# Patient Record
Sex: MALE | Race: BLACK OR AFRICAN AMERICAN | NOT HISPANIC OR LATINO | Employment: OTHER | URBAN - METROPOLITAN AREA
[De-identification: names, ages, dates, MRNs, and addresses within clinical notes are randomized per-mention and may not be internally consistent; named-entity substitution may affect disease eponyms.]

---

## 2017-12-07 ENCOUNTER — OFFICE VISIT (OUTPATIENT)
Dept: RHEUMATOLOGY | Facility: CLINIC | Age: 67
End: 2017-12-07
Payer: MEDICARE

## 2017-12-07 VITALS
DIASTOLIC BLOOD PRESSURE: 72 MMHG | HEIGHT: 72 IN | WEIGHT: 177.69 LBS | BODY MASS INDEX: 24.07 KG/M2 | HEART RATE: 62 BPM | SYSTOLIC BLOOD PRESSURE: 144 MMHG

## 2017-12-07 DIAGNOSIS — M06.9 RHEUMATOID ARTHRITIS, INVOLVING UNSPECIFIED SITE, UNSPECIFIED RHEUMATOID FACTOR PRESENCE: Primary | ICD-10-CM

## 2017-12-07 DIAGNOSIS — B18.2 HEPATITIS C CARRIER: ICD-10-CM

## 2017-12-07 DIAGNOSIS — G56.00 CARPAL TUNNEL SYNDROME, UNSPECIFIED LATERALITY: ICD-10-CM

## 2017-12-07 PROCEDURE — 96372 THER/PROPH/DIAG INJ SC/IM: CPT | Mod: S$GLB,,, | Performed by: INTERNAL MEDICINE

## 2017-12-07 PROCEDURE — 99999 PR PBB SHADOW E&M-EST. PATIENT-LVL III: CPT | Mod: PBBFAC,,, | Performed by: INTERNAL MEDICINE

## 2017-12-07 PROCEDURE — 99205 OFFICE O/P NEW HI 60 MIN: CPT | Mod: 25,S$GLB,, | Performed by: INTERNAL MEDICINE

## 2017-12-07 RX ORDER — CLONAZEPAM 1 MG/1
TABLET ORAL
COMMUNITY
Start: 2017-11-20 | End: 2018-06-13

## 2017-12-07 RX ORDER — TIZANIDINE 4 MG/1
4 TABLET ORAL
COMMUNITY
End: 2017-12-07 | Stop reason: SDUPTHER

## 2017-12-07 RX ORDER — SILDENAFIL 100 MG/1
100 TABLET, FILM COATED ORAL
COMMUNITY
Start: 2014-04-21 | End: 2018-03-15 | Stop reason: SDUPTHER

## 2017-12-07 RX ORDER — TIZANIDINE 4 MG/1
4 TABLET ORAL EVERY 8 HOURS
Qty: 90 TABLET | Refills: 5 | Status: SHIPPED | OUTPATIENT
Start: 2017-12-07 | End: 2018-05-03 | Stop reason: SDUPTHER

## 2017-12-07 RX ORDER — ENALAPRIL MALEATE 20 MG/1
20 TABLET ORAL 2 TIMES DAILY
COMMUNITY
Start: 2014-09-05 | End: 2019-05-24 | Stop reason: SDUPTHER

## 2017-12-07 RX ORDER — OMEPRAZOLE 20 MG/1
20 CAPSULE, DELAYED RELEASE ORAL
COMMUNITY
Start: 2015-02-27 | End: 2019-08-09 | Stop reason: SDUPTHER

## 2017-12-07 RX ORDER — ALBUTEROL SULFATE 90 UG/1
1 AEROSOL, METERED RESPIRATORY (INHALATION) EVERY 4 HOURS PRN
COMMUNITY
Start: 2016-08-14 | End: 2021-10-27 | Stop reason: SDUPTHER

## 2017-12-07 RX ORDER — KETOROLAC TROMETHAMINE 30 MG/ML
60 INJECTION, SOLUTION INTRAMUSCULAR; INTRAVENOUS
Status: COMPLETED | OUTPATIENT
Start: 2017-12-07 | End: 2017-12-07

## 2017-12-07 RX ORDER — FUROSEMIDE 20 MG/1
20 TABLET ORAL EVERY MORNING
COMMUNITY
Start: 2017-12-01 | End: 2019-01-29

## 2017-12-07 RX ORDER — PRAVASTATIN SODIUM 20 MG/1
TABLET ORAL
COMMUNITY
Start: 2014-09-09 | End: 2019-08-09 | Stop reason: SDUPTHER

## 2017-12-07 RX ORDER — DICYCLOMINE HYDROCHLORIDE 20 MG/1
20 TABLET ORAL 3 TIMES DAILY PRN
COMMUNITY
Start: 2017-10-26 | End: 2019-02-18

## 2017-12-07 RX ORDER — METHYLPREDNISOLONE ACETATE 80 MG/ML
160 INJECTION, SUSPENSION INTRA-ARTICULAR; INTRALESIONAL; INTRAMUSCULAR; SOFT TISSUE
Status: COMPLETED | OUTPATIENT
Start: 2017-12-07 | End: 2017-12-07

## 2017-12-07 RX ORDER — PREDNISONE 5 MG/1
10 TABLET ORAL DAILY PRN
Qty: 30 TABLET | Refills: 3 | Status: SHIPPED | OUTPATIENT
Start: 2017-12-07 | End: 2018-03-04 | Stop reason: SDUPTHER

## 2017-12-07 RX ORDER — DICLOFENAC SODIUM 10 MG/G
2 GEL TOPICAL DAILY
Qty: 100 G | Refills: 5 | Status: SHIPPED | OUTPATIENT
Start: 2017-12-07 | End: 2018-03-15 | Stop reason: SDUPTHER

## 2017-12-07 RX ORDER — TRAVOPROST OPHTHALMIC SOLUTION 0.04 MG/ML
SOLUTION OPHTHALMIC
COMMUNITY
Start: 2017-02-09 | End: 2019-04-02 | Stop reason: SDUPTHER

## 2017-12-07 RX ORDER — HYDROCODONE BITARTRATE AND ACETAMINOPHEN 10; 325 MG/1; MG/1
TABLET ORAL
COMMUNITY
Start: 2017-11-29 | End: 2017-12-07 | Stop reason: SDUPTHER

## 2017-12-07 RX ORDER — ATENOLOL 100 MG/1
50 TABLET ORAL
COMMUNITY
Start: 2017-09-22 | End: 2019-01-29

## 2017-12-07 RX ORDER — HYDRALAZINE HYDROCHLORIDE 10 MG/1
TABLET, FILM COATED ORAL
COMMUNITY
Start: 2015-02-27 | End: 2019-08-09 | Stop reason: SDUPTHER

## 2017-12-07 RX ORDER — AMLODIPINE BESYLATE 10 MG/1
10 TABLET ORAL EVERY MORNING
COMMUNITY
Start: 2014-09-05 | End: 2019-08-09 | Stop reason: SDUPTHER

## 2017-12-07 RX ORDER — HYDROCODONE BITARTRATE AND ACETAMINOPHEN 10; 325 MG/1; MG/1
1 TABLET ORAL EVERY 8 HOURS PRN
Qty: 90 TABLET | Refills: 0 | Status: SHIPPED | OUTPATIENT
Start: 2017-12-07 | End: 2018-01-06

## 2017-12-07 RX ADMIN — KETOROLAC TROMETHAMINE 60 MG: 30 INJECTION, SOLUTION INTRAMUSCULAR; INTRAVENOUS at 01:12

## 2017-12-07 RX ADMIN — METHYLPREDNISOLONE ACETATE 160 MG: 80 INJECTION, SUSPENSION INTRA-ARTICULAR; INTRALESIONAL; INTRAMUSCULAR; SOFT TISSUE at 01:12

## 2017-12-07 NOTE — PROGRESS NOTES
Administered 2 cc ( 80 mg/ml ) of depomedrol to the right upper outer gluteal. Informed of s/s to report verbalized understanding. No adverse reactions noted.    Lot # [id number]  Expiration 08/2018    Administered 2 cc ( 30 mg/ml ) of toradol to the left upper outer gluteal. Informed of s/s to report verbalized understanding. No adverse reactions noted.    Lot # [id number]  Expiration 05/19

## 2018-01-23 RX ORDER — HYDROCODONE BITARTRATE AND ACETAMINOPHEN 10; 325 MG/1; MG/1
1 TABLET ORAL EVERY 8 HOURS PRN
Qty: 90 TABLET | Refills: 0 | Status: SHIPPED | OUTPATIENT
Start: 2018-01-23 | End: 2018-02-19 | Stop reason: SDUPTHER

## 2018-01-23 RX ORDER — HYDROCODONE BITARTRATE AND ACETAMINOPHEN 10; 325 MG/1; MG/1
TABLET ORAL
COMMUNITY
Start: 2017-12-26 | End: 2018-01-23 | Stop reason: SDUPTHER

## 2018-01-23 RX ORDER — HYDROCODONE BITARTRATE AND ACETAMINOPHEN 10; 325 MG/1; MG/1
TABLET ORAL
Status: CANCELLED | OUTPATIENT
Start: 2018-01-23

## 2018-01-23 NOTE — TELEPHONE ENCOUNTER
----- Message from Sena Perez sent at 1/22/2018 10:58 AM CST -----  Contact: [phone]  Refill :Hydrocodone please send to [facility] - [place], [place] - [address] [facility]

## 2018-02-19 ENCOUNTER — TELEPHONE (OUTPATIENT)
Dept: RHEUMATOLOGY | Facility: CLINIC | Age: 68
End: 2018-02-19

## 2018-02-19 RX ORDER — HYDROXYCHLOROQUINE SULFATE 200 MG/1
200 TABLET, FILM COATED ORAL 2 TIMES DAILY
Qty: 60 TABLET | Refills: 5 | Status: SHIPPED | OUTPATIENT
Start: 2018-02-19 | End: 2018-07-16 | Stop reason: SDUPTHER

## 2018-02-19 RX ORDER — HYDROCODONE BITARTRATE AND ACETAMINOPHEN 10; 325 MG/1; MG/1
1 TABLET ORAL EVERY 8 HOURS PRN
Qty: 90 TABLET | Refills: 0 | Status: SHIPPED | OUTPATIENT
Start: 2018-02-19 | End: 2018-03-15 | Stop reason: SDUPTHER

## 2018-03-04 DIAGNOSIS — G56.00 CARPAL TUNNEL SYNDROME, UNSPECIFIED LATERALITY: ICD-10-CM

## 2018-03-04 DIAGNOSIS — M06.9 RHEUMATOID ARTHRITIS, INVOLVING UNSPECIFIED SITE, UNSPECIFIED RHEUMATOID FACTOR PRESENCE: ICD-10-CM

## 2018-03-05 RX ORDER — PREDNISONE 5 MG/1
10 TABLET ORAL DAILY PRN
Qty: 60 TABLET | Refills: 3 | Status: SHIPPED | OUTPATIENT
Start: 2018-03-05 | End: 2018-06-29 | Stop reason: SDUPTHER

## 2018-03-15 ENCOUNTER — TELEPHONE (OUTPATIENT)
Dept: PHARMACY | Facility: CLINIC | Age: 68
End: 2018-03-15

## 2018-03-15 ENCOUNTER — OFFICE VISIT (OUTPATIENT)
Dept: RHEUMATOLOGY | Facility: CLINIC | Age: 68
End: 2018-03-15
Payer: MEDICARE

## 2018-03-15 VITALS
HEART RATE: 63 BPM | BODY MASS INDEX: 23.61 KG/M2 | SYSTOLIC BLOOD PRESSURE: 157 MMHG | DIASTOLIC BLOOD PRESSURE: 82 MMHG | WEIGHT: 174.06 LBS

## 2018-03-15 DIAGNOSIS — E03.9 HYPOTHYROIDISM, UNSPECIFIED TYPE: ICD-10-CM

## 2018-03-15 DIAGNOSIS — B18.2 HEPATITIS C CARRIER: ICD-10-CM

## 2018-03-15 DIAGNOSIS — M06.9 RHEUMATOID ARTHRITIS, INVOLVING UNSPECIFIED SITE, UNSPECIFIED RHEUMATOID FACTOR PRESENCE: Primary | ICD-10-CM

## 2018-03-15 DIAGNOSIS — M17.9 OSTEOARTHRITIS OF KNEE, UNSPECIFIED LATERALITY, UNSPECIFIED OSTEOARTHRITIS TYPE: ICD-10-CM

## 2018-03-15 DIAGNOSIS — G56.00 CARPAL TUNNEL SYNDROME, UNSPECIFIED LATERALITY: ICD-10-CM

## 2018-03-15 PROCEDURE — 96372 THER/PROPH/DIAG INJ SC/IM: CPT | Mod: S$GLB,,, | Performed by: INTERNAL MEDICINE

## 2018-03-15 PROCEDURE — 99999 PR PBB SHADOW E&M-EST. PATIENT-LVL III: CPT | Mod: PBBFAC,,, | Performed by: INTERNAL MEDICINE

## 2018-03-15 PROCEDURE — 99214 OFFICE O/P EST MOD 30 MIN: CPT | Mod: 25,S$GLB,, | Performed by: INTERNAL MEDICINE

## 2018-03-15 RX ORDER — DICLOFENAC SODIUM 10 MG/G
2 GEL TOPICAL 4 TIMES DAILY
Qty: 100 G | Refills: 5 | Status: SHIPPED | OUTPATIENT
Start: 2018-03-15 | End: 2018-03-23 | Stop reason: SDUPTHER

## 2018-03-15 RX ORDER — BRIMONIDINE TARTRATE 1.5 MG/ML
1 SOLUTION/ DROPS OPHTHALMIC 2 TIMES DAILY
COMMUNITY
Start: 2018-02-27 | End: 2018-10-17 | Stop reason: ALTCHOICE

## 2018-03-15 RX ORDER — SILDENAFIL 100 MG/1
100 TABLET, FILM COATED ORAL
Qty: 10 TABLET | Refills: 11 | Status: ON HOLD | OUTPATIENT
Start: 2018-03-15 | End: 2018-10-18 | Stop reason: CLARIF

## 2018-03-15 RX ORDER — HYDROCODONE BITARTRATE AND ACETAMINOPHEN 10; 325 MG/1; MG/1
1 TABLET ORAL EVERY 8 HOURS PRN
Qty: 90 TABLET | Refills: 0 | Status: SHIPPED | OUTPATIENT
Start: 2018-04-16 | End: 2018-06-13

## 2018-03-15 RX ORDER — HYDROCODONE BITARTRATE AND ACETAMINOPHEN 10; 325 MG/1; MG/1
1 TABLET ORAL EVERY 8 HOURS PRN
Qty: 90 TABLET | Refills: 0
Start: 2018-05-15 | End: 2018-03-15 | Stop reason: SDUPTHER

## 2018-03-15 RX ORDER — HYDROCODONE BITARTRATE AND ACETAMINOPHEN 10; 325 MG/1; MG/1
1 TABLET ORAL EVERY 8 HOURS PRN
Qty: 90 TABLET | Refills: 0 | Status: SHIPPED | OUTPATIENT
Start: 2018-05-15 | End: 2018-06-13

## 2018-03-15 RX ORDER — HYDROCODONE BITARTRATE AND ACETAMINOPHEN 10; 325 MG/1; MG/1
1 TABLET ORAL EVERY 8 HOURS PRN
Qty: 90 TABLET | Refills: 0 | Status: SHIPPED | OUTPATIENT
Start: 2018-03-15 | End: 2018-06-13 | Stop reason: SDUPTHER

## 2018-03-15 RX ORDER — KETOROLAC TROMETHAMINE 30 MG/ML
60 INJECTION, SOLUTION INTRAMUSCULAR; INTRAVENOUS
Status: COMPLETED | OUTPATIENT
Start: 2018-03-15 | End: 2018-03-15

## 2018-03-15 RX ORDER — METHYLPREDNISOLONE ACETATE 80 MG/ML
160 INJECTION, SUSPENSION INTRA-ARTICULAR; INTRALESIONAL; INTRAMUSCULAR; SOFT TISSUE
Status: COMPLETED | OUTPATIENT
Start: 2018-03-15 | End: 2018-03-15

## 2018-03-15 RX ORDER — DORZOLAMIDE HCL 20 MG/ML
1 SOLUTION/ DROPS OPHTHALMIC 3 TIMES DAILY
COMMUNITY
Start: 2018-03-06 | End: 2019-02-06

## 2018-03-15 RX ADMIN — KETOROLAC TROMETHAMINE 60 MG: 30 INJECTION, SOLUTION INTRAMUSCULAR; INTRAVENOUS at 01:03

## 2018-03-15 RX ADMIN — METHYLPREDNISOLONE ACETATE 160 MG: 80 INJECTION, SUSPENSION INTRA-ARTICULAR; INTRALESIONAL; INTRAMUSCULAR; SOFT TISSUE at 01:03

## 2018-03-15 ASSESSMENT — ROUTINE ASSESSMENT OF PATIENT INDEX DATA (RAPID3)
MDHAQ FUNCTION SCORE: 1.5
TOTAL RAPID3 SCORE: 5
PAIN SCORE: 7
PATIENT GLOBAL ASSESSMENT SCORE: 3
FATIGUE SCORE: 5
WHEN YOU AWAKENED IN THE MORNING OVER THE LAST WEEK, PLEASE INDICATE THE AMOUNT OF TIME IT TAKES UNTIL YOU ARE AS LIMBER AS YOU WILL BE FOR THE DAY: 3 HOURS AFTER WAKING
PSYCHOLOGICAL DISTRESS SCORE: 3.3
AM STIFFNESS SCORE: 1, YES

## 2018-03-15 NOTE — PROGRESS NOTES
Administered 2 cc ( 30 mg/ml ) of toradol to the left upper outer gluteal. Informed of s/s to report verbalized understanding. No adverse reactions noted.    Lot # [id number]  Expiration 1 sep 2019    Administered 2 cc ( 80 mg/ml ) of depomedrol to the right upper outer gluteal. Informed of s/s to report verbalized understanding. No adverse reactions noted.    Lot # [id number]  Expiration 02/2019

## 2018-03-15 NOTE — PROGRESS NOTES
Subjective:       Patient ID: Scooter Swan is a 67 y.o. male.    Chief Complaint: Follow-up          Rheumatoid Arthritis    The disease course has been worsening. Compliance with total regimen is %.      She complains of pain, stiffness and joint swelling. The symptoms have been worsening. Affected locations include the right shoulder, left shoulder, left knee, right knee, left PIP, left DIP, right PIP, right DIP and right MCP. Associated symptoms include pain at night, dry eyes and jaw pain. She complains of morning stiffness.The neck, left shoulder, right shoulder, left elbow, right elbow, left wrist, right wrist, left hand, right hand, left hip, right hip, left knee, right knee, left ankle and right ankle presents with Arthralgia.        Review of Systems   Constitutional: Positive for activity change. Negative for appetite change, chills, diaphoresis and unexpected weight change.   HENT: Negative for congestion, ear pain, facial swelling, mouth sores, nosebleeds, postnasal drip, rhinorrhea, sinus pressure, sneezing, sore throat, tinnitus and voice change.    Eyes: Negative for pain, discharge, redness, itching and visual disturbance.   Respiratory: Negative for apnea, cough, chest tightness, shortness of breath and wheezing.    Cardiovascular: Negative for chest pain, palpitations and leg swelling.   Gastrointestinal: Negative for abdominal pain, constipation, diarrhea, nausea and vomiting.   Endocrine: Negative for cold intolerance, heat intolerance, polydipsia and polyuria.   Genitourinary: Negative for decreased urine volume, difficulty urinating, flank pain, frequency, hematuria and urgency.   Musculoskeletal: Positive for back pain, gait problem, neck pain and neck stiffness. Negative for arthralgias.   Skin: Positive for rash. Negative for pallor and wound.   Allergic/Immunologic: Negative for immunocompromised state.   Neurological: Negative for dizziness, tremors, seizures, syncope, weakness and  numbness.   Hematological: Negative for adenopathy. Does not bruise/bleed easily.   Psychiatric/Behavioral: Negative for sleep disturbance and suicidal ideas. The patient is not nervous/anxious.          Objective:   BP (!) 157/82 (BP Location: Left arm, Patient Position: Sitting, BP Method: Large (Automatic))   Pulse 63   Wt 78.9 kg (174 lb 0.9 oz)   BMI 23.61 kg/m²      Physical Exam   Vitals reviewed.  Constitutional: He is oriented to person, place, and time and well-developed, well-nourished, and in no distress.   HENT:   Head: Normocephalic and atraumatic.   Mouth/Throat: Oropharynx is clear and moist.   Eyes: EOM are normal. Pupils are equal, round, and reactive to light.   Neck: Neck supple. No thyromegaly present.   Cardiovascular: Normal rate, regular rhythm and normal heart sounds.  Exam reveals no gallop and no friction rub.    No murmur heard.  Pulmonary/Chest: Breath sounds normal. He has no wheezes. He has no rales. He exhibits no tenderness.   Abdominal: There is no tenderness. There is no rebound and no guarding.       Right Side Rheumatological Exam     The patient is tender to palpation of the shoulder, elbow, wrist, knee, 1st PIP, 1st MCP, 2nd PIP, 2nd MCP, 3rd PIP, 3rd MCP, 4th PIP, 4th MCP and 5th PIP    He has swelling of the elbow, wrist, knee, 1st PIP, 1st MCP, 2nd PIP, 2nd MCP, 3rd PIP, 3rd MCP, 4th PIP, 4th MCP, 5th PIP and 5th MCP    The patient has an enlarged wrist and knee    Shoulder Exam   Tenderness Location: no tenderness    Range of Motion   Active Abduction: abnormal   Adduction: abnormal  Sensation: normal    Knee Exam   Tenderness Location: medial joint line and LCL  Patellofemoral Crepitus: positive  Effusion: positive  Sensation: normal    Hip Exam   Tenderness Location: posterior and anterior  Sensation: normal    Elbow/Wrist Exam   Tenderness Location: no tenderness  Sensation: normal    Left Side Rheumatological Exam     The patient is tender to palpation of the  shoulder, elbow, wrist, knee, 1st PIP, 1st MCP, 2nd PIP, 2nd MCP, 3rd PIP, 3rd MCP, 4th PIP, 4th MCP, 5th PIP and 5th MCP.    He has swelling of the wrist, knee, 1st PIP, 1st MCP, 2nd PIP, 2nd MCP, 3rd PIP, 3rd MCP, 4th PIP, 4th MCP, 5th PIP and 5th MCP    The patient has an enlarged knee.    Shoulder Exam   Tenderness Location: no tenderness    Range of Motion   Active Abduction: abnormal   Sensation: normal    Knee Exam   Tenderness Location: lateral joint line and medial joint line    Patellofemoral Crepitus: positive  Effusion: positive  Sensation: normal    Hip Exam   Tenderness Location: posterior and anterior  Sensation: normal    Elbow/Wrist Exam   Sensation: normal      Back/Neck Exam   General Inspection   Gait: normal       Tenderness Right paramedian tenderness of the Upper C-Spine, Lower C-Spine, Lower L-Spine and SI Joint.Left paramedian tenderness of the Upper C-Spine, Lower L-Spine, Lower C-Spine and SI Joint.    Neck Range of Motion   Flexion: Limited  Extension: Limited  Lymphadenopathy:     He has no cervical adenopathy.   Neurological: He is alert and oriented to person, place, and time. Gait normal.   Skin: No rash noted. No erythema. No pallor.     Psychiatric: Mood and affect normal.   Musculoskeletal: He exhibits edema, tenderness and deformity.           Component Name Value Ref Range   Test Requested see below   Comment: QUANTIFERON TB GOLD     Quantiferon TB Gold NEGATIVE   Comment:  Negative test result. M. tuberculosis complex  infection unlikely. NEGATIVE    NIL 0.04 [IU]/mL   Mitogen-nil >10.00 [IU]/mL    TB-nil <0.00   Comment:  The Nil tube value is used to determine if the patient  has a preexisting immune response which could cause a  false-positive reading on the test. In order for a  test to be valid, the Nil tube must have a value of  less than or equal to 8.0 IU/mL.  The mitogen control tube is used to assure the patient  has a healthy immune status and also serves as  a  control for correct blood handling and incubation. It  is used to detect false-negative readings. The mitogen  tube must have a gamma interferon value of greater  than or equal to 0.5 IU/mL higher than the value of  the Nil tube.  The TB antigen tube is coated with the M. tuberculosis  specific antigens. For a test to be considered  positive, the TB antigen tube value minus the Nil tube  value must be greater than or equal to 0.35 IU/mL.  For additional information, please refer to  http://education.Khush/faq/QFT  (This link is being provided for informational/  educational purposes only.)  TEST PERFORMED AT:  [facility] [address] [address]  JOVANI LORENZANA M.D. [IU]/mL    Specimen   Blood         Assessment:       1. Rheumatoid arthritis, involving unspecified site, unspecified rheumatoid factor presence    2. Carpal tunnel syndrome, unspecified laterality    3. Hepatitis C carrier    4. Osteoarthritis of knee, unspecified laterality, unspecified osteoarthritis type            Plan:       Scooter was seen today for follow-up.    Diagnoses and all orders for this visit:    Rheumatoid arthritis, involving unspecified site, unspecified rheumatoid factor presence  -     hydrocodone-acetaminophen 10-325mg (NORCO)  mg Tab; Take 1 tablet by mouth every 8 (eight) hours as needed for Pain.  -     hydrocodone-acetaminophen 10-325mg (NORCO)  mg Tab; Take 1 tablet by mouth every 8 (eight) hours as needed for Pain.  -     Discontinue: hydrocodone-acetaminophen 10-325mg (NORCO)  mg Tab; Take 1 tablet by mouth every 8 (eight) hours as needed for Pain.  -     hydrocodone-acetaminophen 10-325mg (NORCO)  mg Tab; Take 1 tablet by mouth every 8 (eight) hours as needed for Pain.  -     Discontinue: diclofenac sodium 1 % Gel; Apply 2 g topically 4 (four) times daily.  -     Quantiferon Gold TB; Future  -     Quantiferon Gold TB  -     etanercept (ENBREL MINI)  50 mg/mL (0.98 mL) Crtg; Inject 1 each into the skin every 7 days.  -     Comprehensive metabolic panel; Future  -     CBC auto differential; Future  -     Hepatitis C RNA, quantitative, PCR; Future  -     Comprehensive metabolic panel  -     CBC auto differential  -     Hepatitis C RNA, quantitative, PCR  -     methylPREDNISolone acetate injection 160 mg; Inject 2 mLs (160 mg total) into the muscle one time.  -     ketorolac injection 60 mg; Inject 2 mLs (60 mg total) into the muscle one time.  -     TSH; Future  -     TSH    Carpal tunnel syndrome, unspecified laterality  -     hydrocodone-acetaminophen 10-325mg (NORCO)  mg Tab; Take 1 tablet by mouth every 8 (eight) hours as needed for Pain.  -     hydrocodone-acetaminophen 10-325mg (NORCO)  mg Tab; Take 1 tablet by mouth every 8 (eight) hours as needed for Pain.  -     Discontinue: hydrocodone-acetaminophen 10-325mg (NORCO)  mg Tab; Take 1 tablet by mouth every 8 (eight) hours as needed for Pain.  -     hydrocodone-acetaminophen 10-325mg (NORCO)  mg Tab; Take 1 tablet by mouth every 8 (eight) hours as needed for Pain.  -     Discontinue: diclofenac sodium 1 % Gel; Apply 2 g topically 4 (four) times daily.  -     Quantiferon Gold TB; Future  -     Quantiferon Gold TB  -     etanercept (ENBREL MINI) 50 mg/mL (0.98 mL) Crtg; Inject 1 each into the skin every 7 days.  -     Comprehensive metabolic panel; Future  -     CBC auto differential; Future  -     Hepatitis C RNA, quantitative, PCR; Future  -     Comprehensive metabolic panel  -     CBC auto differential  -     Hepatitis C RNA, quantitative, PCR  -     methylPREDNISolone acetate injection 160 mg; Inject 2 mLs (160 mg total) into the muscle one time.  -     ketorolac injection 60 mg; Inject 2 mLs (60 mg total) into the muscle one time.  -     TSH; Future  -     TSH    Hepatitis C carrier  Comments:  treated with Harvoni  Orders:  -     hydrocodone-acetaminophen 10-325mg (NORCO)   mg Tab; Take 1 tablet by mouth every 8 (eight) hours as needed for Pain.  -     hydrocodone-acetaminophen 10-325mg (NORCO)  mg Tab; Take 1 tablet by mouth every 8 (eight) hours as needed for Pain.  -     Discontinue: hydrocodone-acetaminophen 10-325mg (NORCO)  mg Tab; Take 1 tablet by mouth every 8 (eight) hours as needed for Pain.  -     hydrocodone-acetaminophen 10-325mg (NORCO)  mg Tab; Take 1 tablet by mouth every 8 (eight) hours as needed for Pain.  -     Quantiferon Gold TB; Future  -     Quantiferon Gold TB  -     etanercept (ENBREL MINI) 50 mg/mL (0.98 mL) Crtg; Inject 1 each into the skin every 7 days.  -     Comprehensive metabolic panel; Future  -     CBC auto differential; Future  -     Hepatitis C RNA, quantitative, PCR; Future  -     Comprehensive metabolic panel  -     CBC auto differential  -     Hepatitis C RNA, quantitative, PCR  -     methylPREDNISolone acetate injection 160 mg; Inject 2 mLs (160 mg total) into the muscle one time.  -     ketorolac injection 60 mg; Inject 2 mLs (60 mg total) into the muscle one time.  -     TSH; Future  -     TSH    Osteoarthritis of knee, unspecified laterality, unspecified osteoarthritis type  -     Discontinue: diclofenac sodium 1 % Gel; Apply 2 g topically 4 (four) times daily.  -     Quantiferon Gold TB; Future  -     Quantiferon Gold TB  -     etanercept (ENBREL MINI) 50 mg/mL (0.98 mL) Crtg; Inject 1 each into the skin every 7 days.  -     Comprehensive metabolic panel; Future  -     CBC auto differential; Future  -     Hepatitis C RNA, quantitative, PCR; Future  -     Comprehensive metabolic panel  -     CBC auto differential  -     Hepatitis C RNA, quantitative, PCR  -     methylPREDNISolone acetate injection 160 mg; Inject 2 mLs (160 mg total) into the muscle one time.  -     ketorolac injection 60 mg; Inject 2 mLs (60 mg total) into the muscle one time.  -     TSH; Future  -     TSH    Hypothyroidism, unspecified type  -     TSH;  Future  -     TSH    Other orders  -     sildenafil (VIAGRA) 100 MG tablet; Take 1 tablet (100 mg total) by mouth as needed for Erectile Dysfunction.

## 2018-03-21 NOTE — TELEPHONE ENCOUNTER
"Patient wife Jacqueline called regarding the new RX that was sent over. Informed her the Rx was Enbrel and it does need authorization before we can give a cost to her. She asked if it was "that needle", told her it was an inject-able med and if that was not something they were comfortable with pursuing they would need to talk to the provider. Also stated if he had questions regarding the medication he can speak to one of our pharmacists. - NICK  "

## 2018-03-23 DIAGNOSIS — M17.9 OSTEOARTHRITIS OF KNEE, UNSPECIFIED LATERALITY, UNSPECIFIED OSTEOARTHRITIS TYPE: ICD-10-CM

## 2018-03-23 DIAGNOSIS — G56.00 CARPAL TUNNEL SYNDROME, UNSPECIFIED LATERALITY: ICD-10-CM

## 2018-03-23 DIAGNOSIS — M06.9 RHEUMATOID ARTHRITIS, INVOLVING UNSPECIFIED SITE, UNSPECIFIED RHEUMATOID FACTOR PRESENCE: ICD-10-CM

## 2018-03-23 RX ORDER — DICLOFENAC SODIUM 10 MG/G
2 GEL TOPICAL 4 TIMES DAILY
Qty: 100 G | Refills: 5 | Status: SHIPPED | OUTPATIENT
Start: 2018-03-23 | End: 2019-10-09 | Stop reason: SDUPTHER

## 2018-03-29 ENCOUNTER — TELEPHONE (OUTPATIENT)
Dept: PHARMACY | Facility: CLINIC | Age: 68
End: 2018-03-29

## 2018-03-29 NOTE — TELEPHONE ENCOUNTER
Called and spoke with patient, Scooter Swan, notifying him that PA was required on his medication Diclofenac Sodium Gel 1%.    Explained that PA has been submitted to his insurance and a decision can take up to 72 hours.    Patient verbalized understanding and was thankful for the call.    Please let me know if you any questions.     Thanks,   Bere Keller  Patient Care Advocate   [facility]  Phone: [phone]  Fax: [phone]

## 2018-04-17 NOTE — TELEPHONE ENCOUNTER
Good Morning,  Mr. Swan's insurance company has denied the prior authorization for Enbrel. Formulary alternatives are Humira (Tier 5), MTX, Xeljanz, Rituxan, Remicade, Leflunomide.  Please let [facility] know how you would like to proceed.    Thanks,  Judy Best, PharmD  [facility]  [phone]

## 2018-04-24 ENCOUNTER — TELEPHONE (OUTPATIENT)
Dept: PHARMACY | Facility: CLINIC | Age: 68
End: 2018-04-24

## 2018-04-27 ENCOUNTER — TELEPHONE (OUTPATIENT)
Dept: RHEUMATOLOGY | Facility: CLINIC | Age: 68
End: 2018-04-27

## 2018-04-27 NOTE — TELEPHONE ENCOUNTER
----- Message from Tess Hoang sent at 4/27/2018  4:41 PM CDT -----  Contact: Rhett/[facility]  Rhett called to speak with the nurse about the PA request that they received. She needs more information. She can be contacted at [phone].    Thanks,  Tess

## 2018-04-30 NOTE — TELEPHONE ENCOUNTER
DOCUMENTATION ONLY:  Prior authorization for Xelariez approved from 4/30/18 to 12/31/18    Co-pay: $3.70    Patient Assistance is not required

## 2018-05-01 NOTE — TELEPHONE ENCOUNTER
Initial Xejanz consult completed on 18 . Xeljanz will be shipped on 18 to arrive at patient's home on 5/3/18 via [employer]. $ 3.70 copay. Address confirmed. Confirmed 2 patient identifiers - name and . Therapy Appropriate.     Patient was counseled on the administration directions for Xeljanz:  -Take 1 tablet (11 mg) by mouth daily, with or without food  -If dose is missed, skip the missed dose and go back to your normal time.  Do not take 2 doses at the same time or extra doses    Patient was counseled on the following possible side effects, which include, but are not limited to:   -diarrhea, headache, cold like symptoms - runny nose, stuffy nose, sore throat.  Contact provider if allergic reaction, changes in heartbeat, muscle pain or weakness, signs of infection, liver problems - dark urine, fatigue, light-colored stools, yellow skin or eyes.       DDIs:  Medication list reviewed.       Patient confirmed understanding. Patient did not have additional questions.  Patient will start Xeljanz after verification with prescriber. Consultation included: indication; goals of treatment; administration; storage and handling; side effects; how to handle side effects; the importance of compliance; how to handle missed doses; the importance of laboratory monitoring; the importance of keeping all follow up appointments.  Patient understands to report any medication changes to [facility] and provider. All questions answered and addressed to patients satisfaction. I will f/u with her in 1 week from start, [facility] to contact patient in 3 weeks for refills.

## 2018-05-03 DIAGNOSIS — G56.00 CARPAL TUNNEL SYNDROME, UNSPECIFIED LATERALITY: ICD-10-CM

## 2018-05-03 DIAGNOSIS — M06.9 RHEUMATOID ARTHRITIS, INVOLVING UNSPECIFIED SITE, UNSPECIFIED RHEUMATOID FACTOR PRESENCE: ICD-10-CM

## 2018-05-03 RX ORDER — TIZANIDINE 4 MG/1
4 TABLET ORAL EVERY 8 HOURS
Qty: 90 TABLET | Refills: 3 | Status: SHIPPED | OUTPATIENT
Start: 2018-05-03 | End: 2018-08-28 | Stop reason: SDUPTHER

## 2018-05-03 NOTE — TELEPHONE ENCOUNTER
Returned patient's call regarding xeljanz. Patient has concerns about starting the medication. Patient states contraindications are concerning to him because he has past history of hep c, stomach ulcers, sob, copd,muscle cramps. These are all contraindications for xeljanz. Patient needs to know if he should start medication.

## 2018-05-03 NOTE — TELEPHONE ENCOUNTER
----- Message from Janny Anguiano sent at 5/3/2018 11:10 AM CDT -----  Contact: wife, Jacqueline wSan  Patient has questions about a new medication Xeljan. Please call patient at [phone]. Thanks!

## 2018-05-18 ENCOUNTER — TELEPHONE (OUTPATIENT)
Dept: RHEUMATOLOGY | Facility: CLINIC | Age: 68
End: 2018-05-18

## 2018-05-18 NOTE — TELEPHONE ENCOUNTER
----- Message from Lai Albarran sent at 5/18/2018 12:37 PM CDT -----  Contact: Jacqueline (wife)  Name of Who is Calling: Jacqueline (wife)      What is the request in detail: Would like to speak with staff in regards to medication that was giving to spouse      Can the clinic reply by [facility]: no      What Number to Call Back if not in [facility]: [phone]

## 2018-05-18 NOTE — TELEPHONE ENCOUNTER
Spoke to pt, advises that he received the xeljanz and was reading on it that he can not be on it if had hepatitis or COPD. Pt advises just got over hepatitis C and has COPD. Advised we can try again to get Enbrel approved. Pt advises he would like to just remain on Plaquenil as he is scared of the injections. Advised this is fine and we will have medication stopped with pharmacy.

## 2018-05-23 LAB
ALBUMIN SERPL-MCNC: 4.8 G/DL (ref 3.6–5.1)
ALBUMIN/GLOB SERPL: 1.8 (CALC) (ref 1–2.5)
ALP SERPL-CCNC: 56 U/L (ref 40–115)
ALT SERPL-CCNC: 7 U/L (ref 9–46)
AST SERPL-CCNC: 14 U/L (ref 10–35)
BASOPHILS # BLD AUTO: 66 CELLS/UL (ref 0–200)
BASOPHILS NFR BLD AUTO: 0.5 %
BILIRUB SERPL-MCNC: 0.6 MG/DL (ref 0.2–1.2)
BUN SERPL-MCNC: 16 MG/DL (ref 7–25)
BUN/CREAT SERPL: ABNORMAL (CALC) (ref 6–22)
CALCIUM SERPL-MCNC: 9.8 MG/DL (ref 8.6–10.3)
CHLORIDE SERPL-SCNC: 104 MMOL/L (ref 98–110)
CO2 SERPL-SCNC: 27 MMOL/L (ref 20–31)
CREAT SERPL-MCNC: 1.17 MG/DL (ref 0.7–1.25)
EOSINOPHIL # BLD AUTO: 39 CELLS/UL (ref 15–500)
EOSINOPHIL NFR BLD AUTO: 0.3 %
ERYTHROCYTE [DISTWIDTH] IN BLOOD BY AUTOMATED COUNT: 13.1 % (ref 11–15)
GFR SERPL CREATININE-BSD FRML MDRD: 64 ML/MIN/1.73M2
GLOBULIN SER CALC-MCNC: 2.7 G/DL (CALC) (ref 1.9–3.7)
GLUCOSE SERPL-MCNC: 94 MG/DL (ref 65–99)
HCT VFR BLD AUTO: 33.3 % (ref 38.5–50)
HCV RNA SERPL NAA+PROBE-ACNC: NORMAL IU/ML
HCV RNA SERPL NAA+PROBE-LOG IU: NORMAL LOG IU/ML
HGB BLD-MCNC: 11.1 G/DL (ref 13.2–17.1)
LYMPHOCYTES # BLD AUTO: 4611 CELLS/UL (ref 850–3900)
LYMPHOCYTES NFR BLD AUTO: 35.2 %
MCH RBC QN AUTO: 29.3 PG (ref 27–33)
MCHC RBC AUTO-ENTMCNC: 33.3 G/DL (ref 32–36)
MCV RBC AUTO: 87.9 FL (ref 80–100)
MONOCYTES # BLD AUTO: 1847 CELLS/UL (ref 200–950)
MONOCYTES NFR BLD AUTO: 14.1 %
NEUTROPHILS # BLD AUTO: 6537 CELLS/UL (ref 1500–7800)
NEUTROPHILS NFR BLD AUTO: 49.9 %
NOTE: NORMAL
PLATELET # BLD AUTO: 183 THOUSAND/UL (ref 140–400)
PMV BLD REES-ECKER: 13.9 FL (ref 7.5–12.5)
POTASSIUM SERPL-SCNC: 3.8 MMOL/L (ref 3.5–5.3)
PROT SERPL-MCNC: 7.5 G/DL (ref 6.1–8.1)
RBC # BLD AUTO: 3.79 MILLION/UL (ref 4.2–5.8)
SODIUM SERPL-SCNC: 142 MMOL/L (ref 135–146)
TSH SERPL-ACNC: 4.24 MIU/L (ref 0.4–4.5)
WBC # BLD AUTO: 13.1 THOUSAND/UL (ref 3.8–10.8)

## 2018-06-13 ENCOUNTER — OFFICE VISIT (OUTPATIENT)
Dept: RHEUMATOLOGY | Facility: CLINIC | Age: 68
End: 2018-06-13
Payer: MEDICARE

## 2018-06-13 VITALS
HEART RATE: 86 BPM | HEIGHT: 72 IN | WEIGHT: 166.56 LBS | BODY MASS INDEX: 22.56 KG/M2 | DIASTOLIC BLOOD PRESSURE: 72 MMHG | SYSTOLIC BLOOD PRESSURE: 154 MMHG

## 2018-06-13 DIAGNOSIS — B18.2 HEPATITIS C CARRIER: ICD-10-CM

## 2018-06-13 DIAGNOSIS — R52 PAIN MANAGEMENT: ICD-10-CM

## 2018-06-13 DIAGNOSIS — M06.9 RHEUMATOID ARTHRITIS, INVOLVING UNSPECIFIED SITE, UNSPECIFIED RHEUMATOID FACTOR PRESENCE: Primary | ICD-10-CM

## 2018-06-13 DIAGNOSIS — G56.00 CARPAL TUNNEL SYNDROME, UNSPECIFIED LATERALITY: ICD-10-CM

## 2018-06-13 DIAGNOSIS — M17.9 OSTEOARTHRITIS OF KNEE, UNSPECIFIED LATERALITY, UNSPECIFIED OSTEOARTHRITIS TYPE: ICD-10-CM

## 2018-06-13 PROCEDURE — 99999 PR PBB SHADOW E&M-EST. PATIENT-LVL III: CPT | Mod: PBBFAC,,, | Performed by: INTERNAL MEDICINE

## 2018-06-13 PROCEDURE — 96372 THER/PROPH/DIAG INJ SC/IM: CPT | Mod: S$GLB,,, | Performed by: INTERNAL MEDICINE

## 2018-06-13 PROCEDURE — 99214 OFFICE O/P EST MOD 30 MIN: CPT | Mod: 25,S$GLB,, | Performed by: INTERNAL MEDICINE

## 2018-06-13 RX ORDER — KETOROLAC TROMETHAMINE 30 MG/ML
60 INJECTION, SOLUTION INTRAMUSCULAR; INTRAVENOUS
Status: COMPLETED | OUTPATIENT
Start: 2018-06-13 | End: 2018-06-13

## 2018-06-13 RX ORDER — HYDROCODONE BITARTRATE AND ACETAMINOPHEN 10; 325 MG/1; MG/1
1 TABLET ORAL EVERY 8 HOURS PRN
Qty: 90 TABLET | Refills: 0 | Status: SHIPPED | OUTPATIENT
Start: 2018-07-13 | End: 2018-06-13 | Stop reason: SDUPTHER

## 2018-06-13 RX ORDER — METHYLPREDNISOLONE ACETATE 80 MG/ML
160 INJECTION, SUSPENSION INTRA-ARTICULAR; INTRALESIONAL; INTRAMUSCULAR; SOFT TISSUE
Status: COMPLETED | OUTPATIENT
Start: 2018-06-13 | End: 2018-06-13

## 2018-06-13 RX ORDER — HYDROCODONE BITARTRATE AND ACETAMINOPHEN 10; 325 MG/1; MG/1
1 TABLET ORAL EVERY 8 HOURS PRN
Qty: 90 TABLET | Refills: 0 | Status: SHIPPED | OUTPATIENT
Start: 2018-08-13 | End: 2018-09-18 | Stop reason: SDUPTHER

## 2018-06-13 RX ORDER — HYDROCODONE BITARTRATE AND ACETAMINOPHEN 10; 325 MG/1; MG/1
1 TABLET ORAL EVERY 8 HOURS PRN
Qty: 90 TABLET | Refills: 0 | Status: SHIPPED | OUTPATIENT
Start: 2018-06-13 | End: 2018-06-13 | Stop reason: SDUPTHER

## 2018-06-13 RX ADMIN — KETOROLAC TROMETHAMINE 60 MG: 30 INJECTION, SOLUTION INTRAMUSCULAR; INTRAVENOUS at 01:06

## 2018-06-13 RX ADMIN — METHYLPREDNISOLONE ACETATE 160 MG: 80 INJECTION, SUSPENSION INTRA-ARTICULAR; INTRALESIONAL; INTRAMUSCULAR; SOFT TISSUE at 01:06

## 2018-06-13 ASSESSMENT — ROUTINE ASSESSMENT OF PATIENT INDEX DATA (RAPID3)
TOTAL RAPID3 SCORE: 4.89
PSYCHOLOGICAL DISTRESS SCORE: 2.2
MDHAQ FUNCTION SCORE: .8
PAIN SCORE: 6
PATIENT GLOBAL ASSESSMENT SCORE: 6

## 2018-06-13 NOTE — PROGRESS NOTES
Subjective:       Patient ID: Scooter Swan is a 67 y.o. male.    Chief Complaint: Follow-up          Rheumatoid Arthritis    The disease course has been worsening.tried xeljanz but he has breathing problems, h/o hep c and gi issues so he was unable to take.    She complains of pain, stiffness and joint swelling. The symptoms have been worsening. Affected locations include the right shoulder, left shoulder, left knee, right knee, left PIP, left DIP, right PIP, right DIP and right MCP. Associated symptoms include pain at night, dry eyes and jaw pain. She complains of morning stiffness.The neck, left shoulder, right shoulder, left elbow, right elbow, left wrist, right wrist, left hand, right hand, left hip, right hip, left knee, right knee, left ankle and right ankle presents with Arthralgia.        Review of Systems   Constitutional: Positive for activity change. Negative for appetite change, chills, diaphoresis and unexpected weight change.   HENT: Negative for congestion, ear pain, facial swelling, mouth sores, nosebleeds, postnasal drip, rhinorrhea, sinus pressure, sneezing, sore throat, tinnitus and voice change.    Eyes: Negative for pain, discharge, redness, itching and visual disturbance.   Respiratory: Negative for apnea, cough, chest tightness, shortness of breath and wheezing.    Cardiovascular: Negative for chest pain, palpitations and leg swelling.   Gastrointestinal: Negative for abdominal pain, constipation, diarrhea, nausea and vomiting.   Endocrine: Negative for cold intolerance, heat intolerance, polydipsia and polyuria.   Genitourinary: Negative for decreased urine volume, difficulty urinating, flank pain, frequency, hematuria and urgency.   Musculoskeletal: Positive for back pain, gait problem, joint swelling, myalgias, neck pain and neck stiffness. Negative for arthralgias.   Skin: Positive for rash. Negative for pallor and wound.   Allergic/Immunologic: Negative for immunocompromised state.    Neurological: Negative for dizziness, tremors, seizures, syncope, weakness and numbness.   Hematological: Negative for adenopathy. Does not bruise/bleed easily.   Psychiatric/Behavioral: Negative for sleep disturbance and suicidal ideas. The patient is not nervous/anxious.          Objective:   BP (!) 154/72   Pulse 86   Ht 6' (1.829 m)   Wt 75.5 kg (166 lb 8.9 oz)   BMI 22.59 kg/m²      Physical Exam   Vitals reviewed.  Constitutional: He is oriented to person, place, and time and well-developed, well-nourished, and in no distress.   HENT:   Head: Normocephalic and atraumatic.   Mouth/Throat: Oropharynx is clear and moist.   Eyes: EOM are normal. Pupils are equal, round, and reactive to light.   Neck: Neck supple. No thyromegaly present.   Cardiovascular: Normal rate, regular rhythm and normal heart sounds.  Exam reveals no gallop and no friction rub.    No murmur heard.  Pulmonary/Chest: Breath sounds normal. He has no wheezes. He has no rales. He exhibits no tenderness.   Abdominal: There is no tenderness. There is no rebound and no guarding.       Right Side Rheumatological Exam     The patient is tender to palpation of the shoulder, elbow, wrist, knee, 1st PIP, 1st MCP, 2nd PIP, 2nd MCP, 3rd PIP, 3rd MCP, 4th PIP, 4th MCP and 5th PIP    He has swelling of the elbow, wrist, knee, 1st PIP, 1st MCP, 2nd PIP, 2nd MCP, 3rd PIP, 3rd MCP, 4th PIP, 4th MCP, 5th PIP and 5th MCP    The patient has an enlarged wrist and knee    Shoulder Exam   Tenderness Location: no tenderness    Range of Motion   Active Abduction: abnormal   Adduction: abnormal  Sensation: normal    Knee Exam   Tenderness Location: medial joint line and LCL  Patellofemoral Crepitus: positive  Effusion: positive  Sensation: normal    Hip Exam   Tenderness Location: posterior and anterior  Sensation: normal    Elbow/Wrist Exam   Tenderness Location: no tenderness  Sensation: normal    Left Side Rheumatological Exam     The patient is tender to  palpation of the shoulder, elbow, wrist, knee, 1st PIP, 1st MCP, 2nd PIP, 2nd MCP, 3rd PIP, 3rd MCP, 4th PIP, 4th MCP, 5th PIP and 5th MCP.    He has swelling of the wrist, knee, 1st PIP, 1st MCP, 2nd PIP, 2nd MCP, 3rd PIP, 3rd MCP, 4th PIP, 4th MCP, 5th PIP and 5th MCP    The patient has an enlarged knee.    Shoulder Exam   Tenderness Location: no tenderness    Range of Motion   Active Abduction: abnormal   Sensation: normal    Knee Exam   Tenderness Location: lateral joint line and medial joint line    Patellofemoral Crepitus: positive  Effusion: positive  Sensation: normal    Hip Exam   Tenderness Location: posterior and anterior  Sensation: normal    Elbow/Wrist Exam   Sensation: normal      Back/Neck Exam   General Inspection   Gait: normal       Tenderness Right paramedian tenderness of the Upper C-Spine, Lower C-Spine, Lower L-Spine and SI Joint.Left paramedian tenderness of the Upper C-Spine, Lower L-Spine, Lower C-Spine and SI Joint.    Neck Range of Motion   Flexion: Limited  Extension: Limited  Lymphadenopathy:     He has no cervical adenopathy.   Neurological: He is alert and oriented to person, place, and time. Gait normal.   Skin: No rash noted. No erythema. No pallor.     Psychiatric: Mood and affect normal.   Musculoskeletal: He exhibits edema, tenderness and deformity.           Results for orders placed or performed in visit on 05/21/18   Comprehensive metabolic panel   Result Value Ref Range    Glucose 94 65 - 99 mg/dL    BUN, Bld 16 7 - 25 mg/dL    Creatinine 1.17 0.70 - 1.25 mg/dL    eGFR if non  64 > OR = 60 mL/min/1.73m2    eGFR if African American 74 > OR = 60 mL/min/1.73m2    BUN/Creatinine Ratio NOT APPLICABLE 6 - 22 (calc)    Sodium 142 135 - 146 mmol/L    Potassium 3.8 3.5 - 5.3 mmol/L    Chloride 104 98 - 110 mmol/L    CO2 27 20 - 31 mmol/L    Calcium 9.8 8.6 - 10.3 mg/dL    Total Protein 7.5 6.1 - 8.1 g/dL    Albumin 4.8 3.6 - 5.1 g/dL    Globulin, Total 2.7 1.9 - 3.7  g/dL (calc)    Albumin/Globulin Ratio 1.8 1.0 - 2.5 (calc)    Total Bilirubin 0.6 0.2 - 1.2 mg/dL    Alkaline Phosphatase 56 40 - 115 U/L    AST 14 10 - 35 U/L    ALT 7 (L) 9 - 46 U/L   CBC auto differential   Result Value Ref Range    WBC 13.1 (H) 3.8 - 10.8 Thousand/uL    RBC 3.79 (L) 4.20 - 5.80 Million/uL    Hemoglobin 11.1 (L) 13.2 - 17.1 g/dL    Hematocrit 33.3 (L) 38.5 - 50.0 %    MCV 87.9 80.0 - 100.0 fL    MCH 29.3 27.0 - 33.0 pg    MCHC 33.3 32.0 - 36.0 g/dL    RDW 13.1 11.0 - 15.0 %    Platelets 183 140 - 400 Thousand/uL    MPV 13.9 (H) 7.5 - 12.5 fL    Neutrophils Absolute 6,537 1,500 - 7,800 cells/uL    Lymph # 4,611 (H) 850 - 3,900 cells/uL    Mono # 1,847 (H) 200 - 950 cells/uL    Eos # 39 15 - 500 cells/uL    Baso # 66 0 - 200 cells/uL    Neutrophils Relative 49.9 %    Lymph% 35.2 %    Mono% 14.1 %    Eosinophil% 0.3 %    Basophil% 0.5 %   TSH   Result Value Ref Range    TSH 4.24 0.40 - 4.50 mIU/L   Hepatitis C RNA, quantitative, PCR   Result Value Ref Range    HCV RNA, Quantitative Real Time PCR <15 NOT DETECTED NOT DETECTED IU/mL    HCV RNA Quant PCR <1.18 NOT DETECTED NOT DETECTED Log IU/mL    Note         Component Name Value Ref Range   Test Requested see below   Comment: QUANTIFERON TB GOLD     Quantiferon TB Gold NEGATIVE   Comment:  Negative test result. M. tuberculosis complex  infection unlikely. NEGATIVE    NIL 0.04 [IU]/mL   Mitogen-nil >10.00 [IU]/mL    TB-nil <0.00   Comment:  The Nil tube value is used to determine if the patient  has a preexisting immune response which could cause a  false-positive reading on the test. In order for a  test to be valid, the Nil tube must have a value of  less than or equal to 8.0 IU/mL.  The mitogen control tube is used to assure the patient  has a healthy immune status and also serves as a  control for correct blood handling and incubation. It  is used to detect false-negative readings. The mitogen  tube must have a gamma interferon value of  greater  than or equal to 0.5 IU/mL higher than the value of  the Nil tube.  The TB antigen tube is coated with the M. tuberculosis  specific antigens. For a test to be considered  positive, the TB antigen tube value minus the Nil tube  value must be greater than or equal to 0.35 IU/mL.  For additional information, please refer to  http://education.[employer]/faq/QFT  (This link is being provided for informational/  educational purposes only.)  TEST PERFORMED AT:  [facility] [address] [address]  JOVANI LORENZANA M.D. [IU]/mL    Specimen   Blood     Hep C, rbc 3.79,  pt had external labs done, we reviewed  the results at this visit and the lab results will be scanned into the  media    Assessment:       1. Rheumatoid arthritis, involving unspecified site, unspecified rheumatoid factor presence    2. Hepatitis C carrier    3. Osteoarthritis of knee, unspecified laterality, unspecified osteoarthritis type    4. Carpal tunnel syndrome, unspecified laterality    5. Hepatitis C carrier            Plan:       Scooter was seen today for follow-up.    Diagnoses and all orders for this visit:    Rheumatoid arthritis, involving unspecified site, unspecified rheumatoid factor presence  -     Discontinue: HYDROcodone-acetaminophen (NORCO)  mg per tablet; Take 1 tablet by mouth every 8 (eight) hours as needed for Pain.  -     Discontinue: HYDROcodone-acetaminophen (NORCO)  mg per tablet; Take 1 tablet by mouth every 8 (eight) hours as needed for Pain.  -     HYDROcodone-acetaminophen (NORCO)  mg per tablet; Take 1 tablet by mouth every 8 (eight) hours as needed for Pain.  -     ketorolac injection 60 mg; Inject 2 mLs (60 mg total) into the muscle one time.  -     methylPREDNISolone acetate injection 160 mg; Inject 2 mLs (160 mg total) into the muscle one time.  -     CBC auto differential; Future  -     Comprehensive metabolic panel; Future  -     C-reactive protein;  Future  -     Sedimentation rate; Future  -     Rheumatoid factor; Future  -     Cyclic citrul peptide antibody, IgG; Future  -     CBC auto differential  -     Comprehensive metabolic panel  -     C-reactive protein  -     Sedimentation rate  -     Rheumatoid factor  -     Cyclic citrul peptide antibody, IgG    Hepatitis C carrier  -     Discontinue: HYDROcodone-acetaminophen (NORCO)  mg per tablet; Take 1 tablet by mouth every 8 (eight) hours as needed for Pain.  -     Discontinue: HYDROcodone-acetaminophen (NORCO)  mg per tablet; Take 1 tablet by mouth every 8 (eight) hours as needed for Pain.  -     HYDROcodone-acetaminophen (NORCO)  mg per tablet; Take 1 tablet by mouth every 8 (eight) hours as needed for Pain.  -     ketorolac injection 60 mg; Inject 2 mLs (60 mg total) into the muscle one time.  -     methylPREDNISolone acetate injection 160 mg; Inject 2 mLs (160 mg total) into the muscle one time.  -     CBC auto differential; Future  -     Comprehensive metabolic panel; Future  -     C-reactive protein; Future  -     Sedimentation rate; Future  -     Rheumatoid factor; Future  -     Cyclic citrul peptide antibody, IgG; Future  -     CBC auto differential  -     Comprehensive metabolic panel  -     C-reactive protein  -     Sedimentation rate  -     Rheumatoid factor  -     Cyclic citrul peptide antibody, IgG    Osteoarthritis of knee, unspecified laterality, unspecified osteoarthritis type  -     ketorolac injection 60 mg; Inject 2 mLs (60 mg total) into the muscle one time.  -     methylPREDNISolone acetate injection 160 mg; Inject 2 mLs (160 mg total) into the muscle one time.  -     CBC auto differential; Future  -     Comprehensive metabolic panel; Future  -     C-reactive protein; Future  -     Sedimentation rate; Future  -     Rheumatoid factor; Future  -     Cyclic citrul peptide antibody, IgG; Future  -     CBC auto differential  -     Comprehensive metabolic panel  -      C-reactive protein  -     Sedimentation rate  -     Rheumatoid factor  -     Cyclic citrul peptide antibody, IgG    Carpal tunnel syndrome, unspecified laterality  -     Discontinue: HYDROcodone-acetaminophen (NORCO)  mg per tablet; Take 1 tablet by mouth every 8 (eight) hours as needed for Pain.  -     Discontinue: HYDROcodone-acetaminophen (NORCO)  mg per tablet; Take 1 tablet by mouth every 8 (eight) hours as needed for Pain.  -     HYDROcodone-acetaminophen (NORCO)  mg per tablet; Take 1 tablet by mouth every 8 (eight) hours as needed for Pain.  -     ketorolac injection 60 mg; Inject 2 mLs (60 mg total) into the muscle one time.  -     methylPREDNISolone acetate injection 160 mg; Inject 2 mLs (160 mg total) into the muscle one time.  -     CBC auto differential; Future  -     Comprehensive metabolic panel; Future  -     C-reactive protein; Future  -     Sedimentation rate; Future  -     Rheumatoid factor; Future  -     Cyclic citrul peptide antibody, IgG; Future  -     CBC auto differential  -     Comprehensive metabolic panel  -     C-reactive protein  -     Sedimentation rate  -     Rheumatoid factor  -     Cyclic citrul peptide antibody, IgG    Hepatitis C carrier  Comments:  treated with Harvoni  Orders:  -     Discontinue: HYDROcodone-acetaminophen (NORCO)  mg per tablet; Take 1 tablet by mouth every 8 (eight) hours as needed for Pain.  -     Discontinue: HYDROcodone-acetaminophen (NORCO)  mg per tablet; Take 1 tablet by mouth every 8 (eight) hours as needed for Pain.  -     HYDROcodone-acetaminophen (NORCO)  mg per tablet; Take 1 tablet by mouth every 8 (eight) hours as needed for Pain.  -     ketorolac injection 60 mg; Inject 2 mLs (60 mg total) into the muscle one time.  -     methylPREDNISolone acetate injection 160 mg; Inject 2 mLs (160 mg total) into the muscle one time.  -     CBC auto differential; Future  -     Comprehensive metabolic panel; Future  -      C-reactive protein; Future  -     Sedimentation rate; Future  -     Rheumatoid factor; Future  -     Cyclic citrul peptide antibody, IgG; Future  -     CBC auto differential  -     Comprehensive metabolic panel  -     C-reactive protein  -     Sedimentation rate  -     Rheumatoid factor  -     Cyclic citrul peptide antibody, IgG    Pain management  Comments:  manage toxicity     hepc took harvoni in remission will try to get enbrel approved

## 2018-06-13 NOTE — PROGRESS NOTES
Administered 2 cc ( 30 mg/ml ) of toradol to the left upper outer gluteal. Informed of s/s to report verbalized understanding. No adverse reactions noted.    Lot # -[id number]  Expiration 1 nov 2019    Administered 2 cc ( 80 mg/ml ) of depomedrol to the right upper outer gluteal. Informed of s/s to report verbalized understanding. No adverse reactions noted.    Lot # [id number]  Expiration 05/19

## 2018-06-29 DIAGNOSIS — M06.9 RHEUMATOID ARTHRITIS, INVOLVING UNSPECIFIED SITE, UNSPECIFIED RHEUMATOID FACTOR PRESENCE: ICD-10-CM

## 2018-06-29 DIAGNOSIS — G56.00 CARPAL TUNNEL SYNDROME, UNSPECIFIED LATERALITY: ICD-10-CM

## 2018-07-02 RX ORDER — PREDNISONE 5 MG/1
TABLET ORAL
Qty: 60 TABLET | Refills: 3 | Status: SHIPPED | OUTPATIENT
Start: 2018-07-02 | End: 2018-10-15 | Stop reason: SDUPTHER

## 2018-07-16 RX ORDER — HYDROXYCHLOROQUINE SULFATE 200 MG/1
200 TABLET, FILM COATED ORAL 2 TIMES DAILY
Qty: 60 TABLET | Refills: 3 | Status: SHIPPED | OUTPATIENT
Start: 2018-07-16 | End: 2018-09-18 | Stop reason: SDUPTHER

## 2018-07-24 NOTE — TELEPHONE ENCOUNTER
----- Message from Betsy Montes sent at 7/23/2018  2:50 PM CDT -----  Contact: wife Jacqueline Swan  wife Jacqueline Swan need to speak with nurse regarding patient was in emergency room and received injection for constipation and patient wife states she was told injection come in pill form and will like that for patient     Please call to advice [phone] (home)

## 2018-07-24 NOTE — TELEPHONE ENCOUNTER
Spoke with pt's wife, advises he was seen yesterday at [place] ED for opiod constipation and was given Relistor injections. Advises they were told they can get this in pill form and would like something sent to [facility]. Advised would submit to Dr. Morales to address. No further questions.

## 2018-07-26 NOTE — TELEPHONE ENCOUNTER
We can try Amitiza or Linzess if the patient has not tried these otherwise reason try more aggressive means of treatment please call patient and see if he sees either these medication and we will call and Amitiza 24 mg 1 p.o. b.i.d. ER if he has uses Linzess 290 mg MCPs 1 p.o. daily thank you also needs to increase his water significantly

## 2018-07-30 RX ORDER — LUBIPROSTONE 24 UG/1
24 CAPSULE ORAL 2 TIMES DAILY WITH MEALS
Qty: 60 CAPSULE | Refills: 3 | Status: SHIPPED | OUTPATIENT
Start: 2018-07-30 | End: 2018-08-29

## 2018-07-30 NOTE — TELEPHONE ENCOUNTER
Spoke to wife and patient advised Relistor does not come in pill form. Per Andrew we can call in Linzess or Amitiza. Explained to pt how to take each medication. He advises he is not doing well with the constipation. He would like to try the Amitiza and have it called in to [facility]. Yue called in to pharmacy.

## 2018-08-28 DIAGNOSIS — M06.9 RHEUMATOID ARTHRITIS, INVOLVING UNSPECIFIED SITE, UNSPECIFIED RHEUMATOID FACTOR PRESENCE: ICD-10-CM

## 2018-08-28 DIAGNOSIS — G56.00 CARPAL TUNNEL SYNDROME, UNSPECIFIED LATERALITY: ICD-10-CM

## 2018-08-28 RX ORDER — TIZANIDINE 4 MG/1
4 TABLET ORAL EVERY 8 HOURS
Qty: 90 TABLET | Refills: 3 | Status: SHIPPED | OUTPATIENT
Start: 2018-08-28 | End: 2018-12-27 | Stop reason: SDUPTHER

## 2018-09-18 ENCOUNTER — TELEPHONE (OUTPATIENT)
Dept: RHEUMATOLOGY | Facility: CLINIC | Age: 68
End: 2018-09-18

## 2018-09-18 ENCOUNTER — OFFICE VISIT (OUTPATIENT)
Dept: RHEUMATOLOGY | Facility: CLINIC | Age: 68
End: 2018-09-18
Payer: MEDICARE

## 2018-09-18 VITALS
HEIGHT: 72 IN | BODY MASS INDEX: 21.31 KG/M2 | WEIGHT: 157.31 LBS | DIASTOLIC BLOOD PRESSURE: 67 MMHG | HEART RATE: 49 BPM | SYSTOLIC BLOOD PRESSURE: 143 MMHG

## 2018-09-18 DIAGNOSIS — B18.2 HEPATITIS C CARRIER: ICD-10-CM

## 2018-09-18 DIAGNOSIS — K59.00 CONSTIPATION, UNSPECIFIED CONSTIPATION TYPE: ICD-10-CM

## 2018-09-18 DIAGNOSIS — M17.9 OSTEOARTHRITIS OF KNEE, UNSPECIFIED LATERALITY, UNSPECIFIED OSTEOARTHRITIS TYPE: ICD-10-CM

## 2018-09-18 DIAGNOSIS — M06.9 RHEUMATOID ARTHRITIS, INVOLVING UNSPECIFIED SITE, UNSPECIFIED RHEUMATOID FACTOR PRESENCE: Primary | ICD-10-CM

## 2018-09-18 DIAGNOSIS — T50.905A BRADYCARDIA, DRUG INDUCED: ICD-10-CM

## 2018-09-18 DIAGNOSIS — R00.1 BRADYCARDIA, DRUG INDUCED: ICD-10-CM

## 2018-09-18 DIAGNOSIS — G56.00 CARPAL TUNNEL SYNDROME, UNSPECIFIED LATERALITY: ICD-10-CM

## 2018-09-18 DIAGNOSIS — I15.9 SECONDARY HYPERTENSION: ICD-10-CM

## 2018-09-18 PROCEDURE — 99215 OFFICE O/P EST HI 40 MIN: CPT | Mod: PBBFAC,PO,25 | Performed by: INTERNAL MEDICINE

## 2018-09-18 PROCEDURE — 96372 THER/PROPH/DIAG INJ SC/IM: CPT | Mod: PBBFAC,PO

## 2018-09-18 PROCEDURE — 99215 OFFICE O/P EST HI 40 MIN: CPT | Mod: S$PBB,,, | Performed by: INTERNAL MEDICINE

## 2018-09-18 PROCEDURE — 99999 PR PBB SHADOW E&M-EST. PATIENT-LVL V: CPT | Mod: PBBFAC,,, | Performed by: INTERNAL MEDICINE

## 2018-09-18 PROCEDURE — 1101F PT FALLS ASSESS-DOCD LE1/YR: CPT | Mod: CPTII,,, | Performed by: INTERNAL MEDICINE

## 2018-09-18 RX ORDER — HYDROCODONE BITARTRATE AND ACETAMINOPHEN 10; 325 MG/1; MG/1
1 TABLET ORAL EVERY 8 HOURS PRN
Qty: 90 TABLET | Refills: 0 | Status: ON HOLD | OUTPATIENT
Start: 2018-11-18 | End: 2018-10-18 | Stop reason: HOSPADM

## 2018-09-18 RX ORDER — ONDANSETRON 4 MG/1
4 TABLET, FILM COATED ORAL EVERY 6 HOURS PRN
Status: ON HOLD | COMMUNITY
Start: 2018-08-14 | End: 2018-10-18 | Stop reason: CLARIF

## 2018-09-18 RX ORDER — FLUCONAZOLE 150 MG/1
150 TABLET ORAL DAILY
Qty: 3 TABLET | Refills: 3 | Status: SHIPPED | OUTPATIENT
Start: 2018-09-18 | End: 2018-09-21

## 2018-09-18 RX ORDER — METHYLPREDNISOLONE ACETATE 80 MG/ML
160 INJECTION, SUSPENSION INTRA-ARTICULAR; INTRALESIONAL; INTRAMUSCULAR; SOFT TISSUE
Status: COMPLETED | OUTPATIENT
Start: 2018-09-18 | End: 2018-09-18

## 2018-09-18 RX ORDER — AMOXICILLIN 500 MG/1
CAPSULE ORAL
COMMUNITY
Start: 2018-09-17 | End: 2018-10-17 | Stop reason: ALTCHOICE

## 2018-09-18 RX ORDER — HYDROCODONE BITARTRATE AND ACETAMINOPHEN 10; 325 MG/1; MG/1
1 TABLET ORAL EVERY 8 HOURS PRN
Qty: 90 TABLET | Refills: 0 | Status: SHIPPED | OUTPATIENT
Start: 2018-09-18 | End: 2018-09-18 | Stop reason: SDUPTHER

## 2018-09-18 RX ORDER — LACTULOSE 10 G/10G
POWDER, FOR SOLUTION ORAL
Qty: 30 EACH | Refills: 12 | Status: SHIPPED | OUTPATIENT
Start: 2018-09-18 | End: 2019-02-18

## 2018-09-18 RX ORDER — HYDROCODONE BITARTRATE AND ACETAMINOPHEN 10; 325 MG/1; MG/1
1 TABLET ORAL EVERY 8 HOURS PRN
Qty: 90 TABLET | Refills: 0 | Status: SHIPPED | OUTPATIENT
Start: 2018-10-18 | End: 2018-09-18 | Stop reason: SDUPTHER

## 2018-09-18 RX ORDER — KETOROLAC TROMETHAMINE 30 MG/ML
60 INJECTION, SOLUTION INTRAMUSCULAR; INTRAVENOUS
Status: COMPLETED | OUTPATIENT
Start: 2018-09-18 | End: 2018-09-18

## 2018-09-18 RX ORDER — MEGESTROL ACETATE 40 MG/1
40 TABLET ORAL DAILY
Qty: 30 TABLET | Refills: 11 | Status: SHIPPED | OUTPATIENT
Start: 2018-09-18 | End: 2018-09-27

## 2018-09-18 RX ORDER — LACTULOSE 10 G/10G
POWDER, FOR SOLUTION ORAL
Refills: 0 | COMMUNITY
Start: 2018-08-24 | End: 2018-09-18 | Stop reason: SDUPTHER

## 2018-09-18 RX ORDER — HYDROXYCHLOROQUINE SULFATE 200 MG/1
200 TABLET, FILM COATED ORAL 2 TIMES DAILY
Qty: 60 TABLET | Refills: 6 | Status: SHIPPED | OUTPATIENT
Start: 2018-09-18 | End: 2019-01-16

## 2018-09-18 RX ORDER — CYANOCOBALAMIN 1000 UG/ML
1000 INJECTION, SOLUTION INTRAMUSCULAR; SUBCUTANEOUS
Status: COMPLETED | OUTPATIENT
Start: 2018-09-18 | End: 2018-09-18

## 2018-09-18 RX ADMIN — KETOROLAC TROMETHAMINE 60 MG: 60 INJECTION, SOLUTION INTRAMUSCULAR at 01:09

## 2018-09-18 RX ADMIN — METHYLPREDNISOLONE ACETATE 160 MG: 80 INJECTION, SUSPENSION INTRA-ARTICULAR; INTRALESIONAL; INTRAMUSCULAR; SOFT TISSUE at 01:09

## 2018-09-18 RX ADMIN — CYANOCOBALAMIN 1000 MCG: 1000 INJECTION, SOLUTION INTRAMUSCULAR at 01:09

## 2018-09-18 ASSESSMENT — ROUTINE ASSESSMENT OF PATIENT INDEX DATA (RAPID3)
TOTAL RAPID3 SCORE: 5.05
PSYCHOLOGICAL DISTRESS SCORE: 3.3
PATIENT GLOBAL ASSESSMENT SCORE: 4
MDHAQ FUNCTION SCORE: 1.1
PAIN SCORE: 7.5

## 2018-09-18 NOTE — PROGRESS NOTES
Administered 1 cc ( 1000 mcg/ml ) of b12 to the right upper outer gluteal. Informed of s/s to report verbalized understanding. No adverse reactions noted.    Lot # [id number]  Expiration nov 19    Administered 2 cc ( 80 mg/ml ) of depomedrol to the right upper outer gluteal. Informed of s/s to report verbalized understanding. No adverse reactions noted.    Lot # [id number]  Expiration 08/19    Administered 2 cc ( 30 mg/ml ) of toradol to the left upper outer gluteal. Informed of s/s to report verbalized understanding. No adverse reactions noted.    Lot # [id number]  Expiration 1 dec 2019

## 2018-09-18 NOTE — TELEPHONE ENCOUNTER
Attempted to call the patient on both lines lm on one and the other the line was busy. Patient has been scheduled for an appointment on 9/27/18

## 2018-09-18 NOTE — PROGRESS NOTES
Subjective:       Patient ID: Scooter Swan is a 67 y.o. male.    Chief Complaint: Pain and Swelling          Rheumatoid Arthritis  Admitted with abdominal pain, anemia and reaction to amitiza, resolved with lactulose, liver was checked, appetite down. Lost weight and anemic but better than in the hospitals   gi issues so he was unable to take.he complains of pain, stiffness and joint swelling. The symptoms have been worsening. Affected locations include the right shoulder, left shoulder, left knee, right knee, left PIP, left DIP, right PIP, right DIP and right MCP. Associated symptoms include pain at night, dry eyes and jaw pain. She complains of morning stiffness.The neck, left shoulder, right shoulder, left elbow, right elbow, left wrist, right wrist, left hand, right hand, left hip, right hip, left knee, right knee, left ankle and right ankle presents with Arthralgia.        Review of Systems   Constitutional: Positive for activity change. Negative for appetite change, chills, diaphoresis and unexpected weight change.   HENT: Negative for congestion, ear pain, facial swelling, mouth sores, nosebleeds, postnasal drip, rhinorrhea, sinus pressure, sneezing, sore throat, tinnitus and voice change.    Eyes: Negative for pain, discharge, redness, itching and visual disturbance.   Respiratory: Negative for apnea, cough, chest tightness, shortness of breath and wheezing.    Cardiovascular: Negative for chest pain, palpitations and leg swelling.   Gastrointestinal: Negative for abdominal pain, constipation, diarrhea, nausea and vomiting.   Endocrine: Negative for cold intolerance, heat intolerance, polydipsia and polyuria.   Genitourinary: Negative for decreased urine volume, difficulty urinating, flank pain, frequency, hematuria and urgency.   Musculoskeletal: Positive for back pain, gait problem, joint swelling, myalgias, neck pain and neck stiffness. Negative for arthralgias.   Skin: Positive for rash. Negative for  pallor and wound.   Allergic/Immunologic: Negative for immunocompromised state.   Neurological: Negative for dizziness, tremors, seizures, syncope, weakness and numbness.   Hematological: Negative for adenopathy. Does not bruise/bleed easily.   Psychiatric/Behavioral: Negative for sleep disturbance and suicidal ideas. The patient is not nervous/anxious.          Objective:   BP (!) 143/67   Pulse (!) 49   Ht 6' (1.829 m)   Wt 71.3 kg (157 lb 4.8 oz)   BMI 21.33 kg/m²      Physical Exam   Vitals reviewed.  Constitutional: He is oriented to person, place, and time and well-developed, well-nourished, and in no distress.   HENT:   Head: Normocephalic and atraumatic.   Mouth/Throat: Oropharynx is clear and moist.   Eyes: EOM are normal. Pupils are equal, round, and reactive to light.   Neck: Neck supple. No thyromegaly present.   Cardiovascular: Normal rate, regular rhythm and normal heart sounds.  Exam reveals no gallop and no friction rub.    No murmur heard.  Pulmonary/Chest: Breath sounds normal. He has no wheezes. He has no rales. He exhibits no tenderness.   Abdominal: There is no tenderness. There is no rebound and no guarding.       Right Side Rheumatological Exam     The patient is tender to palpation of the shoulder, elbow, wrist, knee, 1st PIP, 1st MCP, 2nd PIP, 2nd MCP, 3rd PIP, 3rd MCP, 4th PIP, 4th MCP and 5th PIP    He has swelling of the elbow, wrist, knee, 1st PIP, 1st MCP, 2nd PIP, 2nd MCP, 3rd PIP, 3rd MCP, 4th PIP, 4th MCP, 5th PIP and 5th MCP    The patient has an enlarged wrist and knee    Shoulder Exam   Tenderness Location: no tenderness    Range of Motion   Active abduction: abnormal   Adduction: abnormal  Sensation: normal    Knee Exam   Tenderness Location: medial joint line and LCL  Patellofemoral Crepitus: positive  Effusion: positive  Sensation: normal    Hip Exam   Tenderness Location: posterior and anterior  Sensation: normal    Elbow/Wrist Exam   Tenderness Location: no  tenderness  Sensation: normal    Left Side Rheumatological Exam     The patient is tender to palpation of the shoulder, elbow, wrist, knee, 1st PIP, 1st MCP, 2nd PIP, 2nd MCP, 3rd PIP, 3rd MCP, 4th PIP, 4th MCP, 5th PIP and 5th MCP.    He has swelling of the wrist, knee, 1st PIP, 1st MCP, 2nd PIP, 2nd MCP, 3rd PIP, 3rd MCP, 4th PIP, 4th MCP, 5th PIP and 5th MCP    The patient has an enlarged knee.    Shoulder Exam   Tenderness Location: no tenderness    Range of Motion   Active abduction: abnormal   Sensation: normal    Knee Exam   Tenderness Location: lateral joint line and medial joint line    Patellofemoral Crepitus: positive  Effusion: positive  Sensation: normal    Hip Exam   Tenderness Location: posterior and anterior  Sensation: normal    Elbow/Wrist Exam   Sensation: normal      Back/Neck Exam   General Inspection   Gait: normal       Tenderness Right paramedian tenderness of the Upper C-Spine, Lower C-Spine, Lower L-Spine and SI Joint.Left paramedian tenderness of the Upper C-Spine, Lower L-Spine, Lower C-Spine and SI Joint.    Neck Range of Motion   Flexion: Limited  Extension: Limited  Lymphadenopathy:     He has no cervical adenopathy.   Neurological: He is alert and oriented to person, place, and time. Gait normal.   Skin: No rash noted. No erythema. No pallor.     Psychiatric: Mood and affect normal.   Musculoskeletal: He exhibits edema, tenderness and deformity.           Results for orders placed or performed in visit on 05/21/18   Comprehensive metabolic panel   Result Value Ref Range    Glucose 94 65 - 99 mg/dL    BUN, Bld 16 7 - 25 mg/dL    Creatinine 1.17 0.70 - 1.25 mg/dL    eGFR if non  64 > OR = 60 mL/min/1.73m2    eGFR if African American 74 > OR = 60 mL/min/1.73m2    BUN/Creatinine Ratio NOT APPLICABLE 6 - 22 (calc)    Sodium 142 135 - 146 mmol/L    Potassium 3.8 3.5 - 5.3 mmol/L    Chloride 104 98 - 110 mmol/L    CO2 27 20 - 31 mmol/L    Calcium 9.8 8.6 - 10.3 mg/dL    Total  Protein 7.5 6.1 - 8.1 g/dL    Albumin 4.8 3.6 - 5.1 g/dL    Globulin, Total 2.7 1.9 - 3.7 g/dL (calc)    Albumin/Globulin Ratio 1.8 1.0 - 2.5 (calc)    Total Bilirubin 0.6 0.2 - 1.2 mg/dL    Alkaline Phosphatase 56 40 - 115 U/L    AST 14 10 - 35 U/L    ALT 7 (L) 9 - 46 U/L   CBC auto differential   Result Value Ref Range    WBC 13.1 (H) 3.8 - 10.8 Thousand/uL    RBC 3.79 (L) 4.20 - 5.80 Million/uL    Hemoglobin 11.1 (L) 13.2 - 17.1 g/dL    Hematocrit 33.3 (L) 38.5 - 50.0 %    MCV 87.9 80.0 - 100.0 fL    MCH 29.3 27.0 - 33.0 pg    MCHC 33.3 32.0 - 36.0 g/dL    RDW 13.1 11.0 - 15.0 %    Platelets 183 140 - 400 Thousand/uL    MPV 13.9 (H) 7.5 - 12.5 fL    Neutrophils Absolute 6,537 1,500 - 7,800 cells/uL    Lymph # 4,611 (H) 850 - 3,900 cells/uL    Mono # 1,847 (H) 200 - 950 cells/uL    Eos # 39 15 - 500 cells/uL    Baso # 66 0 - 200 cells/uL    Neutrophils Relative 49.9 %    Lymph% 35.2 %    Mono% 14.1 %    Eosinophil% 0.3 %    Basophil% 0.5 %   TSH   Result Value Ref Range    TSH 4.24 0.40 - 4.50 mIU/L   Hepatitis C RNA, quantitative, PCR   Result Value Ref Range    HCV RNA, Quantitative Real Time PCR <15 NOT DETECTED NOT DETECTED IU/mL    HCV RNA Quant PCR <1.18 NOT DETECTED NOT DETECTED Log IU/mL    Note         Component Name Value Ref Range   Test Requested see below   Comment: QUANTIFERON TB GOLD     Quantiferon TB Gold NEGATIVE   Comment:  Negative test result. M. tuberculosis complex  infection unlikely. NEGATIVE    NIL 0.04 [IU]/mL   Mitogen-nil >10.00 [IU]/mL    TB-nil <0.00   Comment:  The Nil tube value is used to determine if the patient  has a preexisting immune response which could cause a  false-positive reading on the test. In order for a  test to be valid, the Nil tube must have a value of  less than or equal to 8.0 IU/mL.  The mitogen control tube is used to assure the patient  has a healthy immune status and also serves as a  control for correct blood handling and incubation. It  is used to detect  false-negative readings. The mitogen  tube must have a gamma interferon value of greater  than or equal to 0.5 IU/mL higher than the value of  the Nil tube.  The TB antigen tube is coated with the M. tuberculosis  specific antigens. For a test to be considered  positive, the TB antigen tube value minus the Nil tube  value must be greater than or equal to 0.35 IU/mL.  For additional information, please refer to  http://education.[employer]/faq/QFT  (This link is being provided for informational/  educational purposes only.)  TEST PERFORMED AT:  [facility] [address] [address]  JOVANI LORENZANA M.D. [IU]/mL    Specimen   Blood     Hep C, rbc 3.79,  pt had external labs done, we reviewed  the results at this visit and the lab results will be scanned into the  media    Assessment:       1. Rheumatoid arthritis, involving unspecified site, unspecified rheumatoid factor presence    2. Hepatitis C carrier    3. Bradycardia, drug induced    4. Osteoarthritis of knee, unspecified laterality, unspecified osteoarthritis type    5. Constipation, unspecified constipation type    6. Secondary hypertension            Plan:       Scooter was seen today for pain and swelling.    Diagnoses and all orders for this visit:    Rheumatoid arthritis, involving unspecified site, unspecified rheumatoid factor presence  -     megestrol (MEGACE) 40 MG Tab; Take 1 tablet (40 mg total) by mouth once daily.  -     fluconazole (DIFLUCAN) 150 MG Tab; Take 1 tablet (150 mg total) by mouth once daily. for 3 days  -     Cancel: Ambulatory consult to Cardiology  -     Cancel: Ambulatory consult to Hematology / Oncology  -     Ambulatory consult to Hematology / Oncology  -     Ambulatory consult to Cardiology  -     CBC auto differential; Future  -     Comprehensive metabolic panel; Future  -     C-reactive protein; Future  -     Iron and TIBC; Future  -     Sedimentation rate; Future  -     CBC auto  differential  -     Comprehensive metabolic panel  -     C-reactive protein  -     Iron and TIBC  -     Sedimentation rate    Hepatitis C carrier  -     megestrol (MEGACE) 40 MG Tab; Take 1 tablet (40 mg total) by mouth once daily.  -     fluconazole (DIFLUCAN) 150 MG Tab; Take 1 tablet (150 mg total) by mouth once daily. for 3 days  -     Cancel: Ambulatory consult to Cardiology  -     Cancel: Ambulatory consult to Hematology / Oncology  -     Ambulatory consult to Hematology / Oncology  -     Ambulatory consult to Cardiology  -     CBC auto differential; Future  -     Comprehensive metabolic panel; Future  -     C-reactive protein; Future  -     Iron and TIBC; Future  -     Sedimentation rate; Future  -     CBC auto differential  -     Comprehensive metabolic panel  -     C-reactive protein  -     Iron and TIBC  -     Sedimentation rate    Bradycardia, drug induced  -     megestrol (MEGACE) 40 MG Tab; Take 1 tablet (40 mg total) by mouth once daily.  -     fluconazole (DIFLUCAN) 150 MG Tab; Take 1 tablet (150 mg total) by mouth once daily. for 3 days  -     Cancel: Ambulatory consult to Cardiology  -     Cancel: Ambulatory consult to Hematology / Oncology  -     Ambulatory consult to Hematology / Oncology  -     Ambulatory consult to Cardiology  -     CBC auto differential; Future  -     Comprehensive metabolic panel; Future  -     C-reactive protein; Future  -     Iron and TIBC; Future  -     Sedimentation rate; Future  -     CBC auto differential  -     Comprehensive metabolic panel  -     C-reactive protein  -     Iron and TIBC  -     Sedimentation rate    Osteoarthritis of knee, unspecified laterality, unspecified osteoarthritis type  -     megestrol (MEGACE) 40 MG Tab; Take 1 tablet (40 mg total) by mouth once daily.  -     fluconazole (DIFLUCAN) 150 MG Tab; Take 1 tablet (150 mg total) by mouth once daily. for 3 days  -     Cancel: Ambulatory consult to Cardiology  -     Cancel: Ambulatory consult to  Hematology / Oncology  -     Ambulatory consult to Hematology / Oncology  -     Ambulatory consult to Cardiology  -     CBC auto differential; Future  -     Comprehensive metabolic panel; Future  -     C-reactive protein; Future  -     Iron and TIBC; Future  -     Sedimentation rate; Future  -     CBC auto differential  -     Comprehensive metabolic panel  -     C-reactive protein  -     Iron and TIBC  -     Sedimentation rate    Constipation, unspecified constipation type  -     megestrol (MEGACE) 40 MG Tab; Take 1 tablet (40 mg total) by mouth once daily.  -     fluconazole (DIFLUCAN) 150 MG Tab; Take 1 tablet (150 mg total) by mouth once daily. for 3 days  -     Cancel: Ambulatory consult to Cardiology  -     Cancel: Ambulatory consult to Hematology / Oncology  -     Ambulatory consult to Hematology / Oncology  -     Ambulatory consult to Cardiology  -     CBC auto differential; Future  -     Comprehensive metabolic panel; Future  -     C-reactive protein; Future  -     Iron and TIBC; Future  -     Sedimentation rate; Future  -     CBC auto differential  -     Comprehensive metabolic panel  -     C-reactive protein  -     Iron and TIBC  -     Sedimentation rate    Secondary hypertension  -     Cancel: Ambulatory consult to Cardiology  -     Cancel: Ambulatory consult to Hematology / Oncology  -     Ambulatory consult to Hematology / Oncology  -     Ambulatory consult to Cardiology  -     CBC auto differential; Future  -     Comprehensive metabolic panel; Future  -     C-reactive protein; Future  -     Iron and TIBC; Future  -     Sedimentation rate; Future  -     CBC auto differential  -     Comprehensive metabolic panel  -     C-reactive protein  -     Iron and TIBC  -     Sedimentation rate        decrease atenolol to 50 mg like Daisha  Wrote in discharge and take in PM  Add megace and diflucan   referred to cardiology multiple B/P meds with sig jovanny   heme/onc  Possible iron defiency  . I have  check  [place] prescription monitoring program site and no unusual or abnormal behavior has occured

## 2018-09-18 NOTE — TELEPHONE ENCOUNTER
Attempted to contact PCP office to inform of HR and medication changes made at visit. Faxed over office notes to [phone] with medication changes in notes. Left message to contact office regarding medication changes.

## 2018-09-27 ENCOUNTER — OFFICE VISIT (OUTPATIENT)
Dept: CARDIOLOGY | Facility: CLINIC | Age: 68
End: 2018-09-27
Payer: MEDICARE

## 2018-09-27 ENCOUNTER — TELEPHONE (OUTPATIENT)
Dept: CARDIOLOGY | Facility: CLINIC | Age: 68
End: 2018-09-27

## 2018-09-27 VITALS
SYSTOLIC BLOOD PRESSURE: 165 MMHG | HEART RATE: 54 BPM | HEIGHT: 72 IN | WEIGHT: 157.38 LBS | DIASTOLIC BLOOD PRESSURE: 67 MMHG | BODY MASS INDEX: 21.32 KG/M2

## 2018-09-27 DIAGNOSIS — R09.89 PULSE PRESSURE DECREASE: ICD-10-CM

## 2018-09-27 DIAGNOSIS — R03.1: Primary | ICD-10-CM

## 2018-09-27 DIAGNOSIS — I10 ESSENTIAL HYPERTENSION: Primary | ICD-10-CM

## 2018-09-27 PROCEDURE — 1101F PT FALLS ASSESS-DOCD LE1/YR: CPT | Mod: CPTII,,, | Performed by: INTERNAL MEDICINE

## 2018-09-27 PROCEDURE — 99213 OFFICE O/P EST LOW 20 MIN: CPT | Mod: PBBFAC,PO | Performed by: INTERNAL MEDICINE

## 2018-09-27 PROCEDURE — 99999 PR PBB SHADOW E&M-EST. PATIENT-LVL III: CPT | Mod: PBBFAC,,, | Performed by: INTERNAL MEDICINE

## 2018-09-27 PROCEDURE — 99204 OFFICE O/P NEW MOD 45 MIN: CPT | Mod: S$PBB,,, | Performed by: INTERNAL MEDICINE

## 2018-09-27 NOTE — PROGRESS NOTES
Subjective:    Patient ID:  Scooter Swan is a 67 y.o. male who presents for evaluation of hypertension, bradycardia    HPI  Referred by dr Morales.  Long h/o hypertension. On atenolol, amlodipine, enalapril, hydralazine, lasix  He comes with no major problems, no chest pain, no shortness of breath, no syncope/near syncope.  Disabled due to RA  Recently found to be bradycardic, atenolol cut in half    Review of Systems   Constitution: Negative for decreased appetite, weakness, malaise/fatigue, weight gain and weight loss.   Cardiovascular: Negative for chest pain, dyspnea on exertion, leg swelling, palpitations and syncope.   Respiratory: Negative for cough and shortness of breath.    Gastrointestinal: Negative.    All other systems reviewed and are negative.       Objective:      Physical Exam   Constitutional: He is oriented to person, place, and time. He appears well-developed and well-nourished.   HENT:   Head: Normocephalic.   Eyes: Pupils are equal, round, and reactive to light.   Neck: Normal range of motion. Neck supple. No JVD present. Carotid bruit is not present. No thyromegaly present.   Cardiovascular: Normal rate, regular rhythm, normal heart sounds, intact distal pulses and normal pulses. PMI is not displaced. Exam reveals no gallop.   No murmur heard.  Pulmonary/Chest: Effort normal and breath sounds normal.   Abdominal: Soft. Normal appearance. He exhibits no mass. There is no hepatosplenomegaly. There is no tenderness.   Musculoskeletal: Normal range of motion. He exhibits no edema.   Neurological: He is alert and oriented to person, place, and time. He has normal strength and normal reflexes. No sensory deficit.   Skin: Skin is warm and intact.   Psychiatric: He has a normal mood and affect.   Nursing note and vitals reviewed.        Assessment:       1. Essential hypertension         Plan:     Echo, renal doppler  Call with results  Continue current medicines for now

## 2018-09-27 NOTE — LETTER
September 27, 2018      Carson Morales MD  [address] [facility] [address] [place] [address]           [facility] Cardiology  [address] [facility] [address] [place] [address]  Phone: [phone]          Patient: Scooter Swan   MR Number: [id number]   YOB: 1950   Date of Visit: 9/27/2018       Dear Dr. Carson Morales:    Thank you for referring Scooter Swan to me for evaluation. Attached you will find relevant portions of my assessment and plan of care.    If you have questions, please do not hesitate to call me. I look forward to following Scooter Swan along with you.    Sincerely,    Orion Beth MD    Enclosure  CC:  No Recipients    If you would like to receive this communication electronically, please contact [email or web address] or [phone] to request more information on [employer] access.    For providers and/or their staff who would like to refer a patient to [facility], please contact us through our one-stop-shop provider referral line, [facility], at [phone].    If you feel you have received this communication in error or would no longer like to receive these types of communications, please e-mail [email or web address]

## 2018-10-05 ENCOUNTER — CLINICAL SUPPORT (OUTPATIENT)
Dept: CARDIOLOGY | Facility: CLINIC | Age: 68
End: 2018-10-05
Attending: INTERNAL MEDICINE
Payer: MEDICARE

## 2018-10-05 VITALS
BODY MASS INDEX: 21.26 KG/M2 | SYSTOLIC BLOOD PRESSURE: 140 MMHG | HEIGHT: 72 IN | DIASTOLIC BLOOD PRESSURE: 80 MMHG | WEIGHT: 157 LBS | HEART RATE: 60 BPM

## 2018-10-05 DIAGNOSIS — R09.89 PULSE PRESSURE DECREASE: ICD-10-CM

## 2018-10-05 DIAGNOSIS — I10 ESSENTIAL HYPERTENSION: ICD-10-CM

## 2018-10-05 DIAGNOSIS — R03.1: ICD-10-CM

## 2018-10-05 PROCEDURE — 99999 PR PBB SHADOW E&M-EST. PATIENT-LVL II: CPT | Mod: PBBFAC,,,

## 2018-10-05 PROCEDURE — 93975 VASCULAR STUDY: CPT | Mod: 26,S$PBB,, | Performed by: INTERNAL MEDICINE

## 2018-10-05 PROCEDURE — 76770 US EXAM ABDO BACK WALL COMP: CPT | Mod: PBBFAC,PO,59 | Performed by: INTERNAL MEDICINE

## 2018-10-05 PROCEDURE — 93306 TTE W/DOPPLER COMPLETE: CPT | Mod: PBBFAC,PO | Performed by: INTERNAL MEDICINE

## 2018-10-05 PROCEDURE — 99212 OFFICE O/P EST SF 10 MIN: CPT | Mod: PBBFAC,PO,25

## 2018-10-08 LAB
ABDOMINAL AORTA PROX EDV: 12 CM/S
ABDOMINAL AORTA PROX PSV: 76 CM/S
ASCENDING AORTA: 3.28 CM
AV MEAN GRADIENT: 32.58 MMHG
AV PEAK GRADIENT: 65.2 MMHG
AV VALVE AREA: 1.19 CM2
BSA FOR ECHO PROCEDURE: 1.9 M2
CV ECHO LV RWT: 0.35 CM
DOP CALC AO PEAK VEL: 4.04 M/S
DOP CALC AO VTI: 101.92 CM
DOP CALC LVOT AREA: 3.87 CM2
DOP CALC LVOT DIAMETER: 2.22 CM
DOP CALC LVOT STROKE VOLUME: 120.78 CM3
DOP CALCLVOT PEAK VEL VTI: 31.22 CM
E WAVE DECELERATION TIME: 407.85 MSEC
E/A RATIO: 0.84
E/E' RATIO: 11.23
ECHO LV POSTERIOR WALL: 0.92 CM (ref 0.6–1.1)
FRACTIONAL SHORTENING: 38 % (ref 28–44)
INTERVENTRICULAR SEPTUM: 1 CM (ref 0.6–1.1)
IVRT: 0.14 MSEC
LA MAJOR: 4.83 CM
LA MINOR: 5.06 CM
LA WIDTH: 3.83 CM
LEFT ATRIUM SIZE: 3.43 CM
LEFT ATRIUM VOLUME INDEX: 29 ML/M2
LEFT ATRIUM VOLUME: 55.19 CM3
LEFT INTERNAL DIMENSION IN SYSTOLE: 3.4 CM (ref 2.1–4)
LEFT RENAL DIST DIAS: 12 CM/S
LEFT RENAL DIST SYS: 71 CM/S
LEFT RENAL MID DIAS: 12 CM/S
LEFT RENAL MID SYS: 64 CM/S
LEFT RENAL ORIGIN DIAS: 14 CM/S
LEFT RENAL ORIGIN SYS: 82 CM/S
LEFT RENAL PROX DIAS: 11 CM/S
LEFT RENAL PROX RAR: 0.95
LEFT RENAL PROX SYS: 72 CM/S
LEFT RENAL ULTRASOUND ACCELERATION TIME MEASUREMENT 1: 62 MS
LEFT RENAL ULTRASOUND ACCELERATION TIME MEASUREMENT 2: 24 MS
LEFT RENAL ULTRASOUND ACCELERATION TIME MEASUREMENT 3: 59 MS
LEFT RENAL ULTRASOUND ACCELERATION TIME MEASUREMENT AVERAGE: 62 MS
LEFT RENAL ULTRASOUND KIDNEY SIZE MEASUREMENT 1: 11.43 CM
LEFT RENAL ULTRASOUND KIDNEY SIZE MEASUREMENT 2: 11.51 CM
LEFT RENAL ULTRASOUND KIDNEY SIZE MEASUREMENT 3: 11.59 CM
LEFT RENAL ULTRASOUND KIDNEY SIZE MEASUREMENT AVERAGE: 11.59 CM
LEFT RENAL ULTRASOUND RESISTIVE INDEX MEASUREMENT 1: 0.8
LEFT RENAL ULTRASOUND RESISTIVE INDEX MEASUREMENT 2: 0.74
LEFT RENAL ULTRASOUND RESISTIVE INDEX MEASUREMENT 3: 0.68
LEFT RENAL ULTRASOUND RESISTIVE INDEX MEASUREMENT AVERAGE: 0.8
LEFT VENTRICLE DIASTOLIC VOLUME INDEX: 78.36 ML/M2
LEFT VENTRICLE DIASTOLIC VOLUME: 148.89 ML
LEFT VENTRICLE MASS INDEX: 107 G/M2
LEFT VENTRICLE SYSTOLIC VOLUME INDEX: 25 ML/M2
LEFT VENTRICLE SYSTOLIC VOLUME: 47.46 ML
LEFT VENTRICULAR INTERNAL DIMENSION IN DIASTOLE: 5.52 CM (ref 3.5–6)
LEFT VENTRICULAR MASS: 203.3 G
LV LATERAL E/E' RATIO: 10.43
LV SEPTAL E/E' RATIO: 12.17
MV PEAK A VEL: 0.87 M/S
MV PEAK E VEL: 0.73 M/S
MV STENOSIS PRESSURE HALF TIME: 118.28 MS
MV VALVE AREA P 1/2 METHOD: 1.86 CM2
OHS CV LEFT RENAL RAR: 1.08
OHS CV RIGHT RENAL RAR: 1.55
PISA TR MAX VEL: 2.52 M/S
PULM VEIN S/D RATIO: 1.62
PV PEAK D VEL: 0.34 M/S
PV PEAK GRADIENT: 23.79 MMHG
PV PEAK S VEL: 0.55 M/S
PV PEAK VELOCITY: 2.44 CM/S
RA MAJOR: 4.66 CM
RA PRESSURE: 8 MMHG
RA WIDTH: 4.15 CM
RETIRED EF AND QEF - SEE NOTES: 68.12 %
RIGHT RENAL DIST DIAS: 14 CM/S
RIGHT RENAL DIST SYS: 80 CM/S
RIGHT RENAL MID DIAS: 16 CM/S
RIGHT RENAL MID SYS: 118 CM/S
RIGHT RENAL ORIGIN DIAS: 11 CM/S
RIGHT RENAL ORIGIN SYS: 63 CM/S
RIGHT RENAL PROX DIAS: 19 CM/S
RIGHT RENAL PROX RAR: 1.09
RIGHT RENAL PROX SYS: 83 CM/S
RIGHT RENAL ULTRASOUND ACCELERATION TIME MEASUREMENT 1: 55 MS
RIGHT RENAL ULTRASOUND ACCELERATION TIME MEASUREMENT 2: 87 MS
RIGHT RENAL ULTRASOUND ACCELERATION TIME MEASUREMENT 3: 62 MS
RIGHT RENAL ULTRASOUND ACCELERATION TIME MEASUREMENT AVERAGE: 87 MS
RIGHT RENAL ULTRASOUND KIDNEY SIZE MEASUREMENT 1: 10.95 CM
RIGHT RENAL ULTRASOUND KIDNEY SIZE MEASUREMENT 2: 10.99 CM
RIGHT RENAL ULTRASOUND KIDNEY SIZE MEASUREMENT 3: 11.32 CM
RIGHT RENAL ULTRASOUND KIDNEY SIZE MEASUREMENT AVERAGE: 11.32 CM
RIGHT RENAL ULTRASOUND RESISTIVE INDEX MEASUREMENT 1: 0.84
RIGHT RENAL ULTRASOUND RESISTIVE INDEX MEASUREMENT 2: 0.69
RIGHT RENAL ULTRASOUND RESISTIVE INDEX MEASUREMENT 3: 0.73
RIGHT RENAL ULTRASOUND RESISTIVE INDEX MEASUREMENT AVERAGE: 0.84
RIGHT VENTRICULAR END-DIASTOLIC DIMENSION: 4.44 CM
SINUS: 3.43 CM
STJ: 3.03 CM
TDI LATERAL: 0.07
TDI SEPTAL: 0.06
TDI: 0.07
TR MAX PG: 25.4 MMHG
TRICUSPID ANNULAR PLANE SYSTOLIC EXCURSION: 0.02 CM
TV REST PULMONARY ARTERY PRESSURE: 33.4 MMHG

## 2018-10-11 NOTE — PROGRESS NOTES
CRS Office Visit History and Physical    Referring Md:   Aldo Torres Md  [id number] [place] [address]    SUBJECTIVE:     Chief Complaint:  Rectal prolapse    History of Present Illness:  The patient is new patient to this practice.   Course is as follows:  Patient is a 67 y.o. male presents with possible rectal prolapse diagnosed at a doctor's visit 3 days ago. He has a long history of hemorrhoids, however recently has felt something protruding. He is able to reduce the prolapse but then it reappears with flatus or bowel movements. He does not experience pain, just irritation. Of note, he does take daily pain medication for rheumatoid arthritis diagnosed 5-6 years ago. Takes crystalose daily to prevent constipation. Denies blood in stool, but reports some blood on toilet paper he attributes to irritation. Denies pain with BM, recent changes to BMs.   He has a bowel movement every day.  He spends less than 5 min on the toilet for each bowel movement.  He has protrusion with each bowel movement.  He manually reduces this.  This has gotten worse over the past 3-4 weeks.  Functionally, he is active and performs all of his activities of daily living    Last Colonoscopy: ~2 years ago, told to follow up in 10 years     Review of patient's allergies indicates:   Allergen Reactions    Buspar [buspirone] Hallucinations     Night mare    Amitiza [lubiprostone] Other (See Comments)     Nausea, fatigue.    Fentanyl Hallucinations     Hives, hallucinations.       Past Medical History:   Diagnosis Date    GERD (gastroesophageal reflux disease)     Glaucoma     Hyperlipidemia     Hypertension     RA (rheumatoid arthritis)      Past Surgical History:   Procedure Laterality Date    CARPAL TUNNEL RELEASE      Right wrist 2015    THYROID SURGERY       History reviewed. No pertinent family history.  Social History     Tobacco Use    Smoking status: Never Smoker   Substance Use Topics    Alcohol use: Not on  file    Drug use: Not on file        Review of Systems:  Review of Systems   Constitutional: Negative.    HENT: Negative.    Eyes: Negative.    Respiratory: Negative.    Cardiovascular: Negative.    Gastrointestinal: Positive for constipation. Negative for abdominal pain, blood in stool, diarrhea, nausea and vomiting.   Genitourinary: Negative.    Musculoskeletal: Negative.    Skin: Negative.    Neurological: Negative.    Endo/Heme/Allergies: Negative.    Psychiatric/Behavioral: Negative.        OBJECTIVE:     Vital Signs (Most Recent)  BP (!) 115/57 (BP Location: Right arm, Patient Position: Sitting, BP Method: Large (Automatic))   Pulse 60   Ht 6' (1.829 m)   Wt 71.3 kg (157 lb 3 oz)   BMI 21.32 kg/m²     Physical Exam:  General: Black or  male in no distress   Neuro: alert and oriented x 4.  Moves all extremities.     HEENT: no icterus.  Trachea midline  Respiratory: respirations are even and unlabored  Cardiac: regular rate  Abdomen: soft, non-tender    Extremities: Warm dry and intact  Skin: no rashes  Anorectal: External exam was normal. No external hemorrhoids or fissures seen.  Digital exam was performed.  Normal tone. Normal relaxation of the puborectalis.  Anoscopy was then performed.  Mildly irritated distal rectal mucosa.  He was then placed on the toilet Nast strain.  With straining on the toilet, he has circumferentially prolapsed internal hemorrhoids.    Labs:  Pending    Imaging:  None      ASSESSMENT/PLAN:     Scooter was seen today for rectal prolapse.    Diagnoses and all orders for this visit:    Grade III hemorrhoids  -     Case Request Operating Room: HEMORRHOIDECTOMY, prone      59-year-old gentleman with circumferentially prolapsed grade 3 internal hemorrhoids.  We discussed options for treatment today.  This is too extensive for banding.  I therefore recommended excisional hemorrhoidectomy versus Delorme procedure based on intraoperative findings. We discussed this a 4  week recovery.  We discussed the risks of recurrence, bleeding, pain, damage to underlying sphincter, infection.  Consents were signed today.  He will need to be in prone position under general anesthesia.  He would likely need to be admitted for postoperative observation given the extensive nature of the hemorrhoids.    JENNY Horton MD  Staff Surgeon  Colon & Rectal Surgery

## 2018-10-11 NOTE — H&P (VIEW-ONLY)
CRS Office Visit History and Physical    Referring Md:   Aldo Torres Md  [address] [place] [address]    SUBJECTIVE:     Chief Complaint:  Rectal prolapse    History of Present Illness:  The patient is new patient to this practice.   Course is as follows:  Patient is a 67 y.o. male presents with possible rectal prolapse diagnosed at a doctor's visit 3 days ago. He has a long history of hemorrhoids, however recently has felt something protruding. He is able to reduce the prolapse but then it reappears with flatus or bowel movements. He does not experience pain, just irritation. Of note, he does take daily pain medication for rheumatoid arthritis diagnosed 5-6 years ago. Takes crystalose daily to prevent constipation. Denies blood in stool, but reports some blood on toilet paper he attributes to irritation. Denies pain with BM, recent changes to BMs.   He has a bowel movement every day.  He spends less than 5 min on the toilet for each bowel movement.  He has protrusion with each bowel movement.  He manually reduces this.  This has gotten worse over the past 3-4 weeks.  Functionally, he is active and performs all of his activities of daily living    Last Colonoscopy: ~2 years ago, told to follow up in 10 years     Review of patient's allergies indicates:   Allergen Reactions    Buspar [buspirone] Hallucinations     Night mare    Amitiza [lubiprostone] Other (See Comments)     Nausea, fatigue.    Fentanyl Hallucinations     Hives, hallucinations.       Past Medical History:   Diagnosis Date    GERD (gastroesophageal reflux disease)     Glaucoma     Hyperlipidemia     Hypertension     RA (rheumatoid arthritis)      Past Surgical History:   Procedure Laterality Date    CARPAL TUNNEL RELEASE      Right wrist 2015    THYROID SURGERY       History reviewed. No pertinent family history.  Social History     Tobacco Use    Smoking status: Never Smoker   Substance Use Topics    Alcohol use: Not on  file    Drug use: Not on file        Review of Systems:  Review of Systems   Constitutional: Negative.    HENT: Negative.    Eyes: Negative.    Respiratory: Negative.    Cardiovascular: Negative.    Gastrointestinal: Positive for constipation. Negative for abdominal pain, blood in stool, diarrhea, nausea and vomiting.   Genitourinary: Negative.    Musculoskeletal: Negative.    Skin: Negative.    Neurological: Negative.    Endo/Heme/Allergies: Negative.    Psychiatric/Behavioral: Negative.        OBJECTIVE:     Vital Signs (Most Recent)  BP (!) 115/57 (BP Location: Right arm, Patient Position: Sitting, BP Method: Large (Automatic))   Pulse 60   Ht 6' (1.829 m)   Wt 71.3 kg (157 lb 3 oz)   BMI 21.32 kg/m²     Physical Exam:  General: Black or  male in no distress   Neuro: alert and oriented x 4.  Moves all extremities.     HEENT: no icterus.  Trachea midline  Respiratory: respirations are even and unlabored  Cardiac: regular rate  Abdomen: soft, non-tender    Extremities: Warm dry and intact  Skin: no rashes  Anorectal: External exam was normal. No external hemorrhoids or fissures seen.  Digital exam was performed.  Normal tone. Normal relaxation of the puborectalis.  Anoscopy was then performed.  Mildly irritated distal rectal mucosa.  He was then placed on the toilet Nast strain.  With straining on the toilet, he has circumferentially prolapsed internal hemorrhoids.    Labs:  Pending    Imaging:  None      ASSESSMENT/PLAN:     Scooter was seen today for rectal prolapse.    Diagnoses and all orders for this visit:    Grade III hemorrhoids  -     Case Request Operating Room: HEMORRHOIDECTOMY, prone      59-year-old gentleman with circumferentially prolapsed grade 3 internal hemorrhoids.  We discussed options for treatment today.  This is too extensive for banding.  I therefore recommended excisional hemorrhoidectomy versus Delorme procedure based on intraoperative findings. We discussed this a 4  week recovery.  We discussed the risks of recurrence, bleeding, pain, damage to underlying sphincter, infection.  Consents were signed today.  He will need to be in prone position under general anesthesia.  He would likely need to be admitted for postoperative observation given the extensive nature of the hemorrhoids.    JENNY Horton MD  Staff Surgeon  Colon & Rectal Surgery

## 2018-10-12 ENCOUNTER — HOSPITAL ENCOUNTER (OUTPATIENT)
Dept: CARDIOLOGY | Facility: CLINIC | Age: 68
Discharge: HOME | End: 2018-10-12
Payer: MEDICARE

## 2018-10-12 ENCOUNTER — OFFICE VISIT (OUTPATIENT)
Dept: SURGERY | Facility: CLINIC | Age: 68
End: 2018-10-12
Payer: MEDICARE

## 2018-10-12 VITALS
BODY MASS INDEX: 21.29 KG/M2 | SYSTOLIC BLOOD PRESSURE: 115 MMHG | HEART RATE: 60 BPM | WEIGHT: 157.19 LBS | DIASTOLIC BLOOD PRESSURE: 57 MMHG | HEIGHT: 72 IN

## 2018-10-12 DIAGNOSIS — Z01.818 PRE-OP EVALUATION: ICD-10-CM

## 2018-10-12 DIAGNOSIS — K64.2 GRADE III HEMORRHOIDS: Primary | ICD-10-CM

## 2018-10-12 DIAGNOSIS — Z01.818 PRE-OP EVALUATION: Primary | ICD-10-CM

## 2018-10-12 PROCEDURE — 46600 DIAGNOSTIC ANOSCOPY SPX: CPT | Mod: PBBFAC | Performed by: COLON & RECTAL SURGERY

## 2018-10-12 PROCEDURE — 93005 ELECTROCARDIOGRAM TRACING: CPT | Mod: PBBFAC | Performed by: INTERNAL MEDICINE

## 2018-10-12 PROCEDURE — 99203 OFFICE O/P NEW LOW 30 MIN: CPT | Mod: S$PBB,25,, | Performed by: COLON & RECTAL SURGERY

## 2018-10-12 PROCEDURE — 99213 OFFICE O/P EST LOW 20 MIN: CPT | Mod: PBBFAC,25 | Performed by: COLON & RECTAL SURGERY

## 2018-10-12 PROCEDURE — 1101F PT FALLS ASSESS-DOCD LE1/YR: CPT | Mod: CPTII,,, | Performed by: COLON & RECTAL SURGERY

## 2018-10-12 PROCEDURE — 99999 PR PBB SHADOW E&M-EST. PATIENT-LVL III: CPT | Mod: PBBFAC,,, | Performed by: COLON & RECTAL SURGERY

## 2018-10-12 PROCEDURE — 46600 DIAGNOSTIC ANOSCOPY SPX: CPT | Mod: S$PBB,,, | Performed by: COLON & RECTAL SURGERY

## 2018-10-12 PROCEDURE — 93010 ELECTROCARDIOGRAM REPORT: CPT | Mod: S$PBB,,, | Performed by: INTERNAL MEDICINE

## 2018-10-12 NOTE — LETTER
October 12, 2018      Aldo Torres MD  [address] [facility] [employer] [place] [address]           [facility]  [address]  [facility] [address]  Phone: [phone]          Patient: Scooter Swan   MR Number: [id number]   YOB: 1950   Date of Visit: 10/12/2018       Dear Dr. Aldo Torres:    Thank you for referring Scooter Swan to me for evaluation. Attached you will find relevant portions of my assessment and plan of care.    If you have questions, please do not hesitate to call me. I look forward to following Scooter Swan along with you.    Sincerely,    Gunnar Horton MD    Enclosure  CC:  No Recipients    If you would like to receive this communication electronically, please contact [email or web address] or [phone] to request more information on [facility] Link access.    For providers and/or their staff who would like to refer a patient to [facility], please contact us through our one-stop-shop provider referral line, [facility], at [phone].    If you feel you have received this communication in error or would no longer like to receive these types of communications, please e-mail [email or web address]

## 2018-10-15 DIAGNOSIS — M06.9 RHEUMATOID ARTHRITIS, INVOLVING UNSPECIFIED SITE, UNSPECIFIED RHEUMATOID FACTOR PRESENCE: ICD-10-CM

## 2018-10-15 DIAGNOSIS — G56.00 CARPAL TUNNEL SYNDROME, UNSPECIFIED LATERALITY: ICD-10-CM

## 2018-10-17 RX ORDER — PREDNISONE 5 MG/1
TABLET ORAL
Qty: 60 TABLET | Refills: 3 | Status: SHIPPED | OUTPATIENT
Start: 2018-10-17 | End: 2019-02-10 | Stop reason: SDUPTHER

## 2018-10-17 NOTE — PRE-PROCEDURE INSTRUCTIONS
Spoke with Patient.  NPO, medication, and pre-op instructions reviewed.  Denies previous problems with Anesthesia.  Stated that he has a bottle of Mg+ Citrate to take today at 1600 and will also start a clear liquid diet at 1600.  Arrival time 1230.  Instructed that he can stay on a clear liquid diet until 1030 and then to remain NPO after 1030.  Has SOB with exertion and at times while at rest.  Uses an Albuterol inhaler prn with relief.  He checks his blood pressures at home.  Stated that his last reading was 115/80.  Verbalized understanding of instructions.

## 2018-10-18 ENCOUNTER — ANESTHESIA EVENT (OUTPATIENT)
Dept: SURGERY | Facility: HOSPITAL | Age: 68
End: 2018-10-18
Payer: MEDICARE

## 2018-10-18 ENCOUNTER — HOSPITAL ENCOUNTER (OUTPATIENT)
Facility: HOSPITAL | Age: 68
Discharge: HOME | End: 2018-10-18
Attending: COLON & RECTAL SURGERY | Admitting: COLON & RECTAL SURGERY
Payer: MEDICARE

## 2018-10-18 ENCOUNTER — ANESTHESIA (OUTPATIENT)
Dept: SURGERY | Facility: HOSPITAL | Age: 68
End: 2018-10-18
Payer: MEDICARE

## 2018-10-18 ENCOUNTER — NURSE TRIAGE (OUTPATIENT)
Dept: ADMINISTRATIVE | Facility: CLINIC | Age: 68
End: 2018-10-18

## 2018-10-18 VITALS
DIASTOLIC BLOOD PRESSURE: 86 MMHG | BODY MASS INDEX: 20.99 KG/M2 | OXYGEN SATURATION: 100 % | RESPIRATION RATE: 22 BRPM | HEIGHT: 72 IN | TEMPERATURE: 98 F | WEIGHT: 155 LBS | HEART RATE: 67 BPM | SYSTOLIC BLOOD PRESSURE: 169 MMHG

## 2018-10-18 DIAGNOSIS — K64.9 HEMORRHOIDS, UNSPECIFIED HEMORRHOID TYPE: Primary | ICD-10-CM

## 2018-10-18 DIAGNOSIS — K64.9 HEMORRHOIDS: ICD-10-CM

## 2018-10-18 PROCEDURE — 25000003 PHARM REV CODE 250: Performed by: NURSE PRACTITIONER

## 2018-10-18 PROCEDURE — 36000707: Performed by: COLON & RECTAL SURGERY

## 2018-10-18 PROCEDURE — 25000003 PHARM REV CODE 250: Performed by: NURSE ANESTHETIST, CERTIFIED REGISTERED

## 2018-10-18 PROCEDURE — 63600175 PHARM REV CODE 636 W HCPCS: Performed by: COLON & RECTAL SURGERY

## 2018-10-18 PROCEDURE — 71000033 HC RECOVERY, INTIAL HOUR: Performed by: COLON & RECTAL SURGERY

## 2018-10-18 PROCEDURE — 63600175 PHARM REV CODE 636 W HCPCS: Performed by: NURSE PRACTITIONER

## 2018-10-18 PROCEDURE — 37000008 HC ANESTHESIA 1ST 15 MINUTES: Performed by: COLON & RECTAL SURGERY

## 2018-10-18 PROCEDURE — S0020 INJECTION, BUPIVICAINE HYDRO: HCPCS | Performed by: COLON & RECTAL SURGERY

## 2018-10-18 PROCEDURE — 36000706: Performed by: COLON & RECTAL SURGERY

## 2018-10-18 PROCEDURE — 25000003 PHARM REV CODE 250: Performed by: COLON & RECTAL SURGERY

## 2018-10-18 PROCEDURE — 63600175 PHARM REV CODE 636 W HCPCS: Performed by: UROLOGY

## 2018-10-18 PROCEDURE — 88304 TISSUE EXAM BY PATHOLOGIST: CPT | Mod: 26,,, | Performed by: PATHOLOGY

## 2018-10-18 PROCEDURE — 94761 N-INVAS EAR/PLS OXIMETRY MLT: CPT

## 2018-10-18 PROCEDURE — 46260 REMOVE IN/EX HEM GROUPS 2+: CPT | Mod: ,,, | Performed by: COLON & RECTAL SURGERY

## 2018-10-18 PROCEDURE — 71000039 HC RECOVERY, EACH ADD'L HOUR: Performed by: COLON & RECTAL SURGERY

## 2018-10-18 PROCEDURE — C9290 INJ, BUPIVACAINE LIPOSOME: HCPCS | Performed by: COLON & RECTAL SURGERY

## 2018-10-18 PROCEDURE — D9220A PRA ANESTHESIA: Mod: CRNA,,, | Performed by: NURSE ANESTHETIST, CERTIFIED REGISTERED

## 2018-10-18 PROCEDURE — 63600175 PHARM REV CODE 636 W HCPCS: Performed by: NURSE ANESTHETIST, CERTIFIED REGISTERED

## 2018-10-18 PROCEDURE — 25000003 PHARM REV CODE 250: Performed by: SURGERY

## 2018-10-18 PROCEDURE — D9220A PRA ANESTHESIA: Mod: ANES,,, | Performed by: ANESTHESIOLOGY

## 2018-10-18 PROCEDURE — 37000009 HC ANESTHESIA EA ADD 15 MINS: Performed by: COLON & RECTAL SURGERY

## 2018-10-18 PROCEDURE — 88304 TISSUE EXAM BY PATHOLOGIST: CPT | Performed by: PATHOLOGY

## 2018-10-18 PROCEDURE — 71000015 HC POSTOP RECOV 1ST HR: Performed by: COLON & RECTAL SURGERY

## 2018-10-18 RX ORDER — HYDROMORPHONE HYDROCHLORIDE 1 MG/ML
INJECTION, SOLUTION INTRAMUSCULAR; INTRAVENOUS; SUBCUTANEOUS
Status: DISCONTINUED
Start: 2018-10-18 | End: 2018-10-18 | Stop reason: HOSPADM

## 2018-10-18 RX ORDER — HEPARIN SODIUM 5000 [USP'U]/ML
5000 INJECTION, SOLUTION INTRAVENOUS; SUBCUTANEOUS
Status: COMPLETED | OUTPATIENT
Start: 2018-10-18 | End: 2018-10-18

## 2018-10-18 RX ORDER — OXYCODONE AND ACETAMINOPHEN 5; 325 MG/1; MG/1
1-2 TABLET ORAL
Qty: 60 TABLET | Refills: 0 | Status: SHIPPED | OUTPATIENT
Start: 2018-10-18 | End: 2018-10-18 | Stop reason: SDUPTHER

## 2018-10-18 RX ORDER — ACETAMINOPHEN 10 MG/ML
1000 INJECTION, SOLUTION INTRAVENOUS
Status: COMPLETED | OUTPATIENT
Start: 2018-10-18 | End: 2018-10-18

## 2018-10-18 RX ORDER — HYDRALAZINE HYDROCHLORIDE 20 MG/ML
INJECTION INTRAMUSCULAR; INTRAVENOUS
Status: COMPLETED
Start: 2018-10-18 | End: 2018-10-18

## 2018-10-18 RX ORDER — HYDRALAZINE HYDROCHLORIDE 20 MG/ML
INJECTION INTRAMUSCULAR; INTRAVENOUS
Status: DISCONTINUED | OUTPATIENT
Start: 2018-10-18 | End: 2018-10-18

## 2018-10-18 RX ORDER — MIDAZOLAM HYDROCHLORIDE 1 MG/ML
INJECTION, SOLUTION INTRAMUSCULAR; INTRAVENOUS
Status: DISCONTINUED | OUTPATIENT
Start: 2018-10-18 | End: 2018-10-18

## 2018-10-18 RX ORDER — HYDROMORPHONE HYDROCHLORIDE 2 MG/ML
INJECTION, SOLUTION INTRAMUSCULAR; INTRAVENOUS; SUBCUTANEOUS
Status: DISCONTINUED | OUTPATIENT
Start: 2018-10-18 | End: 2018-10-18

## 2018-10-18 RX ORDER — LABETALOL HYDROCHLORIDE 5 MG/ML
INJECTION, SOLUTION INTRAVENOUS
Status: COMPLETED
Start: 2018-10-18 | End: 2018-10-18

## 2018-10-18 RX ORDER — HYDRALAZINE HYDROCHLORIDE 20 MG/ML
10 INJECTION INTRAMUSCULAR; INTRAVENOUS ONCE
Status: COMPLETED | OUTPATIENT
Start: 2018-10-18 | End: 2018-10-18

## 2018-10-18 RX ORDER — PROPOFOL 10 MG/ML
VIAL (ML) INTRAVENOUS
Status: DISCONTINUED | OUTPATIENT
Start: 2018-10-18 | End: 2018-10-18

## 2018-10-18 RX ORDER — OXYCODONE AND ACETAMINOPHEN 5; 325 MG/1; MG/1
1-2 TABLET ORAL
Qty: 60 TABLET | Refills: 0 | Status: SHIPPED | OUTPATIENT
Start: 2018-10-18 | End: 2018-11-29

## 2018-10-18 RX ORDER — OXYCODONE AND ACETAMINOPHEN 5; 325 MG/1; MG/1
1 TABLET ORAL EVERY 4 HOURS PRN
Status: DISCONTINUED | OUTPATIENT
Start: 2018-10-18 | End: 2018-10-18 | Stop reason: HOSPADM

## 2018-10-18 RX ORDER — HYDROMORPHONE HYDROCHLORIDE 1 MG/ML
0.2 INJECTION, SOLUTION INTRAMUSCULAR; INTRAVENOUS; SUBCUTANEOUS EVERY 5 MIN PRN
Status: DISCONTINUED | OUTPATIENT
Start: 2018-10-18 | End: 2018-10-18 | Stop reason: HOSPADM

## 2018-10-18 RX ORDER — SODIUM CHLORIDE 0.9 % (FLUSH) 0.9 %
3 SYRINGE (ML) INJECTION
Status: DISCONTINUED | OUTPATIENT
Start: 2018-10-18 | End: 2018-10-18 | Stop reason: HOSPADM

## 2018-10-18 RX ORDER — BUPIVACAINE HYDROCHLORIDE 5 MG/ML
INJECTION, SOLUTION EPIDURAL; INTRACAUDAL
Status: DISCONTINUED | OUTPATIENT
Start: 2018-10-18 | End: 2018-10-18 | Stop reason: HOSPADM

## 2018-10-18 RX ORDER — LIDOCAINE HCL/PF 100 MG/5ML
SYRINGE (ML) INTRAVENOUS
Status: DISCONTINUED | OUTPATIENT
Start: 2018-10-18 | End: 2018-10-18

## 2018-10-18 RX ORDER — ONDANSETRON 2 MG/ML
INJECTION INTRAMUSCULAR; INTRAVENOUS
Status: DISCONTINUED | OUTPATIENT
Start: 2018-10-18 | End: 2018-10-18

## 2018-10-18 RX ORDER — SODIUM CHLORIDE 9 MG/ML
INJECTION, SOLUTION INTRAVENOUS CONTINUOUS
Status: ACTIVE | OUTPATIENT
Start: 2018-10-18

## 2018-10-18 RX ORDER — LABETALOL HYDROCHLORIDE 5 MG/ML
INJECTION, SOLUTION INTRAVENOUS
Status: DISCONTINUED | OUTPATIENT
Start: 2018-10-18 | End: 2018-10-18

## 2018-10-18 RX ORDER — OXYCODONE AND ACETAMINOPHEN 5; 325 MG/1; MG/1
TABLET ORAL
Status: DISCONTINUED
Start: 2018-10-18 | End: 2018-10-18 | Stop reason: HOSPADM

## 2018-10-18 RX ORDER — HYDROCODONE BITARTRATE AND ACETAMINOPHEN 5; 325 MG/1; MG/1
TABLET ORAL
Status: DISCONTINUED
Start: 2018-10-18 | End: 2018-10-18 | Stop reason: WASHOUT

## 2018-10-18 RX ORDER — MUPIROCIN 20 MG/G
OINTMENT TOPICAL
Status: DISPENSED | OUTPATIENT
Start: 2018-10-18

## 2018-10-18 RX ORDER — SUCCINYLCHOLINE CHLORIDE 20 MG/ML
INJECTION INTRAMUSCULAR; INTRAVENOUS
Status: DISCONTINUED | OUTPATIENT
Start: 2018-10-18 | End: 2018-10-18

## 2018-10-18 RX ADMIN — HYDRALAZINE HYDROCHLORIDE 10 MG: 20 INJECTION INTRAMUSCULAR; INTRAVENOUS at 04:10

## 2018-10-18 RX ADMIN — MUPIROCIN: 20 OINTMENT TOPICAL at 01:10

## 2018-10-18 RX ADMIN — MIDAZOLAM HYDROCHLORIDE 2 MG: 1 INJECTION, SOLUTION INTRAMUSCULAR; INTRAVENOUS at 03:10

## 2018-10-18 RX ADMIN — LABETALOL HYDROCHLORIDE 5 MG: 5 INJECTION, SOLUTION INTRAVENOUS at 04:10

## 2018-10-18 RX ADMIN — ONDANSETRON 4 MG: 2 INJECTION INTRAMUSCULAR; INTRAVENOUS at 04:10

## 2018-10-18 RX ADMIN — HYDROMORPHONE HYDROCHLORIDE 1 MG: 2 INJECTION, SOLUTION INTRAMUSCULAR; INTRAVENOUS; SUBCUTANEOUS at 03:10

## 2018-10-18 RX ADMIN — SODIUM CHLORIDE, SODIUM GLUCONATE, SODIUM ACETATE, POTASSIUM CHLORIDE, MAGNESIUM CHLORIDE, SODIUM PHOSPHATE, DIBASIC, AND POTASSIUM PHOSPHATE: .53; .5; .37; .037; .03; .012; .00082 INJECTION, SOLUTION INTRAVENOUS at 04:10

## 2018-10-18 RX ADMIN — HEPARIN SODIUM 5000 UNITS: 5000 INJECTION, SOLUTION INTRAVENOUS; SUBCUTANEOUS at 02:10

## 2018-10-18 RX ADMIN — PROPOFOL 200 MG: 10 INJECTION, EMULSION INTRAVENOUS at 03:10

## 2018-10-18 RX ADMIN — IBUPROFEN 400 MG: 800 INJECTION INTRAVENOUS at 03:10

## 2018-10-18 RX ADMIN — HYDRALAZINE HYDROCHLORIDE 5 MG: 20 INJECTION INTRAMUSCULAR; INTRAVENOUS at 04:10

## 2018-10-18 RX ADMIN — OXYCODONE HYDROCHLORIDE AND ACETAMINOPHEN 1 TABLET: 5; 325 TABLET ORAL at 04:10

## 2018-10-18 RX ADMIN — ACETAMINOPHEN 1000 MG: 10 INJECTION, SOLUTION INTRAVENOUS at 01:10

## 2018-10-18 RX ADMIN — SUCCINYLCHOLINE CHLORIDE 120 MG: 20 INJECTION, SOLUTION INTRAMUSCULAR; INTRAVENOUS at 03:10

## 2018-10-18 RX ADMIN — LIDOCAINE HYDROCHLORIDE 60 MG: 20 INJECTION, SOLUTION INTRAVENOUS at 03:10

## 2018-10-18 RX ADMIN — SODIUM CHLORIDE: 0.9 INJECTION, SOLUTION INTRAVENOUS at 01:10

## 2018-10-18 NOTE — PLAN OF CARE
Problem: Patient Care Overview  Goal: Plan of Care Review  Outcome: Ongoing (interventions implemented as appropriate)  Vital sign stable. 10mg hydralazine given per MD order for elevated BP. Afebrile. Alert, oriented and following commands. Pain controlled with PRN meds. Denies nausea. Voiding via urinal. Dressing remains intact with serosanguinous drainage. POC reviewed and all questions/concerns addressed.

## 2018-10-18 NOTE — BRIEF OP NOTE
[facility]  Brief Operative Note     SUMMARY     Surgery Date: 10/18/2018     Surgeon(s) and Role:     * Gunnar Horton MD - Primary     * Mp Berrios Jr., MD - Resident - Assisting    Pre-op Diagnosis:  Grade III hemorrhoids [K64.2]    Post-op Diagnosis:  Post-Op Diagnosis Codes:     * Grade III hemorrhoids [K64.2]    Procedure(s) (LRB):  HEMORRHOIDECTOMY, prone (N/A)    Anesthesia: General    Description of the findings of the procedure: Left lateral and right posterior hemorrhoidectomy    Findings/Key Components: Grade III internal hemorrhoids in the left lateral and right posterior columns    Estimated Blood Loss: minimal         Specimens:   Specimen (12h ago, onward)    Start     Ordered    10/18/18 1614  Specimen to Pathology - Surgery  Once     Comments:  1. Left lateral hemorrhoid-permanent2. Right posterior hemorrhoid-permanent     Start Status   10/18/18 1614 Collected (10/18/18 1613)       10/18/18 1613          Discharge Note    SUMMARY     Admit Date: 10/18/2018    Discharge Date and Time:  10/18/2018 4:25 PM    Hospital Course: Patient presented for outpatient hemorrhoidectomy.  He tolerated the procedure well and was discharged later that same day.     Final Diagnosis: Post-Op Diagnosis Codes:     * Grade III hemorrhoids [K64.2]    Disposition: Home or Self Care    Follow Up/Patient Instructions: 4 weeks    Medications:  Reconciled Home Medications:      Medication List      START taking these medications    oxyCODONE-acetaminophen 5-325 mg per tablet  Commonly known as:  PERCOCET  Take 1-2 tablets by mouth every 4 to 6 hours as needed.        CHANGE how you take these medications    diclofenac sodium 1 % Gel  Commonly known as:  VOLTAREN  Apply 2 grams  topically 4 (four) times daily.  What changed:    · when to take this  · reasons to take this     KRISTALOSE 10 gram packet  Generic drug:  lactulose  use 1 packet DAILY AS DIRECTED  What changed:    · how much to  take  · how to take this  · when to take this  · additional instructions        CONTINUE taking these medications    amLODIPine 10 MG tablet  Commonly known as:  NORVASC  Take 10 mg by mouth every morning.     atenolol 100 MG tablet  Commonly known as:  TENORMIN  Take 50 mg by mouth daily with dinner or evening meal.     dicyclomine 20 mg tablet  Commonly known as:  BENTYL  Take 20 mg by mouth 3 (three) times daily as needed.     dorzolamide 2 % ophthalmic solution  Commonly known as:  TRUSOPT  Place 1 drop into both eyes 3 (three) times daily.     enalapril 20 MG tablet  Commonly known as:  VASOTEC  Take 20 mg by mouth 2 (two) times daily.     furosemide 20 MG tablet  Commonly known as:  LASIX  Take 20 mg by mouth every morning.     hydrALAZINE 10 MG tablet  Commonly known as:  APRESOLINE  TAKE 2 TABLETs (20 mg) BY MOUTH 2 TIMES DAILY     hydroxychloroquine 200 mg tablet  Commonly known as:  PLAQUENIL  Take 1 tablet (200 mg total) by mouth 2 (two) times daily. for 240 doses     omeprazole 20 MG capsule  Commonly known as:  PRILOSEC  TAKE 1 CAPSULE BY MOUTH TWICE DAILY     pravastatin 20 MG tablet  Commonly known as:  PRAVACHOL  TAKE 1 TABLET BY MOUTH DAILY, morning     predniSONE 5 MG tablet  Commonly known as:  DELTASONE  TAKE 2 TABLETS BY MOUTH once daily AS NEEDED     PROAIR HFA 90 mcg/actuation inhaler  Generic drug:  albuterol  INHALE 2 PUFFS EVERY 4 HOURS AS NEEDED     tiZANidine 4 MG tablet  Commonly known as:  ZANAFLEX  Take 1 tablet (4 mg total) by mouth every 8 (eight) hours.     TRAVATAN Z 0.004 % ophthalmic solution  Generic drug:  travoprost  1 drop in both eyes at bedtime        STOP taking these medications    HYDROcodone-acetaminophen  mg per tablet  Commonly known as:  NORCO          Discharge Procedure Orders   Diet Adult Regular     Order Specific Question Answer Comments   Additional restrictions: High Fiber      Other restrictions (specify):   Order Comments: OK to shower normally.    Do  not drive while taking Percocet for pain.     Notify your health care provider if you experience any of the following:  redness, tenderness, or signs of infection (pain, swelling, redness, odor or green/yellow discharge around incision site)     Notify your health care provider if you experience any of the following:  severe uncontrolled pain     Notify your health care provider if you experience any of the following:  temperature >100.4     Notify your health care provider if you experience any of the following:  persistent nausea and vomiting or diarrhea     Remove dressing in 24 hours   Order Comments: There are gauze bandages covering your anus.  These may be removed for your first bowel movement or at 24 hours after surgery, whichever comes first.  There is a dressing that is within your anal canal called gel foam. This will likely pass with your first bowel movement.     Activity as tolerated

## 2018-10-18 NOTE — DISCHARGE INSTRUCTIONS
Anal Surgery Post Op Instructions:    You had an excisional hemorrhoidectomy x 2 performed today.  Everything went really well.  You had two areas of very redundant mucosa that were prolapsing with bowel movements and causing you trouble.  Both of those areas were removed and the remainder of the anal canal looked normal and healthy.       Wound care:  Expect some drainage over the next few weeks as the wound heals.  Please wear a pad to help with drainage.     You have no activity restrictions.   No dietary restrictions.    Medications:  A narcotic pain medication has been prescribed.  Please start to wean the pain medication over the following few days.  You can take tylenol instead of the pain medication.      Please take miralax 1 capful at night to prevent constipation associated with narcotic pain medications.      Follow up:  Return to clinic in 4 weeks for follow up and wound check.    JENNY Horton MD  Staff Surgeon  Colon & Rectal Surgery        Discharge Instructions for Hemorrhoid Surgery  You had surgery to remove hemorrhoids. These are large, swollen veins inside and outside the anus. Hemorrhoids are caused by too much pressure on the anus. This is often due to straining during bowel movements or pressure during pregnancy. After surgery, it may take a few weeks or longer to recover. This sheet tells you how to care for yourself once youre home.   Home care  You may have some bleeding, discharge, or itching for a short time after surgery. This is common. Once at home, be sure to:  · Take prescribed pain medicines on time as directed. Dont skip doses or wait until pain gets bad, as it may be harder to control.  · Take sitz baths. Fill a tub with 3 inches of warm water. Sit in the basin or tub for 10 to 20 minutes a few times a day and after each bowel movement.  · Avoid straining to pass stool. This can increase pressure on the anus. It can also lead to swelling.  · Avoid constipation:  ¨ Use a  laxative or stool softener as advised.  ¨ Eat more high-fiber foods. These include whole grains, fruit, and veggies.  ¨ Drink plenty of fluids.  · Avoid heavy lifting and strenuous activity for 1 to 2 weeks.  · Use suppositories and pads, if needed. These can help relieve symptoms.  · Avoid driving until youre able to sit and move without pain. Ask someone to drive you to appointments, if needed.  · Practice good bowel habits. Dont ignore the urge to go. But avoid spending too much time on the toilet.  Follow-up  Youll have a follow-up visit with the healthcare provider. During this visit, the healthcare provider will check how well youre healing. This visit will likely happen within 1 to 2 weeks.  When to call your healthcare provider  Call your healthcare provider right away if you have any of the following:  · Fever of 100.4°F (38.0°C) or higher, or as directed by your healthcare provider  · A large amount of drainage or bleeding from the rectum  · Trouble urinating  · No bowel movement for more than 48 hours     PATIENT INSTRUCTIONS  POST-ANESTHESIA    IMMEDIATELY FOLLOWING SURGERY:  Do not drive or operate machinery for the first twenty four hours after surgery.  Do not make any important decisions for twenty four hours after surgery or while taking narcotic pain medications or sedatives.  If you develop intractable nausea and vomiting or a severe headache please notify your doctor immediately.    FOLLOW-UP:  Please make an appointment with your surgeon as instructed. You do not need to follow up with anesthesia unless specifically instructed to do so.    WOUND CARE INSTRUCTIONS (if applicable):  Keep a dry clean dressing on the anesthesia/puncture wound site if there is drainage.  Once the wound has quit draining you may leave it open to air.  Generally you should leave the bandage intact for twenty four hours unless there is drainage.  If the epidural site drains for more than 36-48 hours please call the  anesthesia department.    QUESTIONS?:  Please feel free to call your physician or the hospital  if you have any questions, and they will be happy to assist you.       [facility] Anesthesia Department  [address] [facility]  [phone]

## 2018-10-18 NOTE — ANESTHESIA PREPROCEDURE EVALUATION
10/18/2018  Scooter Swan is a 67 y.o., male.  Pre-operative evaluation for Procedure(s) (LRB):  HEMORRHOIDECTOMY, prone (N/A)    Scooter Swan is a 67 y.o. male     Patient Active Problem List   Diagnosis    Hemorrhoids       Review of patient's allergies indicates:   Allergen Reactions    Buspar [buspirone] Hallucinations     Nightmares    Fentanyl Hives and Hallucinations    Amitiza [lubiprostone] Nausea Only and Other (See Comments)     Fatigue.       No current facility-administered medications on file prior to encounter.      Current Outpatient Medications on File Prior to Encounter   Medication Sig Dispense Refill    albuterol (PROAIR HFA) 90 mcg/actuation inhaler INHALE 2 PUFFS EVERY 4 HOURS AS NEEDED      amLODIPine (NORVASC) 10 MG tablet Take 10 mg by mouth every morning.       atenolol (TENORMIN) 100 MG tablet Take 50 mg by mouth daily with dinner or evening meal.       diclofenac sodium (VOLTAREN) 1 % Gel Apply 2 grams  topically 4 (four) times daily. (Patient taking differently: Apply 2 g topically 4 (four) times daily as needed. ) 100 g 5    dicyclomine (BENTYL) 20 mg tablet Take 20 mg by mouth 3 (three) times daily as needed.       dorzolamide (TRUSOPT) 2 % ophthalmic solution Place 1 drop into both eyes 3 (three) times daily.       enalapril (VASOTEC) 20 MG tablet Take 20 mg by mouth 2 (two) times daily.       furosemide (LASIX) 20 MG tablet Take 20 mg by mouth every morning.       hydrALAZINE (APRESOLINE) 10 MG tablet TAKE 2 TABLETs (20 mg) BY MOUTH 2 TIMES DAILY      [START ON 11/18/2018] HYDROcodone-acetaminophen (NORCO)  mg per tablet Take 1 tablet by mouth every 8 (eight) hours as needed for Pain. 90 tablet 0    hydroxychloroquine (PLAQUENIL) 200 mg tablet Take 1 tablet (200 mg total) by mouth 2 (two) times daily. for 240 doses 60 tablet 6    KRISTALOSE 10 gram packet use  1 packet DAILY AS DIRECTED (Patient taking differently: Take 10 g by mouth every morning. use 1 packet DAILY AS DIRECTED) 30 each 12    omeprazole (PRILOSEC) 20 MG capsule TAKE 1 CAPSULE BY MOUTH TWICE DAILY      pravastatin (PRAVACHOL) 20 MG tablet TAKE 1 TABLET BY MOUTH DAILY, morning      tiZANidine (ZANAFLEX) 4 MG tablet Take 1 tablet (4 mg total) by mouth every 8 (eight) hours. 90 tablet 3    travoprost (TRAVATAN Z) 0.004 % Drop 1 drop in both eyes at bedtime         Past Surgical History:   Procedure Laterality Date    CARPAL TUNNEL RELEASE      Right wrist 2015    THYROID SURGERY         Social History     Socioeconomic History    Marital status:      Spouse name: Not on file    Number of children: Not on file    Years of education: Not on file    Highest education level: Not on file   Social Needs    Financial resource strain: Not on file    Food insecurity - worry: Not on file    Food insecurity - inability: Not on file    Transportation needs - medical: Not on file    Transportation needs - non-medical: Not on file   Occupational History    Not on file   Tobacco Use    Smoking status: Never Smoker   Substance and Sexual Activity    Alcohol use: Not on file    Drug use: Not on file    Sexual activity: Not on file   Other Topics Concern    Not on file   Social History Narrative    Not on file         CBC: No results for input(s): WBC, RBC, HGB, HCT, PLT, MCV, MCH, MCHC in the last 72 hours.    CMP: No results for input(s): NA, K, CL, CO2, BUN, CREATININE, GLU, MG, PHOS, CALCIUM, ALBUMIN, PROT, ALKPHOS, ALT, AST, BILITOT in the last 72 hours.    INR  No results for input(s): PT, INR, PROTIME, APTT in the last 72 hours.        Diagnostic Studies:      EKD Echo:  No results found for this or any previous visit.      Anesthesia Evaluation    I have reviewed the Patient Summary Reports.     I have reviewed the Medications.     Review of Systems  Anesthesia Hx:  No problems  with previous Anesthesia  History of prior surgery of interest to airway management or planning: Previous anesthesia: General Denies Family Hx of Anesthesia complications.   Denies Personal Hx of Anesthesia complications.   Cardiovascular:   Exercise tolerance: poor Hypertension    Hepatic/GI:   GERD    Musculoskeletal:   Arthritis         Physical Exam  General:  Well nourished, Large Beard    Airway/Jaw/Neck:  Airway Findings: Mouth Opening: Normal Tongue: Normal  General Airway Assessment: Adult  Mallampati: III  TM Distance: Normal, at least 6 cm  Jaw/Neck Findings:  Neck ROM: Normal ROM      Dental:  Dental Findings: In tact   Chest/Lungs:  Chest/Lungs Findings: Clear to auscultation, Normal Respiratory Rate     Heart/Vascular:  Heart Findings: Rate: Normal  Rhythm: Regular Rhythm  Sounds: Normal        Mental Status:  Mental Status Findings:  Cooperative, Alert and Oriented         Anesthesia Plan  Type of Anesthesia, risks & benefits discussed:  Anesthesia Type:  general  Patient's Preference:   Intra-op Monitoring Plan: standard ASA monitors  Intra-op Monitoring Plan Comments:   Post Op Pain Control Plan: multimodal analgesia  Post Op Pain Control Plan Comments:   Induction:   IV  Beta Blocker:         Informed Consent: Patient understands risks and agrees with Anesthesia plan.  Questions answered. Anesthesia consent signed with patient.  ASA Score: 2     Day of Surgery Review of History & Physical:    H&P update referred to the surgeon.         Ready For Surgery From Anesthesia Perspective.

## 2018-10-19 ENCOUNTER — TELEPHONE (OUTPATIENT)
Dept: SURGERY | Facility: CLINIC | Age: 68
End: 2018-10-19

## 2018-10-19 NOTE — ANESTHESIA POSTPROCEDURE EVALUATION
Anesthesia Post Evaluation    Patient: Scooter Swan    Procedure(s) Performed: Procedure(s) (LRB):  HEMORRHOIDECTOMY, prone (N/A)    Final Anesthesia Type: general  Patient location during evaluation: PACU  Patient participation: Yes- Able to Participate  Level of consciousness: awake and alert  Post-procedure vital signs: reviewed and stable  Pain management: adequate  Airway patency: patent  PONV status at discharge: No PONV  Anesthetic complications: no      Cardiovascular status: blood pressure returned to baseline  Respiratory status: unassisted  Hydration status: euvolemic  Follow-up not needed.        Visit Vitals  BP (!) 169/86   Pulse 67   Temp 36.8 °C (98.2 °F) (Temporal)   Resp (!) 22   Ht 6' (1.829 m)   Wt 70.3 kg (155 lb)   SpO2 100%   BMI 21.02 kg/m²       Pain/Carisa Score: Pain Assessment Performed: Yes (10/18/2018  5:50 PM)  Presence of Pain: denies (10/18/2018  5:50 PM)  Pain Rating Prior to Med Admin: 4 (10/18/2018  4:49 PM)  Carisa Score: 10 (10/18/2018  5:50 PM)  Modified Carisa Score: 20 (10/18/2018  5:50 PM)

## 2018-10-19 NOTE — OP NOTE
DATE OF PROCEDURE:  10/18/2018    PREOPERATIVE DIAGNOSIS:  Grade III prolapsing internal hemorrhoids.    POSTOPERATIVE DIAGNOSIS:  Grade III prolapsing internal hemorrhoids.    PROCEDURE PERFORMED:  Two-quadrant excisional hemorrhoidectomy of left lateral   and right posterior hemorrhoids.    ATTENDING SURGEON:  Gunnar Horton M.D.    RESIDENT:  Dex Berrios Jr, M.D. (RES)    ANESTHESIA:  General.    ESTIMATED BLOOD LOSS:  20 mL.    FINDINGS:  Very large redundant internal hemorrhoid column on the left lateral   side as well as the right posterior side.  The right anterior side was   relatively free of hemorrhoidal disease.    SPECIMENS:  1.  Left lateral hemorrhoid.  2.  Right posterior hemorrhoid.    COMPLICATIONS:  None apparent.    DISPOSITION:  PACU, then home.    INDICATIONS:  Mr. Swan is a 67-year-old gentleman who presented to me for   evaluation for rectal prolapse.  His external exam was normal.  On attempted   defecation, prolapsing internal hemorrhoids were seen.  This appeared to be more   consistent with mucosal prolapse or hemorrhoidal prolapse.  I therefore   counseled him for exam under anesthesia with excisional hemorrhoidectomy versus   Delorme depending on the operative findings.  He demonstrated understanding and   wished to proceed with surgery.    DESCRIPTION OF PROCEDURE:  After informed consent was reviewed, the patient was   taken to the Operating Room, and placed under general anesthesia.  He was then   placed in the prone jackknife position.  The buttocks were taped apart.  The   perianal skin was prepped and draped in the usual sterile fashion.  A timeout   was performed.  Digital exam confirmed decreased tone.  Anoscopy was then   performed.  Findings are as above.  Since there was no circumferential mucosal   prolapse, I elected to perform a two-quadrant generous excisional   hemorrhoidectomy of both the left lateral and right posterior columns.  The left   lateral  column was addressed first.  There was a small external component.    This was grasped and elevated.  An elliptical incision was made externally   around the anoderm.  This continued towards the dentate line.  The fibers of the   internal anal sphincter were identified and swept posteriorly.  The avascular   plane between the hemorrhoidal plexus and the internal anal sphincter was   identified and tracked proximally.  We tracked proximally for approximately 4   cm.  At that time, we coned down onto the apex of the hemorrhoidal column,   grasped this with a tonsil clamp, and divided it.  This was then ligated with a   3-0 Vicryl tie.  A 3-0 Vicryl stitch was placed above this and ran back to the   dentate line in a running locked fashion.  At the dentate line, this   transitioned to a simple running suture.  The last 5 mm of the incision was left   open to allow for drainage.  This was then repeated on the right posterior   side.  There was no hematoma in either apex of the hemorrhoidal excision.  There   was approximately 2 cm between the hemorrhoidal excision sites.  A medium   Hill-Woodard could be easily accommodated into the anal canal.  The remainder   of the anal canal appeared normal.  Two pieces of Gelfoam were placed into the   anal canal.  Then, 35 mL of 0.5% Marcaine mixed with Exparel were injected into   the perianal tissue as a long-acting local anesthetic.  He tolerated the   procedure well.  There were no apparent intraoperative complications.  All   sponge, needle, and instrument counts were correct x2.  He was placed back in   the supine position on the stretcher, extubated, and taken to PACU in stable   condition.      DAVID  dd: 10/18/2018 16:29:36 (CDT)  td: 10/18/2018 21:08:21 (CDT)  Doc ID   #[id number]  Job ID #[id number]    CC:

## 2018-10-19 NOTE — TELEPHONE ENCOUNTER
Reason for Disposition   Caller has medication question, adult has minor symptoms, caller declines triage, and triager answers question    Protocols used: ST MEDICATION QUESTION CALL-A-AH    Pt had surgery today and was prescribed Percocet. Pharmacy would not fill because he just had Norco filled. He wanted to know if he could take the Norco fo his pain. Advised patient to do so. Contact patient to further advise

## 2018-10-31 LAB
ALBUMIN SERPL-MCNC: 4.3 G/DL (ref 3.6–5.1)
ALBUMIN/GLOB SERPL: 1.8 (CALC) (ref 1–2.5)
ALP SERPL-CCNC: 46 U/L (ref 40–115)
ALT SERPL-CCNC: 6 U/L (ref 9–46)
AST SERPL-CCNC: 12 U/L (ref 10–35)
BASOPHILS # BLD AUTO: 87 CELLS/UL (ref 0–200)
BASOPHILS NFR BLD AUTO: 1 %
BILIRUB SERPL-MCNC: 0.5 MG/DL (ref 0.2–1.2)
BUN SERPL-MCNC: 12 MG/DL (ref 7–25)
BUN/CREAT SERPL: ABNORMAL (CALC) (ref 6–22)
CALCIUM SERPL-MCNC: 9.3 MG/DL (ref 8.6–10.3)
CCP IGG SERPL-ACNC: <16 UNITS
CHLORIDE SERPL-SCNC: 104 MMOL/L (ref 98–110)
CO2 SERPL-SCNC: 32 MMOL/L (ref 20–32)
CREAT SERPL-MCNC: 1.05 MG/DL (ref 0.7–1.25)
CRP SERPL-MCNC: 7.2 MG/L
EOSINOPHIL # BLD AUTO: 70 CELLS/UL (ref 15–500)
EOSINOPHIL NFR BLD AUTO: 0.8 %
ERYTHROCYTE [DISTWIDTH] IN BLOOD BY AUTOMATED COUNT: 13.7 % (ref 11–15)
ERYTHROCYTE [SEDIMENTATION RATE] IN BLOOD BY WESTERGREN METHOD: 2 MM/H
GFR SERPL CREATININE-BSD FRML MDRD: 73 ML/MIN/1.73M2
GLOBULIN SER CALC-MCNC: 2.4 G/DL (CALC) (ref 1.9–3.7)
GLUCOSE SERPL-MCNC: 97 MG/DL (ref 65–139)
HCT VFR BLD AUTO: 32.2 % (ref 38.5–50)
HGB BLD-MCNC: 10.4 G/DL (ref 13.2–17.1)
LYMPHOCYTES # BLD AUTO: 3028 CELLS/UL (ref 850–3900)
LYMPHOCYTES NFR BLD AUTO: 34.8 %
MCH RBC QN AUTO: 29.1 PG (ref 27–33)
MCHC RBC AUTO-ENTMCNC: 32.3 G/DL (ref 32–36)
MCV RBC AUTO: 90.2 FL (ref 80–100)
MONOCYTES # BLD AUTO: 1401 CELLS/UL (ref 200–950)
MONOCYTES NFR BLD AUTO: 16.1 %
NEUTROPHILS # BLD AUTO: 4115 CELLS/UL (ref 1500–7800)
NEUTROPHILS NFR BLD AUTO: 47.3 %
PLATELET # BLD AUTO: 107 THOUSAND/UL (ref 140–400)
PMV BLD REES-ECKER: 14.5 FL (ref 7.5–12.5)
POTASSIUM SERPL-SCNC: 4.3 MMOL/L (ref 3.5–5.3)
PROT SERPL-MCNC: 6.7 G/DL (ref 6.1–8.1)
RBC # BLD AUTO: 3.57 MILLION/UL (ref 4.2–5.8)
RHEUMATOID FACT SERPL-ACNC: 17 IU/ML
SODIUM SERPL-SCNC: 142 MMOL/L (ref 135–146)
WBC # BLD AUTO: 8.7 THOUSAND/UL (ref 3.8–10.8)

## 2018-10-31 RX ORDER — LUBIPROSTONE 24 UG/1
CAPSULE, GELATIN COATED ORAL
Qty: 60 CAPSULE | Refills: 2 | Status: SHIPPED | OUTPATIENT
Start: 2018-10-31 | End: 2019-02-06

## 2018-11-28 NOTE — PROGRESS NOTES
CRS Office Post Operative Visit    SUBJECTIVE:     Chief Complaint: followup from surgery.     Procedure:   10/18/18: Two-quadrant excisional hemorrhoidectomy of left lateral   and right posterior hemorrhoids.     Indication: grade III hemorrhoids     Significant post operative events: none     Pathology: hemorrhoids    Last Colonoscopy: ~2 years ago, told to follow up in 10 years     Current Status:  Doing well. Continues on long-term narcotic medications secondary to longstanding arthritis.   Currently taking lactulose daily.  Complains of abdominal bloating with lactulose    Review of Systems:  Review of Systems   Constitutional: Negative for chills, diaphoresis, fever, malaise/fatigue and weight loss.   HENT: Negative for congestion.    Respiratory: Negative for shortness of breath.    Cardiovascular: Negative for chest pain and leg swelling.   Gastrointestinal: Negative for abdominal pain, blood in stool, constipation, nausea and vomiting.   Genitourinary: Negative for dysuria.   Musculoskeletal: Positive for joint pain. Negative for back pain and myalgias.   Skin: Negative for rash.   Neurological: Negative for dizziness and weakness.   Endo/Heme/Allergies: Does not bruise/bleed easily.   Psychiatric/Behavioral: Negative for depression.       OBJECTIVE:     Vital Signs (Most Recent)  BP (!) 154/76 (BP Location: Right arm, Patient Position: Sitting, BP Method: Large (Automatic))   Pulse 66   Ht 6' (1.829 m)   Wt 67.6 kg (149 lb 0.5 oz)   BMI 20.21 kg/m²     Physical Exam:  General: Black or  male in no distress   Respiratory: respirations are even and unlabored  Cardiac: regular rate  Abdomen:  Scaphoid, nontender  Extremities: Warm dry and intact  Anorectal:  External exam demonstrates well-healed hemorrhoidectomy with no residual skin tags.  Digital exam was performed. Normal tone.  No masses    ASSESSMENT/PLAN:     Scooter was seen today for hemorrhoids.    Diagnoses and all orders for  this visit:    Grade II hemorrhoids        68 y.o. male post op from 2 quadrant excisional hemorrhoidectomy on 10/18/18.  Overall he is healed very well.  He can follow up with me as needed    JENNY Horton MD  Staff Surgeon  Colon & Rectal Surgery

## 2018-11-29 ENCOUNTER — OFFICE VISIT (OUTPATIENT)
Dept: SURGERY | Facility: CLINIC | Age: 68
End: 2018-11-29
Payer: MEDICARE

## 2018-11-29 VITALS
HEIGHT: 72 IN | DIASTOLIC BLOOD PRESSURE: 76 MMHG | HEART RATE: 66 BPM | BODY MASS INDEX: 20.19 KG/M2 | SYSTOLIC BLOOD PRESSURE: 154 MMHG | WEIGHT: 149.06 LBS

## 2018-11-29 DIAGNOSIS — K64.1 GRADE II HEMORRHOIDS: Primary | ICD-10-CM

## 2018-11-29 PROCEDURE — 99024 POSTOP FOLLOW-UP VISIT: CPT | Mod: S$GLB,,, | Performed by: COLON & RECTAL SURGERY

## 2018-11-29 PROCEDURE — 99999 PR PBB SHADOW E&M-EST. PATIENT-LVL III: CPT | Mod: PBBFAC,,, | Performed by: COLON & RECTAL SURGERY

## 2018-12-11 ENCOUNTER — DOCUMENTATION ONLY (OUTPATIENT)
Dept: RHEUMATOLOGY | Facility: CLINIC | Age: 68
End: 2018-12-11

## 2018-12-11 NOTE — PROGRESS NOTES
Received progress notes from Dr Man. No change in treatment follow up in 6 months notes sent to scan.

## 2018-12-12 RX ORDER — HYDROCODONE BITARTRATE AND ACETAMINOPHEN 10; 325 MG/1; MG/1
1 TABLET ORAL 3 TIMES DAILY PRN
Qty: 90 TABLET | Refills: 0 | Status: SHIPPED | OUTPATIENT
Start: 2018-12-12 | End: 2019-01-10 | Stop reason: SDUPTHER

## 2018-12-27 DIAGNOSIS — G56.00 CARPAL TUNNEL SYNDROME, UNSPECIFIED LATERALITY: ICD-10-CM

## 2018-12-27 DIAGNOSIS — M06.9 RHEUMATOID ARTHRITIS, INVOLVING UNSPECIFIED SITE, UNSPECIFIED RHEUMATOID FACTOR PRESENCE: ICD-10-CM

## 2018-12-27 RX ORDER — TIZANIDINE 4 MG/1
TABLET ORAL
Qty: 90 TABLET | Refills: 3 | Status: SHIPPED | OUTPATIENT
Start: 2018-12-27 | End: 2019-08-09 | Stop reason: SDUPTHER

## 2019-01-03 ENCOUNTER — NURSE TRIAGE (OUTPATIENT)
Dept: ADMINISTRATIVE | Facility: CLINIC | Age: 69
End: 2019-01-03

## 2019-01-03 NOTE — TELEPHONE ENCOUNTER
Reason for Disposition   SEVERE abdominal pain (e.g., excruciating)    Protocols used: ST ABDOMINAL PAIN - MALE-A-OH    Reports severe abdominal pain- rating 8/10. Says that he feels like its trapped gas. Per protocol advised ER. Contact patient to further advise

## 2019-01-10 NOTE — TELEPHONE ENCOUNTER
----- Message from Stormy Espino sent at 1/10/2019  9:25 AM CST -----  Contact: spouse majo [phone]  Patient spouse majo requesting a refill on hydrocodone on patient behalf.    Patient will be using [facility] pharmacy [address] in [place], [place]    Please call patient spouse majo at [phone].     Thanks!

## 2019-01-10 NOTE — TELEPHONE ENCOUNTER
Pt last refilled 12/12/2018, next appt 2/21/2019, last appt 9/18/2018.   Pt notified that refill request received and sent to Dr. Morales for approval. Asked to allow for up to 72 hours for your refill request to be processed.   Pharmacy corrected.

## 2019-01-13 RX ORDER — HYDROCODONE BITARTRATE AND ACETAMINOPHEN 10; 325 MG/1; MG/1
1 TABLET ORAL 3 TIMES DAILY PRN
Qty: 90 TABLET | Refills: 0 | Status: SHIPPED | OUTPATIENT
Start: 2019-01-13 | End: 2019-02-07 | Stop reason: SDUPTHER

## 2019-01-29 ENCOUNTER — OFFICE VISIT (OUTPATIENT)
Dept: FAMILY MEDICINE | Facility: CLINIC | Age: 69
End: 2019-01-29
Payer: MEDICARE

## 2019-01-29 VITALS
HEART RATE: 54 BPM | WEIGHT: 154.13 LBS | OXYGEN SATURATION: 98 % | DIASTOLIC BLOOD PRESSURE: 62 MMHG | BODY MASS INDEX: 20.9 KG/M2 | SYSTOLIC BLOOD PRESSURE: 130 MMHG

## 2019-01-29 DIAGNOSIS — M05.741 RHEUMATOID ARTHRITIS INVOLVING BOTH HANDS WITH POSITIVE RHEUMATOID FACTOR: ICD-10-CM

## 2019-01-29 DIAGNOSIS — I10 ESSENTIAL HYPERTENSION: ICD-10-CM

## 2019-01-29 DIAGNOSIS — M05.742 RHEUMATOID ARTHRITIS INVOLVING BOTH HANDS WITH POSITIVE RHEUMATOID FACTOR: ICD-10-CM

## 2019-01-29 DIAGNOSIS — E78.5 HYPERLIPIDEMIA, UNSPECIFIED HYPERLIPIDEMIA TYPE: ICD-10-CM

## 2019-01-29 DIAGNOSIS — I1A.0 RESISTANT HYPERTENSION: ICD-10-CM

## 2019-01-29 DIAGNOSIS — E78.49 OTHER HYPERLIPIDEMIA: Primary | ICD-10-CM

## 2019-01-29 PROBLEM — M06.9 RA (RHEUMATOID ARTHRITIS): Status: ACTIVE | Noted: 2019-01-29

## 2019-01-29 PROCEDURE — G0008 ADMIN INFLUENZA VIRUS VAC: HCPCS | Mod: S$GLB,,, | Performed by: INTERNAL MEDICINE

## 2019-01-29 PROCEDURE — 3075F PR MOST RECENT SYSTOLIC BLOOD PRESS GE 130-139MM HG: ICD-10-PCS | Mod: CPTII,S$GLB,, | Performed by: INTERNAL MEDICINE

## 2019-01-29 PROCEDURE — 99999 PR PBB SHADOW E&M-EST. PATIENT-LVL IV: CPT | Mod: PBBFAC,,, | Performed by: INTERNAL MEDICINE

## 2019-01-29 PROCEDURE — 1101F PT FALLS ASSESS-DOCD LE1/YR: CPT | Mod: CPTII,S$GLB,, | Performed by: INTERNAL MEDICINE

## 2019-01-29 PROCEDURE — 90662 IIV NO PRSV INCREASED AG IM: CPT | Mod: S$GLB,,, | Performed by: INTERNAL MEDICINE

## 2019-01-29 PROCEDURE — G0008 FLU VACCINE - HIGH DOSE (65+) PRESERVATIVE FREE IM: ICD-10-PCS | Mod: S$GLB,,, | Performed by: INTERNAL MEDICINE

## 2019-01-29 PROCEDURE — 99204 PR OFFICE/OUTPT VISIT, NEW, LEVL IV, 45-59 MIN: ICD-10-PCS | Mod: 25,S$GLB,, | Performed by: INTERNAL MEDICINE

## 2019-01-29 PROCEDURE — 3075F SYST BP GE 130 - 139MM HG: CPT | Mod: CPTII,S$GLB,, | Performed by: INTERNAL MEDICINE

## 2019-01-29 PROCEDURE — 90662 FLU VACCINE - HIGH DOSE (65+) PRESERVATIVE FREE IM: ICD-10-PCS | Mod: S$GLB,,, | Performed by: INTERNAL MEDICINE

## 2019-01-29 PROCEDURE — 99204 OFFICE O/P NEW MOD 45 MIN: CPT | Mod: 25,S$GLB,, | Performed by: INTERNAL MEDICINE

## 2019-01-29 PROCEDURE — 3078F PR MOST RECENT DIASTOLIC BLOOD PRESSURE < 80 MM HG: ICD-10-PCS | Mod: CPTII,S$GLB,, | Performed by: INTERNAL MEDICINE

## 2019-01-29 PROCEDURE — 1101F PR PT FALLS ASSESS DOC 0-1 FALLS W/OUT INJ PAST YR: ICD-10-PCS | Mod: CPTII,S$GLB,, | Performed by: INTERNAL MEDICINE

## 2019-01-29 PROCEDURE — 3078F DIAST BP <80 MM HG: CPT | Mod: CPTII,S$GLB,, | Performed by: INTERNAL MEDICINE

## 2019-01-29 PROCEDURE — 99999 PR PBB SHADOW E&M-EST. PATIENT-LVL IV: ICD-10-PCS | Mod: PBBFAC,,, | Performed by: INTERNAL MEDICINE

## 2019-01-29 RX ORDER — HYDROXYCHLOROQUINE SULFATE 200 MG/1
200 TABLET, FILM COATED ORAL 2 TIMES DAILY
COMMUNITY
End: 2019-02-21

## 2019-01-29 NOTE — PROGRESS NOTES
Patient ID: Scooter Swan is a 68 y.o. male.    Chief Complaint: Establish Care    HPI   Mr. Swan is a 67 yo male from [facility]. PMH: HTN, HLD, RA. glaucoma, emphysema, and Hep C s/p Harvoni.    Resistant HTN:   on amlodipine 10 mg, enalapril 20 mg BID, lasix 20 mg qd, and atenolol 50mg qd, hydralazine 10 mg QID. He is concerned with vol of medications he is on. States lasix makes his constipation worse. HR is 54. No indication for HR to be this low. Renal artery doppler show < 39% stenosis bilaterally   -continue amlodipine 10 mg qd and enalapril 20 mg BID  -will try hydralazine 10 mg TID instead of QID  -will stop atenolol, will monitor for reflex tachy 2/2 hydralazine  -will stop lasix for now   -Check BP for 1 week and will call us with results    RA: seen by Dr. Morales.   Swelling seems improved per patient initial description with digits of bilateral hands  -on hydroxychloroqine 200 mg BID, states eye exam UTD  -prn prednisone 20 mg (last time taken this am)  -recommend 5-7 use of prednisone then break.    Glaucoma: travatan  -Will [facility] in near future    HLD:   On pravastatin 20 mg   -continue statin  -repeat lipids    Anemia:   Likely of Chronic Dz likely (RA). CSC utd.   -Check iron studies (already ordered by Dr. Morales)  -f/u results    Grade II hemorrhoids:   S/p hemorrhoidectomy 10/18/18    Health Maintenance:   -CSC 2-3 yrs ago next in 7 years  -lipid panel today   -PVC 13 today   -influenza today.    Social History     Socioeconomic History    Marital status:      Spouse name: Not on file    Number of children: Not on file    Years of education: Not on file    Highest education level: Not on file   Social Needs    Financial resource strain: Not on file    Food insecurity - worry: Not on file    Food insecurity - inability: Not on file    Transportation needs - medical: Not on file    Transportation needs - non-medical: Not on file   Occupational History    Not on  file   Tobacco Use    Smoking status: Never Smoker   Substance and Sexual Activity    Alcohol use: Not on file    Drug use: Not on file    Sexual activity: Not on file   Other Topics Concern    Not on file   Social History Narrative    Not on file     No family history on file.  Current Outpatient Medications on File Prior to Visit   Medication Sig Dispense Refill    albuterol (PROAIR HFA) 90 mcg/actuation inhaler INHALE 2 PUFFS EVERY 4 HOURS AS NEEDED      amLODIPine (NORVASC) 10 MG tablet Take 10 mg by mouth every morning.       diclofenac sodium (VOLTAREN) 1 % Gel Apply 2 grams  topically 4 (four) times daily. (Patient taking differently: Apply 2 g topically 4 (four) times daily as needed. ) 100 g 5    dicyclomine (BENTYL) 20 mg tablet Take 20 mg by mouth 3 (three) times daily as needed.       enalapril (VASOTEC) 20 MG tablet Take 20 mg by mouth 2 (two) times daily.       hydrALAZINE (APRESOLINE) 10 MG tablet TAKE 2 TABLETs (20 mg) BY MOUTH 2 TIMES DAILY      HYDROcodone-acetaminophen (NORCO)  mg per tablet Take 1 tablet by mouth 3 (three) times daily as needed for Pain (pain). 90 tablet 0    hydroxychloroquine (PLAQUENIL) 200 mg tablet Take 200 mg by mouth 2 (two) times daily.      omeprazole (PRILOSEC) 20 MG capsule TAKE 1 CAPSULE BY MOUTH TWICE DAILY      pravastatin (PRAVACHOL) 20 MG tablet TAKE 1 TABLET BY MOUTH DAILY, morning      predniSONE (DELTASONE) 5 MG tablet TAKE 2 TABLETS BY MOUTH once daily AS NEEDED 60 tablet 3    tiZANidine (ZANAFLEX) 4 MG tablet TAKE 1 TABLET BY MOUTH EVERY 8 HOURS 90 tablet 3    travoprost (TRAVATAN Z) 0.004 % Drop 1 drop in both eyes at bedtime      [DISCONTINUED] atenolol (TENORMIN) 100 MG tablet Take 50 mg by mouth daily with dinner or evening meal.       [DISCONTINUED] furosemide (LASIX) 20 MG tablet Take 20 mg by mouth every morning.       AMITIZA 24 mcg Cap TAKE 1 CAPSULE BY MOUTH TWICE DAILY 60 capsule 2    dorzolamide (TRUSOPT) 2 %  ophthalmic solution Place 1 drop into both eyes 3 (three) times daily.       KRISTALOSE 10 gram packet use 1 packet DAILY AS DIRECTED (Patient taking differently: Take 10 g by mouth every morning. use 1 packet DAILY AS DIRECTED) 30 each 12     Current Facility-Administered Medications on File Prior to Visit   Medication Dose Route Frequency Provider Last Rate Last Dose    0.9%  NaCl infusion   Intravenous Continuous Deedee Sarkar NP   Stopped at 10/18/18 7823    mupirocin 2 % ointment   Nasal On Call Procedure Deedee Sarkar NP         Review of Systems   Constitutional: Negative for activity change, fever and unexpected weight change.   HENT: Negative for facial swelling, hearing loss and trouble swallowing.    Eyes: Negative for visual disturbance.   Respiratory: Negative for cough, chest tightness, shortness of breath and wheezing.    Cardiovascular: Negative for chest pain, palpitations and leg swelling.   Gastrointestinal: Positive for constipation. Negative for abdominal pain, blood in stool, diarrhea, nausea and vomiting.   Endocrine: Negative for cold intolerance, polydipsia, polyphagia and polyuria.   Genitourinary: Negative for decreased urine volume and dysuria.   Musculoskeletal: Negative for gait problem and neck pain.   Skin: Negative for rash.   Neurological: Negative for dizziness, syncope and light-headedness.   Psychiatric/Behavioral: Negative for dysphoric mood. The patient is not nervous/anxious.        Objective:     Vitals:    01/29/19 1035   BP: 130/62   Pulse: (!) 54        Physical Exam   Constitutional: He is oriented to person, place, and time. He appears well-developed and well-nourished. No distress.   HENT:   Head: Normocephalic and atraumatic.   Right Ear: External ear normal.   Left Ear: External ear normal.   Mouth/Throat: Oropharynx is clear and moist.   Eyes: Conjunctivae and EOM are normal. Pupils are equal, round, and reactive to light. Right eye exhibits no  discharge. Left eye exhibits no discharge. No scleral icterus.   Neck: Normal range of motion. Neck supple. No JVD present. No tracheal deviation present. No thyromegaly present.   Cardiovascular: Regular rhythm. Exam reveals no gallop and no friction rub.   No murmur heard.  Bradycardic 50s, systolic murmur 2/6    Pulmonary/Chest: Effort normal and breath sounds normal. No respiratory distress. He has no wheezes.   Abdominal: Soft. Bowel sounds are normal. He exhibits no distension and no mass. There is no tenderness.   Musculoskeletal: Normal range of motion. He exhibits no edema.   Lymphadenopathy:     He has no cervical adenopathy.   Neurological: He is alert and oriented to person, place, and time. No cranial nerve deficit. Coordination normal.   Skin: Skin is warm and dry. Capillary refill takes less than 2 seconds. No rash noted. He is not diaphoretic.   Psychiatric: He has a normal mood and affect. His behavior is normal.       Assessment/Plan   Scooter was seen today for establish care.    Diagnoses and all orders for this visit:    Other hyperlipidemia  -     Lipid panel; Future    Resistant hypertension  -     Cancel: US Abdomen Pelvis Doppler Study Limited; Future    Hyperlipidemia, unspecified hyperlipidemia type    Essential hypertension    Rheumatoid arthritis involving both hands with positive rheumatoid factor    Other orders  -     Pneumococcal Conjugate Vaccine (13 Valent) (IM)

## 2019-01-30 PROCEDURE — 90670 PCV13 VACCINE IM: CPT | Mod: S$GLB,,, | Performed by: INTERNAL MEDICINE

## 2019-01-30 PROCEDURE — G0009 ADMIN PNEUMOCOCCAL VACCINE: HCPCS | Mod: S$GLB,,, | Performed by: INTERNAL MEDICINE

## 2019-01-30 PROCEDURE — 90670 PNEUMOCOCCAL CONJUGATE VACCINE 13-VALENT LESS THAN 5YO & GREATER THAN: ICD-10-PCS | Mod: S$GLB,,, | Performed by: INTERNAL MEDICINE

## 2019-01-30 PROCEDURE — G0009 PNEUMOCOCCAL CONJUGATE VACCINE 13-VALENT LESS THAN 5YO & GREATER THAN: ICD-10-PCS | Mod: S$GLB,,, | Performed by: INTERNAL MEDICINE

## 2019-02-06 ENCOUNTER — HOSPITAL ENCOUNTER (OUTPATIENT)
Dept: RADIOLOGY | Facility: HOSPITAL | Age: 69
Discharge: HOME | End: 2019-02-06
Attending: INTERNAL MEDICINE
Payer: MEDICARE

## 2019-02-06 ENCOUNTER — OFFICE VISIT (OUTPATIENT)
Dept: FAMILY MEDICINE | Facility: CLINIC | Age: 69
End: 2019-02-06
Payer: MEDICARE

## 2019-02-06 VITALS
WEIGHT: 147.69 LBS | HEIGHT: 72 IN | SYSTOLIC BLOOD PRESSURE: 126 MMHG | HEART RATE: 54 BPM | BODY MASS INDEX: 20 KG/M2 | DIASTOLIC BLOOD PRESSURE: 58 MMHG | OXYGEN SATURATION: 97 %

## 2019-02-06 DIAGNOSIS — R10.30 LOWER ABDOMINAL PAIN: ICD-10-CM

## 2019-02-06 DIAGNOSIS — I1A.0 RESISTANT HYPERTENSION: ICD-10-CM

## 2019-02-06 DIAGNOSIS — E78.5 HYPERLIPIDEMIA, UNSPECIFIED HYPERLIPIDEMIA TYPE: Primary | ICD-10-CM

## 2019-02-06 PROCEDURE — 99214 OFFICE O/P EST MOD 30 MIN: CPT | Mod: S$GLB,,, | Performed by: INTERNAL MEDICINE

## 2019-02-06 PROCEDURE — 99214 PR OFFICE/OUTPT VISIT, EST, LEVL IV, 30-39 MIN: ICD-10-PCS | Mod: S$GLB,,, | Performed by: INTERNAL MEDICINE

## 2019-02-06 PROCEDURE — 3078F PR MOST RECENT DIASTOLIC BLOOD PRESSURE < 80 MM HG: ICD-10-PCS | Mod: CPTII,S$GLB,, | Performed by: INTERNAL MEDICINE

## 2019-02-06 PROCEDURE — 74019 RADEX ABDOMEN 2 VIEWS: CPT | Mod: 26,,, | Performed by: RADIOLOGY

## 2019-02-06 PROCEDURE — 3078F DIAST BP <80 MM HG: CPT | Mod: CPTII,S$GLB,, | Performed by: INTERNAL MEDICINE

## 2019-02-06 PROCEDURE — 99499 UNLISTED E&M SERVICE: CPT | Mod: S$GLB,,, | Performed by: INTERNAL MEDICINE

## 2019-02-06 PROCEDURE — 99999 PR PBB SHADOW E&M-EST. PATIENT-LVL III: CPT | Mod: PBBFAC,,, | Performed by: INTERNAL MEDICINE

## 2019-02-06 PROCEDURE — 3074F PR MOST RECENT SYSTOLIC BLOOD PRESSURE < 130 MM HG: ICD-10-PCS | Mod: CPTII,S$GLB,, | Performed by: INTERNAL MEDICINE

## 2019-02-06 PROCEDURE — 74019 RADEX ABDOMEN 2 VIEWS: CPT | Mod: TC,FY,PO

## 2019-02-06 PROCEDURE — 3288F PR FALLS RISK ASSESSMENT DOCUMENTED: ICD-10-PCS | Mod: CPTII,S$GLB,, | Performed by: INTERNAL MEDICINE

## 2019-02-06 PROCEDURE — 74019 XR ABDOMEN FLAT AND ERECT: ICD-10-PCS | Mod: 26,,, | Performed by: RADIOLOGY

## 2019-02-06 PROCEDURE — 1100F PTFALLS ASSESS-DOCD GE2>/YR: CPT | Mod: CPTII,S$GLB,, | Performed by: INTERNAL MEDICINE

## 2019-02-06 PROCEDURE — 99499 RISK ADDL DX/OHS AUDIT: ICD-10-PCS | Mod: S$GLB,,, | Performed by: INTERNAL MEDICINE

## 2019-02-06 PROCEDURE — 3288F FALL RISK ASSESSMENT DOCD: CPT | Mod: CPTII,S$GLB,, | Performed by: INTERNAL MEDICINE

## 2019-02-06 PROCEDURE — 99999 PR PBB SHADOW E&M-EST. PATIENT-LVL III: ICD-10-PCS | Mod: PBBFAC,,, | Performed by: INTERNAL MEDICINE

## 2019-02-06 PROCEDURE — 3074F SYST BP LT 130 MM HG: CPT | Mod: CPTII,S$GLB,, | Performed by: INTERNAL MEDICINE

## 2019-02-06 PROCEDURE — 1100F PR PT FALLS ASSESS DOC 2+ FALLS/FALL W/INJURY/YR: ICD-10-PCS | Mod: CPTII,S$GLB,, | Performed by: INTERNAL MEDICINE

## 2019-02-06 NOTE — PROGRESS NOTES
Subjective:       Patient ID: Scooter Swan is a 68 y.o. male.    Chief Complaint: Hypertension (BP) and Abdominal Pain ( x 1 month, gas)    HPI     Mr. Swan is 67 yo male with a PMH of resistant HTN, HLD, RA, glucoma, emphysema, Hep C s/p Harvoni, and hemorrhoids s/p resection    Has been having abdominal pain and gas pain that gets better once passing gas. States he has had problems with gas for some time now (months). This has been keeping him up at night. No diarrhea. His stool is like rock. He has been having abdominal pain and has been taking dicyclomine. Is still taking norco  about 2x day. Lost 9 lbs since last visit. He has been eating. No blood per rectum. Abdominal x-ray does not show acute pathology. Patient has taken amitiza in the past But had reaction  -Differential diagnosis includes SIBO   -continue miralax  -start colace  -stop dicyclomine.  -Will consider CT abdomen if abdominal pain continues.  -Referral to GI Per patient request    Insomnia: talked about sleep hygiene.   -will start melatonin otc    Resistant HTN:   On amlodipine 10mg, enalapril 20 mg BID, and hydralazine 10 mg TID. Lasix stopped 1/29/19. Atenolol stopped 1/29/19.  -BP Relatively well controlled on current medications per home log.  -Continue current medications.    Emphysema: stable on PRN albuterol inhaler    Normocytic anemia:   -Iron studies and repeat CBC ordered by Dr. Morales  -Follow-up results    Thrombocytopenia:   -Follow-up CBC    HLD:   Currently on pravastatin 20 mg daily.  -Lipid panel ordered.    RA:  Was on hydroxychloroquine. Patient stopped secondary to potential visual side effects. Patient up-to-date with eye doctor. I reeducated patient. He will restart Hydroxychloroquine 200 mg BID.   -Follow-up rheumatology    Glaucoma:   -Stable on Travatan drops  -follow-up ophthalmology as directed.    Routine health maintenance:  -Colonoscopy done 2 to 3 years ago next due 2026  -PPSV 23 due jan 2020    Current  Outpatient Medications on File Prior to Visit   Medication Sig Dispense Refill    albuterol (PROAIR HFA) 90 mcg/actuation inhaler INHALE 2 PUFFS EVERY 4 HOURS AS NEEDED      amLODIPine (NORVASC) 10 MG tablet Take 10 mg by mouth every morning.       diclofenac sodium (VOLTAREN) 1 % Gel Apply 2 grams  topically 4 (four) times daily. (Patient taking differently: Apply 2 g topically 4 (four) times daily as needed. ) 100 g 5    dicyclomine (BENTYL) 20 mg tablet Take 20 mg by mouth 3 (three) times daily as needed.       enalapril (VASOTEC) 20 MG tablet Take 20 mg by mouth 2 (two) times daily.       hydrALAZINE (APRESOLINE) 10 MG tablet TAKE 2 TABLETs (20 mg) BY MOUTH 2 TIMES DAILY      HYDROcodone-acetaminophen (NORCO)  mg per tablet Take 1 tablet by mouth 3 (three) times daily as needed for Pain (pain). 90 tablet 0    hydroxychloroquine (PLAQUENIL) 200 mg tablet Take 200 mg by mouth 2 (two) times daily.      KRISTALOSE 10 gram packet use 1 packet DAILY AS DIRECTED (Patient taking differently: Take 10 g by mouth every morning. use 1 packet DAILY AS DIRECTED) 30 each 12    omeprazole (PRILOSEC) 20 MG capsule TAKE 1 CAPSULE BY MOUTH TWICE DAILY      pravastatin (PRAVACHOL) 20 MG tablet TAKE 1 TABLET BY MOUTH DAILY, morning      predniSONE (DELTASONE) 5 MG tablet TAKE 2 TABLETS BY MOUTH once daily AS NEEDED 60 tablet 3    tiZANidine (ZANAFLEX) 4 MG tablet TAKE 1 TABLET BY MOUTH EVERY 8 HOURS 90 tablet 3    travoprost (TRAVATAN Z) 0.004 % Drop 1 drop in both eyes at bedtime      [DISCONTINUED] AMITIZA 24 mcg Cap TAKE 1 CAPSULE BY MOUTH TWICE DAILY 60 capsule 2    [DISCONTINUED] dorzolamide (TRUSOPT) 2 % ophthalmic solution Place 1 drop into both eyes 3 (three) times daily.        Current Facility-Administered Medications on File Prior to Visit   Medication Dose Route Frequency Provider Last Rate Last Dose    0.9%  NaCl infusion   Intravenous Continuous Deedee Sarkar NP   Stopped at 10/18/18 3246     mupirocin 2 % ointment   Nasal On Call Procedure Deedee Sarkar NP         Review of Systems   Constitutional: Negative for activity change, fever and unexpected weight change.   HENT: Negative for facial swelling, hearing loss and trouble swallowing.    Eyes: Negative for visual disturbance.   Respiratory: Negative for cough, chest tightness, shortness of breath and wheezing.    Cardiovascular: Negative for chest pain, palpitations and leg swelling.   Gastrointestinal: Negative for abdominal pain, blood in stool, constipation, diarrhea, nausea and vomiting.   Endocrine: Negative for cold intolerance, polydipsia, polyphagia and polyuria.   Genitourinary: Negative for decreased urine volume and dysuria.   Musculoskeletal: Negative for gait problem and neck pain.   Skin: Negative for rash.   Neurological: Negative for dizziness, syncope and light-headedness.   Psychiatric/Behavioral: Negative for dysphoric mood. The patient is not nervous/anxious.        Objective:     Vitals:    02/06/19 1616   BP: (!) 126/58   Pulse: (!) 54        Physical Exam   Constitutional: He is oriented to person, place, and time. He appears well-developed and well-nourished. No distress.   Patient is thin and ill appearing   HENT:   Head: Normocephalic and atraumatic.   Right Ear: External ear normal.   Left Ear: External ear normal.   Mouth/Throat: Oropharynx is clear and moist.   Eyes: Conjunctivae and EOM are normal. Pupils are equal, round, and reactive to light. Right eye exhibits no discharge. Left eye exhibits no discharge. No scleral icterus.   Neck: Normal range of motion. Neck supple. No JVD present. No tracheal deviation present. No thyromegaly present.   Cardiovascular: Exam reveals no gallop and no friction rub.   No murmur heard.  3/6 holosystolic murmur   Pulmonary/Chest: Effort normal and breath sounds normal. No respiratory distress. He has no wheezes.   Abdominal: Soft. Bowel sounds are normal. He exhibits no  distension and no mass. There is no tenderness.   Musculoskeletal: Normal range of motion. He exhibits no edema.   Lymphadenopathy:     He has no cervical adenopathy.   Neurological: He is alert and oriented to person, place, and time. No cranial nerve deficit. Coordination normal.   Skin: Skin is warm and dry. Capillary refill takes less than 2 seconds. No rash noted. He is not diaphoretic.   Psychiatric: He has a normal mood and affect. His behavior is normal.       Assessment/Plan   Scooter was seen today for hypertension and abdominal pain.    Diagnoses and all orders for this visit:    Hyperlipidemia, unspecified hyperlipidemia type    Lower abdominal pain  -     X-Ray Abdomen Flat And Erect; Future  -     Comprehensive metabolic panel; Future  -     Ambulatory referral to Gastroenterology    Resistant hypertension

## 2019-02-07 RX ORDER — HYDROCODONE BITARTRATE AND ACETAMINOPHEN 10; 325 MG/1; MG/1
1 TABLET ORAL 3 TIMES DAILY PRN
Qty: 90 TABLET | Refills: 0 | Status: SHIPPED | OUTPATIENT
Start: 2019-02-12 | End: 2019-02-21 | Stop reason: SDUPTHER

## 2019-02-07 NOTE — PROGRESS NOTES
Patient, Scooter Swan (MRN #[id number]), presented with a recent Platelet count less than 150 K/uL consistent with the definition of thrombocytopenia (ICD10 - D69.6).    Platelets   Date Value Ref Range Status   10/12/2018 91 (L) 150 - 350 K/uL Final     The patient's thrombocytopenia was monitored, evaluated, addressed and/or treated. This addendum to the medical record is made on 02/06/2019.

## 2019-02-08 ENCOUNTER — TELEPHONE (OUTPATIENT)
Dept: FAMILY MEDICINE | Facility: CLINIC | Age: 69
End: 2019-02-08

## 2019-02-08 DIAGNOSIS — E78.5 HYPERLIPIDEMIA, UNSPECIFIED HYPERLIPIDEMIA TYPE: Primary | ICD-10-CM

## 2019-02-08 NOTE — TELEPHONE ENCOUNTER
----- Message from Stormy Espino sent at 2/8/2019  2:17 PM CST -----  Contact: [phone]  Patient called requesting orders to have labs done, prefer to have labs done at [facility].    Please call upon patient request at [phone].

## 2019-02-10 DIAGNOSIS — M06.9 RHEUMATOID ARTHRITIS, INVOLVING UNSPECIFIED SITE, UNSPECIFIED RHEUMATOID FACTOR PRESENCE: ICD-10-CM

## 2019-02-10 DIAGNOSIS — G56.00 CARPAL TUNNEL SYNDROME, UNSPECIFIED LATERALITY: ICD-10-CM

## 2019-02-10 DIAGNOSIS — R10.9 ABDOMINAL PAIN, UNSPECIFIED ABDOMINAL LOCATION: Primary | ICD-10-CM

## 2019-02-10 DIAGNOSIS — R79.9 ABNORMAL FINDING OF BLOOD CHEMISTRY: ICD-10-CM

## 2019-02-11 ENCOUNTER — LAB VISIT (OUTPATIENT)
Dept: LAB | Facility: HOSPITAL | Age: 69
End: 2019-02-11
Attending: INTERNAL MEDICINE
Payer: MEDICARE

## 2019-02-11 DIAGNOSIS — M06.9 RHEUMATOID ARTHRITIS, INVOLVING UNSPECIFIED SITE, UNSPECIFIED RHEUMATOID FACTOR PRESENCE: ICD-10-CM

## 2019-02-11 DIAGNOSIS — R79.9 ABNORMAL FINDING OF BLOOD CHEMISTRY: ICD-10-CM

## 2019-02-11 DIAGNOSIS — G56.00 CARPAL TUNNEL SYNDROME, UNSPECIFIED LATERALITY: ICD-10-CM

## 2019-02-11 DIAGNOSIS — R10.30 LOWER ABDOMINAL PAIN: ICD-10-CM

## 2019-02-11 DIAGNOSIS — E78.49 OTHER HYPERLIPIDEMIA: ICD-10-CM

## 2019-02-11 DIAGNOSIS — R10.9 ABDOMINAL PAIN, UNSPECIFIED ABDOMINAL LOCATION: ICD-10-CM

## 2019-02-11 LAB
ALBUMIN SERPL BCP-MCNC: 4.3 G/DL
ALBUMIN SERPL BCP-MCNC: 4.6 G/DL
ALP SERPL-CCNC: 48 U/L
ALP SERPL-CCNC: 51 U/L
ALT SERPL W/O P-5'-P-CCNC: 6 U/L
ALT SERPL W/O P-5'-P-CCNC: 7 U/L
ANION GAP SERPL CALC-SCNC: 11 MMOL/L
ANION GAP SERPL CALC-SCNC: 9 MMOL/L
AST SERPL-CCNC: 16 U/L
AST SERPL-CCNC: 18 U/L
BASOPHILS # BLD AUTO: 0.08 K/UL
BASOPHILS NFR BLD: 0.8 %
BILIRUB SERPL-MCNC: 0.6 MG/DL
BILIRUB SERPL-MCNC: 0.7 MG/DL
BUN SERPL-MCNC: 19 MG/DL
BUN SERPL-MCNC: 19 MG/DL
CALCIUM SERPL-MCNC: 10 MG/DL
CALCIUM SERPL-MCNC: 10.2 MG/DL
CHLORIDE SERPL-SCNC: 105 MMOL/L
CHLORIDE SERPL-SCNC: 106 MMOL/L
CHOLEST SERPL-MCNC: 162 MG/DL
CHOLEST/HDLC SERPL: 2.1 {RATIO}
CO2 SERPL-SCNC: 27 MMOL/L
CO2 SERPL-SCNC: 28 MMOL/L
CREAT SERPL-MCNC: 1.2 MG/DL
CREAT SERPL-MCNC: 1.2 MG/DL
CRP SERPL-MCNC: 1.1 MG/L
DIFFERENTIAL METHOD: ABNORMAL
EOSINOPHIL # BLD AUTO: 0 K/UL
EOSINOPHIL NFR BLD: 0.4 %
ERYTHROCYTE [DISTWIDTH] IN BLOOD BY AUTOMATED COUNT: 13.5 %
ERYTHROCYTE [SEDIMENTATION RATE] IN BLOOD BY WESTERGREN METHOD: 1 MM/HR
EST. GFR  (AFRICAN AMERICAN): >60 ML/MIN/1.73 M^2
EST. GFR  (AFRICAN AMERICAN): >60 ML/MIN/1.73 M^2
EST. GFR  (NON AFRICAN AMERICAN): >60 ML/MIN/1.73 M^2
EST. GFR  (NON AFRICAN AMERICAN): >60 ML/MIN/1.73 M^2
GLUCOSE SERPL-MCNC: 75 MG/DL
GLUCOSE SERPL-MCNC: 86 MG/DL
HCT VFR BLD AUTO: 36.3 %
HDLC SERPL-MCNC: 76 MG/DL
HDLC SERPL: 46.9 %
HGB BLD-MCNC: 11.2 G/DL
IMM GRANULOCYTES # BLD AUTO: 0.02 K/UL
IMM GRANULOCYTES NFR BLD AUTO: 0.2 %
IRON SERPL-MCNC: 99 UG/DL
LDLC SERPL CALC-MCNC: 77.6 MG/DL
LYMPHOCYTES # BLD AUTO: 3.5 K/UL
LYMPHOCYTES NFR BLD: 33.8 %
MCH RBC QN AUTO: 28.6 PG
MCHC RBC AUTO-ENTMCNC: 30.9 G/DL
MCV RBC AUTO: 93 FL
MONOCYTES # BLD AUTO: 1.4 K/UL
MONOCYTES NFR BLD: 13.5 %
NEUTROPHILS # BLD AUTO: 5.3 K/UL
NEUTROPHILS NFR BLD: 51.3 %
NONHDLC SERPL-MCNC: 86 MG/DL
NRBC BLD-RTO: 0 /100 WBC
PLATELET # BLD AUTO: 120 K/UL
PMV BLD AUTO: 14.5 FL
POTASSIUM SERPL-SCNC: 4.1 MMOL/L
POTASSIUM SERPL-SCNC: 4.4 MMOL/L
PROT SERPL-MCNC: 7.2 G/DL
PROT SERPL-MCNC: 7.7 G/DL
RBC # BLD AUTO: 3.92 M/UL
SATURATED IRON: 33 %
SODIUM SERPL-SCNC: 142 MMOL/L
SODIUM SERPL-SCNC: 144 MMOL/L
TOTAL IRON BINDING CAPACITY: 296 UG/DL
TRANSFERRIN SERPL-MCNC: 200 MG/DL
TRIGL SERPL-MCNC: 42 MG/DL
WBC # BLD AUTO: 10.32 K/UL

## 2019-02-11 PROCEDURE — 86140 C-REACTIVE PROTEIN: CPT

## 2019-02-11 PROCEDURE — 85025 COMPLETE CBC W/AUTO DIFF WBC: CPT

## 2019-02-11 PROCEDURE — 80061 LIPID PANEL: CPT

## 2019-02-11 PROCEDURE — 85651 RBC SED RATE NONAUTOMATED: CPT | Mod: PO

## 2019-02-11 PROCEDURE — 80053 COMPREHEN METABOLIC PANEL: CPT

## 2019-02-11 PROCEDURE — 80053 COMPREHEN METABOLIC PANEL: CPT | Mod: 91

## 2019-02-11 PROCEDURE — 83540 ASSAY OF IRON: CPT

## 2019-02-11 PROCEDURE — 36415 COLL VENOUS BLD VENIPUNCTURE: CPT | Mod: PO

## 2019-02-11 RX ORDER — PREDNISONE 5 MG/1
TABLET ORAL
Qty: 60 TABLET | Refills: 3 | Status: SHIPPED | OUTPATIENT
Start: 2019-02-11 | End: 2019-07-31 | Stop reason: SDUPTHER

## 2019-02-11 NOTE — TELEPHONE ENCOUNTER
Returned call regarding orders. Office was informed patient would like to have lab work done at [facility] in [place]. Office informed orders will be re entered since they were ordered external at visit. Verbalized understanding.

## 2019-02-11 NOTE — TELEPHONE ENCOUNTER
----- Message from Fabiola King sent at 2/11/2019  8:26 AM CST -----   Patient would like lab work ordered from dr. ingram

## 2019-02-18 ENCOUNTER — OFFICE VISIT (OUTPATIENT)
Dept: GASTROENTEROLOGY | Facility: CLINIC | Age: 69
End: 2019-02-18
Payer: MEDICARE

## 2019-02-18 VITALS
RESPIRATION RATE: 19 BRPM | BODY MASS INDEX: 20.68 KG/M2 | HEART RATE: 84 BPM | WEIGHT: 152.69 LBS | HEIGHT: 72 IN | DIASTOLIC BLOOD PRESSURE: 66 MMHG | SYSTOLIC BLOOD PRESSURE: 110 MMHG

## 2019-02-18 DIAGNOSIS — K76.9 LIVER LESION: ICD-10-CM

## 2019-02-18 DIAGNOSIS — R14.3 FLATULENCE: ICD-10-CM

## 2019-02-18 DIAGNOSIS — K59.00 CONSTIPATION, UNSPECIFIED CONSTIPATION TYPE: ICD-10-CM

## 2019-02-18 DIAGNOSIS — R10.30 LOWER ABDOMINAL PAIN: Primary | ICD-10-CM

## 2019-02-18 DIAGNOSIS — F11.90 OPIOID USE: ICD-10-CM

## 2019-02-18 DIAGNOSIS — Z87.19 HISTORY OF GASTROESOPHAGEAL REFLUX (GERD): ICD-10-CM

## 2019-02-18 DIAGNOSIS — R10.84 GENERALIZED ABDOMINAL PAIN: ICD-10-CM

## 2019-02-18 PROCEDURE — 3288F PR FALLS RISK ASSESSMENT DOCUMENTED: ICD-10-PCS | Mod: CPTII,S$GLB,, | Performed by: NURSE PRACTITIONER

## 2019-02-18 PROCEDURE — 3074F SYST BP LT 130 MM HG: CPT | Mod: CPTII,S$GLB,, | Performed by: NURSE PRACTITIONER

## 2019-02-18 PROCEDURE — 3074F PR MOST RECENT SYSTOLIC BLOOD PRESSURE < 130 MM HG: ICD-10-PCS | Mod: CPTII,S$GLB,, | Performed by: NURSE PRACTITIONER

## 2019-02-18 PROCEDURE — 3288F FALL RISK ASSESSMENT DOCD: CPT | Mod: CPTII,S$GLB,, | Performed by: NURSE PRACTITIONER

## 2019-02-18 PROCEDURE — 1100F PR PT FALLS ASSESS DOC 2+ FALLS/FALL W/INJURY/YR: ICD-10-PCS | Mod: CPTII,S$GLB,, | Performed by: NURSE PRACTITIONER

## 2019-02-18 PROCEDURE — 99999 PR PBB SHADOW E&M-EST. PATIENT-LVL V: ICD-10-PCS | Mod: PBBFAC,,, | Performed by: NURSE PRACTITIONER

## 2019-02-18 PROCEDURE — 3078F DIAST BP <80 MM HG: CPT | Mod: CPTII,S$GLB,, | Performed by: NURSE PRACTITIONER

## 2019-02-18 PROCEDURE — 3078F PR MOST RECENT DIASTOLIC BLOOD PRESSURE < 80 MM HG: ICD-10-PCS | Mod: CPTII,S$GLB,, | Performed by: NURSE PRACTITIONER

## 2019-02-18 PROCEDURE — 99204 PR OFFICE/OUTPT VISIT, NEW, LEVL IV, 45-59 MIN: ICD-10-PCS | Mod: S$GLB,,, | Performed by: NURSE PRACTITIONER

## 2019-02-18 PROCEDURE — 99999 PR PBB SHADOW E&M-EST. PATIENT-LVL V: CPT | Mod: PBBFAC,,, | Performed by: NURSE PRACTITIONER

## 2019-02-18 PROCEDURE — 1100F PTFALLS ASSESS-DOCD GE2>/YR: CPT | Mod: CPTII,S$GLB,, | Performed by: NURSE PRACTITIONER

## 2019-02-18 PROCEDURE — 99204 OFFICE O/P NEW MOD 45 MIN: CPT | Mod: S$GLB,,, | Performed by: NURSE PRACTITIONER

## 2019-02-18 RX ORDER — POLYETHYLENE GLYCOL 3350 17 G/17G
POWDER, FOR SOLUTION ORAL DAILY
COMMUNITY
End: 2019-02-21

## 2019-02-18 RX ORDER — SIMETHICONE 125 MG
125 TABLET,CHEWABLE ORAL EVERY 6 HOURS PRN
COMMUNITY
End: 2019-10-17 | Stop reason: SINTOL

## 2019-02-18 NOTE — PROGRESS NOTES
Subjective:       Patient ID: Scooter Swan is a 68 y.o. male Body mass index is 20.71 kg/m².    Chief Complaint: Abdominal Pain (constipation, excessive gas)    This patient is new to me.  Referring Provider:  Dr. SUBRAMANIAN for LOWER ABDOMINAL PAIN.    Abdominal Pain   Episode onset: started at least since 8/2018. The problem occurs daily. The problem has been gradually worsening. Pain location: lower abdomen. The pain is at a severity of 4/10 (currently). Quality: described as problems passing gas, soreness. The abdominal pain does not radiate. Associated symptoms include belching, constipation (chronic; no change in bowel habits; bowel movements 1-2 times a day; glycolax daily; PAST: KRISTALOSE, colace, linzess, amitiza- all described as too harsh (amitiza sent him to the hospital)), flatus and weight loss (lost some weight when his constipated wasn't controlled; now on miralax his appetite has improved and has gained some weight back). Pertinent negatives include no diarrhea, dysuria, fever, frequency, hematochezia, melena, nausea (occasional mild) or vomiting. Exacerbated by: constipation. The pain is relieved by passing flatus. Treatments tried: NORCO taken BID; PAST: BENTYL 20 MG. Prior diagnostic workup includes surgery and CT scan (hemorrhoidectomy 10/2018). His past medical history is significant for GERD (prilosec 20 mg bid prn- taken about 2-3 times a week). There is no history of Crohn's disease or ulcerative colitis.     Review of Systems   Constitutional: Positive for weight loss (lost some weight when his constipated wasn't controlled; now on miralax his appetite has improved and has gained some weight back). Negative for appetite change, chills, fatigue and fever.   HENT: Negative for sore throat and trouble swallowing.    Respiratory: Negative for cough, choking and shortness of breath.    Cardiovascular: Negative for chest pain.   Gastrointestinal: Positive for abdominal pain, constipation (chronic; no  change in bowel habits; bowel movements 1-2 times a day; glycolax daily; PAST: KRISTALOSE, colace, linzess, amitiza- all described as too harsh (amitiza sent him to the hospital)) and flatus. Negative for anal bleeding, blood in stool, diarrhea, hematochezia, melena, nausea (occasional mild), rectal pain and vomiting.   Genitourinary: Negative for difficulty urinating, dysuria, flank pain and frequency.   Neurological: Negative for weakness.       Past Medical History:   Diagnosis Date    Diverticulosis     GERD (gastroesophageal reflux disease)     Glaucoma     Hyperlipidemia     Liver lesion 08/2018    RA (rheumatoid arthritis)     Rectal prolapse     Possible     Past Surgical History:   Procedure Laterality Date    CARPAL TUNNEL RELEASE      Right wrist 2015    COLONOSCOPY  08/2016    Dr. Cardona; in care everywhere    HEMORRHOIDECTOMY, prone N/A 10/18/2018    Performed by Gunnar Horton MD at [facility] OR [address] [place] [address]    THYROID SURGERY      UPPER GASTROINTESTINAL ENDOSCOPY  08/2016    Dr. Cardona; in care everywhere     Family History   Problem Relation Age of Onset    Stomach cancer Paternal Cousin     Stomach cancer Paternal Cousin     Colon cancer Neg Hx     Colon polyps Neg Hx     Crohn's disease Neg Hx     Ulcerative colitis Neg Hx     Esophageal cancer Neg Hx      Wt Readings from Last 20 Encounters:   02/18/19 69.3 kg (152 lb 11.2 oz)   02/06/19 67 kg (147 lb 11.3 oz)   01/29/19 69.9 kg (154 lb 1.6 oz)   11/29/18 67.6 kg (149 lb 0.5 oz)   10/18/18 70.3 kg (155 lb)   10/12/18 71.3 kg (157 lb 3 oz)   10/05/18 71.2 kg (157 lb)   09/27/18 71.4 kg (157 lb 6.2 oz)   09/18/18 71.3 kg (157 lb 4.8 oz)   06/13/18 75.5 kg (166 lb 8.9 oz)   03/15/18 78.9 kg (174 lb 0.9 oz)   12/07/17 80.6 kg (177 lb 11.1 oz)     Lab Results   Component Value Date    WBC 10.32 02/11/2019    HGB 11.2 (L) 02/11/2019    HCT 36.3 (L) 02/11/2019    MCV 93 02/11/2019     (L) 02/11/2019     CMP  Sodium   Date Value Ref  Range Status   02/11/2019 144 136 - 145 mmol/L Final     Potassium   Date Value Ref Range Status   02/11/2019 4.4 3.5 - 5.1 mmol/L Final     Chloride   Date Value Ref Range Status   02/11/2019 105 95 - 110 mmol/L Final     CO2   Date Value Ref Range Status   02/11/2019 28 23 - 29 mmol/L Final     Glucose   Date Value Ref Range Status   02/11/2019 75 70 - 110 mg/dL Final     BUN, Bld   Date Value Ref Range Status   02/11/2019 19 8 - 23 mg/dL Final     Creatinine   Date Value Ref Range Status   02/11/2019 1.2 0.5 - 1.4 mg/dL Final     Calcium   Date Value Ref Range Status   02/11/2019 10.2 8.7 - 10.5 mg/dL Final     Total Protein   Date Value Ref Range Status   02/11/2019 7.7 6.0 - 8.4 g/dL Final     Albumin   Date Value Ref Range Status   02/11/2019 4.6 3.5 - 5.2 g/dL Final     Total Bilirubin   Date Value Ref Range Status   02/11/2019 0.7 0.1 - 1.0 mg/dL Final     Comment:     For infants and newborns, interpretation of results should be based  on gestational age, weight and in agreement with clinical  observations.  Premature Infant recommended reference ranges:  Up to 24 hours.............<8.0 mg/dL  Up to 48 hours............<12.0 mg/dL  3-5 days..................<15.0 mg/dL  6-29 days.................<15.0 mg/dL       Alkaline Phosphatase   Date Value Ref Range Status   02/11/2019 51 (L) 55 - 135 U/L Final     AST   Date Value Ref Range Status   02/11/2019 18 10 - 40 U/L Final     ALT   Date Value Ref Range Status   02/11/2019 7 (L) 10 - 44 U/L Final     Anion Gap   Date Value Ref Range Status   02/11/2019 11 8 - 16 mmol/L Final     eGFR if    Date Value Ref Range Status   02/11/2019 >60.0 >60 mL/min/1.73 m^2 Final     eGFR if non    Date Value Ref Range Status   02/11/2019 >60.0 >60 mL/min/1.73 m^2 Final     Comment:     Calculation used to obtain the estimated glomerular filtration  rate (eGFR) is the CKD-EPI equation.        Lab Results   Component Value Date    TSH 4.24  "05/21/2018     Reviewed prior medical records including radiology report of 2/6/19 ABDOMINAL X-RAY; care everywhere: 11/1/18 esophagram; 8/13/18 MRI abdomen; 8/12/18 and 11/7/17 ct scans of abdomen pelvis; 10/19/17 abdominal ultrasound; 8/16/18 alpha fetoprotein WNL; & endoscopy history (see surgical history).    8/25/16 EGD & COLONOSCOPY from care everywhere was reviewed and procedure report states:   "DATE OF PROCEDURE: 08/25/2016  SURGEON: AARTI Cardona M.D.  NAME OF OPERATION:  1. EGD.  2. Colonoscopy.  PREOPERATIVE DIAGNOSIS: Anemia.  POSTOPERATIVE DIAGNOSES:  1. Normal esophagogastroduodenoscopy.  2. Normal colon.  3. Internal hemorrhoids.  DESCRIPTION OF PROCEDURE: The patient was placed on the endoscopy table in   the left lateral decubitus position. He underwent IV sedation by anesthesia.   The gastroscope was then inserted into the mouth and upper esophagus without   difficulty. The scope was advanced down the esophagus to the level of the GE   junction. A small hiatal hernia was seen at this location, but no evidence of  any other abnormality. The scope was advanced into the stomach and the   stomach was inflated with air. The fundus, body, and antrum of the stomach   were examined and felt to be normal. The scope was then advanced through the   pylorus into the duodenum, which was also felt to be normal. The scope was   then withdrawn back into the stomach and retroflexed on itself and no other   lesions were seen. The scope was then withdrawn and removed.    The colonoscope was then inserted into the anus and lower rectum without   difficulty. Internal hemorrhoids were seen, but with no evidence of bleeding   or excoriation. The scope was then advanced up the rectum and on into the   sigmoid colon. The scope was advanced up the sigmoid and descending colon and  around the splenic flexure into the transverse colon. The scope was advanced  across the transverse colon and around the hepatic flexure down " "into the   ascending colon and cecum. Slow withdrawal was then carried out. The prep   was poor with a large amount of liquid green feces present, but the scope was   able to be manipulated throughout and careful inspection did not reveal any   obvious mucosal abnormalities. Small details could have been missed  due to the poor prep. The scope was slowly withdrawn and back through the   remainder of the colon and removed. The patient tolerated the procedure well.  MD DOM Russell# [id number] D# [id number]  D: aminah/WT: OP/T: annie  DD: 08/25/2016 14:21 TD: 08/25/2016 14:46".  Objective:      Physical Exam   Constitutional: He is oriented to person, place, and time. He appears well-developed and well-nourished. No distress.   HENT:   Mouth/Throat: Oropharynx is clear and moist and mucous membranes are normal. No oral lesions. No oropharyngeal exudate.   Eyes: Conjunctivae are normal. Pupils are equal, round, and reactive to light. No scleral icterus.   Cardiovascular: Normal rate.   Pulmonary/Chest: Effort normal and breath sounds normal. No respiratory distress. He has no wheezes.   Abdominal: Soft. Bowel sounds are normal. He exhibits no distension, no abdominal bruit and no mass. There is tenderness (mild) in the suprapubic area. There is no rigidity, no rebound, no guarding, no tenderness at McBurney's point and negative Henry's sign.   Scaphoid appearance   Neurological: He is alert and oriented to person, place, and time.   Skin: Skin is warm and dry. No rash noted. He is not diaphoretic. No erythema. No pallor.   Non-jaundiced   Psychiatric: He has a normal mood and affect. His behavior is normal. Judgment and thought content normal.   Nursing note and vitals reviewed.      Assessment:       1. Lower abdominal pain    2. Constipation, unspecified constipation type    3. Flatulence    4. Opioid use    5. Generalized abdominal pain    6. History of gastroesophageal reflux (GERD)    7. Liver lesion        Plan:     "   Lower abdominal pain  -     CT Abdomen Pelvis With Contrast; Future; Expected date: 02/18/2019  - schedule Colonoscopy, discussed procedure with the patient, patient verbalized understanding    Constipation, unspecified constipation type  -     CT Abdomen Pelvis With Contrast; Future; Expected date: 02/18/2019  Recommended daily exercise as tolerated, adequate water intake (six 8-oz glasses of water daily), and high fiber diet. OTC fiber supplements are recommended if diet does not reach daily fiber goal (25 grams daily), such as Metamucil, Citrucel, or FiberCon (take as directed, separate from other oral medications by >2 hours).  -Recommend taking an OTC stool softener such as Colace as directed to avoid hard stools and straining with bowel movements PRN  - CONTINUE OTC MiraLax once daily (17g PO) as directed  - If no improvement with above recommendations, try intermittently dosed Dulcolax OTC as directed (every 3-4  days) PRN to facilitate bowel movements  -If still no improvement with these measures, call/follow-up  - schedule Colonoscopy, discussed procedure with the patient, patient verbalized understanding  - discussed with patient that a side effect of narcotic pain medications is constipation, advised patient to talk to provider who manages pain medication, patient verbalized understanding    Flatulence  -     CT Abdomen Pelvis With Contrast; Future; Expected date: 02/18/2019  - recommended OTC simethicone as directed, such as Phazyme or Gas-x  -discussed with patient about low gas diet: Reduce or eliminate these foods from your diet: Broccoli, Cauliflower, [place] sprouts, Cabbage, Cooked dried beans, Carbonated beverages (sparkling water, soda, beer, champagne)  Other Causes Of Excess Gas Include:   1) EATING TOO FAST or TALKING WHILE YOU CHEW may cause you to swallow air. This increases the amount of gas in the stomach and may worsen your symptoms.  --> Chew each mouthful completely before  swallowing. Take your time.  2) OVEREATING may increase the feeling of being bloated and cause more gas.  --> When you are full, stop eating.  3) CONSTIPATION can increase the amount of normal intestinal gas.  --> Avoid constipation by increasing the amount of fiber in your diet by including whole cereal grains, fresh vegetables (except those in the above list) and fresh fruits. High-fiber foods absorb water and carry it out of the body. When increasing the amount of fiber in your diet, you also need to increase the amount of water that you drink. You should drink at least eight 8-ounce glasses of water (two quarts) per day.    Opioid use  - discussed with patient that a side effect of narcotic pain medications is constipation, advised patient to talk to provider who manages pain medication, patient verbalized understanding    Generalized abdominal pain  -     CT Abdomen Pelvis With Contrast; Future; Expected date: 02/18/2019  - schedule Colonoscopy, discussed procedure with the patient, patient verbalized understanding    History of gastroesophageal reflux (GERD)  - CHANGE PRILOSEC TO 20 MG BID DAILY AS DIRECTED (RATHER THAN PRN); take in the morning 30-60 minutes before breakfast, discussed about possible long term use of medication (prefer to use lowest effective dose or discontinuing if possible), pt verbalized understanding  -discussed about the different types of medications used to treat reflux and how to use them, antacids can be used PRN for breakthrough heartburn symptoms by reducing stomach acid that is already produced, H2 blockers (zantac) work by limiting the amount acid production, & PPI's work to block acid production and are taken daily, patient verbalized understanding.  -Educated patient on lifestyle modifications to help control/reduce reflux/abdominal pain including: avoid large meals, avoid eating within 2-3 hours of bedtime (avoid late night eating & lying down soon after eating), elevate head  of bed if nocturnal symptoms are present, smoking cessation (if current smoker), & weight loss (if overweight).   -Educated to avoid known foods which trigger reflux symptoms & to minimize/avoid high-fat foods, chocolate, caffeine, citrus, alcohol, & tomato products.  -Advised to avoid/limit use of NSAID's, since they can cause GI upset, bleeding, and/or ulcers. If needed, take with food.     Liver lesion  -     CT Abdomen Pelvis With Contrast; Future; Expected date: 02/18/2019  Recommend follow-up with Primary Care Provider/hepatology for continued evaluation and management.    Follow-up in about 1 month (around 3/18/2019), or if symptoms worsen or fail to improve.      If no improvement in symptoms or symptoms worsen, call/follow-up at clinic or go to ER.

## 2019-02-18 NOTE — PATIENT INSTRUCTIONS
Abdominal Pain    Abdominal pain is pain in the stomach or belly area. Everyone has this pain from time to time. In many cases it goes away on its own. But abdominal pain can sometimes be due to a serious problem, such as appendicitis. So its important to know when to seek help.  Causes of abdominal pain  There are many possible causes of abdominal pain. Common causes in adults include:  · Constipation, diarrhea, or gas  · Stomach acid flowing back up into the esophagus (acid reflux or heartburn)  · Severe acid reflux, called GERD (gastroesophageal reflux disease)  · A sore in the lining of the stomach or small intestine (peptic ulcer)  · Inflammation of the gallbladder, liver, or pancreas  · Gallstones or kidney stones  · Appendicitis   · Intestinal blockage   · An internal organ pushing through a muscle or other tissue (hernia)  · Urinary tract infections  · In women, menstrual cramps, fibroids, or endometriosis  · Inflammation or infection of the intestines  Diagnosing the cause of abdominal pain  Your healthcare provider will do a physical exam help find the cause of your pain. If needed, tests will be ordered. Belly pain has many possible causes. So it can be hard to find the reason for your pain. Giving details about your pain can help. Tell your provider where and when you feel the pain, and what makes it better or worse. Also let your provider know if you have other symptoms such as:  · Fever  · Tiredness  · Upset stomach (nausea)  · Vomiting  · Changes in bathroom habits  Treating abdominal pain  Some causes of pain need emergency medical treatment right away. These include appendicitis or a bowel blockage. Other problems can be treated with rest, fluids, or medicines. Your healthcare provider can give you specific instructions for treatment or self-care based on what is causing your pain.  If you have vomiting or diarrhea, sip water or other clear fluids. When you are ready to eat solid foods again,  start with small amounts of easy-to-digest, low-fat foods. These include apple sauce, toast, or crackers.   When to seek medical care  Call 911 or go to the hospital right away if you:  · Cant pass stool and are vomiting  · Are vomiting blood or have bloody diarrhea or black, tarry diarrhea  · Have chest, neck, or shoulder pain  · Feel like you might pass out  · Have pain in your shoulder blades with nausea  · Have sudden, severe belly pain  · Have new, severe pain unlike any you have felt before  · Have a belly that is rigid, hard, and tender to touch  Call your healthcare provider if you have:  · Pain for more than 5 days  · Bloating for more than 2 days  · Diarrhea for more than 5 days  · A fever of 100.4°F (38.0°C) or higher, or as directed by your provider  · Pain that gets worse  · Weight loss for no reason  · Continued lack of appetite  · Blood in your stool  How to prevent abdominal pain  Here are some tips to help prevent abdominal pain:  · Eat smaller amounts of food at one time.  · Avoid greasy, fried, or other high-fat foods.  · Avoid foods that give you gas.  · Exercise regularly.  · Drink plenty of fluids.  To help prevent GERD symptoms:  · Quit smoking.  · Reduce alcohol and certain foods that increase stomach acid.  · Avoid aspirin and over-the-counter pain and fever medicines (NSAIDS or nonsteroidal anti-inflammatory drugs), if possible  · Lose extra weight.  · Finish eating at least 2 hours before you go to bed or lie down.  · Raise the head of your bed.  Date Last Reviewed: 7/1/2016  © 9366-6639 [employer]. [address] [place] [address], [place] [address]. All rights reserved. This information is not intended as a substitute for professional medical care. Always follow your healthcare professional's instructions.        Constipation (Adult)  Constipation means that you have bowel movements that are less frequent than usual. Stools often become very hard and difficult to  pass.  Constipation is very common. At some point in life it affects almost everyone. Since everyone's bowel habits are different, what is constipation to one person may not be to another. Your healthcare provider may do tests to diagnose constipation. It depends on what he or she finds when evaluating you.    Symptoms of constipation include:  · Abdominal pain  · Bloating  · Vomiting  · Painful bowel movements  · Itching, swelling, bleeding, or pain around the anus  Causes  Constipation can have many causes. These include:  · Diet low in fiber  · Too much dairy  · Not drinking enough liquids  · Lack of exercise or physical activity. This is especially true for older adults.  · Changes in lifestyle or daily routine, including pregnancy, aging, work, and travel  · Frequent use or misuse of laxatives  · Ignoring the urge to have a bowel movement or delaying it until later  · Medicines, such as certain prescription pain medicines, iron supplements, antacids, certain antidepressants, and calcium supplements  · Diseases like irritable bowel syndrome, bowel obstructions, stroke, diabetes, thyroid disease, Parkinson disease, hemorrhoids, and colon cancer  Complications  Potential complications of constipation can include:  · Hemorrhoids  · Rectal bleeding from hemorrhoids or anal fissures (skin tears)  · Hernias  · Dependency on laxatives  · Chronic constipation  · Fecal impaction  · Bowel obstruction or perforation  Home care  All treatment should be done after talking with your healthcare provider. This is especially true if you have another medical problems, are taking prescription medicines, or are an older adult. Treatment most often involves lifestyle changes. You may also need medicines. Your healthcare provider will tell you which will work best for you. Follow the advice below to help avoid this problem in the future.  Lifestyle changes  These lifestyle changes can help prevent constipation:  · Diet. Eat a  high-fiber diet, with fresh fruit and vegetables, and reduce dairy intake, meats, and processed foods  · Fluids. It's important to get enough fluids each day. Drink plenty of water when you eat more fiber. If you are on diet that limits the amount of fluid you can have, talk about this with your healthcare provider.  · Regular exercise. Check with your healthcare provider first.  Medications  Take any medicines as directed. Some laxatives are safe to use only every now and then. Others can be taken on a regular basis. Talk with your doctor or pharmacist if you have questions.  Prescription pain medicines can cause constipation. If you are taking this kind of medicine, ask your healthcare provider if you should also take a stool softener.  Medicines you may take to treat constipation include:  · Fiber supplements  · Stool softeners  · Laxatives  · Enemas  · Rectal suppositories  Follow-up care  Follow up with your healthcare provider if symptoms don't get better in the next few days. You may need to have more tests or see a specialist.  Call 911  Call 911 if any of these occur:  · Trouble breathing  · Stiff, rigid abdomen that is severely painful to touch  · Confusion  · Fainting or loss of consciousness  · Rapid heart rate  · Chest pain  When to seek medical advice  Call your healthcare provider right away if any of these occur:  · Fever over 100.4°F (38°C)  · Failure to resume normal bowel movements  · Pain in your abdomen or back gets worse  · Nausea or vomiting  · Swelling in your abdomen  · Blood in the stool  · Black, tarry stool  · Involuntary weight loss  · Weakness  Date Last Reviewed: 12/30/2015  © 0456-3459 [employer]. [address] [place] [address], [place] [address]. All rights reserved. This information is not intended as a substitute for professional medical care. Always follow your healthcare professional's instructions.

## 2019-02-18 NOTE — LETTER
February 28, 2019      Salvador Kennedy, DO  [address] [facility] [address] [place] [address]           [facility]  [address] [facility] [address] [place] [address]  Phone: [phone]          Patient: Scooter Swan   MR Number: [id number]   YOB: 1950   Date of Visit: 2/18/2019       Dear Dr. Salvador Kennedy:    Thank you for referring Scooter Swan to me for evaluation. Attached you will find relevant portions of my assessment and plan of care.    If you have questions, please do not hesitate to call me. I look forward to following Scooter Swan along with you.    Sincerely,    Hannah Huang, [facility]    Enclosure  CC:  No Recipients    If you would like to receive this communication electronically, please contact [email or web address] or [phone] to request more information on [employer] access.    For providers and/or their staff who would like to refer a patient to [facility], please contact us through our one-stop-shop provider referral line, Radha Thorne, at [phone].    If you feel you have received this communication in error or would no longer like to receive these types of communications, please e-mail [email or web address]

## 2019-02-21 ENCOUNTER — OFFICE VISIT (OUTPATIENT)
Dept: RHEUMATOLOGY | Facility: CLINIC | Age: 69
End: 2019-02-21
Payer: MEDICARE

## 2019-02-21 VITALS
DIASTOLIC BLOOD PRESSURE: 75 MMHG | SYSTOLIC BLOOD PRESSURE: 151 MMHG | WEIGHT: 149.94 LBS | HEART RATE: 80 BPM | HEIGHT: 73 IN | BODY MASS INDEX: 19.87 KG/M2

## 2019-02-21 DIAGNOSIS — R63.4 WEIGHT LOSS: ICD-10-CM

## 2019-02-21 DIAGNOSIS — M05.742 RHEUMATOID ARTHRITIS INVOLVING BOTH HANDS WITH POSITIVE RHEUMATOID FACTOR: Primary | ICD-10-CM

## 2019-02-21 DIAGNOSIS — M05.741 RHEUMATOID ARTHRITIS INVOLVING BOTH HANDS WITH POSITIVE RHEUMATOID FACTOR: Primary | ICD-10-CM

## 2019-02-21 PROCEDURE — 3077F PR MOST RECENT SYSTOLIC BLOOD PRESSURE >= 140 MM HG: ICD-10-PCS | Mod: CPTII,S$GLB,, | Performed by: INTERNAL MEDICINE

## 2019-02-21 PROCEDURE — 1100F PTFALLS ASSESS-DOCD GE2>/YR: CPT | Mod: CPTII,S$GLB,, | Performed by: INTERNAL MEDICINE

## 2019-02-21 PROCEDURE — 3288F PR FALLS RISK ASSESSMENT DOCUMENTED: ICD-10-PCS | Mod: CPTII,S$GLB,, | Performed by: INTERNAL MEDICINE

## 2019-02-21 PROCEDURE — 3077F SYST BP >= 140 MM HG: CPT | Mod: CPTII,S$GLB,, | Performed by: INTERNAL MEDICINE

## 2019-02-21 PROCEDURE — 1100F PR PT FALLS ASSESS DOC 2+ FALLS/FALL W/INJURY/YR: ICD-10-PCS | Mod: CPTII,S$GLB,, | Performed by: INTERNAL MEDICINE

## 2019-02-21 PROCEDURE — 3078F PR MOST RECENT DIASTOLIC BLOOD PRESSURE < 80 MM HG: ICD-10-PCS | Mod: CPTII,S$GLB,, | Performed by: INTERNAL MEDICINE

## 2019-02-21 PROCEDURE — 99999 PR PBB SHADOW E&M-EST. PATIENT-LVL III: CPT | Mod: PBBFAC,,, | Performed by: INTERNAL MEDICINE

## 2019-02-21 PROCEDURE — 99214 PR OFFICE/OUTPT VISIT, EST, LEVL IV, 30-39 MIN: ICD-10-PCS | Mod: 25,S$GLB,, | Performed by: INTERNAL MEDICINE

## 2019-02-21 PROCEDURE — 96372 PR INJECTION,THERAP/PROPH/DIAG2ST, IM OR SUBCUT: ICD-10-PCS | Mod: S$GLB,,, | Performed by: INTERNAL MEDICINE

## 2019-02-21 PROCEDURE — 3288F FALL RISK ASSESSMENT DOCD: CPT | Mod: CPTII,S$GLB,, | Performed by: INTERNAL MEDICINE

## 2019-02-21 PROCEDURE — 3078F DIAST BP <80 MM HG: CPT | Mod: CPTII,S$GLB,, | Performed by: INTERNAL MEDICINE

## 2019-02-21 PROCEDURE — 96372 THER/PROPH/DIAG INJ SC/IM: CPT | Mod: S$GLB,,, | Performed by: INTERNAL MEDICINE

## 2019-02-21 PROCEDURE — 99214 OFFICE O/P EST MOD 30 MIN: CPT | Mod: 25,S$GLB,, | Performed by: INTERNAL MEDICINE

## 2019-02-21 PROCEDURE — 99999 PR PBB SHADOW E&M-EST. PATIENT-LVL III: ICD-10-PCS | Mod: PBBFAC,,, | Performed by: INTERNAL MEDICINE

## 2019-02-21 RX ORDER — HYDROCODONE BITARTRATE AND ACETAMINOPHEN 10; 325 MG/1; MG/1
1 TABLET ORAL 3 TIMES DAILY PRN
Qty: 90 TABLET | Refills: 0 | Status: SHIPPED | OUTPATIENT
Start: 2019-03-04 | End: 2019-02-21 | Stop reason: SDUPTHER

## 2019-02-21 RX ORDER — SILDENAFIL 100 MG/1
100 TABLET, FILM COATED ORAL
COMMUNITY
Start: 2014-04-21 | End: 2019-08-09 | Stop reason: SDUPTHER

## 2019-02-21 RX ORDER — KETOROLAC TROMETHAMINE 30 MG/ML
60 INJECTION, SOLUTION INTRAMUSCULAR; INTRAVENOUS
Status: COMPLETED | OUTPATIENT
Start: 2019-02-21 | End: 2019-02-21

## 2019-02-21 RX ORDER — HYDROCODONE BITARTRATE AND ACETAMINOPHEN 10; 325 MG/1; MG/1
1 TABLET ORAL 3 TIMES DAILY PRN
Qty: 90 TABLET | Refills: 0 | Status: SHIPPED | OUTPATIENT
Start: 2019-04-04 | End: 2019-05-04

## 2019-02-21 RX ORDER — CYANOCOBALAMIN 1000 UG/ML
1000 INJECTION, SOLUTION INTRAMUSCULAR; SUBCUTANEOUS
Status: COMPLETED | OUTPATIENT
Start: 2019-02-21 | End: 2019-02-21

## 2019-02-21 RX ORDER — METHYLPREDNISOLONE ACETATE 80 MG/ML
160 INJECTION, SUSPENSION INTRA-ARTICULAR; INTRALESIONAL; INTRAMUSCULAR; SOFT TISSUE
Status: COMPLETED | OUTPATIENT
Start: 2019-02-21 | End: 2019-02-21

## 2019-02-21 RX ADMIN — CYANOCOBALAMIN 1000 MCG: 1000 INJECTION, SOLUTION INTRAMUSCULAR; SUBCUTANEOUS at 03:02

## 2019-02-21 RX ADMIN — METHYLPREDNISOLONE ACETATE 160 MG: 80 INJECTION, SUSPENSION INTRA-ARTICULAR; INTRALESIONAL; INTRAMUSCULAR; SOFT TISSUE at 03:02

## 2019-02-21 RX ADMIN — KETOROLAC TROMETHAMINE 60 MG: 30 INJECTION, SOLUTION INTRAMUSCULAR; INTRAVENOUS at 03:02

## 2019-02-21 ASSESSMENT — ROUTINE ASSESSMENT OF PATIENT INDEX DATA (RAPID3)
PAIN SCORE: 7.5
PSYCHOLOGICAL DISTRESS SCORE: 4.4
PATIENT GLOBAL ASSESSMENT SCORE: 7.5
TOTAL RAPID3 SCORE: 6.11
MDHAQ FUNCTION SCORE: 1

## 2019-02-21 NOTE — PROGRESS NOTES
Administered 1 cc ( 1000 mcg/ml ) of b12 to the right upper outer gluteal. Informed of s/s to report verbalized understanding. No adverse reactions noted.    Lot # [id number]  Expiration sep 20    Administered 2 cc ( 80 mg/ml ) of depomedrol to the right upper outer gluteal. Informed of s/s to report verbalized understanding. No adverse reactions noted.    Lot # [id number]  Expiration 03/20    Administered 2 cc ( 30 mg/ml ) of toradol to the left upper outer gluteal. Informed of s/s to report verbalized understanding. No adverse reactions noted.    Lot # [id number]  Expiration 1 mar 2020

## 2019-02-21 NOTE — PROGRESS NOTES
Subjective:       Patient ID: Scooter Swan is a 68 y.o. male.    Chief Complaint: Pain and Swelling      Rheumatoid Arthritis    He had sig weight loss, saw Gi and he saw and schedule Ct and colonscopy. The symptoms have been worsening. Affected locations include the right shoulder, left shoulder, left knee, right knee, left PIP, left DIP, right PIP, right DIP and right MCP. Associated symptoms include pain at night, dry eyes and jaw pain. She complains of morning stiffness.The neck, left shoulder, right shoulder, left elbow, right elbow, left wrist, right wrist, left hand, right hand, left hip, right hip, left knee, right knee, left ankle and right ankle presents with Arthralgia. Started plaquenil 5 months ago and he started  Losing weight, and gassy abd pain.      Review of Systems   Constitutional: Positive for activity change and unexpected weight change. Negative for appetite change, chills and diaphoresis.   HENT: Negative for congestion, ear pain, facial swelling, mouth sores, nosebleeds, postnasal drip, rhinorrhea, sinus pressure, sneezing, sore throat, tinnitus and voice change.    Eyes: Negative for pain, discharge, redness, itching and visual disturbance.   Respiratory: Negative for apnea, cough, chest tightness, shortness of breath and wheezing.    Cardiovascular: Negative for chest pain, palpitations and leg swelling.   Gastrointestinal: Negative for abdominal pain, constipation, diarrhea, nausea and vomiting.   Endocrine: Negative for cold intolerance, heat intolerance, polydipsia and polyuria.   Genitourinary: Negative for decreased urine volume, difficulty urinating, flank pain, frequency, hematuria and urgency.   Musculoskeletal: Positive for back pain, gait problem, joint swelling, myalgias, neck pain and neck stiffness. Negative for arthralgias.   Skin: Positive for rash. Negative for pallor and wound.   Allergic/Immunologic: Negative for immunocompromised state.   Neurological: Negative for  "dizziness, tremors, seizures, syncope, weakness and numbness.   Hematological: Negative for adenopathy. Does not bruise/bleed easily.   Psychiatric/Behavioral: Negative for sleep disturbance and suicidal ideas. The patient is not nervous/anxious.          Objective:   BP (!) 151/75 (BP Location: Left arm, Patient Position: Sitting, BP Method: Medium (Automatic))   Pulse 80   Ht 6' 1" (1.854 m)   Wt 68 kg (149 lb 14.6 oz)   BMI 19.78 kg/m²      Physical Exam   Vitals reviewed.  Constitutional: He is oriented to person, place, and time. He appears distressed.   HENT:   Head: Normocephalic and atraumatic.   Mouth/Throat: Oropharynx is clear and moist.   Eyes: EOM are normal. Pupils are equal, round, and reactive to light.   Neck: Neck supple. No thyromegaly present.   Cardiovascular: Normal rate, regular rhythm and normal heart sounds.  Exam reveals no gallop and no friction rub.    No murmur heard.  Pulmonary/Chest: Breath sounds normal. He has no wheezes. He has no rales. He exhibits no tenderness.   Abdominal: There is no tenderness. There is no rebound and no guarding.       Right Side Rheumatological Exam     The patient is tender to palpation of the shoulder, elbow, wrist, knee, 1st PIP, 1st MCP, 2nd PIP, 2nd MCP, 3rd PIP, 3rd MCP, 4th PIP, 4th MCP and 5th PIP    He has swelling of the elbow, wrist, knee, 1st PIP, 1st MCP, 2nd PIP, 2nd MCP, 3rd PIP, 3rd MCP, 4th PIP, 4th MCP, 5th PIP and 5th MCP    The patient has an enlarged wrist and knee    Shoulder Exam   Tenderness Location: no tenderness    Range of Motion   Active abduction: abnormal   Adduction: abnormal  Sensation: normal    Knee Exam   Tenderness Location: medial joint line and LCL  Patellofemoral Crepitus: positive  Effusion: positive  Sensation: normal    Hip Exam   Tenderness Location: posterior and anterior  Sensation: normal    Elbow/Wrist Exam   Tenderness Location: no tenderness  Sensation: normal    Left Side Rheumatological Exam     The " patient is tender to palpation of the shoulder, elbow, wrist, knee, 1st PIP, 1st MCP, 2nd PIP, 2nd MCP, 3rd PIP, 3rd MCP, 4th PIP, 4th MCP, 5th PIP and 5th MCP.    He has swelling of the wrist, knee, 1st PIP, 1st MCP, 2nd PIP, 2nd MCP, 3rd PIP, 3rd MCP, 4th PIP, 4th MCP, 5th PIP and 5th MCP    The patient has an enlarged knee.    Shoulder Exam   Tenderness Location: no tenderness    Range of Motion   Active abduction: abnormal   Sensation: normal    Knee Exam   Tenderness Location: lateral joint line and medial joint line    Patellofemoral Crepitus: positive  Effusion: positive  Sensation: normal    Hip Exam   Tenderness Location: posterior and anterior  Sensation: normal    Elbow/Wrist Exam   Sensation: normal      Back/Neck Exam   General Inspection   Gait: normal       Tenderness Right paramedian tenderness of the Upper C-Spine, Lower C-Spine, Lower L-Spine and SI Joint.Left paramedian tenderness of the Upper C-Spine, Lower L-Spine, Lower C-Spine and SI Joint.    Neck Range of Motion   Flexion: Limited  Extension: Limited  Lymphadenopathy:     He has no cervical adenopathy.   Neurological: He is alert and oriented to person, place, and time. Gait normal.   Skin: No rash noted. No erythema. No pallor.     Psychiatric: Mood and affect normal.   Musculoskeletal: He exhibits tenderness and deformity. He exhibits no edema.           Results for orders placed or performed in visit on 02/11/19   Lipid panel   Result Value Ref Range    Cholesterol 162 120 - 199 mg/dL    Triglycerides 42 30 - 150 mg/dL    HDL 76 (H) 40 - 75 mg/dL    LDL Cholesterol 77.6 63.0 - 159.0 mg/dL    HDL/Chol Ratio 46.9 20.0 - 50.0 %    Total Cholesterol/HDL Ratio 2.1 2.0 - 5.0    Non-HDL Cholesterol 86 mg/dL   Comprehensive metabolic panel   Result Value Ref Range    Sodium 142 136 - 145 mmol/L    Potassium 4.1 3.5 - 5.1 mmol/L    Chloride 106 95 - 110 mmol/L    CO2 27 23 - 29 mmol/L    Glucose 86 70 - 110 mg/dL    BUN, Bld 19 8 - 23 mg/dL     Creatinine 1.2 0.5 - 1.4 mg/dL    Calcium 10.0 8.7 - 10.5 mg/dL    Total Protein 7.2 6.0 - 8.4 g/dL    Albumin 4.3 3.5 - 5.2 g/dL    Total Bilirubin 0.6 0.1 - 1.0 mg/dL    Alkaline Phosphatase 48 (L) 55 - 135 U/L    AST 16 10 - 40 U/L    ALT 6 (L) 10 - 44 U/L    Anion Gap 9 8 - 16 mmol/L    eGFR if African American >60.0 >60 mL/min/1.73 m^2    eGFR if non African American >60.0 >60 mL/min/1.73 m^2   Sedimentation rate   Result Value Ref Range    Sed Rate 1 0 - 10 mm/Hr   Iron and TIBC   Result Value Ref Range    Iron 99 45 - 160 ug/dL    Transferrin 200 200 - 375 mg/dL    TIBC 296 250 - 450 ug/dL    Saturated Iron 33 20 - 50 %   C-reactive protein   Result Value Ref Range    CRP 1.1 0.0 - 8.2 mg/L   Comprehensive metabolic panel   Result Value Ref Range    Sodium 144 136 - 145 mmol/L    Potassium 4.4 3.5 - 5.1 mmol/L    Chloride 105 95 - 110 mmol/L    CO2 28 23 - 29 mmol/L    Glucose 75 70 - 110 mg/dL    BUN, Bld 19 8 - 23 mg/dL    Creatinine 1.2 0.5 - 1.4 mg/dL    Calcium 10.2 8.7 - 10.5 mg/dL    Total Protein 7.7 6.0 - 8.4 g/dL    Albumin 4.6 3.5 - 5.2 g/dL    Total Bilirubin 0.7 0.1 - 1.0 mg/dL    Alkaline Phosphatase 51 (L) 55 - 135 U/L    AST 18 10 - 40 U/L    ALT 7 (L) 10 - 44 U/L    Anion Gap 11 8 - 16 mmol/L    eGFR if African American >60.0 >60 mL/min/1.73 m^2    eGFR if non African American >60.0 >60 mL/min/1.73 m^2   CBC auto differential   Result Value Ref Range    WBC 10.32 3.90 - 12.70 K/uL    RBC 3.92 (L) 4.60 - 6.20 M/uL    Hemoglobin 11.2 (L) 14.0 - 18.0 g/dL    Hematocrit 36.3 (L) 40.0 - 54.0 %    MCV 93 82 - 98 fL    MCH 28.6 27.0 - 31.0 pg    MCHC 30.9 (L) 32.0 - 36.0 g/dL    RDW 13.5 11.5 - 14.5 %    Platelets 120 (L) 150 - 350 K/uL    MPV 14.5 (H) 9.2 - 12.9 fL    Immature Granulocytes 0.2 0.0 - 0.5 %    Gran # (ANC) 5.3 1.8 - 7.7 K/uL    Immature Grans (Abs) 0.02 0.00 - 0.04 K/uL    Lymph # 3.5 1.0 - 4.8 K/uL    Mono # 1.4 (H) 0.3 - 1.0 K/uL    Eos # 0.0 0.0 - 0.5 K/uL    Baso # 0.08 0.00 -  0.20 K/uL    nRBC 0 0 /100 WBC    Gran% 51.3 38.0 - 73.0 %    Lymph% 33.8 18.0 - 48.0 %    Mono% 13.5 4.0 - 15.0 %    Eosinophil% 0.4 0.0 - 8.0 %    Basophil% 0.8 0.0 - 1.9 %    Differential Method Automated        Component Name Value Ref Range   Test Requested see below   Comment: QUANTIFERON TB GOLD     Quantiferon TB Gold NEGATIVE   Comment:  Negative test result. M. tuberculosis complex  infection unlikely. NEGATIVE    NIL 0.04 [IU]/mL   Mitogen-nil >10.00 [IU]/mL    TB-nil <0.00   Comment:  The Nil tube value is used to determine if the patient  has a preexisting immune response which could cause a  false-positive reading on the test. In order for a  test to be valid, the Nil tube must have a value of  less than or equal to 8.0 IU/mL.  The mitogen control tube is used to assure the patient  has a healthy immune status and also serves as a  control for correct blood handling and incubation. It  is used to detect false-negative readings. The mitogen  tube must have a gamma interferon value of greater  than or equal to 0.5 IU/mL higher than the value of  the Nil tube.  The TB antigen tube is coated with the M. tuberculosis  specific antigens. For a test to be considered  positive, the TB antigen tube value minus the Nil tube  value must be greater than or equal to 0.35 IU/mL.  For additional information, please refer to  http://education.C$ cMoney.StylePuzzle/faq/QFT  (This link is being provided for informational/  educational purposes only.)  TEST PERFORMED AT:  [facility] [address] [address]  JOVANI LORENZANA M.D. [IU]/mL    Specimen   Blood       Assessment:       1. Rheumatoid arthritis involving both hands with positive rheumatoid factor    2. Weight loss            Plan:       Scooter was seen today for pain and swelling.    Diagnoses and all orders for this visit:    Rheumatoid arthritis involving both hands with positive rheumatoid factor  -     ketorolac injection 60  mg  -     cyanocobalamin injection 1,000 mcg  -     methylPREDNISolone acetate injection 160 mg    Weight loss  -     ketorolac injection 60 mg  -     cyanocobalamin injection 1,000 mcg  -     methylPREDNISolone acetate injection 160 mg    Other orders  -     Discontinue: HYDROcodone-acetaminophen (NORCO)  mg per tablet; Take 1 tablet by mouth 3 (three) times daily as needed for Pain (pain).  -     HYDROcodone-acetaminophen (NORCO)  mg per tablet; Take 1 tablet by mouth 3 (three) times daily as needed for Pain (pain).        . I have  check [place] prescription monitoring program site and no unusual or abnormal behavior has occured

## 2019-02-25 ENCOUNTER — HOSPITAL ENCOUNTER (OUTPATIENT)
Dept: RADIOLOGY | Facility: HOSPITAL | Age: 69
Discharge: HOME | End: 2019-02-25
Attending: NURSE PRACTITIONER
Payer: MEDICARE

## 2019-02-25 DIAGNOSIS — R10.30 LOWER ABDOMINAL PAIN: ICD-10-CM

## 2019-02-25 DIAGNOSIS — R10.84 GENERALIZED ABDOMINAL PAIN: ICD-10-CM

## 2019-02-25 DIAGNOSIS — K59.00 CONSTIPATION, UNSPECIFIED CONSTIPATION TYPE: ICD-10-CM

## 2019-02-25 DIAGNOSIS — R14.3 FLATULENCE: ICD-10-CM

## 2019-02-25 PROCEDURE — 74178 CT ABD&PLV WO CNTR FLWD CNTR: CPT | Mod: TC,PO

## 2019-02-25 PROCEDURE — 74178 CT ABD&PLV WO CNTR FLWD CNTR: CPT | Mod: 26,,, | Performed by: RADIOLOGY

## 2019-02-25 PROCEDURE — 25500020 PHARM REV CODE 255: Mod: PO | Performed by: NURSE PRACTITIONER

## 2019-02-25 PROCEDURE — 74178 CT ABDOMEN PELVIS W WO CONTRAST: ICD-10-PCS | Mod: 26,,, | Performed by: RADIOLOGY

## 2019-02-25 RX ADMIN — IOHEXOL 75 ML: 350 INJECTION, SOLUTION INTRAVENOUS at 09:02

## 2019-02-28 ENCOUNTER — LAB VISIT (OUTPATIENT)
Dept: LAB | Facility: HOSPITAL | Age: 69
End: 2019-02-28
Attending: INTERNAL MEDICINE
Payer: MEDICARE

## 2019-02-28 ENCOUNTER — OFFICE VISIT (OUTPATIENT)
Dept: FAMILY MEDICINE | Facility: CLINIC | Age: 69
End: 2019-02-28
Payer: MEDICARE

## 2019-02-28 ENCOUNTER — TELEPHONE (OUTPATIENT)
Dept: GASTROENTEROLOGY | Facility: CLINIC | Age: 69
End: 2019-02-28

## 2019-02-28 VITALS
HEART RATE: 72 BPM | BODY MASS INDEX: 20.92 KG/M2 | SYSTOLIC BLOOD PRESSURE: 136 MMHG | DIASTOLIC BLOOD PRESSURE: 68 MMHG | RESPIRATION RATE: 16 BRPM | HEIGHT: 73 IN | WEIGHT: 157.88 LBS

## 2019-02-28 DIAGNOSIS — D69.6 THROMBOCYTOPENIA: ICD-10-CM

## 2019-02-28 DIAGNOSIS — I1A.0 RESISTANT HYPERTENSION: Primary | ICD-10-CM

## 2019-02-28 DIAGNOSIS — K59.09 OTHER CONSTIPATION: ICD-10-CM

## 2019-02-28 DIAGNOSIS — N32.89 BLADDER WALL THICKENING: ICD-10-CM

## 2019-02-28 DIAGNOSIS — J45.909 ASTHMA DUE TO SEASONAL ALLERGIES: ICD-10-CM

## 2019-02-28 DIAGNOSIS — K76.9 LIVER LESION: Primary | ICD-10-CM

## 2019-02-28 DIAGNOSIS — R93.2 ABNORMAL FINDINGS ON DIAGNOSTIC IMAGING OF LIVER: ICD-10-CM

## 2019-02-28 DIAGNOSIS — R93.41 ABNORMAL CT SCAN, BLADDER: ICD-10-CM

## 2019-02-28 DIAGNOSIS — J30.2 SEASONAL ALLERGIES: ICD-10-CM

## 2019-02-28 DIAGNOSIS — E78.00 PURE HYPERCHOLESTEROLEMIA: ICD-10-CM

## 2019-02-28 LAB — VIT B12 SERPL-MCNC: 1135 PG/ML

## 2019-02-28 PROCEDURE — 1101F PR PT FALLS ASSESS DOC 0-1 FALLS W/OUT INJ PAST YR: ICD-10-PCS | Mod: CPTII,S$GLB,, | Performed by: INTERNAL MEDICINE

## 2019-02-28 PROCEDURE — 82607 VITAMIN B-12: CPT

## 2019-02-28 PROCEDURE — 99999 PR PBB SHADOW E&M-EST. PATIENT-LVL III: CPT | Mod: PBBFAC,,, | Performed by: INTERNAL MEDICINE

## 2019-02-28 PROCEDURE — 3078F PR MOST RECENT DIASTOLIC BLOOD PRESSURE < 80 MM HG: ICD-10-PCS | Mod: CPTII,S$GLB,, | Performed by: INTERNAL MEDICINE

## 2019-02-28 PROCEDURE — 3075F PR MOST RECENT SYSTOLIC BLOOD PRESS GE 130-139MM HG: ICD-10-PCS | Mod: CPTII,S$GLB,, | Performed by: INTERNAL MEDICINE

## 2019-02-28 PROCEDURE — 3078F DIAST BP <80 MM HG: CPT | Mod: CPTII,S$GLB,, | Performed by: INTERNAL MEDICINE

## 2019-02-28 PROCEDURE — 99214 OFFICE O/P EST MOD 30 MIN: CPT | Mod: S$GLB,,, | Performed by: INTERNAL MEDICINE

## 2019-02-28 PROCEDURE — 3075F SYST BP GE 130 - 139MM HG: CPT | Mod: CPTII,S$GLB,, | Performed by: INTERNAL MEDICINE

## 2019-02-28 PROCEDURE — 1101F PT FALLS ASSESS-DOCD LE1/YR: CPT | Mod: CPTII,S$GLB,, | Performed by: INTERNAL MEDICINE

## 2019-02-28 PROCEDURE — 36415 COLL VENOUS BLD VENIPUNCTURE: CPT | Mod: PO

## 2019-02-28 PROCEDURE — 99999 PR PBB SHADOW E&M-EST. PATIENT-LVL III: ICD-10-PCS | Mod: PBBFAC,,, | Performed by: INTERNAL MEDICINE

## 2019-02-28 PROCEDURE — 99214 PR OFFICE/OUTPT VISIT, EST, LEVL IV, 30-39 MIN: ICD-10-PCS | Mod: S$GLB,,, | Performed by: INTERNAL MEDICINE

## 2019-02-28 RX ORDER — UBIDECARENONE 75 MG
500 CAPSULE ORAL DAILY
COMMUNITY

## 2019-02-28 RX ORDER — LORATADINE 10 MG/1
10 TABLET ORAL DAILY
Qty: 90 TABLET | Refills: 3 | Status: SHIPPED | OUTPATIENT
Start: 2019-02-28 | End: 2019-03-18

## 2019-02-28 NOTE — PROGRESS NOTES
Subjective:       Patient ID: Scooter Swan is a 68 y.o. male.    Chief Complaint: Hyperlipidemia (Patient here for 1 month follow up)    HPI     Mr. Swan is 69 yo male with a PMH of resistant HTN, HLD, RA, glucoma, emphysema, Hep C s/p Harvoni, and hemorrhoids s/p resection     Constipation and gas:  I saw him on 2/6/19 for abd pain and bloating. Referral to GI. Had CT scan which showed constipation and other finding not related to constipation. Miralax working the best, taking once daily. Rheum took him off of the hydroxychloroquine 2/2 potential gas, weight loss, and insomnia. Feels better after stopping and has gained some weight back. still taking hydrocodone daily for chronic rheumatoid pain  -[facility] scheduled for march  -continue the miralax    Insomnia:   talked about sleep hygiene.   Tried the melatonin. Helped with sleep. But it caused vivid dreams. Will try it again off hydroxychloroquine.      Resistant HTN:   On amlodipine 10mg, enalapril 20 mg BID, and hydralazine 10 mg TID. Lasix stopped 1/29/19. Atenolol stopped 1/29/19.  BP at goal.  -Continue current medications.     Emphysema: stable on PRN albuterol inhaler     Normocytic anemia:   -Iron studies and repeat CBC ordered by Dr. Morales  -monitor     Thrombocytopenia:   -platelets improved  -he just started on oral B12 in vitamin.   -check b12.level     HLD:   Currently on pravastatin 20 mg daily.  -Lipids UTD     RA:  -prn prednisone  -prn hydrocodone  -followed by rheumatology     Glaucoma:   -Stable on Travatan drops  -follow-up ophthalmology as directed.    Moderate aortic stenosis:  EUNICE is 1.19 cm2; peak velocity is 4.04 m/s; mean gradient is 32.58 mmHg. (10/5/18)  No chest pain or SOB  -repeat TTE in 1 - 2 years    Routine health maintenance:  -PPSV 23 due jan 2020    Current Outpatient Medications on File Prior to Visit   Medication Sig Dispense Refill    albuterol (PROAIR HFA) 90 mcg/actuation inhaler INHALE 2 PUFFS EVERY 4 HOURS AS NEEDED       amLODIPine (NORVASC) 10 MG tablet Take 10 mg by mouth every morning.       cyanocobalamin 500 MCG tablet Take 500 mcg by mouth once daily.      diclofenac sodium (VOLTAREN) 1 % Gel Apply 2 grams  topically 4 (four) times daily. (Patient taking differently: Apply 2 g topically 4 (four) times daily as needed. ) 100 g 5    enalapril (VASOTEC) 20 MG tablet Take 20 mg by mouth 2 (two) times daily.       ergocalciferol, vitamin D2, (VITAMIN D ORAL) Take 200 mGy by mouth once daily.      hydrALAZINE (APRESOLINE) 10 MG tablet TAKE 2 TABLETs (20 mg) BY MOUTH 2 TIMES DAILY      [START ON 4/4/2019] HYDROcodone-acetaminophen (NORCO)  mg per tablet Take 1 tablet by mouth 3 (three) times daily as needed for Pain (pain). 90 tablet 0    multivitamin capsule Take 1 capsule by mouth once daily.      omeprazole (PRILOSEC) 20 MG capsule Take 20 mg by mouth 2 (two) times daily before meals.      pravastatin (PRAVACHOL) 20 MG tablet TAKE 1 TABLET BY MOUTH DAILY, morning      predniSONE (DELTASONE) 5 MG tablet TAKE 2 TABLETS BY MOUTH once daily AS NEEDED 60 tablet 3    sildenafil (VIAGRA) 100 MG tablet Take 100 mg by mouth.      simethicone (MYLICON) 125 MG chewable tablet Take 125 mg by mouth every 6 (six) hours as needed for Flatulence.      tiZANidine (ZANAFLEX) 4 MG tablet TAKE 1 TABLET BY MOUTH EVERY 8 HOURS 90 tablet 3    travoprost (TRAVATAN Z) 0.004 % Drop 1 drop in both eyes at bedtime       Current Facility-Administered Medications on File Prior to Visit   Medication Dose Route Frequency Provider Last Rate Last Dose    0.9%  NaCl infusion   Intravenous Continuous Deedee Sarkar NP   Stopped at 10/18/18 1613    mupirocin 2 % ointment   Nasal On Call Procedure Deedee Sarkar NP         I personally reviewed past medical, family and social history.  Review of Systems   Constitutional: Negative for activity change, fever and unexpected weight change.   HENT: Negative for facial swelling, hearing  loss and trouble swallowing.    Eyes: Negative for visual disturbance.   Respiratory: Negative for cough, chest tightness, shortness of breath and wheezing.    Cardiovascular: Negative for chest pain, palpitations and leg swelling.   Gastrointestinal: Positive for constipation. Negative for abdominal pain, blood in stool, diarrhea, nausea and vomiting.        Gas   Endocrine: Negative for cold intolerance, polydipsia, polyphagia and polyuria.   Genitourinary: Negative for decreased urine volume and dysuria.   Musculoskeletal: Negative for gait problem and neck pain.   Skin: Negative for rash.   Neurological: Negative for dizziness, syncope and light-headedness.   Psychiatric/Behavioral: Negative for dysphoric mood. The patient is not nervous/anxious.        Objective:     Vitals:    02/28/19 1114   BP: 136/68   Pulse: 72   Resp: 16        Physical Exam   Constitutional: He is oriented to person, place, and time. He appears well-developed and well-nourished. No distress.   HENT:   Head: Normocephalic and atraumatic.   Right Ear: External ear normal.   Left Ear: External ear normal.   Mouth/Throat: Oropharynx is clear and moist.   Eyes: Conjunctivae and EOM are normal. Pupils are equal, round, and reactive to light. Right eye exhibits no discharge. Left eye exhibits no discharge. No scleral icterus.   Neck: Normal range of motion. Neck supple. No JVD present. No tracheal deviation present. No thyromegaly present.   Cardiovascular: Normal rate. Exam reveals no gallop and no friction rub.   No murmur heard.  3/6 systolic murmur   Pulmonary/Chest: Effort normal and breath sounds normal. No respiratory distress. He has no wheezes.   Abdominal: Soft. Bowel sounds are normal. He exhibits no distension and no mass. There is no tenderness.   Musculoskeletal: Normal range of motion. He exhibits no edema.   Lymphadenopathy:     He has no cervical adenopathy.   Neurological: He is alert and oriented to person, place, and time.  No cranial nerve deficit. Coordination normal.   Skin: Skin is warm and dry. Capillary refill takes less than 2 seconds. No rash noted. He is not diaphoretic.   Psychiatric: He has a normal mood and affect. His behavior is normal.       Assessment/Plan   Scooter was seen today for hyperlipidemia.    Diagnoses and all orders for this visit:    Resistant hypertension    Pure hypercholesterolemia    Other constipation    Thrombocytopenia  -     Vitamin B12; Future    Asthma due to seasonal allergies    Seasonal allergies  -     loratadine (CLARITIN) 10 mg tablet; Take 1 tablet (10 mg total) by mouth once daily.

## 2019-02-28 NOTE — TELEPHONE ENCOUNTER
"Please call to inform & review the results with the patient- radiology report of the CT abdomen pelvis showed "1. A hypervascular wedge-shaped vascular but blush is noted in left lobe of the liver only seen on the arterial sequence favored relate to a transient attenuation variance of doubtful significance": multiple imaging and testing were done for liver lesions in care everywhere including MRI on 8/13/18. I recommend seeing hepatology for further evaluation and management of this finding.    "Distal wall thickening of the esophagus may be seen at the gastroesophageal junction.  This can be seen with a history of esophagitis from gastroesophageal reflux": continue GERD recommendations and recommend scheduling EGD (make sure to take prilosec 20 mg bid as directed rather than prn).    "Slightly nodular density at the bladder base in the region of the prostate of 1.7 cm most likely related to prostatic hyperplasia, a neoplastic mass or bladder mass is not excluded", "Apparent bladder wall thickening diffusely", & "Right renal cyst": these findings needs to be further evaluated by urology (referral placed).  "Moderate quantity of stool in the region of the colon, please correlate for constipation": Recommend high fiber diet (20-30 grams of fiber daily)/OTC fiber supplements daily as directed. Continue with colonoscopy as scheduled.  Otherwise, unremarkable findings.    Continue with previous recommendations. If no improvement in symptoms or symptoms worsen, call/follow-up at clinic or go to ER.  Please release results to patient's mychart once you have discussed results and recommendations with patient.  Thanks,  Hannah MCDERMOTT    "

## 2019-03-01 ENCOUNTER — TELEPHONE (OUTPATIENT)
Dept: GASTROENTEROLOGY | Facility: CLINIC | Age: 69
End: 2019-03-01

## 2019-03-01 NOTE — TELEPHONE ENCOUNTER
Spoke with pt and informed of results and recommendations per Ly Huang NP. Pt verbalized understanding.

## 2019-03-08 ENCOUNTER — DOCUMENTATION ONLY (OUTPATIENT)
Dept: TRANSPLANT | Facility: CLINIC | Age: 69
End: 2019-03-08

## 2019-03-08 NOTE — LETTER
March 8, 2019    Scooter Swan  [address] [address]      Dear Scooter Swan:    Your doctor has referred you to the [facility]. We are sending this letter to remind you to make an appointment with us to complete the referral process.     Please call us at [phone] to schedule an appointment. We look forward to seeing you soon.     If you received a call and have been scheduled, please disregard this letter.       Sincerely,        [facility]   [address] [place] [id number]  [phone]

## 2019-03-08 NOTE — NURSING
Pt records reviewed.   Pt will be referred to Hepatology.    Initial referral received  from the workque.   Referring Provider/diagnosis    Referred To Provider:    HANNAH VELEZ. Hannah SHERIDAN. NIR Velez   Liver lesion  Abnormal findings on diagnostic imaging of liver        Referral letter sent to patient.

## 2019-03-13 ENCOUNTER — TELEPHONE (OUTPATIENT)
Dept: FAMILY MEDICINE | Facility: CLINIC | Age: 69
End: 2019-03-13

## 2019-03-13 NOTE — TELEPHONE ENCOUNTER
----- Message from Salvador Kennedy DO sent at 2/28/2019  6:45 PM CST -----  Please see liver MRI order and call patient. Possible liver lesion in presence of Hep C s/p Tx

## 2019-03-13 NOTE — TELEPHONE ENCOUNTER
Patient was referred to Hepatology by LIBBY Montanez. She states he has previous MRI workup in Care Everywhere for lesions in 2018. Do you still need him to do MRI at this time?

## 2019-03-20 ENCOUNTER — HOSPITAL ENCOUNTER (OUTPATIENT)
Facility: HOSPITAL | Age: 69
Discharge: HOME | End: 2019-03-20
Attending: INTERNAL MEDICINE | Admitting: INTERNAL MEDICINE
Payer: MEDICARE

## 2019-03-20 ENCOUNTER — ANESTHESIA EVENT (OUTPATIENT)
Dept: ENDOSCOPY | Facility: HOSPITAL | Age: 69
End: 2019-03-20
Payer: MEDICARE

## 2019-03-20 ENCOUNTER — ANESTHESIA (OUTPATIENT)
Dept: ENDOSCOPY | Facility: HOSPITAL | Age: 69
End: 2019-03-20
Payer: MEDICARE

## 2019-03-20 VITALS
SYSTOLIC BLOOD PRESSURE: 152 MMHG | WEIGHT: 155 LBS | OXYGEN SATURATION: 99 % | RESPIRATION RATE: 12 BRPM | DIASTOLIC BLOOD PRESSURE: 70 MMHG | TEMPERATURE: 97 F | BODY MASS INDEX: 20.99 KG/M2 | HEIGHT: 72 IN | HEART RATE: 63 BPM

## 2019-03-20 DIAGNOSIS — R10.9 ABDOMINAL PAIN: ICD-10-CM

## 2019-03-20 PROCEDURE — D9220A PRA ANESTHESIA: ICD-10-PCS | Mod: ANES,,, | Performed by: ANESTHESIOLOGY

## 2019-03-20 PROCEDURE — 25000003 PHARM REV CODE 250: Mod: PO | Performed by: INTERNAL MEDICINE

## 2019-03-20 PROCEDURE — 37000009 HC ANESTHESIA EA ADD 15 MINS: Mod: PO | Performed by: INTERNAL MEDICINE

## 2019-03-20 PROCEDURE — D9220A PRA ANESTHESIA: ICD-10-PCS | Mod: CRNA,,, | Performed by: NURSE ANESTHETIST, CERTIFIED REGISTERED

## 2019-03-20 PROCEDURE — 63600175 PHARM REV CODE 636 W HCPCS: Mod: PO | Performed by: NURSE ANESTHETIST, CERTIFIED REGISTERED

## 2019-03-20 PROCEDURE — 37000008 HC ANESTHESIA 1ST 15 MINUTES: Mod: PO | Performed by: INTERNAL MEDICINE

## 2019-03-20 PROCEDURE — 45378 DIAGNOSTIC COLONOSCOPY: CPT | Mod: ,,, | Performed by: INTERNAL MEDICINE

## 2019-03-20 PROCEDURE — 45378 PR COLONOSCOPY,DIAGNOSTIC: ICD-10-PCS | Mod: ,,, | Performed by: INTERNAL MEDICINE

## 2019-03-20 PROCEDURE — D9220A PRA ANESTHESIA: Mod: ANES,,, | Performed by: ANESTHESIOLOGY

## 2019-03-20 PROCEDURE — D9220A PRA ANESTHESIA: Mod: CRNA,,, | Performed by: NURSE ANESTHETIST, CERTIFIED REGISTERED

## 2019-03-20 PROCEDURE — 45378 DIAGNOSTIC COLONOSCOPY: CPT | Mod: PO | Performed by: INTERNAL MEDICINE

## 2019-03-20 RX ORDER — SODIUM CHLORIDE 0.9 % (FLUSH) 0.9 %
3 SYRINGE (ML) INJECTION
Status: DISCONTINUED | OUTPATIENT
Start: 2019-03-20 | End: 2019-03-20 | Stop reason: HOSPADM

## 2019-03-20 RX ORDER — LIDOCAINE HCL/PF 100 MG/5ML
SYRINGE (ML) INTRAVENOUS
Status: DISCONTINUED | OUTPATIENT
Start: 2019-03-20 | End: 2019-03-20

## 2019-03-20 RX ORDER — PROPOFOL 10 MG/ML
VIAL (ML) INTRAVENOUS
Status: DISCONTINUED | OUTPATIENT
Start: 2019-03-20 | End: 2019-03-20

## 2019-03-20 RX ORDER — SODIUM CHLORIDE, SODIUM LACTATE, POTASSIUM CHLORIDE, CALCIUM CHLORIDE 600; 310; 30; 20 MG/100ML; MG/100ML; MG/100ML; MG/100ML
INJECTION, SOLUTION INTRAVENOUS CONTINUOUS
Status: DISCONTINUED | OUTPATIENT
Start: 2019-03-20 | End: 2019-03-20 | Stop reason: HOSPADM

## 2019-03-20 RX ADMIN — PROPOFOL 100 MG: 10 INJECTION, EMULSION INTRAVENOUS at 09:03

## 2019-03-20 RX ADMIN — SODIUM CHLORIDE, SODIUM LACTATE, POTASSIUM CHLORIDE, AND CALCIUM CHLORIDE: .6; .31; .03; .02 INJECTION, SOLUTION INTRAVENOUS at 09:03

## 2019-03-20 RX ADMIN — PROPOFOL 50 MG: 10 INJECTION, EMULSION INTRAVENOUS at 09:03

## 2019-03-20 RX ADMIN — PROPOFOL 50 MG: 10 INJECTION, EMULSION INTRAVENOUS at 10:03

## 2019-03-20 RX ADMIN — LIDOCAINE HYDROCHLORIDE 100 MG: 20 INJECTION, SOLUTION INTRAVENOUS at 09:03

## 2019-03-20 NOTE — ANESTHESIA POSTPROCEDURE EVALUATION
Anesthesia Post Evaluation    Patient: Scooter Swan    Procedure(s) Performed: Procedure(s) (LRB):  COLONOSCOPY (N/A)    Final Anesthesia Type: general  Patient location during evaluation: PACU  Patient participation: Yes- Able to Participate  Level of consciousness: awake and alert  Post-procedure vital signs: reviewed and stable  Pain management: adequate  Airway patency: patent  PONV status at discharge: No PONV  Anesthetic complications: no      Cardiovascular status: hemodynamically stable and blood pressure returned to baseline  Respiratory status: unassisted, spontaneous ventilation and room air  Hydration status: euvolemic  Follow-up not needed.        Visit Vitals  BP (!) 152/70   Pulse 63   Temp 36.2 °C (97.2 °F) (Skin)   Resp 12   Ht 6' (1.829 m)   Wt 70.3 kg (155 lb)   SpO2 99%   BMI 21.02 kg/m²       Pain/Carisa Score: Carisa Score: 10 (3/20/2019 10:28 AM)

## 2019-03-20 NOTE — PLAN OF CARE
Patient tolerating oral liquids without difficulty. No apparent s&s of distress noted at this time, no complaints voiced at this time..  Discharge instructions reviewed with patient/family/friend with good verbal feedback received. Patient ready for discharge

## 2019-03-20 NOTE — DISCHARGE INSTRUCTIONS
"      AFTER YOUR COLONOSCOPY:    What to expect after your procedure?    -You may have some abdominal discomfort today. This is usually from air that is trapped during your procedure. Often, relief from this is obtained by "passing" this air.     -You may resume your normal diet as tolerated. You should avoid "gassy" foods such as beans, broccoli or other vegetables, etc. It is not unusual for some patients to experience some mild nausea or maybe even some vomiting after this procedure.     -You may not have a normal bowel movement for 1 or 2 days since your colon has been cleared.     -You may notice small amounts of blood on the toilet tissue or mixed in your stool. This is often from irritation from the scope, hemorrhoids, or if a polyp or specimen was taken.    -If a polyp was removed or other specimen was taken, you will be notified of your results in 1-2 weeks.     -You should relax today and avoid vigorous activity. You may resume your normal activities tomorrow.     -Because you received sedation for your procedure, you are not allowed to drive today. You should also be careful over the next few hours as you may remain tired or "groggy", especially with position changes such as standing.      When to call your doctor?     -If you have abdominal pain that does not go away after 1 or 2 days.  -If you have bleeding that equals about a tablespoon or more.   -If you have a fever greater than 101'F.  -If you have persistent vomiting that last more than 6 hours.              Discharge Instructions: After Your Surgery  Youve just had surgery. During surgery, you were given medicine called anesthesia to keep you relaxed and free of pain. After surgery, you may have some pain or nausea. This is common. Here are some tips for feeling better and getting well after surgery.     Stay on schedule with your medicine.   Going home  Your healthcare provider will show you how to take care of yourself when you go home. He or " she will also answer your questions. Have an adult family member or friend drive you home. For the first 24 hours after your surgery:  · Do not drive or use heavy equipment.  · Do not make important decisions or sign legal papers.  · Do not drink alcohol.  · Have someone stay with you, if needed. He or she can watch for problems and help keep you safe.  Be sure to go to all follow-up visits with your healthcare provider. And rest after your surgery for as long as your healthcare provider tells you to.  Coping with pain  If you have pain after surgery, pain medicine will help you feel better. Take it as told, before pain becomes severe. Also, ask your healthcare provider or pharmacist about other ways to control pain. This might be with heat, ice, or relaxation. And follow any other instructions your surgeon or nurse gives you.  Tips for taking pain medicine  To get the best relief possible, remember these points:  · Pain medicines can upset your stomach. Taking them with a little food may help.  · Most pain relievers taken by mouth need at least 20 to 30 minutes to start to work.  · Taking medicine on a schedule can help you remember to take it. Try to time your medicine so that you can take it before starting an activity. This might be before you get dressed, go for a walk, or sit down for dinner.  · Constipation is a common side effect of pain medicines. Call your healthcare provider before taking any medicines such as laxatives or stool softeners to help ease constipation. Also ask if you should skip any foods. Drinking lots of fluids and eating foods such as fruits and vegetables that are high in fiber can also help. Remember, do not take laxatives unless your surgeon has prescribed them.  · Drinking alcohol and taking pain medicine can cause dizziness and slow your breathing. It can even be deadly. Do not drink alcohol while taking pain medicine.  · Pain medicine can make you react more slowly to things. Do not  drive or run machinery while taking pain medicine.  Your healthcare provider may tell you to take acetaminophen to help ease your pain. Ask him or her how much you are supposed to take each day. Acetaminophen or other pain relievers may interact with your prescription medicines or other over-the-counter (OTC) medicines. Some prescription medicines have acetaminophen and other ingredients. Using both prescription and OTC acetaminophen for pain can cause you to overdose. Read the labels on your OTC medicines with care. This will help you to clearly know the list of ingredients, how much to take, and any warnings. It may also help you not take too much acetaminophen. If you have questions or do not understand the information, ask your pharmacist or healthcare provider to explain it to you before you take the OTC medicine.  Managing nausea  Some people have an upset stomach after surgery. This is often because of anesthesia, pain, or pain medicine, or the stress of surgery. These tips will help you handle nausea and eat healthy foods as you get better. If you were on a special food plan before surgery, ask your healthcare provider if you should follow it while you get better. These tips may help:  · Do not push yourself to eat. Your body will tell you when to eat and how much.  · Start off with clear liquids and soup. They are easier to digest.  · Next try semi-solid foods, such as mashed potatoes, applesauce, and gelatin, as you feel ready.  · Slowly move to solid foods. Dont eat fatty, rich, or spicy foods at first.  · Do not force yourself to have 3 large meals a day. Instead eat smaller amounts more often.  · Take pain medicines with a small amount of solid food, such as crackers or toast, to avoid nausea.     Call your surgeon if  · You still have pain an hour after taking medicine. The medicine may not be strong enough.  · You feel too sleepy, dizzy, or groggy. The medicine may be too strong.  · You have side  effects like nausea, vomiting, or skin changes, such as rash, itching, or hives.       If you have obstructive sleep apnea  You were given anesthesia medicine during surgery to keep you comfortable and free of pain. After surgery, you may have more apnea spells because of this medicine and other medicines you were given. The spells may last longer than usual.   At home:  · Keep using the continuous positive airway pressure (CPAP) device when you sleep. Unless your healthcare provider tells you not to, use it when you sleep, day or night. CPAP is a common device used to treat obstructive sleep apnea.  · Talk with your provider before taking any pain medicine, muscle relaxants, or sedatives. Your provider will tell you about the possible dangers of taking these medicines.  Date Last Reviewed: 12/1/2016  © 1095-6175 [employer]. [address] [place] [address], [place] [address]. All rights reserved. This information is not intended as a substitute for professional medical care. Always follow your healthcare professional's instructions.

## 2019-03-20 NOTE — H&P
History & Physical - Short Stay  Gastroenterology      SUBJECTIVE:     Procedure: Colonoscopy    Chief Complaint/Indication for Procedure: Abdominal Pain and Change in Bowel Habits    PTA Medications   Medication Sig    albuterol (PROAIR HFA) 90 mcg/actuation inhaler INHALE 2 PUFFS EVERY 4 HOURS AS NEEDED    amLODIPine (NORVASC) 10 MG tablet Take 10 mg by mouth every morning.     cyanocobalamin 500 MCG tablet Take 500 mcg by mouth once daily.    diclofenac sodium (VOLTAREN) 1 % Gel Apply 2 grams  topically 4 (four) times daily. (Patient taking differently: Apply 2 g topically 4 (four) times daily as needed. )    enalapril (VASOTEC) 20 MG tablet Take 20 mg by mouth 2 (two) times daily.     ergocalciferol, vitamin D2, (VITAMIN D ORAL) Take 200 mGy by mouth once daily.    hydrALAZINE (APRESOLINE) 10 MG tablet TAKE 2 TABLETs (20 mg) BY MOUTH 2 TIMES DAILY    [START ON 4/4/2019] HYDROcodone-acetaminophen (NORCO)  mg per tablet Take 1 tablet by mouth 3 (three) times daily as needed for Pain (pain).    multivitamin capsule Take 1 capsule by mouth once daily.    omeprazole (PRILOSEC) 20 MG capsule Take 20 mg by mouth 2 (two) times daily before meals.    pravastatin (PRAVACHOL) 20 MG tablet TAKE 1 TABLET BY MOUTH DAILY, morning    predniSONE (DELTASONE) 5 MG tablet TAKE 2 TABLETS BY MOUTH once daily AS NEEDED    simethicone (MYLICON) 125 MG chewable tablet Take 125 mg by mouth every 6 (six) hours as needed for Flatulence.    tiZANidine (ZANAFLEX) 4 MG tablet TAKE 1 TABLET BY MOUTH EVERY 8 HOURS    travoprost (TRAVATAN Z) 0.004 % Drop 1 drop in both eyes at bedtime    sildenafil (VIAGRA) 100 MG tablet Take 100 mg by mouth.       Review of patient's allergies indicates:   Allergen Reactions    Buspar [buspirone] Hallucinations     Nightmares    Fentanyl Hives and Hallucinations    Amitiza [lubiprostone] Nausea Only and Other (See Comments)     Fatigue.    Plaquenil [hydroxychloroquine]      Nausea,  insomnia, weight loss        Past Medical History:   Diagnosis Date    COPD (chronic obstructive pulmonary disease)     Diverticulosis     DUARTE (dyspnea on exertion)     GERD (gastroesophageal reflux disease)     Glaucoma     Hyperlipidemia     Hypertension     Liver lesion 08/2018    RA (rheumatoid arthritis)     Rectal prolapse     Possible     Past Surgical History:   Procedure Laterality Date    CARPAL TUNNEL RELEASE      Right wrist 2015    COLONOSCOPY  08/2016    Dr. Cardona; in care everywhere    HEMORRHOIDECTOMY, prone N/A 10/18/2018    Performed by Gunnar H[facility] at [facility] OR [address] [place] [address]    THYROID SURGERY      UPPER GASTROINTESTINAL ENDOSCOPY  08/2016    Dr. Cardona; in care everywhere     Family History   Problem Relation Age of Onset    Stomach cancer Paternal Cousin     Stomach cancer Paternal Cousin     Colon cancer Neg Hx     Colon polyps Neg Hx     Crohn's disease Neg Hx     Ulcerative colitis Neg Hx     Esophageal cancer Neg Hx      Social History     Tobacco Use    Smoking status: Never Smoker    Smokeless tobacco: Never Used   Substance Use Topics    Alcohol use: No     Frequency: Never    Drug use: Yes     Types: Hydrocodone         OBJECTIVE:     Vital Signs (Most Recent)  Temp: 97 °F (36.1 °C) (03/20/19 0924)  Pulse: 66 (03/20/19 0924)  Resp: 18 (03/20/19 0924)  BP: (!) 175/82 (03/20/19 0924)  SpO2: 100 % (03/20/19 0924)    Physical Exam:                                                       GENERAL:  Comfortable, in no acute distress.                                 HEENT EXAM:  Nonicteric.  No adenopathy.  Oropharynx is clear.               NECK:  Supple.                                                               LUNGS:  Clear.                                                               CARDIAC:  Regular rate and rhythm.  S1, S2.  No murmur.                      ABDOMEN:  Soft, positive bowel sounds, nontender.  No hepatosplenomegaly or masses.  No rebound or  guarding.                                             EXTREMITIES:  No edema.     MENTAL STATUS:  Normal, alert and oriented.      ASSESSMENT/PLAN:     Assessment: Abdominal Pain and Change in Bowel Habits    Plan: Colonoscopy    Anesthesia Plan: General    ASA Grade: ASA 2 - Patient with mild systemic disease with no functional limitations    MALLAMPATI SCORE:  I (soft palate, uvula, fauces, and tonsillar pillars visible)

## 2019-03-20 NOTE — ANESTHESIA PREPROCEDURE EVALUATION
03/20/2019  Scooter Swan is a 68 y.o., male.    Anesthesia Evaluation      I have reviewed the Medications.   Prednisone    Review of Systems  Anesthesia Hx:  No problems with previous Anesthesia   Social:  Non-Smoker, No Alcohol Use    Cardiovascular:   Hypertension hyperlipidemia    Pulmonary:   COPD    Renal/:  Renal/ Normal     Hepatic/GI:   GERD    Musculoskeletal:   Arthritis (rheumatoid arthritis)     Neurological:  Neurology Normal    Endocrine:  Endocrine Normal        Physical Exam  General:  Well nourished    Airway/Jaw/Neck:  Airway Findings: Mouth Opening: Normal Tongue: Normal  General Airway Assessment: Adult, Average  Mallampati: II  Jaw/Neck Findings:  Neck ROM: Normal ROM       Chest/Lungs:  Chest/Lungs Findings: Clear to auscultation, Normal Respiratory Rate     Heart/Vascular:  Heart Findings: Rate: Normal  Rhythm: Regular Rhythm  Sounds: Normal  Heart murmur: negative       Mental Status:  Mental Status Findings:  Cooperative, Alert and Oriented         Anesthesia Plan  Type of Anesthesia, risks & benefits discussed:  Anesthesia Type:  general  Patient's Preference:   Intra-op Monitoring Plan:   Intra-op Monitoring Plan Comments:   Post Op Pain Control Plan:   Post Op Pain Control Plan Comments:   Induction:   IV  Beta Blocker:  Patient is not currently on a Beta-Blocker (No further documentation required).       Informed Consent: Patient understands risks and agrees with Anesthesia plan.  Questions answered. Anesthesia consent signed with patient.  ASA Score: 3     Day of Surgery Review of History & Physical:        Anesthesia Plan Notes: Propofol general        Ready For Surgery From Anesthesia Perspective.

## 2019-03-20 NOTE — TRANSFER OF CARE
Anesthesia Transfer of Care Note    Patient: Scooter Swan    Procedure(s) Performed: Procedure(s) (LRB):  COLONOSCOPY (N/A)    Patient location: PACU    Anesthesia Type: general    Transport from OR: Transported from OR on room air with adequate spontaneous ventilation    Post pain: adequate analgesia    Post assessment: no apparent anesthetic complications and tolerated procedure well    Post vital signs: stable    Level of consciousness: awake and sedated    Nausea/Vomiting: no nausea/vomiting    Complications: none    Transfer of care protocol was followed      Last vitals:   Visit Vitals  BP (!) 175/82 (BP Location: Right arm, Patient Position: Lying)   Pulse 66   Temp 36.1 °C (97 °F) (Skin)   Resp 18   Ht 6' (1.829 m)   Wt 70.3 kg (155 lb)   SpO2 100%   BMI 21.02 kg/m²

## 2019-03-20 NOTE — [employer] PATIENT INSTRUCTIONS
Discharge Summary/Instructions after an Endoscopic Procedure  Patient Name: Scooter Swan  Patient MRN: [id number]  Patient YOB: 1950  Wednesday, March 20, 2019  Sotero Arthur MD  RESTRICTIONS:  During your procedure today, you received medications for sedation.  These   medications may affect your judgment, balance and coordination.  Therefore,   for 24 hours, you have the following restrictions:   - DO NOT drive a car, operate machinery, make legal/financial decisions,   sign important papers or drink alcohol.    ACTIVITY:  Today: no heavy lifting, straining or running due to procedural   sedation/anesthesia.  The following day: return to full activity including work.  DIET:  Eat and drink normally unless instructed otherwise.     TREATMENT FOR COMMON SIDE EFFECTS:  - Mild abdominal pain, nausea, belching, bloating or excessive gas:  rest,   eat lightly and use a heating pad.  - Sore Throat: treat with throat lozenges and/or gargle with warm salt   water.  - Because air was used during the procedure, expelling large amounts of air   from your rectum or belching is normal.  - If a bowel prep was taken, you may not have a bowel movement for 1-3 days.    This is normal.  SYMPTOMS TO WATCH FOR AND REPORT TO YOUR PHYSICIAN:  1. Abdominal pain or bloating, other than gas cramps.  2. Chest pain.  3. Back pain.  4. Signs of infection such as: chills or fever occurring within 24 hours   after the procedure.  5. Rectal bleeding, which would show as bright red, maroon, or black stools.   (A tablespoon of blood from the rectum is not serious, especially if   hemorrhoids are present.)  6. Vomiting.  7. Weakness or dizziness.  GO DIRECTLY TO THE NEAREST EMERGENCY ROOM IF YOU HAVE ANY OF THE FOLLOWING:      Difficulty breathing              Chills and/or fever over 101 F   Persistent vomiting and/or vomiting blood   Severe abdominal pain   Severe chest pain   Black, tarry stools   Bleeding- more than one  tablespoon   Any other symptom or condition that you feel may need urgent attention  Your doctor recommends these additional instructions:  If any biopsies were taken, your doctors clinic will contact you in 1 to 2   weeks with any results.  Eat a high fiber diet.   Your physician has recommended a repeat colonoscopy in 10 years for   screening purposes.   You are being discharged to home.  For questions, problems or results please call your physician - Sotero Arthur MD at Work:  [phone].  EMERGENCY PHONE NUMBER: [phone], LAB RESULTS: [phone]  IF A COMPLICATION OR EMERGENCY SITUATION ARISES AND YOU ARE UNABLE TO REACH   YOUR PHYSICIAN - GO DIRECTLY TO THE EMERGENCY ROOM.  ___________________________________________  Nurse Signature  ___________________________________________  Patient/Designated Responsible Party Signature  Sotero Arthur MD  3/20/2019 10:10:57 AM  This report has been verified and signed electronically.  [employer]

## 2019-03-20 NOTE — DISCHARGE SUMMARY
Discharge Note  Short Stay      SUMMARY     Admit Date: 3/20/2019    Attending Physician: Sotero Arthur MD     Discharge Physician: Sotero Arthur MD    Discharge Date: 3/20/2019 10:12 AM    Final Diagnosis: Lower abdominal pain [R10.30]    Disposition: HOME OR SELF CARE    Patient Instructions:   Current Discharge Medication List      CONTINUE these medications which have NOT CHANGED    Details   albuterol (PROAIR HFA) 90 mcg/actuation inhaler INHALE 2 PUFFS EVERY 4 HOURS AS NEEDED      amLODIPine (NORVASC) 10 MG tablet Take 10 mg by mouth every morning.       cyanocobalamin 500 MCG tablet Take 500 mcg by mouth once daily.      diclofenac sodium (VOLTAREN) 1 % Gel Apply 2 grams  topically 4 (four) times daily.  Qty: 100 g, Refills: 5    Associated Diagnoses: Rheumatoid arthritis, involving unspecified site, unspecified rheumatoid factor presence; Carpal tunnel syndrome, unspecified laterality; Osteoarthritis of knee, unspecified laterality, unspecified osteoarthritis type      enalapril (VASOTEC) 20 MG tablet Take 20 mg by mouth 2 (two) times daily.       ergocalciferol, vitamin D2, (VITAMIN D ORAL) Take 200 mGy by mouth once daily.      hydrALAZINE (APRESOLINE) 10 MG tablet TAKE 2 TABLETs (20 mg) BY MOUTH 2 TIMES DAILY      HYDROcodone-acetaminophen (NORCO)  mg per tablet Take 1 tablet by mouth 3 (three) times daily as needed for Pain (pain).  Qty: 90 tablet, Refills: 0      multivitamin capsule Take 1 capsule by mouth once daily.      omeprazole (PRILOSEC) 20 MG capsule Take 20 mg by mouth 2 (two) times daily before meals.      pravastatin (PRAVACHOL) 20 MG tablet TAKE 1 TABLET BY MOUTH DAILY, morning      predniSONE (DELTASONE) 5 MG tablet TAKE 2 TABLETS BY MOUTH once daily AS NEEDED  Qty: 60 tablet, Refills: 3    Comments: This prescription was filled on 2/10/2019. Any refills authorized will be placed on file.  Associated Diagnoses: Rheumatoid arthritis, involving unspecified site, unspecified  rheumatoid factor presence; Carpal tunnel syndrome, unspecified laterality; Abdominal pain, unspecified abdominal location      simethicone (MYLICON) 125 MG chewable tablet Take 125 mg by mouth every 6 (six) hours as needed for Flatulence.      tiZANidine (ZANAFLEX) 4 MG tablet TAKE 1 TABLET BY MOUTH EVERY 8 HOURS  Qty: 90 tablet, Refills: 3    Comments: This prescription was filled on 12/27/2018. Any refills authorized will be placed on file.  Associated Diagnoses: Rheumatoid arthritis, involving unspecified site, unspecified rheumatoid factor presence; Carpal tunnel syndrome, unspecified laterality      travoprost (TRAVATAN Z) 0.004 % Drop 1 drop in both eyes at bedtime      sildenafil (VIAGRA) 100 MG tablet Take 100 mg by mouth.             Discharge Procedure Orders (must include Diet, Follow-up, Activity)    Follow Up:  Follow up with PCP as previously scheduled  Resume routine diet.  Activity as tolerated.    No driving day of procedure.

## 2019-03-25 ENCOUNTER — OFFICE VISIT (OUTPATIENT)
Dept: UROLOGY | Facility: CLINIC | Age: 69
End: 2019-03-25
Payer: MEDICARE

## 2019-03-25 VITALS
SYSTOLIC BLOOD PRESSURE: 109 MMHG | DIASTOLIC BLOOD PRESSURE: 62 MMHG | HEIGHT: 72 IN | WEIGHT: 149 LBS | BODY MASS INDEX: 20.18 KG/M2 | HEART RATE: 70 BPM

## 2019-03-25 DIAGNOSIS — R35.1 NOCTURIA MORE THAN TWICE PER NIGHT: ICD-10-CM

## 2019-03-25 DIAGNOSIS — N40.1 ENLARGED PROSTATE WITH URINARY OBSTRUCTION: ICD-10-CM

## 2019-03-25 DIAGNOSIS — N13.8 ENLARGED PROSTATE WITH URINARY OBSTRUCTION: ICD-10-CM

## 2019-03-25 DIAGNOSIS — N28.1 ACQUIRED RENAL CYST: Primary | ICD-10-CM

## 2019-03-25 LAB
BILIRUB SERPL-MCNC: NORMAL MG/DL
BLOOD URINE, POC: NORMAL
COLOR, POC UA: NORMAL
GLUCOSE UR QL STRIP: NORMAL
KETONES UR QL STRIP: NORMAL
LEUKOCYTE ESTERASE URINE, POC: NORMAL
NITRITE, POC UA: NORMAL
PH, POC UA: 6.5
PROTEIN, POC: NORMAL
SPECIFIC GRAVITY, POC UA: 1015
UROBILINOGEN, POC UA: NORMAL

## 2019-03-25 PROCEDURE — 81002 URINALYSIS NONAUTO W/O SCOPE: CPT | Mod: S$GLB,,, | Performed by: UROLOGY

## 2019-03-25 PROCEDURE — 3074F PR MOST RECENT SYSTOLIC BLOOD PRESSURE < 130 MM HG: ICD-10-PCS | Mod: CPTII,S$GLB,, | Performed by: UROLOGY

## 2019-03-25 PROCEDURE — 3078F PR MOST RECENT DIASTOLIC BLOOD PRESSURE < 80 MM HG: ICD-10-PCS | Mod: CPTII,S$GLB,, | Performed by: UROLOGY

## 2019-03-25 PROCEDURE — 3078F DIAST BP <80 MM HG: CPT | Mod: CPTII,S$GLB,, | Performed by: UROLOGY

## 2019-03-25 PROCEDURE — 1100F PR PT FALLS ASSESS DOC 2+ FALLS/FALL W/INJURY/YR: ICD-10-PCS | Mod: CPTII,S$GLB,, | Performed by: UROLOGY

## 2019-03-25 PROCEDURE — 99999 PR PBB SHADOW E&M-EST. PATIENT-LVL III: CPT | Mod: PBBFAC,,, | Performed by: UROLOGY

## 2019-03-25 PROCEDURE — 3288F PR FALLS RISK ASSESSMENT DOCUMENTED: ICD-10-PCS | Mod: CPTII,S$GLB,, | Performed by: UROLOGY

## 2019-03-25 PROCEDURE — 81002 POCT URINE DIPSTICK WITHOUT MICROSCOPE: ICD-10-PCS | Mod: S$GLB,,, | Performed by: UROLOGY

## 2019-03-25 PROCEDURE — 99204 OFFICE O/P NEW MOD 45 MIN: CPT | Mod: 25,S$GLB,, | Performed by: UROLOGY

## 2019-03-25 PROCEDURE — 99204 PR OFFICE/OUTPT VISIT, NEW, LEVL IV, 45-59 MIN: ICD-10-PCS | Mod: 25,S$GLB,, | Performed by: UROLOGY

## 2019-03-25 PROCEDURE — 3074F SYST BP LT 130 MM HG: CPT | Mod: CPTII,S$GLB,, | Performed by: UROLOGY

## 2019-03-25 PROCEDURE — 99999 PR PBB SHADOW E&M-EST. PATIENT-LVL III: ICD-10-PCS | Mod: PBBFAC,,, | Performed by: UROLOGY

## 2019-03-25 PROCEDURE — 1100F PTFALLS ASSESS-DOCD GE2>/YR: CPT | Mod: CPTII,S$GLB,, | Performed by: UROLOGY

## 2019-03-25 PROCEDURE — 3288F FALL RISK ASSESSMENT DOCD: CPT | Mod: CPTII,S$GLB,, | Performed by: UROLOGY

## 2019-03-25 RX ORDER — TAMSULOSIN HYDROCHLORIDE 0.4 MG/1
0.4 CAPSULE ORAL DAILY
Qty: 30 CAPSULE | Refills: 11 | Status: SHIPPED | OUTPATIENT
Start: 2019-03-25 | End: 2020-03-17

## 2019-03-25 RX ORDER — ATENOLOL 50 MG/1
TABLET ORAL
COMMUNITY
Start: 2014-03-22 | End: 2019-07-31

## 2019-03-25 RX ORDER — CYCLOBENZAPRINE HCL 5 MG
5 TABLET ORAL
COMMUNITY
End: 2019-07-31

## 2019-03-25 NOTE — PROGRESS NOTES
Subjective:       Patient ID: Scooter Swan is a 68 y.o. male.    Chief Complaint: Consult (spot on kidney)    HPI     68 year old with mass on right kidney.  He had recent contrast enhanced CT scan and I reviewed the images with him.  There is a benign simple renal cyst on the right kidney.  No enhancing solid mass.  He also complains of worsening nocturia 2-4.  No previous BPH medications.  He denies hematuria and dysuria.  CT scan also mentions thickening bladder wall with intravesical prostate.  He has no family history of prostate cancer.    Urine dipstick shows negative for all components.    Past Medical History:   Diagnosis Date    COPD (chronic obstructive pulmonary disease)     Diverticulosis     DUARTE (dyspnea on exertion)     GERD (gastroesophageal reflux disease)     Glaucoma     Hyperlipidemia     Hypertension     Liver lesion 08/2018    RA (rheumatoid arthritis)     Rectal prolapse     Possible     Past Surgical History:   Procedure Laterality Date    CARPAL TUNNEL RELEASE      Right wrist 2015    COLONOSCOPY  08/2016    Dr. Cardona; in care everywhere    HEMORRHOIDECTOMY, prone N/A 10/18/2018    Performed by Gunnar Horton MD at [facility] OR [address] [place] [address]    THYROID SURGERY      UPPER GASTROINTESTINAL ENDOSCOPY  08/2016    Dr. Cardona; in care everywhere       Current Outpatient Medications:     albuterol (PROAIR HFA) 90 mcg/actuation inhaler, INHALE 2 PUFFS EVERY 4 HOURS AS NEEDED, Disp: , Rfl:     amLODIPine (NORVASC) 10 MG tablet, Take 10 mg by mouth every morning. , Disp: , Rfl:     cyanocobalamin 500 MCG tablet, Take 500 mcg by mouth once daily., Disp: , Rfl:     enalapril (VASOTEC) 20 MG tablet, Take 20 mg by mouth 2 (two) times daily. , Disp: , Rfl:     hydrALAZINE (APRESOLINE) 10 MG tablet, TAKE 2 TABLETs (20 mg) BY MOUTH 2 TIMES DAILY, Disp: , Rfl:     [START ON 4/4/2019] HYDROcodone-acetaminophen (NORCO)  mg per tablet, Take 1 tablet by mouth 3 (three) times daily as needed for  Pain (pain)., Disp: 90 tablet, Rfl: 0    multivitamin capsule, Take 1 capsule by mouth once daily., Disp: , Rfl:     omeprazole (PRILOSEC) 20 MG capsule, Take 20 mg by mouth 2 (two) times daily before meals., Disp: , Rfl:     pravastatin (PRAVACHOL) 20 MG tablet, TAKE 1 TABLET BY MOUTH DAILY, morning, Disp: , Rfl:     predniSONE (DELTASONE) 5 MG tablet, TAKE 2 TABLETS BY MOUTH once daily AS NEEDED, Disp: 60 tablet, Rfl: 3    sildenafil (VIAGRA) 100 MG tablet, Take 100 mg by mouth., Disp: , Rfl:     simethicone (MYLICON) 125 MG chewable tablet, Take 125 mg by mouth every 6 (six) hours as needed for Flatulence., Disp: , Rfl:     tiZANidine (ZANAFLEX) 4 MG tablet, TAKE 1 TABLET BY MOUTH EVERY 8 HOURS, Disp: 90 tablet, Rfl: 3    travoprost (TRAVATAN Z) 0.004 % Drop, 1 drop in both eyes at bedtime, Disp: , Rfl:     atenolol (TENORMIN) 50 MG tablet, TAKE 1 TABLET BY MOUTH DAILY, Disp: , Rfl:     cyclobenzaprine (FLEXERIL) 5 MG tablet, Take 5 mg by mouth., Disp: , Rfl:     diclofenac sodium (VOLTAREN) 1 % Gel, Apply 2 grams  topically 4 (four) times daily. (Patient taking differently: Apply 2 g topically 4 (four) times daily as needed. ), Disp: 100 g, Rfl: 5    ergocalciferol, vitamin D2, (VITAMIN D ORAL), Take 200 mGy by mouth once daily., Disp: , Rfl:     tamsulosin (FLOMAX) 0.4 mg Cap, Take 1 capsule (0.4 mg total) by mouth once daily., Disp: 30 capsule, Rfl: 11  No current facility-administered medications for this visit.     Facility-Administered Medications Ordered in Other Visits:     0.9%  NaCl infusion, , Intravenous, Continuous, Deedee Sarkar NP, Stopped at 10/18/18 1613    mupirocin 2 % ointment, , Nasal, On Call Procedure, Deedee Sarkar NP      Review of Systems   Constitutional: Negative for fever.   Eyes: Negative for visual disturbance.   Respiratory: Negative for shortness of breath.    Cardiovascular: Negative for chest pain.   Gastrointestinal: Negative for nausea.    Genitourinary: Positive for frequency. Negative for dysuria and hematuria.   Musculoskeletal: Negative for gait problem.   Skin: Negative for rash.   Neurological: Negative for seizures.   Psychiatric/Behavioral: Negative for confusion.       Objective:      Physical Exam   Constitutional: He is oriented to person, place, and time. He appears well-developed and well-nourished.   HENT:   Head: Normocephalic and atraumatic.   Eyes: Conjunctivae are normal.   Cardiovascular: Normal rate.   Pulmonary/Chest: Effort normal.   Abdominal: Hernia confirmed negative in the right inguinal area and confirmed negative in the left inguinal area.   Genitourinary: Testes normal and penis normal. Rectal exam shows no mass and anal tone normal. Prostate is enlarged (40g, s/s/a). Prostate is not tender.   Musculoskeletal: Normal range of motion.   Neurological: He is alert and oriented to person, place, and time.   Skin: Skin is warm and dry. No rash noted.   Psychiatric: He has a normal mood and affect.   Vitals reviewed.      Assessment:       1. Acquired renal cyst    2. Enlarged prostate with urinary obstruction    3. Nocturia more than twice per night        Plan:       Acquired renal cyst  -     POCT URINE DIPSTICK WITHOUT MICROSCOPE    Enlarged prostate with urinary obstruction    Nocturia more than twice per night    Other orders  -     tamsulosin (FLOMAX) 0.4 mg Cap; Take 1 capsule (0.4 mg total) by mouth once daily.  Dispense: 30 capsule; Refill: 11      Reassurance.  No treatment for renal cyst recommended.  Trial Flomax for nocturia.

## 2019-04-02 ENCOUNTER — OFFICE VISIT (OUTPATIENT)
Dept: OPHTHALMOLOGY | Facility: CLINIC | Age: 69
End: 2019-04-02
Payer: MEDICARE

## 2019-04-02 DIAGNOSIS — H40.1132 PRIMARY OPEN ANGLE GLAUCOMA (POAG) OF BOTH EYES, MODERATE STAGE: ICD-10-CM

## 2019-04-02 DIAGNOSIS — H05.20 EXOPHTHALMOS OF BOTH EYES: ICD-10-CM

## 2019-04-02 DIAGNOSIS — H25.13 AGE-RELATED NUCLEAR CATARACT OF BOTH EYES: Primary | ICD-10-CM

## 2019-04-02 PROCEDURE — 92004 COMPRE OPH EXAM NEW PT 1/>: CPT | Mod: S$GLB,,, | Performed by: OPHTHALMOLOGY

## 2019-04-02 PROCEDURE — 99999 PR PBB SHADOW E&M-EST. PATIENT-LVL III: ICD-10-PCS | Mod: PBBFAC,,, | Performed by: OPHTHALMOLOGY

## 2019-04-02 PROCEDURE — 99999 PR PBB SHADOW E&M-EST. PATIENT-LVL III: CPT | Mod: PBBFAC,,, | Performed by: OPHTHALMOLOGY

## 2019-04-02 PROCEDURE — 92004 PR EYE EXAM, NEW PATIENT,COMPREHESV: ICD-10-PCS | Mod: S$GLB,,, | Performed by: OPHTHALMOLOGY

## 2019-04-02 RX ORDER — DORZOLAMIDE HYDROCHLORIDE AND TIMOLOL MALEATE 20; 5 MG/ML; MG/ML
1 SOLUTION/ DROPS OPHTHALMIC 2 TIMES DAILY
Qty: 10 ML | Refills: 11 | Status: SHIPPED | OUTPATIENT
Start: 2019-04-02 | End: 2020-06-23

## 2019-04-02 RX ORDER — TRAVOPROST OPHTHALMIC SOLUTION 0.04 MG/ML
1 SOLUTION OPHTHALMIC NIGHTLY
Qty: 5 ML | Refills: 12 | Status: SHIPPED | OUTPATIENT
Start: 2019-04-02

## 2019-04-02 NOTE — PROGRESS NOTES
HPI     Blurred Vision      Additional comments: blurrred va at time x not often//              Glaucoma      Additional comments: glaucoma travatan Z QHS OU //              Comments     blurrred va at time x not often//  No flashes// pos for line floaters x a   while//  glaucoma travatan Z QHS OU //  Will get signed release today.  Saw a Dr 1   time an was dx with glaucoma and had vf done//  Exopthalmos??  Had in past, had sx on thyroid, use to be worse per pt   better now since thyroid sx//          Last edited by Marli Gusman on 4/2/2019 10:00 AM. (History)        ROS     Negative for: Constitutional, Gastrointestinal, Neurological, Skin,   Genitourinary, Musculoskeletal, HENT, Endocrine, Cardiovascular, Eyes,   Respiratory, Psychiatric, Allergic/Imm, Heme/Lymph    Last edited by Lucien Alas Jr., MD on 4/2/2019 10:38 AM. (History)        Assessment /Plan     For exam results, see Encounter Report.    Age-related nuclear cataract of both eyes  -no decrease in ADLS, no significant glare, and functionality is satisfactory  -counseled on what to expect if the cataracts worsening and how it can effect the vision  -continue to monitor and if vision changes patient can call for another appointment    Primary open angle glaucoma (POAG) of both eyes, moderate stage- elevated C:D ratio   -     dorzolamide-timolol 2-0.5% (COSOPT) 22.3-6.8 mg/mL ophthalmic solution; Place 1 drop into both eyes 2 (two) times daily.  Dispense: 10 mL; Refill: 11  -     travoprost (TRAVATAN Z) 0.004 % ophthalmic solution; Place 1 drop into both eyes every evening.  Dispense: 5 mL; Refill: 12  Schedule HVF and OCT ON pachy  Gonio  Follow up in about 4 months (around 8/2/2019) for hvf, oct on, gonio, pachy.    Exophthalmos of both eyes- No RAPD, no corneal decompensation  Refresh 4x daily OU

## 2019-04-02 NOTE — PATIENT INSTRUCTIONS
What Are Cataracts?    A clear lens in the eye focuses light. This lets the eye see images sharply. With age, the lens slowly becomes cloudy. The cloudy lens is a cataract. A cataract scatters light and makes it hard for the eye to focus. Cataracts often form in both eyes. But one lens may cloud faster than the other.  The aging of your lens  Your lens may cloud so slowly that you don`t notice any vision changes at first. But as the cataract gets worse, the eye has a harder time focusing. In early stages, glasses may help you see better. As the lens gets more cloudy, your doctor may recommend surgery to restore your vision.  A clear lens allows your eye to bring objects sharply into focus.  A mild cataract may slightly blur your vision.  A dense cataract can severely blur your vision.  Date Last Reviewed: 6/14/2015  © 8236-6045 [employer]. [address] [place] [address], [place] [address]. All rights reserved. This information is not intended as a substitute for professional medical care. Always follow your healthcare professional's instructions.

## 2019-04-23 ENCOUNTER — PROCEDURE VISIT (OUTPATIENT)
Dept: HEPATOLOGY | Facility: CLINIC | Age: 69
End: 2019-04-23
Attending: INTERNAL MEDICINE
Payer: MEDICARE

## 2019-04-23 ENCOUNTER — OFFICE VISIT (OUTPATIENT)
Dept: HEPATOLOGY | Facility: CLINIC | Age: 69
End: 2019-04-23
Payer: MEDICARE

## 2019-04-23 ENCOUNTER — TELEPHONE (OUTPATIENT)
Dept: GASTROENTEROLOGY | Facility: CLINIC | Age: 69
End: 2019-04-23

## 2019-04-23 VITALS
DIASTOLIC BLOOD PRESSURE: 70 MMHG | HEIGHT: 73 IN | HEART RATE: 72 BPM | TEMPERATURE: 98 F | WEIGHT: 149.06 LBS | OXYGEN SATURATION: 99 % | RESPIRATION RATE: 18 BRPM | BODY MASS INDEX: 19.75 KG/M2 | SYSTOLIC BLOOD PRESSURE: 136 MMHG

## 2019-04-23 DIAGNOSIS — K73.8 OTHER CHRONIC HEPATITIS, NOT ELSEWHERE CLASSIFIED: ICD-10-CM

## 2019-04-23 DIAGNOSIS — K76.9 LIVER LESION: Primary | ICD-10-CM

## 2019-04-23 DIAGNOSIS — K76.9 LIVER LESION: ICD-10-CM

## 2019-04-23 PROCEDURE — 3075F PR MOST RECENT SYSTOLIC BLOOD PRESS GE 130-139MM HG: ICD-10-PCS | Mod: CPTII,S$GLB,, | Performed by: INTERNAL MEDICINE

## 2019-04-23 PROCEDURE — 3288F FALL RISK ASSESSMENT DOCD: CPT | Mod: CPTII,S$GLB,, | Performed by: INTERNAL MEDICINE

## 2019-04-23 PROCEDURE — 99499 UNLISTED E&M SERVICE: CPT | Mod: S$GLB,,, | Performed by: INTERNAL MEDICINE

## 2019-04-23 PROCEDURE — 91200 PR LIVER ELASTOGRAPHY W/OUT IMAG W/INTERP & REPORT: ICD-10-PCS | Mod: S$GLB,,, | Performed by: INTERNAL MEDICINE

## 2019-04-23 PROCEDURE — 99499 RISK ADDL DX/OHS AUDIT: ICD-10-PCS | Mod: S$GLB,,, | Performed by: INTERNAL MEDICINE

## 2019-04-23 PROCEDURE — 1100F PR PT FALLS ASSESS DOC 2+ FALLS/FALL W/INJURY/YR: ICD-10-PCS | Mod: CPTII,S$GLB,, | Performed by: INTERNAL MEDICINE

## 2019-04-23 PROCEDURE — 3288F PR FALLS RISK ASSESSMENT DOCUMENTED: ICD-10-PCS | Mod: CPTII,S$GLB,, | Performed by: INTERNAL MEDICINE

## 2019-04-23 PROCEDURE — 1100F PTFALLS ASSESS-DOCD GE2>/YR: CPT | Mod: CPTII,S$GLB,, | Performed by: INTERNAL MEDICINE

## 2019-04-23 PROCEDURE — 99999 PR PBB SHADOW E&M-EST. PATIENT-LVL V: CPT | Mod: PBBFAC,,, | Performed by: INTERNAL MEDICINE

## 2019-04-23 PROCEDURE — 91200 LIVER ELASTOGRAPHY: CPT | Mod: S$GLB,,, | Performed by: INTERNAL MEDICINE

## 2019-04-23 PROCEDURE — 3078F PR MOST RECENT DIASTOLIC BLOOD PRESSURE < 80 MM HG: ICD-10-PCS | Mod: CPTII,S$GLB,, | Performed by: INTERNAL MEDICINE

## 2019-04-23 PROCEDURE — 3078F DIAST BP <80 MM HG: CPT | Mod: CPTII,S$GLB,, | Performed by: INTERNAL MEDICINE

## 2019-04-23 PROCEDURE — 99999 PR PBB SHADOW E&M-EST. PATIENT-LVL V: ICD-10-PCS | Mod: PBBFAC,,, | Performed by: INTERNAL MEDICINE

## 2019-04-23 PROCEDURE — 99204 PR OFFICE/OUTPT VISIT, NEW, LEVL IV, 45-59 MIN: ICD-10-PCS | Mod: S$GLB,,, | Performed by: INTERNAL MEDICINE

## 2019-04-23 PROCEDURE — 99204 OFFICE O/P NEW MOD 45 MIN: CPT | Mod: S$GLB,,, | Performed by: INTERNAL MEDICINE

## 2019-04-23 PROCEDURE — 3075F SYST BP GE 130 - 139MM HG: CPT | Mod: CPTII,S$GLB,, | Performed by: INTERNAL MEDICINE

## 2019-04-23 NOTE — PATIENT INSTRUCTIONS
Given history of hepatitis C, we will perform fibroscan to determine if there is any scar tissue in the liver.    Recent liver tests are normal.      Fibroscan suggests some damage of the liver.  We will proceed with MRI (schedule within 6 weeks).    Labs for same day as MRI    Return to clinic based on results

## 2019-04-23 NOTE — PROGRESS NOTES
Hepatology Consult Note    Referring provider: Hannah Huang    Chief complaint: liver mass     HPI:  Scooter Swan is a 68 y.o. male that presents to hepatology clinic for consultation of abnormal abdominal CT.  He is accompanied by his wife    Liver mass first detected: incidentally 2/2019 in evaluation of abdominal pain  Diagnosis of mass: unclear, thought to be altered perfusion  History of chronic liver disease: history of hepatitis C.  Patient reports incidentally diagnosed with hepatitis C 3-4 years ago.  Never had symptoms of chronic liver disease.  Treated successfully with Harvoni and has evidence of negative viremia from 5/2018 consistent with cure.  Exposure is unknown but does report remote homemade piercing and tattoo.  No blood transfusions or illicit drug use  Abnormal liver tests: none   Risk factors for SARKAR: hypertension, hyperlipidemia  Medication list reviewed.    Other medical conditions:  RA - plaquenil (discontinued due to weight loss); no history of methotrexate use - intermittent steroid use       Patient Active Problem List   Diagnosis    Hemorrhoids    Hyperlipidemia    Resistant hypertension    RA (rheumatoid arthritis)    Lower abdominal pain    Other constipation    Thrombocytopenia    Abdominal pain       Past Medical History:   Diagnosis Date    Cataract     COPD (chronic obstructive pulmonary disease)     Diverticulosis     DUARTE (dyspnea on exertion)     GERD (gastroesophageal reflux disease)     Glaucoma     Hyperlipidemia     Hypertension     Liver lesion 08/2018    RA (rheumatoid arthritis)     Rectal prolapse     Possible       Past Surgical History:   Procedure Laterality Date    CARPAL TUNNEL RELEASE      Right wrist 2015    COLONOSCOPY  08/2016    Dr. Cardona; in care everywhere    COLONOSCOPY N/A 3/20/2019    [facility] at [facility] ENDO    HEMORRHOIDECTOMY, prone N/A 10/18/2018    Per[facility]ar [facility]  FLR    THYROID SURGERY      UPPER GASTROINTESTINAL ENDOSCOPY  08/2016    Dr. Cardona; in care everywhere   thyroid surgery at age 18    Family History   Problem Relation Age of Onset    Stomach cancer Paternal Cousin     Stomach cancer Paternal Cousin     Cataracts Mother     Diabetes Mother     Hypertension Mother     Stroke Mother     Diabetes Sister     Hypertension Sister     Stroke Sister     Diabetes Brother     Glaucoma Brother     Hypertension Brother     Stroke Brother     Diabetes Maternal Aunt     Hypertension Maternal Aunt     Stroke Maternal Aunt     Diabetes Maternal Uncle     Hypertension Maternal Uncle     Stroke Maternal Uncle     Diabetes Maternal Grandmother     Hypertension Maternal Grandmother     Stroke Maternal Grandmother     Cancer Cousin         stomach    Colon cancer Neg Hx     Colon polyps Neg Hx     Crohn's disease Neg Hx     Ulcerative colitis Neg Hx     Esophageal cancer Neg Hx     Amblyopia Neg Hx     Blindness Neg Hx     Macular degeneration Neg Hx     Retinal detachment Neg Hx     Strabismus Neg Hx     Thyroid disease Neg Hx        Social History     Socioeconomic History    Marital status:      Spouse name: Not on file    Number of children: Not on file    Years of education: Not on file    Highest education level: Not on file   Occupational History    Not on file   Social Needs    Financial resource strain: Not on file    Food insecurity:     Worry: Not on file     Inability: Not on file    Transportation needs:     Medical: Not on file     Non-medical: Not on file   Tobacco Use    Smoking status: Never Smoker    Smokeless tobacco: Never Used   Substance and Sexual Activity    Alcohol use: No     Frequency: Never    Drug use: Yes     Types: Hydrocodone    Sexual activity: Not on file   Lifestyle    Physical activity:     Days per week: Not on file     Minutes per session: Not on file    Stress: Not on file   Relationships     Social connections:     Talks on phone: Not on file     Gets together: Not on file     Attends Sabianist service: Not on file     Active member of club or organization: Not on file     Attends meetings of clubs or organizations: Not on file     Relationship status: Not on file   Other Topics Concern    Not on file   Social History Narrative    Not on file   resides with wife and son  Disability due to RA   No tobacco use  No alcohol use   No illicit drugs     Current Outpatient Medications   Medication Sig Dispense Refill    albuterol (PROAIR HFA) 90 mcg/actuation inhaler INHALE 2 PUFFS EVERY 4 HOURS AS NEEDED      amLODIPine (NORVASC) 10 MG tablet Take 10 mg by mouth every morning.       atenolol (TENORMIN) 50 MG tablet TAKE 1 TABLET BY MOUTH DAILY      cyanocobalamin 500 MCG tablet Take 500 mcg by mouth once daily.      cyclobenzaprine (FLEXERIL) 5 MG tablet Take 5 mg by mouth.      diclofenac sodium (VOLTAREN) 1 % Gel Apply 2 grams  topically 4 (four) times daily. (Patient taking differently: Apply 2 g topically 4 (four) times daily as needed. ) 100 g 5    dorzolamide-timolol 2-0.5% (COSOPT) 22.3-6.8 mg/mL ophthalmic solution Place 1 drop into both eyes 2 (two) times daily. 10 mL 11    enalapril (VASOTEC) 20 MG tablet Take 20 mg by mouth 2 (two) times daily.       ergocalciferol, vitamin D2, (VITAMIN D ORAL) Take 200 mGy by mouth once daily.      hydrALAZINE (APRESOLINE) 10 MG tablet TAKE 2 TABLETs (20 mg) BY MOUTH 2 TIMES DAILY      HYDROcodone-acetaminophen (NORCO)  mg per tablet Take 1 tablet by mouth 3 (three) times daily as needed for Pain (pain). 90 tablet 0    multivitamin capsule Take 1 capsule by mouth once daily.      omeprazole (PRILOSEC) 20 MG capsule Take 20 mg by mouth 2 (two) times daily before meals.      pravastatin (PRAVACHOL) 20 MG tablet TAKE 1 TABLET BY MOUTH DAILY, morning      predniSONE (DELTASONE) 5 MG tablet TAKE 2 TABLETS BY MOUTH once daily AS NEEDED 60  "tablet 3    sildenafil (VIAGRA) 100 MG tablet Take 100 mg by mouth.      simethicone (MYLICON) 125 MG chewable tablet Take 125 mg by mouth every 6 (six) hours as needed for Flatulence.      tamsulosin (FLOMAX) 0.4 mg Cap Take 1 capsule (0.4 mg total) by mouth once daily. 30 capsule 11    tiZANidine (ZANAFLEX) 4 MG tablet TAKE 1 TABLET BY MOUTH EVERY 8 HOURS 90 tablet 3    travoprost (TRAVATAN Z) 0.004 % ophthalmic solution Place 1 drop into both eyes every evening. 5 mL 12     No current facility-administered medications for this visit.      Facility-Administered Medications Ordered in Other Visits   Medication Dose Route Frequency Provider Last Rate Last Dose    0.9%  NaCl infusion   Intravenous Continuous Deedee Sarkar NP   Stopped at 10/18/18 1613    mupirocin 2 % ointment   Nasal On Call Procedure Deedee Sarkar NP           Review of patient's allergies indicates:   Allergen Reactions    Buspar [buspirone] Hallucinations     Nightmares    Fentanyl Hives and Hallucinations    Amitiza [lubiprostone] Nausea Only and Other (See Comments)     Fatigue.    Plaquenil [hydroxychloroquine]      Nausea, insomnia, weight loss       Review of Systems   Constitutional: Negative for chills, fever, malaise/fatigue and weight loss.   Eyes: Negative.    Respiratory: Negative for cough and shortness of breath.    Cardiovascular: Negative for chest pain and leg swelling.   Gastrointestinal: Positive for abdominal pain. Negative for heartburn, nausea and vomiting.   Musculoskeletal: Positive for joint pain. Negative for myalgias.   Skin: Negative for itching and rash.   Neurological: Negative for dizziness, focal weakness and headaches.   Endo/Heme/Allergies: Does not bruise/bleed easily.   Psychiatric/Behavioral: Negative for depression.       Vitals:    04/23/19 1338   BP: 136/70   Pulse: 72   Resp: 18   Temp: 97.8 °F (36.6 °C)   TempSrc: Oral   SpO2: 99%   Weight: 67.6 kg (149 lb 0.5 oz)   Height: 6' 1" " (1.854 m)       Physical Exam   Constitutional: He is oriented to person, place, and time. He appears well-developed and well-nourished. No distress.   Thin male   HENT:   Head: Normocephalic and atraumatic.   Mouth/Throat: Oropharynx is clear and moist. No oropharyngeal exudate.   Eyes: Pupils are equal, round, and reactive to light. Conjunctivae are normal. No scleral icterus.   Neck: Normal range of motion. Neck supple. No thyromegaly present.   Cardiovascular: Normal rate, regular rhythm and normal heart sounds. Exam reveals no gallop and no friction rub.   No murmur heard.  Pulmonary/Chest: Effort normal and breath sounds normal. No respiratory distress. He has no wheezes. He has no rales.   Abdominal: Soft. Bowel sounds are normal. He exhibits no distension. There is no tenderness.   Musculoskeletal: Normal range of motion. He exhibits no edema.   Lymphadenopathy:     He has no cervical adenopathy.   Neurological: He is alert and oriented to person, place, and time. No cranial nerve deficit.   Skin: Skin is warm and dry. No erythema.   Psychiatric: He has a normal mood and affect. His behavior is normal.   Vitals reviewed.      Lab Results   Component Value Date    ALT 7 (L) 02/11/2019    AST 18 02/11/2019    ALKPHOS 51 (L) 02/11/2019    BILITOT 0.7 02/11/2019       Lab Results   Component Value Date    WBC 10.32 02/11/2019    HGB 11.2 (L) 02/11/2019    HCT 36.3 (L) 02/11/2019    MCV 93 02/11/2019     (L) 02/11/2019       Lab Results   Component Value Date     02/11/2019    K 4.4 02/11/2019     02/11/2019    CO2 28 02/11/2019    BUN 19 02/11/2019    CREATININE 1.2 02/11/2019    CALCIUM 10.2 02/11/2019    ANIONGAP 11 02/11/2019    ESTGFRAFRICA >60.0 02/11/2019    EGFRNONAA >60.0 02/11/2019       Imaging: EMR and external imaging available reviewed     Assessment:  68 y.o. male presenting with abnormal liver imaging, no focal masses identified.  Given history of chronic hep C infection, will  perform fibroscan for fibrosis staging.      Plan:  *  fibroscan for fibrosis staging, suggests F3 fibrosis  *  Liver tests are normal without evidence of chronic liver disease or portal hypertension   *  Proceed with MRI given that there is evidence of advanced fibrosis    RTC based on MRI results

## 2019-04-23 NOTE — TELEPHONE ENCOUNTER
Patient was recommend to have EGD to further evaluate GERD, history of liver disease, and CT scan findings. Please schedule patient for EGD if not done so already. Patient is established with Dr. Arthur.  Thanks  [facility] [place]

## 2019-04-23 NOTE — LETTER
April 23, 2019      Hannah Huang, FNP  [address] [facility] [address] [place] [address]           [facility] - Hepatology  [address]  [facility] [address]  Phone: [phone]  Fax: [phone]          Patient: Scooter Swan   MR Number: [id number]   YOB: 1950   Date of Visit: 4/23/2019       Dear Hannah Huang:    Thank you for referring Scooter Swan to me for evaluation. Attached you will find relevant portions of my assessment and plan of care.    If you have questions, please do not hesitate to call me. I look forward to following Scooter Swan along with you.    Sincerely,    Corie Carr MD    Enclosure  CC:  No Recipients    If you would like to receive this communication electronically, please contact [email or web address] or [phone] to request more information on [employer] access.    For providers and/or their staff who would like to refer a patient to [facility], please contact us through our one-stop-shop provider referral line, [facility], at [phone].    If you feel you have received this communication in error or would no longer like to receive these types of communications, please e-mail [email or web address]

## 2019-04-23 NOTE — PROCEDURES
Procedures     Fibroscan Procedure     Name: Scooter Swan  Date of Procedure : 2019    :: Corie Carr MD  Diagnosis: HCV    Probe: M    Fibroscan readin.0 KPa    Fibrosis:F3     CAP readin dB/m    Steatosis: :S0

## 2019-04-24 NOTE — TELEPHONE ENCOUNTER
Pt has been scheduled for EGD on 5/29. Reviewed instructions. Pt is aware I will be mailing instructions and confirmation letter.

## 2019-04-30 ENCOUNTER — TELEPHONE (OUTPATIENT)
Dept: GASTROENTEROLOGY | Facility: CLINIC | Age: 69
End: 2019-04-30

## 2019-04-30 NOTE — PATIENT INSTRUCTIONS
Excess Gas  Certain foods produce gas when digested. In some people, these foods make an excessive amount of gas. This may cause bloating, burping, or increased gas passing through the rectum (flatulence).     Foods that cause gas  The following foods are more likely to cause this problem. Limit them, or remove them from your diet:  · Broccoli  · Cauliflower  · [place] sprouts  · Cabbage  · Cooked dried beans  · Fizzy (carbonated) drinks, such as sparkling water, soda, beer, and champagne  Other causes  Other causes of excess gas include:  · Eating too fast or talking while you chew. This may cause you to swallow air. This increases the amount of gas in your stomach. And it may make your symptoms worse. Chew each mouthful completely before you swallow. Take your time.  · Chewing on gum or sucking on hard candy. These cause you to swallow more often. And some of what you are swallowing is air. This leads to more gas in your stomach. Avoid chewing gum and hard candy.  · Overeating. This may increase the feeling of being bloated and cause more gas. When you are full, stop eating.   · Being constipated. This can increase the amount of normal intestinal gas. Avoid constipation by getting more fiber in your diet. Good sources of fiber include whole-grain cereal, fresh vegetables (except those in the above list), and fresh fruits. High-fiber foods absorb water and carry it out of the body. When adding more fiber to your diet, you also need to drink more water. You should drink at least 8, 8-ounce glasses of water (2 quarts) per day.  Date Last Reviewed: 8/1/2016  © 6387-9143 [employer]. [address] [place] [address], [place] [address]. All rights reserved. This information is not intended as a substitute for professional medical care. Always follow your healthcare professional's instructions.

## 2019-04-30 NOTE — TELEPHONE ENCOUNTER
----- Message from Tanya Cordero sent at 4/30/2019 11:33 AM CDT -----  Type: Needs Medical Advice    Who Called:  Jacqueline Miken - spouse  Symptoms (please be specific):  Problems with stomach/trapped gas  How long has patient had these symptoms:  months  Pharmacy name and phone #:    [id number] - [place] - [facility] [address] AT [facility] [address]  [address] [place] [address]  Phone: [phone] Fax: [phone]  Best Call Back Number:[phone]  Additional Information: contact to advise if something can be sent to the pharmacy or if he can purchase something over the counter better than gas x

## 2019-04-30 NOTE — TELEPHONE ENCOUNTER
I recommend to continue with EGD as scheduled. I attached some lifestyle modifications to help prevent excessive gas and it is below:  - recommended OTC simethicone as directed, such as Phazyme  -discussed with patient about low gas diet: Reduce or eliminate these foods from your diet: Broccoli, Cauliflower, [place] sprouts, Cabbage, Cooked dried beans, Carbonated beverages (sparkling water, soda, beer, champagne)  Other Causes Of Excess Gas Include:   1) EATING TOO FAST or TALKING WHILE YOU CHEW may cause you to swallow air. This increases the amount of gas in the stomach and may worsen your symptoms.  --> Chew each mouthful completely before swallowing. Take your time.  2) OVEREATING may increase the feeling of being bloated and cause more gas.  --> When you are full, stop eating.  3) CONSTIPATION can increase the amount of normal intestinal gas.  --> Avoid constipation by increasing the amount of fiber in your diet by including whole cereal grains, fresh vegetables (except those in the above list) and fresh fruits. High-fiber foods absorb water and carry it out of the body. When increasing the amount of fiber in your diet, you also need to increase the amount of water that you drink. You should drink at least eight 8-ounce glasses of water (two quarts) per day.  If symptoms persist, recommend patient follow-up in clinic.  Thanks  TERESA

## 2019-05-01 ENCOUNTER — TELEPHONE (OUTPATIENT)
Dept: GASTROENTEROLOGY | Facility: CLINIC | Age: 69
End: 2019-05-01

## 2019-05-01 NOTE — TELEPHONE ENCOUNTER
----- Message from Devi Higginbotham LPN sent at 4/30/2019  4:50 PM CDT -----  Contact: [phone]      ----- Message -----  From: Stormy Espino  Sent: 4/30/2019   4:00 PM  To: Sal SHERIDAN Staff    Patient is returning nurse's phone call.  Please call patient back at [phone].

## 2019-05-01 NOTE — TELEPHONE ENCOUNTER
Spoke with pt and informed of recommendations per Ly Huang NP. Pt stated OTC medications not working and he follows a diet which shouldn't contribute to the gas. Educational information sent out to address on file. Pt has made a follow up appt and is wondering if he could try a probiotic. Please advise. Thanks

## 2019-05-01 NOTE — TELEPHONE ENCOUNTER
Called pt and informed that Ly did say it was fine to try the OTC probiotic. Pt verbalized understanding.

## 2019-05-07 NOTE — TELEPHONE ENCOUNTER
----- Message from Janny Anguiano sent at 5/7/2019 10:49 AM CDT -----  Contact: self  Type:  RX Refill Request    Who Called:  self  Refill or New Rx:  new  RX Name and Strength:  Norco 10-325mg  How is the patient currently taking it? (ex. 1XDay):  3Xday  Is this a 30 day or 90 day RX:  30  Preferred Pharmacy with phone number:  [facility]  Local or Mail Order:  local  Ordering Provider:  Dr Morales  [facility] Call Back Number:  [phone]  Additional Information:    [facility] [address]  [place] [facility] [address] [facility] [address]  [address]  [place] [address]  Phone: [phone] Fax: [phone]

## 2019-05-07 NOTE — TELEPHONE ENCOUNTER
----- Message from Janny Anguiano sent at 5/7/2019 10:52 AM CDT -----  Contact: Jacqueline lima  Type:  RX Refill Request    Who Called:  wifeJacqueline  Refill or New Rx:  new  RX Name and Strength:  Norco 10-325mg  How is the patient currently taking it? (ex. 1XDay):  3Xday  Is this a 30 day or 90 day RX:  30  Preferred Pharmacy with phone number:  [facility]  Local or Mail Order:  local  Ordering Provider:  Dr Andrew Larsen Call Back Number:  [phone]  Additional Information:    [facility] [id number]  [place] [facility]  [place] [address] [facility] [address]  [address] [place] [address]  Phone: [phone] Fax: [phone]

## 2019-05-09 RX ORDER — HYDROCODONE BITARTRATE AND ACETAMINOPHEN 10; 325 MG/1; MG/1
1 TABLET ORAL EVERY 8 HOURS PRN
Qty: 90 TABLET | Refills: 0 | Status: SHIPPED | OUTPATIENT
Start: 2019-05-09 | End: 2019-06-06 | Stop reason: SDUPTHER

## 2019-05-27 ENCOUNTER — TELEPHONE (OUTPATIENT)
Dept: GASTROENTEROLOGY | Facility: CLINIC | Age: 69
End: 2019-05-27

## 2019-05-27 RX ORDER — ENALAPRIL MALEATE 20 MG/1
TABLET ORAL
Qty: 180 TABLET | Refills: 3 | Status: SHIPPED | OUTPATIENT
Start: 2019-05-27 | End: 2019-06-22 | Stop reason: SDUPTHER

## 2019-05-27 NOTE — TELEPHONE ENCOUNTER
----- Message from Eliel Wiley sent at 5/27/2019  2:55 PM CDT -----  Type: Needs Medical Advice    Who Called:  Wife  Best Call Back Number: [phone] (home)   Additional Information: Needs to cancel procedure on 5.29 - please call to advise

## 2019-05-27 NOTE — TELEPHONE ENCOUNTER
Pt's wife had surgery he cannot leave her alone, procedure cx. Pt will call back to reschedule at a later date

## 2019-06-06 NOTE — TELEPHONE ENCOUNTER
----- Message from Janny Anguiano sent at 6/6/2019  1:04 PM CDT -----  Contact: WifeJacqueline  Type:  RX Refill Request    Who Called:  WifeJacqueline  Refill or New Rx:  refill  RX Name and Strength:  HYDROcodone-acetaminophen (NORCO)  mg per tablet  How is the patient currently taking it? (ex. 1XDay):  3Xday  Is this a 30 day or 90 day RX:  30  Preferred Pharmacy with phone number:  [facility]  Local or Mail Order:  local  Ordering Provider:  Dr Andrew Larsen Call Back Number:  [phone]  Additional Information:  Please call patient. Thanks!    [facility] [address]  [place]  [address] [facility] [address]  [place] [address]  Phone: [phone] Fax: [phone]

## 2019-06-12 RX ORDER — HYDROCODONE BITARTRATE AND ACETAMINOPHEN 10; 325 MG/1; MG/1
1 TABLET ORAL EVERY 8 HOURS PRN
Qty: 90 TABLET | Refills: 0 | Status: SHIPPED | OUTPATIENT
Start: 2019-06-12 | End: 2019-07-08 | Stop reason: SDUPTHER

## 2019-06-24 ENCOUNTER — TELEPHONE (OUTPATIENT)
Dept: HEPATOLOGY | Facility: CLINIC | Age: 69
End: 2019-06-24

## 2019-06-24 ENCOUNTER — HOSPITAL ENCOUNTER (OUTPATIENT)
Dept: RADIOLOGY | Facility: HOSPITAL | Age: 69
Discharge: HOME | End: 2019-06-24
Attending: INTERNAL MEDICINE
Payer: MEDICARE

## 2019-06-24 DIAGNOSIS — K76.9 LIVER LESION: Primary | ICD-10-CM

## 2019-06-24 DIAGNOSIS — K76.9 LIVER LESION: ICD-10-CM

## 2019-06-24 PROCEDURE — 74183 MRI ABDOMEN W WO CONTRAST: ICD-10-PCS | Mod: 26,,, | Performed by: RADIOLOGY

## 2019-06-24 PROCEDURE — 74183 MRI ABD W/O CNTR FLWD CNTR: CPT | Mod: TC,PO

## 2019-06-24 PROCEDURE — 74183 MRI ABD W/O CNTR FLWD CNTR: CPT | Mod: 26,,, | Performed by: RADIOLOGY

## 2019-06-24 PROCEDURE — 25500020 PHARM REV CODE 255: Mod: PO | Performed by: INTERNAL MEDICINE

## 2019-06-24 PROCEDURE — A9585 GADOBUTROL INJECTION: HCPCS | Mod: PO | Performed by: INTERNAL MEDICINE

## 2019-06-24 RX ORDER — GADOBUTROL 604.72 MG/ML
7 INJECTION INTRAVENOUS
Status: COMPLETED | OUTPATIENT
Start: 2019-06-24 | End: 2019-06-24

## 2019-06-24 RX ADMIN — GADOBUTROL 7 ML: 604.72 INJECTION INTRAVENOUS at 11:06

## 2019-06-24 NOTE — TELEPHONE ENCOUNTER
Patient called and message relayed to the patient from Dr Bui.  Patient agreed to do the scan.  CT Scan Abd scheduled for Tues 7/2/19 at 10:45 am at [facility] location. Instructed to fast (do not eat) 4 hrs before Scan and arrive 1 hr early to drink the prep.  Appt letter placed in the mail.

## 2019-06-24 NOTE — TELEPHONE ENCOUNTER
Repeat ct scan to be scheduled since MRI did not show abnormality previously seen. Schedule and Please let pt know

## 2019-06-26 ENCOUNTER — TELEPHONE (OUTPATIENT)
Dept: RHEUMATOLOGY | Facility: CLINIC | Age: 69
End: 2019-06-26

## 2019-06-26 RX ORDER — ENALAPRIL MALEATE 20 MG/1
TABLET ORAL
Qty: 60 TABLET | Refills: 0 | Status: SHIPPED | OUTPATIENT
Start: 2019-06-26 | End: 2019-07-31 | Stop reason: SDUPTHER

## 2019-06-26 NOTE — TELEPHONE ENCOUNTER
Was speaking with wife about her refill. Then she asked about patient refill on vasotec. Nurse informed the request was sent to Dr Morales on 6-22-19. Just waiting for her to send to the pharmacy. Wife verbalized understanding.

## 2019-06-26 NOTE — TELEPHONE ENCOUNTER
----- Message from Ajith Donahue sent at 6/26/2019  2:42 PM CDT -----  Contact: pt's wife Jacqueline  Type:  RX Refill Request    Who Called:  Jacqueline  Refill or New Rx:  refill  RX Name and Strength:  enalapril (VASOTEC) 20 MG tablet  How is the patient currently taking it? (ex. 1XDay):  TAKE 1 TABLET BY MOUTH TWICE DAILY  Is this a 30 day or 90 day RX:    Preferred Pharmacy with phone number:    [facility] [address] - [place] [facility]  [address] [address] [facility] [address]  [facility] [place] [address]  Phone: [phone] Fax: [phone]    Local or Mail Order:  local  Ordering Provider:  Andrew Larsen Call Back Number:  [phone]  Additional Information:

## 2019-07-02 ENCOUNTER — HOSPITAL ENCOUNTER (OUTPATIENT)
Dept: RADIOLOGY | Facility: HOSPITAL | Age: 69
Discharge: HOME | End: 2019-07-02
Attending: INTERNAL MEDICINE
Payer: MEDICARE

## 2019-07-02 DIAGNOSIS — K76.9 LIVER LESION: ICD-10-CM

## 2019-07-02 PROCEDURE — 74177 CT ABD & PELVIS W/CONTRAST: CPT | Mod: 26,,, | Performed by: RADIOLOGY

## 2019-07-02 PROCEDURE — 74177 CT ABDOMEN PELVIS WITH CONTRAST: ICD-10-PCS | Mod: 26,,, | Performed by: RADIOLOGY

## 2019-07-02 PROCEDURE — 25500020 PHARM REV CODE 255: Mod: PO | Performed by: INTERNAL MEDICINE

## 2019-07-02 PROCEDURE — 74177 CT ABD & PELVIS W/CONTRAST: CPT | Mod: TC,PO

## 2019-07-02 RX ADMIN — IOHEXOL 75 ML: 350 INJECTION, SOLUTION INTRAVENOUS at 09:07

## 2019-07-08 NOTE — TELEPHONE ENCOUNTER
----- Message from Yael Medina sent at 7/8/2019 12:01 PM CDT -----  Contact: pt- [phone]  MD Carson Oconnor MD  Outpatient Medication Detail      Disp Refills Start End   HYDROcodone-acetaminophen (NORCO)  mg per tablet 90 tablet 0 6/12/2019    Sig - Route: Take 1 tablet by mouth every 8 (eight) hours as needed for Pain. - Oral   Sent to pharmacy as: HYDROcodone-acetaminophen (NORCO)  mg per tablet   Earliest Fill Date: 6/12/2019   E-Prescribing Status: Receipt confirmed by pharmacy (6/12/2019  9:06 AM CDT)   Pharmacy

## 2019-07-09 ENCOUNTER — TELEPHONE (OUTPATIENT)
Dept: HEPATOLOGY | Facility: CLINIC | Age: 69
End: 2019-07-09

## 2019-07-09 NOTE — TELEPHONE ENCOUNTER
----- Message from Corie Carr MD sent at 7/9/2019 12:37 PM CDT -----  Please place reminder for clinic f/u October 2019

## 2019-07-10 NOTE — TELEPHONE ENCOUNTER
----- Message from Aldo Cadena sent at 7/10/2019  1:31 PM CDT -----  Contact: Jacqueline Swan - Spouse  Type:  RX Refill Request    Who Called:  Jacqueline  Refill or New Rx:  Refill  RX Name and Strength:  HYDROcodone-acetaminophen (NORCO)  mg per tablet  How is the patient currently taking it? (ex. 1XDay):  As ordered  Is this a 30 day or 90 day RX:  30  Preferred Pharmacy with phone number:    [facility] [address]  [address] [place] [facility] [address]  [address] [place] [address]  [place] [address]  Phone: [phone] Fax: [phone]  Local or Mail Order:  Local  Ordering Provider:  Dr. Andrew Larsen Call Back Number:  [phone]  Additional Information:  NA

## 2019-07-12 RX ORDER — HYDROCODONE BITARTRATE AND ACETAMINOPHEN 10; 325 MG/1; MG/1
1 TABLET ORAL EVERY 8 HOURS PRN
Qty: 90 TABLET | Refills: 0 | Status: SHIPPED | OUTPATIENT
Start: 2019-07-12 | End: 2019-07-31 | Stop reason: SDUPTHER

## 2019-07-31 ENCOUNTER — OFFICE VISIT (OUTPATIENT)
Dept: RHEUMATOLOGY | Facility: CLINIC | Age: 69
End: 2019-07-31
Payer: MEDICARE

## 2019-07-31 VITALS
HEART RATE: 76 BPM | SYSTOLIC BLOOD PRESSURE: 167 MMHG | BODY MASS INDEX: 21.67 KG/M2 | HEIGHT: 72 IN | DIASTOLIC BLOOD PRESSURE: 83 MMHG | WEIGHT: 160 LBS

## 2019-07-31 DIAGNOSIS — D69.6 THROMBOCYTOPENIC: ICD-10-CM

## 2019-07-31 DIAGNOSIS — I15.9 SECONDARY HYPERTENSION: ICD-10-CM

## 2019-07-31 DIAGNOSIS — M05.741 RHEUMATOID ARTHRITIS INVOLVING BOTH HANDS WITH POSITIVE RHEUMATOID FACTOR: Primary | ICD-10-CM

## 2019-07-31 DIAGNOSIS — M05.742 RHEUMATOID ARTHRITIS INVOLVING BOTH HANDS WITH POSITIVE RHEUMATOID FACTOR: Primary | ICD-10-CM

## 2019-07-31 DIAGNOSIS — R10.9 ABDOMINAL PAIN, UNSPECIFIED ABDOMINAL LOCATION: ICD-10-CM

## 2019-07-31 DIAGNOSIS — G56.00 CARPAL TUNNEL SYNDROME, UNSPECIFIED LATERALITY: ICD-10-CM

## 2019-07-31 DIAGNOSIS — M06.9 RHEUMATOID ARTHRITIS, INVOLVING UNSPECIFIED SITE, UNSPECIFIED RHEUMATOID FACTOR PRESENCE: ICD-10-CM

## 2019-07-31 DIAGNOSIS — M89.9 DISORDER OF BONE: ICD-10-CM

## 2019-07-31 DIAGNOSIS — M17.9 OSTEOARTHRITIS OF KNEE, UNSPECIFIED LATERALITY, UNSPECIFIED OSTEOARTHRITIS TYPE: ICD-10-CM

## 2019-07-31 DIAGNOSIS — R52 PAIN MANAGEMENT: ICD-10-CM

## 2019-07-31 PROCEDURE — 99999 PR PBB SHADOW E&M-EST. PATIENT-LVL III: CPT | Mod: PBBFAC,,, | Performed by: INTERNAL MEDICINE

## 2019-07-31 PROCEDURE — 3077F PR MOST RECENT SYSTOLIC BLOOD PRESSURE >= 140 MM HG: ICD-10-PCS | Mod: CPTII,S$GLB,, | Performed by: INTERNAL MEDICINE

## 2019-07-31 PROCEDURE — 99214 PR OFFICE/OUTPT VISIT, EST, LEVL IV, 30-39 MIN: ICD-10-PCS | Mod: 25,S$GLB,, | Performed by: INTERNAL MEDICINE

## 2019-07-31 PROCEDURE — 99214 OFFICE O/P EST MOD 30 MIN: CPT | Mod: 25,S$GLB,, | Performed by: INTERNAL MEDICINE

## 2019-07-31 PROCEDURE — 3288F FALL RISK ASSESSMENT DOCD: CPT | Mod: CPTII,S$GLB,, | Performed by: INTERNAL MEDICINE

## 2019-07-31 PROCEDURE — 99999 PR PBB SHADOW E&M-EST. PATIENT-LVL III: ICD-10-PCS | Mod: PBBFAC,,, | Performed by: INTERNAL MEDICINE

## 2019-07-31 PROCEDURE — 1100F PTFALLS ASSESS-DOCD GE2>/YR: CPT | Mod: CPTII,S$GLB,, | Performed by: INTERNAL MEDICINE

## 2019-07-31 PROCEDURE — 1100F PR PT FALLS ASSESS DOC 2+ FALLS/FALL W/INJURY/YR: ICD-10-PCS | Mod: CPTII,S$GLB,, | Performed by: INTERNAL MEDICINE

## 2019-07-31 PROCEDURE — 96372 THER/PROPH/DIAG INJ SC/IM: CPT | Mod: S$GLB,,, | Performed by: INTERNAL MEDICINE

## 2019-07-31 PROCEDURE — 3288F PR FALLS RISK ASSESSMENT DOCUMENTED: ICD-10-PCS | Mod: CPTII,S$GLB,, | Performed by: INTERNAL MEDICINE

## 2019-07-31 PROCEDURE — 3079F PR MOST RECENT DIASTOLIC BLOOD PRESSURE 80-89 MM HG: ICD-10-PCS | Mod: CPTII,S$GLB,, | Performed by: INTERNAL MEDICINE

## 2019-07-31 PROCEDURE — 3079F DIAST BP 80-89 MM HG: CPT | Mod: CPTII,S$GLB,, | Performed by: INTERNAL MEDICINE

## 2019-07-31 PROCEDURE — 3077F SYST BP >= 140 MM HG: CPT | Mod: CPTII,S$GLB,, | Performed by: INTERNAL MEDICINE

## 2019-07-31 PROCEDURE — 96372 PR INJECTION,THERAP/PROPH/DIAG2ST, IM OR SUBCUT: ICD-10-PCS | Mod: S$GLB,,, | Performed by: INTERNAL MEDICINE

## 2019-07-31 RX ORDER — KETOROLAC TROMETHAMINE 30 MG/ML
60 INJECTION, SOLUTION INTRAMUSCULAR; INTRAVENOUS
Status: COMPLETED | OUTPATIENT
Start: 2019-07-31 | End: 2019-07-31

## 2019-07-31 RX ORDER — HYDROCODONE BITARTRATE AND ACETAMINOPHEN 10; 325 MG/1; MG/1
1 TABLET ORAL EVERY 8 HOURS PRN
Qty: 90 TABLET | Refills: 0 | Status: SHIPPED | OUTPATIENT
Start: 2019-10-09 | End: 2019-11-05 | Stop reason: SDUPTHER

## 2019-07-31 RX ORDER — PREDNISONE 5 MG/1
TABLET ORAL
Qty: 60 TABLET | Refills: 6 | Status: SHIPPED | OUTPATIENT
Start: 2019-07-31 | End: 2020-01-30 | Stop reason: SDUPTHER

## 2019-07-31 RX ORDER — ENALAPRIL MALEATE 20 MG/1
20 TABLET ORAL 2 TIMES DAILY
Qty: 60 TABLET | Refills: 12 | Status: SHIPPED | OUTPATIENT
Start: 2019-07-31 | End: 2020-11-10 | Stop reason: SDUPTHER

## 2019-07-31 RX ORDER — FUROSEMIDE 20 MG/1
TABLET ORAL
Refills: 0 | COMMUNITY
Start: 2019-07-05 | End: 2019-07-31

## 2019-07-31 RX ORDER — SULFASALAZINE 500 MG/1
1000 TABLET, DELAYED RELEASE ORAL 2 TIMES DAILY
Qty: 120 TABLET | Refills: 6 | Status: SHIPPED | OUTPATIENT
Start: 2019-07-31 | End: 2020-02-26

## 2019-07-31 RX ORDER — HYDROCODONE BITARTRATE AND ACETAMINOPHEN 10; 325 MG/1; MG/1
1 TABLET ORAL EVERY 8 HOURS PRN
Qty: 90 TABLET | Refills: 0 | Status: SHIPPED | OUTPATIENT
Start: 2019-09-09 | End: 2019-07-31 | Stop reason: SDUPTHER

## 2019-07-31 RX ORDER — CYANOCOBALAMIN 1000 UG/ML
1000 INJECTION, SOLUTION INTRAMUSCULAR; SUBCUTANEOUS
Status: COMPLETED | OUTPATIENT
Start: 2019-07-31 | End: 2019-07-31

## 2019-07-31 RX ORDER — METHYLPREDNISOLONE ACETATE 80 MG/ML
160 INJECTION, SUSPENSION INTRA-ARTICULAR; INTRALESIONAL; INTRAMUSCULAR; SOFT TISSUE
Status: COMPLETED | OUTPATIENT
Start: 2019-07-31 | End: 2019-07-31

## 2019-07-31 RX ORDER — HYDROCODONE BITARTRATE AND ACETAMINOPHEN 10; 325 MG/1; MG/1
1 TABLET ORAL EVERY 8 HOURS PRN
Qty: 90 TABLET | Refills: 0 | Status: SHIPPED | OUTPATIENT
Start: 2019-08-09 | End: 2019-07-31 | Stop reason: SDUPTHER

## 2019-07-31 RX ADMIN — KETOROLAC TROMETHAMINE 60 MG: 30 INJECTION, SOLUTION INTRAMUSCULAR; INTRAVENOUS at 06:07

## 2019-07-31 RX ADMIN — METHYLPREDNISOLONE ACETATE 160 MG: 80 INJECTION, SUSPENSION INTRA-ARTICULAR; INTRALESIONAL; INTRAMUSCULAR; SOFT TISSUE at 06:07

## 2019-07-31 RX ADMIN — CYANOCOBALAMIN 1000 MCG: 1000 INJECTION, SOLUTION INTRAMUSCULAR; SUBCUTANEOUS at 06:07

## 2019-07-31 ASSESSMENT — ROUTINE ASSESSMENT OF PATIENT INDEX DATA (RAPID3)
TOTAL RAPID3 SCORE: 6
PATIENT GLOBAL ASSESSMENT SCORE: 7.5
MDHAQ FUNCTION SCORE: .9
PSYCHOLOGICAL DISTRESS SCORE: 3.3
PAIN SCORE: 7.5

## 2019-07-31 NOTE — PROGRESS NOTES
Administered 1 cc ( 1000 mcg/ml ) of b12 to the right upper outer gluteal. Informed of s/s to report verbalized understanding. No adverse reactions noted.    Lot # [id number]  Expiration nov 20    Administered 2 cc ( 80 mg/ml ) of depomedrol to the right upper outer gluteal. Informed of s/s to report verbalized understanding. No adverse reactions noted.    Lot # [id number]  Expiration 05/20    Administered 2 cc ( 30 mg/ml ) of toradol to the left upper outer gluteal. Informed of s/s to report verbalized understanding. No adverse reactions noted.    Lot # [id number]  Expiration 1 jul 2020

## 2019-07-31 NOTE — PROGRESS NOTES
Subjective:       Patient ID: Scooter Swan is a 68 y.o. male.    Chief Complaint: Rheumatoid Arthritis      Rheumatoid Arthritis    Weight loss stopped with DC of Plaquenil.,  The symptoms have been worsening. Affected locations include the right shoulder, left shoulder, left knee, right knee, left PIP, left DIP, right PIP, right DIP and right MCP. Associated symptoms include pain at night, dry eyes and jaw pain. She complains of morning stiffness.The neck, left shoulder, right shoulder, left elbow, right elbow, left wrist, right wrist, left hand, right hand, left hip, right hip, left knee, right knee, left ankle and right ankle presents with Arthralgia.     Review of Systems   Constitutional: Positive for activity change and unexpected weight change. Negative for appetite change, chills and diaphoresis.   HENT: Negative for congestion, ear pain, facial swelling, mouth sores, nosebleeds, postnasal drip, rhinorrhea, sinus pressure, sneezing, sore throat, tinnitus and voice change.    Eyes: Negative for pain, discharge, redness, itching and visual disturbance.   Respiratory: Negative for apnea, cough, chest tightness, shortness of breath and wheezing.    Cardiovascular: Negative for chest pain, palpitations and leg swelling.   Gastrointestinal: Negative for abdominal pain, constipation, diarrhea, nausea and vomiting.   Endocrine: Negative for cold intolerance, heat intolerance, polydipsia and polyuria.   Genitourinary: Negative for decreased urine volume, difficulty urinating, flank pain, frequency, hematuria and urgency.   Musculoskeletal: Positive for back pain, gait problem, joint swelling, myalgias, neck pain and neck stiffness. Negative for arthralgias.   Skin: Positive for rash. Negative for pallor and wound.   Allergic/Immunologic: Negative for immunocompromised state.   Neurological: Negative for dizziness, tremors, seizures, syncope, weakness and numbness.   Hematological: Negative for adenopathy. Does not  bruise/bleed easily.   Psychiatric/Behavioral: Negative for sleep disturbance and suicidal ideas. The patient is not nervous/anxious.          Objective:   BP (!) 167/83   Pulse 76   Ht 6' (1.829 m)   Wt 72.6 kg (160 lb)   BMI 21.70 kg/m²      Physical Exam   Vitals reviewed.  Constitutional: He is oriented to person, place, and time. No distress.   HENT:   Head: Normocephalic and atraumatic.   Mouth/Throat: Oropharynx is clear and moist.   Eyes: EOM are normal. Pupils are equal, round, and reactive to light.   Neck: Neck supple. No thyromegaly present.   Cardiovascular: Normal rate, regular rhythm and normal heart sounds.  Exam reveals no gallop and no friction rub.    No murmur heard.  Pulmonary/Chest: Breath sounds normal. He has no wheezes. He has no rales. He exhibits no tenderness.   Abdominal: There is no tenderness. There is no rebound and no guarding.       Right Side Rheumatological Exam     The patient is tender to palpation of the shoulder, elbow, wrist, knee, 1st PIP, 1st MCP, 2nd PIP, 2nd MCP, 3rd PIP, 3rd MCP, 4th PIP, 4th MCP and 5th PIP    He has swelling of the elbow, wrist, knee, 1st PIP, 1st MCP, 2nd PIP, 2nd MCP, 3rd PIP, 3rd MCP, 4th PIP, 4th MCP, 5th PIP and 5th MCP    The patient has an enlarged wrist and knee    Shoulder Exam   Tenderness Location: no tenderness    Range of Motion   Active abduction: abnormal   Adduction: abnormal  Sensation: normal    Knee Exam   Tenderness Location: medial joint line and LCL  Patellofemoral Crepitus: positive  Effusion: positive  Sensation: normal    Hip Exam   Tenderness Location: posterior and anterior  Sensation: normal    Elbow/Wrist Exam   Tenderness Location: no tenderness  Sensation: normal    Left Side Rheumatological Exam     The patient is tender to palpation of the shoulder, elbow, wrist, knee, 1st PIP, 1st MCP, 2nd PIP, 2nd MCP, 3rd PIP, 3rd MCP, 4th PIP, 4th MCP, 5th PIP and 5th MCP.    He has swelling of the wrist, knee, 1st PIP, 1st MCP,  2nd PIP, 2nd MCP, 3rd PIP, 3rd MCP, 4th PIP, 4th MCP, 5th PIP and 5th MCP    The patient has an enlarged knee.    Shoulder Exam   Tenderness Location: no tenderness    Range of Motion   Active abduction: abnormal   Sensation: normal    Knee Exam   Tenderness Location: lateral joint line and medial joint line    Patellofemoral Crepitus: positive  Effusion: positive  Sensation: normal    Hip Exam   Tenderness Location: posterior and anterior  Sensation: normal    Elbow/Wrist Exam   Sensation: normal      Back/Neck Exam   General Inspection   Gait: normal       Tenderness Right paramedian tenderness of the Upper C-Spine, Lower C-Spine, Lower L-Spine and SI Joint.Left paramedian tenderness of the Upper C-Spine, Lower L-Spine, Lower C-Spine and SI Joint.    Neck Range of Motion   Flexion: Limited  Extension: Limited  Lymphadenopathy:     He has no cervical adenopathy.   Neurological: He is alert and oriented to person, place, and time. Gait normal.   Skin: No rash noted. No erythema. No pallor.     Psychiatric: Mood and affect normal.   Musculoskeletal: He exhibits tenderness and deformity. He exhibits no edema.           Results for orders placed or performed in visit on 06/24/19   AFP tumor marker   Result Value Ref Range    AFP 1.2 0.0 - 8.4 ng/mL   Hepatic function panel   Result Value Ref Range    Total Protein 6.6 6.0 - 8.4 g/dL    Albumin 4.2 3.5 - 5.2 g/dL    Total Bilirubin 0.3 0.1 - 1.0 mg/dL    Bilirubin, Direct 0.2 0.1 - 0.3 mg/dL    AST 16 10 - 40 U/L    ALT 6 (L) 10 - 44 U/L    Alkaline Phosphatase 45 (L) 55 - 135 U/L   CREATININE, SERUM   Result Value Ref Range    Creatinine 1.2 0.5 - 1.4 mg/dL    eGFR if African American >60 >60 mL/min/1.73 m^2    eGFR if non African American >60 >60 mL/min/1.73 m^2       Component Name Value Ref Range   Test Requested see below   Comment: QUANTIFERON TB GOLD     Quantiferon TB Gold NEGATIVE   Comment:  Negative test result. M. tuberculosis complex  infection unlikely.  NEGATIVE    NIL 0.04 [IU]/mL   Mitogen-nil >10.00 [IU]/mL    TB-nil <0.00   Comment:  The Nil tube value is used to determine if the patient  has a preexisting immune response which could cause a  false-positive reading on the test. In order for a  test to be valid, the Nil tube must have a value of  less than or equal to 8.0 IU/mL.  The mitogen control tube is used to assure the patient  has a healthy immune status and also serves as a  control for correct blood handling and incubation. It  is used to detect false-negative readings. The mitogen  tube must have a gamma interferon value of greater  than or equal to 0.5 IU/mL higher than the value of  the Nil tube.  The TB antigen tube is coated with the M. tuberculosis  specific antigens. For a test to be considered  positive, the TB antigen tube value minus the Nil tube  value must be greater than or equal to 0.35 IU/mL.  For additional information, please refer to  http://education.[employer]/faq/QFT  (This link is being provided for informational/  educational purposes only.)  TEST PERFORMED AT:  [facility] [address] [address]  JOVANI LORENZANA M.D. [IU]/mL    Specimen   Blood       Assessment:       1. Rheumatoid arthritis involving both hands with positive rheumatoid factor    2. Osteoarthritis of knee, unspecified laterality, unspecified osteoarthritis type    3. Secondary hypertension    4. Carpal tunnel syndrome, unspecified laterality    5. Pain management    6. Rheumatoid arthritis, involving unspecified site, unspecified rheumatoid factor presence    7. Abdominal pain, unspecified abdominal location    8. Thrombocytopenic    9. Disorder of bone             Plan:       Scooter was seen today for rheumatoid arthritis.    Diagnoses and all orders for this visit:    Rheumatoid arthritis involving both hands with positive rheumatoid factor  -     sulfaSALAzine (AZULFIDINE) 500 MG TbEC; Take 2 tablets (1,000 mg total)  by mouth 2 (two) times daily.  -     CBC auto differential; Future  -     Comprehensive metabolic panel; Future  -     Rheumatoid factor; Future  -     Sedimentation rate; Future  -     C-reactive protein; Future  -     Vitamin D; Future  -     PTH, intact; Future  -     HYDROcodone-acetaminophen (NORCO)  mg per tablet; Take 1 tablet by mouth every 8 (eight) hours as needed for Pain.    Osteoarthritis of knee, unspecified laterality, unspecified osteoarthritis type  -     sulfaSALAzine (AZULFIDINE) 500 MG TbEC; Take 2 tablets (1,000 mg total) by mouth 2 (two) times daily.  -     CBC auto differential; Future  -     Comprehensive metabolic panel; Future  -     Rheumatoid factor; Future  -     Sedimentation rate; Future  -     C-reactive protein; Future  -     Vitamin D; Future  -     PTH, intact; Future  -     HYDROcodone-acetaminophen (NORCO)  mg per tablet; Take 1 tablet by mouth every 8 (eight) hours as needed for Pain.    Secondary hypertension  -     sulfaSALAzine (AZULFIDINE) 500 MG TbEC; Take 2 tablets (1,000 mg total) by mouth 2 (two) times daily.  -     CBC auto differential; Future  -     Comprehensive metabolic panel; Future  -     Rheumatoid factor; Future  -     Sedimentation rate; Future  -     C-reactive protein; Future  -     Vitamin D; Future  -     PTH, intact; Future    Carpal tunnel syndrome, unspecified laterality  -     sulfaSALAzine (AZULFIDINE) 500 MG TbEC; Take 2 tablets (1,000 mg total) by mouth 2 (two) times daily.  -     predniSONE (DELTASONE) 5 MG tablet; TAKE 2 TABLETS BY MOUTH once daily AS NEEDED  -     CBC auto differential; Future  -     Comprehensive metabolic panel; Future  -     Rheumatoid factor; Future  -     Sedimentation rate; Future  -     C-reactive protein; Future  -     Vitamin D; Future  -     PTH, intact; Future    Pain management  -     sulfaSALAzine (AZULFIDINE) 500 MG TbEC; Take 2 tablets (1,000 mg total) by mouth 2 (two) times daily.  -     CBC auto  differential; Future  -     Comprehensive metabolic panel; Future  -     Rheumatoid factor; Future  -     Sedimentation rate; Future  -     C-reactive protein; Future  -     Vitamin D; Future  -     PTH, intact; Future    Rheumatoid arthritis, involving unspecified site, unspecified rheumatoid factor presence  -     predniSONE (DELTASONE) 5 MG tablet; TAKE 2 TABLETS BY MOUTH once daily AS NEEDED    Abdominal pain, unspecified abdominal location  -     predniSONE (DELTASONE) 5 MG tablet; TAKE 2 TABLETS BY MOUTH once daily AS NEEDED    Thrombocytopenic  -     CBC auto differential; Future  -     Comprehensive metabolic panel; Future  -     Rheumatoid factor; Future  -     Sedimentation rate; Future  -     C-reactive protein; Future  -     Vitamin D; Future  -     PTH, intact; Future    Disorder of bone   -     Vitamin D; Future    Other orders  -     ketorolac injection 60 mg  -     methylPREDNISolone acetate injection 160 mg  -     cyanocobalamin injection 1,000 mcg  -     enalapril (VASOTEC) 20 MG tablet; Take 1 tablet (20 mg total) by mouth 2 (two) times daily.  -     Discontinue: HYDROcodone-acetaminophen (NORCO)  mg per tablet; Take 1 tablet by mouth every 8 (eight) hours as needed for Pain.  -     Discontinue: HYDROcodone-acetaminophen (NORCO)  mg per tablet; Take 1 tablet by mouth every 8 (eight) hours as needed for Pain.        . I have  check [place] prescription monitoring program site and no unusual or abnormal behavior has occurred  Pt had weight loss with plaquenil, will try azulfidine if this med causes any problems will try enbrel

## 2019-08-01 RX ORDER — ENALAPRIL MALEATE 20 MG/1
TABLET ORAL
Qty: 60 TABLET | Refills: 0 | Status: SHIPPED | OUTPATIENT
Start: 2019-08-01 | End: 2019-10-15

## 2019-08-05 NOTE — INTERVAL H&P NOTE
The patient has been examined and the H&P has been reviewed:    I concur with the findings and no changes have occurred since H&P was written.    Anesthesia/Surgery risks, benefits and alternative options discussed and understood by patient/family.      Active Hospital Problems    Diagnosis  POA    Hemorrhoids [K64.9]  Yes      Resolved Hospital Problems   No resolved problems to display.     
English

## 2019-08-09 ENCOUNTER — OFFICE VISIT (OUTPATIENT)
Dept: FAMILY MEDICINE | Facility: CLINIC | Age: 69
End: 2019-08-09
Payer: MEDICARE

## 2019-08-09 VITALS
HEIGHT: 72 IN | HEART RATE: 67 BPM | DIASTOLIC BLOOD PRESSURE: 80 MMHG | OXYGEN SATURATION: 98 % | WEIGHT: 166 LBS | BODY MASS INDEX: 22.48 KG/M2 | SYSTOLIC BLOOD PRESSURE: 145 MMHG

## 2019-08-09 DIAGNOSIS — G56.00 CARPAL TUNNEL SYNDROME, UNSPECIFIED LATERALITY: ICD-10-CM

## 2019-08-09 DIAGNOSIS — I1A.0 RESISTANT HYPERTENSION: ICD-10-CM

## 2019-08-09 DIAGNOSIS — N52.8 OTHER MALE ERECTILE DYSFUNCTION: ICD-10-CM

## 2019-08-09 DIAGNOSIS — K21.9 GASTROESOPHAGEAL REFLUX DISEASE, ESOPHAGITIS PRESENCE NOT SPECIFIED: Primary | ICD-10-CM

## 2019-08-09 DIAGNOSIS — M06.9 RHEUMATOID ARTHRITIS, INVOLVING UNSPECIFIED SITE, UNSPECIFIED RHEUMATOID FACTOR PRESENCE: ICD-10-CM

## 2019-08-09 DIAGNOSIS — E78.49 OTHER HYPERLIPIDEMIA: ICD-10-CM

## 2019-08-09 PROCEDURE — 99999 PR PBB SHADOW E&M-EST. PATIENT-LVL III: CPT | Mod: PBBFAC,,, | Performed by: INTERNAL MEDICINE

## 2019-08-09 PROCEDURE — 3079F DIAST BP 80-89 MM HG: CPT | Mod: CPTII,S$GLB,, | Performed by: INTERNAL MEDICINE

## 2019-08-09 PROCEDURE — 3077F PR MOST RECENT SYSTOLIC BLOOD PRESSURE >= 140 MM HG: ICD-10-PCS | Mod: CPTII,S$GLB,, | Performed by: INTERNAL MEDICINE

## 2019-08-09 PROCEDURE — 3077F SYST BP >= 140 MM HG: CPT | Mod: CPTII,S$GLB,, | Performed by: INTERNAL MEDICINE

## 2019-08-09 PROCEDURE — 99999 PR PBB SHADOW E&M-EST. PATIENT-LVL III: ICD-10-PCS | Mod: PBBFAC,,, | Performed by: INTERNAL MEDICINE

## 2019-08-09 PROCEDURE — 99214 PR OFFICE/OUTPT VISIT, EST, LEVL IV, 30-39 MIN: ICD-10-PCS | Mod: S$GLB,,, | Performed by: INTERNAL MEDICINE

## 2019-08-09 PROCEDURE — 1101F PR PT FALLS ASSESS DOC 0-1 FALLS W/OUT INJ PAST YR: ICD-10-PCS | Mod: CPTII,S$GLB,, | Performed by: INTERNAL MEDICINE

## 2019-08-09 PROCEDURE — 1101F PT FALLS ASSESS-DOCD LE1/YR: CPT | Mod: CPTII,S$GLB,, | Performed by: INTERNAL MEDICINE

## 2019-08-09 PROCEDURE — 99499 RISK ADDL DX/OHS AUDIT: ICD-10-PCS | Mod: S$GLB,,, | Performed by: INTERNAL MEDICINE

## 2019-08-09 PROCEDURE — 3079F PR MOST RECENT DIASTOLIC BLOOD PRESSURE 80-89 MM HG: ICD-10-PCS | Mod: CPTII,S$GLB,, | Performed by: INTERNAL MEDICINE

## 2019-08-09 PROCEDURE — 99214 OFFICE O/P EST MOD 30 MIN: CPT | Mod: S$GLB,,, | Performed by: INTERNAL MEDICINE

## 2019-08-09 PROCEDURE — 99499 UNLISTED E&M SERVICE: CPT | Mod: S$GLB,,, | Performed by: INTERNAL MEDICINE

## 2019-08-09 RX ORDER — TIZANIDINE 4 MG/1
TABLET ORAL
Qty: 270 TABLET | Refills: 1 | Status: SHIPPED | OUTPATIENT
Start: 2019-08-09 | End: 2020-03-27

## 2019-08-09 RX ORDER — TIZANIDINE 4 MG/1
4 TABLET ORAL EVERY 8 HOURS
Qty: 90 TABLET | Refills: 1 | Status: SHIPPED | OUTPATIENT
Start: 2019-08-09 | End: 2019-08-09 | Stop reason: SDUPTHER

## 2019-08-09 RX ORDER — HYDRALAZINE HYDROCHLORIDE 25 MG/1
25 TABLET, FILM COATED ORAL EVERY 8 HOURS
Qty: 270 TABLET | Refills: 1 | Status: SHIPPED | OUTPATIENT
Start: 2019-08-09 | End: 2019-11-06 | Stop reason: SDUPTHER

## 2019-08-09 RX ORDER — SILDENAFIL 100 MG/1
100 TABLET, FILM COATED ORAL
Qty: 4 TABLET | Refills: 0 | Status: SHIPPED | OUTPATIENT
Start: 2019-08-09 | End: 2020-04-29 | Stop reason: SDUPTHER

## 2019-08-09 RX ORDER — SILDENAFIL 100 MG/1
100 TABLET, FILM COATED ORAL
Qty: 8 TABLET | Refills: 2 | Status: SHIPPED | OUTPATIENT
Start: 2019-08-09 | End: 2019-08-09 | Stop reason: SDUPTHER

## 2019-08-09 RX ORDER — PRAVASTATIN SODIUM 20 MG/1
20 TABLET ORAL DAILY
Qty: 90 TABLET | Refills: 1 | Status: SHIPPED | OUTPATIENT
Start: 2019-08-09 | End: 2020-08-21 | Stop reason: SDUPTHER

## 2019-08-09 RX ORDER — AMLODIPINE BESYLATE 10 MG/1
10 TABLET ORAL EVERY MORNING
Qty: 90 TABLET | Refills: 1 | Status: SHIPPED | OUTPATIENT
Start: 2019-08-09 | End: 2020-03-02

## 2019-08-09 RX ORDER — OMEPRAZOLE 20 MG/1
20 CAPSULE, DELAYED RELEASE ORAL
Qty: 180 CAPSULE | Refills: 1 | Status: SHIPPED | OUTPATIENT
Start: 2019-08-09 | End: 2020-02-29 | Stop reason: SDUPTHER

## 2019-08-09 NOTE — PROGRESS NOTES
Subjective:       Patient ID: Scooter Swan is a 68 y.o. male.    Chief Complaint: Hypertension    HPI    PMH of resistant HTN, HLD, RA, glucoma, emphysema, Hep C s/p Harvoni, and hemorrhoids s/p resection      He is here today for med refills. Stomach is better off plaquenil. Has gained some weight back. He is taking probiotics. Has seen Dr. Morales. planning embrel if azulfidine not tolerated. It made is stomach hurt so he stopped that.       GERD:   Has tried protonix was not effective.   Was on omeprazole and this helped.   Refilled omeprazole.      Resistant HTN:   On amlodipine 10mg, enalapril 20 mg BID, and hydralazine 10 mg TID. Lasix stopped 1/29/19. Atenolol stopped 1/29/19.  BP elevated today.  Will increase hydralazine to 25 mg TID.   Follow-up nurse visit in 1 week.     Emphysema: stable on PRN albuterol inhaler     Thrombocytopenia:   platelets improved  he just started on oral B12 in vitamin.   check b12.level     HLD:   Currently on pravastatin 20 mg daily.  Lipids UTD     RA:  prn prednisone  prn hydrocodone  followed by rheumatology     Glaucoma:   Stable on Travatan drops  follow-up ophthalmology as directed.     Moderate aortic stenosis:  EUNICE is 1.19 cm2; peak velocity is 4.04 m/s; mean gradient is 32.58 mmHg. (10/5/18)  No chest pain or SOB  repeat TTE in 1 - 2 years  Has systolic murmur. Slight diastolic component.   Will discuss with Cardiology about repeating TTE soon.    Erectile dysfunction:  No chest pain or dyspnea.  Prescribed Viagra 100 mg.  Discussed to stop taking if he has shortness of breath or chest pain.     Current Outpatient Medications on File Prior to Visit   Medication Sig Dispense Refill    albuterol (PROAIR HFA) 90 mcg/actuation inhaler INHALE 2 PUFFS EVERY 4 HOURS AS NEEDED      diclofenac sodium (VOLTAREN) 1 % Gel Apply 2 grams  topically 4 (four) times daily. (Patient taking differently: Apply 2 g topically 4 (four) times daily as needed. ) 100 g 5    dorzolamide-timolol  2-0.5% (COSOPT) 22.3-6.8 mg/mL ophthalmic solution Place 1 drop into both eyes 2 (two) times daily. 10 mL 11    enalapril (VASOTEC) 20 MG tablet Take 1 tablet (20 mg total) by mouth 2 (two) times daily. 60 tablet 12    ergocalciferol, vitamin D2, (VITAMIN D ORAL) Take 200 mGy by mouth once daily.      [START ON 10/9/2019] HYDROcodone-acetaminophen (NORCO)  mg per tablet Take 1 tablet by mouth every 8 (eight) hours as needed for Pain. 90 tablet 0    multivitamin capsule Take 1 capsule by mouth once daily.      predniSONE (DELTASONE) 5 MG tablet TAKE 2 TABLETS BY MOUTH once daily AS NEEDED 60 tablet 6    simethicone (MYLICON) 125 MG chewable tablet Take 125 mg by mouth every 6 (six) hours as needed for Flatulence.      tamsulosin (FLOMAX) 0.4 mg Cap Take 1 capsule (0.4 mg total) by mouth once daily. 30 capsule 11    travoprost (TRAVATAN Z) 0.004 % ophthalmic solution Place 1 drop into both eyes every evening. 5 mL 12    [DISCONTINUED] amLODIPine (NORVASC) 10 MG tablet Take 10 mg by mouth every morning.       [DISCONTINUED] hydrALAZINE (APRESOLINE) 10 MG tablet TAKE 2 TABLETs (20 mg) BY MOUTH 2 TIMES DAILY      [DISCONTINUED] pravastatin (PRAVACHOL) 20 MG tablet TAKE 1 TABLET BY MOUTH DAILY, morning      [DISCONTINUED] tiZANidine (ZANAFLEX) 4 MG tablet TAKE 1 TABLET BY MOUTH EVERY 8 HOURS 90 tablet 3    cyanocobalamin 500 MCG tablet Take 500 mcg by mouth once daily.      enalapril (VASOTEC) 20 MG tablet TAKE 1 TABLET BY MOUTH TWICE DAILY 60 tablet 0    sulfaSALAzine (AZULFIDINE) 500 MG TbEC Take 2 tablets (1,000 mg total) by mouth 2 (two) times daily. 120 tablet 6    [DISCONTINUED] omeprazole (PRILOSEC) 20 MG capsule Take 20 mg by mouth 2 (two) times daily before meals.      [DISCONTINUED] sildenafil (VIAGRA) 100 MG tablet Take 100 mg by mouth.       Current Facility-Administered Medications on File Prior to Visit   Medication Dose Route Frequency Provider Last Rate Last Dose    0.9%  NaCl  infusion   Intravenous Continuous Deedee Sarkar NP   Stopped at 10/18/18 1613    mupirocin 2 % ointment   Nasal On Call Procedure Deedee Sarkar NP         I personally reviewed past medical, family and social history.  Review of Systems   Constitutional: Negative for activity change, fever and unexpected weight change.   HENT: Negative for facial swelling, hearing loss and trouble swallowing.    Eyes: Negative for visual disturbance.   Respiratory: Negative for cough, chest tightness, shortness of breath and wheezing.    Cardiovascular: Negative for chest pain, palpitations and leg swelling.   Gastrointestinal: Positive for constipation. Negative for abdominal pain, blood in stool, diarrhea, nausea and vomiting.   Endocrine: Negative for cold intolerance, polydipsia, polyphagia and polyuria.   Genitourinary: Negative for decreased urine volume and dysuria.   Musculoskeletal: Positive for arthralgias. Negative for gait problem and neck pain.   Skin: Negative for rash.   Neurological: Negative for dizziness, syncope and light-headedness.   Psychiatric/Behavioral: Negative for dysphoric mood. The patient is not nervous/anxious.        Objective:     Vitals:    08/09/19 1533   BP: (!) 145/80   Pulse:         Physical Exam   Constitutional: He is oriented to person, place, and time. He appears well-developed and well-nourished. No distress.   HENT:   Head: Normocephalic and atraumatic.   Eyes: Pupils are equal, round, and reactive to light. EOM are normal. Right eye exhibits no discharge. Left eye exhibits no discharge. No scleral icterus.   Neck: Normal range of motion. Neck supple. No JVD present. No thyromegaly present.   Cardiovascular: Normal rate and regular rhythm. Exam reveals no gallop and no friction rub.   Murmur heard.  Pulmonary/Chest: Effort normal and breath sounds normal. No respiratory distress. He has no wheezes.   Abdominal: Soft. Bowel sounds are normal. He exhibits no distension and no  mass. There is no tenderness.   Musculoskeletal: Normal range of motion. He exhibits no edema.   Lymphadenopathy:     He has no cervical adenopathy.   Neurological: He is alert and oriented to person, place, and time. No cranial nerve deficit. Coordination normal.   Skin: Skin is warm and dry. Capillary refill takes less than 2 seconds. No rash noted. He is not diaphoretic.   Psychiatric: He has a normal mood and affect. His behavior is normal.         Assessment/Plan   Scooter was seen today for hypertension.    Diagnoses and all orders for this visit:    Gastroesophageal reflux disease, esophagitis presence not specified  -     omeprazole (PRILOSEC) 20 MG capsule; Take 1 capsule (20 mg total) by mouth 2 (two) times daily before meals.    Rheumatoid arthritis, involving unspecified site, unspecified rheumatoid factor presence  -     tiZANidine (ZANAFLEX) 4 MG tablet; Take 1 tablet (4 mg total) by mouth every 8 (eight) hours.    Carpal tunnel syndrome, unspecified laterality  -     tiZANidine (ZANAFLEX) 4 MG tablet; Take 1 tablet (4 mg total) by mouth every 8 (eight) hours.    Resistant hypertension  -     amLODIPine (NORVASC) 10 MG tablet; Take 1 tablet (10 mg total) by mouth every morning.  -     hydrALAZINE (APRESOLINE) 25 MG tablet; Take 1 tablet (25 mg total) by mouth every 8 (eight) hours.    Other hyperlipidemia  -     pravastatin (PRAVACHOL) 20 MG tablet; Take 1 tablet (20 mg total) by mouth once daily.    Other male erectile dysfunction  -     Discontinue: sildenafil (VIAGRA) 100 MG tablet; Take 1 tablet (100 mg total) by mouth as needed for Erectile Dysfunction.  -     sildenafil (VIAGRA) 100 MG tablet; Take 1 tablet (100 mg total) by mouth as needed for Erectile Dysfunction.

## 2019-10-09 DIAGNOSIS — G56.00 CARPAL TUNNEL SYNDROME, UNSPECIFIED LATERALITY: ICD-10-CM

## 2019-10-09 DIAGNOSIS — M06.9 RHEUMATOID ARTHRITIS, INVOLVING UNSPECIFIED SITE, UNSPECIFIED RHEUMATOID FACTOR PRESENCE: ICD-10-CM

## 2019-10-09 DIAGNOSIS — M17.9 OSTEOARTHRITIS OF KNEE, UNSPECIFIED LATERALITY, UNSPECIFIED OSTEOARTHRITIS TYPE: ICD-10-CM

## 2019-10-09 RX ORDER — DICLOFENAC SODIUM 10 MG/G
2 GEL TOPICAL 4 TIMES DAILY
Qty: 100 G | Refills: 5 | Status: SHIPPED | OUTPATIENT
Start: 2019-10-09 | End: 2020-04-13 | Stop reason: SDUPTHER

## 2019-10-11 ENCOUNTER — LAB VISIT (OUTPATIENT)
Dept: LAB | Facility: HOSPITAL | Age: 69
End: 2019-10-11
Attending: INTERNAL MEDICINE
Payer: MEDICARE

## 2019-10-11 DIAGNOSIS — M89.9 DISORDER OF BONE: ICD-10-CM

## 2019-10-11 DIAGNOSIS — R52 PAIN MANAGEMENT: ICD-10-CM

## 2019-10-11 DIAGNOSIS — G56.00 CARPAL TUNNEL SYNDROME, UNSPECIFIED LATERALITY: ICD-10-CM

## 2019-10-11 DIAGNOSIS — M17.9 OSTEOARTHRITIS OF KNEE, UNSPECIFIED LATERALITY, UNSPECIFIED OSTEOARTHRITIS TYPE: ICD-10-CM

## 2019-10-11 DIAGNOSIS — I15.9 SECONDARY HYPERTENSION: ICD-10-CM

## 2019-10-11 DIAGNOSIS — M05.742 RHEUMATOID ARTHRITIS INVOLVING BOTH HANDS WITH POSITIVE RHEUMATOID FACTOR: ICD-10-CM

## 2019-10-11 DIAGNOSIS — M05.741 RHEUMATOID ARTHRITIS INVOLVING BOTH HANDS WITH POSITIVE RHEUMATOID FACTOR: ICD-10-CM

## 2019-10-11 DIAGNOSIS — D69.6 THROMBOCYTOPENIC: ICD-10-CM

## 2019-10-11 LAB
25(OH)D3+25(OH)D2 SERPL-MCNC: 32 NG/ML (ref 30–96)
ALBUMIN SERPL BCP-MCNC: 4.1 G/DL (ref 3.5–5.2)
ALP SERPL-CCNC: 52 U/L (ref 55–135)
ALT SERPL W/O P-5'-P-CCNC: 6 U/L (ref 10–44)
ANION GAP SERPL CALC-SCNC: 7 MMOL/L (ref 8–16)
AST SERPL-CCNC: 16 U/L (ref 10–40)
BASOPHILS # BLD AUTO: 0.04 K/UL (ref 0–0.2)
BASOPHILS NFR BLD: 0.4 % (ref 0–1.9)
BILIRUB SERPL-MCNC: 0.4 MG/DL (ref 0.1–1)
BUN SERPL-MCNC: 15 MG/DL (ref 8–23)
CALCIUM SERPL-MCNC: 9.2 MG/DL (ref 8.7–10.5)
CHLORIDE SERPL-SCNC: 103 MMOL/L (ref 95–110)
CO2 SERPL-SCNC: 28 MMOL/L (ref 23–29)
CREAT SERPL-MCNC: 1.2 MG/DL (ref 0.5–1.4)
CRP SERPL-MCNC: 0.7 MG/L (ref 0–8.2)
DIFFERENTIAL METHOD: ABNORMAL
EOSINOPHIL # BLD AUTO: 0.1 K/UL (ref 0–0.5)
EOSINOPHIL NFR BLD: 0.6 % (ref 0–8)
ERYTHROCYTE [DISTWIDTH] IN BLOOD BY AUTOMATED COUNT: 13.2 % (ref 11.5–14.5)
ERYTHROCYTE [SEDIMENTATION RATE] IN BLOOD BY WESTERGREN METHOD: 4 MM/HR (ref 0–10)
EST. GFR  (AFRICAN AMERICAN): >60 ML/MIN/1.73 M^2
EST. GFR  (NON AFRICAN AMERICAN): >60 ML/MIN/1.73 M^2
GLUCOSE SERPL-MCNC: 90 MG/DL (ref 70–110)
HCT VFR BLD AUTO: 33.6 % (ref 40–54)
HGB BLD-MCNC: 10.3 G/DL (ref 14–18)
IMM GRANULOCYTES # BLD AUTO: 0.05 K/UL (ref 0–0.04)
IMM GRANULOCYTES NFR BLD AUTO: 0.5 % (ref 0–0.5)
LYMPHOCYTES # BLD AUTO: 2.8 K/UL (ref 1–4.8)
LYMPHOCYTES NFR BLD: 27.8 % (ref 18–48)
MCH RBC QN AUTO: 28.5 PG (ref 27–31)
MCHC RBC AUTO-ENTMCNC: 30.7 G/DL (ref 32–36)
MCV RBC AUTO: 93 FL (ref 82–98)
MONOCYTES # BLD AUTO: 1.2 K/UL (ref 0.3–1)
MONOCYTES NFR BLD: 12.3 % (ref 4–15)
NEUTROPHILS # BLD AUTO: 5.9 K/UL (ref 1.8–7.7)
NEUTROPHILS NFR BLD: 58.4 % (ref 38–73)
NRBC BLD-RTO: 0 /100 WBC
PLATELET # BLD AUTO: 131 K/UL (ref 150–350)
PMV BLD AUTO: 13.9 FL (ref 9.2–12.9)
POTASSIUM SERPL-SCNC: 3.9 MMOL/L (ref 3.5–5.1)
PROT SERPL-MCNC: 6.8 G/DL (ref 6–8.4)
PTH-INTACT SERPL-MCNC: 61 PG/ML (ref 9–77)
RBC # BLD AUTO: 3.61 M/UL (ref 4.6–6.2)
RHEUMATOID FACT SERPL-ACNC: 18 IU/ML (ref 0–15)
SODIUM SERPL-SCNC: 138 MMOL/L (ref 136–145)
WBC # BLD AUTO: 10.09 K/UL (ref 3.9–12.7)

## 2019-10-11 PROCEDURE — 82306 VITAMIN D 25 HYDROXY: CPT

## 2019-10-11 PROCEDURE — 85651 RBC SED RATE NONAUTOMATED: CPT | Mod: PO

## 2019-10-11 PROCEDURE — 83970 ASSAY OF PARATHORMONE: CPT

## 2019-10-11 PROCEDURE — 86431 RHEUMATOID FACTOR QUANT: CPT

## 2019-10-11 PROCEDURE — 86140 C-REACTIVE PROTEIN: CPT

## 2019-10-11 PROCEDURE — 80053 COMPREHEN METABOLIC PANEL: CPT

## 2019-10-11 PROCEDURE — 36415 COLL VENOUS BLD VENIPUNCTURE: CPT | Mod: PO

## 2019-10-11 PROCEDURE — 85025 COMPLETE CBC W/AUTO DIFF WBC: CPT

## 2019-10-15 ENCOUNTER — OFFICE VISIT (OUTPATIENT)
Dept: RHEUMATOLOGY | Facility: CLINIC | Age: 69
End: 2019-10-15
Payer: MEDICARE

## 2019-10-15 VITALS
DIASTOLIC BLOOD PRESSURE: 78 MMHG | SYSTOLIC BLOOD PRESSURE: 148 MMHG | HEIGHT: 72 IN | WEIGHT: 169 LBS | BODY MASS INDEX: 22.89 KG/M2 | HEART RATE: 71 BPM

## 2019-10-15 DIAGNOSIS — M05.741 RHEUMATOID ARTHRITIS INVOLVING BOTH HANDS WITH POSITIVE RHEUMATOID FACTOR: Primary | ICD-10-CM

## 2019-10-15 DIAGNOSIS — M05.742 RHEUMATOID ARTHRITIS INVOLVING BOTH HANDS WITH POSITIVE RHEUMATOID FACTOR: Primary | ICD-10-CM

## 2019-10-15 DIAGNOSIS — Z79.52 CURRENT USE OF STEROID MEDICATION: ICD-10-CM

## 2019-10-15 DIAGNOSIS — G89.4 CHRONIC PAIN SYNDROME: ICD-10-CM

## 2019-10-15 DIAGNOSIS — M17.9 OSTEOARTHRITIS OF KNEE, UNSPECIFIED LATERALITY, UNSPECIFIED OSTEOARTHRITIS TYPE: ICD-10-CM

## 2019-10-15 DIAGNOSIS — D69.6 THROMBOCYTOPENIA: ICD-10-CM

## 2019-10-15 DIAGNOSIS — Z86.19 HISTORY OF HEPATITIS C: ICD-10-CM

## 2019-10-15 PROCEDURE — 99214 OFFICE O/P EST MOD 30 MIN: CPT | Mod: 25,S$GLB,, | Performed by: PHYSICIAN ASSISTANT

## 2019-10-15 PROCEDURE — 99999 PR PBB SHADOW E&M-EST. PATIENT-LVL IV: ICD-10-PCS | Mod: PBBFAC,,, | Performed by: PHYSICIAN ASSISTANT

## 2019-10-15 PROCEDURE — 96372 THER/PROPH/DIAG INJ SC/IM: CPT | Mod: S$GLB,,, | Performed by: PHYSICIAN ASSISTANT

## 2019-10-15 PROCEDURE — 3077F SYST BP >= 140 MM HG: CPT | Mod: CPTII,S$GLB,, | Performed by: PHYSICIAN ASSISTANT

## 2019-10-15 PROCEDURE — 99499 UNLISTED E&M SERVICE: CPT | Mod: S$GLB,,, | Performed by: PHYSICIAN ASSISTANT

## 2019-10-15 PROCEDURE — 3078F DIAST BP <80 MM HG: CPT | Mod: CPTII,S$GLB,, | Performed by: PHYSICIAN ASSISTANT

## 2019-10-15 PROCEDURE — 3078F PR MOST RECENT DIASTOLIC BLOOD PRESSURE < 80 MM HG: ICD-10-PCS | Mod: CPTII,S$GLB,, | Performed by: PHYSICIAN ASSISTANT

## 2019-10-15 PROCEDURE — 96372 PR INJECTION,THERAP/PROPH/DIAG2ST, IM OR SUBCUT: ICD-10-PCS | Mod: S$GLB,,, | Performed by: PHYSICIAN ASSISTANT

## 2019-10-15 PROCEDURE — 3077F PR MOST RECENT SYSTOLIC BLOOD PRESSURE >= 140 MM HG: ICD-10-PCS | Mod: CPTII,S$GLB,, | Performed by: PHYSICIAN ASSISTANT

## 2019-10-15 PROCEDURE — 1101F PT FALLS ASSESS-DOCD LE1/YR: CPT | Mod: CPTII,S$GLB,, | Performed by: PHYSICIAN ASSISTANT

## 2019-10-15 PROCEDURE — 99214 PR OFFICE/OUTPT VISIT, EST, LEVL IV, 30-39 MIN: ICD-10-PCS | Mod: 25,S$GLB,, | Performed by: PHYSICIAN ASSISTANT

## 2019-10-15 PROCEDURE — 99999 PR PBB SHADOW E&M-EST. PATIENT-LVL IV: CPT | Mod: PBBFAC,,, | Performed by: PHYSICIAN ASSISTANT

## 2019-10-15 PROCEDURE — 1101F PR PT FALLS ASSESS DOC 0-1 FALLS W/OUT INJ PAST YR: ICD-10-PCS | Mod: CPTII,S$GLB,, | Performed by: PHYSICIAN ASSISTANT

## 2019-10-15 PROCEDURE — 99499 RISK ADDL DX/OHS AUDIT: ICD-10-PCS | Mod: S$GLB,,, | Performed by: PHYSICIAN ASSISTANT

## 2019-10-15 RX ORDER — CYANOCOBALAMIN 1000 UG/ML
1000 INJECTION, SOLUTION INTRAMUSCULAR; SUBCUTANEOUS
Status: COMPLETED | OUTPATIENT
Start: 2019-10-15 | End: 2019-10-15

## 2019-10-15 RX ORDER — KETOROLAC TROMETHAMINE 30 MG/ML
60 INJECTION, SOLUTION INTRAMUSCULAR; INTRAVENOUS
Status: COMPLETED | OUTPATIENT
Start: 2019-10-15 | End: 2019-10-15

## 2019-10-15 RX ORDER — METHYLPREDNISOLONE ACETATE 80 MG/ML
160 INJECTION, SUSPENSION INTRA-ARTICULAR; INTRALESIONAL; INTRAMUSCULAR; SOFT TISSUE
Status: COMPLETED | OUTPATIENT
Start: 2019-10-15 | End: 2019-10-15

## 2019-10-15 RX ADMIN — METHYLPREDNISOLONE ACETATE 160 MG: 80 INJECTION, SUSPENSION INTRA-ARTICULAR; INTRALESIONAL; INTRAMUSCULAR; SOFT TISSUE at 03:10

## 2019-10-15 RX ADMIN — CYANOCOBALAMIN 1000 MCG: 1000 INJECTION, SOLUTION INTRAMUSCULAR; SUBCUTANEOUS at 03:10

## 2019-10-15 RX ADMIN — KETOROLAC TROMETHAMINE 60 MG: 30 INJECTION, SOLUTION INTRAMUSCULAR; INTRAVENOUS at 03:10

## 2019-10-15 ASSESSMENT — ROUTINE ASSESSMENT OF PATIENT INDEX DATA (RAPID3)
PAIN SCORE: 7.5
MDHAQ FUNCTION SCORE: .5
PSYCHOLOGICAL DISTRESS SCORE: 0
PATIENT GLOBAL ASSESSMENT SCORE: 5
TOTAL RAPID3 SCORE: 4.72
FATIGUE SCORE: 1.1

## 2019-10-15 NOTE — PROGRESS NOTES
Administered 1 cc ( 1000 mcg/ml ) of b12 to the right upper outer gluteal. Informed of s/s to report verbalized understanding. No adverse reactions noted.    Lot # [id number]  Expiration feb 21    Administered 2 cc ( 80 mg/ml ) of depomedrol to the right upper outer gluteal. Informed of s/s to report verbalized understanding. No adverse reactions noted.    Lot # [id number]  Expiration 12/2020    Administered 2 cc ( 30 mg/ml ) of toradol to the left upper outer gluteal. Informed of s/s to report verbalized understanding. No adverse reactions noted.    Lot # [id number]  Expiration 05/2021

## 2019-10-15 NOTE — PROGRESS NOTES
Subjective:       Patient ID: Scooter Swan is a 68 y.o. male.    Chief Complaint: Rheumatoid Arthritis and Disease Management    Mr. Swan is a 68 year old male who presents to clinic for follow up on seropositive rheumatoid arthritis and osteoarthritis. He is a new patient to me. He has been doing poorly since his last visit. He was unable to tolerate SSZ due to stomach upset. He complains of constant aching pain involving his hands, knees, feet, and low back. He has significant morning stiffness and joint swelling despite taking prednisone 10 mg daily. His BP is elevated today, but he reports home readings typically 115/60s. He states norco is helpful for pain relief and he is not having any side effects from the medication. We reviewed recent labs in detail.    Current treatment:  1. Norco TID PRN  2. Prednisone 10 mg  3. Zanaflex PRN    Prior treatment:  1. Plaquenil (WEIGHT LOSS)  2. SSZ caused GI upset    Review of Systems   Constitutional: Positive for activity change. Negative for chills, fatigue and fever.   Eyes: Negative for visual disturbance.   Respiratory: Negative for cough, shortness of breath and wheezing.    Cardiovascular: Negative for chest pain, palpitations and leg swelling.   Gastrointestinal: Negative for abdominal pain, constipation, diarrhea, nausea and vomiting.   Musculoskeletal: Positive for arthralgias, back pain and joint swelling. Negative for myalgias.   Neurological: Negative for dizziness, syncope and headaches.   Hematological: Negative for adenopathy.         Objective:     Vitals:    10/15/19 1340   BP: (!) 148/78   Pulse: 71       Past Medical History:   Diagnosis Date    Cataract     COPD (chronic obstructive pulmonary disease)     Diverticulosis     DUARTE (dyspnea on exertion)     GERD (gastroesophageal reflux disease)     Glaucoma     Hyperlipidemia     Hypertension     Liver lesion 08/2018    RA (rheumatoid arthritis)     Rectal prolapse     Possible     Past Surgical  History:   Procedure Laterality Date    CARPAL TUNNEL RELEASE      Right wrist 2015    COLONOSCOPY  08/2016    Dr. Cardona; in care everywhere    COLONOSCOPY N/A 3/20/2019    Procedure: COLONOSCOPY;  Surgeon: Sotero Arthur MD;  Location: [facility];  Service: Endoscopy;  Laterality: N/A;    EXCISIONAL HEMORRHOIDECTOMY N/A 10/18/2018    Procedure: HEMORRHOIDECTOMY, prone;  Surgeon: Gunnar Horton MD;  Location: [address];  Service: Colon and Rectal;  Laterality: N/A;    THYROID SURGERY      UPPER GASTROINTESTINAL ENDOSCOPY  08/2016    Dr. Cardona; in care everywhere          Physical Exam   Constitutional: He is well-developed, well-nourished, and in no distress.   Eyes: Right conjunctiva is not injected. Left conjunctiva is not injected. Right eye exhibits normal extraocular motion. Left eye exhibits normal extraocular motion.   Neck: No JVD present. No thyromegaly present.   Cardiovascular: Normal rate and regular rhythm.  Exam reveals no decreased pulses.    Pulmonary/Chest: He has no wheezes. He has no rhonchi. He has no rales.       Right Side Rheumatological Exam     Examination finds the shoulder normal.    The patient is tender to palpation of the elbow, wrist, knee, 1st PIP, 1st MCP, 2nd PIP, 2nd MCP, 3rd PIP, 3rd MCP, 4th PIP, 4th MCP, 5th PIP and 5th MCP    He has swelling of the wrist, 1st PIP, 2nd PIP, 3rd PIP and 4th PIP    Left Side Rheumatological Exam     Examination finds the shoulder normal.    The patient is tender to palpation of the elbow, wrist, knee, 1st PIP, 1st MCP, 2nd PIP, 2nd MCP, 3rd PIP, 3rd MCP, 4th PIP, 4th MCP, 5th PIP and 5th MCP.    He has swelling of the wrist, 1st PIP, 2nd PIP, 3rd PIP, 4th PIP and 5th PIP      Lymphadenopathy:     He has no cervical adenopathy.   Neurological: Gait normal.   Skin: No rash noted.     Psychiatric: Mood and affect normal.       Recent labs reviewed:  Component      Latest Ref Rng & Units 10/11/2019   WBC      3.90 - 12.70 K/uL  10.09   RBC      4.60 - 6.20 M/uL 3.61 (L)   Hemoglobin      14.0 - 18.0 g/dL 10.3 (L)   Hematocrit      40.0 - 54.0 % 33.6 (L)   MCV      82 - 98 fL 93   MCH      27.0 - 31.0 pg 28.5   MCHC      32.0 - 36.0 g/dL 30.7 (L)   RDW      11.5 - 14.5 % 13.2   Platelets      150 - 350 K/uL 131 (L)   MPV      9.2 - 12.9 fL 13.9 (H)   Immature Granulocytes      0.0 - 0.5 % 0.5   Gran # (ANC)      1.8 - 7.7 K/uL 5.9   Immature Grans (Abs)      0.00 - 0.04 K/uL 0.05 (H)   Lymph #      1.0 - 4.8 K/uL 2.8   Mono #      0.3 - 1.0 K/uL 1.2 (H)   Eos #      0.0 - 0.5 K/uL 0.1   Baso #      0.00 - 0.20 K/uL 0.04   nRBC      0 /100 WBC 0   Gran%      38.0 - 73.0 % 58.4   Lymph%      18.0 - 48.0 % 27.8   Mono%      4.0 - 15.0 % 12.3   Eosinophil%      0.0 - 8.0 % 0.6   Basophil%      0.0 - 1.9 % 0.4   Differential Method       Automated   Sodium      136 - 145 mmol/L 138   Potassium      3.5 - 5.1 mmol/L 3.9   Chloride      95 - 110 mmol/L 103   CO2      23 - 29 mmol/L 28   Glucose      70 - 110 mg/dL 90   BUN, Bld      8 - 23 mg/dL 15   Creatinine      0.5 - 1.4 mg/dL 1.2   Calcium      8.7 - 10.5 mg/dL 9.2   PROTEIN TOTAL      6.0 - 8.4 g/dL 6.8   Albumin      3.5 - 5.2 g/dL 4.1   BILIRUBIN TOTAL      0.1 - 1.0 mg/dL 0.4   Alkaline Phosphatase      55 - 135 U/L 52 (L)   AST      10 - 40 U/L 16   ALT      10 - 44 U/L 6 (L)   Anion Gap      8 - 16 mmol/L 7 (L)   eGFR if African American      >60 mL/min/1.73 m:2 >60.0   eGFR if non African American      >60 mL/min/1.73 m:2 >60.0   Rheumatoid Factor      0.0 - 15.0 IU/mL 18.0 (H)   Sed Rate      0 - 10 mm/Hr 4   CRP      0.0 - 8.2 mg/L 0.7   Vit D, 25-Hydroxy      30 - 96 ng/mL 32   PTH      9.0 - 77.0 pg/mL 61.0     Assessment:       1. Rheumatoid arthritis involving both hands with positive rheumatoid factor    2. Thrombocytopenia    3. Osteoarthritis of knee, unspecified laterality, unspecified osteoarthritis type    4. History of hepatitis C    5. Chronic pain syndrome    6. Current  use of steroid medication            Plan:       Rheumatoid arthritis involving both hands with positive rheumatoid factor  -     ketorolac injection 60 mg  -     cyanocobalamin injection 1,000 mcg  -     methylPREDNISolone acetate injection 160 mg  -     CBC auto differential; Future; Expected date: 10/15/2019  -     Comprehensive metabolic panel; Future; Expected date: 10/15/2019  -     C-reactive protein; Future; Expected date: 10/15/2019  -     Sedimentation rate; Future; Expected date: 10/15/2019    Thrombocytopenia  -     CBC auto differential; Future; Expected date: 10/15/2019  -     Comprehensive metabolic panel; Future; Expected date: 10/15/2019  -     C-reactive protein; Future; Expected date: 10/15/2019  -     Sedimentation rate; Future; Expected date: 10/15/2019    Osteoarthritis of knee, unspecified laterality, unspecified osteoarthritis type    History of hepatitis C    Chronic pain syndrome    Current use of steroid medication        Assessment:  68 year old male with  Seropositive (+RF) rheumatoid arthritis, osteoarthritis on prednisone 10 mg  --thrombocytopenia  --hx of hep c s/p harvoni, last hep c viral load undetectable  --chronic pain syndrome    Plan:  1. Toradol 60, deop 160, b12 1000 mcg given today for falre  2. Discussed Enbrel and information given. Pt is very hesitant to start biologic therapy. If he is interested he will need additional labs including TB and updated hepatitis labs.  3. Continue prednisone 10 mg daily for now  4. Continue norco PRN per Dr. Morales. I have checked [place] prescription monitoring program site and no unusual or abnormal behavior has occurred pt understand the risk and benefits of taking opioid medications and has decided to continue the medication       Follow up:  3 months Dr. Morales or sooner if needed

## 2019-10-17 ENCOUNTER — OFFICE VISIT (OUTPATIENT)
Dept: GASTROENTEROLOGY | Facility: CLINIC | Age: 69
End: 2019-10-17
Payer: MEDICARE

## 2019-10-17 VITALS
HEART RATE: 76 BPM | BODY MASS INDEX: 23.47 KG/M2 | HEIGHT: 72 IN | RESPIRATION RATE: 18 BRPM | SYSTOLIC BLOOD PRESSURE: 156 MMHG | WEIGHT: 173.31 LBS | DIASTOLIC BLOOD PRESSURE: 77 MMHG

## 2019-10-17 DIAGNOSIS — Z87.19 HISTORY OF CHRONIC CONSTIPATION: ICD-10-CM

## 2019-10-17 DIAGNOSIS — R93.2 ABNORMAL FINDING ON IMAGING OF LIVER: ICD-10-CM

## 2019-10-17 DIAGNOSIS — R13.14 PHARYNGOESOPHAGEAL DYSPHAGIA: ICD-10-CM

## 2019-10-17 DIAGNOSIS — Z86.19 HISTORY OF HEPATITIS C: ICD-10-CM

## 2019-10-17 DIAGNOSIS — R93.41 ABNORMAL CT SCAN, BLADDER: ICD-10-CM

## 2019-10-17 DIAGNOSIS — R10.30 LOWER ABDOMINAL PAIN: ICD-10-CM

## 2019-10-17 DIAGNOSIS — F11.90 OPIOID USE: ICD-10-CM

## 2019-10-17 DIAGNOSIS — Z87.19 HISTORY OF GASTROESOPHAGEAL REFLUX (GERD): ICD-10-CM

## 2019-10-17 DIAGNOSIS — R14.3 FLATULENCE: Primary | ICD-10-CM

## 2019-10-17 PROCEDURE — 1101F PT FALLS ASSESS-DOCD LE1/YR: CPT | Mod: CPTII,S$GLB,, | Performed by: NURSE PRACTITIONER

## 2019-10-17 PROCEDURE — 3078F PR MOST RECENT DIASTOLIC BLOOD PRESSURE < 80 MM HG: ICD-10-PCS | Mod: CPTII,S$GLB,, | Performed by: NURSE PRACTITIONER

## 2019-10-17 PROCEDURE — 1101F PR PT FALLS ASSESS DOC 0-1 FALLS W/OUT INJ PAST YR: ICD-10-PCS | Mod: CPTII,S$GLB,, | Performed by: NURSE PRACTITIONER

## 2019-10-17 PROCEDURE — 99999 PR PBB SHADOW E&M-EST. PATIENT-LVL V: ICD-10-PCS | Mod: PBBFAC,,, | Performed by: NURSE PRACTITIONER

## 2019-10-17 PROCEDURE — 3078F DIAST BP <80 MM HG: CPT | Mod: CPTII,S$GLB,, | Performed by: NURSE PRACTITIONER

## 2019-10-17 PROCEDURE — 99214 OFFICE O/P EST MOD 30 MIN: CPT | Mod: S$GLB,,, | Performed by: NURSE PRACTITIONER

## 2019-10-17 PROCEDURE — 3077F SYST BP >= 140 MM HG: CPT | Mod: CPTII,S$GLB,, | Performed by: NURSE PRACTITIONER

## 2019-10-17 PROCEDURE — 99999 PR PBB SHADOW E&M-EST. PATIENT-LVL V: CPT | Mod: PBBFAC,,, | Performed by: NURSE PRACTITIONER

## 2019-10-17 PROCEDURE — 3077F PR MOST RECENT SYSTOLIC BLOOD PRESSURE >= 140 MM HG: ICD-10-PCS | Mod: CPTII,S$GLB,, | Performed by: NURSE PRACTITIONER

## 2019-10-17 PROCEDURE — 99214 PR OFFICE/OUTPT VISIT, EST, LEVL IV, 30-39 MIN: ICD-10-PCS | Mod: S$GLB,,, | Performed by: NURSE PRACTITIONER

## 2019-10-17 RX ORDER — POLYETHYLENE GLYCOL 3350 17 G/17G
17 POWDER, FOR SOLUTION ORAL DAILY PRN
COMMUNITY

## 2019-10-17 NOTE — PROGRESS NOTES
Subjective:       Patient ID: Scooter Swan is a 68 y.o. male Body mass index is 23.5 kg/m².    Chief Complaint: Other (Follow-up Gas)    This patient is Dr. Arthur & myself    Abdominal Pain   Episode onset: started at least since 8/2018. The problem occurs intermittently (occasional- maybe once a week). The problem has been rapidly improving (reports gas has significantly improved since off plaquenil). Pain location: lower abdomen. The pain is at a severity of 0/10 (currently). The quality of the pain is a sensation of fullness (described as problems passing gas, soreness). The abdominal pain does not radiate. Associated symptoms include arthralgias (reports generalized joint pain rated 5/10; seeing rheumatology for this currently), constipation (chronic; no change in bowel habits; bowel movements 1-2 times a day; glycolax daily; PAST: KRISTALOSE, colace, linzess, amitiza- all described as too harsh (amitiza sent him to the hospital)) and flatus. Pertinent negatives include no belching, diarrhea, dysuria, fever, frequency, hematochezia, melena, nausea (occasional mild), vomiting or weight loss. Exacerbated by: constipation. The pain is relieved by passing flatus. Treatments tried: probiotic daily helping; NORCO taken BID for arthritis pain; PAST: BENTYL 20 MG, gas-x made it worse. Prior diagnostic workup includes surgery, CT scan and lower endoscopy (hemorrhoidectomy 10/2018). His past medical history is significant for GERD (controlled on prilosec 20 mg bid). There is no history of Crohn's disease, pancreatitis or ulcerative colitis.     Review of Systems   Constitutional: Negative for appetite change, chills, fatigue, fever and weight loss.   HENT: Positive for trouble swallowing (occasional with food; denies problems with liquids or pills). Negative for sore throat.    Respiratory: Negative for cough, choking and shortness of breath.    Cardiovascular: Negative for chest pain.   Gastrointestinal: Positive for  abdominal pain, constipation (chronic; no change in bowel habits; bowel movements 1-2 times a day; glycolax daily; PAST: KRISTALOSE, colace, linzess, amitiza- all described as too harsh (amitiza sent him to the hospital)) and flatus. Negative for anal bleeding, blood in stool, diarrhea, hematochezia, melena, nausea (occasional mild), rectal pain and vomiting.   Genitourinary: Negative for difficulty urinating, dysuria, flank pain and frequency.        Seeing urology   Musculoskeletal: Positive for arthralgias (reports generalized joint pain rated 5/10; seeing rheumatology for this currently).   Neurological: Negative for weakness.       Past Medical History:   Diagnosis Date    Cataract     COPD (chronic obstructive pulmonary disease)     Diverticulosis     DUARTE (dyspnea on exertion)     GERD (gastroesophageal reflux disease)     Glaucoma     Hepatitis C     treated; seeing Dr. Carr    Hyperlipidemia     Hypertension     Liver lesion 08/2018    RA (rheumatoid arthritis)     Rectal prolapse     Possible     Past Surgical History:   Procedure Laterality Date    CARPAL TUNNEL RELEASE      Right wrist 2015    COLONOSCOPY  08/2016    Dr. Cardona; in care everywhere    COLONOSCOPY N/A 3/20/2019    Procedure: COLONOSCOPY;  Surgeon: Sotero Arthur MD;  Location: [facility];  Service: Endoscopy;  Laterality: N/A; hemorrhoids, diverticulosis, repeat in 10 years for screening    EXCISIONAL HEMORRHOIDECTOMY N/A 10/18/2018    Procedure: HEMORRHOIDECTOMY, prone;  Surgeon: Gunnar Horton MD;  Location: [facility] OR [address] [place] [address];  Service: Colon and Rectal;  Laterality: N/A;    THYROID SURGERY      UPPER GASTROINTESTINAL ENDOSCOPY  08/2016    Dr. Cardona; in care everywhere     Family History   Problem Relation Age of Onset    Stomach cancer Paternal Cousin     Stomach cancer Paternal Cousin     Cataracts Mother     Diabetes Mother     Hypertension Mother     Stroke Mother     Diabetes Sister     Hypertension  Sister     Stroke Sister     Diabetes Brother     Glaucoma Brother     Hypertension Brother     Stroke Brother     Diabetes Maternal Aunt     Hypertension Maternal Aunt     Stroke Maternal Aunt     Diabetes Maternal Uncle     Hypertension Maternal Uncle     Stroke Maternal Uncle     Diabetes Maternal Grandmother     Hypertension Maternal Grandmother     Stroke Maternal Grandmother     Cancer Cousin         stomach    Colon cancer Neg Hx     Colon polyps Neg Hx     Crohn's disease Neg Hx     Ulcerative colitis Neg Hx     Esophageal cancer Neg Hx     Amblyopia Neg Hx     Blindness Neg Hx     Macular degeneration Neg Hx     Retinal detachment Neg Hx     Strabismus Neg Hx     Thyroid disease Neg Hx      Wt Readings from Last 20 Encounters:   10/17/19 78.6 kg (173 lb 4.5 oz)   10/15/19 76.7 kg (169 lb)   08/09/19 75.3 kg (166 lb 0.1 oz)   07/31/19 72.6 kg (160 lb)   04/23/19 67.6 kg (149 lb 0.5 oz)   03/25/19 67.6 kg (149 lb)   03/18/19 70.3 kg (155 lb)   02/28/19 71.6 kg (157 lb 13.6 oz)   02/21/19 68 kg (149 lb 14.6 oz)   02/18/19 69.3 kg (152 lb 11.2 oz)   02/06/19 67 kg (147 lb 11.3 oz)   01/29/19 69.9 kg (154 lb 1.6 oz)   11/29/18 67.6 kg (149 lb 0.5 oz)   10/18/18 70.3 kg (155 lb)   10/12/18 71.3 kg (157 lb 3 oz)   10/05/18 71.2 kg (157 lb)   09/27/18 71.4 kg (157 lb 6.2 oz)   09/18/18 71.3 kg (157 lb 4.8 oz)   06/13/18 75.5 kg (166 lb 8.9 oz)   03/15/18 78.9 kg (174 lb 0.9 oz)     Lab Results   Component Value Date    WBC 10.09 10/11/2019    HGB 10.3 (L) 10/11/2019    HCT 33.6 (L) 10/11/2019    MCV 93 10/11/2019     (L) 10/11/2019     CMP  Sodium   Date Value Ref Range Status   10/11/2019 138 136 - 145 mmol/L Final     Potassium   Date Value Ref Range Status   10/11/2019 3.9 3.5 - 5.1 mmol/L Final     Chloride   Date Value Ref Range Status   10/11/2019 103 95 - 110 mmol/L Final     CO2   Date Value Ref Range Status   10/11/2019 28 23 - 29 mmol/L Final     Glucose   Date Value Ref  Range Status   10/11/2019 90 70 - 110 mg/dL Final     BUN, Bld   Date Value Ref Range Status   10/11/2019 15 8 - 23 mg/dL Final     Creatinine   Date Value Ref Range Status   10/11/2019 1.2 0.5 - 1.4 mg/dL Final     Calcium   Date Value Ref Range Status   10/11/2019 9.2 8.7 - 10.5 mg/dL Final     Total Protein   Date Value Ref Range Status   10/11/2019 6.8 6.0 - 8.4 g/dL Final     Albumin   Date Value Ref Range Status   10/11/2019 4.1 3.5 - 5.2 g/dL Final     Total Bilirubin   Date Value Ref Range Status   10/11/2019 0.4 0.1 - 1.0 mg/dL Final     Comment:     For infants and newborns, interpretation of results should be based  on gestational age, weight and in agreement with clinical  observations.  Premature Infant recommended reference ranges:  Up to 24 hours.............<8.0 mg/dL  Up to 48 hours............<12.0 mg/dL  3-5 days..................<15.0 mg/dL  6-29 days.................<15.0 mg/dL       Alkaline Phosphatase   Date Value Ref Range Status   10/11/2019 52 (L) 55 - 135 U/L Final     AST   Date Value Ref Range Status   10/11/2019 16 10 - 40 U/L Final     ALT   Date Value Ref Range Status   10/11/2019 6 (L) 10 - 44 U/L Final     Anion Gap   Date Value Ref Range Status   10/11/2019 7 (L) 8 - 16 mmol/L Final     eGFR if    Date Value Ref Range Status   10/11/2019 >60.0 >60 mL/min/1.73 m^2 Final     eGFR if non    Date Value Ref Range Status   10/11/2019 >60.0 >60 mL/min/1.73 m^2 Final     Comment:     Calculation used to obtain the estimated glomerular filtration  rate (eGFR) is the CKD-EPI equation.        Lab Results   Component Value Date    TSH 4.24 05/21/2018     Reviewed prior medical records including radiology report of 7/2/19 and 2/25/19 ct scans abdomen pelvis; 6/24/19 MRI abdomen; 2/6/19 ABDOMINAL X-RAY; care everywhere: 11/1/18 esophagram; 8/13/18 MRI abdomen; 8/12/18 and 11/7/17 ct scans of abdomen pelvis; 10/19/17 abdominal ultrasound; 8/16/18 alpha fetoprotein  "WNL; & endoscopy history (see surgical history).    8/25/16 EGD & COLONOSCOPY from care everywhere was reviewed and procedure report states:   "DATE OF PROCEDURE: 08/25/2016  SURGEON: AARTI Cardona M.D.  NAME OF OPERATION:  1. EGD.  2. Colonoscopy.  PREOPERATIVE DIAGNOSIS: Anemia.  POSTOPERATIVE DIAGNOSES:  1. Normal esophagogastroduodenoscopy.  2. Normal colon.  3. Internal hemorrhoids.  DESCRIPTION OF PROCEDURE: The patient was placed on the endoscopy table in   the left lateral decubitus position. He underwent IV sedation by anesthesia.   The gastroscope was then inserted into the mouth and upper esophagus without   difficulty. The scope was advanced down the esophagus to the level of the GE   junction. A small hiatal hernia was seen at this location, but no evidence of  any other abnormality. The scope was advanced into the stomach and the   stomach was inflated with air. The fundus, body, and antrum of the stomach   were examined and felt to be normal. The scope was then advanced through the   pylorus into the duodenum, which was also felt to be normal. The scope was   then withdrawn back into the stomach and retroflexed on itself and no other   lesions were seen. The scope was then withdrawn and removed.    The colonoscope was then inserted into the anus and lower rectum without   difficulty. Internal hemorrhoids were seen, but with no evidence of bleeding   or excoriation. The scope was then advanced up the rectum and on into the   sigmoid colon. The scope was advanced up the sigmoid and descending colon and  around the splenic flexure into the transverse colon. The scope was advanced  across the transverse colon and around the hepatic flexure down into the   ascending colon and cecum. Slow withdrawal was then carried out. The prep   was poor with a large amount of liquid green feces present, but the scope was   able to be manipulated throughout and careful inspection did not reveal any   obvious mucosal " "abnormalities. Small details could have been missed  due to the poor prep. The scope was slowly withdrawn and back through the   remainder of the colon and removed. The patient tolerated the procedure well.  MD DOM Russell# [id number] D# [id number]  D: aminah/WT: OP/T: annie  DD: 08/25/2016 14:21 TD: 08/25/2016 14:46".  Objective:      Physical Exam   Constitutional: He is oriented to person, place, and time. He appears well-developed and well-nourished. No distress.   HENT:   Mouth/Throat: Oropharynx is clear and moist and mucous membranes are normal. No oral lesions. No oropharyngeal exudate.   Eyes: Pupils are equal, round, and reactive to light. Conjunctivae are normal. No scleral icterus.   Pulmonary/Chest: Effort normal and breath sounds normal. No respiratory distress. He has no wheezes.   Abdominal: Soft. Normal appearance and bowel sounds are normal. He exhibits no distension, no abdominal bruit and no mass. There is no tenderness. There is no rigidity, no rebound, no guarding, no tenderness at McBurney's point and negative Henry's sign.   Neurological: He is alert and oriented to person, place, and time.   Skin: Skin is warm and dry. No rash noted. He is not diaphoretic. No erythema. No pallor.   Non-jaundiced   Psychiatric: He has a normal mood and affect. His behavior is normal. Judgment and thought content normal.   Nursing note and vitals reviewed.      Assessment:       1. Flatulence    2. Pharyngoesophageal dysphagia    3. History of hepatitis C    4. Abnormal finding on imaging of liver    5. Abnormal CT scan, bladder    6. History of chronic constipation    7. Opioid use    8. Lower abdominal pain    9. History of gastroesophageal reflux (GERD)        Plan:       Flatulence  - continue OTC probiotic as directed  -discussed with patient about low gas diet: Reduce or eliminate these foods from your diet: Broccoli, Cauliflower, [place] sprouts, Cabbage, Cooked dried beans, Carbonated beverages " (sparkling water, soda, beer, champagne)  Other Causes Of Excess Gas Include:   1) EATING TOO FAST or TALKING WHILE YOU CHEW may cause you to swallow air. This increases the amount of gas in the stomach and may worsen your symptoms.  --> Chew each mouthful completely before swallowing. Take your time.  2) OVEREATING may increase the feeling of being bloated and cause more gas.  --> When you are full, stop eating.  3) CONSTIPATION can increase the amount of normal intestinal gas.  --> Avoid constipation by increasing the amount of fiber in your diet by including whole cereal grains, fresh vegetables (except those in the above list) and fresh fruits. High-fiber foods absorb water and carry it out of the body. When increasing the amount of fiber in your diet, you also need to increase the amount of water that you drink. You should drink at least eight 8-ounce glasses of water (two quarts) per day.    Pharyngoesophageal dysphagia  - schedule EGD, discussed procedure with patient and possible esophageal dilation may be performed during procedure if indicated, patient verbalized understanding  - educated patient to eat smaller more frequent meals and to eat slowly and advised to eat a soft diet.  - possible UGI/esophagram/esophageal manometry if symptoms persist    History of hepatitis C & Abnormal finding on imaging of liver  Recommend follow-up with hepatology for continued evaluation and management.    Abnormal CT scan, bladder  Recommend follow-up with urology for continued evaluation and management.    History of chronic constipation  Recommended daily exercise as tolerated, adequate water intake (six 8-oz glasses of water daily), and high fiber diet. OTC fiber supplements are recommended if diet does not reach daily fiber goal (25 grams daily), such as Metamucil, Citrucel, or FiberCon (take as directed, separate from other oral medications by >2 hours).  -Recommend taking an OTC stool softener such as Colace as  directed to avoid hard stools and straining with bowel movements PRN  - CONTINUE OTC MiraLax once daily (17g PO) as directed  - If no improvement with above recommendations, try intermittently dosed Dulcolax OTC as directed (every 3-4  days) PRN to facilitate bowel movements  -If still no improvement with these measures, call/follow-up    Opioid use  - discussed with patient that a side effect of narcotic pain medications is constipation, advised patient to talk to provider who manages pain medication, patient verbalized understanding    Lower abdominal pain  Recommend follow-up with urology for continued evaluation and management.  Recommend follow-up with Dr. Arthur for continued evaluation and management.    History of gastroesophageal reflux (GERD)  - CONTINUE PRILOSEC TO 20 MG BID DAILY AS DIRECTED; take in the morning 30-60 minutes before breakfast, discussed about possible long term use of medication (prefer to use lowest effective dose or discontinuing if possible), pt verbalized understanding  -Educated patient on lifestyle modifications to help control/reduce reflux/abdominal pain including: avoid large meals, avoid eating within 2-3 hours of bedtime (avoid late night eating & lying down soon after eating), elevate head of bed if nocturnal symptoms are present, smoking cessation (if current smoker), & weight loss (if overweight).   -Educated to avoid known foods which trigger reflux symptoms & to minimize/avoid high-fat foods, chocolate, caffeine, citrus, alcohol, & tomato products.  -Advised to avoid/limit use of NSAID's, since they can cause GI upset, bleeding, and/or ulcers. If needed, take with food.   - schedule EGD, discussed procedure with patient, including risks and benefits, patient verbalized understanding    Follow up in about 1 month (around 11/17/2019), or if symptoms worsen or fail to improve.      If no improvement in symptoms or symptoms worsen, call/follow-up at clinic or go to  [facility]

## 2019-10-17 NOTE — PATIENT INSTRUCTIONS
Excess Gas  Certain foods produce gas when digested. In some people, these foods make an excessive amount of gas. This may cause bloating, burping, or increased gas passing through the rectum (flatulence).     Foods that cause gas  The following foods are more likely to cause this problem. Limit them, or remove them from your diet:  · Broccoli  · Cauliflower  · [place] sprouts  · Cabbage  · Cooked dried beans  · Fizzy (carbonated) drinks, such as sparkling water, soda, beer, and champagne  Other causes  Other causes of excess gas include:  · Eating too fast or talking while you chew. This may cause you to swallow air. This increases the amount of gas in your stomach. And it may make your symptoms worse. Chew each mouthful completely before you swallow. Take your time.  · Chewing on gum or sucking on hard candy. These cause you to swallow more often. And some of what you are swallowing is air. This leads to more gas in your stomach. Avoid chewing gum and hard candy.  · Overeating. This may increase the feeling of being bloated and cause more gas. When you are full, stop eating.   · Being constipated. This can increase the amount of normal intestinal gas. Avoid constipation by getting more fiber in your diet. Good sources of fiber include whole-grain cereal, fresh vegetables (except those in the above list), and fresh fruits. High-fiber foods absorb water and carry it out of the body. When adding more fiber to your diet, you also need to drink more water. You should drink at least 8, 8-ounce glasses of water (2 quarts) per day.  Date Last Reviewed: 8/1/2016  © 0008-5170 [employer]. [address] [place] [address], [place] [address]. All rights reserved. This information is not intended as a substitute for professional medical care. Always follow your healthcare professional's instructions.

## 2019-11-01 ENCOUNTER — PATIENT OUTREACH (OUTPATIENT)
Dept: ADMINISTRATIVE | Facility: HOSPITAL | Age: 69
End: 2019-11-01

## 2019-11-05 DIAGNOSIS — M05.742 RHEUMATOID ARTHRITIS INVOLVING BOTH HANDS WITH POSITIVE RHEUMATOID FACTOR: ICD-10-CM

## 2019-11-05 DIAGNOSIS — M05.741 RHEUMATOID ARTHRITIS INVOLVING BOTH HANDS WITH POSITIVE RHEUMATOID FACTOR: ICD-10-CM

## 2019-11-05 DIAGNOSIS — G89.4 CHRONIC PAIN SYNDROME: Primary | ICD-10-CM

## 2019-11-05 DIAGNOSIS — M17.9 OSTEOARTHRITIS OF KNEE, UNSPECIFIED LATERALITY, UNSPECIFIED OSTEOARTHRITIS TYPE: ICD-10-CM

## 2019-11-05 NOTE — TELEPHONE ENCOUNTER
----- Message from Julita Sparks sent at 11/5/2019  9:23 AM CST -----  Contact: Jacqueline  Type: Needs Medical Advice    Who Called: Wife  Best Call Back Number: [phone] (home)   Additional Information: The patient is calling to have the Rx for Hydrocodone refilled he uses the [facility]

## 2019-11-06 ENCOUNTER — OFFICE VISIT (OUTPATIENT)
Dept: OPHTHALMOLOGY | Facility: CLINIC | Age: 69
End: 2019-11-06
Payer: MEDICARE

## 2019-11-06 DIAGNOSIS — H25.13 AGE-RELATED NUCLEAR CATARACT OF BOTH EYES: ICD-10-CM

## 2019-11-06 DIAGNOSIS — H40.1132 PRIMARY OPEN ANGLE GLAUCOMA (POAG) OF BOTH EYES, MODERATE STAGE: Primary | ICD-10-CM

## 2019-11-06 DIAGNOSIS — H05.20 EXOPHTHALMOS OF BOTH EYES: ICD-10-CM

## 2019-11-06 PROCEDURE — 92020 GONIOSCOPY: CPT | Mod: S$GLB,,, | Performed by: OPHTHALMOLOGY

## 2019-11-06 PROCEDURE — 92012 PR EYE EXAM, EST PATIENT,INTERMED: ICD-10-PCS | Mod: S$GLB,,, | Performed by: OPHTHALMOLOGY

## 2019-11-06 PROCEDURE — 92133 CPTRZD OPH DX IMG PST SGM ON: CPT | Mod: S$GLB,,, | Performed by: OPHTHALMOLOGY

## 2019-11-06 PROCEDURE — 92020 PR SPECIAL EYE EVAL,GONIOSCOPY: ICD-10-PCS | Mod: S$GLB,,, | Performed by: OPHTHALMOLOGY

## 2019-11-06 PROCEDURE — 99999 PR PBB SHADOW E&M-EST. PATIENT-LVL II: CPT | Mod: PBBFAC,,, | Performed by: OPHTHALMOLOGY

## 2019-11-06 PROCEDURE — 92012 INTRM OPH EXAM EST PATIENT: CPT | Mod: S$GLB,,, | Performed by: OPHTHALMOLOGY

## 2019-11-06 PROCEDURE — 99999 PR PBB SHADOW E&M-EST. PATIENT-LVL II: ICD-10-PCS | Mod: PBBFAC,,, | Performed by: OPHTHALMOLOGY

## 2019-11-06 PROCEDURE — 92133 POSTERIOR SEGMENT OCT OPTIC NERVE(OCULAR COHERENCE TOMOGRAPHY) - OU - BOTH EYES: ICD-10-PCS | Mod: S$GLB,,, | Performed by: OPHTHALMOLOGY

## 2019-11-06 RX ORDER — HYDRALAZINE HYDROCHLORIDE 10 MG/1
TABLET, FILM COATED ORAL
Refills: 2 | COMMUNITY
Start: 2019-10-28 | End: 2020-01-29

## 2019-11-06 NOTE — PROGRESS NOTES
HPI     DLS:4/2/19    Pt states not having any problems currently. Denies pain in eyes today.   Denies F/F. Pt states using dorzolamide/ aleyda OU BID and travatan z OU QHS   as directed.     Last edited by Judy Carlson on 11/6/2019 11:14 AM. (History)        ROS     Negative for: Constitutional, Gastrointestinal, Neurological, Skin,   Genitourinary, Musculoskeletal, HENT, Endocrine, Cardiovascular, Eyes,   Respiratory, Psychiatric, Allergic/Imm, Heme/Lymph    Last edited by Lucien Alas Jr., MD on 11/6/2019 12:06 PM. (History)        Assessment /Plan     For exam results, see Encounter Report.    Primary open angle glaucoma (POAG) of both eyes, moderate stage  -     Posterior Segment OCT Optic Nerve- Both eyes  OCT ON OD- sup and inf thinning OS- WNL  Schedule HVF at next visit  Continue cosopt BID and travatan OU QHS  Age-related nuclear cataract of both eyes  -no decrease in ADLS, no significant glare, and functionality is satisfactory  -counseled on what to expect if the cataracts worsening and how it can effect the vision  -continue to monitor and if vision changes patient can call for another appointment  Exophthalmos of both eyes  Stable, no lagophthalmos, no corneal decussation    Follow up in about 4 months (around 3/6/2020) for HVF and refraction, IOP and Medication check.

## 2019-11-08 RX ORDER — HYDROCODONE BITARTRATE AND ACETAMINOPHEN 10; 325 MG/1; MG/1
1 TABLET ORAL EVERY 8 HOURS PRN
Qty: 90 TABLET | Refills: 0 | Status: SHIPPED | OUTPATIENT
Start: 2019-11-08 | End: 2019-12-04 | Stop reason: SDUPTHER

## 2019-11-15 ENCOUNTER — TELEPHONE (OUTPATIENT)
Dept: GASTROENTEROLOGY | Facility: CLINIC | Age: 69
End: 2019-11-15

## 2019-11-15 NOTE — TELEPHONE ENCOUNTER
----- Message from Ariana Natarajan sent at 11/15/2019 11:17 AM CST -----  Contact: Jacqueline/spouse  Type: Needs Medical Advice    Who Called:  jacqueline  Symptoms (please be specific):  salas  How long has patient had these symptoms:  salas  Pharmacy name and phone #:  [facility]  Best Call Back Number: [phone]  Additional Information: Jacqueline states patient is cancelling apt for endoscopy procedure on11/21/19. Thanks!

## 2019-11-15 NOTE — TELEPHONE ENCOUNTER
Left message for Jacqueline that pt's procedure has been canceled & to return call to clinic to reschedule. Number provided.

## 2019-11-16 NOTE — TRANSFER OF CARE
Anesthesia Transfer of Care Note    Patient: Scooter Swan    Procedure(s) Performed: Procedure(s) (LRB):  HEMORRHOIDECTOMY, prone (N/A)    Patient location: PACU    Anesthesia Type: general    Transport from OR: Transported from OR on 100% O2 by closed face mask with adequate spontaneous ventilation    Post pain: adequate analgesia    Post assessment: no apparent anesthetic complications and tolerated procedure well    Post vital signs: stable    Level of consciousness: responds to stimulation and sedated    Nausea/Vomiting: no nausea/vomiting    Complications: none    Transfer of care protocol was followed      Last vitals:   Visit Vitals  BP (!) 168/65   Pulse 63   Temp 36.3 °C (97.3 °F) (Temporal)   Resp 19   Ht 6' (1.829 m)   Wt 70.3 kg (155 lb)   SpO2 100%   BMI 21.02 kg/m²      Maria Ines Flannery is a 45year old female. Patient presents with:  Hypertension  Diabetes  Hyperlipidemia    HPI:   New Patient to me. Reports her glucose is overall controlled. Reports about 1 year with diabetes. Has lost 30 lbs per pt - changed her diet.  Ashley Baca Future  - HEMOGLOBIN A1C; Future  - MICROALB/CREAT RATIO, RANDOM URINE; Future  - LIPID PANEL; Future  - TSH W REFLEX TO FREE T4; Future  - VITAMIN D, 25-HYDROXY; Future  - OPHTHALMOLOGY - INTERNAL    3.  Vitamin D deficiency    - VITAMIN D, 25-HYDROXY; Fut

## 2019-11-27 ENCOUNTER — TELEPHONE (OUTPATIENT)
Dept: GASTROENTEROLOGY | Facility: CLINIC | Age: 69
End: 2019-11-27

## 2019-12-04 DIAGNOSIS — M05.742 RHEUMATOID ARTHRITIS INVOLVING BOTH HANDS WITH POSITIVE RHEUMATOID FACTOR: ICD-10-CM

## 2019-12-04 DIAGNOSIS — G89.4 CHRONIC PAIN SYNDROME: ICD-10-CM

## 2019-12-04 DIAGNOSIS — M05.741 RHEUMATOID ARTHRITIS INVOLVING BOTH HANDS WITH POSITIVE RHEUMATOID FACTOR: ICD-10-CM

## 2019-12-04 DIAGNOSIS — M17.9 OSTEOARTHRITIS OF KNEE, UNSPECIFIED LATERALITY, UNSPECIFIED OSTEOARTHRITIS TYPE: ICD-10-CM

## 2019-12-04 NOTE — TELEPHONE ENCOUNTER
----- Message from Mary Martins sent at 12/4/2019 11:12 AM CST -----  Contact: pt's wife called  [phone]  Pt's wife called for a refill on his Hydrocodone to be called into [facility] in [place]. He is almost out of the medication.

## 2019-12-06 NOTE — TELEPHONE ENCOUNTER
----- Message from Chino BHARGAV Thor sent at 12/6/2019 12:05 PM CST -----  Contact: Wife/Jacqueline Phillips called in and is requesting a refill on patients Rx for:    HYDROcodone-acetaminophen (NORCO)  mg per tablet, 90 tablets with 0  refills last  Filled on 11/8/2019   Sig - Route: Take 1 tablet by mouth every 8 (eight) hours as needed for Pain. - Oral      [id number] - [place] [address] AT [facility] [address] [id number]  Phone: [phone] Fax: [phone]    Patient call back number is [phone]

## 2019-12-09 RX ORDER — HYDROCODONE BITARTRATE AND ACETAMINOPHEN 10; 325 MG/1; MG/1
1 TABLET ORAL EVERY 8 HOURS PRN
Qty: 90 TABLET | Refills: 0 | Status: SHIPPED | OUTPATIENT
Start: 2019-12-09 | End: 2020-01-06 | Stop reason: SDUPTHER

## 2020-01-06 DIAGNOSIS — M05.742 RHEUMATOID ARTHRITIS INVOLVING BOTH HANDS WITH POSITIVE RHEUMATOID FACTOR: ICD-10-CM

## 2020-01-06 DIAGNOSIS — M17.9 OSTEOARTHRITIS OF KNEE, UNSPECIFIED LATERALITY, UNSPECIFIED OSTEOARTHRITIS TYPE: ICD-10-CM

## 2020-01-06 DIAGNOSIS — G89.4 CHRONIC PAIN SYNDROME: ICD-10-CM

## 2020-01-06 DIAGNOSIS — M05.741 RHEUMATOID ARTHRITIS INVOLVING BOTH HANDS WITH POSITIVE RHEUMATOID FACTOR: ICD-10-CM

## 2020-01-08 NOTE — TELEPHONE ENCOUNTER
----- Message from Stormy Espino sent at 1/8/2020 11:21 AM CST -----  Contact: patient spouse majo ku ph# [phone]   patient spouse majo ku ph# [phone]   Patient requesting a refill on hydrocodone.     Patient will be using   [id number] - [place]  [address] AT [facility] [address] [id number]  Phone: [phone] Fax: [phone]    Thanks!

## 2020-01-08 NOTE — TELEPHONE ENCOUNTER
Returned wife call and informed a refill request was sent to Dr Morales on 1-6-2020 waiting for medication to be sent to pharmacy. Wife informed patient will be out of medication tomorrow.

## 2020-01-09 RX ORDER — HYDROCODONE BITARTRATE AND ACETAMINOPHEN 10; 325 MG/1; MG/1
1 TABLET ORAL EVERY 8 HOURS PRN
Qty: 90 TABLET | Refills: 0 | Status: SHIPPED | OUTPATIENT
Start: 2020-01-09 | End: 2020-01-30 | Stop reason: SDUPTHER

## 2020-01-24 ENCOUNTER — LAB VISIT (OUTPATIENT)
Dept: LAB | Facility: HOSPITAL | Age: 70
End: 2020-01-24
Attending: PHYSICIAN ASSISTANT
Payer: MEDICARE

## 2020-01-24 DIAGNOSIS — D69.6 THROMBOCYTOPENIA: ICD-10-CM

## 2020-01-24 DIAGNOSIS — M05.742 RHEUMATOID ARTHRITIS INVOLVING BOTH HANDS WITH POSITIVE RHEUMATOID FACTOR: ICD-10-CM

## 2020-01-24 DIAGNOSIS — M05.741 RHEUMATOID ARTHRITIS INVOLVING BOTH HANDS WITH POSITIVE RHEUMATOID FACTOR: ICD-10-CM

## 2020-01-24 LAB
ALBUMIN SERPL BCP-MCNC: 3.8 G/DL (ref 3.5–5.2)
ALP SERPL-CCNC: 50 U/L (ref 55–135)
ALT SERPL W/O P-5'-P-CCNC: 7 U/L (ref 10–44)
ANION GAP SERPL CALC-SCNC: 7 MMOL/L (ref 8–16)
AST SERPL-CCNC: 15 U/L (ref 10–40)
BASOPHILS # BLD AUTO: 0.04 K/UL (ref 0–0.2)
BASOPHILS NFR BLD: 0.4 % (ref 0–1.9)
BILIRUB SERPL-MCNC: 0.3 MG/DL (ref 0.1–1)
BUN SERPL-MCNC: 10 MG/DL (ref 8–23)
CALCIUM SERPL-MCNC: 8.8 MG/DL (ref 8.7–10.5)
CHLORIDE SERPL-SCNC: 104 MMOL/L (ref 95–110)
CO2 SERPL-SCNC: 29 MMOL/L (ref 23–29)
CREAT SERPL-MCNC: 1.1 MG/DL (ref 0.5–1.4)
CRP SERPL-MCNC: 5.8 MG/L (ref 0–8.2)
DIFFERENTIAL METHOD: ABNORMAL
EOSINOPHIL # BLD AUTO: 0.1 K/UL (ref 0–0.5)
EOSINOPHIL NFR BLD: 1 % (ref 0–8)
ERYTHROCYTE [DISTWIDTH] IN BLOOD BY AUTOMATED COUNT: 13.2 % (ref 11.5–14.5)
ERYTHROCYTE [SEDIMENTATION RATE] IN BLOOD BY WESTERGREN METHOD: 4 MM/HR (ref 0–10)
EST. GFR  (AFRICAN AMERICAN): >60 ML/MIN/1.73 M^2
EST. GFR  (NON AFRICAN AMERICAN): >60 ML/MIN/1.73 M^2
GLUCOSE SERPL-MCNC: 111 MG/DL (ref 70–110)
HCT VFR BLD AUTO: 32.2 % (ref 40–54)
HGB BLD-MCNC: 9.6 G/DL (ref 14–18)
IMM GRANULOCYTES # BLD AUTO: 0.04 K/UL (ref 0–0.04)
IMM GRANULOCYTES NFR BLD AUTO: 0.4 % (ref 0–0.5)
LYMPHOCYTES # BLD AUTO: 3 K/UL (ref 1–4.8)
LYMPHOCYTES NFR BLD: 28.5 % (ref 18–48)
MCH RBC QN AUTO: 28.7 PG (ref 27–31)
MCHC RBC AUTO-ENTMCNC: 29.8 G/DL (ref 32–36)
MCV RBC AUTO: 96 FL (ref 82–98)
MONOCYTES # BLD AUTO: 1.5 K/UL (ref 0.3–1)
MONOCYTES NFR BLD: 14.7 % (ref 4–15)
NEUTROPHILS # BLD AUTO: 5.8 K/UL (ref 1.8–7.7)
NEUTROPHILS NFR BLD: 55 % (ref 38–73)
NRBC BLD-RTO: 0 /100 WBC
PLATELET # BLD AUTO: 115 K/UL (ref 150–350)
PMV BLD AUTO: 14.5 FL (ref 9.2–12.9)
POTASSIUM SERPL-SCNC: 3.6 MMOL/L (ref 3.5–5.1)
PROT SERPL-MCNC: 6.4 G/DL (ref 6–8.4)
RBC # BLD AUTO: 3.35 M/UL (ref 4.6–6.2)
SODIUM SERPL-SCNC: 140 MMOL/L (ref 136–145)
WBC # BLD AUTO: 10.5 K/UL (ref 3.9–12.7)

## 2020-01-24 PROCEDURE — 85025 COMPLETE CBC W/AUTO DIFF WBC: CPT

## 2020-01-24 PROCEDURE — 85651 RBC SED RATE NONAUTOMATED: CPT | Mod: PO

## 2020-01-24 PROCEDURE — 86140 C-REACTIVE PROTEIN: CPT

## 2020-01-24 PROCEDURE — 80053 COMPREHEN METABOLIC PANEL: CPT

## 2020-01-24 PROCEDURE — 36415 COLL VENOUS BLD VENIPUNCTURE: CPT | Mod: PO

## 2020-01-28 ENCOUNTER — PATIENT OUTREACH (OUTPATIENT)
Dept: ADMINISTRATIVE | Facility: OTHER | Age: 70
End: 2020-01-28

## 2020-01-29 DIAGNOSIS — I1A.0 RESISTANT HYPERTENSION: ICD-10-CM

## 2020-01-29 RX ORDER — HYDRALAZINE HYDROCHLORIDE 25 MG/1
TABLET, FILM COATED ORAL
Qty: 270 TABLET | Refills: 1 | Status: SHIPPED | OUTPATIENT
Start: 2020-01-29 | End: 2020-07-23

## 2020-01-30 ENCOUNTER — OFFICE VISIT (OUTPATIENT)
Dept: RHEUMATOLOGY | Facility: CLINIC | Age: 70
End: 2020-01-30
Payer: MEDICARE

## 2020-01-30 VITALS
HEIGHT: 72 IN | WEIGHT: 182 LBS | HEART RATE: 73 BPM | DIASTOLIC BLOOD PRESSURE: 88 MMHG | BODY MASS INDEX: 24.65 KG/M2 | SYSTOLIC BLOOD PRESSURE: 165 MMHG

## 2020-01-30 DIAGNOSIS — G56.00 CARPAL TUNNEL SYNDROME, UNSPECIFIED LATERALITY: ICD-10-CM

## 2020-01-30 DIAGNOSIS — D69.6 THROMBOPENIA: ICD-10-CM

## 2020-01-30 DIAGNOSIS — T37.8X5D HYDROXYCHLOROQUINE CAUSING ADVERSE EFFECT IN THERAPEUTIC USE, SUBSEQUENT ENCOUNTER: ICD-10-CM

## 2020-01-30 DIAGNOSIS — M05.742 RHEUMATOID ARTHRITIS INVOLVING BOTH HANDS WITH POSITIVE RHEUMATOID FACTOR: Primary | ICD-10-CM

## 2020-01-30 DIAGNOSIS — M05.741 RHEUMATOID ARTHRITIS INVOLVING BOTH HANDS WITH POSITIVE RHEUMATOID FACTOR: Primary | ICD-10-CM

## 2020-01-30 DIAGNOSIS — M06.9 RHEUMATOID ARTHRITIS, INVOLVING UNSPECIFIED SITE, UNSPECIFIED RHEUMATOID FACTOR PRESENCE: ICD-10-CM

## 2020-01-30 DIAGNOSIS — M17.9 OSTEOARTHRITIS OF KNEE, UNSPECIFIED LATERALITY, UNSPECIFIED OSTEOARTHRITIS TYPE: ICD-10-CM

## 2020-01-30 DIAGNOSIS — R10.9 ABDOMINAL PAIN, UNSPECIFIED ABDOMINAL LOCATION: ICD-10-CM

## 2020-01-30 DIAGNOSIS — G89.4 CHRONIC PAIN SYNDROME: ICD-10-CM

## 2020-01-30 DIAGNOSIS — D64.9 ANEMIA, UNSPECIFIED TYPE: ICD-10-CM

## 2020-01-30 PROCEDURE — 96372 THER/PROPH/DIAG INJ SC/IM: CPT | Mod: 59,S$GLB,, | Performed by: INTERNAL MEDICINE

## 2020-01-30 PROCEDURE — 99499 UNLISTED E&M SERVICE: CPT | Mod: S$GLB,,, | Performed by: INTERNAL MEDICINE

## 2020-01-30 PROCEDURE — 1101F PT FALLS ASSESS-DOCD LE1/YR: CPT | Mod: CPTII,S$GLB,, | Performed by: INTERNAL MEDICINE

## 2020-01-30 PROCEDURE — 99499 RISK ADDL DX/OHS AUDIT: ICD-10-PCS | Mod: S$GLB,,, | Performed by: INTERNAL MEDICINE

## 2020-01-30 PROCEDURE — 3077F PR MOST RECENT SYSTOLIC BLOOD PRESSURE >= 140 MM HG: ICD-10-PCS | Mod: CPTII,S$GLB,, | Performed by: INTERNAL MEDICINE

## 2020-01-30 PROCEDURE — 99999 PR PBB SHADOW E&M-EST. PATIENT-LVL III: CPT | Mod: PBBFAC,,, | Performed by: INTERNAL MEDICINE

## 2020-01-30 PROCEDURE — 1125F AMNT PAIN NOTED PAIN PRSNT: CPT | Mod: S$GLB,,, | Performed by: INTERNAL MEDICINE

## 2020-01-30 PROCEDURE — 1159F MED LIST DOCD IN RCRD: CPT | Mod: S$GLB,,, | Performed by: INTERNAL MEDICINE

## 2020-01-30 PROCEDURE — 1125F PR PAIN SEVERITY QUANTIFIED, PAIN PRESENT: ICD-10-PCS | Mod: S$GLB,,, | Performed by: INTERNAL MEDICINE

## 2020-01-30 PROCEDURE — 3077F SYST BP >= 140 MM HG: CPT | Mod: CPTII,S$GLB,, | Performed by: INTERNAL MEDICINE

## 2020-01-30 PROCEDURE — 3079F PR MOST RECENT DIASTOLIC BLOOD PRESSURE 80-89 MM HG: ICD-10-PCS | Mod: CPTII,S$GLB,, | Performed by: INTERNAL MEDICINE

## 2020-01-30 PROCEDURE — 99215 PR OFFICE/OUTPT VISIT, EST, LEVL V, 40-54 MIN: ICD-10-PCS | Mod: 25,S$GLB,, | Performed by: INTERNAL MEDICINE

## 2020-01-30 PROCEDURE — 99215 OFFICE O/P EST HI 40 MIN: CPT | Mod: 25,S$GLB,, | Performed by: INTERNAL MEDICINE

## 2020-01-30 PROCEDURE — 1101F PR PT FALLS ASSESS DOC 0-1 FALLS W/OUT INJ PAST YR: ICD-10-PCS | Mod: CPTII,S$GLB,, | Performed by: INTERNAL MEDICINE

## 2020-01-30 PROCEDURE — 1159F PR MEDICATION LIST DOCUMENTED IN MEDICAL RECORD: ICD-10-PCS | Mod: S$GLB,,, | Performed by: INTERNAL MEDICINE

## 2020-01-30 PROCEDURE — 99999 PR PBB SHADOW E&M-EST. PATIENT-LVL III: ICD-10-PCS | Mod: PBBFAC,,, | Performed by: INTERNAL MEDICINE

## 2020-01-30 PROCEDURE — 3079F DIAST BP 80-89 MM HG: CPT | Mod: CPTII,S$GLB,, | Performed by: INTERNAL MEDICINE

## 2020-01-30 PROCEDURE — 96372 PR INJECTION,THERAP/PROPH/DIAG2ST, IM OR SUBCUT: ICD-10-PCS | Mod: 59,S$GLB,, | Performed by: INTERNAL MEDICINE

## 2020-01-30 RX ORDER — CYANOCOBALAMIN 1000 UG/ML
1000 INJECTION, SOLUTION INTRAMUSCULAR; SUBCUTANEOUS
Status: COMPLETED | OUTPATIENT
Start: 2020-01-30 | End: 2020-01-30

## 2020-01-30 RX ORDER — HYDROCODONE BITARTRATE AND ACETAMINOPHEN 10; 325 MG/1; MG/1
1 TABLET ORAL EVERY 8 HOURS PRN
Qty: 90 TABLET | Refills: 0 | Status: SHIPPED | OUTPATIENT
Start: 2020-02-04 | End: 2020-01-30 | Stop reason: SDUPTHER

## 2020-01-30 RX ORDER — PREDNISONE 5 MG/1
TABLET ORAL
Qty: 60 TABLET | Refills: 6 | Status: SHIPPED | OUTPATIENT
Start: 2020-01-30 | End: 2020-04-13 | Stop reason: SDUPTHER

## 2020-01-30 RX ORDER — HYDROCODONE BITARTRATE AND ACETAMINOPHEN 10; 325 MG/1; MG/1
1 TABLET ORAL EVERY 8 HOURS PRN
Qty: 90 TABLET | Refills: 0 | Status: SHIPPED | OUTPATIENT
Start: 2020-03-04 | End: 2020-01-30 | Stop reason: SDUPTHER

## 2020-01-30 RX ORDER — HYDROCODONE BITARTRATE AND ACETAMINOPHEN 10; 325 MG/1; MG/1
1 TABLET ORAL EVERY 8 HOURS PRN
Qty: 90 TABLET | Refills: 0 | Status: SHIPPED | OUTPATIENT
Start: 2020-04-04 | End: 2020-05-04 | Stop reason: SDUPTHER

## 2020-01-30 RX ORDER — METHYLPREDNISOLONE ACETATE 80 MG/ML
160 INJECTION, SUSPENSION INTRA-ARTICULAR; INTRALESIONAL; INTRAMUSCULAR; SOFT TISSUE
Status: COMPLETED | OUTPATIENT
Start: 2020-01-30 | End: 2020-01-30

## 2020-01-30 RX ORDER — ATENOLOL 100 MG/1
TABLET ORAL
COMMUNITY
Start: 2019-12-24 | End: 2020-08-24

## 2020-01-30 RX ORDER — KETOROLAC TROMETHAMINE 30 MG/ML
60 INJECTION, SOLUTION INTRAMUSCULAR; INTRAVENOUS
Status: COMPLETED | OUTPATIENT
Start: 2020-01-30 | End: 2020-01-30

## 2020-01-30 RX ADMIN — METHYLPREDNISOLONE ACETATE 160 MG: 80 INJECTION, SUSPENSION INTRA-ARTICULAR; INTRALESIONAL; INTRAMUSCULAR; SOFT TISSUE at 06:01

## 2020-01-30 RX ADMIN — KETOROLAC TROMETHAMINE 60 MG: 30 INJECTION, SOLUTION INTRAMUSCULAR; INTRAVENOUS at 06:01

## 2020-01-30 RX ADMIN — CYANOCOBALAMIN 1000 MCG: 1000 INJECTION, SOLUTION INTRAMUSCULAR; SUBCUTANEOUS at 06:01

## 2020-01-30 ASSESSMENT — ROUTINE ASSESSMENT OF PATIENT INDEX DATA (RAPID3)
MDHAQ FUNCTION SCORE: .5
PATIENT GLOBAL ASSESSMENT SCORE: 7
TOTAL RAPID3 SCORE: 5.22
PAIN SCORE: 7
PSYCHOLOGICAL DISTRESS SCORE: 0
FATIGUE SCORE: 1.1

## 2020-01-31 NOTE — PROGRESS NOTES
Subjective:       Patient ID: Scooter Swan is a 69 y.o. male.    Chief Complaint: Disease Management and Rheumatoid Arthritis    Follow up: 68 year old male seropositive rheumatoid arthritis and osteoarthritis.  He complains of constant aching pain involving his hands, knees, feet, and low back. He has significant morning stiffness and joint swelling despite taking prednisone 10 mg daily. His BP is elevated today, but he reports home readings typically 115/60s. He states norco is helpful for pain relief and he is not having any side effects from the medication. We reviewed recent labs in detail. He had colonoscopy 3/20/19  And it said diverticulitis and hemmorhoids.    Current treatment:  1. Norco TID PRN  2. Prednisone 10 mg  3. Zanaflex PRN    Prior treatment:  1. Plaquenil (WEIGHT LOSS)  2. SSZ caused GI upset    Review of Systems   Constitutional: Positive for activity change. Negative for chills.   Eyes: Negative for visual disturbance.   Respiratory: Negative for cough, shortness of breath and wheezing.    Cardiovascular: Negative for chest pain, palpitations and leg swelling.   Gastrointestinal: Negative for abdominal pain, constipation, diarrhea, nausea and vomiting.   Musculoskeletal: Positive for arthralgias, back pain, joint swelling, myalgias, neck pain and neck stiffness.   Neurological: Negative for dizziness and syncope.   Hematological: Negative for adenopathy.         Objective:     Vitals:    01/30/20 1526   BP: (!) 165/88   Pulse: 73       Past Medical History:   Diagnosis Date    Cataract     COPD (chronic obstructive pulmonary disease)     Diverticulosis     DUARTE (dyspnea on exertion)     GERD (gastroesophageal reflux disease)     Glaucoma     Hepatitis C     treated; seeing Dr. Carr    Hyperlipidemia     Hypertension     Liver lesion 08/2018    RA (rheumatoid arthritis)     Rectal prolapse     Possible     Past Surgical History:   Procedure Laterality Date    CARPAL TUNNEL RELEASE       Right wrist 2015    COLONOSCOPY  08/2016    Dr. Cardona; in care everywhere    COLONOSCOPY N/A 3/20/2019    Procedure: COLONOSCOPY;  Surgeon: Sotero Arthur MD;  Location: [facility];  Service: Endoscopy;  Laterality: N/A; hemorrhoids, diverticulosis, repeat in 10 years for screening    EXCISIONAL HEMORRHOIDECTOMY N/A 10/18/2018    Procedure: HEMORRHOIDECTOMY, prone;  Surgeon: Gunnar Horton MD;  Location: [address];  Service: Colon and Rectal;  Laterality: N/A;    THYROID SURGERY      UPPER GASTROINTESTINAL ENDOSCOPY  08/2016    Dr. Cardona; in care everywhere          Physical Exam   Constitutional: He is well-developed, well-nourished, and in no distress.   Eyes: Right conjunctiva is not injected. Left conjunctiva is not injected. Right eye exhibits normal extraocular motion. Left eye exhibits normal extraocular motion.   Neck: No JVD present. No thyromegaly present.   Cardiovascular: Normal rate and regular rhythm.  Exam reveals no decreased pulses.    Pulmonary/Chest: He has no wheezes. He has no rhonchi. He has no rales.       Right Side Rheumatological Exam     Examination finds the shoulder normal.    The patient is tender to palpation of the elbow, wrist, knee, 1st PIP, 1st MCP, 2nd PIP, 2nd MCP, 3rd PIP, 3rd MCP, 4th PIP, 4th MCP, 5th PIP and 5th MCP    He has swelling of the wrist, 1st PIP, 2nd PIP, 3rd PIP and 4th PIP    Left Side Rheumatological Exam     Examination finds the shoulder normal.    The patient is tender to palpation of the elbow, wrist, knee, 1st PIP, 1st MCP, 2nd PIP, 2nd MCP, 3rd PIP, 3rd MCP, 4th PIP, 4th MCP, 5th PIP and 5th MCP.    He has swelling of the wrist, 1st PIP, 2nd PIP, 3rd PIP, 4th PIP and 5th PIP      Lymphadenopathy:     He has no cervical adenopathy.   Neurological: Gait normal.   Skin: No rash noted.     Psychiatric: Mood and affect normal.   Musculoskeletal: He exhibits tenderness and deformity.        Results for orders placed or performed in visit  on 01/24/20   CBC auto differential   Result Value Ref Range    WBC 10.50 3.90 - 12.70 K/uL    RBC 3.35 (L) 4.60 - 6.20 M/uL    Hemoglobin 9.6 (L) 14.0 - 18.0 g/dL    Hematocrit 32.2 (L) 40.0 - 54.0 %    Mean Corpuscular Volume 96 82 - 98 fL    Mean Corpuscular Hemoglobin 28.7 27.0 - 31.0 pg    Mean Corpuscular Hemoglobin Conc 29.8 (L) 32.0 - 36.0 g/dL    RDW 13.2 11.5 - 14.5 %    Platelets 115 (L) 150 - 350 K/uL    MPV 14.5 (H) 9.2 - 12.9 fL    Immature Granulocytes 0.4 0.0 - 0.5 %    Gran # (ANC) 5.8 1.8 - 7.7 K/uL    Immature Grans (Abs) 0.04 0.00 - 0.04 K/uL    Lymph # 3.0 1.0 - 4.8 K/uL    Mono # 1.5 (H) 0.3 - 1.0 K/uL    Eos # 0.1 0.0 - 0.5 K/uL    Baso # 0.04 0.00 - 0.20 K/uL    nRBC 0 0 /100 WBC    Gran% 55.0 38.0 - 73.0 %    Lymph% 28.5 18.0 - 48.0 %    Mono% 14.7 4.0 - 15.0 %    Eosinophil% 1.0 0.0 - 8.0 %    Basophil% 0.4 0.0 - 1.9 %    Differential Method Automated    Comprehensive metabolic panel   Result Value Ref Range    Sodium 140 136 - 145 mmol/L    Potassium 3.6 3.5 - 5.1 mmol/L    Chloride 104 95 - 110 mmol/L    CO2 29 23 - 29 mmol/L    Glucose 111 (H) 70 - 110 mg/dL    BUN, Bld 10 8 - 23 mg/dL    Creatinine 1.1 0.5 - 1.4 mg/dL    Calcium 8.8 8.7 - 10.5 mg/dL    Total Protein 6.4 6.0 - 8.4 g/dL    Albumin 3.8 3.5 - 5.2 g/dL    Total Bilirubin 0.3 0.1 - 1.0 mg/dL    Alkaline Phosphatase 50 (L) 55 - 135 U/L    AST 15 10 - 40 U/L    ALT 7 (L) 10 - 44 U/L    Anion Gap 7 (L) 8 - 16 mmol/L    eGFR if African American >60.0 >60 mL/min/1.73 m^2    eGFR if non African American >60.0 >60 mL/min/1.73 m^2   C-reactive protein   Result Value Ref Range    CRP 5.8 0.0 - 8.2 mg/L   Sedimentation rate   Result Value Ref Range    Sed Rate 4 0 - 10 mm/Hr     Attending MD: Sotero Arthur MD  Procedure:           Colonoscopy  Indications:         Generalized abdominal pain, Change in bowel habits,                        Constipation  Providers:           Sotero Arthur MD, Samra Thornton RN, [facility]                         KYLE Gould, Technician  Referring MD:        Salvador Kennedy DO (Referring MD)  Complications:       No immediate complications.  Medicines:           General Anesthesia  Procedure:           Pre-Anesthesia Assessment:                       - Prior to the procedure, a History and Physical was                        performed, and patient medications, allergies and                        sensitivities were reviewed. The patient's tolerance                        of previous anesthesia was reviewed.                       - The risks and benefits of the procedure and the                        sedation options and risks were discussed with the                        patient. All questions were answered and informed                        consent was obtained.                       - Abdominal Examination: bowel sounds present,                        abdomen soft and non-tender, no masses or                        organomegaly noted.                       - CV Examination: normal.                       - Mental Status Examination: alert and oriented.                       - Respiratory Examination: clear to auscultation.                       - ASA Grade Assessment: II - A patient with mild                        systemic disease.                       - After reviewing the risks and benefits, the                        patient was deemed in satisfactory condition to                        undergo the procedure.                       - The anesthesia plan was to use general anesthesia.                       - Immediately prior to administration of                        medications, the patient was re-assessed for                        adequacy to receive sedatives.                       - Sedation was administered by an anesthesia                        professional. General anesthesia was attained.                       - The heart rate, respiratory rate, oxygen                        saturations,  blood pressure, adequacy of pulmonary                        ventilation, and response to care were monitored                        throughout the procedure.                       - The physical status of the patient was re-assessed                        after the procedure.                       After I obtained informed consent, the scope was                        passed under direct vision. Throughout the                        procedure, the patient's blood pressure, pulse, and                        oxygen saturations were monitored continuously. The                        Olympus scope [id number] ([id number]) was                        introduced through the anus and advanced to the                        terminal ileum. The colonoscopy was performed                        without difficulty. The patient tolerated the                        procedure well. The quality of the bowel preparation                        was good. The ileocecal valve, appendiceal orifice,                        and rectum were photographed.  Findings:       A few small-mouthed diverticula were found in the sigmoid colon.       Internal hemorrhoids were found during retroflexion.       The exam was otherwise without abnormality to cecum, including        terminal ileum.  Impression:          - Diverticulosis in the sigmoid colon.                       - Internal hemorrhoids.                       - The examination was otherwise normal.                       - No specimens collected.  Recommendation:      - High fiber diet.                       - Repeat colonoscopy in 10 years for screening                        purposes.                       - Discharge patient to home (ambulatory).  Sotero Arthur MD  3/20/2019 10:10:57 AM  This report has been verified and signed electronically.  Number of Addenda: 0   reviewed labs with patient during this visit       Assessment:       1. Rheumatoid arthritis involving both hands with  positive rheumatoid factor    2. Chronic pain syndrome    3. Anemia, unspecified type    4. Hydroxychloroquine causing adverse effect in therapeutic use, subsequent encounter    5. Thrombopenia    6. Osteoarthritis of knee, unspecified laterality, unspecified osteoarthritis type    7. Rheumatoid arthritis, involving unspecified site, unspecified rheumatoid factor presence    8. Carpal tunnel syndrome, unspecified laterality    9. Abdominal pain, unspecified abdominal location            Plan:       Rheumatoid arthritis involving both hands with positive rheumatoid factor  -     ketorolac injection 60 mg  -     methylPREDNISolone acetate injection 160 mg  -     cyanocobalamin injection 1,000 mcg  -     Iron and TIBC; Future; Expected date: 01/30/2020  -     Protein electrophoresis, serum; Future; Expected date: 01/30/2020  -     CBC auto differential; Future; Expected date: 01/30/2020  -     Ferritin; Future; Expected date: 01/30/2020  -     Ambulatory consult to Hematology / Oncology  -     Discontinue: HYDROcodone-acetaminophen (NORCO)  mg per tablet; Take 1 tablet by mouth every 8 (eight) hours as needed for Pain.  Dispense: 90 tablet; Refill: 0  -     Discontinue: HYDROcodone-acetaminophen (NORCO)  mg per tablet; Take 1 tablet by mouth every 8 (eight) hours as needed for Pain.  Dispense: 90 tablet; Refill: 0  -     HYDROcodone-acetaminophen (NORCO)  mg per tablet; Take 1 tablet by mouth every 8 (eight) hours as needed for Pain.  Dispense: 90 tablet; Refill: 0    Chronic pain syndrome  -     ketorolac injection 60 mg  -     methylPREDNISolone acetate injection 160 mg  -     cyanocobalamin injection 1,000 mcg  -     Iron and TIBC; Future; Expected date: 01/30/2020  -     Protein electrophoresis, serum; Future; Expected date: 01/30/2020  -     CBC auto differential; Future; Expected date: 01/30/2020  -     Ferritin; Future; Expected date: 01/30/2020  -     Ambulatory consult to Hematology /  Oncology  -     Discontinue: HYDROcodone-acetaminophen (NORCO)  mg per tablet; Take 1 tablet by mouth every 8 (eight) hours as needed for Pain.  Dispense: 90 tablet; Refill: 0  -     Discontinue: HYDROcodone-acetaminophen (NORCO)  mg per tablet; Take 1 tablet by mouth every 8 (eight) hours as needed for Pain.  Dispense: 90 tablet; Refill: 0  -     HYDROcodone-acetaminophen (NORCO)  mg per tablet; Take 1 tablet by mouth every 8 (eight) hours as needed for Pain.  Dispense: 90 tablet; Refill: 0    Anemia, unspecified type  -     Iron and TIBC; Future; Expected date: 01/30/2020  -     Protein electrophoresis, serum; Future; Expected date: 01/30/2020  -     CBC auto differential; Future; Expected date: 01/30/2020  -     Ferritin; Future; Expected date: 01/30/2020  -     Ambulatory consult to Hematology / Oncology  -     Discontinue: HYDROcodone-acetaminophen (NORCO)  mg per tablet; Take 1 tablet by mouth every 8 (eight) hours as needed for Pain.  Dispense: 90 tablet; Refill: 0  -     Discontinue: HYDROcodone-acetaminophen (NORCO)  mg per tablet; Take 1 tablet by mouth every 8 (eight) hours as needed for Pain.  Dispense: 90 tablet; Refill: 0  -     HYDROcodone-acetaminophen (NORCO)  mg per tablet; Take 1 tablet by mouth every 8 (eight) hours as needed for Pain.  Dispense: 90 tablet; Refill: 0    Hydroxychloroquine causing adverse effect in therapeutic use, subsequent encounter  Comments:  last dose July 2019  Orders:  -     Iron and TIBC; Future; Expected date: 01/30/2020  -     Protein electrophoresis, serum; Future; Expected date: 01/30/2020  -     CBC auto differential; Future; Expected date: 01/30/2020  -     Ferritin; Future; Expected date: 01/30/2020  -     Ambulatory consult to Hematology / Oncology  -     Discontinue: HYDROcodone-acetaminophen (NORCO)  mg per tablet; Take 1 tablet by mouth every 8 (eight) hours as needed for Pain.  Dispense: 90 tablet; Refill: 0  -      Discontinue: HYDROcodone-acetaminophen (NORCO)  mg per tablet; Take 1 tablet by mouth every 8 (eight) hours as needed for Pain.  Dispense: 90 tablet; Refill: 0  -     HYDROcodone-acetaminophen (NORCO)  mg per tablet; Take 1 tablet by mouth every 8 (eight) hours as needed for Pain.  Dispense: 90 tablet; Refill: 0    Thrombopenia  -     Ambulatory consult to Hematology / Oncology  -     Discontinue: HYDROcodone-acetaminophen (NORCO)  mg per tablet; Take 1 tablet by mouth every 8 (eight) hours as needed for Pain.  Dispense: 90 tablet; Refill: 0  -     Discontinue: HYDROcodone-acetaminophen (NORCO)  mg per tablet; Take 1 tablet by mouth every 8 (eight) hours as needed for Pain.  Dispense: 90 tablet; Refill: 0  -     HYDROcodone-acetaminophen (NORCO)  mg per tablet; Take 1 tablet by mouth every 8 (eight) hours as needed for Pain.  Dispense: 90 tablet; Refill: 0    Osteoarthritis of knee, unspecified laterality, unspecified osteoarthritis type  -     Discontinue: HYDROcodone-acetaminophen (NORCO)  mg per tablet; Take 1 tablet by mouth every 8 (eight) hours as needed for Pain.  Dispense: 90 tablet; Refill: 0  -     Discontinue: HYDROcodone-acetaminophen (NORCO)  mg per tablet; Take 1 tablet by mouth every 8 (eight) hours as needed for Pain.  Dispense: 90 tablet; Refill: 0  -     HYDROcodone-acetaminophen (NORCO)  mg per tablet; Take 1 tablet by mouth every 8 (eight) hours as needed for Pain.  Dispense: 90 tablet; Refill: 0    Rheumatoid arthritis, involving unspecified site, unspecified rheumatoid factor presence  -     predniSONE (DELTASONE) 5 MG tablet; TAKE 2 TABLETS BY MOUTH once daily AS NEEDED  Dispense: 60 tablet; Refill: 6    Carpal tunnel syndrome, unspecified laterality  -     predniSONE (DELTASONE) 5 MG tablet; TAKE 2 TABLETS BY MOUTH once daily AS NEEDED  Dispense: 60 tablet; Refill: 6    Abdominal pain, unspecified abdominal location  -     predniSONE (DELTASONE)  5 MG tablet; TAKE 2 TABLETS BY MOUTH once daily AS NEEDED  Dispense: 60 tablet; Refill: 6        I have  check [place] prescription monitoring program site and no unusual or abnormal behavior has occurred pt understand the risk and benefits of taking opioid medications and has decided to continue the  medication

## 2020-01-31 NOTE — PROGRESS NOTES
Administered 1 cc ( 1000 mcg/ml ) of b12 to the right upper outer gluteal. Informed of s/s to report verbalized understanding. No adverse reactions noted.    Lot # [id number]  Expiration jan 21    Administered 2 cc ( 80 mg/ml ) of depomedrol to the right upper outer gluteal. Informed of s/s to report verbalized understanding. No adverse reactions noted.    Lot # [id number]  Expiration 03/2021    Administered 2 cc ( 30 mg/ml ) of toradol to the left upper outer gluteal. Informed of s/s to report verbalized understanding. No adverse reactions noted.    Lot # [id number]  Expiration 1 aug 2020

## 2020-02-03 ENCOUNTER — PATIENT OUTREACH (OUTPATIENT)
Dept: ADMINISTRATIVE | Facility: OTHER | Age: 70
End: 2020-02-03

## 2020-02-04 ENCOUNTER — TELEPHONE (OUTPATIENT)
Dept: OPHTHALMOLOGY | Facility: CLINIC | Age: 70
End: 2020-02-04

## 2020-02-04 NOTE — TELEPHONE ENCOUNTER
Called pt to reschedule missed appt with Dr Alas. Pt said he called yesterday to cancel the appt and will call back to reschedule at a later date.

## 2020-02-07 ENCOUNTER — OFFICE VISIT (OUTPATIENT)
Dept: HEMATOLOGY/ONCOLOGY | Facility: CLINIC | Age: 70
End: 2020-02-07
Payer: MEDICARE

## 2020-02-07 ENCOUNTER — LAB VISIT (OUTPATIENT)
Dept: LAB | Facility: HOSPITAL | Age: 70
End: 2020-02-07
Attending: INTERNAL MEDICINE
Payer: MEDICARE

## 2020-02-07 VITALS
WEIGHT: 187.25 LBS | TEMPERATURE: 98 F | BODY MASS INDEX: 25.36 KG/M2 | DIASTOLIC BLOOD PRESSURE: 69 MMHG | HEART RATE: 71 BPM | OXYGEN SATURATION: 97 % | SYSTOLIC BLOOD PRESSURE: 133 MMHG | HEIGHT: 72 IN

## 2020-02-07 DIAGNOSIS — D64.9 ANEMIA, UNSPECIFIED TYPE: ICD-10-CM

## 2020-02-07 DIAGNOSIS — Z86.19 HISTORY OF HEPATITIS C: ICD-10-CM

## 2020-02-07 DIAGNOSIS — D64.9 ANEMIA, UNSPECIFIED TYPE: Primary | ICD-10-CM

## 2020-02-07 DIAGNOSIS — D69.6 THROMBOCYTOPENIA: ICD-10-CM

## 2020-02-07 DIAGNOSIS — I1A.0 RESISTANT HYPERTENSION: ICD-10-CM

## 2020-02-07 DIAGNOSIS — R93.2 ABNORMAL CT SCAN, LIVER: ICD-10-CM

## 2020-02-07 LAB
ALBUMIN SERPL BCP-MCNC: 4.3 G/DL (ref 3.5–5.2)
ALP SERPL-CCNC: 48 U/L (ref 38–145)
ALT SERPL W/O P-5'-P-CCNC: 11 U/L (ref 0–50)
ANION GAP SERPL CALC-SCNC: 8 MMOL/L (ref 8–16)
AST SERPL-CCNC: 20 U/L (ref 17–59)
B2 MICROGLOB SERPL-MCNC: 1.5 UG/ML (ref 0–2.5)
BASOPHILS # BLD AUTO: 0.01 K/UL (ref 0–0.2)
BASOPHILS NFR BLD: 0.1 % (ref 0–1.9)
BILIRUB SERPL-MCNC: 0.3 MG/DL (ref 0.2–1.3)
BUN SERPL-MCNC: 16 MG/DL (ref 9–21)
CALCIUM SERPL-MCNC: 9.1 MG/DL (ref 8.4–10.2)
CHLORIDE SERPL-SCNC: 101 MMOL/L (ref 95–110)
CO2 SERPL-SCNC: 28 MMOL/L (ref 22–31)
CREAT SERPL-MCNC: 0.91 MG/DL (ref 0.5–1.4)
DIFFERENTIAL METHOD: ABNORMAL
EOSINOPHIL # BLD AUTO: 0 K/UL (ref 0–0.5)
EOSINOPHIL NFR BLD: 0.1 % (ref 0–8)
ERYTHROCYTE [DISTWIDTH] IN BLOOD BY AUTOMATED COUNT: 13.5 % (ref 11.5–14.5)
EST. GFR  (AFRICAN AMERICAN): >60 ML/MIN/1.73 M^2
EST. GFR  (NON AFRICAN AMERICAN): >60 ML/MIN/1.73 M^2
FERRITIN SERPL-MCNC: 55 NG/ML (ref 18–464)
FOLATE SERPL-MCNC: 19.7 NG/ML (ref 4–24)
GLUCOSE SERPL-MCNC: 122 MG/DL (ref 70–110)
HCT VFR BLD AUTO: 36 % (ref 40–54)
HGB BLD-MCNC: 11.1 G/DL (ref 14–18)
IMM GRANULOCYTES # BLD AUTO: 0.14 K/UL (ref 0–0.04)
IMM GRANULOCYTES NFR BLD AUTO: 0.8 % (ref 0–0.5)
IRON SATN MFR SERPL: 31 % (ref 20–50)
IRON SERPL-MCNC: 85 UG/DL (ref 45–160)
LDH SERPL L TO P-CCNC: 591 U/L (ref 313–618)
LYMPHOCYTES # BLD AUTO: 1.7 K/UL (ref 1–4.8)
LYMPHOCYTES NFR BLD: 9.7 % (ref 18–48)
MCH RBC QN AUTO: 28.6 PG (ref 27–31)
MCHC RBC AUTO-ENTMCNC: 30.8 G/DL (ref 32–36)
MCV RBC AUTO: 93 FL (ref 82–98)
MONOCYTES # BLD AUTO: 1.5 K/UL (ref 0.3–1)
MONOCYTES NFR BLD: 8.4 % (ref 4–15)
NEUTROPHILS # BLD AUTO: 14.4 K/UL (ref 1.8–7.7)
NEUTROPHILS NFR BLD: 80.9 % (ref 38–73)
NRBC BLD-RTO: 0 /100 WBC
PATH REV BLD -IMP: NORMAL
PLATELET # BLD AUTO: 155 K/UL (ref 150–350)
PMV BLD AUTO: 13.2 FL (ref 9.2–12.9)
POTASSIUM SERPL-SCNC: 4 MMOL/L (ref 3.5–5.1)
PROT SERPL-MCNC: 7.2 G/DL (ref 6–8.4)
RBC # BLD AUTO: 3.88 M/UL (ref 4.6–6.2)
RETICS/RBC NFR AUTO: 3.1 % (ref 0.4–2)
SODIUM SERPL-SCNC: 137 MMOL/L (ref 136–145)
TOTAL IRON BINDING CAPACITY: 273 UG/DL (ref 261–462)
VIT B12 SERPL-MCNC: 911 PG/ML (ref 210–950)
WBC # BLD AUTO: 17.75 K/UL (ref 3.9–12.7)

## 2020-02-07 PROCEDURE — 99205 PR OFFICE/OUTPT VISIT, NEW, LEVL V, 60-74 MIN: ICD-10-PCS | Mod: S$GLB,,, | Performed by: INTERNAL MEDICINE

## 2020-02-07 PROCEDURE — 1159F PR MEDICATION LIST DOCUMENTED IN MEDICAL RECORD: ICD-10-PCS | Mod: S$GLB,,, | Performed by: INTERNAL MEDICINE

## 2020-02-07 PROCEDURE — 82728 ASSAY OF FERRITIN: CPT | Mod: PN

## 2020-02-07 PROCEDURE — 99999 PR PBB SHADOW E&M-EST. PATIENT-LVL III: ICD-10-PCS | Mod: PBBFAC,,, | Performed by: INTERNAL MEDICINE

## 2020-02-07 PROCEDURE — 99205 OFFICE O/P NEW HI 60 MIN: CPT | Mod: S$GLB,,, | Performed by: INTERNAL MEDICINE

## 2020-02-07 PROCEDURE — 86334 IMMUNOFIX E-PHORESIS SERUM: CPT

## 2020-02-07 PROCEDURE — 85025 COMPLETE CBC W/AUTO DIFF WBC: CPT

## 2020-02-07 PROCEDURE — 84165 PROTEIN E-PHORESIS SERUM: CPT | Mod: 26,,, | Performed by: PATHOLOGY

## 2020-02-07 PROCEDURE — 1159F MED LIST DOCD IN RCRD: CPT | Mod: S$GLB,,, | Performed by: INTERNAL MEDICINE

## 2020-02-07 PROCEDURE — 83540 ASSAY OF IRON: CPT | Mod: PN

## 2020-02-07 PROCEDURE — 1101F PT FALLS ASSESS-DOCD LE1/YR: CPT | Mod: CPTII,S$GLB,, | Performed by: INTERNAL MEDICINE

## 2020-02-07 PROCEDURE — 3078F DIAST BP <80 MM HG: CPT | Mod: CPTII,S$GLB,, | Performed by: INTERNAL MEDICINE

## 2020-02-07 PROCEDURE — 83615 LACTATE (LD) (LDH) ENZYME: CPT | Mod: PN

## 2020-02-07 PROCEDURE — 82746 ASSAY OF FOLIC ACID SERUM: CPT | Mod: PN

## 2020-02-07 PROCEDURE — 86334 IMMUNOFIX E-PHORESIS SERUM: CPT | Mod: 26,,, | Performed by: PATHOLOGY

## 2020-02-07 PROCEDURE — 82607 VITAMIN B-12: CPT

## 2020-02-07 PROCEDURE — 82728 ASSAY OF FERRITIN: CPT

## 2020-02-07 PROCEDURE — 85045 AUTOMATED RETICULOCYTE COUNT: CPT | Mod: PN

## 2020-02-07 PROCEDURE — 99999 PR PBB SHADOW E&M-EST. PATIENT-LVL III: CPT | Mod: PBBFAC,,, | Performed by: INTERNAL MEDICINE

## 2020-02-07 PROCEDURE — 85045 AUTOMATED RETICULOCYTE COUNT: CPT

## 2020-02-07 PROCEDURE — 82232 ASSAY OF BETA-2 PROTEIN: CPT

## 2020-02-07 PROCEDURE — 3078F PR MOST RECENT DIASTOLIC BLOOD PRESSURE < 80 MM HG: ICD-10-PCS | Mod: CPTII,S$GLB,, | Performed by: INTERNAL MEDICINE

## 2020-02-07 PROCEDURE — 1101F PR PT FALLS ASSESS DOC 0-1 FALLS W/OUT INJ PAST YR: ICD-10-PCS | Mod: CPTII,S$GLB,, | Performed by: INTERNAL MEDICINE

## 2020-02-07 PROCEDURE — 82668 ASSAY OF ERYTHROPOIETIN: CPT

## 2020-02-07 PROCEDURE — 82746 ASSAY OF FOLIC ACID SERUM: CPT

## 2020-02-07 PROCEDURE — 82607 VITAMIN B-12: CPT | Mod: PN

## 2020-02-07 PROCEDURE — 1125F AMNT PAIN NOTED PAIN PRSNT: CPT | Mod: S$GLB,,, | Performed by: INTERNAL MEDICINE

## 2020-02-07 PROCEDURE — 36415 COLL VENOUS BLD VENIPUNCTURE: CPT | Mod: PN

## 2020-02-07 PROCEDURE — 1125F PR PAIN SEVERITY QUANTIFIED, PAIN PRESENT: ICD-10-PCS | Mod: S$GLB,,, | Performed by: INTERNAL MEDICINE

## 2020-02-07 PROCEDURE — 86334 PATHOLOGIST INTERPRETATION IFE: ICD-10-PCS | Mod: 26,,, | Performed by: PATHOLOGY

## 2020-02-07 PROCEDURE — 83520 IMMUNOASSAY QUANT NOS NONAB: CPT | Mod: 59

## 2020-02-07 PROCEDURE — 84165 PATHOLOGIST INTERPRETATION SPE: ICD-10-PCS | Mod: 26,,, | Performed by: PATHOLOGY

## 2020-02-07 PROCEDURE — 83540 ASSAY OF IRON: CPT

## 2020-02-07 PROCEDURE — 3075F PR MOST RECENT SYSTOLIC BLOOD PRESS GE 130-139MM HG: ICD-10-PCS | Mod: CPTII,S$GLB,, | Performed by: INTERNAL MEDICINE

## 2020-02-07 PROCEDURE — 3075F SYST BP GE 130 - 139MM HG: CPT | Mod: CPTII,S$GLB,, | Performed by: INTERNAL MEDICINE

## 2020-02-07 PROCEDURE — 85025 COMPLETE CBC W/AUTO DIFF WBC: CPT | Mod: PN

## 2020-02-07 PROCEDURE — 80053 COMPREHEN METABOLIC PANEL: CPT

## 2020-02-07 PROCEDURE — 84165 PROTEIN E-PHORESIS SERUM: CPT

## 2020-02-07 PROCEDURE — 80053 COMPREHEN METABOLIC PANEL: CPT | Mod: PN

## 2020-02-07 PROCEDURE — 83615 LACTATE (LD) (LDH) ENZYME: CPT

## 2020-02-07 NOTE — PROGRESS NOTES
HPI    69 years old  male was referred to Hematology Clinic for evaluation of anemia.        Patient had no history of hepatitis-C treated successfully.  Recent CT abdomen pelvis shows early bright arterial enhancement .  At the time GI consultation was referred.  However Patient did not follow-up with any GI doctor.  Currently denies any GI bleeding. Patient denies any chest pain shortness breath.  Patient denies any abdominal pain nausea vomiting or diarrhea.  No fever no chills.    Fourteen point review system as above.      Past Medical History:   Diagnosis Date    Cataract     COPD (chronic obstructive pulmonary disease)     Diverticulosis     DUARTE (dyspnea on exertion)     GERD (gastroesophageal reflux disease)     Glaucoma     Hepatitis C     treated; seeing Dr. Carr    Hyperlipidemia     Hypertension     Liver lesion 08/2018    RA (rheumatoid arthritis)     Rectal prolapse     Possible     Social History     Socioeconomic History    Marital status:      Spouse name: Not on file    Number of children: Not on file    Years of education: Not on file    Highest education level: Not on file   Occupational History    Not on file   Social Needs    Financial resource strain: Not on file    Food insecurity:     Worry: Not on file     Inability: Not on file    Transportation needs:     Medical: Not on file     Non-medical: Not on file   Tobacco Use    Smoking status: Never Smoker    Smokeless tobacco: Never Used   Substance and Sexual Activity    Alcohol use: No     Frequency: Never    Drug use: Yes     Types: Hydrocodone    Sexual activity: Not on file   Lifestyle    Physical activity:     Days per week: Not on file     Minutes per session: Not on file    Stress: Not on file   Relationships    Social connections:     Talks on phone: Not on file     Gets together: Not on file     Attends [employer] service: Not on file     Active member of club or organization: Not  on file     Attends meetings of clubs or organizations: Not on file     Relationship status: Not on file   Other Topics Concern    Not on file   Social History Narrative    Not on file         Subjective      Review of Systems   Constitutional: Negative for appetite change, fatigue and unexpected weight change.   HENT: Negative for mouth sores.   Eyes: Negative for visual disturbance.   Respiratory: Negative for cough and shortness of breath.   Cardiovascular: Negative for chest pain.   Gastrointestinal: Negative for diarrhea.   Genitourinary: Negative for frequency.   Musculoskeletal: Negative for back pain.   Skin: Negative for rash.   Neurological: Negative for headaches.   Hematological: Negative for adenopathy.   Psychiatric/Behavioral: The patient is not nervous/anxious.   All other systems reviewed and are negative.     Objective    Physical Exam   Vitals:    02/07/20 1014   BP: 133/69   Pulse: 71   Temp: 98.2 °F (36.8 °C)       Constitutional: patient is oriented to person, place, and time. patient appears well-developed and well-nourished. No distress.   HENT:   Right Ear: External ear normal.   Left Ear: External ear normal.   Nose: Nose normal.   Mouth/Throat: Oropharynx is clear and moist. No oropharyngeal exudate.   Teeth, gums and lips are normal   No sinus tenderness   Palate, tongue, posterior pharynx are normal   Eyes: Conjunctivae and lids are normal.   Neck: Trachea normal and normal range of motion. No thyromegaly   Cardiovascular: Normal rate, regular rhythm, normal heart sounds, intact distal pulses and normal pulses.   No murmur heard.   No edema, no tenderness in the extremities.   Pulmonary/Chest: Effort normal and breath sounds normal. No accessory muscle usage. patient has no wheezes..   Abdominal: Soft. Normal appearance and bowel sounds are normal. patient exhibits no distension and no mass. There is no hepatosplenomegaly. There is no tenderness.   Musculoskeletal: Normal range of  motion.   Gait is normal   No clubbing, cyanosis     Lymphadenopathy:   Head (right side): No submental and no submandibular adenopathy present.   Head (left side): No submental and no submandibular adenopathy present.   patient has no cervical adenopathy.   Right: No supraclavicular adenopathy present.   Left: No supraclavicular adenopathy present.   Neurological: patient is alert and oriented to person, place, and time. patient has normal strength and normal reflexes. No sensory deficit. Gait normal.   Skin: Skin is warm, dry and intact. No bruising, no lesion and no rash noted. No cyanosis. Nails show no clubbing.   No lesions   Psychiatric: patient has a normal mood and affect. patient speech is normal and behavior is normal. Judgment normal. Cognition and memory are normal.   Vitals reviewed.       CMP  Sodium   Date Value Ref Range Status   01/24/2020 140 136 - 145 mmol/L Final     Potassium   Date Value Ref Range Status   01/24/2020 3.6 3.5 - 5.1 mmol/L Final     Chloride   Date Value Ref Range Status   01/24/2020 104 95 - 110 mmol/L Final     CO2   Date Value Ref Range Status   01/24/2020 29 23 - 29 mmol/L Final     Glucose   Date Value Ref Range Status   01/24/2020 111 (H) 70 - 110 mg/dL Final     BUN, Bld   Date Value Ref Range Status   01/24/2020 10 8 - 23 mg/dL Final     Creatinine   Date Value Ref Range Status   01/24/2020 1.1 0.5 - 1.4 mg/dL Final     Calcium   Date Value Ref Range Status   01/24/2020 8.8 8.7 - 10.5 mg/dL Final     Total Protein   Date Value Ref Range Status   01/24/2020 6.4 6.0 - 8.4 g/dL Final     Albumin   Date Value Ref Range Status   01/24/2020 3.8 3.5 - 5.2 g/dL Final     Total Bilirubin   Date Value Ref Range Status   01/24/2020 0.3 0.1 - 1.0 mg/dL Final     Comment:     For infants and newborns, interpretation of results should be based  on gestational age, weight and in agreement with clinical  observations.  Premature Infant recommended reference ranges:  Up to 24  hours.............<8.0 mg/dL  Up to 48 hours............<12.0 mg/dL  3-5 days..................<15.0 mg/dL  6-29 days.................<15.0 mg/dL       Alkaline Phosphatase   Date Value Ref Range Status   01/24/2020 50 (L) 55 - 135 U/L Final     AST   Date Value Ref Range Status   01/24/2020 15 10 - 40 U/L Final     ALT   Date Value Ref Range Status   01/24/2020 7 (L) 10 - 44 U/L Final     Anion Gap   Date Value Ref Range Status   01/24/2020 7 (L) 8 - 16 mmol/L Final     eGFR if    Date Value Ref Range Status   01/24/2020 >60.0 >60 mL/min/1.73 m^2 Final     eGFR if non    Date Value Ref Range Status   01/24/2020 >60.0 >60 mL/min/1.73 m^2 Final     Comment:     Calculation used to obtain the estimated glomerular filtration  rate (eGFR) is the CKD-EPI equation.        Lab Results   Component Value Date    WBC 10.50 01/24/2020    HGB 9.6 (L) 01/24/2020    HCT 32.2 (L) 01/24/2020    MCV 96 01/24/2020     (L) 01/24/2020         Assessment    Anemia on differentiated.  Normocytic with MCV of 96.    Thrombocytopenia platelets 115 K.    [] No evidence of renal insufficiencies.    [] Know  history of COPD, and hepatitis-C    [] Abnormal CT finding of the liver.    [] Acid reflux    Plan     Iron panel iron studies ferritin level  SPEP KOFI   Free light chain  LDH  Beta 2 microglobulin  Ultrasound abdomen  B12 and folate  EPO level  Reticular count  Peripheral blood smear    Require GI or hepatology follow-up    Extensive discussion today on his initial visit.  Differential diagnosis of anemia and thrombocytopenia has been discussed with patient at this visit.  Patient is here with his wife.  They voiced understanding.  Further study will be based on above results.    RTC 2 weeks    There are no diagnoses linked to this encounter.

## 2020-02-07 NOTE — LETTER
February 7, 2020      Carson Morales MD  [address] [facility] [address]  [facility] [address]           [facility] [place] [id number] [facility] [address]  [place] [address]  Phone: [phone]  Fax: [phone]          Patient: Scooter Swan   MR Number: [id number]   YOB: 1950   Date of Visit: 2/7/2020       Dear Dr. Carson Morales:    Thank you for referring Scooter Swan to me for evaluation. Attached you will find relevant portions of my assessment and plan of care.    If you have questions, please do not hesitate to call me. I look forward to following Scooter Swan along with you.    Sincerely,    Eamon Bradford MD    Enclosure  CC:  No Recipients    If you would like to receive this communication electronically, please contact [email or web address] or [phone] to request more information on [facility] access.    For providers and/or their staff who would like to refer a patient to [facility], please contact us through our one-stop-shop provider referral line, [facility], at [phone].    If you feel you have received this communication in error or would no longer like to receive these types of communications, please e-mail [email or web address]

## 2020-02-10 LAB
ALBUMIN SERPL ELPH-MCNC: 4.06 G/DL (ref 3.35–5.55)
ALPHA1 GLOB SERPL ELPH-MCNC: 0.34 G/DL (ref 0.17–0.41)
ALPHA2 GLOB SERPL ELPH-MCNC: 0.88 G/DL (ref 0.43–0.99)
B-GLOBULIN SERPL ELPH-MCNC: 0.66 G/DL (ref 0.5–1.1)
EPO SERPL-ACNC: 7.8 MIU/ML (ref 2.6–18.5)
GAMMA GLOB SERPL ELPH-MCNC: 0.97 G/DL (ref 0.67–1.58)
INTERPRETATION SERPL IFE-IMP: NORMAL
KAPPA LC SER QL IA: 1.04 MG/DL (ref 0.33–1.94)
KAPPA LC/LAMBDA SER IA: 0.9 (ref 0.26–1.65)
LAMBDA LC SER QL IA: 1.15 MG/DL (ref 0.57–2.63)
PATH REV BLD -IMP: NORMAL
PATHOLOGIST INTERPRETATION IFE: NORMAL
PATHOLOGIST INTERPRETATION SPE: NORMAL
PROT SERPL-MCNC: 6.9 G/DL (ref 6–8.4)

## 2020-02-13 ENCOUNTER — HOSPITAL ENCOUNTER (OUTPATIENT)
Dept: RADIOLOGY | Facility: HOSPITAL | Age: 70
Discharge: HOME | End: 2020-02-13
Attending: INTERNAL MEDICINE
Payer: MEDICARE

## 2020-02-13 DIAGNOSIS — D69.6 THROMBOCYTOPENIA: ICD-10-CM

## 2020-02-13 DIAGNOSIS — D64.9 ANEMIA, UNSPECIFIED TYPE: ICD-10-CM

## 2020-02-13 PROCEDURE — 76700 US EXAM ABDOM COMPLETE: CPT | Mod: 26,,, | Performed by: RADIOLOGY

## 2020-02-13 PROCEDURE — 76700 US ABDOMEN COMPLETE: ICD-10-PCS | Mod: 26,,, | Performed by: RADIOLOGY

## 2020-02-13 PROCEDURE — 76700 US EXAM ABDOM COMPLETE: CPT | Mod: TC,PO

## 2020-02-21 ENCOUNTER — OFFICE VISIT (OUTPATIENT)
Dept: HEMATOLOGY/ONCOLOGY | Facility: CLINIC | Age: 70
End: 2020-02-21
Payer: MEDICARE

## 2020-02-21 VITALS
TEMPERATURE: 98 F | BODY MASS INDEX: 23.98 KG/M2 | SYSTOLIC BLOOD PRESSURE: 144 MMHG | OXYGEN SATURATION: 100 % | RESPIRATION RATE: 18 BRPM | HEART RATE: 70 BPM | DIASTOLIC BLOOD PRESSURE: 84 MMHG | WEIGHT: 177 LBS | HEIGHT: 72 IN

## 2020-02-21 DIAGNOSIS — Z86.19 HISTORY OF HEPATITIS C: ICD-10-CM

## 2020-02-21 DIAGNOSIS — I1A.0 RESISTANT HYPERTENSION: ICD-10-CM

## 2020-02-21 DIAGNOSIS — R93.2 ABNORMAL CT SCAN, LIVER: ICD-10-CM

## 2020-02-21 DIAGNOSIS — D64.9 ANEMIA, UNSPECIFIED TYPE: Primary | ICD-10-CM

## 2020-02-21 DIAGNOSIS — D69.6 THROMBOCYTOPENIA: ICD-10-CM

## 2020-02-21 PROCEDURE — 99999 PR PBB SHADOW E&M-EST. PATIENT-LVL III: CPT | Mod: PBBFAC,,, | Performed by: INTERNAL MEDICINE

## 2020-02-21 PROCEDURE — 1159F MED LIST DOCD IN RCRD: CPT | Mod: S$GLB,,, | Performed by: INTERNAL MEDICINE

## 2020-02-21 PROCEDURE — 1125F PR PAIN SEVERITY QUANTIFIED, PAIN PRESENT: ICD-10-PCS | Mod: S$GLB,,, | Performed by: INTERNAL MEDICINE

## 2020-02-21 PROCEDURE — 1101F PR PT FALLS ASSESS DOC 0-1 FALLS W/OUT INJ PAST YR: ICD-10-PCS | Mod: CPTII,S$GLB,, | Performed by: INTERNAL MEDICINE

## 2020-02-21 PROCEDURE — 3077F SYST BP >= 140 MM HG: CPT | Mod: CPTII,S$GLB,, | Performed by: INTERNAL MEDICINE

## 2020-02-21 PROCEDURE — 99214 PR OFFICE/OUTPT VISIT, EST, LEVL IV, 30-39 MIN: ICD-10-PCS | Mod: S$GLB,,, | Performed by: INTERNAL MEDICINE

## 2020-02-21 PROCEDURE — 1125F AMNT PAIN NOTED PAIN PRSNT: CPT | Mod: S$GLB,,, | Performed by: INTERNAL MEDICINE

## 2020-02-21 PROCEDURE — 3077F PR MOST RECENT SYSTOLIC BLOOD PRESSURE >= 140 MM HG: ICD-10-PCS | Mod: CPTII,S$GLB,, | Performed by: INTERNAL MEDICINE

## 2020-02-21 PROCEDURE — 99999 PR PBB SHADOW E&M-EST. PATIENT-LVL III: ICD-10-PCS | Mod: PBBFAC,,, | Performed by: INTERNAL MEDICINE

## 2020-02-21 PROCEDURE — 99214 OFFICE O/P EST MOD 30 MIN: CPT | Mod: S$GLB,,, | Performed by: INTERNAL MEDICINE

## 2020-02-21 PROCEDURE — 1101F PT FALLS ASSESS-DOCD LE1/YR: CPT | Mod: CPTII,S$GLB,, | Performed by: INTERNAL MEDICINE

## 2020-02-21 PROCEDURE — 3079F PR MOST RECENT DIASTOLIC BLOOD PRESSURE 80-89 MM HG: ICD-10-PCS | Mod: CPTII,S$GLB,, | Performed by: INTERNAL MEDICINE

## 2020-02-21 PROCEDURE — 3079F DIAST BP 80-89 MM HG: CPT | Mod: CPTII,S$GLB,, | Performed by: INTERNAL MEDICINE

## 2020-02-21 PROCEDURE — 1159F PR MEDICATION LIST DOCUMENTED IN MEDICAL RECORD: ICD-10-PCS | Mod: S$GLB,,, | Performed by: INTERNAL MEDICINE

## 2020-02-21 NOTE — PROGRESS NOTES
HPI    69 years old  male was referred to Hematology Clinic for evaluation of anemia.        Patient had no history of hepatitis-C treated successfully.  Recent CT abdomen pelvis shows early bright arterial enhancement .  At the time GI consultation was referred.  However Patient did not follow-up with any GI doctor.  Currently denies any GI bleeding. Patient denies any chest pain shortness breath.  Patient denies any abdominal pain nausea vomiting or diarrhea.  No fever no chills.    Fourteen point review system as above.      Past Medical History:   Diagnosis Date    Cataract     COPD (chronic obstructive pulmonary disease)     Diverticulosis     DUARTE (dyspnea on exertion)     GERD (gastroesophageal reflux disease)     Glaucoma     Hepatitis C     treated; seeing Dr. Carr    Hyperlipidemia     Hypertension     Liver lesion 08/2018    RA (rheumatoid arthritis)     Rectal prolapse     Possible     Social History     Socioeconomic History    Marital status:      Spouse name: Not on file    Number of children: Not on file    Years of education: Not on file    Highest education level: Not on file   Occupational History    Not on file   Social Needs    Financial resource strain: Not on file    Food insecurity:     Worry: Not on file     Inability: Not on file    Transportation needs:     Medical: Not on file     Non-medical: Not on file   Tobacco Use    Smoking status: Never Smoker    Smokeless tobacco: Never Used   Substance and Sexual Activity    Alcohol use: No     Frequency: Never    Drug use: Yes     Types: Hydrocodone    Sexual activity: Not on file   Lifestyle    Physical activity:     Days per week: Not on file     Minutes per session: Not on file    Stress: Not on file   Relationships    Social connections:     Talks on phone: Not on file     Gets together: Not on file     Attends Rastafarian service: Not on file     Active member of club or organization: Not  on file     Attends meetings of clubs or organizations: Not on file     Relationship status: Not on file   Other Topics Concern    Not on file   Social History Narrative    Not on file         Subjective      Review of Systems   Constitutional: Negative for appetite change, fatigue and unexpected weight change.   HENT: Negative for mouth sores.   Eyes: Negative for visual disturbance.   Respiratory: Negative for cough and shortness of breath.   Cardiovascular: Negative for chest pain.   Gastrointestinal: Negative for diarrhea.   Genitourinary: Negative for frequency.   Musculoskeletal: Negative for back pain.   Skin: Negative for rash.   Neurological: Negative for headaches.   Hematological: Negative for adenopathy.   Psychiatric/Behavioral: The patient is not nervous/anxious.   All other systems reviewed and are negative.     Objective    Physical Exam   Vitals:    02/21/20 1143   BP: (!) 144/84   Pulse: 70   Resp: 18   Temp: 98 °F (36.7 °C)       Constitutional:  Alert oriented x3 no acute distress  HENT:  Normocephalic atraumatic pupils equal round reactive to light extraocular muscle intact    Cardiovascular:  Regular rate   Pulmonary/Chest:  Clear to auscultate  Abdominal:  Soft nontender nondistended bowel sounds x4   Musculoskeletal: Normal range of motion.   Gait is normal   No clubbing, cyanosis     Lymphadenopathy:  No evidence lymphadenopathy  Neurological: patient is alert and oriented to person, place, and time. patient has normal strength and normal reflexes. No sensory deficit. Gait normal.   Skin: Skin is warm, dry and intact. No bruising, no lesion and no rash noted. No cyanosis. Nails show no clubbing.   No lesions   Psychiatric: patient has a normal mood and affect. patient speech is normal and behavior is normal. Judgment normal. Cognition and memory are normal.   Vitals reviewed.     CMP  Sodium   Date Value Ref Range Status   02/07/2020 137 136 - 145 mmol/L Final     Potassium   Date Value  Ref Range Status   02/07/2020 4.0 3.5 - 5.1 mmol/L Final     Chloride   Date Value Ref Range Status   02/07/2020 101 95 - 110 mmol/L Final     CO2   Date Value Ref Range Status   02/07/2020 28 22 - 31 mmol/L Final     Glucose   Date Value Ref Range Status   02/07/2020 122 (H) 70 - 110 mg/dL Final     Comment:     The ADA recommends the following guidelines for fasting glucose:  Normal:       less than 100 mg/dL  Prediabetes:  100 mg/dL to 125 mg/dL  Diabetes:     126 mg/dL or higher       BUN, Bld   Date Value Ref Range Status   02/07/2020 16 9 - 21 mg/dL Final     Creatinine   Date Value Ref Range Status   02/07/2020 0.91 0.50 - 1.40 mg/dL Final     Calcium   Date Value Ref Range Status   02/07/2020 9.1 8.4 - 10.2 mg/dL Final     Total Protein   Date Value Ref Range Status   02/07/2020 7.2 6.0 - 8.4 g/dL Final     Albumin   Date Value Ref Range Status   02/07/2020 4.3 3.5 - 5.2 g/dL Final     Total Bilirubin   Date Value Ref Range Status   02/07/2020 0.3 0.2 - 1.3 mg/dL Final     Alkaline Phosphatase   Date Value Ref Range Status   02/07/2020 48 38 - 145 U/L Final     AST   Date Value Ref Range Status   02/07/2020 20 17 - 59 U/L Final     ALT   Date Value Ref Range Status   02/07/2020 11 0 - 50 U/L Final     Anion Gap   Date Value Ref Range Status   02/07/2020 8 8 - 16 mmol/L Final     eGFR if    Date Value Ref Range Status   02/07/2020 >60 >60 mL/min/1.73 m^2 Final     eGFR if non    Date Value Ref Range Status   02/07/2020 >60 >60 mL/min/1.73 m^2 Final     Comment:     Calculation used to obtain the estimated glomerular filtration  rate (eGFR) is the CKD-EPI equation.        Lab Results   Component Value Date    WBC 17.75 (H) 02/07/2020    HGB 11.1 (L) 02/07/2020    HCT 36.0 (L) 02/07/2020    MCV 93 02/07/2020     02/07/2020         [place] free light chain 1.04 lambda free light chain 1.15 kappa lambda ratio 0.9  Epo level 7.8  Reticular count 3.1  b-2 microglobulin  1.5  SPEP Jann[place] no evidence of monoclonal peaks  Folate 19.7  B12 911        Assessment    [] Anemia un differentiated - improving  Normocytic with MCV of 96.    [] Thrombocytopenia resolved    [] No evidence of renal insufficiencies.    [] Know  history of COPD, and hepatitis-C    [] Abnormal CT finding of the liver.    [] Acid reflux    [] Elevated WBC predominantly neutrophils.    Plan    Anemia repeat CBC 2 months.  Consider bone marrow biopsy if anemia and thrombocytopenia is persistent    Require GI or hepatology follow-up    RTC 2 months    There are no diagnoses linked to this encounter.

## 2020-02-25 DIAGNOSIS — G56.00 CARPAL TUNNEL SYNDROME, UNSPECIFIED LATERALITY: ICD-10-CM

## 2020-02-25 DIAGNOSIS — R52 PAIN MANAGEMENT: ICD-10-CM

## 2020-02-25 DIAGNOSIS — I15.9 SECONDARY HYPERTENSION: ICD-10-CM

## 2020-02-25 DIAGNOSIS — M17.9 OSTEOARTHRITIS OF KNEE, UNSPECIFIED LATERALITY, UNSPECIFIED OSTEOARTHRITIS TYPE: ICD-10-CM

## 2020-02-25 DIAGNOSIS — M05.742 RHEUMATOID ARTHRITIS INVOLVING BOTH HANDS WITH POSITIVE RHEUMATOID FACTOR: ICD-10-CM

## 2020-02-25 DIAGNOSIS — M05.741 RHEUMATOID ARTHRITIS INVOLVING BOTH HANDS WITH POSITIVE RHEUMATOID FACTOR: ICD-10-CM

## 2020-02-26 RX ORDER — SULFASALAZINE 500 MG/1
TABLET, DELAYED RELEASE ORAL
Qty: 120 TABLET | Refills: 6 | Status: SHIPPED | OUTPATIENT
Start: 2020-02-26 | End: 2020-04-13

## 2020-02-29 DIAGNOSIS — I1A.0 RESISTANT HYPERTENSION: ICD-10-CM

## 2020-02-29 DIAGNOSIS — K21.9 GASTROESOPHAGEAL REFLUX DISEASE, ESOPHAGITIS PRESENCE NOT SPECIFIED: ICD-10-CM

## 2020-03-02 RX ORDER — AMLODIPINE BESYLATE 10 MG/1
TABLET ORAL
Qty: 90 TABLET | Refills: 1 | Status: SHIPPED | OUTPATIENT
Start: 2020-03-02 | End: 2020-08-23

## 2020-03-02 RX ORDER — OMEPRAZOLE 20 MG/1
20 CAPSULE, DELAYED RELEASE ORAL
Qty: 180 CAPSULE | Refills: 1 | Status: SHIPPED | OUTPATIENT
Start: 2020-03-02 | End: 2020-08-23

## 2020-03-17 RX ORDER — TAMSULOSIN HYDROCHLORIDE 0.4 MG/1
CAPSULE ORAL
Qty: 30 CAPSULE | Refills: 11 | Status: SHIPPED | OUTPATIENT
Start: 2020-03-17 | End: 2021-03-08

## 2020-03-27 DIAGNOSIS — G56.00 CARPAL TUNNEL SYNDROME, UNSPECIFIED LATERALITY: ICD-10-CM

## 2020-03-27 DIAGNOSIS — M06.9 RHEUMATOID ARTHRITIS, INVOLVING UNSPECIFIED SITE, UNSPECIFIED RHEUMATOID FACTOR PRESENCE: ICD-10-CM

## 2020-03-27 RX ORDER — TIZANIDINE 4 MG/1
TABLET ORAL
Qty: 270 TABLET | Refills: 1 | Status: SHIPPED | OUTPATIENT
Start: 2020-03-27 | End: 2020-09-21

## 2020-04-08 ENCOUNTER — PATIENT MESSAGE (OUTPATIENT)
Dept: HEMATOLOGY/ONCOLOGY | Facility: CLINIC | Age: 70
End: 2020-04-08

## 2020-04-09 ENCOUNTER — PATIENT OUTREACH (OUTPATIENT)
Dept: ADMINISTRATIVE | Facility: OTHER | Age: 70
End: 2020-04-09

## 2020-04-13 ENCOUNTER — OFFICE VISIT (OUTPATIENT)
Dept: RHEUMATOLOGY | Facility: CLINIC | Age: 70
End: 2020-04-13
Payer: MEDICARE

## 2020-04-13 VITALS — BODY MASS INDEX: 23.7 KG/M2 | WEIGHT: 175 LBS | HEIGHT: 72 IN

## 2020-04-13 DIAGNOSIS — Z79.52 CURRENT USE OF STEROID MEDICATION: ICD-10-CM

## 2020-04-13 DIAGNOSIS — M05.9 SEROPOSITIVE RHEUMATOID ARTHRITIS: Primary | ICD-10-CM

## 2020-04-13 DIAGNOSIS — G89.4 CHRONIC PAIN SYNDROME: ICD-10-CM

## 2020-04-13 DIAGNOSIS — M17.9 OSTEOARTHRITIS OF KNEE, UNSPECIFIED LATERALITY, UNSPECIFIED OSTEOARTHRITIS TYPE: ICD-10-CM

## 2020-04-13 PROCEDURE — 99441 PR PHYSICIAN TELEPHONE EVALUATION 5-10 MIN: ICD-10-PCS | Mod: 95,,, | Performed by: PHYSICIAN ASSISTANT

## 2020-04-13 PROCEDURE — 99441 PR PHYSICIAN TELEPHONE EVALUATION 5-10 MIN: CPT | Mod: 95,,, | Performed by: PHYSICIAN ASSISTANT

## 2020-04-13 RX ORDER — PREDNISONE 5 MG/1
TABLET ORAL
Qty: 60 TABLET | Refills: 6 | Status: SHIPPED | OUTPATIENT
Start: 2020-04-13 | End: 2020-10-13 | Stop reason: SDUPTHER

## 2020-04-13 RX ORDER — DICLOFENAC SODIUM 10 MG/G
2 GEL TOPICAL 4 TIMES DAILY
Qty: 100 G | Refills: 5 | Status: SHIPPED | OUTPATIENT
Start: 2020-04-13 | End: 2020-10-13 | Stop reason: SDUPTHER

## 2020-04-13 NOTE — PROGRESS NOTES
The patient location is: home  The chief complaint leading to consultation is: RA  Visit type: Virtual visit with synchronous audio   Total time spent with patient: 8 minutes  Each patient to whom he or she provides medical services by telemedicine is:  (1) informed of the relationship between the physician and patient and the respective role of any other health care provider with respect to management of the patient; and (2) notified that he or she may decline to receive medical services by telemedicine and may withdraw from such care at any time.  Notes:   Subjective:       Patient ID: Scooter Swan is a 69 y.o. male.    Chief Complaint: Disease Management and Rheumatoid Arthritis    Mr. Swan is a 68 year old male who presents to audio visit for follow up on seropositive rheumatoid arthritis and osteoarthritis. He has been doing fair since his last visit. He complains of intermittent aching pain involving his hands, knees, feet, and low back. He has significant morning stiffness and he is taking prednisone 5-10 mg daily. Norco is providing adequate control of his pain without side effects. BP is well controlled. He is undergoing work up with Hematology for anemia and may undergo BMB in the future.    Current treatment:  1. Norco TID PRN  2. Prednisone 10 mg  3. Zanaflex PRN    Prior treatment:  1. Plaquenil (WEIGHT LOSS)  2. SSZ caused GI upset    Review of Systems   Constitutional: Positive for activity change. Negative for chills, fatigue and fever.   Eyes: Negative for visual disturbance.   Respiratory: Negative for cough, shortness of breath and wheezing.    Cardiovascular: Negative for chest pain, palpitations and leg swelling.   Gastrointestinal: Negative for abdominal pain, constipation, diarrhea, nausea and vomiting.   Musculoskeletal: Positive for arthralgias, back pain and joint swelling. Negative for myalgias.   Neurological: Negative for dizziness, syncope and headaches.   Hematological: Negative for  adenopathy.         Objective:     There were no vitals filed for this visit.    Past Medical History:   Diagnosis Date    Cataract     COPD (chronic obstructive pulmonary disease)     Diverticulosis     DUARTE (dyspnea on exertion)     GERD (gastroesophageal reflux disease)     Glaucoma     Hepatitis C     treated; seeing Dr. Carr    Hyperlipidemia     Hypertension     Liver lesion 08/2018    RA (rheumatoid arthritis)     Rectal prolapse     Possible     Past Surgical History:   Procedure Laterality Date    CARPAL TUNNEL RELEASE      Right wrist 2015    COLONOSCOPY  08/2016    Dr. Cardona; in care everywhere    COLONOSCOPY N/A 3/20/2019    Procedure: COLONOSCOPY;  Surgeon: Sotero Arthur MD;  [facility];  Service: Endoscopy;  Laterality: N/A; hemorrhoids, diverticulosis, repeat in 10 years for screening    EXCISIONAL HEMORRHOIDECTOMY N/A 10/18/2018    Procedure: HEMORRHOIDECTOMY, prone;  Surgeon: Gunnar Horton MD;  Location: [facility] OR [address] [place] [address];  Service: Colon and Rectal;  Laterality: N/A;    THYROID SURGERY      UPPER GASTROINTESTINAL ENDOSCOPY  08/2016    Dr. Cardona; in care everywhere          Physical Exam   Constitutional: He is oriented to person, place, and time.   Neurological: He is alert and oriented to person, place, and time.        Assessment:       1. Seropositive rheumatoid arthritis    2. Osteoarthritis of knee, unspecified laterality, unspecified osteoarthritis type    3. Chronic pain syndrome    4. Current use of steroid medication            Plan:       Seropositive rheumatoid arthritis  -     predniSONE (DELTASONE) 5 MG tablet; TAKE 2 TABLETS BY MOUTH once daily AS NEEDED  Dispense: 60 tablet; Refill: 6    Osteoarthritis of knee, unspecified laterality, unspecified osteoarthritis type  -     diclofenac sodium (VOLTAREN) 1 % Gel; Apply 2 grams  topically 4 (four) times daily.  Dispense: 100 g; Refill: 5    Chronic pain syndrome    Current use of steroid  medication        Assessment:  68 year old male with  Seropositive (+RF) rheumatoid arthritis, osteoarthritis on prednisone 10 mg  --thrombocytopenia, anemia undergoing hematology work up  --hx of hep c s/p harvoni, last hep c viral load undetectable  --chronic pain syndrome on norco    Plan:  1. Cont. Prednisone 5-10 mg daily PRN  2. Continue norco PRN per Dr. Morales. I have checked [place] prescription monitoring program site and no unusual or abnormal behavior has occurred pt understand the risk and benefits of taking opioid medications and has decided to continue the medication   3. Labs prior to f/u visit at [facility]    Follow up:  3 months Dr. Morales

## 2020-04-23 ENCOUNTER — LAB VISIT (OUTPATIENT)
Dept: LAB | Facility: HOSPITAL | Age: 70
End: 2020-04-23
Attending: INTERNAL MEDICINE
Payer: MEDICARE

## 2020-04-23 DIAGNOSIS — D69.6 THROMBOCYTOPENIA: ICD-10-CM

## 2020-04-23 DIAGNOSIS — I1A.0 RESISTANT HYPERTENSION: ICD-10-CM

## 2020-04-23 DIAGNOSIS — Z86.19 HISTORY OF HEPATITIS C: ICD-10-CM

## 2020-04-23 DIAGNOSIS — D64.9 ANEMIA, UNSPECIFIED TYPE: ICD-10-CM

## 2020-04-23 DIAGNOSIS — R93.2 ABNORMAL CT SCAN, LIVER: ICD-10-CM

## 2020-04-23 LAB
ALBUMIN SERPL BCP-MCNC: 3.9 G/DL (ref 3.5–5.2)
ALP SERPL-CCNC: 65 U/L (ref 55–135)
ALT SERPL W/O P-5'-P-CCNC: 8 U/L (ref 10–44)
ANION GAP SERPL CALC-SCNC: 8 MMOL/L (ref 8–16)
AST SERPL-CCNC: 16 U/L (ref 10–40)
BASOPHILS # BLD AUTO: 0.07 K/UL (ref 0–0.2)
BASOPHILS NFR BLD: 0.6 % (ref 0–1.9)
BILIRUB SERPL-MCNC: 0.5 MG/DL (ref 0.1–1)
BUN SERPL-MCNC: 11 MG/DL (ref 8–23)
CALCIUM SERPL-MCNC: 9.2 MG/DL (ref 8.7–10.5)
CHLORIDE SERPL-SCNC: 106 MMOL/L (ref 95–110)
CO2 SERPL-SCNC: 28 MMOL/L (ref 23–29)
CREAT SERPL-MCNC: 1.1 MG/DL (ref 0.5–1.4)
DIFFERENTIAL METHOD: ABNORMAL
EOSINOPHIL # BLD AUTO: 0.1 K/UL (ref 0–0.5)
EOSINOPHIL NFR BLD: 0.4 % (ref 0–8)
ERYTHROCYTE [DISTWIDTH] IN BLOOD BY AUTOMATED COUNT: 14.3 % (ref 11.5–14.5)
EST. GFR  (AFRICAN AMERICAN): >60 ML/MIN/1.73 M^2
EST. GFR  (NON AFRICAN AMERICAN): >60 ML/MIN/1.73 M^2
GLUCOSE SERPL-MCNC: 92 MG/DL (ref 70–110)
HCT VFR BLD AUTO: 35.6 % (ref 40–54)
HGB BLD-MCNC: 11.4 G/DL (ref 14–18)
IMM GRANULOCYTES # BLD AUTO: 0.08 K/UL (ref 0–0.04)
IMM GRANULOCYTES NFR BLD AUTO: 0.7 % (ref 0–0.5)
LYMPHOCYTES # BLD AUTO: 3 K/UL (ref 1–4.8)
LYMPHOCYTES NFR BLD: 25.7 % (ref 18–48)
MCH RBC QN AUTO: 30.2 PG (ref 27–31)
MCHC RBC AUTO-ENTMCNC: 32 G/DL (ref 32–36)
MCV RBC AUTO: 94 FL (ref 82–98)
MONOCYTES # BLD AUTO: 1.5 K/UL (ref 0.3–1)
MONOCYTES NFR BLD: 12.9 % (ref 4–15)
NEUTROPHILS # BLD AUTO: 7.1 K/UL (ref 1.8–7.7)
NEUTROPHILS NFR BLD: 59.7 % (ref 38–73)
NRBC BLD-RTO: 0 /100 WBC
OVALOCYTES BLD QL SMEAR: ABNORMAL
PLATELET # BLD AUTO: 138 K/UL (ref 150–350)
PLATELET BLD QL SMEAR: ABNORMAL
PMV BLD AUTO: 12.4 FL (ref 9.2–12.9)
POLYCHROMASIA BLD QL SMEAR: ABNORMAL
POTASSIUM SERPL-SCNC: 4.9 MMOL/L (ref 3.5–5.1)
PROT SERPL-MCNC: 7 G/DL (ref 6–8.4)
RBC # BLD AUTO: 3.78 M/UL (ref 4.6–6.2)
SODIUM SERPL-SCNC: 142 MMOL/L (ref 136–145)
WBC # BLD AUTO: 11.85 K/UL (ref 3.9–12.7)

## 2020-04-23 PROCEDURE — 80053 COMPREHEN METABOLIC PANEL: CPT

## 2020-04-23 PROCEDURE — 36415 COLL VENOUS BLD VENIPUNCTURE: CPT | Mod: PO

## 2020-04-23 PROCEDURE — 85025 COMPLETE CBC W/AUTO DIFF WBC: CPT | Mod: PO

## 2020-04-23 NOTE — PROGRESS NOTES
The patient location is: home  The chief complaint leading to consultation is: anemia   Visit type: audiovisual by [employer]  Total time spent with patient: 15  Each patient to whom he or she provides medical services by telemedicine is:  (1) informed of the relationship between the physician and patient and the respective role of any other health care provider with respect to management of the patient; and (2) notified that he or she may decline to receive medical services by telemedicine and may withdraw from such care at any time.    Notes:     HPI    69 years old  male was referred to Hematology Clinic for evaluation of anemia.        Patient had no history of hepatitis-C treated successfully.  Recent CT abdomen pelvis shows early bright arterial enhancement .  At the time GI consultation was referred.  However Patient did not follow-up with any GI doctor.  Currently denies any GI bleeding. Patient denies any chest pain shortness breath.  Patient denies any abdominal pain nausea vomiting or diarrhea.  No fever no chills.    Fourteen point review system as above.      Past Medical History:   Diagnosis Date    Cataract     COPD (chronic obstructive pulmonary disease)     Diverticulosis     DUARTE (dyspnea on exertion)     GERD (gastroesophageal reflux disease)     Glaucoma     Hepatitis C     treated; seeing Dr. Carr    Hyperlipidemia     Hypertension     Liver lesion 08/2018    RA (rheumatoid arthritis)     Rectal prolapse     Possible     Social History     Socioeconomic History    Marital status:      Spouse name: Not on file    Number of children: Not on file    Years of education: Not on file    Highest education level: Not on file   Occupational History    Not on file   Social Needs    Financial resource strain: Not on file    Food insecurity:     Worry: Not on file     Inability: Not on file    Transportation needs:     Medical: Not on file     Non-medical: Not on  file   Tobacco Use    Smoking status: Never Smoker    Smokeless tobacco: Never Used   Substance and Sexual Activity    Alcohol use: No     Frequency: Never    Drug use: Yes     Types: Hydrocodone    Sexual activity: Not on file   Lifestyle    Physical activity:     Days per week: Not on file     Minutes per session: Not on file    Stress: Not on file   Relationships    Social connections:     Talks on phone: Not on file     Gets together: Not on file     Attends Cheondoism service: Not on file     Active member of club or organization: Not on file     Attends meetings of clubs or organizations: Not on file     Relationship status: Not on file   Other Topics Concern    Not on file   Social History Narrative    Not on file         Subjective      Review of Systems   Constitutional: Negative for appetite change, fatigue and unexpected weight change.   HENT: Negative for mouth sores.   Eyes: Negative for visual disturbance.   Respiratory: Negative for cough and shortness of breath.   Cardiovascular: Negative for chest pain.   Gastrointestinal: Negative for diarrhea.   Genitourinary: Negative for frequency.   Musculoskeletal: Negative for back pain.   Skin: Negative for rash.   Neurological: Negative for headaches.   Hematological: Negative for adenopathy.   Psychiatric/Behavioral: The patient is not nervous/anxious.   All other systems reviewed and are negative.     Objective    Physical Exam     Alert oriented x3 no acute distress  Normocephalic atraumatic  Normal speech  Normal breathing effort  No rash no lesions  Cranial nerves 2-12 grossly intact  Move extremities  Normal affect      Lab Results   Component Value Date    WBC 11.85 04/23/2020    HGB 11.4 (L) 04/23/2020    HCT 35.6 (L) 04/23/2020    MCV 94 04/23/2020     (L) 04/23/2020           [place] free light chain 1.04 lambda free light chain 1.15 kappa lambda ratio 0.9  Epo level 7.8  Reticular count 3.1  b-2 microglobulin 1.5  SPEP Jannette no  evidence of monoclonal peaks  Folate 19.7  B12 911      Assessment Plan    [] Mild Anemia un differentiated - improving  Normocytic with MCV of 96.  colonoscope 1 year ago negative - recommendation RTC 10 for surveillance scope  > continue monitor  > will have patient return 3 months with repeat blood work CBC CMP    [] ultrasound shows coarse liver echotexture   > outpatient GI referral    [] Thrombocytopenia mild    [] No evidence of renal insufficiencies.    [] Know  history of COPD, and hepatitis-C    [] Acid reflux        There are no diagnoses linked to this encounter.

## 2020-04-24 ENCOUNTER — OFFICE VISIT (OUTPATIENT)
Dept: HEMATOLOGY/ONCOLOGY | Facility: CLINIC | Age: 70
End: 2020-04-24
Payer: MEDICARE

## 2020-04-24 DIAGNOSIS — D64.9 ANEMIA, UNSPECIFIED TYPE: Primary | ICD-10-CM

## 2020-04-24 DIAGNOSIS — D69.6 THROMBOCYTOPENIA: ICD-10-CM

## 2020-04-24 PROCEDURE — 99499 RISK ADDL DX/OHS AUDIT: ICD-10-PCS | Mod: 95,,, | Performed by: INTERNAL MEDICINE

## 2020-04-24 PROCEDURE — 99214 PR OFFICE/OUTPT VISIT, EST, LEVL IV, 30-39 MIN: ICD-10-PCS | Mod: 95,,, | Performed by: INTERNAL MEDICINE

## 2020-04-24 PROCEDURE — 1159F PR MEDICATION LIST DOCUMENTED IN MEDICAL RECORD: ICD-10-PCS | Mod: 95,,, | Performed by: INTERNAL MEDICINE

## 2020-04-24 PROCEDURE — 1159F MED LIST DOCD IN RCRD: CPT | Mod: 95,,, | Performed by: INTERNAL MEDICINE

## 2020-04-24 PROCEDURE — 99499 UNLISTED E&M SERVICE: CPT | Mod: 95,,, | Performed by: INTERNAL MEDICINE

## 2020-04-24 PROCEDURE — 1101F PT FALLS ASSESS-DOCD LE1/YR: CPT | Mod: CPTII,95,, | Performed by: INTERNAL MEDICINE

## 2020-04-24 PROCEDURE — 99214 OFFICE O/P EST MOD 30 MIN: CPT | Mod: 95,,, | Performed by: INTERNAL MEDICINE

## 2020-04-24 PROCEDURE — 1101F PR PT FALLS ASSESS DOC 0-1 FALLS W/OUT INJ PAST YR: ICD-10-PCS | Mod: CPTII,95,, | Performed by: INTERNAL MEDICINE

## 2020-04-28 DIAGNOSIS — Z86.19 HISTORY OF HEPATITIS C: ICD-10-CM

## 2020-04-28 DIAGNOSIS — R93.2 ABNORMAL CT SCAN, LIVER: Primary | ICD-10-CM

## 2020-04-28 DIAGNOSIS — D64.9 ANEMIA, UNSPECIFIED TYPE: ICD-10-CM

## 2020-04-29 ENCOUNTER — OFFICE VISIT (OUTPATIENT)
Dept: FAMILY MEDICINE | Facility: CLINIC | Age: 70
End: 2020-04-29
Payer: MEDICARE

## 2020-04-29 VITALS — SYSTOLIC BLOOD PRESSURE: 115 MMHG | DIASTOLIC BLOOD PRESSURE: 60 MMHG

## 2020-04-29 DIAGNOSIS — E78.49 OTHER HYPERLIPIDEMIA: Primary | ICD-10-CM

## 2020-04-29 DIAGNOSIS — I35.0 MODERATE AORTIC STENOSIS BY PRIOR ECHOCARDIOGRAM: ICD-10-CM

## 2020-04-29 DIAGNOSIS — J43.9 PULMONARY EMPHYSEMA, UNSPECIFIED EMPHYSEMA TYPE: ICD-10-CM

## 2020-04-29 DIAGNOSIS — N52.8 OTHER MALE ERECTILE DYSFUNCTION: ICD-10-CM

## 2020-04-29 PROCEDURE — 99441 PR PHYSICIAN TELEPHONE EVALUATION 5-10 MIN: ICD-10-PCS | Mod: 95,,, | Performed by: INTERNAL MEDICINE

## 2020-04-29 PROCEDURE — 99441 PR PHYSICIAN TELEPHONE EVALUATION 5-10 MIN: CPT | Mod: 95,,, | Performed by: INTERNAL MEDICINE

## 2020-04-29 RX ORDER — SILDENAFIL 100 MG/1
100 TABLET, FILM COATED ORAL
Qty: 4 TABLET | Refills: 0 | Status: SHIPPED | OUTPATIENT
Start: 2020-04-29 | End: 2020-07-01 | Stop reason: SDUPTHER

## 2020-04-29 NOTE — PROGRESS NOTES
Audio Only Telehealth Visit     The patient location is: home  The chief complaint leading to consultation is: f/u and med refill  Visit type: Virtual visit with audio only (telephone)     The reason for the audio only service rather than synchronous audio and video virtual visit was related to technical difficulties or patient preference/necessity.     Each patient to whom I provide medical services by telemedicine is:  (1) informed of the relationship between the physician and patient and the respective role of any other health care provider with respect to management of the patient; and (2) notified that they may decline to receive medical services by telemedicine and may withdraw from such care at any time. Patient verbally consented to receive this service via voice-only telephone call.    This service was not originating from a related E/M service provided within the previous 7 days nor will  to an E/M service or procedure within the next 24 hours or my soonest available appointment.  Prevailing standard of care was able to be met in this audio-only visit.       Social Hx:  From [place]. Living in [place]. . 4 grown children. Retired .     HPI:  Has been checking BP at home. 115/60.  He has been doing well overall.  Pain is well controlled.  No chest pain or shortness of breath.  He would like to restart Viagra.    PMH and A/P  Problem  Assessment Plan Hx/Notes   Resistant hypertension Well controlled  Amlodipine, enalapril, hydralazine TID Past Tx: atenolol,    Hyperlipidemia Controlled February 2019 Repeat lipids with next labs. Pravastatin   Moderate aortic stenosis Asymptomatic Repeat TTE in October 2020 EUNICE is 1.19 cm2; peak velocity is 4.04 m/s; mean gradient is 32.58 mmHg. (10/5/18)   Emphysema controlled  Prn albuterol   Seropositive rheumatoid arthritis/osteoarthritis Pain well controlled.  Followed by rheumatology Low dosePrednisone. Prn norco.    Normocytic anemia and  thrombocytopenia Mild Followed by Heme-Onc    GERD Controlled  omeprazole   Erectile dysfunction  Restart Viagra    Glaucoma Controlled Followed by optometry    Hepatitis C Resolved  Status post Harvoni      Health Maintenance:      Current Outpatient Medications on File Prior to Visit   Medication Sig Dispense Refill    albuterol (PROAIR HFA) 90 mcg/actuation inhaler INHALE 2 PUFFS EVERY 4 HOURS AS NEEDED      amLODIPine (NORVASC) 10 MG tablet TAKE 1 TABLET(10 MG) BY MOUTH EVERY MORNING 90 tablet 1    cyanocobalamin 500 MCG tablet Take 500 mcg by mouth once daily.      diclofenac sodium (VOLTAREN) 1 % Gel Apply 2 grams  topically 4 (four) times daily. 100 g 5    enalapril (VASOTEC) 20 MG tablet Take 1 tablet (20 mg total) by mouth 2 (two) times daily. 60 tablet 12    ergocalciferol, vitamin D2, (VITAMIN D ORAL) Take 200 mGy by mouth once daily.      hydrALAZINE (APRESOLINE) 25 MG tablet TAKE 1 TABLET(25 MG) BY MOUTH EVERY 8 HOURS 270 tablet 1    HYDROcodone-acetaminophen (NORCO)  mg per tablet Take 1 tablet by mouth every 8 (eight) hours as needed for Pain. 90 tablet 0    Lactobac no.41/Bifidobact no.7 (PROBIOTIC-10 ORAL) Take by mouth once daily.      multivitamin capsule Take 1 capsule by mouth once daily.      omeprazole (PRILOSEC) 20 MG capsule Take 1 capsule (20 mg total) by mouth 2 (two) times daily before meals. 180 capsule 1    polyethylene glycol (GLYCOLAX) 17 gram PwPk Take by mouth once daily.      pravastatin (PRAVACHOL) 20 MG tablet Take 1 tablet (20 mg total) by mouth once daily. 90 tablet 1    predniSONE (DELTASONE) 5 MG tablet TAKE 2 TABLETS BY MOUTH once daily AS NEEDED 60 tablet 6    tamsulosin (FLOMAX) 0.4 mg Cap TAKE 1 CAPSULE(0.4 MG) BY MOUTH EVERY DAY 30 capsule 11    tiZANidine (ZANAFLEX) 4 MG tablet TAKE 1 TABLET BY MOUTH EVERY 8 HOURS 270 tablet 1    travoprost (TRAVATAN Z) 0.004 % ophthalmic solution Place 1 drop into both eyes every evening. 5 mL 12    atenolol  (TENORMIN) 100 MG tablet TK 1 T PO D      dorzolamide-timolol 2-0.5% (COSOPT) 22.3-6.8 mg/mL ophthalmic solution Place 1 drop into both eyes 2 (two) times daily. 10 mL 11    [DISCONTINUED] sildenafil (VIAGRA) 100 MG tablet Take 1 tablet (100 mg total) by mouth as needed for Erectile Dysfunction. (Patient not taking: Reported on 4/29/2020) 4 tablet 0     Current Facility-Administered Medications on File Prior to Visit   Medication Dose Route Frequency Provider Last Rate Last Dose    0.9%  NaCl infusion   Intravenous Continuous Deedee Sarkar NP   Stopped at 10/18/18 1613    mupirocin 2 % ointment   Nasal On Call Procedure Deedee Sarkar NP         I personally reviewed past medical, family and social history.  Review of Systems   Constitutional: Negative for activity change and fever.   HENT: Negative for sore throat and trouble swallowing.    Eyes: Negative for pain and visual disturbance.   Respiratory: Negative for cough, shortness of breath and wheezing.    Cardiovascular: Negative for chest pain, palpitations and leg swelling.   Gastrointestinal: Negative for abdominal pain, blood in stool, diarrhea, nausea and vomiting.   Endocrine: Negative for cold intolerance and polyuria.   Genitourinary: Negative for decreased urine volume and dysuria.   Musculoskeletal: Negative for gait problem and neck pain.   Skin: Negative for rash.   Neurological: Negative for dizziness, syncope and light-headedness.   Psychiatric/Behavioral: Negative for dysphoric mood. The patient is not nervous/anxious.          Assessment/Plan   Diagnoses and all orders for this visit:    Other hyperlipidemia  -     Lipid panel; Future    Pulmonary emphysema, unspecified emphysema type    Other male erectile dysfunction  -     sildenafiL (VIAGRA) 100 MG tablet; Take 1 tablet (100 mg total) by mouth as needed for Erectile Dysfunction.    Moderate aortic stenosis by prior echocardiogram  -     Echo Color Flow Doppler? Yes;  Future

## 2020-05-04 DIAGNOSIS — G89.4 CHRONIC PAIN SYNDROME: ICD-10-CM

## 2020-05-04 DIAGNOSIS — T37.8X5D HYDROXYCHLOROQUINE CAUSING ADVERSE EFFECT IN THERAPEUTIC USE, SUBSEQUENT ENCOUNTER: ICD-10-CM

## 2020-05-04 DIAGNOSIS — M05.741 RHEUMATOID ARTHRITIS INVOLVING BOTH HANDS WITH POSITIVE RHEUMATOID FACTOR: ICD-10-CM

## 2020-05-04 DIAGNOSIS — D69.6 THROMBOPENIA: ICD-10-CM

## 2020-05-04 DIAGNOSIS — D64.9 ANEMIA, UNSPECIFIED TYPE: ICD-10-CM

## 2020-05-04 DIAGNOSIS — M05.742 RHEUMATOID ARTHRITIS INVOLVING BOTH HANDS WITH POSITIVE RHEUMATOID FACTOR: ICD-10-CM

## 2020-05-04 DIAGNOSIS — M17.9 OSTEOARTHRITIS OF KNEE, UNSPECIFIED LATERALITY, UNSPECIFIED OSTEOARTHRITIS TYPE: ICD-10-CM

## 2020-05-05 ENCOUNTER — PATIENT MESSAGE (OUTPATIENT)
Dept: ADMINISTRATIVE | Facility: HOSPITAL | Age: 70
End: 2020-05-05

## 2020-05-05 RX ORDER — HYDROCODONE BITARTRATE AND ACETAMINOPHEN 10; 325 MG/1; MG/1
1 TABLET ORAL EVERY 8 HOURS PRN
Qty: 90 TABLET | Refills: 0 | Status: SHIPPED | OUTPATIENT
Start: 2020-05-05 | End: 2020-06-03 | Stop reason: SDUPTHER

## 2020-05-05 NOTE — TELEPHONE ENCOUNTER
----- Message from Marcela Anthony sent at 5/5/2020 12:34 PM CDT -----  Contact: patient  Type:  RX Refill Request    Who Called:  patient  Refill or New Rx:  refill  RX Name and Strength:  HYDROcodone-acetaminophen (NORCO)  mg per tablet  How is the patient currently taking it? (ex. 1XDay): as directed  Is this a 30 day or 90 day RX: 30  Preferred Pharmacy with phone number:    [id number] - [place] - [address] [facility] [address]  [facility]  [place] [address]  Phone: [phone] Fax: [phone]  Local or Mail Order:  local  Ordering Provider:  juan jose Larsen Call Back Number:  [phone]  Additional Information:  Please advise-thank you

## 2020-06-03 DIAGNOSIS — D64.9 ANEMIA, UNSPECIFIED TYPE: ICD-10-CM

## 2020-06-03 DIAGNOSIS — T37.8X5D HYDROXYCHLOROQUINE CAUSING ADVERSE EFFECT IN THERAPEUTIC USE, SUBSEQUENT ENCOUNTER: ICD-10-CM

## 2020-06-03 DIAGNOSIS — D69.6 THROMBOPENIA: ICD-10-CM

## 2020-06-03 DIAGNOSIS — M17.9 OSTEOARTHRITIS OF KNEE, UNSPECIFIED LATERALITY, UNSPECIFIED OSTEOARTHRITIS TYPE: ICD-10-CM

## 2020-06-03 DIAGNOSIS — M05.742 RHEUMATOID ARTHRITIS INVOLVING BOTH HANDS WITH POSITIVE RHEUMATOID FACTOR: ICD-10-CM

## 2020-06-03 DIAGNOSIS — G89.4 CHRONIC PAIN SYNDROME: ICD-10-CM

## 2020-06-03 DIAGNOSIS — M05.741 RHEUMATOID ARTHRITIS INVOLVING BOTH HANDS WITH POSITIVE RHEUMATOID FACTOR: ICD-10-CM

## 2020-06-07 RX ORDER — HYDROCODONE BITARTRATE AND ACETAMINOPHEN 10; 325 MG/1; MG/1
1 TABLET ORAL EVERY 8 HOURS PRN
Qty: 90 TABLET | Refills: 0 | Status: SHIPPED | OUTPATIENT
Start: 2020-06-07 | End: 2020-07-06 | Stop reason: SDUPTHER

## 2020-06-18 ENCOUNTER — LAB VISIT (OUTPATIENT)
Dept: LAB | Facility: HOSPITAL | Age: 70
End: 2020-06-18
Attending: INTERNAL MEDICINE
Payer: MEDICARE

## 2020-06-18 DIAGNOSIS — D64.9 ANEMIA, UNSPECIFIED TYPE: ICD-10-CM

## 2020-06-18 DIAGNOSIS — D69.6 THROMBOCYTOPENIA: ICD-10-CM

## 2020-06-18 LAB
ALBUMIN SERPL BCP-MCNC: 3.8 G/DL (ref 3.5–5.2)
ALP SERPL-CCNC: 51 U/L (ref 55–135)
ALT SERPL W/O P-5'-P-CCNC: 9 U/L (ref 10–44)
ANION GAP SERPL CALC-SCNC: 9 MMOL/L (ref 8–16)
AST SERPL-CCNC: 18 U/L (ref 10–40)
BASOPHILS # BLD AUTO: 0.04 K/UL (ref 0–0.2)
BASOPHILS NFR BLD: 0.4 % (ref 0–1.9)
BILIRUB SERPL-MCNC: 0.4 MG/DL (ref 0.1–1)
BUN SERPL-MCNC: 11 MG/DL (ref 8–23)
CALCIUM SERPL-MCNC: 8.7 MG/DL (ref 8.7–10.5)
CHLORIDE SERPL-SCNC: 105 MMOL/L (ref 95–110)
CO2 SERPL-SCNC: 25 MMOL/L (ref 23–29)
CREAT SERPL-MCNC: 1.2 MG/DL (ref 0.5–1.4)
DIFFERENTIAL METHOD: ABNORMAL
EOSINOPHIL # BLD AUTO: 0 K/UL (ref 0–0.5)
EOSINOPHIL NFR BLD: 0.4 % (ref 0–8)
ERYTHROCYTE [DISTWIDTH] IN BLOOD BY AUTOMATED COUNT: 14.2 % (ref 11.5–14.5)
EST. GFR  (AFRICAN AMERICAN): >60 ML/MIN/1.73 M^2
EST. GFR  (NON AFRICAN AMERICAN): >60 ML/MIN/1.73 M^2
GLUCOSE SERPL-MCNC: 139 MG/DL (ref 70–110)
HCT VFR BLD AUTO: 30.8 % (ref 40–54)
HGB BLD-MCNC: 9.7 G/DL (ref 14–18)
IMM GRANULOCYTES # BLD AUTO: 0.02 K/UL (ref 0–0.04)
IMM GRANULOCYTES NFR BLD AUTO: 0.2 % (ref 0–0.5)
LYMPHOCYTES # BLD AUTO: 3.5 K/UL (ref 1–4.8)
LYMPHOCYTES NFR BLD: 33.5 % (ref 18–48)
MCH RBC QN AUTO: 28.9 PG (ref 27–31)
MCHC RBC AUTO-ENTMCNC: 31.5 G/DL (ref 32–36)
MCV RBC AUTO: 92 FL (ref 82–98)
MONOCYTES # BLD AUTO: 1.3 K/UL (ref 0.3–1)
MONOCYTES NFR BLD: 12.5 % (ref 4–15)
NEUTROPHILS # BLD AUTO: 5.5 K/UL (ref 1.8–7.7)
NEUTROPHILS NFR BLD: 53 % (ref 38–73)
NRBC BLD-RTO: 0 /100 WBC
PLATELET # BLD AUTO: 121 K/UL (ref 150–350)
PMV BLD AUTO: 13 FL (ref 9.2–12.9)
POTASSIUM SERPL-SCNC: 3.6 MMOL/L (ref 3.5–5.1)
PROT SERPL-MCNC: 6.8 G/DL (ref 6–8.4)
RBC # BLD AUTO: 3.36 M/UL (ref 4.6–6.2)
SODIUM SERPL-SCNC: 139 MMOL/L (ref 136–145)
WBC # BLD AUTO: 10.36 K/UL (ref 3.9–12.7)

## 2020-06-18 PROCEDURE — 85025 COMPLETE CBC W/AUTO DIFF WBC: CPT | Mod: PO

## 2020-06-18 PROCEDURE — 36415 COLL VENOUS BLD VENIPUNCTURE: CPT | Mod: PO

## 2020-06-18 PROCEDURE — 80053 COMPREHEN METABOLIC PANEL: CPT

## 2020-07-01 DIAGNOSIS — N52.8 OTHER MALE ERECTILE DYSFUNCTION: ICD-10-CM

## 2020-07-02 RX ORDER — SILDENAFIL 100 MG/1
100 TABLET, FILM COATED ORAL
Qty: 4 TABLET | Refills: 0 | Status: SHIPPED | OUTPATIENT
Start: 2020-07-02 | End: 2020-08-04 | Stop reason: SDUPTHER

## 2020-07-06 DIAGNOSIS — M05.742 RHEUMATOID ARTHRITIS INVOLVING BOTH HANDS WITH POSITIVE RHEUMATOID FACTOR: ICD-10-CM

## 2020-07-06 DIAGNOSIS — G89.4 CHRONIC PAIN SYNDROME: ICD-10-CM

## 2020-07-06 DIAGNOSIS — D69.6 THROMBOPENIA: ICD-10-CM

## 2020-07-06 DIAGNOSIS — T37.8X5D HYDROXYCHLOROQUINE CAUSING ADVERSE EFFECT IN THERAPEUTIC USE, SUBSEQUENT ENCOUNTER: ICD-10-CM

## 2020-07-06 DIAGNOSIS — M17.9 OSTEOARTHRITIS OF KNEE, UNSPECIFIED LATERALITY, UNSPECIFIED OSTEOARTHRITIS TYPE: ICD-10-CM

## 2020-07-06 DIAGNOSIS — M05.741 RHEUMATOID ARTHRITIS INVOLVING BOTH HANDS WITH POSITIVE RHEUMATOID FACTOR: ICD-10-CM

## 2020-07-06 DIAGNOSIS — D64.9 ANEMIA, UNSPECIFIED TYPE: ICD-10-CM

## 2020-07-07 DIAGNOSIS — M17.9 OSTEOARTHRITIS OF KNEE, UNSPECIFIED LATERALITY, UNSPECIFIED OSTEOARTHRITIS TYPE: ICD-10-CM

## 2020-07-07 DIAGNOSIS — M05.741 RHEUMATOID ARTHRITIS INVOLVING BOTH HANDS WITH POSITIVE RHEUMATOID FACTOR: ICD-10-CM

## 2020-07-07 DIAGNOSIS — M05.742 RHEUMATOID ARTHRITIS INVOLVING BOTH HANDS WITH POSITIVE RHEUMATOID FACTOR: ICD-10-CM

## 2020-07-07 DIAGNOSIS — D64.9 ANEMIA, UNSPECIFIED TYPE: ICD-10-CM

## 2020-07-07 DIAGNOSIS — T37.8X5D HYDROXYCHLOROQUINE CAUSING ADVERSE EFFECT IN THERAPEUTIC USE, SUBSEQUENT ENCOUNTER: ICD-10-CM

## 2020-07-07 DIAGNOSIS — G89.4 CHRONIC PAIN SYNDROME: ICD-10-CM

## 2020-07-07 DIAGNOSIS — D69.6 THROMBOPENIA: ICD-10-CM

## 2020-07-07 RX ORDER — HYDROCODONE BITARTRATE AND ACETAMINOPHEN 10; 325 MG/1; MG/1
1 TABLET ORAL EVERY 8 HOURS PRN
Qty: 90 TABLET | Refills: 0 | Status: CANCELLED | OUTPATIENT
Start: 2020-07-07 | End: 2020-08-06

## 2020-07-07 RX ORDER — HYDROCODONE BITARTRATE AND ACETAMINOPHEN 10; 325 MG/1; MG/1
1 TABLET ORAL EVERY 8 HOURS PRN
Qty: 90 TABLET | Refills: 0 | Status: SHIPPED | OUTPATIENT
Start: 2020-07-07 | End: 2020-07-13 | Stop reason: SDUPTHER

## 2020-07-08 ENCOUNTER — OFFICE VISIT (OUTPATIENT)
Dept: HEMATOLOGY/ONCOLOGY | Facility: CLINIC | Age: 70
End: 2020-07-08
Payer: MEDICARE

## 2020-07-08 DIAGNOSIS — D69.6 THROMBOCYTOPENIA: Primary | ICD-10-CM

## 2020-07-08 PROCEDURE — 1101F PR PT FALLS ASSESS DOC 0-1 FALLS W/OUT INJ PAST YR: ICD-10-PCS | Mod: CPTII,95,, | Performed by: INTERNAL MEDICINE

## 2020-07-08 PROCEDURE — 1159F PR MEDICATION LIST DOCUMENTED IN MEDICAL RECORD: ICD-10-PCS | Mod: 95,,, | Performed by: INTERNAL MEDICINE

## 2020-07-08 PROCEDURE — 1101F PT FALLS ASSESS-DOCD LE1/YR: CPT | Mod: CPTII,95,, | Performed by: INTERNAL MEDICINE

## 2020-07-08 PROCEDURE — 99213 OFFICE O/P EST LOW 20 MIN: CPT | Mod: 95,,, | Performed by: INTERNAL MEDICINE

## 2020-07-08 PROCEDURE — 1159F MED LIST DOCD IN RCRD: CPT | Mod: 95,,, | Performed by: INTERNAL MEDICINE

## 2020-07-08 PROCEDURE — 99213 PR OFFICE/OUTPT VISIT, EST, LEVL III, 20-29 MIN: ICD-10-PCS | Mod: 95,,, | Performed by: INTERNAL MEDICINE

## 2020-07-08 NOTE — PROGRESS NOTES
The patient location is: home  The chief complaint leading to consultation is: anemia   Visit type:  Phone  Total time spent with patient: 15  Each patient to whom he or she provides medical services by telemedicine is:  (1) informed of the relationship between the physician and patient and the respective role of any other health care provider with respect to management of the patient; and (2) notified that he or she may decline to receive medical services by telemedicine and may withdraw from such care at any time.    Notes:     HPI    69 years old  male was referred to Hematology Clinic for evaluation of anemia.        Patient had no history of hepatitis-C treated successfully.  Recent CT abdomen pelvis shows early bright arterial enhancement .  At the time GI consultation was referred.  However Patient did not follow-up with any GI doctor.  Currently denies any GI bleeding. Patient denies any chest pain shortness breath.  Patient denies any abdominal pain nausea vomiting or diarrhea.  No fever no chills.    Fourteen point review system as above.      Past Medical History:   Diagnosis Date    Cataract     COPD (chronic obstructive pulmonary disease)     Diverticulosis     DUARTE (dyspnea on exertion)     GERD (gastroesophageal reflux disease)     Glaucoma     Hepatitis C     treated; seeing Dr. Carr    Hyperlipidemia     Hypertension     Liver lesion 08/2018    RA (rheumatoid arthritis)     Rectal prolapse     Possible     Social History     Socioeconomic History    Marital status:      Spouse name: Not on file    Number of children: Not on file    Years of education: Not on file    Highest education level: Not on file   Occupational History    Not on file   Social Needs    Financial resource strain: Not on file    Food insecurity     Worry: Not on file     Inability: Not on file    Transportation needs     Medical: Not on file     Non-medical: Not on file   Tobacco Use     Smoking status: Never Smoker    Smokeless tobacco: Never Used   Substance and Sexual Activity    Alcohol use: No     Frequency: Never    Drug use: Yes     Types: Hydrocodone    Sexual activity: Not on file   Lifestyle    Physical activity     Days per week: Not on file     Minutes per session: Not on file    Stress: Not on file   Relationships    Social connections     Talks on phone: Not on file     Gets together: Not on file     Attends Yazidism service: Not on file     Active member of club or organization: Not on file     Attends meetings of clubs or organizations: Not on file     Relationship status: Not on file   Other Topics Concern    Not on file   Social History Narrative    Not on file         Subjective      Review of Systems   Constitutional: Negative for appetite change, fatigue and unexpected weight change.   HENT: Negative for mouth sores.   Eyes: Negative for visual disturbance.   Respiratory: Negative for cough and shortness of breath.   Cardiovascular: Negative for chest pain.   Gastrointestinal: Negative for diarrhea.   Genitourinary: Negative for frequency.   Musculoskeletal: Negative for back pain.   Skin: Negative for rash.   Neurological: Negative for headaches.   Hematological: Negative for adenopathy.   Psychiatric/Behavioral: The patient is not nervous/anxious.   All other systems reviewed and are negative.     Objective    Physical Exam   Virtual visit        CMP  Sodium   Date Value Ref Range Status   06/18/2020 139 136 - 145 mmol/L Final     Potassium   Date Value Ref Range Status   06/18/2020 3.6 3.5 - 5.1 mmol/L Final     Chloride   Date Value Ref Range Status   06/18/2020 105 95 - 110 mmol/L Final     CO2   Date Value Ref Range Status   06/18/2020 25 23 - 29 mmol/L Final     Glucose   Date Value Ref Range Status   06/18/2020 139 (H) 70 - 110 mg/dL Final     BUN, Bld   Date Value Ref Range Status   06/18/2020 11 8 - 23 mg/dL Final     Creatinine   Date Value Ref Range Status    06/18/2020 1.2 0.5 - 1.4 mg/dL Final     Calcium   Date Value Ref Range Status   06/18/2020 8.7 8.7 - 10.5 mg/dL Final     Total Protein   Date Value Ref Range Status   06/18/2020 6.8 6.0 - 8.4 g/dL Final     Albumin   Date Value Ref Range Status   06/18/2020 3.8 3.5 - 5.2 g/dL Final     Total Bilirubin   Date Value Ref Range Status   06/18/2020 0.4 0.1 - 1.0 mg/dL Final     Comment:     For infants and newborns, interpretation of results should be based  on gestational age, weight and in agreement with clinical  observations.  Premature Infant recommended reference ranges:  Up to 24 hours.............<8.0 mg/dL  Up to 48 hours............<12.0 mg/dL  3-5 days..................<15.0 mg/dL  6-29 days.................<15.0 mg/dL       Alkaline Phosphatase   Date Value Ref Range Status   06/18/2020 51 (L) 55 - 135 U/L Final     AST   Date Value Ref Range Status   06/18/2020 18 10 - 40 U/L Final     ALT   Date Value Ref Range Status   06/18/2020 9 (L) 10 - 44 U/L Final     Anion Gap   Date Value Ref Range Status   06/18/2020 9 8 - 16 mmol/L Final     eGFR if    Date Value Ref Range Status   06/18/2020 >60.0 >60 mL/min/1.73 m^2 Final     eGFR if non    Date Value Ref Range Status   06/18/2020 >60.0 >60 mL/min/1.73 m^2 Final     Comment:     Calculation used to obtain the estimated glomerular filtration  rate (eGFR) is the CKD-EPI equation.        Lab Results   Component Value Date    WBC 10.36 06/18/2020    HGB 9.7 (L) 06/18/2020    HCT 30.8 (L) 06/18/2020    MCV 92 06/18/2020     (L) 06/18/2020             [place] free light chain 1.04 lambda free light chain 1.15 kappa lambda ratio 0.9  Epo level 7.8  Reticular count 3.1  b-2 microglobulin 1.5  SPEP Jannette no evidence of monoclonal peaks  Folate 19.7  B12 911      Assessment Plan    [] Mild Anemia un differentiated - improving  Normocytic with MCV of 96.  colonoscope 1 year ago negative - recommendation RTC 10 for surveillance  scope  > ct bone marrow biopsy RTC 2 months    [] ultrasound shows coarse liver echotexture   > outpatient GI referral    [] Thrombocytopenia mild    [] No evidence of renal insufficiencies.    [] Know  history of COPD, and hepatitis-C    [] Acid reflux        There are no diagnoses linked to this encounter.

## 2020-07-09 ENCOUNTER — LAB VISIT (OUTPATIENT)
Dept: LAB | Facility: HOSPITAL | Age: 70
End: 2020-07-09
Attending: PHYSICIAN ASSISTANT
Payer: MEDICARE

## 2020-07-09 DIAGNOSIS — M05.9 SEROPOSITIVE RHEUMATOID ARTHRITIS: ICD-10-CM

## 2020-07-09 LAB
ALBUMIN SERPL BCP-MCNC: 3.9 G/DL (ref 3.5–5.2)
ALP SERPL-CCNC: 50 U/L (ref 55–135)
ALT SERPL W/O P-5'-P-CCNC: 8 U/L (ref 10–44)
ANION GAP SERPL CALC-SCNC: 8 MMOL/L (ref 8–16)
AST SERPL-CCNC: 17 U/L (ref 10–40)
BASOPHILS # BLD AUTO: 0.06 K/UL (ref 0–0.2)
BASOPHILS NFR BLD: 0.5 % (ref 0–1.9)
BILIRUB SERPL-MCNC: 0.3 MG/DL (ref 0.1–1)
BUN SERPL-MCNC: 11 MG/DL (ref 8–23)
CALCIUM SERPL-MCNC: 9.2 MG/DL (ref 8.7–10.5)
CHLORIDE SERPL-SCNC: 107 MMOL/L (ref 95–110)
CO2 SERPL-SCNC: 27 MMOL/L (ref 23–29)
CREAT SERPL-MCNC: 1.1 MG/DL (ref 0.5–1.4)
CRP SERPL-MCNC: 7.5 MG/L (ref 0–8.2)
DIFFERENTIAL METHOD: ABNORMAL
EOSINOPHIL # BLD AUTO: 0.1 K/UL (ref 0–0.5)
EOSINOPHIL NFR BLD: 0.8 % (ref 0–8)
ERYTHROCYTE [DISTWIDTH] IN BLOOD BY AUTOMATED COUNT: 14.2 % (ref 11.5–14.5)
ERYTHROCYTE [SEDIMENTATION RATE] IN BLOOD BY WESTERGREN METHOD: 4 MM/HR (ref 0–10)
EST. GFR  (AFRICAN AMERICAN): >60 ML/MIN/1.73 M^2
EST. GFR  (NON AFRICAN AMERICAN): >60 ML/MIN/1.73 M^2
GLUCOSE SERPL-MCNC: 116 MG/DL (ref 70–110)
HCT VFR BLD AUTO: 34 % (ref 40–54)
HGB BLD-MCNC: 10.1 G/DL (ref 14–18)
IMM GRANULOCYTES # BLD AUTO: 0.03 K/UL (ref 0–0.04)
IMM GRANULOCYTES NFR BLD AUTO: 0.3 % (ref 0–0.5)
LYMPHOCYTES # BLD AUTO: 3.6 K/UL (ref 1–4.8)
LYMPHOCYTES NFR BLD: 31.1 % (ref 18–48)
MCH RBC QN AUTO: 28.1 PG (ref 27–31)
MCHC RBC AUTO-ENTMCNC: 29.7 G/DL (ref 32–36)
MCV RBC AUTO: 94 FL (ref 82–98)
MONOCYTES # BLD AUTO: 1.3 K/UL (ref 0.3–1)
MONOCYTES NFR BLD: 11.1 % (ref 4–15)
NEUTROPHILS # BLD AUTO: 6.5 K/UL (ref 1.8–7.7)
NEUTROPHILS NFR BLD: 56.2 % (ref 38–73)
NRBC BLD-RTO: 0 /100 WBC
PLATELET # BLD AUTO: 153 K/UL (ref 150–350)
PMV BLD AUTO: 13.5 FL (ref 9.2–12.9)
POTASSIUM SERPL-SCNC: 3.8 MMOL/L (ref 3.5–5.1)
PROT SERPL-MCNC: 7 G/DL (ref 6–8.4)
RBC # BLD AUTO: 3.6 M/UL (ref 4.6–6.2)
SODIUM SERPL-SCNC: 142 MMOL/L (ref 136–145)
WBC # BLD AUTO: 11.56 K/UL (ref 3.9–12.7)

## 2020-07-09 PROCEDURE — 36415 COLL VENOUS BLD VENIPUNCTURE: CPT | Mod: PO

## 2020-07-09 PROCEDURE — 85025 COMPLETE CBC W/AUTO DIFF WBC: CPT

## 2020-07-09 PROCEDURE — 86140 C-REACTIVE PROTEIN: CPT

## 2020-07-09 PROCEDURE — 80053 COMPREHEN METABOLIC PANEL: CPT

## 2020-07-09 PROCEDURE — 85651 RBC SED RATE NONAUTOMATED: CPT | Mod: PO

## 2020-07-12 ENCOUNTER — PATIENT OUTREACH (OUTPATIENT)
Dept: ADMINISTRATIVE | Facility: OTHER | Age: 70
End: 2020-07-12

## 2020-07-12 NOTE — PROGRESS NOTES
Requested updates within Care Everywhere.  Patient's chart was reviewed for overdue MARLON topics.  Immunizations reconciled.

## 2020-07-13 ENCOUNTER — OFFICE VISIT (OUTPATIENT)
Dept: RHEUMATOLOGY | Facility: CLINIC | Age: 70
End: 2020-07-13
Payer: MEDICARE

## 2020-07-13 VITALS — BODY MASS INDEX: 23.7 KG/M2 | HEIGHT: 72 IN | WEIGHT: 175 LBS

## 2020-07-13 DIAGNOSIS — M05.742 RHEUMATOID ARTHRITIS INVOLVING BOTH HANDS WITH POSITIVE RHEUMATOID FACTOR: ICD-10-CM

## 2020-07-13 DIAGNOSIS — G89.4 CHRONIC PAIN SYNDROME: ICD-10-CM

## 2020-07-13 DIAGNOSIS — Z79.52 CURRENT USE OF STEROID MEDICATION: ICD-10-CM

## 2020-07-13 DIAGNOSIS — D69.6 THROMBOPENIA: ICD-10-CM

## 2020-07-13 DIAGNOSIS — M05.9 SEROPOSITIVE RHEUMATOID ARTHRITIS: Primary | ICD-10-CM

## 2020-07-13 DIAGNOSIS — M05.741 RHEUMATOID ARTHRITIS INVOLVING BOTH HANDS WITH POSITIVE RHEUMATOID FACTOR: ICD-10-CM

## 2020-07-13 DIAGNOSIS — M17.9 OSTEOARTHRITIS OF KNEE, UNSPECIFIED LATERALITY, UNSPECIFIED OSTEOARTHRITIS TYPE: ICD-10-CM

## 2020-07-13 DIAGNOSIS — D64.9 ANEMIA, UNSPECIFIED TYPE: ICD-10-CM

## 2020-07-13 DIAGNOSIS — T37.8X5D HYDROXYCHLOROQUINE CAUSING ADVERSE EFFECT IN THERAPEUTIC USE, SUBSEQUENT ENCOUNTER: ICD-10-CM

## 2020-07-13 PROCEDURE — 1159F MED LIST DOCD IN RCRD: CPT | Mod: 95,,, | Performed by: INTERNAL MEDICINE

## 2020-07-13 PROCEDURE — 1101F PR PT FALLS ASSESS DOC 0-1 FALLS W/OUT INJ PAST YR: ICD-10-PCS | Mod: CPTII,95,, | Performed by: INTERNAL MEDICINE

## 2020-07-13 PROCEDURE — 99214 OFFICE O/P EST MOD 30 MIN: CPT | Mod: 95,,, | Performed by: INTERNAL MEDICINE

## 2020-07-13 PROCEDURE — 1101F PT FALLS ASSESS-DOCD LE1/YR: CPT | Mod: CPTII,95,, | Performed by: INTERNAL MEDICINE

## 2020-07-13 PROCEDURE — 3008F PR BODY MASS INDEX (BMI) DOCUMENTED: ICD-10-PCS | Mod: CPTII,95,, | Performed by: INTERNAL MEDICINE

## 2020-07-13 PROCEDURE — 99214 PR OFFICE/OUTPT VISIT, EST, LEVL IV, 30-39 MIN: ICD-10-PCS | Mod: 95,,, | Performed by: INTERNAL MEDICINE

## 2020-07-13 PROCEDURE — 3008F BODY MASS INDEX DOCD: CPT | Mod: CPTII,95,, | Performed by: INTERNAL MEDICINE

## 2020-07-13 PROCEDURE — 1159F PR MEDICATION LIST DOCUMENTED IN MEDICAL RECORD: ICD-10-PCS | Mod: 95,,, | Performed by: INTERNAL MEDICINE

## 2020-07-13 RX ORDER — HYDROCODONE BITARTRATE AND ACETAMINOPHEN 10; 325 MG/1; MG/1
1 TABLET ORAL EVERY 8 HOURS PRN
Qty: 90 TABLET | Refills: 0 | Status: SHIPPED | OUTPATIENT
Start: 2020-08-01 | End: 2020-07-13 | Stop reason: SDUPTHER

## 2020-07-13 RX ORDER — HYDROCODONE BITARTRATE AND ACETAMINOPHEN 10; 325 MG/1; MG/1
1 TABLET ORAL EVERY 8 HOURS PRN
Qty: 90 TABLET | Refills: 0 | Status: SHIPPED | OUTPATIENT
Start: 2020-08-01 | End: 2020-08-31

## 2020-07-13 ASSESSMENT — ROUTINE ASSESSMENT OF PATIENT INDEX DATA (RAPID3)
PSYCHOLOGICAL DISTRESS SCORE: 3
MDHAQ FUNCTION SCORE: 1
PAIN SCORE: 4

## 2020-07-13 NOTE — PROGRESS NOTES
Subjective:       Patient ID: Scooter Swan is a 69 y.o. male.    Chief Complaint: Disease Management and Rheumatoid Arthritis    Follow up: 68 year old male seropositive rheumatoid arthritis and osteoarthritis. He has been doing fair since his last visit. He complains of intermittent aching pain involving his hands, knees, feet, and low back. He has significant morning stiffness and he is taking prednisone 5-10 mg daily. Norco is providing adequate control of his pain without side effects. BP is well controlled. He is undergoing work up with Hematology for anemia and may undergo BMB in the future.    Current treatment:  1. Norco TID PRN  2. Prednisone 10 mg  3. Zanaflex PRN    Prior treatment:  1. Plaquenil (WEIGHT LOSS)  2. SSZ caused GI upset            He complains of joint swelling. Pertinent negatives include no fatigue, fever, myalgias or headaches.         Review of Systems   Constitutional: Positive for activity change. Negative for chills, fatigue and fever.   Eyes: Negative for visual disturbance.   Respiratory: Negative for cough, shortness of breath and wheezing.    Cardiovascular: Negative for chest pain, palpitations and leg swelling.   Gastrointestinal: Negative for abdominal pain, constipation, diarrhea, nausea and vomiting.   Musculoskeletal: Positive for arthralgias, back pain and joint swelling. Negative for myalgias.   Neurological: Negative for dizziness, syncope and headaches.   Hematological: Negative for adenopathy.         Objective:     There were no vitals filed for this visit.    Past Medical History:   Diagnosis Date    Cataract     COPD (chronic obstructive pulmonary disease)     Diverticulosis     DUARTE (dyspnea on exertion)     GERD (gastroesophageal reflux disease)     Glaucoma     Hepatitis C     treated; seeing Dr. Carr    Hyperlipidemia     Hypertension     Liver lesion 08/2018    RA (rheumatoid arthritis)     Rectal prolapse     Possible     Past Surgical History:    Procedure Laterality Date    CARPAL TUNNEL RELEASE      Right wrist 2015    COLONOSCOPY  08/2016    Dr. Cardona; in care everywhere    COLONOSCOPY N/A 3/20/2019    Procedure: COLONOSCOPY;  Surgeon: Sotero Arthur MD;  Location: [facility];  Service: Endoscopy;  Laterality: N/A; hemorrhoids, diverticulosis, repeat in 10 years for screening    EXCISIONAL HEMORRHOIDECTOMY N/A 10/18/2018    Procedure: HEMORRHOIDECTOMY, prone;  Surgeon: Gunnar Horton MD;  Location: [address];  Service: Colon and Rectal;  Laterality: N/A;    THYROID SURGERY      UPPER GASTROINTESTINAL ENDOSCOPY  08/2016    Dr. Cardona; in care everywhere          Physical Exam   Constitutional: He is oriented to person, place, and time and well-developed, well-nourished, and in no distress.   Neurological: He is alert and oriented to person, place, and time.   Psychiatric: Mood, affect and judgment normal.        Assessment:       1. Seropositive rheumatoid arthritis    2. Thrombopenia    3. Chronic pain syndrome    4. Current use of steroid medication    5. Rheumatoid arthritis involving both hands with positive rheumatoid factor    6. Osteoarthritis of knee, unspecified laterality, unspecified osteoarthritis type    7. Anemia, unspecified type    8. Hydroxychloroquine causing adverse effect in therapeutic use, subsequent encounter            Plan:       Seropositive rheumatoid arthritis  -     CBC auto differential; Future; Expected date: 07/13/2020  -     Iron and TIBC; Future; Expected date: 07/13/2020  -     Comprehensive metabolic panel; Future; Expected date: 07/13/2020  -     Rheumatoid factor; Future; Expected date: 07/13/2020  -     Sedimentation rate; Future; Expected date: 07/13/2020  -     C-Reactive Protein; Future; Expected date: 07/13/2020    Thrombopenia  -     CBC auto differential; Future; Expected date: 07/13/2020  -     Iron and TIBC; Future; Expected date: 07/13/2020  -     Comprehensive metabolic panel; Future;  Expected date: 07/13/2020  -     Rheumatoid factor; Future; Expected date: 07/13/2020  -     Sedimentation rate; Future; Expected date: 07/13/2020  -     C-Reactive Protein; Future; Expected date: 07/13/2020  -     Discontinue: HYDROcodone-acetaminophen (NORCO)  mg per tablet; Take 1 tablet by mouth every 8 (eight) hours as needed for Pain.  Dispense: 90 tablet; Refill: 0  -     HYDROcodone-acetaminophen (NORCO)  mg per tablet; Take 1 tablet by mouth every 8 (eight) hours as needed for Pain.  Dispense: 90 tablet; Refill: 0    Chronic pain syndrome  -     CBC auto differential; Future; Expected date: 07/13/2020  -     Iron and TIBC; Future; Expected date: 07/13/2020  -     Comprehensive metabolic panel; Future; Expected date: 07/13/2020  -     Rheumatoid factor; Future; Expected date: 07/13/2020  -     Sedimentation rate; Future; Expected date: 07/13/2020  -     C-Reactive Protein; Future; Expected date: 07/13/2020  -     Discontinue: HYDROcodone-acetaminophen (NORCO)  mg per tablet; Take 1 tablet by mouth every 8 (eight) hours as needed for Pain.  Dispense: 90 tablet; Refill: 0  -     HYDROcodone-acetaminophen (NORCO)  mg per tablet; Take 1 tablet by mouth every 8 (eight) hours as needed for Pain.  Dispense: 90 tablet; Refill: 0    Current use of steroid medication  -     CBC auto differential; Future; Expected date: 07/13/2020  -     Iron and TIBC; Future; Expected date: 07/13/2020  -     Comprehensive metabolic panel; Future; Expected date: 07/13/2020  -     Rheumatoid factor; Future; Expected date: 07/13/2020  -     Sedimentation rate; Future; Expected date: 07/13/2020  -     C-Reactive Protein; Future; Expected date: 07/13/2020    Rheumatoid arthritis involving both hands with positive rheumatoid factor  -     Discontinue: HYDROcodone-acetaminophen (NORCO)  mg per tablet; Take 1 tablet by mouth every 8 (eight) hours as needed for Pain.  Dispense: 90 tablet; Refill: 0  -      HYDROcodone-acetaminophen (NORCO)  mg per tablet; Take 1 tablet by mouth every 8 (eight) hours as needed for Pain.  Dispense: 90 tablet; Refill: 0    Osteoarthritis of knee, unspecified laterality, unspecified osteoarthritis type  -     Discontinue: HYDROcodone-acetaminophen (NORCO)  mg per tablet; Take 1 tablet by mouth every 8 (eight) hours as needed for Pain.  Dispense: 90 tablet; Refill: 0  -     HYDROcodone-acetaminophen (NORCO)  mg per tablet; Take 1 tablet by mouth every 8 (eight) hours as needed for Pain.  Dispense: 90 tablet; Refill: 0    Anemia, unspecified type  -     Discontinue: HYDROcodone-acetaminophen (NORCO)  mg per tablet; Take 1 tablet by mouth every 8 (eight) hours as needed for Pain.  Dispense: 90 tablet; Refill: 0  -     HYDROcodone-acetaminophen (NORCO)  mg per tablet; Take 1 tablet by mouth every 8 (eight) hours as needed for Pain.  Dispense: 90 tablet; Refill: 0    Hydroxychloroquine causing adverse effect in therapeutic use, subsequent encounter  Comments:  last dose July 2019  Orders:  -     Discontinue: HYDROcodone-acetaminophen (NORCO)  mg per tablet; Take 1 tablet by mouth every 8 (eight) hours as needed for Pain.  Dispense: 90 tablet; Refill: 0  -     HYDROcodone-acetaminophen (NORCO)  mg per tablet; Take 1 tablet by mouth every 8 (eight) hours as needed for Pain.  Dispense: 90 tablet; Refill: 0        The patient location is: home  The chief complaint leading to consultation is: ra    Visit type: audiovisual    Face to Face time with patient: 25   minutes of total time spent on the encounter, which includes face to face time and non-face to face time preparing to see the patient (eg, review of tests), Obtaining and/or reviewing separately obtained history, Documenting clinical information in the electronic or other health record, Independently interpreting results (not separately reported) and communicating results to the  patient/family/caregiver, or Care coordination (not separately reported).         Each patient to whom he or she provides medical services by telemedicine is:  (1) informed of the relationship between the physician and patient and the respective role of any other health care provider with respect to management of the patient; and (2) notified that he or she may decline to receive medical services by telemedicine and may withdraw from such care at any time.    Notes:

## 2020-07-15 ENCOUNTER — TELEPHONE (OUTPATIENT)
Dept: HEMATOLOGY/ONCOLOGY | Facility: CLINIC | Age: 70
End: 2020-07-15

## 2020-07-15 DIAGNOSIS — D69.6 THROMBOCYTOPENIA: Primary | ICD-10-CM

## 2020-07-15 NOTE — TELEPHONE ENCOUNTER
Attempted to contact pt regarding his BMBX and Covid testing. Attempted both phone numbers listed. Left message with contact number.

## 2020-07-16 ENCOUNTER — TELEPHONE (OUTPATIENT)
Dept: HEMATOLOGY/ONCOLOGY | Facility: CLINIC | Age: 70
End: 2020-07-16

## 2020-07-16 NOTE — TELEPHONE ENCOUNTER
----- Message from Princess FATIMAH Jesus sent at 7/16/2020 10:27 AM CDT -----  Contact: Jacqueline Swan (Spouse)  Type: Needs Medical Advice  Who Called:  Jacqueline Swan (Spouse)  Best Call Back Number: [phone]    Additional Information: requesting a call to cancel appt for the Bx he is having due to having a appt on the same day. Please advise

## 2020-07-16 NOTE — TELEPHONE ENCOUNTER
Spoke with pt. Pt stated he wanted to cancel his BMBX that was scheduled with [facility] on 7/24/20. Pt still nervous about Covid 19. [facility] to be notified and his Covid testing on Monday 7/20/20 will be cancelled.    Spoke with Laurie in IR to notify of pt wanting to cancel BMBX. Laurie verbalized understanding and will cancel procedure.

## 2020-07-24 ENCOUNTER — TELEPHONE (OUTPATIENT)
Dept: GASTROENTEROLOGY | Facility: CLINIC | Age: 70
End: 2020-07-24

## 2020-08-04 DIAGNOSIS — N52.8 OTHER MALE ERECTILE DYSFUNCTION: ICD-10-CM

## 2020-08-04 RX ORDER — SILDENAFIL 100 MG/1
100 TABLET, FILM COATED ORAL
Qty: 4 TABLET | Refills: 0 | Status: SHIPPED | OUTPATIENT
Start: 2020-08-04 | End: 2020-08-24 | Stop reason: SDUPTHER

## 2020-08-10 DIAGNOSIS — M05.742 RHEUMATOID ARTHRITIS INVOLVING BOTH HANDS WITH POSITIVE RHEUMATOID FACTOR: ICD-10-CM

## 2020-08-10 DIAGNOSIS — T37.8X5D HYDROXYCHLOROQUINE CAUSING ADVERSE EFFECT IN THERAPEUTIC USE, SUBSEQUENT ENCOUNTER: ICD-10-CM

## 2020-08-10 DIAGNOSIS — D69.6 THROMBOPENIA: ICD-10-CM

## 2020-08-10 DIAGNOSIS — D64.9 ANEMIA, UNSPECIFIED TYPE: ICD-10-CM

## 2020-08-10 DIAGNOSIS — G89.4 CHRONIC PAIN SYNDROME: ICD-10-CM

## 2020-08-10 DIAGNOSIS — M05.741 RHEUMATOID ARTHRITIS INVOLVING BOTH HANDS WITH POSITIVE RHEUMATOID FACTOR: ICD-10-CM

## 2020-08-10 DIAGNOSIS — M17.9 OSTEOARTHRITIS OF KNEE, UNSPECIFIED LATERALITY, UNSPECIFIED OSTEOARTHRITIS TYPE: ICD-10-CM

## 2020-08-10 RX ORDER — HYDROCODONE BITARTRATE AND ACETAMINOPHEN 10; 325 MG/1; MG/1
1 TABLET ORAL EVERY 8 HOURS PRN
Qty: 90 TABLET | Refills: 0 | Status: CANCELLED | OUTPATIENT
Start: 2020-08-10 | End: 2020-09-09

## 2020-08-10 NOTE — TELEPHONE ENCOUNTER
Contacted wife and informed refill was sent to the pharmacy on August 1st. Verbalized understanding.

## 2020-08-21 DIAGNOSIS — E78.49 OTHER HYPERLIPIDEMIA: ICD-10-CM

## 2020-08-21 RX ORDER — PRAVASTATIN SODIUM 20 MG/1
20 TABLET ORAL DAILY
Qty: 90 TABLET | Refills: 1 | Status: SHIPPED | OUTPATIENT
Start: 2020-08-21 | End: 2020-08-24 | Stop reason: SDUPTHER

## 2020-08-21 NOTE — PROGRESS NOTES
Refill Routing Note     Medication(s) are not appropriate for processing by [facility]:    Non Participating Provider in Refill Center     Appointments  past 12m or future 3m with PCP    Date Provider   Last Visit   4/29/2020 Salvador Kennedy, DO   Next Visit   8/24/2020 Salvador Kennedy DO           Automatic Epic Protocol Generated Data:    Requested Prescriptions   Pending Prescriptions Disp Refills    pravastatin (PRAVACHOL) 20 MG tablet 90 tablet 1     Sig: Take 1 tablet (20 mg total) by mouth once daily.       Hmg CoA Reductase Inhibitors Protocol Failed - 8/21/2020 12:22 PM        Failed - Lipid Panel within past 12 months     Lab Results   Component Value Date    CHOL 162 02/11/2019    HDL 76 (H) 02/11/2019    LDLCALC 77.6 02/11/2019    TRIG 42 02/11/2019             Passed - Recent or future visit with authorizing provider        Passed - ALT less than 95 in past 12 months      Lab Results   Component Value Date    ALT 8 (L) 07/09/2020    ALT 9 (L) 06/18/2020    ALT 8 (L) 04/23/2020                    Note composed:4:14 PM 08/21/2020

## 2020-08-24 ENCOUNTER — OFFICE VISIT (OUTPATIENT)
Dept: FAMILY MEDICINE | Facility: CLINIC | Age: 70
End: 2020-08-24
Payer: MEDICARE

## 2020-08-24 VITALS
OXYGEN SATURATION: 98 % | BODY MASS INDEX: 25.68 KG/M2 | HEART RATE: 86 BPM | TEMPERATURE: 98 F | HEIGHT: 72 IN | WEIGHT: 189.63 LBS | DIASTOLIC BLOOD PRESSURE: 82 MMHG | SYSTOLIC BLOOD PRESSURE: 118 MMHG

## 2020-08-24 DIAGNOSIS — I1A.0 RESISTANT HYPERTENSION: Primary | ICD-10-CM

## 2020-08-24 DIAGNOSIS — Z23 NEED FOR VACCINATION FOR STREP PNEUMONIAE: ICD-10-CM

## 2020-08-24 DIAGNOSIS — N52.8 OTHER MALE ERECTILE DYSFUNCTION: ICD-10-CM

## 2020-08-24 DIAGNOSIS — I35.0 MODERATE AORTIC STENOSIS: ICD-10-CM

## 2020-08-24 DIAGNOSIS — E78.49 OTHER HYPERLIPIDEMIA: ICD-10-CM

## 2020-08-24 PROCEDURE — 99214 OFFICE O/P EST MOD 30 MIN: CPT | Mod: S$GLB,25,, | Performed by: INTERNAL MEDICINE

## 2020-08-24 PROCEDURE — 1101F PT FALLS ASSESS-DOCD LE1/YR: CPT | Mod: CPTII,S$GLB,, | Performed by: INTERNAL MEDICINE

## 2020-08-24 PROCEDURE — 90732 PPSV23 VACC 2 YRS+ SUBQ/IM: CPT | Mod: S$GLB,,, | Performed by: INTERNAL MEDICINE

## 2020-08-24 PROCEDURE — 1125F PR PAIN SEVERITY QUANTIFIED, PAIN PRESENT: ICD-10-PCS | Mod: S$GLB,,, | Performed by: INTERNAL MEDICINE

## 2020-08-24 PROCEDURE — 3074F SYST BP LT 130 MM HG: CPT | Mod: CPTII,S$GLB,, | Performed by: INTERNAL MEDICINE

## 2020-08-24 PROCEDURE — 3074F PR MOST RECENT SYSTOLIC BLOOD PRESSURE < 130 MM HG: ICD-10-PCS | Mod: CPTII,S$GLB,, | Performed by: INTERNAL MEDICINE

## 2020-08-24 PROCEDURE — G0009 ADMIN PNEUMOCOCCAL VACCINE: HCPCS | Mod: S$GLB,,, | Performed by: INTERNAL MEDICINE

## 2020-08-24 PROCEDURE — 99214 PR OFFICE/OUTPT VISIT, EST, LEVL IV, 30-39 MIN: ICD-10-PCS | Mod: S$GLB,25,, | Performed by: INTERNAL MEDICINE

## 2020-08-24 PROCEDURE — 3079F PR MOST RECENT DIASTOLIC BLOOD PRESSURE 80-89 MM HG: ICD-10-PCS | Mod: CPTII,S$GLB,, | Performed by: INTERNAL MEDICINE

## 2020-08-24 PROCEDURE — 1125F AMNT PAIN NOTED PAIN PRSNT: CPT | Mod: S$GLB,,, | Performed by: INTERNAL MEDICINE

## 2020-08-24 PROCEDURE — 3079F DIAST BP 80-89 MM HG: CPT | Mod: CPTII,S$GLB,, | Performed by: INTERNAL MEDICINE

## 2020-08-24 PROCEDURE — 1101F PR PT FALLS ASSESS DOC 0-1 FALLS W/OUT INJ PAST YR: ICD-10-PCS | Mod: CPTII,S$GLB,, | Performed by: INTERNAL MEDICINE

## 2020-08-24 PROCEDURE — 3008F PR BODY MASS INDEX (BMI) DOCUMENTED: ICD-10-PCS | Mod: CPTII,S$GLB,, | Performed by: INTERNAL MEDICINE

## 2020-08-24 PROCEDURE — 99999 PR PBB SHADOW E&M-EST. PATIENT-LVL III: CPT | Mod: PBBFAC,,, | Performed by: INTERNAL MEDICINE

## 2020-08-24 PROCEDURE — 99999 PR PBB SHADOW E&M-EST. PATIENT-LVL III: ICD-10-PCS | Mod: PBBFAC,,, | Performed by: INTERNAL MEDICINE

## 2020-08-24 PROCEDURE — 1159F MED LIST DOCD IN RCRD: CPT | Mod: S$GLB,,, | Performed by: INTERNAL MEDICINE

## 2020-08-24 PROCEDURE — G0009 PNEUMOCOCCAL POLYSACCHARIDE VACCINE 23-VALENT =>2YO SQ IM: ICD-10-PCS | Mod: S$GLB,,, | Performed by: INTERNAL MEDICINE

## 2020-08-24 PROCEDURE — 90732 PNEUMOCOCCAL POLYSACCHARIDE VACCINE 23-VALENT =>2YO SQ IM: ICD-10-PCS | Mod: S$GLB,,, | Performed by: INTERNAL MEDICINE

## 2020-08-24 PROCEDURE — 1159F PR MEDICATION LIST DOCUMENTED IN MEDICAL RECORD: ICD-10-PCS | Mod: S$GLB,,, | Performed by: INTERNAL MEDICINE

## 2020-08-24 PROCEDURE — 3008F BODY MASS INDEX DOCD: CPT | Mod: CPTII,S$GLB,, | Performed by: INTERNAL MEDICINE

## 2020-08-24 RX ORDER — AMLODIPINE BESYLATE 10 MG/1
10 TABLET ORAL EVERY MORNING
Qty: 90 TABLET | Refills: 3 | Status: SHIPPED | OUTPATIENT
Start: 2020-08-24 | End: 2021-05-15

## 2020-08-24 RX ORDER — SILDENAFIL 100 MG/1
100 TABLET, FILM COATED ORAL
Qty: 8 TABLET | Refills: 5 | Status: SHIPPED | OUTPATIENT
Start: 2020-08-24 | End: 2020-09-02 | Stop reason: SDUPTHER

## 2020-08-24 RX ORDER — PRAVASTATIN SODIUM 20 MG/1
20 TABLET ORAL DAILY
Qty: 90 TABLET | Refills: 3 | Status: SHIPPED | OUTPATIENT
Start: 2020-08-24 | End: 2021-09-23

## 2020-09-02 DIAGNOSIS — N52.8 OTHER MALE ERECTILE DYSFUNCTION: ICD-10-CM

## 2020-09-02 RX ORDER — SILDENAFIL 100 MG/1
100 TABLET, FILM COATED ORAL
Qty: 8 TABLET | Refills: 5 | Status: SHIPPED | OUTPATIENT
Start: 2020-09-02 | End: 2021-04-21 | Stop reason: SDUPTHER

## 2020-09-08 DIAGNOSIS — G89.4 CHRONIC PAIN SYNDROME: Primary | ICD-10-CM

## 2020-09-08 NOTE — TELEPHONE ENCOUNTER
----- Message from Mann Harrington sent at 9/8/2020 12:38 PM CDT -----  Regarding: pt  Type:  RX Refill Request    Who Called:  pt  Refill or New Rx:  refill  RX Name and Strength:  NORCO  How is the patient currently taking it? (ex. 1XDay):  1x every 8 hours  Is this a 30 day or 90 day RX:  30  Preferred Pharmacy with phone number:    [id number] - [place] - [facility] [facility] [address] [address]  Phone: [phone] Fax: [phone]    Local or Mail Order:  local  Ordering Provider:  Dr Andrew Larsen Call Back Number:  [phone]  Additional Information:  last fill 8/10/2020 according to label on bottle. Not in chart. Please call PT to discuss

## 2020-09-09 RX ORDER — HYDROCODONE BITARTRATE AND ACETAMINOPHEN 10; 325 MG/1; MG/1
1 TABLET ORAL EVERY 8 HOURS PRN
Qty: 90 TABLET | Refills: 0 | Status: SHIPPED | OUTPATIENT
Start: 2020-09-09 | End: 2020-10-07 | Stop reason: SDUPTHER

## 2020-09-18 ENCOUNTER — TELEPHONE (OUTPATIENT)
Dept: OPHTHALMOLOGY | Facility: CLINIC | Age: 70
End: 2020-09-18

## 2020-09-18 NOTE — TELEPHONE ENCOUNTER
Called pt to reschedule him for overdue f/u appt for glaucoma ck with Dr Alas. Pt states he is seeing an outside doctor for his glaucoma treatment.

## 2020-09-21 ENCOUNTER — PATIENT MESSAGE (OUTPATIENT)
Dept: HEMATOLOGY/ONCOLOGY | Facility: CLINIC | Age: 70
End: 2020-09-21

## 2020-09-22 ENCOUNTER — LAB VISIT (OUTPATIENT)
Dept: LAB | Facility: HOSPITAL | Age: 70
End: 2020-09-22
Attending: INTERNAL MEDICINE
Payer: MEDICARE

## 2020-09-22 DIAGNOSIS — D69.6 THROMBOCYTOPENIA: ICD-10-CM

## 2020-09-22 LAB
ALBUMIN SERPL BCP-MCNC: 3.8 G/DL (ref 3.5–5.2)
ALP SERPL-CCNC: 53 U/L (ref 55–135)
ALT SERPL W/O P-5'-P-CCNC: 10 U/L (ref 10–44)
ANION GAP SERPL CALC-SCNC: 9 MMOL/L (ref 8–16)
AST SERPL-CCNC: 15 U/L (ref 10–40)
BASOPHILS # BLD AUTO: 0.04 K/UL (ref 0–0.2)
BASOPHILS NFR BLD: 0.4 % (ref 0–1.9)
BILIRUB SERPL-MCNC: 0.3 MG/DL (ref 0.1–1)
BUN SERPL-MCNC: 14 MG/DL (ref 8–23)
CALCIUM SERPL-MCNC: 9.1 MG/DL (ref 8.7–10.5)
CHLORIDE SERPL-SCNC: 104 MMOL/L (ref 95–110)
CO2 SERPL-SCNC: 29 MMOL/L (ref 23–29)
CREAT SERPL-MCNC: 1.1 MG/DL (ref 0.5–1.4)
DIFFERENTIAL METHOD: ABNORMAL
EOSINOPHIL # BLD AUTO: 0.1 K/UL (ref 0–0.5)
EOSINOPHIL NFR BLD: 0.5 % (ref 0–8)
ERYTHROCYTE [DISTWIDTH] IN BLOOD BY AUTOMATED COUNT: 13.6 % (ref 11.5–14.5)
EST. GFR  (AFRICAN AMERICAN): >60 ML/MIN/1.73 M^2
EST. GFR  (NON AFRICAN AMERICAN): >60 ML/MIN/1.73 M^2
GLUCOSE SERPL-MCNC: 120 MG/DL (ref 70–110)
HCT VFR BLD AUTO: 35.6 % (ref 40–54)
HGB BLD-MCNC: 11.1 G/DL (ref 14–18)
IMM GRANULOCYTES # BLD AUTO: 0.07 K/UL (ref 0–0.04)
IMM GRANULOCYTES NFR BLD AUTO: 0.6 % (ref 0–0.5)
LYMPHOCYTES # BLD AUTO: 2.5 K/UL (ref 1–4.8)
LYMPHOCYTES NFR BLD: 22.5 % (ref 18–48)
MCH RBC QN AUTO: 28.9 PG (ref 27–31)
MCHC RBC AUTO-ENTMCNC: 31.2 G/DL (ref 32–36)
MCV RBC AUTO: 93 FL (ref 82–98)
MONOCYTES # BLD AUTO: 1.2 K/UL (ref 0.3–1)
MONOCYTES NFR BLD: 10.8 % (ref 4–15)
NEUTROPHILS # BLD AUTO: 7.2 K/UL (ref 1.8–7.7)
NEUTROPHILS NFR BLD: 65.2 % (ref 38–73)
NRBC BLD-RTO: 0 /100 WBC
PLATELET # BLD AUTO: 117 K/UL (ref 150–350)
PMV BLD AUTO: 12.8 FL (ref 9.2–12.9)
POTASSIUM SERPL-SCNC: 3.9 MMOL/L (ref 3.5–5.1)
PROT SERPL-MCNC: 6.9 G/DL (ref 6–8.4)
RBC # BLD AUTO: 3.84 M/UL (ref 4.6–6.2)
SODIUM SERPL-SCNC: 142 MMOL/L (ref 136–145)
WBC # BLD AUTO: 11.06 K/UL (ref 3.9–12.7)

## 2020-09-22 PROCEDURE — 36415 COLL VENOUS BLD VENIPUNCTURE: CPT | Mod: PO

## 2020-09-22 PROCEDURE — 85025 COMPLETE CBC W/AUTO DIFF WBC: CPT | Mod: PO

## 2020-09-22 PROCEDURE — 80053 COMPREHEN METABOLIC PANEL: CPT

## 2020-09-23 ENCOUNTER — OFFICE VISIT (OUTPATIENT)
Dept: HEMATOLOGY/ONCOLOGY | Facility: CLINIC | Age: 70
End: 2020-09-23
Payer: MEDICARE

## 2020-09-23 ENCOUNTER — PATIENT MESSAGE (OUTPATIENT)
Dept: HEMATOLOGY/ONCOLOGY | Facility: CLINIC | Age: 70
End: 2020-09-23

## 2020-09-23 DIAGNOSIS — D64.9 ANEMIA, UNSPECIFIED TYPE: Primary | ICD-10-CM

## 2020-09-23 PROCEDURE — 99212 OFFICE O/P EST SF 10 MIN: CPT | Mod: S$GLB,,, | Performed by: INTERNAL MEDICINE

## 2020-09-23 PROCEDURE — 1159F MED LIST DOCD IN RCRD: CPT | Mod: S$GLB,,, | Performed by: INTERNAL MEDICINE

## 2020-09-23 PROCEDURE — 1101F PT FALLS ASSESS-DOCD LE1/YR: CPT | Mod: CPTII,S$GLB,, | Performed by: INTERNAL MEDICINE

## 2020-09-23 PROCEDURE — 99212 PR OFFICE/OUTPT VISIT, EST, LEVL II, 10-19 MIN: ICD-10-PCS | Mod: S$GLB,,, | Performed by: INTERNAL MEDICINE

## 2020-09-23 PROCEDURE — 1101F PR PT FALLS ASSESS DOC 0-1 FALLS W/OUT INJ PAST YR: ICD-10-PCS | Mod: CPTII,S$GLB,, | Performed by: INTERNAL MEDICINE

## 2020-09-23 PROCEDURE — 1159F PR MEDICATION LIST DOCUMENTED IN MEDICAL RECORD: ICD-10-PCS | Mod: S$GLB,,, | Performed by: INTERNAL MEDICINE

## 2020-09-23 NOTE — PROGRESS NOTES
The patient location is: home  The chief complaint leading to consultation is: anemia   Visit type:  Phone  Total time spent with patient: 15  Each patient to whom he or she provides medical services by telemedicine is:  (1) informed of the relationship between the physician and patient and the respective role of any other health care provider with respect to management of the patient; and (2) notified that he or she may decline to receive medical services by telemedicine and may withdraw from such care at any time.    Notes:     HPI    69 years old  male was referred to Hematology Clinic for evaluation of anemia.        Patient had no history of hepatitis-C treated successfully.  Recent CT abdomen pelvis shows early bright arterial enhancement .  At the time GI consultation was referred.  However Patient did not follow-up with any GI doctor.  Currently denies any GI bleeding. Patient denies any chest pain shortness breath.  Patient denies any abdominal pain nausea vomiting or diarrhea.  No fever no chills.    Fourteen point review system as above.      Past Medical History:   Diagnosis Date    Cataract     COPD (chronic obstructive pulmonary disease)     Diverticulosis     DUARTE (dyspnea on exertion)     GERD (gastroesophageal reflux disease)     Glaucoma     Hepatitis C     treated; seeing Dr. Carr    Hyperlipidemia     Hypertension     Liver lesion 08/2018    RA (rheumatoid arthritis)     Rectal prolapse     Possible     Social History     Socioeconomic History    Marital status:      Spouse name: Not on file    Number of children: Not on file    Years of education: Not on file    Highest education level: Not on file   Occupational History    Not on file   Social Needs    Financial resource strain: Not on file    Food insecurity     Worry: Not on file     Inability: Not on file    Transportation needs     Medical: Not on file     Non-medical: Not on file   Tobacco Use     Smoking status: Never Smoker    Smokeless tobacco: Never Used   Substance and Sexual Activity    Alcohol use: No     Frequency: Never    Drug use: Yes     Types: Hydrocodone    Sexual activity: Not on file   Lifestyle    Physical activity     Days per week: Not on file     Minutes per session: Not on file    Stress: Not on file   Relationships    Social connections     Talks on phone: Not on file     Gets together: Not on file     Attends Yarsanism service: Not on file     Active member of club or organization: Not on file     Attends meetings of clubs or organizations: Not on file     Relationship status: Not on file   Other Topics Concern    Not on file   Social History Narrative    Not on file         Subjective      Review of Systems   Constitutional: Negative for appetite change, fatigue and unexpected weight change.   HENT: Negative for mouth sores.   Eyes: Negative for visual disturbance.   Respiratory: Negative for cough and shortness of breath.   Cardiovascular: Negative for chest pain.   Gastrointestinal: Negative for diarrhea.   Genitourinary: Negative for frequency.   Musculoskeletal: Negative for back pain.   Skin: Negative for rash.   Neurological: Negative for headaches.   Hematological: Negative for adenopathy.   Psychiatric/Behavioral: The patient is not nervous/anxious.   All other systems reviewed and are negative.     Objective    Physical Exam     Audio visit      CMP  Sodium   Date Value Ref Range Status   09/22/2020 142 136 - 145 mmol/L Final     Potassium   Date Value Ref Range Status   09/22/2020 3.9 3.5 - 5.1 mmol/L Final     Chloride   Date Value Ref Range Status   09/22/2020 104 95 - 110 mmol/L Final     CO2   Date Value Ref Range Status   09/22/2020 29 23 - 29 mmol/L Final     Glucose   Date Value Ref Range Status   09/22/2020 120 (H) 70 - 110 mg/dL Final     BUN, Bld   Date Value Ref Range Status   09/22/2020 14 8 - 23 mg/dL Final     Creatinine   Date Value Ref Range Status    09/22/2020 1.1 0.5 - 1.4 mg/dL Final     Calcium   Date Value Ref Range Status   09/22/2020 9.1 8.7 - 10.5 mg/dL Final     Total Protein   Date Value Ref Range Status   09/22/2020 6.9 6.0 - 8.4 g/dL Final     Albumin   Date Value Ref Range Status   09/22/2020 3.8 3.5 - 5.2 g/dL Final     Total Bilirubin   Date Value Ref Range Status   09/22/2020 0.3 0.1 - 1.0 mg/dL Final     Comment:     For infants and newborns, interpretation of results should be based  on gestational age, weight and in agreement with clinical  observations.  Premature Infant recommended reference ranges:  Up to 24 hours.............<8.0 mg/dL  Up to 48 hours............<12.0 mg/dL  3-5 days..................<15.0 mg/dL  6-29 days.................<15.0 mg/dL       Alkaline Phosphatase   Date Value Ref Range Status   09/22/2020 53 (L) 55 - 135 U/L Final     AST   Date Value Ref Range Status   09/22/2020 15 10 - 40 U/L Final     ALT   Date Value Ref Range Status   09/22/2020 10 10 - 44 U/L Final     Anion Gap   Date Value Ref Range Status   09/22/2020 9 8 - 16 mmol/L Final     eGFR if    Date Value Ref Range Status   09/22/2020 >60.0 >60 mL/min/1.73 m^2 Final     eGFR if non    Date Value Ref Range Status   09/22/2020 >60.0 >60 mL/min/1.73 m^2 Final     Comment:     Calculation used to obtain the estimated glomerular filtration  rate (eGFR) is the CKD-EPI equation.        Lab Results   Component Value Date    WBC 11.06 09/22/2020    HGB 11.1 (L) 09/22/2020    HCT 35.6 (L) 09/22/2020    MCV 93 09/22/2020     (L) 09/22/2020             [place] free light chain 1.04 lambda free light chain 1.15 kappa lambda ratio 0.9  Epo level 7.8  Reticular count 3.1  b-2 microglobulin 1.5  SPEP Jannette no evidence of monoclonal peaks  Folate 19.7  B12 911      Assessment Plan    [] Mild Anemia un differentiated - improving  Normocytic with MCV of 96.  colonoscope 1 year ago negative - recommendation RTC 10 for surveillance  scope  > RTC 4 weeks after schedule      [] ultrasound shows coarse liver echotexture   > outpatient GI referral    [] Thrombocytopenia mild    [] No evidence of renal insufficiencies.    [] Know  history of COPD, and hepatitis-C    [] Acid reflux        There are no diagnoses linked to this encounter.

## 2020-10-07 DIAGNOSIS — G89.4 CHRONIC PAIN SYNDROME: ICD-10-CM

## 2020-10-08 RX ORDER — HYDROCODONE BITARTRATE AND ACETAMINOPHEN 10; 325 MG/1; MG/1
1 TABLET ORAL EVERY 8 HOURS PRN
Qty: 90 TABLET | Refills: 0 | Status: SHIPPED | OUTPATIENT
Start: 2020-10-08 | End: 2020-11-05 | Stop reason: SDUPTHER

## 2020-10-09 ENCOUNTER — LAB VISIT (OUTPATIENT)
Dept: LAB | Facility: HOSPITAL | Age: 70
End: 2020-10-09
Attending: INTERNAL MEDICINE
Payer: MEDICARE

## 2020-10-09 DIAGNOSIS — G89.4 CHRONIC PAIN SYNDROME: ICD-10-CM

## 2020-10-09 DIAGNOSIS — D69.6 THROMBOPENIA: ICD-10-CM

## 2020-10-09 DIAGNOSIS — Z79.52 CURRENT USE OF STEROID MEDICATION: ICD-10-CM

## 2020-10-09 DIAGNOSIS — M05.9 SEROPOSITIVE RHEUMATOID ARTHRITIS: ICD-10-CM

## 2020-10-09 LAB
ALBUMIN SERPL BCP-MCNC: 4.1 G/DL (ref 3.5–5.2)
ALP SERPL-CCNC: 63 U/L (ref 55–135)
ALT SERPL W/O P-5'-P-CCNC: 10 U/L (ref 10–44)
ANION GAP SERPL CALC-SCNC: 10 MMOL/L (ref 8–16)
AST SERPL-CCNC: 17 U/L (ref 10–40)
BASOPHILS # BLD AUTO: 0.04 K/UL (ref 0–0.2)
BASOPHILS NFR BLD: 0.3 % (ref 0–1.9)
BILIRUB SERPL-MCNC: 0.4 MG/DL (ref 0.1–1)
BUN SERPL-MCNC: 16 MG/DL (ref 8–23)
CALCIUM SERPL-MCNC: 9.2 MG/DL (ref 8.7–10.5)
CHLORIDE SERPL-SCNC: 102 MMOL/L (ref 95–110)
CO2 SERPL-SCNC: 26 MMOL/L (ref 23–29)
CREAT SERPL-MCNC: 1.3 MG/DL (ref 0.5–1.4)
CRP SERPL-MCNC: 2 MG/L (ref 0–8.2)
DIFFERENTIAL METHOD: ABNORMAL
EOSINOPHIL # BLD AUTO: 0 K/UL (ref 0–0.5)
EOSINOPHIL NFR BLD: 0.1 % (ref 0–8)
ERYTHROCYTE [DISTWIDTH] IN BLOOD BY AUTOMATED COUNT: 14.1 % (ref 11.5–14.5)
ERYTHROCYTE [SEDIMENTATION RATE] IN BLOOD BY WESTERGREN METHOD: 3 MM/HR (ref 0–10)
EST. GFR  (AFRICAN AMERICAN): >60 ML/MIN/1.73 M^2
EST. GFR  (NON AFRICAN AMERICAN): 55.3 ML/MIN/1.73 M^2
GLUCOSE SERPL-MCNC: 114 MG/DL (ref 70–110)
HCT VFR BLD AUTO: 38.1 % (ref 40–54)
HGB BLD-MCNC: 11.4 G/DL (ref 14–18)
IMM GRANULOCYTES # BLD AUTO: 0.1 K/UL (ref 0–0.04)
IMM GRANULOCYTES NFR BLD AUTO: 0.7 % (ref 0–0.5)
IRON SERPL-MCNC: 72 UG/DL (ref 45–160)
LYMPHOCYTES # BLD AUTO: 2.4 K/UL (ref 1–4.8)
LYMPHOCYTES NFR BLD: 17.7 % (ref 18–48)
MCH RBC QN AUTO: 28.6 PG (ref 27–31)
MCHC RBC AUTO-ENTMCNC: 29.9 G/DL (ref 32–36)
MCV RBC AUTO: 96 FL (ref 82–98)
MONOCYTES # BLD AUTO: 1.1 K/UL (ref 0.3–1)
MONOCYTES NFR BLD: 8.2 % (ref 4–15)
NEUTROPHILS # BLD AUTO: 9.7 K/UL (ref 1.8–7.7)
NEUTROPHILS NFR BLD: 73 % (ref 38–73)
NRBC BLD-RTO: 0 /100 WBC
PLATELET # BLD AUTO: 139 K/UL (ref 150–350)
PMV BLD AUTO: 14.3 FL (ref 9.2–12.9)
POTASSIUM SERPL-SCNC: 4.6 MMOL/L (ref 3.5–5.1)
PROT SERPL-MCNC: 7.6 G/DL (ref 6–8.4)
RBC # BLD AUTO: 3.99 M/UL (ref 4.6–6.2)
SATURATED IRON: 20 % (ref 20–50)
SODIUM SERPL-SCNC: 138 MMOL/L (ref 136–145)
TOTAL IRON BINDING CAPACITY: 352 UG/DL (ref 250–450)
TRANSFERRIN SERPL-MCNC: 238 MG/DL (ref 200–375)
WBC # BLD AUTO: 13.36 K/UL (ref 3.9–12.7)

## 2020-10-09 PROCEDURE — 80053 COMPREHEN METABOLIC PANEL: CPT

## 2020-10-09 PROCEDURE — 85025 COMPLETE CBC W/AUTO DIFF WBC: CPT

## 2020-10-09 PROCEDURE — 85651 RBC SED RATE NONAUTOMATED: CPT | Mod: PO

## 2020-10-09 PROCEDURE — 86431 RHEUMATOID FACTOR QUANT: CPT

## 2020-10-09 PROCEDURE — 36415 COLL VENOUS BLD VENIPUNCTURE: CPT | Mod: PO

## 2020-10-09 PROCEDURE — 86140 C-REACTIVE PROTEIN: CPT

## 2020-10-09 PROCEDURE — 83540 ASSAY OF IRON: CPT

## 2020-10-11 ENCOUNTER — PATIENT OUTREACH (OUTPATIENT)
Dept: ADMINISTRATIVE | Facility: OTHER | Age: 70
End: 2020-10-11

## 2020-10-11 NOTE — PROGRESS NOTES
Health Maintenance Due   Topic Date Due    TETANUS VACCINE  10/26/1968    Shingles Vaccine (1 of 2) 10/26/1999     Updates were requested from care everywhere.  Chart was reviewed for overdue Proactive [facility] Encounters ([facility]) topics (CRS, Breast Cancer Screening, Eye exam)  Health Maintenance has been updated.  LINKS immunization registry triggered.  Immunizations were reconciled.

## 2020-10-12 LAB — RHEUMATOID FACT SERPL-ACNC: 19 IU/ML (ref 0–15)

## 2020-10-13 ENCOUNTER — OFFICE VISIT (OUTPATIENT)
Dept: RHEUMATOLOGY | Facility: CLINIC | Age: 70
End: 2020-10-13
Payer: MEDICARE

## 2020-10-13 VITALS
HEIGHT: 72 IN | DIASTOLIC BLOOD PRESSURE: 86 MMHG | HEART RATE: 79 BPM | SYSTOLIC BLOOD PRESSURE: 143 MMHG | BODY MASS INDEX: 26.28 KG/M2 | WEIGHT: 194 LBS

## 2020-10-13 DIAGNOSIS — M05.9 SEROPOSITIVE RHEUMATOID ARTHRITIS: Primary | ICD-10-CM

## 2020-10-13 DIAGNOSIS — D72.829 LEUKOCYTOSIS, UNSPECIFIED TYPE: ICD-10-CM

## 2020-10-13 DIAGNOSIS — G89.4 CHRONIC PAIN SYNDROME: ICD-10-CM

## 2020-10-13 DIAGNOSIS — M17.9 OSTEOARTHRITIS OF KNEE, UNSPECIFIED LATERALITY, UNSPECIFIED OSTEOARTHRITIS TYPE: ICD-10-CM

## 2020-10-13 PROCEDURE — 1101F PT FALLS ASSESS-DOCD LE1/YR: CPT | Mod: CPTII,S$GLB,, | Performed by: PHYSICIAN ASSISTANT

## 2020-10-13 PROCEDURE — 1125F AMNT PAIN NOTED PAIN PRSNT: CPT | Mod: S$GLB,,, | Performed by: PHYSICIAN ASSISTANT

## 2020-10-13 PROCEDURE — 3077F SYST BP >= 140 MM HG: CPT | Mod: CPTII,S$GLB,, | Performed by: PHYSICIAN ASSISTANT

## 2020-10-13 PROCEDURE — 96372 THER/PROPH/DIAG INJ SC/IM: CPT | Mod: S$GLB,,, | Performed by: PHYSICIAN ASSISTANT

## 2020-10-13 PROCEDURE — 3079F PR MOST RECENT DIASTOLIC BLOOD PRESSURE 80-89 MM HG: ICD-10-PCS | Mod: CPTII,S$GLB,, | Performed by: PHYSICIAN ASSISTANT

## 2020-10-13 PROCEDURE — 99214 OFFICE O/P EST MOD 30 MIN: CPT | Mod: 25,S$GLB,, | Performed by: PHYSICIAN ASSISTANT

## 2020-10-13 PROCEDURE — 1159F MED LIST DOCD IN RCRD: CPT | Mod: S$GLB,,, | Performed by: PHYSICIAN ASSISTANT

## 2020-10-13 PROCEDURE — 99499 UNLISTED E&M SERVICE: CPT | Mod: S$GLB,,, | Performed by: PHYSICIAN ASSISTANT

## 2020-10-13 PROCEDURE — 99214 PR OFFICE/OUTPT VISIT, EST, LEVL IV, 30-39 MIN: ICD-10-PCS | Mod: 25,S$GLB,, | Performed by: PHYSICIAN ASSISTANT

## 2020-10-13 PROCEDURE — 3077F PR MOST RECENT SYSTOLIC BLOOD PRESSURE >= 140 MM HG: ICD-10-PCS | Mod: CPTII,S$GLB,, | Performed by: PHYSICIAN ASSISTANT

## 2020-10-13 PROCEDURE — 3008F BODY MASS INDEX DOCD: CPT | Mod: CPTII,S$GLB,, | Performed by: PHYSICIAN ASSISTANT

## 2020-10-13 PROCEDURE — 1125F PR PAIN SEVERITY QUANTIFIED, PAIN PRESENT: ICD-10-PCS | Mod: S$GLB,,, | Performed by: PHYSICIAN ASSISTANT

## 2020-10-13 PROCEDURE — 3079F DIAST BP 80-89 MM HG: CPT | Mod: CPTII,S$GLB,, | Performed by: PHYSICIAN ASSISTANT

## 2020-10-13 PROCEDURE — 99999 PR PBB SHADOW E&M-EST. PATIENT-LVL III: ICD-10-PCS | Mod: PBBFAC,,, | Performed by: PHYSICIAN ASSISTANT

## 2020-10-13 PROCEDURE — 1159F PR MEDICATION LIST DOCUMENTED IN MEDICAL RECORD: ICD-10-PCS | Mod: S$GLB,,, | Performed by: PHYSICIAN ASSISTANT

## 2020-10-13 PROCEDURE — 1101F PR PT FALLS ASSESS DOC 0-1 FALLS W/OUT INJ PAST YR: ICD-10-PCS | Mod: CPTII,S$GLB,, | Performed by: PHYSICIAN ASSISTANT

## 2020-10-13 PROCEDURE — 96372 PR INJECTION,THERAP/PROPH/DIAG2ST, IM OR SUBCUT: ICD-10-PCS | Mod: S$GLB,,, | Performed by: PHYSICIAN ASSISTANT

## 2020-10-13 PROCEDURE — 99499 RISK ADDL DX/OHS AUDIT: ICD-10-PCS | Mod: S$GLB,,, | Performed by: PHYSICIAN ASSISTANT

## 2020-10-13 PROCEDURE — 3008F PR BODY MASS INDEX (BMI) DOCUMENTED: ICD-10-PCS | Mod: CPTII,S$GLB,, | Performed by: PHYSICIAN ASSISTANT

## 2020-10-13 PROCEDURE — 99999 PR PBB SHADOW E&M-EST. PATIENT-LVL III: CPT | Mod: PBBFAC,,, | Performed by: PHYSICIAN ASSISTANT

## 2020-10-13 RX ORDER — METHYLPREDNISOLONE ACETATE 80 MG/ML
160 INJECTION, SUSPENSION INTRA-ARTICULAR; INTRALESIONAL; INTRAMUSCULAR; SOFT TISSUE
Status: COMPLETED | OUTPATIENT
Start: 2020-10-13 | End: 2020-10-13

## 2020-10-13 RX ORDER — CYANOCOBALAMIN 1000 UG/ML
1000 INJECTION, SOLUTION INTRAMUSCULAR; SUBCUTANEOUS
Status: COMPLETED | OUTPATIENT
Start: 2020-10-13 | End: 2020-10-13

## 2020-10-13 RX ORDER — DICLOFENAC SODIUM 10 MG/G
2 GEL TOPICAL 4 TIMES DAILY
Qty: 100 G | Refills: 5 | Status: SHIPPED | OUTPATIENT
Start: 2020-10-13 | End: 2021-05-13 | Stop reason: SDUPTHER

## 2020-10-13 RX ORDER — KETOROLAC TROMETHAMINE 30 MG/ML
60 INJECTION, SOLUTION INTRAMUSCULAR; INTRAVENOUS
Status: COMPLETED | OUTPATIENT
Start: 2020-10-13 | End: 2020-10-13

## 2020-10-13 RX ORDER — PREDNISONE 5 MG/1
TABLET ORAL
Qty: 60 TABLET | Refills: 6 | Status: SHIPPED | OUTPATIENT
Start: 2020-10-13 | End: 2021-05-13 | Stop reason: SDUPTHER

## 2020-10-13 RX ADMIN — METHYLPREDNISOLONE ACETATE 160 MG: 80 INJECTION, SUSPENSION INTRA-ARTICULAR; INTRALESIONAL; INTRAMUSCULAR; SOFT TISSUE at 02:10

## 2020-10-13 RX ADMIN — CYANOCOBALAMIN 1000 MCG: 1000 INJECTION, SOLUTION INTRAMUSCULAR; SUBCUTANEOUS at 02:10

## 2020-10-13 RX ADMIN — KETOROLAC TROMETHAMINE 60 MG: 30 INJECTION, SOLUTION INTRAMUSCULAR; INTRAVENOUS at 02:10

## 2020-10-13 ASSESSMENT — ROUTINE ASSESSMENT OF PATIENT INDEX DATA (RAPID3)
TOTAL RAPID3 SCORE: 4.55
PAIN SCORE: 7
PSYCHOLOGICAL DISTRESS SCORE: 0
FATIGUE SCORE: 2.2
MDHAQ FUNCTION SCORE: 0.8
PATIENT GLOBAL ASSESSMENT SCORE: 4

## 2020-10-13 NOTE — PROGRESS NOTES
Administered 1 cc ( 1000 mcg/ml ) of b12 to the right upper outer gluteal. Informed of s/s to report verbalized understanding. No adverse reactions noted.      Administered 2 cc ( 80 mg/ml ) of depomedrol to the right upper outer gluteal. Informed of s/s to report verbalized understanding. No adverse reactions noted.        Administered 2 cc ( 30 mg/ml ) of toradol to the left upper outer gluteal. Informed of s/s to report verbalized understanding. No adverse reactions noted.

## 2020-10-13 NOTE — PROGRESS NOTES
Subjective:       Patient ID: Scooter Swan is a 69 y.o. male.    Chief Complaint: Disease Management and Rheumatoid Arthritis    Mr. Swan is a 69 year old male who presents to clinic for follow up on seropositive rheumatoid arthritis and osteoarthritis. He has been doing fair since his last visit. He complains of aching pain involving his hands, knees, feet, and low back. He has significant morning stiffness and he is taking prednisone 10 mg daily. Norco is providing adequate control of his pain without side effects. BP is well controlled. He is followed by Hematology for anemia and thrombocytopenia. He was previously followed by Hepatology for abnormal CT abdomen and he is overdue for f/u.    Current treatment:  1. Norco TID PRN  2. Prednisone 10 mg  3. Zanaflex PRN    Prior treatment:  1. Plaquenil (WEIGHT LOSS)  2. SSZ caused GI upset    Review of Systems   Constitutional: Positive for activity change. Negative for chills, fatigue and fever.   Eyes: Negative for visual disturbance.   Respiratory: Negative for cough, shortness of breath and wheezing.    Cardiovascular: Negative for chest pain, palpitations and leg swelling.   Gastrointestinal: Negative for abdominal pain, constipation, diarrhea, nausea and vomiting.   Musculoskeletal: Positive for arthralgias, back pain and joint swelling. Negative for myalgias.   Neurological: Negative for dizziness, syncope and headaches.   Hematological: Negative for adenopathy.         Objective:     Vitals:    10/13/20 1309   BP: (!) 143/86   Pulse: 79       Past Medical History:   Diagnosis Date    Cataract     COPD (chronic obstructive pulmonary disease)     Diverticulosis     DUARTE (dyspnea on exertion)     GERD (gastroesophageal reflux disease)     Glaucoma     Hepatitis C     treated; seeing Dr. Carr    Hyperlipidemia     Hypertension     Liver lesion 08/2018    RA (rheumatoid arthritis)     Rectal prolapse     Possible     Past Surgical History:   Procedure  Laterality Date    CARPAL TUNNEL RELEASE      Right wrist 2015    COLONOSCOPY  08/2016    Dr. Cardona; in care everywhere    COLONOSCOPY N/A 3/20/2019    Procedure: COLONOSCOPY;  Surgeon: Sotero Arthur MD;  Location: [facility];  Service: Endoscopy;  Laterality: N/A; hemorrhoids, diverticulosis, repeat in 10 years for screening    EXCISIONAL HEMORRHOIDECTOMY N/A 10/18/2018    Procedure: HEMORRHOIDECTOMY, prone;  Surgeon: Gunnar Horton MD;  Location: [address];  Service: Colon and Rectal;  Laterality: N/A;    THYROID SURGERY      UPPER GASTROINTESTINAL ENDOSCOPY  08/2016    Dr. Cardona; in care everywhere          Physical Exam   Constitutional: He is well-developed, well-nourished, and in no distress.   Eyes: Right conjunctiva is not injected. Left conjunctiva is not injected. Right eye exhibits normal extraocular motion. Left eye exhibits normal extraocular motion.   Neck: No JVD present. No thyromegaly present.   Cardiovascular: Normal rate and regular rhythm.  Exam reveals no decreased pulses.    Pulmonary/Chest: He has no wheezes. He has no rhonchi. He has no rales.         Right Side Rheumatological Exam     Examination finds the shoulder normal.    The patient is tender to palpation of the elbow, wrist, knee, 1st PIP, 1st MCP, 2nd PIP, 2nd MCP, 3rd PIP, 3rd MCP, 4th PIP, 4th MCP, 5th PIP and 5th MCP    He has swelling of the wrist, 1st PIP, 2nd PIP, 3rd PIP and 4th PIP     Left Side Rheumatological Exam     Examination finds the shoulder normal.    The patient is tender to palpation of the elbow, wrist, knee, 1st PIP, 1st MCP, 2nd PIP, 2nd MCP, 3rd PIP, 3rd MCP, 4th PIP, 4th MCP, 5th PIP and 5th MCP.    He has swelling of the wrist, 1st PIP, 2nd PIP, 3rd PIP, 4th PIP and 5th PIP       Lymphadenopathy:     He has no cervical adenopathy.   Neurological: Gait normal.   Skin: No rash noted.     Psychiatric: Mood and affect normal.     Recent labs reviewed:  Component      Latest Ref Rng & Units  10/9/2020   WBC      3.90 - 12.70 K/uL 13.36 (H)   RBC      4.60 - 6.20 M/uL 3.99 (L)   Hemoglobin      14.0 - 18.0 g/dL 11.4 (L)   Hematocrit      40.0 - 54.0 % 38.1 (L)   MCV      82 - 98 fL 96   MCH      27.0 - 31.0 pg 28.6   MCHC      32.0 - 36.0 g/dL 29.9 (L)   RDW      11.5 - 14.5 % 14.1   Platelets      150 - 350 K/uL 139 (L)   MPV      9.2 - 12.9 fL 14.3 (H)   Immature Granulocytes      0.0 - 0.5 % 0.7 (H)   Gran # (ANC)      1.8 - 7.7 K/uL 9.7 (H)   Immature Grans (Abs)      0.00 - 0.04 K/uL 0.10 (H)   Lymph #      1.0 - 4.8 K/uL 2.4   Mono #      0.3 - 1.0 K/uL 1.1 (H)   Eos #      0.0 - 0.5 K/uL 0.0   Baso #      0.00 - 0.20 K/uL 0.04   nRBC      0 /100 WBC 0   Gran%      38.0 - 73.0 % 73.0   Lymph%      18.0 - 48.0 % 17.7 (L)   Mono%      4.0 - 15.0 % 8.2   Eosinophil%      0.0 - 8.0 % 0.1   Basophil%      0.0 - 1.9 % 0.3   Differential Method       Automated   Sodium      136 - 145 mmol/L 138   Potassium      3.5 - 5.1 mmol/L 4.6   Chloride      95 - 110 mmol/L 102   CO2      23 - 29 mmol/L 26   Glucose      70 - 110 mg/dL 114 (H)   BUN, Bld      8 - 23 mg/dL 16   Creatinine      0.5 - 1.4 mg/dL 1.3   Calcium      8.7 - 10.5 mg/dL 9.2   PROTEIN TOTAL      6.0 - 8.4 g/dL 7.6   Albumin      3.5 - 5.2 g/dL 4.1   BILIRUBIN TOTAL      0.1 - 1.0 mg/dL 0.4   Alkaline Phosphatase      55 - 135 U/L 63   AST      10 - 40 U/L 17   ALT      10 - 44 U/L 10   Anion Gap      8 - 16 mmol/L 10   eGFR if African American      >60 mL/min/1.73 m:2 >60.0   eGFR if non African American      >60 mL/min/1.73 m:2 55.3 (A)   Iron      45 - 160 ug/dL 72   Transferrin      200 - 375 mg/dL 238   TIBC      250 - 450 ug/dL 352   Saturated Iron      20 - 50 % 20   Rheumatoid Factor      0.0 - 15.0 IU/mL 19.0 (H)   Sed Rate      0 - 10 mm/Hr 3   CRP      0.0 - 8.2 mg/L 2.0     Assessment:       1. Seropositive rheumatoid arthritis    2. Osteoarthritis of knee, unspecified laterality, unspecified osteoarthritis type    3. Chronic pain  "syndrome    4. Leukocytosis, unspecified type            Plan:       Seropositive rheumatoid arthritis  -     predniSONE (DELTASONE) 5 MG tablet; TAKE 2 TABLETS BY MOUTH once daily AS NEEDED  Dispense: 60 tablet; Refill: 6  -     cyanocobalamin injection 1,000 mcg  -     ketorolac injection 60 mg  -     methylPREDNISolone acetate injection 160 mg    Osteoarthritis of knee, unspecified laterality, unspecified osteoarthritis type  -     predniSONE (DELTASONE) 5 MG tablet; TAKE 2 TABLETS BY MOUTH once daily AS NEEDED  Dispense: 60 tablet; Refill: 6  -     diclofenac sodium (VOLTAREN) 1 % Gel; Apply 2 grams  topically 4 (four) times daily.  Dispense: 100 g; Refill: 5    Chronic pain syndrome    Leukocytosis, unspecified type        Assessment:  69 year old male with  Seropositive (+RF) rheumatoid arthritis, osteoarthritis on prednisone 10 mg  --thrombocytopenia, anemia undergoing hematology work up  --hx of hep c s/p harvoni, last hep c viral load undetectable  --chronic pain syndrome on norco  --abnormal CT abd 6/2019 "Multiple areas of early bright arterial enhancement not seen on the later images.."    Plan:  1. Cont. Prednisone 5-10 mg daily PRN  2. Continue norco PRN per Dr. Morales. I have checked [place] prescription monitoring program site and no unusual or abnormal behavior has occurred pt understand the risk and benefits of taking opioid medications and has decided to continue the medication   3. Will follow repeat cbc in 2 weeks for leukocytosis. Pt denied infection sx at this time.   4. Toradol 60, b12, depo 160 given today in clinic    Follow up:  3 months Dr. Morales    "

## 2020-11-10 RX ORDER — ENALAPRIL MALEATE 20 MG/1
20 TABLET ORAL 2 TIMES DAILY
Qty: 180 TABLET | Refills: 3 | Status: SHIPPED | OUTPATIENT
Start: 2020-11-10 | End: 2021-11-05

## 2020-11-10 NOTE — TELEPHONE ENCOUNTER
----- Message from Princess FATIMAH Jesus sent at 11/10/2020 10:12 AM CST -----  Contact: Jacqueline spouse  Type:  RX Refill Request    Who Called:  Spouse   Refill or New Rx:  refill   RX Name and Strength: enalapril (VASOTEC) 20 MG tablet  How is the patient currently taking it? (ex. 1XDay):  - Route: Take 1 tablet (20 mg total) by mouth 2 (two) times daily  Is this a 30 day or 90 day RX:  30  Preferred Pharmacy with phone number:    [id number] - [facility] [address]  AT [facility] [address]  [address] [facility] [address]  Phone: [phone] Fax: [phone]      Local or Mail Order:  [facility]  Ordering Provider:  Dr. Andrew Larsen Call Back Number:  [phone] (home)     Additional Information:  please call when rx is sent

## 2020-11-10 NOTE — PROGRESS NOTES
Refill Routing Note   Medication(s) are not appropriate for processing by [facility] for the following reason(s):     - Non-participating provider    [facility] actions taken in this encounter: Route          Medication reconciliation completed: No   Automatic Epic Generated Protocol Data:        Requested Prescriptions   Pending Prescriptions Disp Refills    enalapril (VASOTEC) 20 MG tablet 60 tablet 12     Sig: Take 1 tablet (20 mg total) by mouth 2 (two) times daily.       ACE Inhibitors Refill Protocol Passed - 11/10/2020 10:25 AM        Passed - Serum Creatinine less than 1.4 on file in the past 12 months     Lab Results   Component Value Date    CREATININE 1.3 10/09/2020    CREATININE 1.1 09/22/2020    CREATININE 1.1 07/09/2020     Lab Results   Component Value Date    ESTGFRAFRICA >60.0 10/09/2020    ESTGFRAFRICA >60.0 09/22/2020    ESTGFRAFRICA >60.0 07/09/2020               Passed - Visit with authorizing provider in past 12 months or upcoming 90 days         Passed - Serum Potassium less than 5.2 on file in the past 12 months     Lab Results   Component Value Date    K 4.6 10/09/2020    K 3.9 09/22/2020    K 3.8 07/09/2020                  Passed - Blood Pressure under 139/89 on file in past 12 months      BP Readings from Last 3 Encounters:   10/13/20 (!) 143/86   08/24/20 118/82   04/29/20 115/60                   Appointments  past 12m or future 3m with PCP    Date Provider   Last Visit   8/24/2020 Salvador Kennedy, DO   Next Visit   Visit date not found Salvador Kennedy, DO   ED visits in past 90 days: 0        Note composed:2:53 PM 11/10/2020

## 2020-12-03 DIAGNOSIS — G89.4 CHRONIC PAIN SYNDROME: ICD-10-CM

## 2020-12-07 RX ORDER — HYDROCODONE BITARTRATE AND ACETAMINOPHEN 10; 325 MG/1; MG/1
1 TABLET ORAL EVERY 8 HOURS PRN
Qty: 90 TABLET | Refills: 0 | Status: SHIPPED | OUTPATIENT
Start: 2020-12-07 | End: 2021-01-05 | Stop reason: SDUPTHER

## 2020-12-11 ENCOUNTER — PATIENT MESSAGE (OUTPATIENT)
Dept: OTHER | Facility: OTHER | Age: 70
End: 2020-12-11

## 2021-01-05 DIAGNOSIS — G89.4 CHRONIC PAIN SYNDROME: ICD-10-CM

## 2021-01-05 RX ORDER — HYDROCODONE BITARTRATE AND ACETAMINOPHEN 10; 325 MG/1; MG/1
1 TABLET ORAL EVERY 8 HOURS PRN
Qty: 90 TABLET | Refills: 0 | Status: SHIPPED | OUTPATIENT
Start: 2021-01-05 | End: 2021-01-26 | Stop reason: SDUPTHER

## 2021-01-09 ENCOUNTER — IMMUNIZATION (OUTPATIENT)
Dept: FAMILY MEDICINE | Facility: CLINIC | Age: 71
End: 2021-01-09
Payer: MEDICARE

## 2021-01-09 DIAGNOSIS — Z23 NEED FOR VACCINATION: ICD-10-CM

## 2021-01-09 PROCEDURE — 91300 COVID-19, MRNA, LNP-S, PF, 30 MCG/0.3 ML DOSE VACCINE: CPT | Mod: PBBFAC | Performed by: INTERNAL MEDICINE

## 2021-01-25 ENCOUNTER — PATIENT OUTREACH (OUTPATIENT)
Dept: ADMINISTRATIVE | Facility: OTHER | Age: 71
End: 2021-01-25

## 2021-01-26 ENCOUNTER — OFFICE VISIT (OUTPATIENT)
Dept: RHEUMATOLOGY | Facility: CLINIC | Age: 71
End: 2021-01-26
Payer: MEDICARE

## 2021-01-26 DIAGNOSIS — G89.4 CHRONIC PAIN SYNDROME: ICD-10-CM

## 2021-01-26 DIAGNOSIS — D72.829 LEUKOCYTOSIS, UNSPECIFIED TYPE: ICD-10-CM

## 2021-01-26 DIAGNOSIS — M05.9 SEROPOSITIVE RHEUMATOID ARTHRITIS: Primary | ICD-10-CM

## 2021-01-26 DIAGNOSIS — M17.9 OSTEOARTHRITIS OF KNEE, UNSPECIFIED LATERALITY, UNSPECIFIED OSTEOARTHRITIS TYPE: ICD-10-CM

## 2021-01-26 DIAGNOSIS — E55.9 VITAMIN D DEFICIENCY, UNSPECIFIED: ICD-10-CM

## 2021-01-26 DIAGNOSIS — G56.00 CARPAL TUNNEL SYNDROME, UNSPECIFIED LATERALITY: ICD-10-CM

## 2021-01-26 DIAGNOSIS — D64.9 ANEMIA, UNSPECIFIED TYPE: ICD-10-CM

## 2021-01-26 PROCEDURE — 1159F PR MEDICATION LIST DOCUMENTED IN MEDICAL RECORD: ICD-10-PCS | Mod: 95,,, | Performed by: INTERNAL MEDICINE

## 2021-01-26 PROCEDURE — 99214 OFFICE O/P EST MOD 30 MIN: CPT | Mod: 95,,, | Performed by: INTERNAL MEDICINE

## 2021-01-26 PROCEDURE — 1159F MED LIST DOCD IN RCRD: CPT | Mod: 95,,, | Performed by: INTERNAL MEDICINE

## 2021-01-26 PROCEDURE — 99499 RISK ADDL DX/OHS AUDIT: ICD-10-PCS | Mod: 95,,, | Performed by: INTERNAL MEDICINE

## 2021-01-26 PROCEDURE — 99499 UNLISTED E&M SERVICE: CPT | Mod: 95,,, | Performed by: INTERNAL MEDICINE

## 2021-01-26 PROCEDURE — 99214 PR OFFICE/OUTPT VISIT, EST, LEVL IV, 30-39 MIN: ICD-10-PCS | Mod: 95,,, | Performed by: INTERNAL MEDICINE

## 2021-01-26 RX ORDER — HYDROCODONE BITARTRATE AND ACETAMINOPHEN 10; 325 MG/1; MG/1
1 TABLET ORAL EVERY 8 HOURS PRN
Qty: 90 TABLET | Refills: 0 | Status: SHIPPED | OUTPATIENT
Start: 2021-02-02 | End: 2021-03-02 | Stop reason: SDUPTHER

## 2021-01-26 RX ORDER — TIZANIDINE 4 MG/1
4 TABLET ORAL EVERY 8 HOURS
Qty: 270 TABLET | Refills: 1 | Status: SHIPPED | OUTPATIENT
Start: 2021-01-26 | End: 2021-05-13 | Stop reason: SDUPTHER

## 2021-01-30 ENCOUNTER — IMMUNIZATION (OUTPATIENT)
Dept: FAMILY MEDICINE | Facility: CLINIC | Age: 71
End: 2021-01-30
Payer: MEDICARE

## 2021-01-30 DIAGNOSIS — Z23 NEED FOR VACCINATION: Primary | ICD-10-CM

## 2021-01-30 PROCEDURE — 91300 COVID-19, MRNA, LNP-S, PF, 30 MCG/0.3 ML DOSE VACCINE: CPT | Mod: PBBFAC | Performed by: INTERNAL MEDICINE

## 2021-01-30 PROCEDURE — 0002A COVID-19, MRNA, LNP-S, PF, 30 MCG/0.3 ML DOSE VACCINE: CPT | Mod: PBBFAC | Performed by: INTERNAL MEDICINE

## 2021-02-04 DIAGNOSIS — I1A.0 RESISTANT HYPERTENSION: ICD-10-CM

## 2021-02-04 RX ORDER — HYDRALAZINE HYDROCHLORIDE 25 MG/1
25 TABLET, FILM COATED ORAL EVERY 8 HOURS
Qty: 270 TABLET | Refills: 3 | Status: SHIPPED | OUTPATIENT
Start: 2021-02-04 | End: 2021-06-25

## 2021-03-02 DIAGNOSIS — G89.4 CHRONIC PAIN SYNDROME: ICD-10-CM

## 2021-03-02 DIAGNOSIS — M05.9 SEROPOSITIVE RHEUMATOID ARTHRITIS: ICD-10-CM

## 2021-03-02 DIAGNOSIS — D64.9 ANEMIA, UNSPECIFIED TYPE: ICD-10-CM

## 2021-03-02 DIAGNOSIS — M17.9 OSTEOARTHRITIS OF KNEE, UNSPECIFIED LATERALITY, UNSPECIFIED OSTEOARTHRITIS TYPE: ICD-10-CM

## 2021-03-02 DIAGNOSIS — G56.00 CARPAL TUNNEL SYNDROME, UNSPECIFIED LATERALITY: ICD-10-CM

## 2021-03-02 DIAGNOSIS — D72.829 LEUKOCYTOSIS, UNSPECIFIED TYPE: ICD-10-CM

## 2021-03-06 ENCOUNTER — PATIENT MESSAGE (OUTPATIENT)
Dept: RHEUMATOLOGY | Facility: CLINIC | Age: 71
End: 2021-03-06

## 2021-03-07 RX ORDER — HYDROCODONE BITARTRATE AND ACETAMINOPHEN 10; 325 MG/1; MG/1
1 TABLET ORAL EVERY 8 HOURS PRN
Qty: 90 TABLET | Refills: 0 | Status: SHIPPED | OUTPATIENT
Start: 2021-03-07 | End: 2021-04-05 | Stop reason: SDUPTHER

## 2021-03-15 ENCOUNTER — OFFICE VISIT (OUTPATIENT)
Dept: FAMILY MEDICINE | Facility: CLINIC | Age: 71
End: 2021-03-15
Payer: MEDICARE

## 2021-03-15 VITALS
BODY MASS INDEX: 27.11 KG/M2 | TEMPERATURE: 98 F | HEART RATE: 92 BPM | HEIGHT: 72 IN | SYSTOLIC BLOOD PRESSURE: 138 MMHG | WEIGHT: 200.19 LBS | OXYGEN SATURATION: 96 % | DIASTOLIC BLOOD PRESSURE: 76 MMHG

## 2021-03-15 DIAGNOSIS — R01.1 SYSTOLIC MURMUR: Primary | ICD-10-CM

## 2021-03-15 DIAGNOSIS — R06.02 SHORTNESS OF BREATH: ICD-10-CM

## 2021-03-15 DIAGNOSIS — I25.119 ATHEROSCLEROSIS OF NATIVE CORONARY ARTERY OF NATIVE HEART WITH ANGINA PECTORIS: ICD-10-CM

## 2021-03-15 DIAGNOSIS — D69.6 THROMBOCYTOPENIA, UNSPECIFIED: ICD-10-CM

## 2021-03-15 DIAGNOSIS — J43.9 PULMONARY EMPHYSEMA, UNSPECIFIED EMPHYSEMA TYPE: ICD-10-CM

## 2021-03-15 PROCEDURE — 3075F SYST BP GE 130 - 139MM HG: CPT | Mod: CPTII,S$GLB,, | Performed by: INTERNAL MEDICINE

## 2021-03-15 PROCEDURE — 99214 OFFICE O/P EST MOD 30 MIN: CPT | Mod: S$GLB,,, | Performed by: INTERNAL MEDICINE

## 2021-03-15 PROCEDURE — 1125F PR PAIN SEVERITY QUANTIFIED, PAIN PRESENT: ICD-10-PCS | Mod: S$GLB,,, | Performed by: INTERNAL MEDICINE

## 2021-03-15 PROCEDURE — 1101F PT FALLS ASSESS-DOCD LE1/YR: CPT | Mod: CPTII,S$GLB,, | Performed by: INTERNAL MEDICINE

## 2021-03-15 PROCEDURE — 1101F PR PT FALLS ASSESS DOC 0-1 FALLS W/OUT INJ PAST YR: ICD-10-PCS | Mod: CPTII,S$GLB,, | Performed by: INTERNAL MEDICINE

## 2021-03-15 PROCEDURE — 1125F AMNT PAIN NOTED PAIN PRSNT: CPT | Mod: S$GLB,,, | Performed by: INTERNAL MEDICINE

## 2021-03-15 PROCEDURE — 93005 ELECTROCARDIOGRAM TRACING: CPT | Mod: S$GLB,,, | Performed by: INTERNAL MEDICINE

## 2021-03-15 PROCEDURE — 93010 ELECTROCARDIOGRAM REPORT: CPT | Mod: S$GLB,,, | Performed by: INTERNAL MEDICINE

## 2021-03-15 PROCEDURE — 3288F FALL RISK ASSESSMENT DOCD: CPT | Mod: CPTII,S$GLB,, | Performed by: INTERNAL MEDICINE

## 2021-03-15 PROCEDURE — 3075F PR MOST RECENT SYSTOLIC BLOOD PRESS GE 130-139MM HG: ICD-10-PCS | Mod: CPTII,S$GLB,, | Performed by: INTERNAL MEDICINE

## 2021-03-15 PROCEDURE — 99999 PR PBB SHADOW E&M-EST. PATIENT-LVL V: ICD-10-PCS | Mod: PBBFAC,,, | Performed by: INTERNAL MEDICINE

## 2021-03-15 PROCEDURE — 93010 EKG 12-LEAD: ICD-10-PCS | Mod: S$GLB,,, | Performed by: INTERNAL MEDICINE

## 2021-03-15 PROCEDURE — 3078F DIAST BP <80 MM HG: CPT | Mod: CPTII,S$GLB,, | Performed by: INTERNAL MEDICINE

## 2021-03-15 PROCEDURE — 99999 PR PBB SHADOW E&M-EST. PATIENT-LVL V: CPT | Mod: PBBFAC,,, | Performed by: INTERNAL MEDICINE

## 2021-03-15 PROCEDURE — 93005 EKG 12-LEAD: ICD-10-PCS | Mod: S$GLB,,, | Performed by: INTERNAL MEDICINE

## 2021-03-15 PROCEDURE — 3288F PR FALLS RISK ASSESSMENT DOCUMENTED: ICD-10-PCS | Mod: CPTII,S$GLB,, | Performed by: INTERNAL MEDICINE

## 2021-03-15 PROCEDURE — 99214 PR OFFICE/OUTPT VISIT, EST, LEVL IV, 30-39 MIN: ICD-10-PCS | Mod: S$GLB,,, | Performed by: INTERNAL MEDICINE

## 2021-03-15 PROCEDURE — 3008F PR BODY MASS INDEX (BMI) DOCUMENTED: ICD-10-PCS | Mod: CPTII,S$GLB,, | Performed by: INTERNAL MEDICINE

## 2021-03-15 PROCEDURE — 1159F MED LIST DOCD IN RCRD: CPT | Mod: S$GLB,,, | Performed by: INTERNAL MEDICINE

## 2021-03-15 PROCEDURE — 1159F PR MEDICATION LIST DOCUMENTED IN MEDICAL RECORD: ICD-10-PCS | Mod: S$GLB,,, | Performed by: INTERNAL MEDICINE

## 2021-03-15 PROCEDURE — 3078F PR MOST RECENT DIASTOLIC BLOOD PRESSURE < 80 MM HG: ICD-10-PCS | Mod: CPTII,S$GLB,, | Performed by: INTERNAL MEDICINE

## 2021-03-15 PROCEDURE — 3008F BODY MASS INDEX DOCD: CPT | Mod: CPTII,S$GLB,, | Performed by: INTERNAL MEDICINE

## 2021-03-18 ENCOUNTER — HOSPITAL ENCOUNTER (OUTPATIENT)
Dept: CARDIOLOGY | Facility: HOSPITAL | Age: 71
Discharge: HOME | End: 2021-03-18
Attending: INTERNAL MEDICINE
Payer: MEDICARE

## 2021-03-18 ENCOUNTER — TELEPHONE (OUTPATIENT)
Dept: FAMILY MEDICINE | Facility: CLINIC | Age: 71
End: 2021-03-18

## 2021-03-18 VITALS
WEIGHT: 200 LBS | HEIGHT: 72 IN | HEART RATE: 88 BPM | SYSTOLIC BLOOD PRESSURE: 138 MMHG | BODY MASS INDEX: 27.09 KG/M2 | TEMPERATURE: 99 F | DIASTOLIC BLOOD PRESSURE: 76 MMHG

## 2021-03-18 DIAGNOSIS — I35.1 MODERATE AORTIC REGURGITATION: Primary | ICD-10-CM

## 2021-03-18 DIAGNOSIS — R01.1 SYSTOLIC MURMUR: ICD-10-CM

## 2021-03-18 DIAGNOSIS — R06.09 DYSPNEA ON EXERTION: ICD-10-CM

## 2021-03-18 LAB
AORTIC ROOT ANNULUS: 3.92 CM
ASCENDING AORTA: 3.44 CM
AV INDEX (PROSTH): 0.32
AV MEAN GRADIENT: 48 MMHG
AV PEAK GRADIENT: 75 MMHG
AV REGURGITATION PRESSURE HALF TIME: 373 MS
AV VALVE AREA: 1.65 CM2
AV VELOCITY RATIO: 0.27
BSA FOR ECHO PROCEDURE: 2.15 M2
CV ECHO LV RWT: 0.45 CM
DOP CALC AO PEAK VEL: 4.34 M/S
DOP CALC AO VTI: 94.01 CM
DOP CALC LVOT AREA: 5.2 CM2
DOP CALC LVOT DIAMETER: 2.57 CM
DOP CALC LVOT PEAK VEL: 1.19 M/S
DOP CALC LVOT STROKE VOLUME: 154.72 CM3
DOP CALC RVOT PEAK VEL: 0.74 M/S
DOP CALC RVOT VTI: 15.42 CM
DOP CALCLVOT PEAK VEL VTI: 29.84 CM
E WAVE DECELERATION TIME: 215.18 MSEC
E/A RATIO: 0.79
E/E' RATIO: 11.5 M/S
ECHO LV POSTERIOR WALL: 1.19 CM (ref 0.6–1.1)
FRACTIONAL SHORTENING: 43 % (ref 28–44)
INTERVENTRICULAR SEPTUM: 1.32 CM (ref 0.6–1.1)
IVRT: 117.03 MSEC
LA MAJOR: 5.52 CM
LA MINOR: 5.16 CM
LA WIDTH: 3.93 CM
LEFT ATRIUM SIZE: 4.32 CM
LEFT ATRIUM VOLUME INDEX: 36.1 ML/M2
LEFT ATRIUM VOLUME: 76.97 CM3
LEFT INTERNAL DIMENSION IN SYSTOLE: 3.02 CM (ref 2.1–4)
LEFT VENTRICLE DIASTOLIC VOLUME INDEX: 62.57 ML/M2
LEFT VENTRICLE DIASTOLIC VOLUME: 133.27 ML
LEFT VENTRICLE MASS INDEX: 127 G/M2
LEFT VENTRICLE SYSTOLIC VOLUME INDEX: 16.7 ML/M2
LEFT VENTRICLE SYSTOLIC VOLUME: 35.61 ML
LEFT VENTRICULAR INTERNAL DIMENSION IN DIASTOLE: 5.26 CM (ref 3.5–6)
LEFT VENTRICULAR MASS: 269.81 G
LV LATERAL E/E' RATIO: 11.5 M/S
LV SEPTAL E/E' RATIO: 11.5 M/S
MV A" WAVE DURATION": 9.42 MSEC
MV PEAK A VEL: 1.17 M/S
MV PEAK E VEL: 0.92 M/S
MV STENOSIS PRESSURE HALF TIME: 62.4 MS
MV VALVE AREA P 1/2 METHOD: 3.53 CM2
PULM VEIN S/D RATIO: 1.04
PV MEAN GRADIENT: 1 MMHG
PV PEAK D VEL: 0.5 M/S
PV PEAK S VEL: 0.52 M/S
PV PEAK VELOCITY: 1.19 CM/S
RA MAJOR: 4.88 CM
RA PRESSURE: 3 MMHG
RA WIDTH: 3.5 CM
RIGHT VENTRICULAR END-DIASTOLIC DIMENSION: 2.15 CM
SINUS: 3.65 CM
STJ: 3.97 CM
TDI LATERAL: 0.08 M/S
TDI SEPTAL: 0.08 M/S
TDI: 0.08 M/S
TRICUSPID ANNULAR PLANE SYSTOLIC EXCURSION: 2.57 CM

## 2021-03-18 PROCEDURE — 93306 ECHO (CUPID ONLY): ICD-10-PCS | Mod: 26,,, | Performed by: INTERNAL MEDICINE

## 2021-03-18 PROCEDURE — 93306 TTE W/DOPPLER COMPLETE: CPT | Mod: PO

## 2021-03-18 PROCEDURE — 93306 TTE W/DOPPLER COMPLETE: CPT | Mod: 26,,, | Performed by: INTERNAL MEDICINE

## 2021-03-19 ENCOUNTER — OFFICE VISIT (OUTPATIENT)
Dept: CARDIOLOGY | Facility: CLINIC | Age: 71
End: 2021-03-19
Payer: MEDICARE

## 2021-03-19 VITALS
BODY MASS INDEX: 26.96 KG/M2 | WEIGHT: 198.81 LBS | SYSTOLIC BLOOD PRESSURE: 132 MMHG | OXYGEN SATURATION: 98 % | HEART RATE: 99 BPM | DIASTOLIC BLOOD PRESSURE: 78 MMHG

## 2021-03-19 DIAGNOSIS — I35.1 MODERATE AORTIC REGURGITATION: ICD-10-CM

## 2021-03-19 DIAGNOSIS — E78.00 PURE HYPERCHOLESTEROLEMIA: ICD-10-CM

## 2021-03-19 DIAGNOSIS — I25.119 ATHEROSCLEROSIS OF NATIVE CORONARY ARTERY OF NATIVE HEART WITH ANGINA PECTORIS: ICD-10-CM

## 2021-03-19 DIAGNOSIS — I25.10 ATHEROSCLEROSIS OF NATIVE CORONARY ARTERY OF NATIVE HEART WITHOUT ANGINA PECTORIS: Primary | ICD-10-CM

## 2021-03-19 DIAGNOSIS — I10 ESSENTIAL HYPERTENSION: ICD-10-CM

## 2021-03-19 DIAGNOSIS — I1A.0 RESISTANT HYPERTENSION: ICD-10-CM

## 2021-03-19 DIAGNOSIS — J43.8 OTHER EMPHYSEMA: ICD-10-CM

## 2021-03-19 DIAGNOSIS — I35.0 MODERATE AORTIC STENOSIS: ICD-10-CM

## 2021-03-19 DIAGNOSIS — D50.0 IRON DEFICIENCY ANEMIA DUE TO CHRONIC BLOOD LOSS: ICD-10-CM

## 2021-03-19 DIAGNOSIS — R06.09 DYSPNEA ON EXERTION: ICD-10-CM

## 2021-03-19 PROCEDURE — 1159F MED LIST DOCD IN RCRD: CPT | Mod: S$GLB,,, | Performed by: INTERNAL MEDICINE

## 2021-03-19 PROCEDURE — 3078F PR MOST RECENT DIASTOLIC BLOOD PRESSURE < 80 MM HG: ICD-10-PCS | Mod: CPTII,S$GLB,, | Performed by: INTERNAL MEDICINE

## 2021-03-19 PROCEDURE — 99999 PR PBB SHADOW E&M-EST. PATIENT-LVL V: ICD-10-PCS | Mod: PBBFAC,,, | Performed by: INTERNAL MEDICINE

## 2021-03-19 PROCEDURE — 99499 UNLISTED E&M SERVICE: CPT | Mod: S$GLB,,, | Performed by: INTERNAL MEDICINE

## 2021-03-19 PROCEDURE — 3008F BODY MASS INDEX DOCD: CPT | Mod: CPTII,S$GLB,, | Performed by: INTERNAL MEDICINE

## 2021-03-19 PROCEDURE — 3078F DIAST BP <80 MM HG: CPT | Mod: CPTII,S$GLB,, | Performed by: INTERNAL MEDICINE

## 2021-03-19 PROCEDURE — 1159F PR MEDICATION LIST DOCUMENTED IN MEDICAL RECORD: ICD-10-PCS | Mod: S$GLB,,, | Performed by: INTERNAL MEDICINE

## 2021-03-19 PROCEDURE — 3075F PR MOST RECENT SYSTOLIC BLOOD PRESS GE 130-139MM HG: ICD-10-PCS | Mod: CPTII,S$GLB,, | Performed by: INTERNAL MEDICINE

## 2021-03-19 PROCEDURE — 99205 PR OFFICE/OUTPT VISIT, NEW, LEVL V, 60-74 MIN: ICD-10-PCS | Mod: S$GLB,,, | Performed by: INTERNAL MEDICINE

## 2021-03-19 PROCEDURE — 99499 RISK ADDL DX/OHS AUDIT: ICD-10-PCS | Mod: S$GLB,,, | Performed by: INTERNAL MEDICINE

## 2021-03-19 PROCEDURE — 99999 PR PBB SHADOW E&M-EST. PATIENT-LVL V: CPT | Mod: PBBFAC,,, | Performed by: INTERNAL MEDICINE

## 2021-03-19 PROCEDURE — 3075F SYST BP GE 130 - 139MM HG: CPT | Mod: CPTII,S$GLB,, | Performed by: INTERNAL MEDICINE

## 2021-03-19 PROCEDURE — 99205 OFFICE O/P NEW HI 60 MIN: CPT | Mod: S$GLB,,, | Performed by: INTERNAL MEDICINE

## 2021-03-19 PROCEDURE — 3008F PR BODY MASS INDEX (BMI) DOCUMENTED: ICD-10-PCS | Mod: CPTII,S$GLB,, | Performed by: INTERNAL MEDICINE

## 2021-04-21 DIAGNOSIS — N52.8 OTHER MALE ERECTILE DYSFUNCTION: ICD-10-CM

## 2021-04-21 RX ORDER — SILDENAFIL 100 MG/1
100 TABLET, FILM COATED ORAL
Qty: 8 TABLET | Refills: 5 | Status: SHIPPED | OUTPATIENT
Start: 2021-04-21 | End: 2022-08-31 | Stop reason: SDUPTHER

## 2021-04-21 RX ORDER — SILDENAFIL 100 MG/1
100 TABLET, FILM COATED ORAL
Qty: 8 TABLET | Refills: 5 | Status: CANCELLED | OUTPATIENT
Start: 2021-04-21

## 2021-05-04 DIAGNOSIS — D72.829 LEUKOCYTOSIS, UNSPECIFIED TYPE: ICD-10-CM

## 2021-05-04 DIAGNOSIS — M05.9 SEROPOSITIVE RHEUMATOID ARTHRITIS: ICD-10-CM

## 2021-05-04 DIAGNOSIS — G56.00 CARPAL TUNNEL SYNDROME, UNSPECIFIED LATERALITY: ICD-10-CM

## 2021-05-04 DIAGNOSIS — G89.4 CHRONIC PAIN SYNDROME: ICD-10-CM

## 2021-05-04 DIAGNOSIS — D64.9 ANEMIA, UNSPECIFIED TYPE: ICD-10-CM

## 2021-05-04 DIAGNOSIS — M17.9 OSTEOARTHRITIS OF KNEE, UNSPECIFIED LATERALITY, UNSPECIFIED OSTEOARTHRITIS TYPE: ICD-10-CM

## 2021-05-07 ENCOUNTER — OFFICE VISIT (OUTPATIENT)
Dept: FAMILY MEDICINE | Facility: CLINIC | Age: 71
End: 2021-05-07
Payer: MEDICARE

## 2021-05-07 VITALS
BODY MASS INDEX: 25.83 KG/M2 | WEIGHT: 190.5 LBS | SYSTOLIC BLOOD PRESSURE: 135 MMHG | OXYGEN SATURATION: 97 % | HEART RATE: 78 BPM | DIASTOLIC BLOOD PRESSURE: 80 MMHG

## 2021-05-07 DIAGNOSIS — K13.70 ORAL LESION: ICD-10-CM

## 2021-05-07 DIAGNOSIS — I1A.0 RESISTANT HYPERTENSION: ICD-10-CM

## 2021-05-07 DIAGNOSIS — F51.04 CHRONIC INSOMNIA: Primary | ICD-10-CM

## 2021-05-07 PROCEDURE — 99999 PR PBB SHADOW E&M-EST. PATIENT-LVL V: CPT | Mod: PBBFAC,,, | Performed by: INTERNAL MEDICINE

## 2021-05-07 PROCEDURE — 99999 PR PBB SHADOW E&M-EST. PATIENT-LVL V: ICD-10-PCS | Mod: PBBFAC,,, | Performed by: INTERNAL MEDICINE

## 2021-05-07 PROCEDURE — 3008F PR BODY MASS INDEX (BMI) DOCUMENTED: ICD-10-PCS | Mod: CPTII,S$GLB,, | Performed by: INTERNAL MEDICINE

## 2021-05-07 PROCEDURE — 1125F AMNT PAIN NOTED PAIN PRSNT: CPT | Mod: S$GLB,,, | Performed by: INTERNAL MEDICINE

## 2021-05-07 PROCEDURE — 99499 RISK ADDL DX/OHS AUDIT: ICD-10-PCS | Mod: S$GLB,,, | Performed by: INTERNAL MEDICINE

## 2021-05-07 PROCEDURE — 1159F MED LIST DOCD IN RCRD: CPT | Mod: S$GLB,,, | Performed by: INTERNAL MEDICINE

## 2021-05-07 PROCEDURE — 1159F PR MEDICATION LIST DOCUMENTED IN MEDICAL RECORD: ICD-10-PCS | Mod: S$GLB,,, | Performed by: INTERNAL MEDICINE

## 2021-05-07 PROCEDURE — 1125F PR PAIN SEVERITY QUANTIFIED, PAIN PRESENT: ICD-10-PCS | Mod: S$GLB,,, | Performed by: INTERNAL MEDICINE

## 2021-05-07 PROCEDURE — 99214 OFFICE O/P EST MOD 30 MIN: CPT | Mod: S$GLB,,, | Performed by: INTERNAL MEDICINE

## 2021-05-07 PROCEDURE — 99499 UNLISTED E&M SERVICE: CPT | Mod: S$GLB,,, | Performed by: INTERNAL MEDICINE

## 2021-05-07 PROCEDURE — 3008F BODY MASS INDEX DOCD: CPT | Mod: CPTII,S$GLB,, | Performed by: INTERNAL MEDICINE

## 2021-05-07 PROCEDURE — 99214 PR OFFICE/OUTPT VISIT, EST, LEVL IV, 30-39 MIN: ICD-10-PCS | Mod: S$GLB,,, | Performed by: INTERNAL MEDICINE

## 2021-05-07 RX ORDER — HYDROCODONE BITARTRATE AND ACETAMINOPHEN 10; 325 MG/1; MG/1
1 TABLET ORAL EVERY 8 HOURS PRN
Qty: 90 TABLET | Refills: 0 | Status: SHIPPED | OUTPATIENT
Start: 2021-05-07 | End: 2021-06-07 | Stop reason: SDUPTHER

## 2021-05-07 RX ORDER — TRAZODONE HYDROCHLORIDE 50 MG/1
TABLET ORAL
Qty: 60 TABLET | Refills: 5 | Status: SHIPPED | OUTPATIENT
Start: 2021-05-07 | End: 2021-06-08

## 2021-05-12 ENCOUNTER — PATIENT OUTREACH (OUTPATIENT)
Dept: ADMINISTRATIVE | Facility: OTHER | Age: 71
End: 2021-05-12

## 2021-05-13 ENCOUNTER — OFFICE VISIT (OUTPATIENT)
Dept: RHEUMATOLOGY | Facility: CLINIC | Age: 71
End: 2021-05-13
Payer: MEDICARE

## 2021-05-13 DIAGNOSIS — M05.9 SEROPOSITIVE RHEUMATOID ARTHRITIS: ICD-10-CM

## 2021-05-13 DIAGNOSIS — M17.9 OSTEOARTHRITIS OF KNEE, UNSPECIFIED LATERALITY, UNSPECIFIED OSTEOARTHRITIS TYPE: ICD-10-CM

## 2021-05-13 DIAGNOSIS — Z79.52 CURRENT CHRONIC USE OF SYSTEMIC STEROIDS: ICD-10-CM

## 2021-05-13 DIAGNOSIS — G89.4 CHRONIC PAIN SYNDROME: ICD-10-CM

## 2021-05-13 PROCEDURE — 99499 RISK ADDL DX/OHS AUDIT: ICD-10-PCS | Mod: 95,,, | Performed by: PHYSICIAN ASSISTANT

## 2021-05-13 PROCEDURE — 1159F MED LIST DOCD IN RCRD: CPT | Mod: 95,,, | Performed by: PHYSICIAN ASSISTANT

## 2021-05-13 PROCEDURE — 99214 PR OFFICE/OUTPT VISIT, EST, LEVL IV, 30-39 MIN: ICD-10-PCS | Mod: 95,,, | Performed by: PHYSICIAN ASSISTANT

## 2021-05-13 PROCEDURE — 99499 UNLISTED E&M SERVICE: CPT | Mod: 95,,, | Performed by: PHYSICIAN ASSISTANT

## 2021-05-13 PROCEDURE — 1159F PR MEDICATION LIST DOCUMENTED IN MEDICAL RECORD: ICD-10-PCS | Mod: 95,,, | Performed by: PHYSICIAN ASSISTANT

## 2021-05-13 PROCEDURE — 99214 OFFICE O/P EST MOD 30 MIN: CPT | Mod: 95,,, | Performed by: PHYSICIAN ASSISTANT

## 2021-05-13 RX ORDER — PREDNISONE 5 MG/1
TABLET ORAL
Qty: 180 TABLET | Refills: 1 | Status: SHIPPED | OUTPATIENT
Start: 2021-05-13 | End: 2021-08-12

## 2021-05-13 RX ORDER — DICLOFENAC SODIUM 10 MG/G
2 GEL TOPICAL 4 TIMES DAILY
Qty: 100 G | Refills: 5 | Status: SHIPPED | OUTPATIENT
Start: 2021-05-13 | End: 2023-03-06 | Stop reason: SDUPTHER

## 2021-05-13 RX ORDER — TIZANIDINE 4 MG/1
4 TABLET ORAL EVERY 8 HOURS
Qty: 270 TABLET | Refills: 1 | Status: SHIPPED | OUTPATIENT
Start: 2021-05-13 | End: 2022-02-11

## 2021-05-18 ENCOUNTER — CLINICAL SUPPORT (OUTPATIENT)
Dept: RHEUMATOLOGY | Facility: CLINIC | Age: 71
End: 2021-05-18
Payer: MEDICARE

## 2021-05-18 ENCOUNTER — TELEPHONE (OUTPATIENT)
Dept: RHEUMATOLOGY | Facility: CLINIC | Age: 71
End: 2021-05-18

## 2021-05-18 VITALS — DIASTOLIC BLOOD PRESSURE: 85 MMHG | SYSTOLIC BLOOD PRESSURE: 172 MMHG | HEART RATE: 82 BPM

## 2021-05-18 DIAGNOSIS — M05.9 SEROPOSITIVE RHEUMATOID ARTHRITIS: Primary | ICD-10-CM

## 2021-05-18 DIAGNOSIS — M05.741 RHEUMATOID ARTHRITIS INVOLVING BOTH HANDS WITH POSITIVE RHEUMATOID FACTOR: ICD-10-CM

## 2021-05-18 DIAGNOSIS — M05.742 RHEUMATOID ARTHRITIS INVOLVING BOTH HANDS WITH POSITIVE RHEUMATOID FACTOR: ICD-10-CM

## 2021-05-18 DIAGNOSIS — G89.4 CHRONIC PAIN SYNDROME: ICD-10-CM

## 2021-05-18 PROCEDURE — 99999 PR PBB SHADOW E&M-EST. PATIENT-LVL III: ICD-10-PCS | Mod: PBBFAC,,,

## 2021-05-18 PROCEDURE — 96372 THER/PROPH/DIAG INJ SC/IM: CPT | Mod: S$GLB,,, | Performed by: PHYSICIAN ASSISTANT

## 2021-05-18 PROCEDURE — 96372 PR INJECTION,THERAP/PROPH/DIAG2ST, IM OR SUBCUT: ICD-10-PCS | Mod: S$GLB,,, | Performed by: PHYSICIAN ASSISTANT

## 2021-05-18 PROCEDURE — 99999 PR PBB SHADOW E&M-EST. PATIENT-LVL III: CPT | Mod: PBBFAC,,,

## 2021-05-18 RX ORDER — KETOROLAC TROMETHAMINE 30 MG/ML
60 INJECTION, SOLUTION INTRAMUSCULAR; INTRAVENOUS
Status: COMPLETED | OUTPATIENT
Start: 2021-05-18 | End: 2021-05-18

## 2021-05-18 RX ORDER — CYANOCOBALAMIN 1000 UG/ML
1000 INJECTION, SOLUTION INTRAMUSCULAR; SUBCUTANEOUS
Status: COMPLETED | OUTPATIENT
Start: 2021-05-18 | End: 2021-05-18

## 2021-05-18 RX ORDER — METHYLPREDNISOLONE ACETATE 80 MG/ML
160 INJECTION, SUSPENSION INTRA-ARTICULAR; INTRALESIONAL; INTRAMUSCULAR; SOFT TISSUE
Status: COMPLETED | OUTPATIENT
Start: 2021-05-18 | End: 2021-05-18

## 2021-05-18 RX ADMIN — KETOROLAC TROMETHAMINE 60 MG: 30 INJECTION, SOLUTION INTRAMUSCULAR; INTRAVENOUS at 02:05

## 2021-05-18 RX ADMIN — METHYLPREDNISOLONE ACETATE 160 MG: 80 INJECTION, SUSPENSION INTRA-ARTICULAR; INTRALESIONAL; INTRAMUSCULAR; SOFT TISSUE at 02:05

## 2021-05-18 RX ADMIN — CYANOCOBALAMIN 1000 MCG: 1000 INJECTION, SOLUTION INTRAMUSCULAR; SUBCUTANEOUS at 02:05

## 2021-06-08 ENCOUNTER — OFFICE VISIT (OUTPATIENT)
Dept: FAMILY MEDICINE | Facility: CLINIC | Age: 71
End: 2021-06-08
Payer: MEDICARE

## 2021-06-08 VITALS
WEIGHT: 195.75 LBS | RESPIRATION RATE: 18 BRPM | HEART RATE: 87 BPM | TEMPERATURE: 98 F | BODY MASS INDEX: 26.55 KG/M2 | SYSTOLIC BLOOD PRESSURE: 138 MMHG | DIASTOLIC BLOOD PRESSURE: 78 MMHG | OXYGEN SATURATION: 97 %

## 2021-06-08 DIAGNOSIS — F41.9 ANXIETY: ICD-10-CM

## 2021-06-08 DIAGNOSIS — G47.00 INSOMNIA, UNSPECIFIED TYPE: Primary | ICD-10-CM

## 2021-06-08 DIAGNOSIS — I1A.0 RESISTANT HYPERTENSION: ICD-10-CM

## 2021-06-08 PROCEDURE — 1159F PR MEDICATION LIST DOCUMENTED IN MEDICAL RECORD: ICD-10-PCS | Mod: S$GLB,,, | Performed by: INTERNAL MEDICINE

## 2021-06-08 PROCEDURE — 3008F BODY MASS INDEX DOCD: CPT | Mod: CPTII,S$GLB,, | Performed by: INTERNAL MEDICINE

## 2021-06-08 PROCEDURE — 99214 PR OFFICE/OUTPT VISIT, EST, LEVL IV, 30-39 MIN: ICD-10-PCS | Mod: S$GLB,,, | Performed by: INTERNAL MEDICINE

## 2021-06-08 PROCEDURE — 1159F MED LIST DOCD IN RCRD: CPT | Mod: S$GLB,,, | Performed by: INTERNAL MEDICINE

## 2021-06-08 PROCEDURE — 99999 PR PBB SHADOW E&M-EST. PATIENT-LVL V: ICD-10-PCS | Mod: PBBFAC,,, | Performed by: INTERNAL MEDICINE

## 2021-06-08 PROCEDURE — 3008F PR BODY MASS INDEX (BMI) DOCUMENTED: ICD-10-PCS | Mod: CPTII,S$GLB,, | Performed by: INTERNAL MEDICINE

## 2021-06-08 PROCEDURE — 99214 OFFICE O/P EST MOD 30 MIN: CPT | Mod: S$GLB,,, | Performed by: INTERNAL MEDICINE

## 2021-06-08 PROCEDURE — 99999 PR PBB SHADOW E&M-EST. PATIENT-LVL V: CPT | Mod: PBBFAC,,, | Performed by: INTERNAL MEDICINE

## 2021-06-08 PROCEDURE — 1125F AMNT PAIN NOTED PAIN PRSNT: CPT | Mod: S$GLB,,, | Performed by: INTERNAL MEDICINE

## 2021-06-08 PROCEDURE — 1125F PR PAIN SEVERITY QUANTIFIED, PAIN PRESENT: ICD-10-PCS | Mod: S$GLB,,, | Performed by: INTERNAL MEDICINE

## 2021-06-08 RX ORDER — MIRTAZAPINE 7.5 MG/1
7.5 TABLET, FILM COATED ORAL NIGHTLY
Qty: 30 TABLET | Refills: 11 | Status: SHIPPED | OUTPATIENT
Start: 2021-06-08 | End: 2022-02-14

## 2021-06-08 RX ORDER — DORZOLAMIDE HCL 20 MG/ML
1 SOLUTION/ DROPS OPHTHALMIC 3 TIMES DAILY
COMMUNITY
Start: 2020-12-14

## 2021-06-25 ENCOUNTER — OFFICE VISIT (OUTPATIENT)
Dept: CARDIOLOGY | Facility: CLINIC | Age: 71
End: 2021-06-25
Payer: MEDICARE

## 2021-06-25 VITALS
HEIGHT: 72 IN | BODY MASS INDEX: 25.78 KG/M2 | WEIGHT: 190.38 LBS | SYSTOLIC BLOOD PRESSURE: 168 MMHG | HEART RATE: 101 BPM | OXYGEN SATURATION: 98 % | DIASTOLIC BLOOD PRESSURE: 92 MMHG

## 2021-06-25 DIAGNOSIS — R06.09 DYSPNEA ON EXERTION: ICD-10-CM

## 2021-06-25 DIAGNOSIS — I10 ESSENTIAL HYPERTENSION: ICD-10-CM

## 2021-06-25 DIAGNOSIS — D69.6 THROMBOCYTOPENIA: ICD-10-CM

## 2021-06-25 DIAGNOSIS — D50.0 IRON DEFICIENCY ANEMIA DUE TO CHRONIC BLOOD LOSS: ICD-10-CM

## 2021-06-25 DIAGNOSIS — I35.1 MODERATE AORTIC REGURGITATION: Primary | ICD-10-CM

## 2021-06-25 DIAGNOSIS — J43.8 OTHER EMPHYSEMA: ICD-10-CM

## 2021-06-25 DIAGNOSIS — R01.1 SYSTOLIC MURMUR: ICD-10-CM

## 2021-06-25 DIAGNOSIS — I25.10 ATHEROSCLEROSIS OF NATIVE CORONARY ARTERY OF NATIVE HEART WITHOUT ANGINA PECTORIS: ICD-10-CM

## 2021-06-25 DIAGNOSIS — I35.0 MODERATE AORTIC STENOSIS: ICD-10-CM

## 2021-06-25 DIAGNOSIS — E78.00 PURE HYPERCHOLESTEROLEMIA: ICD-10-CM

## 2021-06-25 DIAGNOSIS — I1A.0 RESISTANT HYPERTENSION: ICD-10-CM

## 2021-06-25 DIAGNOSIS — I25.119 ATHEROSCLEROSIS OF NATIVE CORONARY ARTERY OF NATIVE HEART WITH ANGINA PECTORIS: ICD-10-CM

## 2021-06-25 PROCEDURE — 99999 PR PBB SHADOW E&M-EST. PATIENT-LVL IV: ICD-10-PCS | Mod: PBBFAC,,, | Performed by: INTERNAL MEDICINE

## 2021-06-25 PROCEDURE — 3008F BODY MASS INDEX DOCD: CPT | Mod: CPTII,S$GLB,, | Performed by: INTERNAL MEDICINE

## 2021-06-25 PROCEDURE — 1159F PR MEDICATION LIST DOCUMENTED IN MEDICAL RECORD: ICD-10-PCS | Mod: S$GLB,,, | Performed by: INTERNAL MEDICINE

## 2021-06-25 PROCEDURE — 99999 PR PBB SHADOW E&M-EST. PATIENT-LVL IV: CPT | Mod: PBBFAC,,, | Performed by: INTERNAL MEDICINE

## 2021-06-25 PROCEDURE — 99214 PR OFFICE/OUTPT VISIT, EST, LEVL IV, 30-39 MIN: ICD-10-PCS | Mod: S$GLB,,, | Performed by: INTERNAL MEDICINE

## 2021-06-25 PROCEDURE — 1159F MED LIST DOCD IN RCRD: CPT | Mod: S$GLB,,, | Performed by: INTERNAL MEDICINE

## 2021-06-25 PROCEDURE — 3008F PR BODY MASS INDEX (BMI) DOCUMENTED: ICD-10-PCS | Mod: CPTII,S$GLB,, | Performed by: INTERNAL MEDICINE

## 2021-06-25 PROCEDURE — 99214 OFFICE O/P EST MOD 30 MIN: CPT | Mod: S$GLB,,, | Performed by: INTERNAL MEDICINE

## 2021-06-25 RX ORDER — LATANOPROSTENE BUNOD 0.24 MG/ML
SOLUTION/ DROPS OPHTHALMIC
COMMUNITY
Start: 2021-04-18 | End: 2021-08-06 | Stop reason: ALTCHOICE

## 2021-06-25 RX ORDER — HYDRALAZINE HYDROCHLORIDE 50 MG/1
50 TABLET, FILM COATED ORAL EVERY 8 HOURS
Qty: 270 TABLET | Refills: 3 | Status: SHIPPED | OUTPATIENT
Start: 2021-06-25 | End: 2021-09-17 | Stop reason: SDUPTHER

## 2021-06-28 ENCOUNTER — TELEPHONE (OUTPATIENT)
Dept: CARDIOLOGY | Facility: CLINIC | Age: 71
End: 2021-06-28

## 2021-06-28 DIAGNOSIS — I1A.0 RESISTANT HYPERTENSION: Primary | ICD-10-CM

## 2021-06-28 RX ORDER — DILTIAZEM HYDROCHLORIDE 180 MG/1
180 CAPSULE, EXTENDED RELEASE ORAL DAILY
Qty: 30 CAPSULE | Refills: 11 | Status: SHIPPED | OUTPATIENT
Start: 2021-06-28 | End: 2022-06-10 | Stop reason: SDUPTHER

## 2021-07-06 DIAGNOSIS — G89.4 CHRONIC PAIN SYNDROME: ICD-10-CM

## 2021-07-06 DIAGNOSIS — M17.9 OSTEOARTHRITIS OF KNEE, UNSPECIFIED LATERALITY, UNSPECIFIED OSTEOARTHRITIS TYPE: ICD-10-CM

## 2021-07-06 DIAGNOSIS — D64.9 ANEMIA, UNSPECIFIED TYPE: ICD-10-CM

## 2021-07-06 DIAGNOSIS — D72.829 LEUKOCYTOSIS, UNSPECIFIED TYPE: ICD-10-CM

## 2021-07-06 DIAGNOSIS — M05.9 SEROPOSITIVE RHEUMATOID ARTHRITIS: ICD-10-CM

## 2021-07-06 DIAGNOSIS — G56.00 CARPAL TUNNEL SYNDROME, UNSPECIFIED LATERALITY: ICD-10-CM

## 2021-07-06 RX ORDER — HYDROCODONE BITARTRATE AND ACETAMINOPHEN 10; 325 MG/1; MG/1
1 TABLET ORAL EVERY 8 HOURS PRN
Qty: 90 TABLET | Refills: 0 | Status: SHIPPED | OUTPATIENT
Start: 2021-07-06 | End: 2021-08-04 | Stop reason: SDUPTHER

## 2021-07-07 ENCOUNTER — TELEPHONE (OUTPATIENT)
Dept: FAMILY MEDICINE | Facility: CLINIC | Age: 71
End: 2021-07-07

## 2021-07-08 ENCOUNTER — CLINICAL SUPPORT (OUTPATIENT)
Dept: FAMILY MEDICINE | Facility: CLINIC | Age: 71
End: 2021-07-08
Payer: MEDICARE

## 2021-07-08 ENCOUNTER — TELEPHONE (OUTPATIENT)
Dept: FAMILY MEDICINE | Facility: CLINIC | Age: 71
End: 2021-07-08

## 2021-07-08 ENCOUNTER — OFFICE VISIT (OUTPATIENT)
Dept: FAMILY MEDICINE | Facility: CLINIC | Age: 71
End: 2021-07-08
Payer: MEDICARE

## 2021-07-08 VITALS — DIASTOLIC BLOOD PRESSURE: 78 MMHG | OXYGEN SATURATION: 96 % | SYSTOLIC BLOOD PRESSURE: 134 MMHG | HEART RATE: 91 BPM

## 2021-07-08 VITALS — SYSTOLIC BLOOD PRESSURE: 145 MMHG | DIASTOLIC BLOOD PRESSURE: 95 MMHG

## 2021-07-08 DIAGNOSIS — Z01.30 BP CHECK: Primary | ICD-10-CM

## 2021-07-08 DIAGNOSIS — R00.0 TACHYCARDIA: ICD-10-CM

## 2021-07-08 DIAGNOSIS — F41.9 ANXIETY: ICD-10-CM

## 2021-07-08 DIAGNOSIS — M06.9 RHEUMATOID ARTHRITIS, INVOLVING UNSPECIFIED SITE, UNSPECIFIED WHETHER RHEUMATOID FACTOR PRESENT: ICD-10-CM

## 2021-07-08 DIAGNOSIS — I10 HYPERTENSION, UNSPECIFIED TYPE: Primary | ICD-10-CM

## 2021-07-08 DIAGNOSIS — Z87.39 HISTORY OF GOUT: ICD-10-CM

## 2021-07-08 PROCEDURE — 99499 UNLISTED E&M SERVICE: CPT | Mod: S$GLB,,, | Performed by: INTERNAL MEDICINE

## 2021-07-08 PROCEDURE — 99499 NO LOS: ICD-10-PCS | Mod: S$GLB,,, | Performed by: INTERNAL MEDICINE

## 2021-07-08 PROCEDURE — 3077F PR MOST RECENT SYSTOLIC BLOOD PRESSURE >= 140 MM HG: ICD-10-PCS | Mod: CPTII,95,, | Performed by: INTERNAL MEDICINE

## 2021-07-08 PROCEDURE — 99499 RISK ADDL DX/OHS AUDIT: ICD-10-PCS | Mod: 95,,, | Performed by: INTERNAL MEDICINE

## 2021-07-08 PROCEDURE — 99213 PR OFFICE/OUTPT VISIT, EST, LEVL III, 20-29 MIN: ICD-10-PCS | Mod: 95,,, | Performed by: INTERNAL MEDICINE

## 2021-07-08 PROCEDURE — 3077F SYST BP >= 140 MM HG: CPT | Mod: CPTII,95,, | Performed by: INTERNAL MEDICINE

## 2021-07-08 PROCEDURE — 99999 PR PBB SHADOW E&M-EST. PATIENT-LVL II: ICD-10-PCS | Mod: PBBFAC,,,

## 2021-07-08 PROCEDURE — 99499 UNLISTED E&M SERVICE: CPT | Mod: 95,,, | Performed by: INTERNAL MEDICINE

## 2021-07-08 PROCEDURE — 99999 PR PBB SHADOW E&M-EST. PATIENT-LVL II: CPT | Mod: PBBFAC,,,

## 2021-07-08 PROCEDURE — 3080F DIAST BP >= 90 MM HG: CPT | Mod: CPTII,95,, | Performed by: INTERNAL MEDICINE

## 2021-07-08 PROCEDURE — 1159F MED LIST DOCD IN RCRD: CPT | Mod: 95,,, | Performed by: INTERNAL MEDICINE

## 2021-07-08 PROCEDURE — 3080F PR MOST RECENT DIASTOLIC BLOOD PRESSURE >= 90 MM HG: ICD-10-PCS | Mod: CPTII,95,, | Performed by: INTERNAL MEDICINE

## 2021-07-08 PROCEDURE — 99213 OFFICE O/P EST LOW 20 MIN: CPT | Mod: 95,,, | Performed by: INTERNAL MEDICINE

## 2021-07-08 PROCEDURE — 1159F PR MEDICATION LIST DOCUMENTED IN MEDICAL RECORD: ICD-10-PCS | Mod: 95,,, | Performed by: INTERNAL MEDICINE

## 2021-07-08 RX ORDER — AMLODIPINE BESYLATE 10 MG/1
10 TABLET ORAL DAILY
COMMUNITY
End: 2021-07-15

## 2021-07-15 ENCOUNTER — OFFICE VISIT (OUTPATIENT)
Dept: FAMILY MEDICINE | Facility: CLINIC | Age: 71
End: 2021-07-15
Payer: MEDICARE

## 2021-07-15 ENCOUNTER — LAB VISIT (OUTPATIENT)
Dept: LAB | Facility: HOSPITAL | Age: 71
End: 2021-07-15
Attending: INTERNAL MEDICINE
Payer: MEDICARE

## 2021-07-15 VITALS
HEIGHT: 72 IN | WEIGHT: 189.81 LBS | SYSTOLIC BLOOD PRESSURE: 138 MMHG | HEART RATE: 90 BPM | BODY MASS INDEX: 25.71 KG/M2 | OXYGEN SATURATION: 99 % | DIASTOLIC BLOOD PRESSURE: 70 MMHG | TEMPERATURE: 98 F

## 2021-07-15 DIAGNOSIS — I1A.0 RESISTANT HYPERTENSION: Primary | ICD-10-CM

## 2021-07-15 DIAGNOSIS — K40.90 RIGHT GROIN HERNIA: ICD-10-CM

## 2021-07-15 DIAGNOSIS — I1A.0 RESISTANT HYPERTENSION: ICD-10-CM

## 2021-07-15 DIAGNOSIS — R10.31 RIGHT GROIN PAIN: ICD-10-CM

## 2021-07-15 PROBLEM — H40.89 OTHER SPECIFIED GLAUCOMA: Status: ACTIVE | Noted: 2021-07-15

## 2021-07-15 PROCEDURE — 84443 ASSAY THYROID STIM HORMONE: CPT | Performed by: INTERNAL MEDICINE

## 2021-07-15 PROCEDURE — 1101F PT FALLS ASSESS-DOCD LE1/YR: CPT | Mod: CPTII,S$GLB,, | Performed by: INTERNAL MEDICINE

## 2021-07-15 PROCEDURE — 36415 COLL VENOUS BLD VENIPUNCTURE: CPT | Mod: PO | Performed by: INTERNAL MEDICINE

## 2021-07-15 PROCEDURE — 3008F PR BODY MASS INDEX (BMI) DOCUMENTED: ICD-10-PCS | Mod: CPTII,S$GLB,, | Performed by: INTERNAL MEDICINE

## 2021-07-15 PROCEDURE — 1101F PR PT FALLS ASSESS DOC 0-1 FALLS W/OUT INJ PAST YR: ICD-10-PCS | Mod: CPTII,S$GLB,, | Performed by: INTERNAL MEDICINE

## 2021-07-15 PROCEDURE — 3078F DIAST BP <80 MM HG: CPT | Mod: CPTII,S$GLB,, | Performed by: INTERNAL MEDICINE

## 2021-07-15 PROCEDURE — 82088 ASSAY OF ALDOSTERONE: CPT | Performed by: INTERNAL MEDICINE

## 2021-07-15 PROCEDURE — 3288F FALL RISK ASSESSMENT DOCD: CPT | Mod: CPTII,S$GLB,, | Performed by: INTERNAL MEDICINE

## 2021-07-15 PROCEDURE — 80053 COMPREHEN METABOLIC PANEL: CPT | Performed by: INTERNAL MEDICINE

## 2021-07-15 PROCEDURE — 99214 OFFICE O/P EST MOD 30 MIN: CPT | Mod: S$GLB,,, | Performed by: INTERNAL MEDICINE

## 2021-07-15 PROCEDURE — 3288F PR FALLS RISK ASSESSMENT DOCUMENTED: ICD-10-PCS | Mod: CPTII,S$GLB,, | Performed by: INTERNAL MEDICINE

## 2021-07-15 PROCEDURE — 1125F AMNT PAIN NOTED PAIN PRSNT: CPT | Mod: S$GLB,,, | Performed by: INTERNAL MEDICINE

## 2021-07-15 PROCEDURE — 3078F PR MOST RECENT DIASTOLIC BLOOD PRESSURE < 80 MM HG: ICD-10-PCS | Mod: CPTII,S$GLB,, | Performed by: INTERNAL MEDICINE

## 2021-07-15 PROCEDURE — 99214 PR OFFICE/OUTPT VISIT, EST, LEVL IV, 30-39 MIN: ICD-10-PCS | Mod: S$GLB,,, | Performed by: INTERNAL MEDICINE

## 2021-07-15 PROCEDURE — 99999 PR PBB SHADOW E&M-EST. PATIENT-LVL V: ICD-10-PCS | Mod: PBBFAC,,, | Performed by: INTERNAL MEDICINE

## 2021-07-15 PROCEDURE — 1125F PR PAIN SEVERITY QUANTIFIED, PAIN PRESENT: ICD-10-PCS | Mod: S$GLB,,, | Performed by: INTERNAL MEDICINE

## 2021-07-15 PROCEDURE — 3075F PR MOST RECENT SYSTOLIC BLOOD PRESS GE 130-139MM HG: ICD-10-PCS | Mod: CPTII,S$GLB,, | Performed by: INTERNAL MEDICINE

## 2021-07-15 PROCEDURE — 3075F SYST BP GE 130 - 139MM HG: CPT | Mod: CPTII,S$GLB,, | Performed by: INTERNAL MEDICINE

## 2021-07-15 PROCEDURE — 1159F MED LIST DOCD IN RCRD: CPT | Mod: S$GLB,,, | Performed by: INTERNAL MEDICINE

## 2021-07-15 PROCEDURE — 99999 PR PBB SHADOW E&M-EST. PATIENT-LVL V: CPT | Mod: PBBFAC,,, | Performed by: INTERNAL MEDICINE

## 2021-07-15 PROCEDURE — 1159F PR MEDICATION LIST DOCUMENTED IN MEDICAL RECORD: ICD-10-PCS | Mod: S$GLB,,, | Performed by: INTERNAL MEDICINE

## 2021-07-15 PROCEDURE — 3008F BODY MASS INDEX DOCD: CPT | Mod: CPTII,S$GLB,, | Performed by: INTERNAL MEDICINE

## 2021-07-16 LAB
ALBUMIN SERPL BCP-MCNC: 4.1 G/DL (ref 3.5–5.2)
ALP SERPL-CCNC: 54 U/L (ref 55–135)
ALT SERPL W/O P-5'-P-CCNC: 12 U/L (ref 10–44)
ANION GAP SERPL CALC-SCNC: 13 MMOL/L (ref 8–16)
AST SERPL-CCNC: 21 U/L (ref 10–40)
BILIRUB SERPL-MCNC: 0.4 MG/DL (ref 0.1–1)
BUN SERPL-MCNC: 13 MG/DL (ref 8–23)
CALCIUM SERPL-MCNC: 9.5 MG/DL (ref 8.7–10.5)
CHLORIDE SERPL-SCNC: 103 MMOL/L (ref 95–110)
CO2 SERPL-SCNC: 24 MMOL/L (ref 23–29)
CREAT SERPL-MCNC: 1.2 MG/DL (ref 0.5–1.4)
EST. GFR  (AFRICAN AMERICAN): >60 ML/MIN/1.73 M^2
EST. GFR  (NON AFRICAN AMERICAN): >60 ML/MIN/1.73 M^2
GLUCOSE SERPL-MCNC: 122 MG/DL (ref 70–110)
POTASSIUM SERPL-SCNC: 4.2 MMOL/L (ref 3.5–5.1)
PROT SERPL-MCNC: 7.2 G/DL (ref 6–8.4)
SODIUM SERPL-SCNC: 140 MMOL/L (ref 136–145)
TSH SERPL DL<=0.005 MIU/L-ACNC: 3.03 UIU/ML (ref 0.4–4)

## 2021-07-27 LAB
ALDOST SERPL-MCNC: 15.4 NG/DL
ALDOST/RENIN PLAS-RTO: 1.9 RATIO
RENIN PLAS-CCNC: 8.2 NG/ML/HR

## 2021-08-06 ENCOUNTER — PATIENT OUTREACH (OUTPATIENT)
Dept: ADMINISTRATIVE | Facility: OTHER | Age: 71
End: 2021-08-06

## 2021-08-06 ENCOUNTER — OFFICE VISIT (OUTPATIENT)
Dept: CARDIOLOGY | Facility: CLINIC | Age: 71
End: 2021-08-06
Payer: MEDICARE

## 2021-08-06 VITALS
WEIGHT: 194 LBS | SYSTOLIC BLOOD PRESSURE: 138 MMHG | BODY MASS INDEX: 26.31 KG/M2 | HEART RATE: 78 BPM | OXYGEN SATURATION: 93 % | DIASTOLIC BLOOD PRESSURE: 60 MMHG

## 2021-08-06 DIAGNOSIS — I35.1 MODERATE AORTIC REGURGITATION: ICD-10-CM

## 2021-08-06 DIAGNOSIS — D50.0 IRON DEFICIENCY ANEMIA DUE TO CHRONIC BLOOD LOSS: ICD-10-CM

## 2021-08-06 DIAGNOSIS — I35.0 MODERATE AORTIC STENOSIS: ICD-10-CM

## 2021-08-06 DIAGNOSIS — E78.00 PURE HYPERCHOLESTEROLEMIA: ICD-10-CM

## 2021-08-06 DIAGNOSIS — I1A.0 RESISTANT HYPERTENSION: Primary | ICD-10-CM

## 2021-08-06 DIAGNOSIS — I25.10 ATHEROSCLEROSIS OF NATIVE CORONARY ARTERY OF NATIVE HEART WITHOUT ANGINA PECTORIS: ICD-10-CM

## 2021-08-06 DIAGNOSIS — D69.6 THROMBOCYTOPENIA: ICD-10-CM

## 2021-08-06 PROCEDURE — 1159F PR MEDICATION LIST DOCUMENTED IN MEDICAL RECORD: ICD-10-PCS | Mod: CPTII,S$GLB,, | Performed by: INTERNAL MEDICINE

## 2021-08-06 PROCEDURE — 99999 PR PBB SHADOW E&M-EST. PATIENT-LVL IV: CPT | Mod: PBBFAC,,, | Performed by: INTERNAL MEDICINE

## 2021-08-06 PROCEDURE — 3008F PR BODY MASS INDEX (BMI) DOCUMENTED: ICD-10-PCS | Mod: CPTII,S$GLB,, | Performed by: INTERNAL MEDICINE

## 2021-08-06 PROCEDURE — 1160F RVW MEDS BY RX/DR IN RCRD: CPT | Mod: CPTII,S$GLB,, | Performed by: INTERNAL MEDICINE

## 2021-08-06 PROCEDURE — 99214 PR OFFICE/OUTPT VISIT, EST, LEVL IV, 30-39 MIN: ICD-10-PCS | Mod: S$GLB,,, | Performed by: INTERNAL MEDICINE

## 2021-08-06 PROCEDURE — 3078F DIAST BP <80 MM HG: CPT | Mod: CPTII,S$GLB,, | Performed by: INTERNAL MEDICINE

## 2021-08-06 PROCEDURE — 3075F PR MOST RECENT SYSTOLIC BLOOD PRESS GE 130-139MM HG: ICD-10-PCS | Mod: CPTII,S$GLB,, | Performed by: INTERNAL MEDICINE

## 2021-08-06 PROCEDURE — 99999 PR PBB SHADOW E&M-EST. PATIENT-LVL IV: ICD-10-PCS | Mod: PBBFAC,,, | Performed by: INTERNAL MEDICINE

## 2021-08-06 PROCEDURE — 3075F SYST BP GE 130 - 139MM HG: CPT | Mod: CPTII,S$GLB,, | Performed by: INTERNAL MEDICINE

## 2021-08-06 PROCEDURE — 3008F BODY MASS INDEX DOCD: CPT | Mod: CPTII,S$GLB,, | Performed by: INTERNAL MEDICINE

## 2021-08-06 PROCEDURE — 1160F PR REVIEW ALL MEDS BY PRESCRIBER/CLIN PHARMACIST DOCUMENTED: ICD-10-PCS | Mod: CPTII,S$GLB,, | Performed by: INTERNAL MEDICINE

## 2021-08-06 PROCEDURE — 1125F AMNT PAIN NOTED PAIN PRSNT: CPT | Mod: CPTII,S$GLB,, | Performed by: INTERNAL MEDICINE

## 2021-08-06 PROCEDURE — 1159F MED LIST DOCD IN RCRD: CPT | Mod: CPTII,S$GLB,, | Performed by: INTERNAL MEDICINE

## 2021-08-06 PROCEDURE — 3078F PR MOST RECENT DIASTOLIC BLOOD PRESSURE < 80 MM HG: ICD-10-PCS | Mod: CPTII,S$GLB,, | Performed by: INTERNAL MEDICINE

## 2021-08-06 PROCEDURE — 99214 OFFICE O/P EST MOD 30 MIN: CPT | Mod: S$GLB,,, | Performed by: INTERNAL MEDICINE

## 2021-08-06 PROCEDURE — 1125F PR PAIN SEVERITY QUANTIFIED, PAIN PRESENT: ICD-10-PCS | Mod: CPTII,S$GLB,, | Performed by: INTERNAL MEDICINE

## 2021-08-12 DIAGNOSIS — M17.9 OSTEOARTHRITIS OF KNEE, UNSPECIFIED LATERALITY, UNSPECIFIED OSTEOARTHRITIS TYPE: ICD-10-CM

## 2021-08-12 DIAGNOSIS — M05.9 SEROPOSITIVE RHEUMATOID ARTHRITIS: ICD-10-CM

## 2021-08-12 RX ORDER — PREDNISONE 5 MG/1
TABLET ORAL
Qty: 60 TABLET | Refills: 3 | Status: SHIPPED | OUTPATIENT
Start: 2021-08-12 | End: 2022-02-14 | Stop reason: SDUPTHER

## 2021-08-17 ENCOUNTER — OFFICE VISIT (OUTPATIENT)
Dept: FAMILY MEDICINE | Facility: CLINIC | Age: 71
End: 2021-08-17
Payer: MEDICARE

## 2021-08-17 VITALS
HEIGHT: 72 IN | WEIGHT: 193.81 LBS | OXYGEN SATURATION: 97 % | DIASTOLIC BLOOD PRESSURE: 78 MMHG | HEART RATE: 78 BPM | SYSTOLIC BLOOD PRESSURE: 124 MMHG | BODY MASS INDEX: 26.25 KG/M2

## 2021-08-17 DIAGNOSIS — R73.9 HYPERGLYCEMIA: ICD-10-CM

## 2021-08-17 DIAGNOSIS — I1A.0 RESISTANT HYPERTENSION: Primary | ICD-10-CM

## 2021-08-17 PROCEDURE — 1160F RVW MEDS BY RX/DR IN RCRD: CPT | Mod: CPTII,S$GLB,, | Performed by: INTERNAL MEDICINE

## 2021-08-17 PROCEDURE — 1159F MED LIST DOCD IN RCRD: CPT | Mod: CPTII,S$GLB,, | Performed by: INTERNAL MEDICINE

## 2021-08-17 PROCEDURE — 1160F PR REVIEW ALL MEDS BY PRESCRIBER/CLIN PHARMACIST DOCUMENTED: ICD-10-PCS | Mod: CPTII,S$GLB,, | Performed by: INTERNAL MEDICINE

## 2021-08-17 PROCEDURE — 99213 OFFICE O/P EST LOW 20 MIN: CPT | Mod: S$GLB,,, | Performed by: INTERNAL MEDICINE

## 2021-08-17 PROCEDURE — 3074F PR MOST RECENT SYSTOLIC BLOOD PRESSURE < 130 MM HG: ICD-10-PCS | Mod: CPTII,S$GLB,, | Performed by: INTERNAL MEDICINE

## 2021-08-17 PROCEDURE — 3288F PR FALLS RISK ASSESSMENT DOCUMENTED: ICD-10-PCS | Mod: CPTII,S$GLB,, | Performed by: INTERNAL MEDICINE

## 2021-08-17 PROCEDURE — 1159F PR MEDICATION LIST DOCUMENTED IN MEDICAL RECORD: ICD-10-PCS | Mod: CPTII,S$GLB,, | Performed by: INTERNAL MEDICINE

## 2021-08-17 PROCEDURE — 99999 PR PBB SHADOW E&M-EST. PATIENT-LVL V: ICD-10-PCS | Mod: PBBFAC,,, | Performed by: INTERNAL MEDICINE

## 2021-08-17 PROCEDURE — 1101F PT FALLS ASSESS-DOCD LE1/YR: CPT | Mod: CPTII,S$GLB,, | Performed by: INTERNAL MEDICINE

## 2021-08-17 PROCEDURE — 3074F SYST BP LT 130 MM HG: CPT | Mod: CPTII,S$GLB,, | Performed by: INTERNAL MEDICINE

## 2021-08-17 PROCEDURE — 3008F BODY MASS INDEX DOCD: CPT | Mod: CPTII,S$GLB,, | Performed by: INTERNAL MEDICINE

## 2021-08-17 PROCEDURE — 3078F PR MOST RECENT DIASTOLIC BLOOD PRESSURE < 80 MM HG: ICD-10-PCS | Mod: CPTII,S$GLB,, | Performed by: INTERNAL MEDICINE

## 2021-08-17 PROCEDURE — 3288F FALL RISK ASSESSMENT DOCD: CPT | Mod: CPTII,S$GLB,, | Performed by: INTERNAL MEDICINE

## 2021-08-17 PROCEDURE — 3008F PR BODY MASS INDEX (BMI) DOCUMENTED: ICD-10-PCS | Mod: CPTII,S$GLB,, | Performed by: INTERNAL MEDICINE

## 2021-08-17 PROCEDURE — 99213 PR OFFICE/OUTPT VISIT, EST, LEVL III, 20-29 MIN: ICD-10-PCS | Mod: S$GLB,,, | Performed by: INTERNAL MEDICINE

## 2021-08-17 PROCEDURE — 1126F PR PAIN SEVERITY QUANTIFIED, NO PAIN PRESENT: ICD-10-PCS | Mod: CPTII,S$GLB,, | Performed by: INTERNAL MEDICINE

## 2021-08-17 PROCEDURE — 1126F AMNT PAIN NOTED NONE PRSNT: CPT | Mod: CPTII,S$GLB,, | Performed by: INTERNAL MEDICINE

## 2021-08-17 PROCEDURE — 99999 PR PBB SHADOW E&M-EST. PATIENT-LVL V: CPT | Mod: PBBFAC,,, | Performed by: INTERNAL MEDICINE

## 2021-08-17 PROCEDURE — 1101F PR PT FALLS ASSESS DOC 0-1 FALLS W/OUT INJ PAST YR: ICD-10-PCS | Mod: CPTII,S$GLB,, | Performed by: INTERNAL MEDICINE

## 2021-08-17 PROCEDURE — 3078F DIAST BP <80 MM HG: CPT | Mod: CPTII,S$GLB,, | Performed by: INTERNAL MEDICINE

## 2021-08-17 RX ORDER — TRAZODONE HYDROCHLORIDE 50 MG/1
50-100 TABLET ORAL NIGHTLY PRN
COMMUNITY
Start: 2021-07-31 | End: 2021-10-07

## 2021-09-17 DIAGNOSIS — I1A.0 RESISTANT HYPERTENSION: ICD-10-CM

## 2021-09-17 RX ORDER — HYDRALAZINE HYDROCHLORIDE 50 MG/1
50 TABLET, FILM COATED ORAL EVERY 8 HOURS
Qty: 270 TABLET | Refills: 3 | Status: SHIPPED | OUTPATIENT
Start: 2021-09-17 | End: 2021-10-15 | Stop reason: SDUPTHER

## 2021-10-07 ENCOUNTER — LAB VISIT (OUTPATIENT)
Dept: LAB | Facility: HOSPITAL | Age: 71
End: 2021-10-07
Attending: SURGERY
Payer: MEDICARE

## 2021-10-07 ENCOUNTER — OFFICE VISIT (OUTPATIENT)
Dept: SURGERY | Facility: CLINIC | Age: 71
End: 2021-10-07
Payer: MEDICARE

## 2021-10-07 VITALS
BODY MASS INDEX: 26.14 KG/M2 | TEMPERATURE: 98 F | HEIGHT: 72 IN | DIASTOLIC BLOOD PRESSURE: 85 MMHG | WEIGHT: 193 LBS | HEART RATE: 79 BPM | SYSTOLIC BLOOD PRESSURE: 168 MMHG

## 2021-10-07 DIAGNOSIS — K40.90 INGUINAL HERNIA OF RIGHT SIDE WITHOUT OBSTRUCTION OR GANGRENE: ICD-10-CM

## 2021-10-07 DIAGNOSIS — Z01.818 PRE-OP TESTING: ICD-10-CM

## 2021-10-07 DIAGNOSIS — K40.90 INGUINAL HERNIA OF RIGHT SIDE WITHOUT OBSTRUCTION OR GANGRENE: Primary | ICD-10-CM

## 2021-10-07 LAB
ALBUMIN SERPL BCP-MCNC: 4.3 G/DL (ref 3.5–5.2)
ALP SERPL-CCNC: 59 U/L (ref 55–135)
ALT SERPL W/O P-5'-P-CCNC: 7 U/L (ref 10–44)
ANION GAP SERPL CALC-SCNC: 14 MMOL/L (ref 8–16)
AST SERPL-CCNC: 20 U/L (ref 10–40)
BASOPHILS # BLD AUTO: 0.04 K/UL (ref 0–0.2)
BASOPHILS NFR BLD: 0.4 % (ref 0–1.9)
BILIRUB SERPL-MCNC: 0.5 MG/DL (ref 0.1–1)
BUN SERPL-MCNC: 10 MG/DL (ref 8–23)
CALCIUM SERPL-MCNC: 9.4 MG/DL (ref 8.7–10.5)
CHLORIDE SERPL-SCNC: 105 MMOL/L (ref 95–110)
CO2 SERPL-SCNC: 22 MMOL/L (ref 23–29)
CREAT SERPL-MCNC: 1.2 MG/DL (ref 0.5–1.4)
DIFFERENTIAL METHOD: ABNORMAL
EOSINOPHIL # BLD AUTO: 0 K/UL (ref 0–0.5)
EOSINOPHIL NFR BLD: 0 % (ref 0–8)
ERYTHROCYTE [DISTWIDTH] IN BLOOD BY AUTOMATED COUNT: 14.3 % (ref 11.5–14.5)
EST. GFR  (AFRICAN AMERICAN): >60 ML/MIN/1.73 M^2
EST. GFR  (NON AFRICAN AMERICAN): >60 ML/MIN/1.73 M^2
GLUCOSE SERPL-MCNC: 156 MG/DL (ref 70–110)
HCT VFR BLD AUTO: 36.1 % (ref 40–54)
HGB BLD-MCNC: 11.1 G/DL (ref 14–18)
IMM GRANULOCYTES # BLD AUTO: 0.05 K/UL (ref 0–0.04)
IMM GRANULOCYTES NFR BLD AUTO: 0.5 % (ref 0–0.5)
LYMPHOCYTES # BLD AUTO: 1.4 K/UL (ref 1–4.8)
LYMPHOCYTES NFR BLD: 13.3 % (ref 18–48)
MCH RBC QN AUTO: 28.4 PG (ref 27–31)
MCHC RBC AUTO-ENTMCNC: 30.7 G/DL (ref 32–36)
MCV RBC AUTO: 92 FL (ref 82–98)
MONOCYTES # BLD AUTO: 0.6 K/UL (ref 0.3–1)
MONOCYTES NFR BLD: 6.3 % (ref 4–15)
NEUTROPHILS # BLD AUTO: 8.2 K/UL (ref 1.8–7.7)
NEUTROPHILS NFR BLD: 79.5 % (ref 38–73)
NRBC BLD-RTO: 0 /100 WBC
PLATELET # BLD AUTO: 125 K/UL (ref 150–450)
PMV BLD AUTO: 14.6 FL (ref 9.2–12.9)
POTASSIUM SERPL-SCNC: 4.7 MMOL/L (ref 3.5–5.1)
PROT SERPL-MCNC: 7.6 G/DL (ref 6–8.4)
RBC # BLD AUTO: 3.91 M/UL (ref 4.6–6.2)
SODIUM SERPL-SCNC: 141 MMOL/L (ref 136–145)
WBC # BLD AUTO: 10.24 K/UL (ref 3.9–12.7)

## 2021-10-07 PROCEDURE — 36415 COLL VENOUS BLD VENIPUNCTURE: CPT | Mod: PO | Performed by: SURGERY

## 2021-10-07 PROCEDURE — 99999 PR PBB SHADOW E&M-EST. PATIENT-LVL V: CPT | Mod: PBBFAC,,, | Performed by: SURGERY

## 2021-10-07 PROCEDURE — 4010F ACE/ARB THERAPY RXD/TAKEN: CPT | Mod: CPTII,S$GLB,, | Performed by: SURGERY

## 2021-10-07 PROCEDURE — 99204 PR OFFICE/OUTPT VISIT, NEW, LEVL IV, 45-59 MIN: ICD-10-PCS | Mod: S$GLB,,, | Performed by: SURGERY

## 2021-10-07 PROCEDURE — 3077F SYST BP >= 140 MM HG: CPT | Mod: CPTII,S$GLB,, | Performed by: SURGERY

## 2021-10-07 PROCEDURE — 3077F PR MOST RECENT SYSTOLIC BLOOD PRESSURE >= 140 MM HG: ICD-10-PCS | Mod: CPTII,S$GLB,, | Performed by: SURGERY

## 2021-10-07 PROCEDURE — 1159F PR MEDICATION LIST DOCUMENTED IN MEDICAL RECORD: ICD-10-PCS | Mod: CPTII,S$GLB,, | Performed by: SURGERY

## 2021-10-07 PROCEDURE — 99499 UNLISTED E&M SERVICE: CPT | Mod: S$GLB,,, | Performed by: SURGERY

## 2021-10-07 PROCEDURE — 99499 RISK ADDL DX/OHS AUDIT: ICD-10-PCS | Mod: S$GLB,,, | Performed by: SURGERY

## 2021-10-07 PROCEDURE — 3079F PR MOST RECENT DIASTOLIC BLOOD PRESSURE 80-89 MM HG: ICD-10-PCS | Mod: CPTII,S$GLB,, | Performed by: SURGERY

## 2021-10-07 PROCEDURE — 1160F RVW MEDS BY RX/DR IN RCRD: CPT | Mod: CPTII,S$GLB,, | Performed by: SURGERY

## 2021-10-07 PROCEDURE — 4010F PR ACE/ARB THEARPY RXD/TAKEN: ICD-10-PCS | Mod: CPTII,S$GLB,, | Performed by: SURGERY

## 2021-10-07 PROCEDURE — 1160F PR REVIEW ALL MEDS BY PRESCRIBER/CLIN PHARMACIST DOCUMENTED: ICD-10-PCS | Mod: CPTII,S$GLB,, | Performed by: SURGERY

## 2021-10-07 PROCEDURE — 3008F BODY MASS INDEX DOCD: CPT | Mod: CPTII,S$GLB,, | Performed by: SURGERY

## 2021-10-07 PROCEDURE — 85025 COMPLETE CBC W/AUTO DIFF WBC: CPT | Performed by: SURGERY

## 2021-10-07 PROCEDURE — 99204 OFFICE O/P NEW MOD 45 MIN: CPT | Mod: S$GLB,,, | Performed by: SURGERY

## 2021-10-07 PROCEDURE — 99999 PR PBB SHADOW E&M-EST. PATIENT-LVL V: ICD-10-PCS | Mod: PBBFAC,,, | Performed by: SURGERY

## 2021-10-07 PROCEDURE — 1159F MED LIST DOCD IN RCRD: CPT | Mod: CPTII,S$GLB,, | Performed by: SURGERY

## 2021-10-07 PROCEDURE — 3079F DIAST BP 80-89 MM HG: CPT | Mod: CPTII,S$GLB,, | Performed by: SURGERY

## 2021-10-07 PROCEDURE — 1125F AMNT PAIN NOTED PAIN PRSNT: CPT | Mod: CPTII,S$GLB,, | Performed by: SURGERY

## 2021-10-07 PROCEDURE — 1125F PR PAIN SEVERITY QUANTIFIED, PAIN PRESENT: ICD-10-PCS | Mod: CPTII,S$GLB,, | Performed by: SURGERY

## 2021-10-07 PROCEDURE — 80053 COMPREHEN METABOLIC PANEL: CPT | Performed by: SURGERY

## 2021-10-07 PROCEDURE — 3008F PR BODY MASS INDEX (BMI) DOCUMENTED: ICD-10-PCS | Mod: CPTII,S$GLB,, | Performed by: SURGERY

## 2021-10-07 RX ORDER — LIDOCAINE HYDROCHLORIDE 10 MG/ML
1 INJECTION, SOLUTION EPIDURAL; INFILTRATION; INTRACAUDAL; PERINEURAL ONCE
Status: DISCONTINUED | OUTPATIENT
Start: 2021-10-07 | End: 2021-10-22 | Stop reason: HOSPADM

## 2021-10-07 RX ORDER — ONDANSETRON 4 MG/1
8 TABLET, ORALLY DISINTEGRATING ORAL EVERY 8 HOURS PRN
Status: CANCELLED | OUTPATIENT
Start: 2021-10-07

## 2021-10-08 ENCOUNTER — OFFICE VISIT (OUTPATIENT)
Dept: CARDIOLOGY | Facility: CLINIC | Age: 71
End: 2021-10-08
Payer: MEDICARE

## 2021-10-08 ENCOUNTER — HOSPITAL ENCOUNTER (OUTPATIENT)
Dept: CARDIOLOGY | Facility: HOSPITAL | Age: 71
Discharge: HOME | End: 2021-10-08
Attending: SURGERY
Payer: MEDICARE

## 2021-10-08 VITALS
HEART RATE: 81 BPM | BODY MASS INDEX: 26.42 KG/M2 | DIASTOLIC BLOOD PRESSURE: 58 MMHG | WEIGHT: 194.81 LBS | SYSTOLIC BLOOD PRESSURE: 120 MMHG | OXYGEN SATURATION: 96 %

## 2021-10-08 DIAGNOSIS — R01.1 SYSTOLIC MURMUR: ICD-10-CM

## 2021-10-08 DIAGNOSIS — K40.90 INGUINAL HERNIA OF RIGHT SIDE WITHOUT OBSTRUCTION OR GANGRENE: ICD-10-CM

## 2021-10-08 DIAGNOSIS — D69.6 THROMBOCYTOPENIA: ICD-10-CM

## 2021-10-08 DIAGNOSIS — I1A.0 RESISTANT HYPERTENSION: Primary | ICD-10-CM

## 2021-10-08 DIAGNOSIS — I10 ESSENTIAL HYPERTENSION: ICD-10-CM

## 2021-10-08 DIAGNOSIS — D50.0 IRON DEFICIENCY ANEMIA DUE TO CHRONIC BLOOD LOSS: ICD-10-CM

## 2021-10-08 DIAGNOSIS — R06.09 DYSPNEA ON EXERTION: ICD-10-CM

## 2021-10-08 DIAGNOSIS — E78.00 PURE HYPERCHOLESTEROLEMIA: ICD-10-CM

## 2021-10-08 DIAGNOSIS — I25.10 ATHEROSCLEROSIS OF NATIVE CORONARY ARTERY OF NATIVE HEART WITHOUT ANGINA PECTORIS: ICD-10-CM

## 2021-10-08 DIAGNOSIS — I35.0 MODERATE AORTIC STENOSIS: ICD-10-CM

## 2021-10-08 DIAGNOSIS — I25.119 ATHEROSCLEROSIS OF NATIVE CORONARY ARTERY OF NATIVE HEART WITH ANGINA PECTORIS: ICD-10-CM

## 2021-10-08 DIAGNOSIS — J43.8 OTHER EMPHYSEMA: ICD-10-CM

## 2021-10-08 DIAGNOSIS — I35.1 MODERATE AORTIC REGURGITATION: ICD-10-CM

## 2021-10-08 PROCEDURE — 99214 PR OFFICE/OUTPT VISIT, EST, LEVL IV, 30-39 MIN: ICD-10-PCS | Mod: S$GLB,,, | Performed by: INTERNAL MEDICINE

## 2021-10-08 PROCEDURE — 3078F DIAST BP <80 MM HG: CPT | Mod: CPTII,S$GLB,, | Performed by: INTERNAL MEDICINE

## 2021-10-08 PROCEDURE — 99214 OFFICE O/P EST MOD 30 MIN: CPT | Mod: S$GLB,,, | Performed by: INTERNAL MEDICINE

## 2021-10-08 PROCEDURE — 3074F SYST BP LT 130 MM HG: CPT | Mod: CPTII,S$GLB,, | Performed by: INTERNAL MEDICINE

## 2021-10-08 PROCEDURE — 1159F MED LIST DOCD IN RCRD: CPT | Mod: CPTII,S$GLB,, | Performed by: INTERNAL MEDICINE

## 2021-10-08 PROCEDURE — 3074F PR MOST RECENT SYSTOLIC BLOOD PRESSURE < 130 MM HG: ICD-10-PCS | Mod: CPTII,S$GLB,, | Performed by: INTERNAL MEDICINE

## 2021-10-08 PROCEDURE — 99999 PR PBB SHADOW E&M-EST. PATIENT-LVL III: ICD-10-PCS | Mod: PBBFAC,,, | Performed by: INTERNAL MEDICINE

## 2021-10-08 PROCEDURE — 3078F PR MOST RECENT DIASTOLIC BLOOD PRESSURE < 80 MM HG: ICD-10-PCS | Mod: CPTII,S$GLB,, | Performed by: INTERNAL MEDICINE

## 2021-10-08 PROCEDURE — 4010F ACE/ARB THERAPY RXD/TAKEN: CPT | Mod: CPTII,S$GLB,, | Performed by: INTERNAL MEDICINE

## 2021-10-08 PROCEDURE — 4010F PR ACE/ARB THEARPY RXD/TAKEN: ICD-10-PCS | Mod: CPTII,S$GLB,, | Performed by: INTERNAL MEDICINE

## 2021-10-08 PROCEDURE — 93005 ELECTROCARDIOGRAM TRACING: CPT | Mod: PO

## 2021-10-08 PROCEDURE — 93010 EKG 12-LEAD: ICD-10-PCS | Mod: ,,, | Performed by: INTERNAL MEDICINE

## 2021-10-08 PROCEDURE — 3008F BODY MASS INDEX DOCD: CPT | Mod: CPTII,S$GLB,, | Performed by: INTERNAL MEDICINE

## 2021-10-08 PROCEDURE — 1160F RVW MEDS BY RX/DR IN RCRD: CPT | Mod: CPTII,S$GLB,, | Performed by: INTERNAL MEDICINE

## 2021-10-08 PROCEDURE — 3008F PR BODY MASS INDEX (BMI) DOCUMENTED: ICD-10-PCS | Mod: CPTII,S$GLB,, | Performed by: INTERNAL MEDICINE

## 2021-10-08 PROCEDURE — 1125F AMNT PAIN NOTED PAIN PRSNT: CPT | Mod: CPTII,S$GLB,, | Performed by: INTERNAL MEDICINE

## 2021-10-08 PROCEDURE — 1159F PR MEDICATION LIST DOCUMENTED IN MEDICAL RECORD: ICD-10-PCS | Mod: CPTII,S$GLB,, | Performed by: INTERNAL MEDICINE

## 2021-10-08 PROCEDURE — 93010 ELECTROCARDIOGRAM REPORT: CPT | Mod: ,,, | Performed by: INTERNAL MEDICINE

## 2021-10-08 PROCEDURE — 99999 PR PBB SHADOW E&M-EST. PATIENT-LVL III: CPT | Mod: PBBFAC,,, | Performed by: INTERNAL MEDICINE

## 2021-10-08 PROCEDURE — 1160F PR REVIEW ALL MEDS BY PRESCRIBER/CLIN PHARMACIST DOCUMENTED: ICD-10-PCS | Mod: CPTII,S$GLB,, | Performed by: INTERNAL MEDICINE

## 2021-10-08 PROCEDURE — 1125F PR PAIN SEVERITY QUANTIFIED, PAIN PRESENT: ICD-10-PCS | Mod: CPTII,S$GLB,, | Performed by: INTERNAL MEDICINE

## 2021-10-08 RX ORDER — HYDROCODONE BITARTRATE AND ACETAMINOPHEN 10; 325 MG/1; MG/1
1 TABLET ORAL EVERY 6 HOURS PRN
COMMUNITY
End: 2021-10-15 | Stop reason: SDUPTHER

## 2021-10-15 ENCOUNTER — OFFICE VISIT (OUTPATIENT)
Dept: RHEUMATOLOGY | Facility: CLINIC | Age: 71
End: 2021-10-15
Payer: MEDICARE

## 2021-10-15 VITALS
HEIGHT: 72 IN | SYSTOLIC BLOOD PRESSURE: 135 MMHG | BODY MASS INDEX: 25.47 KG/M2 | HEART RATE: 85 BPM | DIASTOLIC BLOOD PRESSURE: 76 MMHG | WEIGHT: 188 LBS

## 2021-10-15 DIAGNOSIS — M05.9 SEROPOSITIVE RHEUMATOID ARTHRITIS: ICD-10-CM

## 2021-10-15 DIAGNOSIS — D64.9 ANEMIA, UNSPECIFIED TYPE: ICD-10-CM

## 2021-10-15 DIAGNOSIS — M17.9 OSTEOARTHRITIS OF KNEE, UNSPECIFIED LATERALITY, UNSPECIFIED OSTEOARTHRITIS TYPE: ICD-10-CM

## 2021-10-15 DIAGNOSIS — I1A.0 RESISTANT HYPERTENSION: ICD-10-CM

## 2021-10-15 DIAGNOSIS — D72.829 LEUKOCYTOSIS, UNSPECIFIED TYPE: ICD-10-CM

## 2021-10-15 DIAGNOSIS — G89.4 CHRONIC PAIN SYNDROME: ICD-10-CM

## 2021-10-15 DIAGNOSIS — D61.818 PANCYTOPENIA: Primary | ICD-10-CM

## 2021-10-15 DIAGNOSIS — G56.00 CARPAL TUNNEL SYNDROME, UNSPECIFIED LATERALITY: ICD-10-CM

## 2021-10-15 DIAGNOSIS — D69.6 THROMBOPENIA: ICD-10-CM

## 2021-10-15 PROCEDURE — 3044F PR MOST RECENT HEMOGLOBIN A1C LEVEL <7.0%: ICD-10-PCS | Mod: CPTII,S$GLB,, | Performed by: INTERNAL MEDICINE

## 2021-10-15 PROCEDURE — 99215 PR OFFICE/OUTPT VISIT, EST, LEVL V, 40-54 MIN: ICD-10-PCS | Mod: 25,S$GLB,, | Performed by: INTERNAL MEDICINE

## 2021-10-15 PROCEDURE — 99999 PR PBB SHADOW E&M-EST. PATIENT-LVL III: ICD-10-PCS | Mod: PBBFAC,,, | Performed by: INTERNAL MEDICINE

## 2021-10-15 PROCEDURE — 3008F BODY MASS INDEX DOCD: CPT | Mod: CPTII,S$GLB,, | Performed by: INTERNAL MEDICINE

## 2021-10-15 PROCEDURE — 4010F ACE/ARB THERAPY RXD/TAKEN: CPT | Mod: CPTII,S$GLB,, | Performed by: INTERNAL MEDICINE

## 2021-10-15 PROCEDURE — 3078F DIAST BP <80 MM HG: CPT | Mod: CPTII,S$GLB,, | Performed by: INTERNAL MEDICINE

## 2021-10-15 PROCEDURE — 4010F PR ACE/ARB THEARPY RXD/TAKEN: ICD-10-PCS | Mod: CPTII,S$GLB,, | Performed by: INTERNAL MEDICINE

## 2021-10-15 PROCEDURE — 99499 UNLISTED E&M SERVICE: CPT | Mod: S$GLB,,, | Performed by: INTERNAL MEDICINE

## 2021-10-15 PROCEDURE — 3075F PR MOST RECENT SYSTOLIC BLOOD PRESS GE 130-139MM HG: ICD-10-PCS | Mod: CPTII,S$GLB,, | Performed by: INTERNAL MEDICINE

## 2021-10-15 PROCEDURE — 96372 THER/PROPH/DIAG INJ SC/IM: CPT | Mod: S$GLB,,, | Performed by: INTERNAL MEDICINE

## 2021-10-15 PROCEDURE — 3075F SYST BP GE 130 - 139MM HG: CPT | Mod: CPTII,S$GLB,, | Performed by: INTERNAL MEDICINE

## 2021-10-15 PROCEDURE — 99215 OFFICE O/P EST HI 40 MIN: CPT | Mod: 25,S$GLB,, | Performed by: INTERNAL MEDICINE

## 2021-10-15 PROCEDURE — 99999 PR PBB SHADOW E&M-EST. PATIENT-LVL III: CPT | Mod: PBBFAC,,, | Performed by: INTERNAL MEDICINE

## 2021-10-15 PROCEDURE — 3078F PR MOST RECENT DIASTOLIC BLOOD PRESSURE < 80 MM HG: ICD-10-PCS | Mod: CPTII,S$GLB,, | Performed by: INTERNAL MEDICINE

## 2021-10-15 PROCEDURE — 96372 PR INJECTION,THERAP/PROPH/DIAG2ST, IM OR SUBCUT: ICD-10-PCS | Mod: S$GLB,,, | Performed by: INTERNAL MEDICINE

## 2021-10-15 PROCEDURE — 1125F PR PAIN SEVERITY QUANTIFIED, PAIN PRESENT: ICD-10-PCS | Mod: CPTII,S$GLB,, | Performed by: INTERNAL MEDICINE

## 2021-10-15 PROCEDURE — 3044F HG A1C LEVEL LT 7.0%: CPT | Mod: CPTII,S$GLB,, | Performed by: INTERNAL MEDICINE

## 2021-10-15 PROCEDURE — 99499 RISK ADDL DX/OHS AUDIT: ICD-10-PCS | Mod: S$GLB,,, | Performed by: INTERNAL MEDICINE

## 2021-10-15 PROCEDURE — 3008F PR BODY MASS INDEX (BMI) DOCUMENTED: ICD-10-PCS | Mod: CPTII,S$GLB,, | Performed by: INTERNAL MEDICINE

## 2021-10-15 PROCEDURE — 1125F AMNT PAIN NOTED PAIN PRSNT: CPT | Mod: CPTII,S$GLB,, | Performed by: INTERNAL MEDICINE

## 2021-10-15 RX ORDER — CYANOCOBALAMIN 1000 UG/ML
1000 INJECTION, SOLUTION INTRAMUSCULAR; SUBCUTANEOUS
Status: COMPLETED | OUTPATIENT
Start: 2021-10-15 | End: 2021-10-15

## 2021-10-15 RX ORDER — DEXAMETHASONE SODIUM PHOSPHATE 4 MG/ML
8 INJECTION, SOLUTION INTRA-ARTICULAR; INTRALESIONAL; INTRAMUSCULAR; INTRAVENOUS; SOFT TISSUE
Status: COMPLETED | OUTPATIENT
Start: 2021-10-15 | End: 2021-10-15

## 2021-10-15 RX ORDER — HYDROCODONE BITARTRATE AND ACETAMINOPHEN 10; 325 MG/1; MG/1
1 TABLET ORAL EVERY 8 HOURS PRN
Qty: 90 TABLET | Refills: 0 | Status: SHIPPED | OUTPATIENT
Start: 2021-11-03 | End: 2021-10-15

## 2021-10-15 RX ORDER — HYDROCODONE BITARTRATE AND ACETAMINOPHEN 10; 325 MG/1; MG/1
1 TABLET ORAL EVERY 8 HOURS PRN
Qty: 90 TABLET | Refills: 0 | Status: SHIPPED | OUTPATIENT
Start: 2021-12-03 | End: 2021-10-15 | Stop reason: SDUPTHER

## 2021-10-15 RX ORDER — HYDRALAZINE HYDROCHLORIDE 50 MG/1
100 TABLET, FILM COATED ORAL EVERY 12 HOURS
Qty: 360 TABLET | Refills: 3 | Status: SHIPPED | OUTPATIENT
Start: 2021-10-15 | End: 2021-10-27 | Stop reason: SDUPTHER

## 2021-10-15 RX ORDER — HYDROCODONE BITARTRATE AND ACETAMINOPHEN 10; 325 MG/1; MG/1
1 TABLET ORAL EVERY 8 HOURS PRN
Qty: 90 TABLET | Refills: 0 | Status: SHIPPED | OUTPATIENT
Start: 2022-01-03 | End: 2022-02-03 | Stop reason: SDUPTHER

## 2021-10-15 RX ADMIN — DEXAMETHASONE SODIUM PHOSPHATE 8 MG: 4 INJECTION, SOLUTION INTRA-ARTICULAR; INTRALESIONAL; INTRAMUSCULAR; INTRAVENOUS; SOFT TISSUE at 01:10

## 2021-10-15 RX ADMIN — CYANOCOBALAMIN 1000 MCG: 1000 INJECTION, SOLUTION INTRAMUSCULAR; SUBCUTANEOUS at 01:10

## 2021-10-15 ASSESSMENT — ROUTINE ASSESSMENT OF PATIENT INDEX DATA (RAPID3)
MDHAQ FUNCTION SCORE: 0.7
PAIN SCORE: 7
FATIGUE SCORE: 1.1
TOTAL RAPID3 SCORE: 4.94
PATIENT GLOBAL ASSESSMENT SCORE: 5.5
PSYCHOLOGICAL DISTRESS SCORE: 1.1

## 2021-10-22 ENCOUNTER — ANESTHESIA (OUTPATIENT)
Dept: SURGERY | Facility: HOSPITAL | Age: 71
End: 2021-10-22
Payer: MEDICARE

## 2021-10-22 ENCOUNTER — HOSPITAL ENCOUNTER (OUTPATIENT)
Facility: HOSPITAL | Age: 71
Discharge: HOME | End: 2021-10-22
Attending: SURGERY | Admitting: SURGERY
Payer: MEDICARE

## 2021-10-22 ENCOUNTER — ANESTHESIA EVENT (OUTPATIENT)
Dept: SURGERY | Facility: HOSPITAL | Age: 71
End: 2021-10-22
Payer: MEDICARE

## 2021-10-22 DIAGNOSIS — G89.4 CHRONIC PAIN SYNDROME: ICD-10-CM

## 2021-10-22 DIAGNOSIS — K40.90 INGUINAL HERNIA OF RIGHT SIDE WITHOUT OBSTRUCTION OR GANGRENE: ICD-10-CM

## 2021-10-22 DIAGNOSIS — M17.9 OSTEOARTHRITIS OF KNEE, UNSPECIFIED LATERALITY, UNSPECIFIED OSTEOARTHRITIS TYPE: ICD-10-CM

## 2021-10-22 PROCEDURE — 36000706: Performed by: SURGERY

## 2021-10-22 PROCEDURE — 25000003 PHARM REV CODE 250: Performed by: SURGERY

## 2021-10-22 PROCEDURE — 37000009 HC ANESTHESIA EA ADD 15 MINS: Performed by: SURGERY

## 2021-10-22 PROCEDURE — 71000033 HC RECOVERY, INTIAL HOUR: Performed by: SURGERY

## 2021-10-22 PROCEDURE — 63600175 PHARM REV CODE 636 W HCPCS: Performed by: SURGERY

## 2021-10-22 PROCEDURE — 37000008 HC ANESTHESIA 1ST 15 MINUTES: Performed by: SURGERY

## 2021-10-22 PROCEDURE — 49505 PRP I/HERN INIT REDUC >5 YR: CPT | Mod: RT,,, | Performed by: SURGERY

## 2021-10-22 PROCEDURE — 36000707: Performed by: SURGERY

## 2021-10-22 PROCEDURE — 71000015 HC POSTOP RECOV 1ST HR: Performed by: SURGERY

## 2021-10-22 PROCEDURE — 25000003 PHARM REV CODE 250: Performed by: NURSE ANESTHETIST, CERTIFIED REGISTERED

## 2021-10-22 PROCEDURE — 63600175 PHARM REV CODE 636 W HCPCS: Performed by: ANESTHESIOLOGY

## 2021-10-22 PROCEDURE — C1781 MESH (IMPLANTABLE): HCPCS | Performed by: SURGERY

## 2021-10-22 PROCEDURE — 49505 PR REPAIR ING HERNIA,5+Y/O,REDUCIBL: ICD-10-PCS | Mod: RT,,, | Performed by: SURGERY

## 2021-10-22 PROCEDURE — 63600175 PHARM REV CODE 636 W HCPCS: Performed by: NURSE ANESTHETIST, CERTIFIED REGISTERED

## 2021-10-22 PROCEDURE — 71000039 HC RECOVERY, EACH ADD'L HOUR: Performed by: SURGERY

## 2021-10-22 DEVICE — POLYPROPYLENE PARTIALLY ABSORBABLE MEDIUM BLUE / UNDYED PLUG AND ONLAY PATCH
Type: IMPLANTABLE DEVICE | Site: GROIN | Status: FUNCTIONAL
Brand: ULTRAPRO

## 2021-10-22 RX ORDER — ONDANSETRON 8 MG/1
8 TABLET, ORALLY DISINTEGRATING ORAL EVERY 8 HOURS PRN
Status: DISCONTINUED | OUTPATIENT
Start: 2021-10-22 | End: 2021-10-22 | Stop reason: HOSPADM

## 2021-10-22 RX ORDER — MIDAZOLAM HYDROCHLORIDE 1 MG/ML
INJECTION, SOLUTION INTRAMUSCULAR; INTRAVENOUS
Status: DISCONTINUED | OUTPATIENT
Start: 2021-10-22 | End: 2021-10-22

## 2021-10-22 RX ORDER — HYDROMORPHONE HYDROCHLORIDE 2 MG/ML
1 INJECTION, SOLUTION INTRAMUSCULAR; INTRAVENOUS; SUBCUTANEOUS EVERY 4 HOURS PRN
Status: DISCONTINUED | OUTPATIENT
Start: 2021-10-22 | End: 2021-10-22 | Stop reason: HOSPADM

## 2021-10-22 RX ORDER — BUPIVACAINE HYDROCHLORIDE 2.5 MG/ML
INJECTION, SOLUTION EPIDURAL; INFILTRATION; INTRACAUDAL
Status: DISCONTINUED | OUTPATIENT
Start: 2021-10-22 | End: 2021-10-22 | Stop reason: HOSPADM

## 2021-10-22 RX ORDER — PROPOFOL 10 MG/ML
VIAL (ML) INTRAVENOUS
Status: DISCONTINUED | OUTPATIENT
Start: 2021-10-22 | End: 2021-10-22

## 2021-10-22 RX ORDER — ONDANSETRON 2 MG/ML
4 INJECTION INTRAMUSCULAR; INTRAVENOUS DAILY PRN
Status: DISCONTINUED | OUTPATIENT
Start: 2021-10-22 | End: 2021-10-22 | Stop reason: HOSPADM

## 2021-10-22 RX ORDER — HYDROCODONE BITARTRATE AND ACETAMINOPHEN 10; 325 MG/1; MG/1
1 TABLET ORAL EVERY 6 HOURS PRN
Status: DISCONTINUED | OUTPATIENT
Start: 2021-10-22 | End: 2021-10-22 | Stop reason: HOSPADM

## 2021-10-22 RX ORDER — CEFAZOLIN SODIUM 2 G/50ML
2 SOLUTION INTRAVENOUS
Status: COMPLETED | OUTPATIENT
Start: 2021-10-22 | End: 2021-10-22

## 2021-10-22 RX ORDER — LIDOCAINE HYDROCHLORIDE 10 MG/ML
INJECTION, SOLUTION EPIDURAL; INFILTRATION; INTRACAUDAL; PERINEURAL
Status: DISCONTINUED | OUTPATIENT
Start: 2021-10-22 | End: 2021-10-22 | Stop reason: HOSPADM

## 2021-10-22 RX ORDER — PROPOFOL 10 MG/ML
VIAL (ML) INTRAVENOUS CONTINUOUS PRN
Status: DISCONTINUED | OUTPATIENT
Start: 2021-10-22 | End: 2021-10-22

## 2021-10-22 RX ORDER — HYDRALAZINE HYDROCHLORIDE 20 MG/ML
10 INJECTION INTRAMUSCULAR; INTRAVENOUS ONCE
Status: COMPLETED | OUTPATIENT
Start: 2021-10-22 | End: 2021-10-22

## 2021-10-22 RX ORDER — HYDROMORPHONE HYDROCHLORIDE 2 MG/ML
0.2 INJECTION, SOLUTION INTRAMUSCULAR; INTRAVENOUS; SUBCUTANEOUS EVERY 5 MIN PRN
Status: DISCONTINUED | OUTPATIENT
Start: 2021-10-22 | End: 2021-10-22 | Stop reason: HOSPADM

## 2021-10-22 RX ORDER — LIDOCAINE HYDROCHLORIDE 20 MG/ML
INJECTION INTRAVENOUS
Status: DISCONTINUED | OUTPATIENT
Start: 2021-10-22 | End: 2021-10-22

## 2021-10-22 RX ADMIN — HYDROMORPHONE HYDROCHLORIDE 0.2 MG: 2 INJECTION INTRAMUSCULAR; INTRAVENOUS; SUBCUTANEOUS at 10:10

## 2021-10-22 RX ADMIN — SODIUM CHLORIDE, SODIUM LACTATE, POTASSIUM CHLORIDE, AND CALCIUM CHLORIDE: .6; .31; .03; .02 INJECTION, SOLUTION INTRAVENOUS at 08:10

## 2021-10-22 RX ADMIN — CEFAZOLIN SODIUM 2 G: 2 SOLUTION INTRAVENOUS at 08:10

## 2021-10-22 RX ADMIN — MIDAZOLAM HYDROCHLORIDE 2 MG: 1 INJECTION, SOLUTION INTRAMUSCULAR; INTRAVENOUS at 10:10

## 2021-10-22 RX ADMIN — PROPOFOL 100 MCG/KG/MIN: 10 INJECTION, EMULSION INTRAVENOUS at 08:10

## 2021-10-22 RX ADMIN — HYDRALAZINE HYDROCHLORIDE 10 MG: 20 INJECTION INTRAMUSCULAR; INTRAVENOUS at 10:10

## 2021-10-22 RX ADMIN — PROPOFOL 50 MG: 10 INJECTION, EMULSION INTRAVENOUS at 08:10

## 2021-10-22 RX ADMIN — LIDOCAINE HYDROCHLORIDE 100 MG: 20 INJECTION, SOLUTION INTRAVENOUS at 08:10

## 2021-10-23 PROBLEM — J69.0 ASPIRATION PNEUMONIA OF RIGHT LOWER LOBE DUE TO GASTRIC SECRETIONS: Status: ACTIVE | Noted: 2021-10-23

## 2021-10-23 PROBLEM — R50.82 POST-PROCEDURAL FEVER: Status: ACTIVE | Noted: 2021-10-23

## 2021-10-24 PROBLEM — J69.0 ASPIRATION PNEUMONIA OF RIGHT LOWER LOBE DUE TO GASTRIC SECRETIONS: Status: ACTIVE | Noted: 2021-10-24

## 2021-10-27 DIAGNOSIS — I1A.0 RESISTANT HYPERTENSION: ICD-10-CM

## 2021-10-27 RX ORDER — ACETAMINOPHEN 500 MG
1 TABLET ORAL DAILY
Qty: 90 CAPSULE | Refills: 3 | Status: SHIPPED | OUTPATIENT
Start: 2021-10-27

## 2021-10-27 RX ORDER — HYDRALAZINE HYDROCHLORIDE 50 MG/1
100 TABLET, FILM COATED ORAL EVERY 12 HOURS
Qty: 360 TABLET | Refills: 3 | Status: SHIPPED | OUTPATIENT
Start: 2021-10-27 | End: 2022-12-26 | Stop reason: SDUPTHER

## 2021-10-27 RX ORDER — ALBUTEROL SULFATE 90 UG/1
1 AEROSOL, METERED RESPIRATORY (INHALATION) EVERY 4 HOURS PRN
Qty: 6.7 G | Refills: 5 | Status: SHIPPED | OUTPATIENT
Start: 2021-10-27 | End: 2022-12-22

## 2021-11-03 ENCOUNTER — PES CALL (OUTPATIENT)
Dept: ADMINISTRATIVE | Facility: CLINIC | Age: 71
End: 2021-11-03
Payer: MEDICARE

## 2021-11-08 ENCOUNTER — OFFICE VISIT (OUTPATIENT)
Dept: SURGERY | Facility: CLINIC | Age: 71
End: 2021-11-08
Payer: MEDICARE

## 2021-11-08 VITALS
HEIGHT: 72 IN | HEART RATE: 62 BPM | DIASTOLIC BLOOD PRESSURE: 71 MMHG | SYSTOLIC BLOOD PRESSURE: 134 MMHG | WEIGHT: 169.75 LBS | BODY MASS INDEX: 22.99 KG/M2

## 2021-11-08 DIAGNOSIS — K40.90 INGUINAL HERNIA OF RIGHT SIDE WITHOUT OBSTRUCTION OR GANGRENE: Primary | ICD-10-CM

## 2021-11-08 PROCEDURE — 3044F PR MOST RECENT HEMOGLOBIN A1C LEVEL <7.0%: ICD-10-PCS | Mod: CPTII,S$GLB,, | Performed by: SURGERY

## 2021-11-08 PROCEDURE — 1159F MED LIST DOCD IN RCRD: CPT | Mod: CPTII,S$GLB,, | Performed by: SURGERY

## 2021-11-08 PROCEDURE — 1159F PR MEDICATION LIST DOCUMENTED IN MEDICAL RECORD: ICD-10-PCS | Mod: CPTII,S$GLB,, | Performed by: SURGERY

## 2021-11-08 PROCEDURE — 3078F DIAST BP <80 MM HG: CPT | Mod: CPTII,S$GLB,, | Performed by: SURGERY

## 2021-11-08 PROCEDURE — 3075F PR MOST RECENT SYSTOLIC BLOOD PRESS GE 130-139MM HG: ICD-10-PCS | Mod: CPTII,S$GLB,, | Performed by: SURGERY

## 2021-11-08 PROCEDURE — 4010F PR ACE/ARB THEARPY RXD/TAKEN: ICD-10-PCS | Mod: CPTII,S$GLB,, | Performed by: SURGERY

## 2021-11-08 PROCEDURE — 99024 PR POST-OP FOLLOW-UP VISIT: ICD-10-PCS | Mod: S$GLB,,, | Performed by: SURGERY

## 2021-11-08 PROCEDURE — 3044F HG A1C LEVEL LT 7.0%: CPT | Mod: CPTII,S$GLB,, | Performed by: SURGERY

## 2021-11-08 PROCEDURE — 99999 PR PBB SHADOW E&M-EST. PATIENT-LVL IV: CPT | Mod: PBBFAC,,, | Performed by: SURGERY

## 2021-11-08 PROCEDURE — 1126F AMNT PAIN NOTED NONE PRSNT: CPT | Mod: CPTII,S$GLB,, | Performed by: SURGERY

## 2021-11-08 PROCEDURE — 3008F BODY MASS INDEX DOCD: CPT | Mod: CPTII,S$GLB,, | Performed by: SURGERY

## 2021-11-08 PROCEDURE — 3008F PR BODY MASS INDEX (BMI) DOCUMENTED: ICD-10-PCS | Mod: CPTII,S$GLB,, | Performed by: SURGERY

## 2021-11-08 PROCEDURE — 99999 PR PBB SHADOW E&M-EST. PATIENT-LVL IV: ICD-10-PCS | Mod: PBBFAC,,, | Performed by: SURGERY

## 2021-11-08 PROCEDURE — 3078F PR MOST RECENT DIASTOLIC BLOOD PRESSURE < 80 MM HG: ICD-10-PCS | Mod: CPTII,S$GLB,, | Performed by: SURGERY

## 2021-11-08 PROCEDURE — 99024 POSTOP FOLLOW-UP VISIT: CPT | Mod: S$GLB,,, | Performed by: SURGERY

## 2021-11-08 PROCEDURE — 1126F PR PAIN SEVERITY QUANTIFIED, NO PAIN PRESENT: ICD-10-PCS | Mod: CPTII,S$GLB,, | Performed by: SURGERY

## 2021-11-08 PROCEDURE — 3075F SYST BP GE 130 - 139MM HG: CPT | Mod: CPTII,S$GLB,, | Performed by: SURGERY

## 2021-11-08 PROCEDURE — 4010F ACE/ARB THERAPY RXD/TAKEN: CPT | Mod: CPTII,S$GLB,, | Performed by: SURGERY

## 2021-11-12 VITALS
OXYGEN SATURATION: 98 % | BODY MASS INDEX: 26.25 KG/M2 | HEIGHT: 72 IN | HEART RATE: 82 BPM | TEMPERATURE: 97 F | DIASTOLIC BLOOD PRESSURE: 78 MMHG | WEIGHT: 193.81 LBS | SYSTOLIC BLOOD PRESSURE: 174 MMHG | RESPIRATION RATE: 19 BRPM

## 2021-11-16 NOTE — PROGRESS NOTES
Left ACL follow Up     Patient: Steve Orellana        YOB: 1980      Chief Complaints:left  knee pain      History of Present Illness: Pt is here f/u knee arthroscopy, ACL reconstruction he states he is doing great progressing his activities he feels quite stable.  He is a  certainly not ready to go back to the streets        Allergies: No Known Allergies    Medications:   Home Medications:  Current Outpatient Medications on File Prior to Visit   Medication Sig   • cephalexin (KEFLEX) 500 MG capsule Take 1 capsule by mouth Every 12 (Twelve) Hours.   • ibuprofen (ADVIL,MOTRIN) 200 MG tablet Take 200 mg by mouth Every 6 (Six) Hours As Needed for Mild Pain . PT TO STOP PER MD INSTRUCTION   • multivitamin (MULTI VITAMIN DAILY PO) Take 1 tablet by mouth Daily. PT TO STOP PER MD INSTRUCTION   • Omega-3 Fatty Acids (fish oil) 1000 MG capsule capsule Take 1,000 mg by mouth Daily With Breakfast. PT TO STOP PER MD INSTRUCTION   • ondansetron (Zofran) 4 MG tablet Take 1 tablet by mouth Every 8 (Eight) Hours As Needed for Nausea or Vomiting.   • oxyCODONE-acetaminophen (PERCOCET) 5-325 MG per tablet Take 1 to 2 tablets by mouth every 4 hours as needed for severe pain.   • traZODone (DESYREL) 50 MG tablet Take 50 mg by mouth Daily.   • vilazodone (VIIBRYD) 20 MG tablet tablet Take 20 mg by mouth Daily.     No current facility-administered medications on file prior to visit.     Current Medications:  Scheduled Meds:  Continuous Infusions:No current facility-administered medications for this visit.    PRN Meds:.          Physical Exam: 41 y.o. male  General Appearance:    Alert, cooperative, in no acute distress                 There were no vitals filed for this visit.   Patient is alert and oriented ×3 no acute distress normal mood physical exam.  Physical exam of the knee, incisions looked good there is no erythema, calf is soft and non-tender.  No sign or sx of DVT. Full ROM, Neg Lachman, Good SLR  Subjective:       Patient ID: Scooter Swan is a 69 y.o. male.    Chief Complaint: Follow-up      Social Hx:  Living in [place]. . 4 grown children. Retired .     HPI:  F/u. Feeling well overall. No new issues.     PMH and A/P:   Resistant hypertension:  Amlodipine, enalapril, hydralazine.  Past treatments include atenolol.  -controlled.   Hyperlipidemia:  Pravastatin  -controlled   Moderate aortic stenosis: EUNICE is 1.19 cm2; peak velocity is 4.04 m/s; mean gradient is 32.58 mmHg. (10/5/18)  -repeat TTE October 2020  Seropositive rheumatoid arthritis/osteoarthritis:  P.r.n. hydrocodone.  Followed by Dr. Morales  Normocytic anemia and thrombocytopenia:  Followed by Heme-Onc.  -H&H 10.1 and 34 and platelets 153 July 2020.  GERD:  Omeprazole  -stable.   History of hepatitis-C:  Status post [facility]  Health maintenance:  -shingles vaccine  -pneumonia vaccine    Lipid panel with next labs.  BMP in January  Echocardiogram in October  Current Outpatient Medications on File Prior to Visit   Medication Sig Dispense Refill    albuterol (PROAIR HFA) 90 mcg/actuation inhaler INHALE 2 PUFFS EVERY 4 HOURS AS NEEDED      cyanocobalamin 500 MCG tablet Take 500 mcg by mouth once daily.      diclofenac sodium (VOLTAREN) 1 % Gel Apply 2 grams  topically 4 (four) times daily. 100 g 5    dorzolamide-timolol 2-0.5% (COSOPT) 22.3-6.8 mg/mL ophthalmic solution INSTILL 1 DROP IN BOTH EYES TWICE DAILY 10 mL 11    enalapril (VASOTEC) 20 MG tablet Take 1 tablet (20 mg total) by mouth 2 (two) times daily. 60 tablet 12    ergocalciferol, vitamin D2, (VITAMIN D ORAL) Take 200 mGy by mouth once daily.      hydrALAZINE (APRESOLINE) 25 MG tablet TAKE 1 TABLET(25 MG) BY MOUTH EVERY 8 HOURS 270 tablet 1    HYDROcodone-acetaminophen (NORCO)  mg per tablet Take 1 tablet by mouth every 8 (eight) hours as needed for Pain. 90 tablet 0    Lactobac no.41/Bifidobact no.7 (PROBIOTIC-10 ORAL) Take by mouth once daily.       and quad control      Assessment  S/P knee scope. ACL reconstruction, overall doing well.  He is off his pain medicine his knee looks good    Plan: Continue PT, work on quads,  Follow up in 6 weeks.  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      omeprazole (PRILOSEC) 20 MG capsule TAKE 1 CAPSULE(20 MG) BY MOUTH TWICE DAILY BEFORE MEALS 180 capsule 3    polyethylene glycol (GLYCOLAX) 17 gram PwPk Take by mouth once daily.      tamsulosin (FLOMAX) 0.4 mg Cap TAKE 1 CAPSULE(0.4 MG) BY MOUTH EVERY DAY 30 capsule 11    tiZANidine (ZANAFLEX) 4 MG tablet TAKE 1 TABLET BY MOUTH EVERY 8 HOURS 270 tablet 1    travoprost (TRAVATAN Z) 0.004 % ophthalmic solution Place 1 drop into both eyes every evening. 5 mL 12    [DISCONTINUED] amLODIPine (NORVASC) 10 MG tablet TAKE 1 TABLET(10 MG) BY MOUTH EVERY MORNING 90 tablet 3    [DISCONTINUED] atenolol (TENORMIN) 100 MG tablet TK 1 T PO D      [DISCONTINUED] pravastatin (PRAVACHOL) 20 MG tablet Take 1 tablet (20 mg total) by mouth once daily. 90 tablet 1    [DISCONTINUED] sildenafiL (VIAGRA) 100 MG tablet Take 1 tablet (100 mg total) by mouth as needed for Erectile Dysfunction. 4 tablet 0    predniSONE (DELTASONE) 5 MG tablet TAKE 2 TABLETS BY MOUTH once daily AS NEEDED (Patient not taking: Reported on 8/24/2020) 60 tablet 6     Current Facility-Administered Medications on File Prior to Visit   Medication Dose Route Frequency Provider Last Rate Last Dose    0.9%  NaCl infusion   Intravenous Continuous Deedee Sarkar NP   Stopped at 10/18/18 1613    mupirocin 2 % ointment   Nasal On Call Procedure Deedee Sarkar NP         I personally reviewed past medical, family and social history.  Review of Systems   Constitutional: Negative for activity change and fever.   HENT: Negative for sore throat and trouble swallowing.    Eyes: Negative for pain and visual disturbance.   Respiratory: Negative for cough, shortness of breath and wheezing.    Cardiovascular: Negative for chest pain, palpitations and leg swelling.   Gastrointestinal: Negative for abdominal pain, blood in stool, diarrhea, nausea and vomiting.   Endocrine: Negative for cold intolerance  and polyuria.   Genitourinary: Negative for decreased urine volume and dysuria.   Musculoskeletal: Negative for gait problem and neck pain.   Skin: Negative for rash.   Neurological: Negative for dizziness, syncope and light-headedness.   Psychiatric/Behavioral: Negative for dysphoric mood. The patient is not nervous/anxious.          Objective:     Vitals:    08/24/20 1315   BP: 118/82   Pulse: 86   Temp: 98.2 °F (36.8 °C)        Physical Exam  Constitutional:       General: He is not in acute distress.     Appearance: He is well-developed.   HENT:      Head: Normocephalic and atraumatic.   Eyes:      Pupils: Pupils are equal, round, and reactive to light.   Neck:      Musculoskeletal: Neck supple.      Thyroid: No thyromegaly.   Cardiovascular:      Rate and Rhythm: Normal rate and regular rhythm.      Heart sounds: Normal heart sounds. No murmur. No friction rub. No gallop.       Comments: 3/6 CHRIS rads to carotid.   Pulmonary:      Effort: Pulmonary effort is normal. No respiratory distress.      Breath sounds: Normal breath sounds. No wheezing.   Abdominal:      General: Bowel sounds are normal.      Palpations: Abdomen is soft.      Tenderness: There is no abdominal tenderness.   Skin:     General: Skin is warm.      Findings: No rash.   Neurological:      Mental Status: He is alert and oriented to person, place, and time.      Cranial Nerves: No cranial nerve deficit.   Psychiatric:         Behavior: Behavior normal.           Assessment/Plan   Scooter was seen today for follow-up.    Diagnoses and all orders for this visit:    Resistant hypertension  -     Basic metabolic panel; Future  -     amLODIPine (NORVASC) 10 MG tablet; Take 1 tablet (10 mg total) by mouth every morning.    Other hyperlipidemia  -     pravastatin (PRAVACHOL) 20 MG tablet; Take 1 tablet (20 mg total) by mouth once daily.  -     Lipid Panel; Future    Moderate aortic stenosis    Other male erectile dysfunction  -     sildenafiL (VIAGRA)  100 MG tablet; Take 1 tablet (100 mg total) by mouth as needed for Erectile Dysfunction.    Need for vaccination for Strep pneumoniae  -     Pneumococcal Polysaccharide Vaccine (23 Valent) (SQ/IM)

## 2021-11-17 ENCOUNTER — OFFICE VISIT (OUTPATIENT)
Dept: FAMILY MEDICINE | Facility: CLINIC | Age: 71
End: 2021-11-17
Payer: MEDICARE

## 2021-11-17 VITALS
DIASTOLIC BLOOD PRESSURE: 72 MMHG | HEART RATE: 68 BPM | BODY MASS INDEX: 26.07 KG/M2 | WEIGHT: 192.44 LBS | HEIGHT: 72 IN | OXYGEN SATURATION: 98 % | SYSTOLIC BLOOD PRESSURE: 124 MMHG

## 2021-11-17 DIAGNOSIS — Z12.5 SCREENING FOR PROSTATE CANCER: ICD-10-CM

## 2021-11-17 DIAGNOSIS — D69.6 THROMBOCYTOPENIA, UNSPECIFIED: ICD-10-CM

## 2021-11-17 DIAGNOSIS — I1A.0 RESISTANT HYPERTENSION: ICD-10-CM

## 2021-11-17 DIAGNOSIS — Z09 HOSPITAL DISCHARGE FOLLOW-UP: Primary | ICD-10-CM

## 2021-11-17 PROCEDURE — 4010F ACE/ARB THERAPY RXD/TAKEN: CPT | Mod: CPTII,S$GLB,, | Performed by: INTERNAL MEDICINE

## 2021-11-17 PROCEDURE — 3288F FALL RISK ASSESSMENT DOCD: CPT | Mod: CPTII,S$GLB,, | Performed by: INTERNAL MEDICINE

## 2021-11-17 PROCEDURE — 1159F PR MEDICATION LIST DOCUMENTED IN MEDICAL RECORD: ICD-10-PCS | Mod: CPTII,S$GLB,, | Performed by: INTERNAL MEDICINE

## 2021-11-17 PROCEDURE — 1111F PR DISCHARGE MEDS RECONCILED W/ CURRENT OUTPATIENT MED LIST: ICD-10-PCS | Mod: CPTII,S$GLB,, | Performed by: INTERNAL MEDICINE

## 2021-11-17 PROCEDURE — 3074F PR MOST RECENT SYSTOLIC BLOOD PRESSURE < 130 MM HG: ICD-10-PCS | Mod: CPTII,S$GLB,, | Performed by: INTERNAL MEDICINE

## 2021-11-17 PROCEDURE — 1160F PR REVIEW ALL MEDS BY PRESCRIBER/CLIN PHARMACIST DOCUMENTED: ICD-10-PCS | Mod: CPTII,S$GLB,, | Performed by: INTERNAL MEDICINE

## 2021-11-17 PROCEDURE — 3008F BODY MASS INDEX DOCD: CPT | Mod: CPTII,S$GLB,, | Performed by: INTERNAL MEDICINE

## 2021-11-17 PROCEDURE — 3078F DIAST BP <80 MM HG: CPT | Mod: CPTII,S$GLB,, | Performed by: INTERNAL MEDICINE

## 2021-11-17 PROCEDURE — 3288F PR FALLS RISK ASSESSMENT DOCUMENTED: ICD-10-PCS | Mod: CPTII,S$GLB,, | Performed by: INTERNAL MEDICINE

## 2021-11-17 PROCEDURE — 3044F HG A1C LEVEL LT 7.0%: CPT | Mod: CPTII,S$GLB,, | Performed by: INTERNAL MEDICINE

## 2021-11-17 PROCEDURE — 1101F PT FALLS ASSESS-DOCD LE1/YR: CPT | Mod: CPTII,S$GLB,, | Performed by: INTERNAL MEDICINE

## 2021-11-17 PROCEDURE — 99214 OFFICE O/P EST MOD 30 MIN: CPT | Mod: S$GLB,,, | Performed by: INTERNAL MEDICINE

## 2021-11-17 PROCEDURE — 3078F PR MOST RECENT DIASTOLIC BLOOD PRESSURE < 80 MM HG: ICD-10-PCS | Mod: CPTII,S$GLB,, | Performed by: INTERNAL MEDICINE

## 2021-11-17 PROCEDURE — 4010F PR ACE/ARB THEARPY RXD/TAKEN: ICD-10-PCS | Mod: CPTII,S$GLB,, | Performed by: INTERNAL MEDICINE

## 2021-11-17 PROCEDURE — 3074F SYST BP LT 130 MM HG: CPT | Mod: CPTII,S$GLB,, | Performed by: INTERNAL MEDICINE

## 2021-11-17 PROCEDURE — 1160F RVW MEDS BY RX/DR IN RCRD: CPT | Mod: CPTII,S$GLB,, | Performed by: INTERNAL MEDICINE

## 2021-11-17 PROCEDURE — 1126F AMNT PAIN NOTED NONE PRSNT: CPT | Mod: CPTII,S$GLB,, | Performed by: INTERNAL MEDICINE

## 2021-11-17 PROCEDURE — 99999 PR PBB SHADOW E&M-EST. PATIENT-LVL IV: ICD-10-PCS | Mod: PBBFAC,,, | Performed by: INTERNAL MEDICINE

## 2021-11-17 PROCEDURE — 1159F MED LIST DOCD IN RCRD: CPT | Mod: CPTII,S$GLB,, | Performed by: INTERNAL MEDICINE

## 2021-11-17 PROCEDURE — 1111F DSCHRG MED/CURRENT MED MERGE: CPT | Mod: CPTII,S$GLB,, | Performed by: INTERNAL MEDICINE

## 2021-11-17 PROCEDURE — 3044F PR MOST RECENT HEMOGLOBIN A1C LEVEL <7.0%: ICD-10-PCS | Mod: CPTII,S$GLB,, | Performed by: INTERNAL MEDICINE

## 2021-11-17 PROCEDURE — 99999 PR PBB SHADOW E&M-EST. PATIENT-LVL IV: CPT | Mod: PBBFAC,,, | Performed by: INTERNAL MEDICINE

## 2021-11-17 PROCEDURE — 3008F PR BODY MASS INDEX (BMI) DOCUMENTED: ICD-10-PCS | Mod: CPTII,S$GLB,, | Performed by: INTERNAL MEDICINE

## 2021-11-17 PROCEDURE — 99214 PR OFFICE/OUTPT VISIT, EST, LEVL IV, 30-39 MIN: ICD-10-PCS | Mod: S$GLB,,, | Performed by: INTERNAL MEDICINE

## 2021-11-17 PROCEDURE — 1101F PR PT FALLS ASSESS DOC 0-1 FALLS W/OUT INJ PAST YR: ICD-10-PCS | Mod: CPTII,S$GLB,, | Performed by: INTERNAL MEDICINE

## 2021-11-17 PROCEDURE — 1126F PR PAIN SEVERITY QUANTIFIED, NO PAIN PRESENT: ICD-10-PCS | Mod: CPTII,S$GLB,, | Performed by: INTERNAL MEDICINE

## 2021-11-18 ENCOUNTER — IMMUNIZATION (OUTPATIENT)
Dept: FAMILY MEDICINE | Facility: CLINIC | Age: 71
End: 2021-11-18
Payer: MEDICARE

## 2021-11-18 DIAGNOSIS — Z23 NEED FOR VACCINATION: Primary | ICD-10-CM

## 2021-11-18 PROCEDURE — 0004A COVID-19, MRNA, LNP-S, PF, 30 MCG/0.3 ML DOSE VACCINE: CPT | Mod: PBBFAC | Performed by: FAMILY MEDICINE

## 2022-02-03 DIAGNOSIS — G89.4 CHRONIC PAIN SYNDROME: ICD-10-CM

## 2022-02-03 DIAGNOSIS — M17.9 OSTEOARTHRITIS OF KNEE, UNSPECIFIED LATERALITY, UNSPECIFIED OSTEOARTHRITIS TYPE: ICD-10-CM

## 2022-02-03 NOTE — PROGRESS NOTES
Subjective:   Patient ID:  Scooter Swan is a 71 y.o. male who presents for follow-up of No chief complaint on file.    Evaluation of cardiac echo shows mild aortic stenosis with moderate aortic insufficiency normal LV function.  This is described the patient today there is no urgency on follow-up at this time.  Follow-up evaluation in 3-6 months.  Repeat echos yearly.        Overall doing well stable evidence of mild aortic stenosis and aortic insufficiency good LV function doing well this time blood pressure remains stable and advised him to take the hydralazine 50 mg q.8 hours follow-up evaluation blood pressure in about a month     Stable doing well this time no acute chest discomfort or shortness breath blood pressures been a very good control on hydralazine he continues med.  Aortic stenosis is mild to moderate follow-up evaluation next year.  Otherwise doing well this time.  No questions today.     Overall patient doing well stable blood pressure heart rate today and doing well current medications no other changes.  No evidence of shortness of breath..  Prior cardiac echo showed mild aortic stenosis repeat cardiac echo again next year.     Patient presents the office after hernia repair.  Complications a hematoma but stable.  History of aortic valve disease and evidence of resistant hypertension and here for evaluation blood pressure today.  The patient has a heart murmur consistent with more significant aortic stenosis repeat another echo this year.  Otherwise clinically stable this time.      Review of Systems   Constitutional: Negative for chills, diaphoresis, night sweats, weight gain and weight loss.   HENT: Negative for congestion, hoarse voice, sore throat and stridor.    Eyes: Negative for double vision and pain.   Cardiovascular: Negative for chest pain, claudication, cyanosis, dyspnea on exertion, irregular heartbeat, leg swelling, near-syncope, orthopnea, palpitations, paroxysmal nocturnal dyspnea and  syncope.   Respiratory: Negative for cough, hemoptysis, shortness of breath, sleep disturbances due to breathing, snoring, sputum production and wheezing.    Endocrine: Negative for cold intolerance, heat intolerance and polydipsia.   Hematologic/Lymphatic: Negative for bleeding problem. Does not bruise/bleed easily.   Skin: Negative for color change, dry skin and rash.   Musculoskeletal: Negative for joint swelling and muscle cramps.   Gastrointestinal: Negative for bloating, abdominal pain, constipation, diarrhea, dysphagia, melena, nausea and vomiting.   Genitourinary: Negative for flank pain and urgency.   Neurological: Negative for dizziness, focal weakness, headaches, light-headedness, loss of balance, seizures and weakness.   Psychiatric/Behavioral: Negative for altered mental status and memory loss. The patient is not nervous/anxious.      Family History   Problem Relation Age of Onset    Stomach cancer Paternal Cousin     Stomach cancer Paternal Cousin     Cataracts Mother     Diabetes Mother     Hypertension Mother     Stroke Mother     Diabetes Sister     Hypertension Sister     Stroke Sister     Diabetes Brother     Glaucoma Brother     Hypertension Brother     Stroke Brother     Diabetes Maternal Aunt     Hypertension Maternal Aunt     Stroke Maternal Aunt     Diabetes Maternal Uncle     Hypertension Maternal Uncle     Stroke Maternal Uncle     Diabetes Maternal Grandmother     Hypertension Maternal Grandmother     Stroke Maternal Grandmother     Cancer Cousin         stomach    Colon cancer Neg Hx     Colon polyps Neg Hx     Crohn's disease Neg Hx     Ulcerative colitis Neg Hx     Esophageal cancer Neg Hx     Amblyopia Neg Hx     Blindness Neg Hx     Macular degeneration Neg Hx     Retinal detachment Neg Hx     Strabismus Neg Hx     Thyroid disease Neg Hx      Past Medical History:   Diagnosis Date    Cataract     COPD (chronic obstructive pulmonary disease)      Diverticulosis     DUARTE (dyspnea on exertion)     GERD (gastroesophageal reflux disease)     Glaucoma     Hepatitis C     treated; seeing Dr. Carr    Hyperlipidemia     Hypertension     Liver lesion 08/2018    RA (rheumatoid arthritis)     Rectal prolapse     Possible     Social History     Socioeconomic History    Marital status:    Tobacco Use    Smoking status: Never Smoker    Smokeless tobacco: Never Used   Substance and Sexual Activity    Alcohol use: Yes     Comment: social    Drug use: Yes     Types: Hydrocodone     Social Determinants of Health     Food Insecurity: Food Insecurity Present    Worried About Running Out of Food in the Last Year: Sometimes true    Ran Out of Food in the Last Year: Never true   Transportation Needs: No Transportation Needs    Lack of Transportation (Medical): No    Lack of Transportation (Non-Medical): No   Physical Activity: Insufficiently Active    Days of Exercise per Week: 3 days    Minutes of Exercise per Session: 10 min   Stress: Stress Concern Present    Feeling of Stress : To some extent   Social Connections: Unknown    Frequency of Communication with Friends and Family: More than three times a week    Frequency of Social Gatherings with Friends and Family: Patient refused    Active Member of Clubs or Organizations: Patient refused    Marital Status:      Current Outpatient Medications on File Prior to Visit   Medication Sig Dispense Refill    albuterol (PROVENTIL/VENTOLIN HFA) 90 mcg/actuation inhaler Inhale 1 puff into the lungs every 4 (four) hours as needed for Wheezing or Shortness of Breath. 6.7 g 5    cholecalciferol, vitamin D3, (VITAMIN D3) 50 mcg (2,000 unit) Cap Take 1 capsule (2,000 Units total) by mouth once daily. 90 capsule 3    cyanocobalamin 500 MCG tablet Take 500 mcg by mouth once daily.      diclofenac sodium (VOLTAREN) 1 % Gel Apply 2 grams  topically 4 (four) times daily. 100 g 5    diltiaZEM (DILACOR XR)  180 MG CDCR Take 1 capsule (180 mg total) by mouth once daily. 30 capsule 11    dorzolamide (TRUSOPT) 2 % ophthalmic solution Place 1 drop into both eyes 3 (three) times daily.      enalapril (VASOTEC) 20 MG tablet TAKE 1 TABLET(20 MG) BY MOUTH TWICE DAILY 180 tablet 3    hydrALAZINE (APRESOLINE) 50 MG tablet Take 50 mg by mouth after lunch.      hydrALAZINE (APRESOLINE) 50 MG tablet Take 2 tablets (100 mg total) by mouth every 12 (twelve) hours. 360 tablet 3    [] HYDROcodone-acetaminophen (NORCO)  mg per tablet Take 1 tablet by mouth every 8 (eight) hours as needed for Pain. 90 tablet 0    Lactobac no.41/Bifidobact no.7 (PROBIOTIC-10 ORAL) Take 1 capsule by mouth once daily.      mirtazapine (REMERON) 7.5 MG Tab Take 1 tablet (7.5 mg total) by mouth every evening. 30 tablet 11    multivitamin capsule Take 1 capsule by mouth once daily.      omeprazole (PRILOSEC) 20 MG capsule TAKE 1 CAPSULE(20 MG) BY MOUTH TWICE DAILY BEFORE MEALS (Patient taking differently: Take 20 mg by mouth 2 (two) times a day.) 180 capsule 3    polyethylene glycol (GLYCOLAX) 17 gram PwPk Take 17 g by mouth once daily.      pravastatin (PRAVACHOL) 20 MG tablet TAKE 1 TABLET BY MOUTH DAILY (Patient taking differently: Take 20 mg by mouth once daily.) 90 tablet 3    predniSONE (DELTASONE) 5 MG tablet TAKE 2 TABLETS BY MOUTH EVERY DAY AS NEEDED (Patient taking differently: Take 10 mg by mouth daily as needed (rheumatoid arthritis).) 60 tablet 3    sildenafiL (VIAGRA) 100 MG tablet Take 1 tablet (100 mg total) by mouth as needed for Erectile Dysfunction. (Patient taking differently: Take 100 mg by mouth daily as needed for Erectile Dysfunction.) 8 tablet 5    tamsulosin (FLOMAX) 0.4 mg Cap Take 1 capsule (0.4 mg total) by mouth once daily. 30 capsule 11    tiZANidine (ZANAFLEX) 4 MG tablet Take 1 tablet (4 mg total) by mouth every 8 (eight) hours. 270 tablet 1    travoprost (TRAVATAN Z) 0.004 % ophthalmic solution  Place 1 drop into both eyes every evening. 5 mL 12     Current Facility-Administered Medications on File Prior to Visit   Medication Dose Route Frequency Provider Last Rate Last Admin    0.9%  NaCl infusion   Intravenous Continuous Deedee Sarkar NP   Stopped at 10/18/18 1613    mupirocin 2 % ointment   Nasal On Call Procedure Deedee Sarkar NP   Given at 10/18/18 1348     Review of patient's allergies indicates:   Allergen Reactions    Buspar [buspirone] Hallucinations     Nightmares    Fentanyl Hives and Hallucinations    Plaquenil [hydroxychloroquine]      Nausea, insomnia, weight loss 40LBS    Amitiza [lubiprostone] Nausea Only and Other (See Comments)     Fatigue.    Azulfidine [sulfasalazine] Nausea And Vomiting       Objective:     Physical Exam  Eyes:      Pupils: Pupils are equal, round, and reactive to light.   Neck:      Trachea: No tracheal deviation.   Cardiovascular:      Rate and Rhythm: Normal rate and regular rhythm.      Pulses: Intact distal pulses.           Carotid pulses are 2+ on the right side and 2+ on the left side.       Radial pulses are 2+ on the right side and 2+ on the left side.        Femoral pulses are 2+ on the right side and 2+ on the left side.       Popliteal pulses are 2+ on the right side and 2+ on the left side.        Dorsalis pedis pulses are 2+ on the right side and 2+ on the left side.        Posterior tibial pulses are 2+ on the right side and 2+ on the left side.      Heart sounds: Murmur heard.    Harsh midsystolic murmur is present with a grade of 2/6 at the upper right sternal border radiating to the neck.  No friction rub. No gallop.    Pulmonary:      Effort: Pulmonary effort is normal. No respiratory distress.      Breath sounds: Normal breath sounds. No stridor. No wheezing or rales.   Chest:      Chest wall: No tenderness.   Abdominal:      General: There is no distension.      Tenderness: There is no abdominal tenderness. There is no rebound.    Musculoskeletal:         General: No tenderness or edema.      Cervical back: Normal range of motion.   Skin:     General: Skin is warm and dry.   Neurological:      Mental Status: He is alert and oriented to person, place, and time.         Assessment:     1. Inguinal hernia of right side without obstruction or gangrene    2. Iron deficiency anemia due to chronic blood loss    3. Dyspnea on exertion    4. Resistant hypertension    5. Other emphysema    6. Moderate aortic stenosis    7. Moderate aortic regurgitation    8. Atherosclerosis of native coronary artery of native heart with angina pectoris    9. Thrombocytopenia    10. Atherosclerosis of native coronary artery of native heart without angina pectoris    11. Systolic murmur        Plan:     Inguinal hernia of right side without obstruction or gangrene    Iron deficiency anemia due to chronic blood loss    Dyspnea on exertion    Resistant hypertension    Other emphysema    Moderate aortic stenosis    Moderate aortic regurgitation    Atherosclerosis of native coronary artery of native heart with angina pectoris    Thrombocytopenia    Atherosclerosis of native coronary artery of native heart without angina pectoris    Systolic murmur    Impression 1. Aortic stenosis:  Seems to be worse by exam however physically he is doing well.  Recent surgery and stable.  Will plan on a cardiac echo this year after the anniversary of the last echo.  Clinically otherwise stable  2. Coronary disease stable  3. Hypertension stable good blood pressure    4. Hypercholesterolemia stable  All parameters reviewed all medications reviewed and renewed as necessary follow-up evaluation in several months after next cardiac echo was done.  Comparison with prior echoes will be done at that time

## 2022-02-04 ENCOUNTER — OFFICE VISIT (OUTPATIENT)
Dept: CARDIOLOGY | Facility: CLINIC | Age: 72
End: 2022-02-04
Payer: MEDICARE

## 2022-02-04 VITALS
OXYGEN SATURATION: 95 % | WEIGHT: 197.19 LBS | BODY MASS INDEX: 26.71 KG/M2 | DIASTOLIC BLOOD PRESSURE: 58 MMHG | HEIGHT: 72 IN | SYSTOLIC BLOOD PRESSURE: 122 MMHG | HEART RATE: 88 BPM

## 2022-02-04 DIAGNOSIS — D69.6 THROMBOCYTOPENIA: ICD-10-CM

## 2022-02-04 DIAGNOSIS — R01.1 SYSTOLIC MURMUR: ICD-10-CM

## 2022-02-04 DIAGNOSIS — I25.119 ATHEROSCLEROSIS OF NATIVE CORONARY ARTERY OF NATIVE HEART WITH ANGINA PECTORIS: ICD-10-CM

## 2022-02-04 DIAGNOSIS — I1A.0 RESISTANT HYPERTENSION: ICD-10-CM

## 2022-02-04 DIAGNOSIS — I35.1 MODERATE AORTIC REGURGITATION: ICD-10-CM

## 2022-02-04 DIAGNOSIS — I35.0 MODERATE AORTIC STENOSIS: Primary | ICD-10-CM

## 2022-02-04 DIAGNOSIS — R06.09 DYSPNEA ON EXERTION: ICD-10-CM

## 2022-02-04 DIAGNOSIS — J43.8 OTHER EMPHYSEMA: ICD-10-CM

## 2022-02-04 DIAGNOSIS — D50.0 IRON DEFICIENCY ANEMIA DUE TO CHRONIC BLOOD LOSS: ICD-10-CM

## 2022-02-04 DIAGNOSIS — I25.10 ATHEROSCLEROSIS OF NATIVE CORONARY ARTERY OF NATIVE HEART WITHOUT ANGINA PECTORIS: ICD-10-CM

## 2022-02-04 DIAGNOSIS — K40.90 INGUINAL HERNIA OF RIGHT SIDE WITHOUT OBSTRUCTION OR GANGRENE: ICD-10-CM

## 2022-02-04 PROCEDURE — 3008F BODY MASS INDEX DOCD: CPT | Mod: CPTII,S$GLB,, | Performed by: INTERNAL MEDICINE

## 2022-02-04 PROCEDURE — 1159F MED LIST DOCD IN RCRD: CPT | Mod: CPTII,S$GLB,, | Performed by: INTERNAL MEDICINE

## 2022-02-04 PROCEDURE — 99499 RISK ADDL DX/OHS AUDIT: ICD-10-PCS | Mod: S$GLB,,, | Performed by: INTERNAL MEDICINE

## 2022-02-04 PROCEDURE — 3074F PR MOST RECENT SYSTOLIC BLOOD PRESSURE < 130 MM HG: ICD-10-PCS | Mod: CPTII,S$GLB,, | Performed by: INTERNAL MEDICINE

## 2022-02-04 PROCEDURE — 99999 PR PBB SHADOW E&M-EST. PATIENT-LVL V: ICD-10-PCS | Mod: PBBFAC,,, | Performed by: INTERNAL MEDICINE

## 2022-02-04 PROCEDURE — 3074F SYST BP LT 130 MM HG: CPT | Mod: CPTII,S$GLB,, | Performed by: INTERNAL MEDICINE

## 2022-02-04 PROCEDURE — 1159F PR MEDICATION LIST DOCUMENTED IN MEDICAL RECORD: ICD-10-PCS | Mod: CPTII,S$GLB,, | Performed by: INTERNAL MEDICINE

## 2022-02-04 PROCEDURE — 99214 PR OFFICE/OUTPT VISIT, EST, LEVL IV, 30-39 MIN: ICD-10-PCS | Mod: S$GLB,,, | Performed by: INTERNAL MEDICINE

## 2022-02-04 PROCEDURE — 1101F PT FALLS ASSESS-DOCD LE1/YR: CPT | Mod: CPTII,S$GLB,, | Performed by: INTERNAL MEDICINE

## 2022-02-04 PROCEDURE — 99999 PR PBB SHADOW E&M-EST. PATIENT-LVL V: CPT | Mod: PBBFAC,,, | Performed by: INTERNAL MEDICINE

## 2022-02-04 PROCEDURE — 3078F DIAST BP <80 MM HG: CPT | Mod: CPTII,S$GLB,, | Performed by: INTERNAL MEDICINE

## 2022-02-04 PROCEDURE — 3288F PR FALLS RISK ASSESSMENT DOCUMENTED: ICD-10-PCS | Mod: CPTII,S$GLB,, | Performed by: INTERNAL MEDICINE

## 2022-02-04 PROCEDURE — 99499 UNLISTED E&M SERVICE: CPT | Mod: S$GLB,,, | Performed by: INTERNAL MEDICINE

## 2022-02-04 PROCEDURE — 3078F PR MOST RECENT DIASTOLIC BLOOD PRESSURE < 80 MM HG: ICD-10-PCS | Mod: CPTII,S$GLB,, | Performed by: INTERNAL MEDICINE

## 2022-02-04 PROCEDURE — 1101F PR PT FALLS ASSESS DOC 0-1 FALLS W/OUT INJ PAST YR: ICD-10-PCS | Mod: CPTII,S$GLB,, | Performed by: INTERNAL MEDICINE

## 2022-02-04 PROCEDURE — 99214 OFFICE O/P EST MOD 30 MIN: CPT | Mod: S$GLB,,, | Performed by: INTERNAL MEDICINE

## 2022-02-04 PROCEDURE — 1125F PR PAIN SEVERITY QUANTIFIED, PAIN PRESENT: ICD-10-PCS | Mod: CPTII,S$GLB,, | Performed by: INTERNAL MEDICINE

## 2022-02-04 PROCEDURE — 3288F FALL RISK ASSESSMENT DOCD: CPT | Mod: CPTII,S$GLB,, | Performed by: INTERNAL MEDICINE

## 2022-02-04 PROCEDURE — 1125F AMNT PAIN NOTED PAIN PRSNT: CPT | Mod: CPTII,S$GLB,, | Performed by: INTERNAL MEDICINE

## 2022-02-04 PROCEDURE — 3008F PR BODY MASS INDEX (BMI) DOCUMENTED: ICD-10-PCS | Mod: CPTII,S$GLB,, | Performed by: INTERNAL MEDICINE

## 2022-02-05 RX ORDER — HYDROCODONE BITARTRATE AND ACETAMINOPHEN 10; 325 MG/1; MG/1
1 TABLET ORAL EVERY 8 HOURS PRN
Qty: 90 TABLET | Refills: 0 | Status: SHIPPED | OUTPATIENT
Start: 2022-02-05 | End: 2022-02-14 | Stop reason: SDUPTHER

## 2022-02-14 ENCOUNTER — OFFICE VISIT (OUTPATIENT)
Dept: RHEUMATOLOGY | Facility: CLINIC | Age: 72
End: 2022-02-14
Payer: MEDICARE

## 2022-02-14 DIAGNOSIS — M17.9 OSTEOARTHRITIS OF KNEE, UNSPECIFIED LATERALITY, UNSPECIFIED OSTEOARTHRITIS TYPE: ICD-10-CM

## 2022-02-14 DIAGNOSIS — G89.4 CHRONIC PAIN SYNDROME: ICD-10-CM

## 2022-02-14 DIAGNOSIS — Z79.52 CURRENT CHRONIC USE OF SYSTEMIC STEROIDS: ICD-10-CM

## 2022-02-14 DIAGNOSIS — M05.9 SEROPOSITIVE RHEUMATOID ARTHRITIS: Primary | ICD-10-CM

## 2022-02-14 PROCEDURE — 4010F ACE/ARB THERAPY RXD/TAKEN: CPT | Mod: CPTII,95,, | Performed by: PHYSICIAN ASSISTANT

## 2022-02-14 PROCEDURE — 99499 RISK ADDL DX/OHS AUDIT: ICD-10-PCS | Mod: 95,,, | Performed by: PHYSICIAN ASSISTANT

## 2022-02-14 PROCEDURE — 99499 UNLISTED E&M SERVICE: CPT | Mod: 95,,, | Performed by: PHYSICIAN ASSISTANT

## 2022-02-14 PROCEDURE — 4010F PR ACE/ARB THEARPY RXD/TAKEN: ICD-10-PCS | Mod: CPTII,95,, | Performed by: PHYSICIAN ASSISTANT

## 2022-02-14 PROCEDURE — 99213 PR OFFICE/OUTPT VISIT, EST, LEVL III, 20-29 MIN: ICD-10-PCS | Mod: 95,,, | Performed by: PHYSICIAN ASSISTANT

## 2022-02-14 PROCEDURE — 1159F PR MEDICATION LIST DOCUMENTED IN MEDICAL RECORD: ICD-10-PCS | Mod: CPTII,95,, | Performed by: PHYSICIAN ASSISTANT

## 2022-02-14 PROCEDURE — 99213 OFFICE O/P EST LOW 20 MIN: CPT | Mod: 95,,, | Performed by: PHYSICIAN ASSISTANT

## 2022-02-14 PROCEDURE — 1159F MED LIST DOCD IN RCRD: CPT | Mod: CPTII,95,, | Performed by: PHYSICIAN ASSISTANT

## 2022-02-14 PROCEDURE — 1160F PR REVIEW ALL MEDS BY PRESCRIBER/CLIN PHARMACIST DOCUMENTED: ICD-10-PCS | Mod: CPTII,95,, | Performed by: PHYSICIAN ASSISTANT

## 2022-02-14 PROCEDURE — 1160F RVW MEDS BY RX/DR IN RCRD: CPT | Mod: CPTII,95,, | Performed by: PHYSICIAN ASSISTANT

## 2022-02-14 RX ORDER — PREDNISONE 5 MG/1
TABLET ORAL
Qty: 60 TABLET | Refills: 3 | Status: SHIPPED | OUTPATIENT
Start: 2022-02-14 | End: 2022-04-07

## 2022-02-14 NOTE — Clinical Note
Link esr/crp and dr. Morales labs October 2021 order to his appt in May for labs. Let me know if I need to re-enter anything F/u with me 4+ juan jose w/his wife visit as well

## 2022-02-14 NOTE — PROGRESS NOTES
The patient location is: home  The chief complaint leading to consultation is: RA    Visit type: audiovisual    Face to Face time with patient: 6 minutes  15 minutes of total time spent on the encounter, which includes face to face time and non-face to face time preparing to see the patient (eg, review of tests), Obtaining and/or reviewing separately obtained history, Documenting clinical information in the electronic or other health record, Independently interpreting results (not separately reported) and communicating results to the patient/family/caregiver, or Care coordination (not separately reported).     Each patient to whom he or she provides medical services by telemedicine is:  (1) informed of the relationship between the physician and patient and the respective role of any other health care provider with respect to management of the patient; and (2) notified that he or she may decline to receive medical services by telemedicine and may withdraw from such care at any time.    Notes:     Subjective:       Patient ID: Scooter Swan is a 71 y.o. male.    Chief Complaint: Disease Management    Mr. Swan is a 71 year old male who presents to telemedicine visit for follow up on seropositive rheumatoid arthritis and osteoarthritis. He is doing fair overall. He continues to have pain in his hands, elbows, knees, and ankles. He has significant morning stiffness and he is taking prednisone 10 mg daily. Norco is providing adequate control of his pain without side effects. BP has stabilized. He is scheduled for labs in May 22.    He was admitted to pneumonia s/p hernia surgery in November.     Current treatment:  1. Norco TID PRN  2. Prednisone 10 mg  3. Zanaflex PRN    Prior treatment:  1. Plaquenil (WEIGHT LOSS)  2. SSZ caused GI upset    Review of Systems   Constitutional: Positive for activity change. Negative for chills, fatigue and fever.   Eyes: Negative for visual disturbance.   Respiratory: Negative for cough,  shortness of breath and wheezing.    Cardiovascular: Negative for chest pain, palpitations and leg swelling.   Gastrointestinal: Negative for abdominal pain, constipation, diarrhea, nausea and vomiting.   Musculoskeletal: Positive for arthralgias, back pain and joint swelling. Negative for myalgias.   Neurological: Negative for dizziness, syncope and headaches.   Hematological: Negative for adenopathy.         Objective:     There were no vitals filed for this visit.    Past Medical History:   Diagnosis Date    Cataract     COPD (chronic obstructive pulmonary disease)     Diverticulosis     DUARTE (dyspnea on exertion)     GERD (gastroesophageal reflux disease)     Glaucoma     Hepatitis C     treated; seeing Dr. Carr    Hyperlipidemia     Hypertension     Liver lesion 08/2018    RA (rheumatoid arthritis)     Rectal prolapse     Possible     Past Surgical History:   Procedure Laterality Date    CARPAL TUNNEL RELEASE      Right wrist 2015    COLONOSCOPY  08/2016    Dr. Cardona; in care everywhere    COLONOSCOPY N/A 3/20/2019    Procedure: COLONOSCOPY;  Surgeon: Sotero Arthur MD;  [facility];  Service: Endoscopy;  Laterality: N/A; hemorrhoids, diverticulosis, repeat in 10 years for screening    EXCISIONAL HEMORRHOIDECTOMY N/A 10/18/2018    Procedure: HEMORRHOIDECTOMY, prone;  Surgeon: Gunnar Horton MD;  Location: [facility] OR [address] [place] [address];  Service: Colon and Rectal;  Laterality: N/A;    THYROID SURGERY      UPPER GASTROINTESTINAL ENDOSCOPY  08/2016    Dr. Cardona; in care everywhere          Physical Exam  Psychiatric: Mood and affect normal.     Labs:  Component      Latest Ref Rng & Units 10/26/2021 10/25/2021   Sodium      136 - 145 mmol/L  136   Potassium      3.5 - 5.1 mmol/L  3.7   Chloride      95 - 110 mmol/L  105   CO2      22 - 31 mmol/L  27   Glucose      70 - 110 mg/dL  123 (H)   BUN      9 - 21 mg/dL  24 (H)   Creatinine      0.50 - 1.40 mg/dL  1.10   Calcium      8.4 - 10.2 mg/dL  " 8.5   PROTEIN TOTAL      6.0 - 8.4 g/dL  6.2   Albumin      3.5 - 5.2 g/dL  3.4 (L)   BILIRUBIN TOTAL      0.2 - 1.3 mg/dL  0.9   Alkaline Phosphatase      38 - 145 U/L  47   AST      17 - 59 U/L  27   ALT      0 - 50 U/L  8   Anion Gap      8 - 16 mmol/L  4 (L)   eGFR if African American      >60 mL/min/1.73 m:2  >60   eGFR if non African American      >60 mL/min/1.73 m:2  >60   WBC      3.90 - 12.70 K/uL 11.17    RBC      4.60 - 6.20 M/uL 3.32 (L)    Hemoglobin      14.0 - 18.0 g/dL 9.3 (L)    Hematocrit      40.0 - 54.0 % 30.2 (L)    MCV      82 - 98 fL 91    MCH      27.0 - 31.0 pg 28.0    MCHC      32.0 - 36.0 g/dL 30.8 (L)    RDW      11.5 - 14.5 % 13.7    Platelets      150 - 450 K/uL 123 (L)    MPV      9.2 - 12.9 fL 11.8      Assessment:       1. Seropositive rheumatoid arthritis    2. Current chronic use of systemic steroids    3. Chronic pain syndrome            Plan:       Seropositive rheumatoid arthritis  -     C-Reactive Protein; Future; Expected date: 02/14/2022  -     Sedimentation rate; Future; Expected date: 02/14/2022    Current chronic use of systemic steroids    Chronic pain syndrome        Assessment:  71 year old male with  Seropositive (+RF) rheumatoid arthritis, osteoarthritis on prednisone 10 mg  --thrombocytopenia, anemia   --hx of hep c s/p glen, last hep c viral load undetectable  --chronic pain syndrome on norco  --abnormal CT abd 6/2019 "Multiple areas of early bright arterial enhancement not seen on the later images.."    Plan:  1. Cont. Prednisone 5-10 mg daily PRN  2. Continue norco PRN per Dr. Morales. I have checked [place] prescription monitoring program site and no unusual or abnormal behavior has occurred pt understand the risk and benefits of taking opioid medications and has decided to continue the medication.  reviewed.  3. Cont tizanidine prn, voltaren gel prn  4. Nurse injections if needed prior to next visit  5. Link labs to May appt    Follow up:  4 mo w/labs " prior

## 2022-02-19 RX ORDER — HYDROCODONE BITARTRATE AND ACETAMINOPHEN 10; 325 MG/1; MG/1
1 TABLET ORAL EVERY 8 HOURS PRN
Qty: 90 TABLET | Refills: 0 | Status: SHIPPED | OUTPATIENT
Start: 2022-04-04 | End: 2022-05-03 | Stop reason: SDUPTHER

## 2022-02-19 RX ORDER — HYDROCODONE BITARTRATE AND ACETAMINOPHEN 10; 325 MG/1; MG/1
1 TABLET ORAL EVERY 8 HOURS PRN
Qty: 90 TABLET | Refills: 0 | Status: SHIPPED | OUTPATIENT
Start: 2022-05-04 | End: 2022-06-03

## 2022-02-19 RX ORDER — HYDROCODONE BITARTRATE AND ACETAMINOPHEN 10; 325 MG/1; MG/1
1 TABLET ORAL EVERY 8 HOURS PRN
Qty: 90 TABLET | Refills: 0 | Status: SHIPPED | OUTPATIENT
Start: 2022-03-05 | End: 2022-04-04

## 2022-02-21 ENCOUNTER — HOSPITAL ENCOUNTER (OUTPATIENT)
Dept: RADIOLOGY | Facility: HOSPITAL | Age: 72
Discharge: HOME | End: 2022-02-21
Attending: INTERNAL MEDICINE
Payer: MEDICARE

## 2022-02-21 DIAGNOSIS — R06.02 SHORTNESS OF BREATH: ICD-10-CM

## 2022-02-21 PROCEDURE — 71045 XR CHEST 1 VIEW: ICD-10-PCS | Mod: 26,,, | Performed by: RADIOLOGY

## 2022-02-21 PROCEDURE — 71045 X-RAY EXAM CHEST 1 VIEW: CPT | Mod: 26,,, | Performed by: RADIOLOGY

## 2022-02-21 PROCEDURE — 71045 X-RAY EXAM CHEST 1 VIEW: CPT | Mod: TC,PO

## 2022-02-21 NOTE — TELEPHONE ENCOUNTER
----- Message from Veronica Alexandra sent at 2/7/2019  1:51 PM CST -----  Contact: wife   Wife Jacqueline Swan [phone] is requesting a refill on patient's Hydrocodone 10/325mg/please advise     [facility] [id number] - [place] - [address] AT [facility] [place] [id number]  Phone: [phone] Fax: [phone]       NEGATIVE

## 2022-02-23 DIAGNOSIS — D84.9 IMMUNOSUPPRESSED STATUS: ICD-10-CM

## 2022-03-10 ENCOUNTER — PATIENT MESSAGE (OUTPATIENT)
Dept: RHEUMATOLOGY | Facility: CLINIC | Age: 72
End: 2022-03-10
Payer: MEDICARE

## 2022-03-24 ENCOUNTER — PATIENT MESSAGE (OUTPATIENT)
Dept: CARDIOLOGY | Facility: HOSPITAL | Age: 72
End: 2022-03-24
Payer: MEDICARE

## 2022-05-03 DIAGNOSIS — M17.9 OSTEOARTHRITIS OF KNEE, UNSPECIFIED LATERALITY, UNSPECIFIED OSTEOARTHRITIS TYPE: ICD-10-CM

## 2022-05-03 DIAGNOSIS — G89.4 CHRONIC PAIN SYNDROME: ICD-10-CM

## 2022-05-03 RX ORDER — HYDROCODONE BITARTRATE AND ACETAMINOPHEN 10; 325 MG/1; MG/1
1 TABLET ORAL EVERY 8 HOURS PRN
Qty: 90 TABLET | Refills: 0 | Status: SHIPPED | OUTPATIENT
Start: 2022-05-03 | End: 2022-06-03 | Stop reason: SDUPTHER

## 2022-05-09 ENCOUNTER — PATIENT MESSAGE (OUTPATIENT)
Dept: SMOKING CESSATION | Facility: CLINIC | Age: 72
End: 2022-05-09
Payer: MEDICARE

## 2022-05-13 ENCOUNTER — HOSPITAL ENCOUNTER (OUTPATIENT)
Dept: CARDIOLOGY | Facility: HOSPITAL | Age: 72
Discharge: HOME | End: 2022-05-13
Attending: INTERNAL MEDICINE
Payer: MEDICARE

## 2022-05-13 VITALS
SYSTOLIC BLOOD PRESSURE: 122 MMHG | DIASTOLIC BLOOD PRESSURE: 58 MMHG | WEIGHT: 197 LBS | HEART RATE: 77 BPM | BODY MASS INDEX: 26.68 KG/M2 | HEIGHT: 72 IN

## 2022-05-13 DIAGNOSIS — I35.1 MODERATE AORTIC REGURGITATION: ICD-10-CM

## 2022-05-13 DIAGNOSIS — I35.0 MODERATE AORTIC STENOSIS: ICD-10-CM

## 2022-05-13 LAB
AORTIC ROOT ANNULUS: 3.92 CM
ASCENDING AORTA: 3.65 CM
AV INDEX (PROSTH): 0.31
AV MEAN GRADIENT: 61 MMHG
AV PEAK GRADIENT: 94 MMHG
AV REGURGITATION PRESSURE HALF TIME: 403.02 MS
AV VALVE AREA: 1.54 CM2
AV VELOCITY RATIO: 0.27
BSA FOR ECHO PROCEDURE: 2.13 M2
CV ECHO LV RWT: 0.59 CM
DOP CALC AO PEAK VEL: 4.84 M/S
DOP CALC AO VTI: 112.2 CM
DOP CALC LVOT AREA: 4.9 CM2
DOP CALC LVOT DIAMETER: 2.5 CM
DOP CALC LVOT PEAK VEL: 1.29 M/S
DOP CALC LVOT STROKE VOLUME: 173.19 CM3
DOP CALC RVOT PEAK VEL: 0.74 M/S
DOP CALC RVOT VTI: 16.1 CM
DOP CALCLVOT PEAK VEL VTI: 35.3 CM
E WAVE DECELERATION TIME: 329.44 MSEC
E/A RATIO: 0.61
E/E' RATIO: 11.5 M/S
ECHO EF ESTIMATED: 72 %
ECHO LV POSTERIOR WALL: 1.46 CM (ref 0.6–1.1)
EJECTION FRACTION: 55 %
FRACTIONAL SHORTENING: 41 % (ref 28–44)
INTERVENTRICULAR SEPTUM: 1.35 CM (ref 0.6–1.1)
IVRT: 137.01 MSEC
LA MAJOR: 6.02 CM
LA MINOR: 5.89 CM
LA WIDTH: 4.1 CM
LEFT ATRIUM SIZE: 4.1 CM
LEFT ATRIUM VOLUME INDEX MOD: 34.4 ML/M2
LEFT ATRIUM VOLUME INDEX: 40.1 ML/M2
LEFT ATRIUM VOLUME MOD: 73 CM3
LEFT ATRIUM VOLUME: 85.08 CM3
LEFT INTERNAL DIMENSION IN SYSTOLE: 2.88 CM (ref 2.1–4)
LEFT VENTRICLE DIASTOLIC VOLUME INDEX: 53.59 ML/M2
LEFT VENTRICLE DIASTOLIC VOLUME: 113.62 ML
LEFT VENTRICLE MASS INDEX: 134 G/M2
LEFT VENTRICLE SYSTOLIC VOLUME INDEX: 15 ML/M2
LEFT VENTRICLE SYSTOLIC VOLUME: 31.73 ML
LEFT VENTRICULAR INTERNAL DIMENSION IN DIASTOLE: 4.91 CM (ref 3.5–6)
LEFT VENTRICULAR MASS: 284.92 G
LV LATERAL E/E' RATIO: 11.5 M/S
LV SEPTAL E/E' RATIO: 11.5 M/S
LVOT MG: 4.36 MMHG
LVOT MV: 1.02 CM/S
MV PEAK A VEL: 1.14 M/S
MV PEAK E VEL: 0.69 M/S
MV STENOSIS PRESSURE HALF TIME: 95.54 MS
MV VALVE AREA P 1/2 METHOD: 2.3 CM2
PISA AR MAX VEL: 4.2 M/S
PULM VEIN S/D RATIO: 1.17
PV MEAN GRADIENT: 1.27 MMHG
PV PEAK D VEL: 0.62 M/S
PV PEAK S VEL: 0.73 M/S
PV PEAK VELOCITY: 1.19 CM/S
RA MAJOR: 4.86 CM
RA PRESSURE: 3 MMHG
RA WIDTH: 4.05 CM
RIGHT VENTRICULAR END-DIASTOLIC DIMENSION: 2.22 CM
SINUS: 3.8 CM
STJ: 3.96 CM
TDI LATERAL: 0.06 M/S
TDI SEPTAL: 0.06 M/S
TDI: 0.06 M/S
TRICUSPID ANNULAR PLANE SYSTOLIC EXCURSION: 2.37 CM

## 2022-05-13 PROCEDURE — 93306 TTE W/DOPPLER COMPLETE: CPT | Mod: PO

## 2022-05-13 PROCEDURE — 93306 ECHO (CUPID ONLY): ICD-10-PCS | Mod: 26,,, | Performed by: INTERNAL MEDICINE

## 2022-05-13 PROCEDURE — 93306 TTE W/DOPPLER COMPLETE: CPT | Mod: 26,,, | Performed by: INTERNAL MEDICINE

## 2022-05-16 ENCOUNTER — TELEPHONE (OUTPATIENT)
Dept: CARDIOLOGY | Facility: CLINIC | Age: 72
End: 2022-05-16
Payer: MEDICARE

## 2022-05-16 NOTE — TELEPHONE ENCOUNTER
Pt was contacted about results:     Heart function is good, and aortic valve is moderately narrowed. No changes, will follow.       Pt verbalized understanding with no questions or concerns.        ----- Message from Jim Gray MD sent at 5/13/2022  3:14 PM CDT -----  Heart function is good, and aortic valve is moderately narrowed. No changes, will follow.

## 2022-06-03 DIAGNOSIS — M17.9 OSTEOARTHRITIS OF KNEE, UNSPECIFIED LATERALITY, UNSPECIFIED OSTEOARTHRITIS TYPE: ICD-10-CM

## 2022-06-03 DIAGNOSIS — G89.4 CHRONIC PAIN SYNDROME: ICD-10-CM

## 2022-06-03 RX ORDER — HYDROCODONE BITARTRATE AND ACETAMINOPHEN 10; 325 MG/1; MG/1
1 TABLET ORAL EVERY 8 HOURS PRN
Qty: 90 TABLET | Refills: 0 | Status: SHIPPED | OUTPATIENT
Start: 2022-06-03 | End: 2022-06-17 | Stop reason: SDUPTHER

## 2022-06-10 ENCOUNTER — OFFICE VISIT (OUTPATIENT)
Dept: CARDIOLOGY | Facility: CLINIC | Age: 72
End: 2022-06-10
Payer: MEDICARE

## 2022-06-10 VITALS
HEART RATE: 76 BPM | SYSTOLIC BLOOD PRESSURE: 112 MMHG | WEIGHT: 192.5 LBS | BODY MASS INDEX: 26.07 KG/M2 | DIASTOLIC BLOOD PRESSURE: 54 MMHG | RESPIRATION RATE: 16 BRPM | OXYGEN SATURATION: 96 % | HEIGHT: 72 IN

## 2022-06-10 DIAGNOSIS — J43.8 OTHER EMPHYSEMA: ICD-10-CM

## 2022-06-10 DIAGNOSIS — I25.119 ATHEROSCLEROSIS OF NATIVE CORONARY ARTERY OF NATIVE HEART WITH ANGINA PECTORIS: ICD-10-CM

## 2022-06-10 DIAGNOSIS — R06.09 DYSPNEA ON EXERTION: ICD-10-CM

## 2022-06-10 DIAGNOSIS — D50.0 IRON DEFICIENCY ANEMIA DUE TO CHRONIC BLOOD LOSS: ICD-10-CM

## 2022-06-10 DIAGNOSIS — I1A.0 RESISTANT HYPERTENSION: ICD-10-CM

## 2022-06-10 DIAGNOSIS — D69.6 THROMBOCYTOPENIA: ICD-10-CM

## 2022-06-10 DIAGNOSIS — R01.1 SYSTOLIC MURMUR: ICD-10-CM

## 2022-06-10 DIAGNOSIS — E78.00 PURE HYPERCHOLESTEROLEMIA: Primary | ICD-10-CM

## 2022-06-10 DIAGNOSIS — I35.0 MODERATE AORTIC STENOSIS: ICD-10-CM

## 2022-06-10 DIAGNOSIS — I35.1 MODERATE AORTIC REGURGITATION: ICD-10-CM

## 2022-06-10 PROCEDURE — 3288F FALL RISK ASSESSMENT DOCD: CPT | Mod: CPTII,S$GLB,, | Performed by: INTERNAL MEDICINE

## 2022-06-10 PROCEDURE — 1126F PR PAIN SEVERITY QUANTIFIED, NO PAIN PRESENT: ICD-10-PCS | Mod: CPTII,S$GLB,, | Performed by: INTERNAL MEDICINE

## 2022-06-10 PROCEDURE — 3078F DIAST BP <80 MM HG: CPT | Mod: CPTII,S$GLB,, | Performed by: INTERNAL MEDICINE

## 2022-06-10 PROCEDURE — 3008F PR BODY MASS INDEX (BMI) DOCUMENTED: ICD-10-PCS | Mod: CPTII,S$GLB,, | Performed by: INTERNAL MEDICINE

## 2022-06-10 PROCEDURE — 99499 RISK ADDL DX/OHS AUDIT: ICD-10-PCS | Mod: S$GLB,,, | Performed by: INTERNAL MEDICINE

## 2022-06-10 PROCEDURE — 3288F PR FALLS RISK ASSESSMENT DOCUMENTED: ICD-10-PCS | Mod: CPTII,S$GLB,, | Performed by: INTERNAL MEDICINE

## 2022-06-10 PROCEDURE — 1159F MED LIST DOCD IN RCRD: CPT | Mod: CPTII,S$GLB,, | Performed by: INTERNAL MEDICINE

## 2022-06-10 PROCEDURE — 99499 UNLISTED E&M SERVICE: CPT | Mod: S$GLB,,, | Performed by: INTERNAL MEDICINE

## 2022-06-10 PROCEDURE — 99214 PR OFFICE/OUTPT VISIT, EST, LEVL IV, 30-39 MIN: ICD-10-PCS | Mod: S$GLB,,, | Performed by: INTERNAL MEDICINE

## 2022-06-10 PROCEDURE — 1126F AMNT PAIN NOTED NONE PRSNT: CPT | Mod: CPTII,S$GLB,, | Performed by: INTERNAL MEDICINE

## 2022-06-10 PROCEDURE — 99999 PR PBB SHADOW E&M-EST. PATIENT-LVL IV: ICD-10-PCS | Mod: PBBFAC,,, | Performed by: INTERNAL MEDICINE

## 2022-06-10 PROCEDURE — 3078F PR MOST RECENT DIASTOLIC BLOOD PRESSURE < 80 MM HG: ICD-10-PCS | Mod: CPTII,S$GLB,, | Performed by: INTERNAL MEDICINE

## 2022-06-10 PROCEDURE — 4010F PR ACE/ARB THEARPY RXD/TAKEN: ICD-10-PCS | Mod: CPTII,S$GLB,, | Performed by: INTERNAL MEDICINE

## 2022-06-10 PROCEDURE — 3008F BODY MASS INDEX DOCD: CPT | Mod: CPTII,S$GLB,, | Performed by: INTERNAL MEDICINE

## 2022-06-10 PROCEDURE — 99999 PR PBB SHADOW E&M-EST. PATIENT-LVL IV: CPT | Mod: PBBFAC,,, | Performed by: INTERNAL MEDICINE

## 2022-06-10 PROCEDURE — 3074F SYST BP LT 130 MM HG: CPT | Mod: CPTII,S$GLB,, | Performed by: INTERNAL MEDICINE

## 2022-06-10 PROCEDURE — 1101F PR PT FALLS ASSESS DOC 0-1 FALLS W/OUT INJ PAST YR: ICD-10-PCS | Mod: CPTII,S$GLB,, | Performed by: INTERNAL MEDICINE

## 2022-06-10 PROCEDURE — 3074F PR MOST RECENT SYSTOLIC BLOOD PRESSURE < 130 MM HG: ICD-10-PCS | Mod: CPTII,S$GLB,, | Performed by: INTERNAL MEDICINE

## 2022-06-10 PROCEDURE — 1159F PR MEDICATION LIST DOCUMENTED IN MEDICAL RECORD: ICD-10-PCS | Mod: CPTII,S$GLB,, | Performed by: INTERNAL MEDICINE

## 2022-06-10 PROCEDURE — 1101F PT FALLS ASSESS-DOCD LE1/YR: CPT | Mod: CPTII,S$GLB,, | Performed by: INTERNAL MEDICINE

## 2022-06-10 PROCEDURE — 4010F ACE/ARB THERAPY RXD/TAKEN: CPT | Mod: CPTII,S$GLB,, | Performed by: INTERNAL MEDICINE

## 2022-06-10 PROCEDURE — 99214 OFFICE O/P EST MOD 30 MIN: CPT | Mod: S$GLB,,, | Performed by: INTERNAL MEDICINE

## 2022-06-10 RX ORDER — DILTIAZEM HYDROCHLORIDE 180 MG/1
180 CAPSULE, EXTENDED RELEASE ORAL DAILY
Qty: 30 CAPSULE | Refills: 11 | Status: SHIPPED | OUTPATIENT
Start: 2022-06-10 | End: 2023-05-10 | Stop reason: SDUPTHER

## 2022-06-10 NOTE — PROGRESS NOTES
Subjective:   Patient ID:  Scooter Swan is a 71 y.o. male who presents for follow-up of No chief complaint on file.  Evaluation of cardiac echo shows mild aortic stenosis with moderate aortic insufficiency normal LV function.  This is described the patient today there is no urgency on follow-up at this time.  Follow-up evaluation in 3-6 months.  Repeat echos yearly.        Overall doing well stable evidence of mild aortic stenosis and aortic insufficiency good LV function doing well this time blood pressure remains stable and advised him to take the hydralazine 50 mg q.8 hours follow-up evaluation blood pressure in about a month     Stable doing well this time no acute chest discomfort or shortness breath blood pressures been a very good control on hydralazine he continues med.  Aortic stenosis is mild to moderate follow-up evaluation next year.  Otherwise doing well this time.  No questions today.     Overall patient doing well stable blood pressure heart rate today and doing well current medications no other changes.  No evidence of shortness of breath..  Prior cardiac echo showed mild aortic stenosis repeat cardiac echo again next year.     Patient presents the office after hernia repair.  Complications a hematoma but stable.  History of aortic valve disease and evidence of resistant hypertension and here for evaluation blood pressure today.  The patient has a heart murmur consistent with more significant aortic stenosis repeat another echo this year.  Otherwise clinically stable this time.     This visit finds patient feeling well all medications were reviewed and renewed as necessary.  Doing well without significant symptoms.      Review of Systems   Constitutional: Negative for chills, diaphoresis, night sweats, weight gain and weight loss.   HENT: Negative for congestion, hoarse voice, sore throat and stridor.    Eyes: Negative for double vision and pain.   Cardiovascular: Negative for chest pain, claudication,  cyanosis, dyspnea on exertion, irregular heartbeat, leg swelling, near-syncope, orthopnea, palpitations, paroxysmal nocturnal dyspnea and syncope.   Respiratory: Negative for cough, hemoptysis, shortness of breath, sleep disturbances due to breathing, snoring, sputum production and wheezing.    Endocrine: Negative for cold intolerance, heat intolerance and polydipsia.   Hematologic/Lymphatic: Negative for bleeding problem. Does not bruise/bleed easily.   Skin: Negative for color change, dry skin and rash.   Musculoskeletal: Negative for joint swelling and muscle cramps.   Gastrointestinal: Negative for bloating, abdominal pain, constipation, diarrhea, dysphagia, melena, nausea and vomiting.   Genitourinary: Negative for flank pain and urgency.   Neurological: Negative for dizziness, focal weakness, headaches, light-headedness, loss of balance, seizures and weakness.   Psychiatric/Behavioral: Negative for altered mental status and memory loss. The patient is not nervous/anxious.      Family History   Problem Relation Age of Onset    Stomach cancer Paternal Cousin     Stomach cancer Paternal Cousin     Cataracts Mother     Diabetes Mother     Hypertension Mother     Stroke Mother     Diabetes Sister     Hypertension Sister     Stroke Sister     Diabetes Brother     Glaucoma Brother     Hypertension Brother     Stroke Brother     Diabetes Maternal Aunt     Hypertension Maternal Aunt     Stroke Maternal Aunt     Diabetes Maternal Uncle     Hypertension Maternal Uncle     Stroke Maternal Uncle     Diabetes Maternal Grandmother     Hypertension Maternal Grandmother     Stroke Maternal Grandmother     Cancer Cousin         stomach    Colon cancer Neg Hx     Colon polyps Neg Hx     Crohn's disease Neg Hx     Ulcerative colitis Neg Hx     Esophageal cancer Neg Hx     Amblyopia Neg Hx     Blindness Neg Hx     Macular degeneration Neg Hx     Retinal detachment Neg Hx     Strabismus Neg Hx      Thyroid disease Neg Hx      Past Medical History:   Diagnosis Date    Cataract     COPD (chronic obstructive pulmonary disease)     Diverticulosis     DUARTE (dyspnea on exertion)     GERD (gastroesophageal reflux disease)     Glaucoma     Hepatitis C     treated; seeing Dr. Carr    Hyperlipidemia     Hypertension     Liver lesion 08/2018    RA (rheumatoid arthritis)     Rectal prolapse     Possible     Social History     Socioeconomic History    Marital status:    Tobacco Use    Smoking status: Never Smoker    Smokeless tobacco: Never Used   Substance and Sexual Activity    Alcohol use: Yes     Comment: social    Drug use: Yes     Types: Hydrocodone     Current Outpatient Medications on File Prior to Visit   Medication Sig Dispense Refill    albuterol (PROVENTIL/VENTOLIN HFA) 90 mcg/actuation inhaler Inhale 1 puff into the lungs every 4 (four) hours as needed for Wheezing or Shortness of Breath. 6.7 g 5    cholecalciferol, vitamin D3, (VITAMIN D3) 50 mcg (2,000 unit) Cap Take 1 capsule (2,000 Units total) by mouth once daily. 90 capsule 3    cyanocobalamin 500 MCG tablet Take 500 mcg by mouth once daily.      diclofenac sodium (VOLTAREN) 1 % Gel Apply 2 grams  topically 4 (four) times daily. 100 g 5    diltiaZEM (DILACOR XR) 180 MG CDCR Take 1 capsule (180 mg total) by mouth once daily. 30 capsule 11    dorzolamide (TRUSOPT) 2 % ophthalmic solution Place 1 drop into both eyes 3 (three) times daily.      enalapril (VASOTEC) 20 MG tablet TAKE 1 TABLET(20 MG) BY MOUTH TWICE DAILY 180 tablet 3    hydrALAZINE (APRESOLINE) 50 MG tablet Take 2 tablets (100 mg total) by mouth every 12 (twelve) hours. 360 tablet 3    HYDROcodone-acetaminophen (NORCO)  mg per tablet Take 1 tablet by mouth every 8 (eight) hours as needed for Pain. 90 tablet 0    Lactobac no.41/Bifidobact no.7 (PROBIOTIC-10 ORAL) Take 1 capsule by mouth once daily.      multivitamin capsule Take 1 capsule by mouth once  daily.      omeprazole (PRILOSEC) 20 MG capsule TAKE 1 CAPSULE(20 MG) BY MOUTH TWICE DAILY BEFORE MEALS (Patient taking differently: Take 20 mg by mouth 2 (two) times a day.) 180 capsule 3    polyethylene glycol (GLYCOLAX) 17 gram PwPk Take 17 g by mouth once daily.      pravastatin (PRAVACHOL) 20 MG tablet TAKE 1 TABLET BY MOUTH DAILY (Patient taking differently: Take 20 mg by mouth once daily.) 90 tablet 3    predniSONE (DELTASONE) 5 MG tablet TAKE 2 TABLETS BY MOUTH EVERY DAY AS NEEDED 60 tablet 3    sildenafiL (VIAGRA) 100 MG tablet Take 1 tablet (100 mg total) by mouth as needed for Erectile Dysfunction. (Patient taking differently: Take 100 mg by mouth daily as needed for Erectile Dysfunction.) 8 tablet 5    tamsulosin (FLOMAX) 0.4 mg Cap Take 1 capsule (0.4 mg total) by mouth once daily. 30 capsule 11    tiZANidine (ZANAFLEX) 4 MG tablet TAKE 1 TABLET(4 MG) BY MOUTH EVERY 8 HOURS 270 tablet 1    travoprost (TRAVATAN Z) 0.004 % ophthalmic solution Place 1 drop into both eyes every evening. 5 mL 12     Current Facility-Administered Medications on File Prior to Visit   Medication Dose Route Frequency Provider Last Rate Last Admin    0.9%  NaCl infusion   Intravenous Continuous Deedee Sarkar NP   Stopped at 10/18/18 1613    mupirocin 2 % ointment   Nasal On Call Procedure Deedee Sarkar NP   Given at 10/18/18 1348     Review of patient's allergies indicates:   Allergen Reactions    Buspar [buspirone] Hallucinations     Nightmares    Fentanyl Hives and Hallucinations    Plaquenil [hydroxychloroquine]      Nausea, insomnia, weight loss 40LBS    Amitiza [lubiprostone] Nausea Only and Other (See Comments)     Fatigue.    Azulfidine [sulfasalazine] Nausea And Vomiting       Objective:     Physical Exam  Eyes:      Pupils: Pupils are equal, round, and reactive to light.   Neck:      Trachea: No tracheal deviation.   Cardiovascular:      Rate and Rhythm: Normal rate and regular rhythm.       Pulses: Intact distal pulses.           Carotid pulses are 2+ on the right side and 2+ on the left side.       Radial pulses are 2+ on the right side and 2+ on the left side.        Femoral pulses are 2+ on the right side and 2+ on the left side.       Popliteal pulses are 2+ on the right side and 2+ on the left side.        Dorsalis pedis pulses are 2+ on the right side and 2+ on the left side.        Posterior tibial pulses are 2+ on the right side and 2+ on the left side.      Heart sounds: Normal heart sounds. No murmur heard.    No friction rub. No gallop.   Pulmonary:      Effort: Pulmonary effort is normal. No respiratory distress.      Breath sounds: Normal breath sounds. No stridor. No wheezing or rales.   Chest:      Chest wall: No tenderness.   Abdominal:      General: There is no distension.      Tenderness: There is no abdominal tenderness. There is no rebound.   Musculoskeletal:         General: No tenderness.      Cervical back: Normal range of motion.   Skin:     General: Skin is warm and dry.   Neurological:      Mental Status: He is alert and oriented to person, place, and time.     Cardiac echo 02/04/2022:  · The left ventricle is normal in size with concentric hypertrophy and normal systolic function.  · Mild left atrial enlargement.  · Mild pulmonic regurgitation.  · The estimated ejection fraction is 55%.  · Indeterminate left ventricular diastolic function.  · Normal right ventricular size with normal right ventricular systolic function.  · Moderate aortic regurgitation.  · There is mild-to-moderate aortic valve stenosis.  · Aortic valve area is 1.54 cm2; peak velocity is 4.84 m/s; mean gradient is 61 mmHg.  · Normal central venous pressure (3 mmHg).         Assessment:     1. Pure hypercholesterolemia    2. Resistant hypertension    3. Moderate aortic stenosis    4. Dyspnea on exertion    5. Thrombocytopenia    6. Moderate aortic regurgitation    7. Other emphysema    8. Systolic murmur    9.  Iron deficiency anemia due to chronic blood loss    10. Atherosclerosis of native coronary artery of native heart with angina pectoris        Plan:     Pure hypercholesterolemia    Resistant hypertension    Moderate aortic stenosis    Dyspnea on exertion    Thrombocytopenia    Moderate aortic regurgitation    Other emphysema    Systolic murmur    Iron deficiency anemia due to chronic blood loss    Atherosclerosis of native coronary artery of native heart with angina pectoris    Impression 1 emphysema stable   2. Aortic regurgitation stable repeat echo next year  3. CAD stable no angina.  All questions answered patient doing well chronic medications including hydralazine 50 mg q12 hours, now will 20 mg daily, pravastatin 20 mg daily.

## 2022-06-17 ENCOUNTER — OFFICE VISIT (OUTPATIENT)
Dept: RHEUMATOLOGY | Facility: CLINIC | Age: 72
End: 2022-06-17
Payer: MEDICARE

## 2022-06-17 VITALS
HEIGHT: 72 IN | WEIGHT: 191 LBS | SYSTOLIC BLOOD PRESSURE: 153 MMHG | DIASTOLIC BLOOD PRESSURE: 79 MMHG | HEART RATE: 78 BPM | BODY MASS INDEX: 25.87 KG/M2

## 2022-06-17 DIAGNOSIS — G89.4 CHRONIC PAIN SYNDROME: ICD-10-CM

## 2022-06-17 DIAGNOSIS — M05.742 RHEUMATOID ARTHRITIS INVOLVING BOTH HANDS WITH POSITIVE RHEUMATOID FACTOR: Primary | ICD-10-CM

## 2022-06-17 DIAGNOSIS — Z79.52 CURRENT CHRONIC USE OF SYSTEMIC STEROIDS: ICD-10-CM

## 2022-06-17 DIAGNOSIS — J43.9 PULMONARY EMPHYSEMA, UNSPECIFIED EMPHYSEMA TYPE: ICD-10-CM

## 2022-06-17 DIAGNOSIS — M05.741 RHEUMATOID ARTHRITIS INVOLVING BOTH HANDS WITH POSITIVE RHEUMATOID FACTOR: Primary | ICD-10-CM

## 2022-06-17 DIAGNOSIS — D69.6 THROMBOPENIA: ICD-10-CM

## 2022-06-17 DIAGNOSIS — M05.9 SEROPOSITIVE RHEUMATOID ARTHRITIS: ICD-10-CM

## 2022-06-17 DIAGNOSIS — F51.01 PRIMARY INSOMNIA: ICD-10-CM

## 2022-06-17 DIAGNOSIS — M17.9 OSTEOARTHRITIS OF KNEE, UNSPECIFIED LATERALITY, UNSPECIFIED OSTEOARTHRITIS TYPE: ICD-10-CM

## 2022-06-17 DIAGNOSIS — D61.818 PANCYTOPENIA: ICD-10-CM

## 2022-06-17 PROCEDURE — 99999 PR PBB SHADOW E&M-EST. PATIENT-LVL IV: ICD-10-PCS | Mod: PBBFAC,,, | Performed by: INTERNAL MEDICINE

## 2022-06-17 PROCEDURE — 99215 OFFICE O/P EST HI 40 MIN: CPT | Mod: 25,S$GLB,, | Performed by: INTERNAL MEDICINE

## 2022-06-17 PROCEDURE — 99499 UNLISTED E&M SERVICE: CPT | Mod: S$GLB,,, | Performed by: INTERNAL MEDICINE

## 2022-06-17 PROCEDURE — 4010F ACE/ARB THERAPY RXD/TAKEN: CPT | Mod: CPTII,S$GLB,, | Performed by: INTERNAL MEDICINE

## 2022-06-17 PROCEDURE — 99215 PR OFFICE/OUTPT VISIT, EST, LEVL V, 40-54 MIN: ICD-10-PCS | Mod: 25,S$GLB,, | Performed by: INTERNAL MEDICINE

## 2022-06-17 PROCEDURE — 4010F PR ACE/ARB THEARPY RXD/TAKEN: ICD-10-PCS | Mod: CPTII,S$GLB,, | Performed by: INTERNAL MEDICINE

## 2022-06-17 PROCEDURE — 96372 PR INJECTION,THERAP/PROPH/DIAG2ST, IM OR SUBCUT: ICD-10-PCS | Mod: S$GLB,,, | Performed by: INTERNAL MEDICINE

## 2022-06-17 PROCEDURE — 1101F PR PT FALLS ASSESS DOC 0-1 FALLS W/OUT INJ PAST YR: ICD-10-PCS | Mod: CPTII,S$GLB,, | Performed by: INTERNAL MEDICINE

## 2022-06-17 PROCEDURE — 1125F AMNT PAIN NOTED PAIN PRSNT: CPT | Mod: CPTII,S$GLB,, | Performed by: INTERNAL MEDICINE

## 2022-06-17 PROCEDURE — 1101F PT FALLS ASSESS-DOCD LE1/YR: CPT | Mod: CPTII,S$GLB,, | Performed by: INTERNAL MEDICINE

## 2022-06-17 PROCEDURE — 3077F SYST BP >= 140 MM HG: CPT | Mod: CPTII,S$GLB,, | Performed by: INTERNAL MEDICINE

## 2022-06-17 PROCEDURE — 99499 RISK ADDL DX/OHS AUDIT: ICD-10-PCS | Mod: S$GLB,,, | Performed by: INTERNAL MEDICINE

## 2022-06-17 PROCEDURE — 3077F PR MOST RECENT SYSTOLIC BLOOD PRESSURE >= 140 MM HG: ICD-10-PCS | Mod: CPTII,S$GLB,, | Performed by: INTERNAL MEDICINE

## 2022-06-17 PROCEDURE — 1159F MED LIST DOCD IN RCRD: CPT | Mod: CPTII,S$GLB,, | Performed by: INTERNAL MEDICINE

## 2022-06-17 PROCEDURE — 1159F PR MEDICATION LIST DOCUMENTED IN MEDICAL RECORD: ICD-10-PCS | Mod: CPTII,S$GLB,, | Performed by: INTERNAL MEDICINE

## 2022-06-17 PROCEDURE — 99999 PR PBB SHADOW E&M-EST. PATIENT-LVL IV: CPT | Mod: PBBFAC,,, | Performed by: INTERNAL MEDICINE

## 2022-06-17 PROCEDURE — 3078F PR MOST RECENT DIASTOLIC BLOOD PRESSURE < 80 MM HG: ICD-10-PCS | Mod: CPTII,S$GLB,, | Performed by: INTERNAL MEDICINE

## 2022-06-17 PROCEDURE — 3078F DIAST BP <80 MM HG: CPT | Mod: CPTII,S$GLB,, | Performed by: INTERNAL MEDICINE

## 2022-06-17 PROCEDURE — 3288F PR FALLS RISK ASSESSMENT DOCUMENTED: ICD-10-PCS | Mod: CPTII,S$GLB,, | Performed by: INTERNAL MEDICINE

## 2022-06-17 PROCEDURE — 3008F PR BODY MASS INDEX (BMI) DOCUMENTED: ICD-10-PCS | Mod: CPTII,S$GLB,, | Performed by: INTERNAL MEDICINE

## 2022-06-17 PROCEDURE — 3288F FALL RISK ASSESSMENT DOCD: CPT | Mod: CPTII,S$GLB,, | Performed by: INTERNAL MEDICINE

## 2022-06-17 PROCEDURE — 96372 THER/PROPH/DIAG INJ SC/IM: CPT | Mod: S$GLB,,, | Performed by: INTERNAL MEDICINE

## 2022-06-17 PROCEDURE — 1125F PR PAIN SEVERITY QUANTIFIED, PAIN PRESENT: ICD-10-PCS | Mod: CPTII,S$GLB,, | Performed by: INTERNAL MEDICINE

## 2022-06-17 PROCEDURE — 3008F BODY MASS INDEX DOCD: CPT | Mod: CPTII,S$GLB,, | Performed by: INTERNAL MEDICINE

## 2022-06-17 RX ORDER — ESZOPICLONE 3 MG/1
3 TABLET, FILM COATED ORAL NIGHTLY
Qty: 30 TABLET | Refills: 3 | Status: SHIPPED | OUTPATIENT
Start: 2022-06-17 | End: 2022-07-17

## 2022-06-17 RX ORDER — PREDNISONE 5 MG/1
15 TABLET ORAL DAILY
Qty: 270 TABLET | Refills: 1 | Status: SHIPPED | OUTPATIENT
Start: 2022-06-17 | End: 2022-10-18 | Stop reason: SDUPTHER

## 2022-06-17 RX ORDER — KETOROLAC TROMETHAMINE 30 MG/ML
60 INJECTION, SOLUTION INTRAMUSCULAR; INTRAVENOUS
Status: COMPLETED | OUTPATIENT
Start: 2022-06-17 | End: 2022-06-17

## 2022-06-17 RX ORDER — METHYLPREDNISOLONE ACETATE 80 MG/ML
160 INJECTION, SUSPENSION INTRA-ARTICULAR; INTRALESIONAL; INTRAMUSCULAR; SOFT TISSUE ONCE
Status: COMPLETED | OUTPATIENT
Start: 2022-06-17 | End: 2022-06-17

## 2022-06-17 RX ORDER — HYDROCODONE BITARTRATE AND ACETAMINOPHEN 10; 325 MG/1; MG/1
1 TABLET ORAL EVERY 8 HOURS PRN
Qty: 90 TABLET | Refills: 0 | Status: SHIPPED | OUTPATIENT
Start: 2022-08-01 | End: 2022-08-31

## 2022-06-17 RX ORDER — CYANOCOBALAMIN 1000 UG/ML
1000 INJECTION, SOLUTION INTRAMUSCULAR; SUBCUTANEOUS ONCE
Status: COMPLETED | OUTPATIENT
Start: 2022-06-17 | End: 2022-06-17

## 2022-06-17 RX ORDER — MOMETASONE FUROATE AND FORMOTEROL FUMARATE DIHYDRATE 100; 5 UG/1; UG/1
1 AEROSOL RESPIRATORY (INHALATION) 2 TIMES DAILY
Qty: 13 G | Refills: 3 | Status: SHIPPED | OUTPATIENT
Start: 2022-06-17 | End: 2022-10-18

## 2022-06-17 RX ORDER — MIRTAZAPINE 7.5 MG/1
TABLET, FILM COATED ORAL
COMMUNITY
Start: 2022-04-22 | End: 2022-06-17

## 2022-06-17 RX ORDER — HYDROCODONE BITARTRATE AND ACETAMINOPHEN 10; 325 MG/1; MG/1
1 TABLET ORAL EVERY 8 HOURS PRN
Qty: 90 TABLET | Refills: 0 | Status: SHIPPED | OUTPATIENT
Start: 2022-07-01 | End: 2022-07-31

## 2022-06-17 RX ORDER — HYDROCODONE BITARTRATE AND ACETAMINOPHEN 10; 325 MG/1; MG/1
1 TABLET ORAL EVERY 8 HOURS PRN
Qty: 90 TABLET | Refills: 0 | Status: SHIPPED | OUTPATIENT
Start: 2022-09-01 | End: 2022-08-08

## 2022-06-17 RX ADMIN — KETOROLAC TROMETHAMINE 60 MG: 30 INJECTION, SOLUTION INTRAMUSCULAR; INTRAVENOUS at 04:06

## 2022-06-17 RX ADMIN — METHYLPREDNISOLONE ACETATE 160 MG: 80 INJECTION, SUSPENSION INTRA-ARTICULAR; INTRALESIONAL; INTRAMUSCULAR; SOFT TISSUE at 04:06

## 2022-06-17 RX ADMIN — CYANOCOBALAMIN 1000 MCG: 1000 INJECTION, SOLUTION INTRAMUSCULAR; SUBCUTANEOUS at 04:06

## 2022-06-17 NOTE — PROGRESS NOTES
Subjective:       Patient ID: Scooter Swan is a 71 y.o. male.    Chief Complaint: Disease Management    Follow up: 71 year old male on seropositive rheumatoid arthritis and osteoarthritis. He is doing fair overall. He continues to have pain in his hands, elbows, knees, and ankles. He has significant morning stiffness and he is taking prednisone 10 mg daily. Norco is providing adequate control of his pain without side effects. BP has stabilized. He is scheduled for labs in May 22.    He was admitted to pneumonia s/p hernia surgery in November.     Current treatment:  1. Norco TID PRN  2. Prednisone 10 mg  3. Zanaflex PRN    Prior treatment:  1. Plaquenil (WEIGHT LOSS)  2. SSZ caused GI upset      Review of Systems   Constitutional: Negative for activity change, appetite change, chills, diaphoresis, fatigue, fever and unexpected weight change.   HENT: Negative for congestion, ear pain, facial swelling, mouth sores, nosebleeds, postnasal drip, rhinorrhea, sinus pressure, sneezing, sore throat, tinnitus, trouble swallowing and voice change.    Eyes: Negative for pain, discharge, redness, itching and visual disturbance.   Respiratory: Negative for apnea, cough, chest tightness, shortness of breath and wheezing.    Cardiovascular: Negative for chest pain, palpitations and leg swelling.   Gastrointestinal: Negative for abdominal pain, constipation, diarrhea, nausea and vomiting.   Endocrine: Negative for cold intolerance, heat intolerance, polydipsia and polyuria.   Genitourinary: Negative for decreased urine volume, difficulty urinating, dysuria, flank pain, frequency, hematuria and urgency.   Musculoskeletal: Positive for back pain, gait problem, joint swelling, myalgias, neck pain and neck stiffness. Negative for arthralgias.   Skin: Positive for rash. Negative for pallor and wound.   Allergic/Immunologic: Negative for immunocompromised state.   Neurological: Negative for dizziness, tremors, seizures, syncope, weakness,  numbness and headaches.   Hematological: Negative for adenopathy. Does not bruise/bleed easily.   Psychiatric/Behavioral: Negative for sleep disturbance and suicidal ideas. The patient is not nervous/anxious.          Objective:   BP (!) 153/79   Pulse 78   Ht 6' (1.829 m)   Wt 86.6 kg (191 lb)   BMI 25.90 kg/m²      Physical Exam   Constitutional: He is oriented to person, place, and time.   HENT:   Head: Normocephalic and atraumatic.   Mouth/Throat: Oropharynx is clear and moist.   Eyes: Pupils are equal, round, and reactive to light.   Neck: No thyromegaly present.   Cardiovascular: Normal rate, regular rhythm and normal heart sounds. Exam reveals no gallop and no friction rub.   No murmur heard.  Pulmonary/Chest: Breath sounds normal. He has no wheezes. He has no rales. He exhibits no tenderness.   Abdominal: There is no abdominal tenderness. There is no rebound and no guarding.   Musculoskeletal:         General: Tenderness present.      Right shoulder: Tenderness present.      Left shoulder: Tenderness present.      Right elbow: Swelling present. Tenderness present.      Left elbow: Tenderness present.      Right wrist: Swelling and tenderness present.      Left wrist: Swelling and tenderness present.      Cervical back: Neck supple.      Right knee: Swelling and effusion present. Tenderness present.      Left knee: Swelling and effusion present. Tenderness present.   Lymphadenopathy:     He has no cervical adenopathy.   Neurological: He is alert and oriented to person, place, and time. Gait normal.   Skin: No rash noted. No erythema. No pallor.   Psychiatric: Mood and affect normal.   Vitals reviewed.      Right Side Rheumatological Exam     The patient is tender to palpation of the shoulder, elbow, wrist, knee, 1st PIP, 1st MCP, 2nd PIP, 2nd MCP, 3rd PIP, 3rd MCP, 4th PIP, 4th MCP and 5th PIP    He has swelling of the elbow, wrist, knee, 1st PIP, 1st MCP, 2nd PIP, 2nd MCP, 3rd PIP, 3rd MCP, 4th PIP, 4th  MCP, 5th PIP and 5th MCP    The patient has an enlarged wrist and knee    Shoulder Exam   Tenderness Location: no tenderness    Range of Motion   Active abduction: abnormal   Adduction: abnormal  Sensation: normal    Knee Exam   Tenderness Location: medial joint line and LCL  Patellofemoral Crepitus: positive  Effusion: positive  Sensation: normal    Hip Exam   Tenderness Location: posterior and anterior  Sensation: normal    Elbow/Wrist Exam   Tenderness Location: no tenderness  Sensation: normal    Left Side Rheumatological Exam     The patient is tender to palpation of the shoulder, elbow, wrist, knee, 1st PIP, 1st MCP, 2nd PIP, 2nd MCP, 3rd PIP, 3rd MCP, 4th PIP, 4th MCP, 5th PIP and 5th MCP.    He has swelling of the wrist, knee, 1st PIP, 1st MCP, 2nd PIP, 2nd MCP, 3rd PIP, 3rd MCP, 4th PIP, 4th MCP, 5th PIP and 5th MCP    The patient has an enlarged knee.    Shoulder Exam   Tenderness Location: no tenderness    Range of Motion   Active abduction: abnormal   Sensation: normal    Knee Exam   Tenderness Location: lateral joint line and medial joint line    Patellofemoral Crepitus: positive  Effusion: positive  Sensation: normal    Hip Exam   Tenderness Location: posterior and anterior  Sensation: normal    Elbow/Wrist Exam   Sensation: normal      Back/Neck Exam   General Inspection   Gait: normal       Tenderness Right paramedian tenderness of the Upper C-Spine, Lower C-Spine, Lower L-Spine and SI Joint.Left paramedian tenderness of the Upper C-Spine, Lower L-Spine, Lower C-Spine and SI Joint.    Neck Range of Motion   Flexion: Limited  Extension: Limited       Results for orders placed or performed during the hospital encounter of 05/13/22   Echo   Result Value Ref Range    BSA 2.13 m2    TDI SEPTAL 0.06 m/s    LV LATERAL E/E' RATIO 11.50 m/s    LV SEPTAL E/E' RATIO 11.50 m/s    LA WIDTH 4.10 cm    Left Ventricular Outflow Tract Mean Velocity 4.8178791517 cm/s    Left Ventricular Outflow Tract Mean Gradient  4.36 mmHg    TDI LATERAL 0.06 m/s    PV PEAK VELOCITY 1.19 cm/s    LVIDd 4.91 3.5 - 6.0 cm    IVS 1.35 (A) 0.6 - 1.1 cm    Posterior Wall 1.46 (A) 0.6 - 1.1 cm    Ao root annulus 3.92 cm    LVIDs 2.88 2.1 - 4.0 cm    FS 41 28 - 44 %    LA volume 85.08 cm3    Sinus 3.80 cm    STJ 3.96 cm    Ascending aorta 3.65 cm    LV mass 284.92 g    LA size 4.10 cm    RVDD 2.22 cm    TAPSE 2.37 cm    Left Ventricle Relative Wall Thickness 0.59 cm    AV regurgitation pressure 1/2 time 403.849361282663970 ms    AV mean gradient 61 mmHg    AV valve area 1.54 cm2    AV Velocity Ratio 0.27     AV index (prosthetic) 0.31     MV valve area p 1/2 method 2.30 cm2    E/A ratio 0.61     Mean e' 0.06 m/s    E wave deceleration time 329.700999857855015 msec    IVRT 137.652086721471385 msec    Pulm vein S/D ratio 1.17     LVOT diameter 2.50 cm    LVOT area 4.9 cm2    LVOT peak jole 1.29 m/s    LVOT peak VTI 35.30 cm    Ao peak joel 4.84 m/s    Ao .2 cm    RVOT peak joel 0.74 m/s    RVOT peak VTI 16.1 cm    LVOT stroke volume 173.19 cm3    AV peak gradient 94 mmHg    PV mean gradient 1.27 mmHg    E/E' ratio 11.50 m/s    MV Peak E Joel 0.69 m/s    AR Max Joel 4.20 m/s    MV stenosis pressure 1/2 time 95.652644527248090 ms    MV Peak A Joel 1.14 m/s    PV Peak S Joel 0.69875328049 m/s    PV Peak D Joel 0.62 m/s    LV Systolic Volume 31.73 mL    LV Systolic Volume Index 15.0 mL/m2    LV Diastolic Volume 113.62 mL    LV Diastolic Volume Index 53.59 mL/m2    LA Volume Index 40.1 mL/m2    LV Mass Index 134 g/m2    Echo EF Estimated 72 %    RA Major Axis 4.86 cm    Left Atrium Minor Axis 5.89 cm    Left Atrium Major Axis 6.02 cm    LA Volume Index (Mod) 34.4 mL/m2    LA volume (mod) 73.00 cm3    RA Width 4.05 cm    Right Atrial Pressure (from IVC) 3 mmHg    EF 55 %        Collected Updated Procedure    05/13/2022 0900 05/16/2022 1829 HEAVY METALS SCREEN, BLOOD (QUANTITATIVE) [[id number]]   Blood    Component Value Units   Arsenic <1  ng/mL   Lead <1.0   mcg/dL   Cadmium <0.2  ng/mL   Mercury <1  ng/mL   Venous/Capillary Venous     Street Address Test Not Performed    City Test Not Performed    State Test Not Performed    Zip Test Not Performed    County Test Not Performed    Guardian First Name Test Not Performed    Guardian Last Name Test Not Performed    Home Phone Test Not Performed    Race Test Not Performed           05/13/2022 0900 05/13/2022 1201 Sedimentation rate [[id number]]   Blood    Component Value Units   Sed Rate 5 mm/Hr          05/13/2022 0900 05/13/2022 1622 C-Reactive Protein [[id number]]   (Abnormal)   Blood    Component Value Units   CRP 12.6 High  mg/L          05/13/2022 0900 05/13/2022 1622 Comprehensive Metabolic Panel [[id number]]   (Abnormal)   Blood    Component Value Units   Sodium 140 mmol/L   Potassium 4.2 mmol/L   Chloride 103 mmol/L   CO2 27 mmol/L   Glucose 103 mg/dL   BUN 16 mg/dL   Creatinine 1.1 mg/dL   Calcium 9.0 mg/dL   Total Protein 6.5 g/dL   Albumin 3.9 g/dL   Total Bilirubin 0.5  mg/dL   Alkaline Phosphatase 52 Low  U/L   AST 15 U/L   ALT 8 Low  U/L   Anion Gap 10 mmol/L   eGFR if African American >60.0 mL/min/1.73 m^2   eGFR if non African American >60.0  mL/min/1.73 m^2          05/13/2022 0900 05/13/2022 1619 CBC Auto Differential [[id number]]   (Abnormal)   Blood    Component Value Units   WBC 12.17 K/uL   RBC 3.53 Low  M/uL   Hemoglobin 10.1 Low  g/dL   Hematocrit 32.6 Low  %   MCV 92 fL   MCH 28.6 pg   MCHC 31.0 Low  g/dL   RDW 13.9 %   Platelets 130 Low  K/uL   MPV 13.8 High  fL   Immature Granulocytes 0.4 %   Gran # (ANC) 7.1 K/uL   Immature Grans (Abs) 0.05 High   K/uL   Lymph # 3.5 K/uL   Mono # 1.4 High  K/uL   Eos # 0.1 K/uL   Baso # 0.05 K/uL   nRBC 0 /100 WBC   Gran % 58.0 %   Lymph % 29.0 %   Mono % 11.7 %   Eosinophil % 0.5 %   Basophil % 0.4 %   Differential Method Automated           05/13/2022 0900 05/17/2022 1044 Zinc [[id number]]   Blood    Component Value Units   Zinc 74  ug/dL          05/13/2022 0900  05/13/2022 1622 Iron and TIBC [[id number]]   (Abnormal)   Blood    Component Value Units   Iron 66 ug/dL   Transferrin 184 Low  mg/dL   TIBC 272 ug/dL   Saturated Iron 24 %          05/13/2022 0900 05/17/2022 1007 COPPER, SERUM [[id number]]   Blood    Component Value Units   Copper 1139  ug/L            Assessment:       1. Rheumatoid arthritis involving both hands with positive rheumatoid factor    2. Seropositive rheumatoid arthritis    3. Current chronic use of systemic steroids    4. Chronic pain syndrome    5. Osteoarthritis of knee, unspecified laterality, unspecified osteoarthritis type    6. Thrombopenia    7. Pancytopenia    8. Pulmonary emphysema, unspecified emphysema type    9. Primary insomnia            Plan:         Scooter was seen today for disease management.    Diagnoses and all orders for this visit:    Rheumatoid arthritis involving both hands with positive rheumatoid factor  -     DXA Bone Density Spine And Hip; Future  -     predniSONE (DELTASONE) 5 MG tablet; Take 3 tablets (15 mg total) by mouth once daily.  -     Ambulatory referral/consult to Pain Clinic; Future  -     Comprehensive Metabolic Panel; Future  -     CBC Auto Differential; Future  -     C-Reactive Protein; Future  -     Rheumatoid Factor; Future  -     Sedimentation rate; Future  -     Cyclic Citrullinated Peptide Antibody, IgG; Future  -     PTH, Intact; Future  -     Iron and TIBC; Future  -     XR Arthritis Survey; Future    Seropositive rheumatoid arthritis  -     DXA Bone Density Spine And Hip; Future  -     predniSONE (DELTASONE) 5 MG tablet; Take 3 tablets (15 mg total) by mouth once daily.  -     Ambulatory referral/consult to Pulmonology; Future  -     Ambulatory referral/consult to Pain Clinic; Future  -     HYDROcodone-acetaminophen (NORCO)  mg per tablet; Take 1 tablet by mouth every 8 (eight) hours as needed for Pain.  -     HYDROcodone-acetaminophen (NORCO)  mg per tablet; Take 1 tablet by mouth every 8  (eight) hours as needed for Pain.  -     HYDROcodone-acetaminophen (NORCO)  mg per tablet; Take 1 tablet by mouth every 8 (eight) hours as needed for Pain.  -     Comprehensive Metabolic Panel; Future  -     CBC Auto Differential; Future  -     C-Reactive Protein; Future  -     Rheumatoid Factor; Future  -     Sedimentation rate; Future  -     Cyclic Citrullinated Peptide Antibody, IgG; Future  -     PTH, Intact; Future  -     Iron and TIBC; Future  -     XR Arthritis Survey; Future    Current chronic use of systemic steroids  -     DXA Bone Density Spine And Hip; Future  -     predniSONE (DELTASONE) 5 MG tablet; Take 3 tablets (15 mg total) by mouth once daily.  -     Ambulatory referral/consult to Pain Clinic; Future  -     HYDROcodone-acetaminophen (NORCO)  mg per tablet; Take 1 tablet by mouth every 8 (eight) hours as needed for Pain.  -     HYDROcodone-acetaminophen (NORCO)  mg per tablet; Take 1 tablet by mouth every 8 (eight) hours as needed for Pain.  -     HYDROcodone-acetaminophen (NORCO)  mg per tablet; Take 1 tablet by mouth every 8 (eight) hours as needed for Pain.  -     Comprehensive Metabolic Panel; Future  -     CBC Auto Differential; Future  -     C-Reactive Protein; Future  -     Rheumatoid Factor; Future  -     Sedimentation rate; Future  -     Cyclic Citrullinated Peptide Antibody, IgG; Future  -     PTH, Intact; Future  -     Iron and TIBC; Future  -     XR Arthritis Survey; Future    Chronic pain syndrome  -     DXA Bone Density Spine And Hip; Future  -     predniSONE (DELTASONE) 5 MG tablet; Take 3 tablets (15 mg total) by mouth once daily.  -     Ambulatory referral/consult to Pain Clinic; Future  -     HYDROcodone-acetaminophen (NORCO)  mg per tablet; Take 1 tablet by mouth every 8 (eight) hours as needed for Pain.  -     HYDROcodone-acetaminophen (NORCO)  mg per tablet; Take 1 tablet by mouth every 8 (eight) hours as needed for Pain.  -      HYDROcodone-acetaminophen (NORCO)  mg per tablet; Take 1 tablet by mouth every 8 (eight) hours as needed for Pain.  -     Comprehensive Metabolic Panel; Future  -     CBC Auto Differential; Future  -     C-Reactive Protein; Future  -     Rheumatoid Factor; Future  -     Sedimentation rate; Future  -     Cyclic Citrullinated Peptide Antibody, IgG; Future  -     PTH, Intact; Future  -     Iron and TIBC; Future  -     XR Arthritis Survey; Future    Osteoarthritis of knee, unspecified laterality, unspecified osteoarthritis type  -     DXA Bone Density Spine And Hip; Future  -     predniSONE (DELTASONE) 5 MG tablet; Take 3 tablets (15 mg total) by mouth once daily.  -     Ambulatory referral/consult to Pain Clinic; Future  -     HYDROcodone-acetaminophen (NORCO)  mg per tablet; Take 1 tablet by mouth every 8 (eight) hours as needed for Pain.  -     HYDROcodone-acetaminophen (NORCO)  mg per tablet; Take 1 tablet by mouth every 8 (eight) hours as needed for Pain.  -     HYDROcodone-acetaminophen (NORCO)  mg per tablet; Take 1 tablet by mouth every 8 (eight) hours as needed for Pain.  -     Comprehensive Metabolic Panel; Future  -     CBC Auto Differential; Future  -     C-Reactive Protein; Future  -     Rheumatoid Factor; Future  -     Sedimentation rate; Future  -     Cyclic Citrullinated Peptide Antibody, IgG; Future  -     PTH, Intact; Future  -     Iron and TIBC; Future  -     XR Arthritis Survey; Future    Thrombopenia  -     DXA Bone Density Spine And Hip; Future  -     predniSONE (DELTASONE) 5 MG tablet; Take 3 tablets (15 mg total) by mouth once daily.  -     Ambulatory referral/consult to Pain Clinic; Future  -     HYDROcodone-acetaminophen (NORCO)  mg per tablet; Take 1 tablet by mouth every 8 (eight) hours as needed for Pain.  -     HYDROcodone-acetaminophen (NORCO)  mg per tablet; Take 1 tablet by mouth every 8 (eight) hours as needed for Pain.  -      HYDROcodone-acetaminophen (NORCO)  mg per tablet; Take 1 tablet by mouth every 8 (eight) hours as needed for Pain.  -     Comprehensive Metabolic Panel; Future  -     CBC Auto Differential; Future  -     C-Reactive Protein; Future  -     Rheumatoid Factor; Future  -     Sedimentation rate; Future  -     Cyclic Citrullinated Peptide Antibody, IgG; Future  -     PTH, Intact; Future  -     Iron and TIBC; Future  -     XR Arthritis Survey; Future    Pancytopenia  -     DXA Bone Density Spine And Hip; Future  -     predniSONE (DELTASONE) 5 MG tablet; Take 3 tablets (15 mg total) by mouth once daily.  -     Ambulatory referral/consult to Pain Clinic; Future  -     HYDROcodone-acetaminophen (NORCO)  mg per tablet; Take 1 tablet by mouth every 8 (eight) hours as needed for Pain.  -     HYDROcodone-acetaminophen (NORCO)  mg per tablet; Take 1 tablet by mouth every 8 (eight) hours as needed for Pain.  -     HYDROcodone-acetaminophen (NORCO)  mg per tablet; Take 1 tablet by mouth every 8 (eight) hours as needed for Pain.  -     Comprehensive Metabolic Panel; Future  -     CBC Auto Differential; Future  -     C-Reactive Protein; Future  -     Rheumatoid Factor; Future  -     Sedimentation rate; Future  -     Cyclic Citrullinated Peptide Antibody, IgG; Future  -     PTH, Intact; Future  -     Iron and TIBC; Future    Pulmonary emphysema, unspecified emphysema type  -     mometasone-formoterol (DULERA) 100-5 mcg/actuation HFAA; Inhale 1 puff into the lungs 2 (two) times a day. Controller  -     Ambulatory referral/consult to Pulmonology; Future  -     Ambulatory referral/consult to Pain Clinic; Future  -     Comprehensive Metabolic Panel; Future  -     CBC Auto Differential; Future  -     C-Reactive Protein; Future  -     Rheumatoid Factor; Future  -     Sedimentation rate; Future  -     Cyclic Citrullinated Peptide Antibody, IgG; Future  -     PTH, Intact; Future  -     Iron and TIBC; Future  -     XR  Arthritis Survey; Future    Primary insomnia  -     eszopiclone (LUNESTA) 3 mg Tab; Take 1 tablet (3 mg total) by mouth every evening.  -     Comprehensive Metabolic Panel; Future  -     CBC Auto Differential; Future  -     C-Reactive Protein; Future  -     Rheumatoid Factor; Future  -     Sedimentation rate; Future  -     Cyclic Citrullinated Peptide Antibody, IgG; Future  -     PTH, Intact; Future  -     Iron and TIBC; Future  -     XR Arthritis Survey; Future      1/ refer to pulmonary  2. [employer] refill I have  check [place] prescription monitoring program site and no unusual or abnormal behavior has occurred pt understand the risk and benefits of taking opioid medications and has decided to continue the  medication   3. dexa scan  4. Arthritis survey  5, referral for   6. luesta to sleep

## 2022-06-17 NOTE — PROGRESS NOTES
Patient was administered B12 1cc (1000mcg) IM into Right Deltoid. Patient was administered 2cc (160mg) Methylprednisolone IM into Left Upper Outer Quad Gluteus. Patient was administered 2cc (60mg) Ketorolac IM into Right Upper Outer Quad Gluteus. Patient tolerated injections well without adverse reactions noted. No complaints voiced at this time.

## 2022-07-07 ENCOUNTER — DOCUMENTATION ONLY (OUTPATIENT)
Dept: PHARMACY | Facility: CLINIC | Age: 72
End: 2022-07-07
Payer: MEDICARE

## 2022-07-08 ENCOUNTER — IMMUNIZATION (OUTPATIENT)
Dept: FAMILY MEDICINE | Facility: CLINIC | Age: 72
End: 2022-07-08
Payer: MEDICARE

## 2022-07-08 DIAGNOSIS — Z23 NEED FOR VACCINATION: Primary | ICD-10-CM

## 2022-07-08 PROCEDURE — 91305 COVID-19, MRNA, LNP-S, PF, 30 MCG/0.3 ML DOSE VACCINE (PFIZER): CPT | Mod: PBBFAC | Performed by: RADIOLOGY

## 2022-08-08 ENCOUNTER — OFFICE VISIT (OUTPATIENT)
Dept: FAMILY MEDICINE | Facility: CLINIC | Age: 72
End: 2022-08-08
Payer: MEDICARE

## 2022-08-08 ENCOUNTER — TELEPHONE (OUTPATIENT)
Dept: FAMILY MEDICINE | Facility: CLINIC | Age: 72
End: 2022-08-08

## 2022-08-08 VITALS
DIASTOLIC BLOOD PRESSURE: 80 MMHG | SYSTOLIC BLOOD PRESSURE: 124 MMHG | HEART RATE: 89 BPM | OXYGEN SATURATION: 98 % | BODY MASS INDEX: 25.98 KG/M2 | WEIGHT: 191.81 LBS | HEIGHT: 72 IN

## 2022-08-08 DIAGNOSIS — I35.0 MODERATE AORTIC STENOSIS: ICD-10-CM

## 2022-08-08 DIAGNOSIS — I35.1 MODERATE AORTIC REGURGITATION: ICD-10-CM

## 2022-08-08 DIAGNOSIS — R06.00 DYSPNEA, UNSPECIFIED TYPE: Primary | ICD-10-CM

## 2022-08-08 PROBLEM — E78.2 MIXED HYPERLIPIDEMIA: Status: ACTIVE | Noted: 2019-01-29

## 2022-08-08 PROCEDURE — 3079F DIAST BP 80-89 MM HG: CPT | Mod: CPTII,S$GLB,, | Performed by: INTERNAL MEDICINE

## 2022-08-08 PROCEDURE — 93010 EKG 12-LEAD: ICD-10-PCS | Mod: S$GLB,,, | Performed by: INTERNAL MEDICINE

## 2022-08-08 PROCEDURE — 3008F PR BODY MASS INDEX (BMI) DOCUMENTED: ICD-10-PCS | Mod: CPTII,S$GLB,, | Performed by: INTERNAL MEDICINE

## 2022-08-08 PROCEDURE — 3008F BODY MASS INDEX DOCD: CPT | Mod: CPTII,S$GLB,, | Performed by: INTERNAL MEDICINE

## 2022-08-08 PROCEDURE — 1159F MED LIST DOCD IN RCRD: CPT | Mod: CPTII,S$GLB,, | Performed by: INTERNAL MEDICINE

## 2022-08-08 PROCEDURE — 99214 OFFICE O/P EST MOD 30 MIN: CPT | Mod: S$GLB,,, | Performed by: INTERNAL MEDICINE

## 2022-08-08 PROCEDURE — 93010 ELECTROCARDIOGRAM REPORT: CPT | Mod: S$GLB,,, | Performed by: INTERNAL MEDICINE

## 2022-08-08 PROCEDURE — 1101F PT FALLS ASSESS-DOCD LE1/YR: CPT | Mod: CPTII,S$GLB,, | Performed by: INTERNAL MEDICINE

## 2022-08-08 PROCEDURE — 4010F ACE/ARB THERAPY RXD/TAKEN: CPT | Mod: CPTII,S$GLB,, | Performed by: INTERNAL MEDICINE

## 2022-08-08 PROCEDURE — 93005 ELECTROCARDIOGRAM TRACING: CPT | Mod: S$GLB,,, | Performed by: INTERNAL MEDICINE

## 2022-08-08 PROCEDURE — 3288F PR FALLS RISK ASSESSMENT DOCUMENTED: ICD-10-PCS | Mod: CPTII,S$GLB,, | Performed by: INTERNAL MEDICINE

## 2022-08-08 PROCEDURE — 1160F PR REVIEW ALL MEDS BY PRESCRIBER/CLIN PHARMACIST DOCUMENTED: ICD-10-PCS | Mod: CPTII,S$GLB,, | Performed by: INTERNAL MEDICINE

## 2022-08-08 PROCEDURE — 99214 PR OFFICE/OUTPT VISIT, EST, LEVL IV, 30-39 MIN: ICD-10-PCS | Mod: S$GLB,,, | Performed by: INTERNAL MEDICINE

## 2022-08-08 PROCEDURE — 4010F PR ACE/ARB THEARPY RXD/TAKEN: ICD-10-PCS | Mod: CPTII,S$GLB,, | Performed by: INTERNAL MEDICINE

## 2022-08-08 PROCEDURE — 99499 RISK ADDL DX/OHS AUDIT: ICD-10-PCS | Mod: S$GLB,,, | Performed by: INTERNAL MEDICINE

## 2022-08-08 PROCEDURE — 1159F PR MEDICATION LIST DOCUMENTED IN MEDICAL RECORD: ICD-10-PCS | Mod: CPTII,S$GLB,, | Performed by: INTERNAL MEDICINE

## 2022-08-08 PROCEDURE — 1101F PR PT FALLS ASSESS DOC 0-1 FALLS W/OUT INJ PAST YR: ICD-10-PCS | Mod: CPTII,S$GLB,, | Performed by: INTERNAL MEDICINE

## 2022-08-08 PROCEDURE — 3079F PR MOST RECENT DIASTOLIC BLOOD PRESSURE 80-89 MM HG: ICD-10-PCS | Mod: CPTII,S$GLB,, | Performed by: INTERNAL MEDICINE

## 2022-08-08 PROCEDURE — 99499 UNLISTED E&M SERVICE: CPT | Mod: S$GLB,,, | Performed by: INTERNAL MEDICINE

## 2022-08-08 PROCEDURE — 1126F PR PAIN SEVERITY QUANTIFIED, NO PAIN PRESENT: ICD-10-PCS | Mod: CPTII,S$GLB,, | Performed by: INTERNAL MEDICINE

## 2022-08-08 PROCEDURE — 1126F AMNT PAIN NOTED NONE PRSNT: CPT | Mod: CPTII,S$GLB,, | Performed by: INTERNAL MEDICINE

## 2022-08-08 PROCEDURE — 3074F PR MOST RECENT SYSTOLIC BLOOD PRESSURE < 130 MM HG: ICD-10-PCS | Mod: CPTII,S$GLB,, | Performed by: INTERNAL MEDICINE

## 2022-08-08 PROCEDURE — 99999 PR PBB SHADOW E&M-EST. PATIENT-LVL V: ICD-10-PCS | Mod: PBBFAC,,, | Performed by: INTERNAL MEDICINE

## 2022-08-08 PROCEDURE — 3288F FALL RISK ASSESSMENT DOCD: CPT | Mod: CPTII,S$GLB,, | Performed by: INTERNAL MEDICINE

## 2022-08-08 PROCEDURE — 99999 PR PBB SHADOW E&M-EST. PATIENT-LVL V: CPT | Mod: PBBFAC,,, | Performed by: INTERNAL MEDICINE

## 2022-08-08 PROCEDURE — 93005 EKG 12-LEAD: ICD-10-PCS | Mod: S$GLB,,, | Performed by: INTERNAL MEDICINE

## 2022-08-08 PROCEDURE — 3074F SYST BP LT 130 MM HG: CPT | Mod: CPTII,S$GLB,, | Performed by: INTERNAL MEDICINE

## 2022-08-08 PROCEDURE — 1160F RVW MEDS BY RX/DR IN RCRD: CPT | Mod: CPTII,S$GLB,, | Performed by: INTERNAL MEDICINE

## 2022-08-08 RX ORDER — ESZOPICLONE 3 MG/1
3 TABLET, FILM COATED ORAL NIGHTLY PRN
COMMUNITY
Start: 2022-07-18 | End: 2022-10-04 | Stop reason: CLARIF

## 2022-08-08 NOTE — PROGRESS NOTES
Patient ID: Scooter Swan is a 71 y.o. male.    Chief Complaint: Shortness of Breath     Assessment and Plan    I would like for him to get 2nd opinion from cardiology. Check pulmonary function tests. He has both aortic stenosis and regurgitation and dyspnea. Lungs are clear. Last chest xray does not suggest COPD. If 2nd opinion is in agreement with initial,then will consider chest CTand pulmonology referral.     1. Dyspnea, unspecified type  Ambulatory referral/consult to Cardiology    Complete PFT w/ bronchodilator    IN OFFICE EKG 12-LEAD (to [facility])   2. Moderate aortic regurgitation  Ambulatory referral/consult to Cardiology   3. Moderate aortic stenosis  Ambulatory referral/consult to Cardiology      HPI   Resistant hypertension, hyperlipidemia, moderate aortic stenosis,  Moderate aortic regurgitation, rheumatoid arthritis, thrombocytopenia, anemia    Has been having dyspnea. Feels like it difficult to get air in. This has been going on for about 2 months. Worse with exertion (about 1 block before stopping for rest)  Better with rest. Albuterol helps some. He does have aortic stenosis and moderate aortic regurgitation. He saw cardiology and no changes to management made. He does have emphysema on problem list but never smoked. Chest xray march of 2021 did not indicate changes consistent with COPD. EKG ok today.     Health Maintenance Due   Topic Date Due    TETANUS VACCINE  Never done    Aspirin/Antiplatelet Therapy  Never done    Shingles Vaccine (1 of 2) Never done      Wt Readings from Last 3 Encounters:   08/08/22 1402 87 kg (191 lb 12.8 oz)   06/17/22 1320 86.6 kg (191 lb)   06/10/22 1104 87.3 kg (192 lb 8 oz)      Body mass index is 26.01 kg/m².      Hypertension Medications             diltiaZEM (DILACOR XR) 180 MG CDCR Take 1 capsule (180 mg total) by mouth once daily.    enalapril (VASOTEC) 20 MG tablet TAKE 1 TABLET(20 MG) BY MOUTH TWICE DAILY    hydrALAZINE (APRESOLINE) 50 MG tablet Take 2 tablets  (100 mg total) by mouth every 12 (twelve) hours.         Hyperlipidemia Medications             pravastatin (PRAVACHOL) 20 MG tablet TAKE 1 TABLET BY MOUTH DAILY         Review of Systems   Constitutional: Negative for fever.   Respiratory: Positive for shortness of breath.    Cardiovascular: Negative for chest pain.   Gastrointestinal: Negative for abdominal pain.         Vitals:    08/08/22 1402   BP: 124/80   Pulse: 89     Physical Exam  Cardiovascular:      Rate and Rhythm: Normal rate and regular rhythm.      Heart sounds: Murmur heard.    Systolic murmur is present with a grade of 2/6.   Diastolic murmur is present with a grade of 2/4.    No gallop.   Pulmonary:      Breath sounds: Normal breath sounds. No wheezing or rhonchi.   Abdominal:      General: Bowel sounds are normal.      Palpations: Abdomen is soft.      Tenderness: There is no abdominal tenderness.   Musculoskeletal:         General: Normal range of motion.      Cervical back: Neck supple.   Skin:     General: Skin is warm.      Findings: No rash.   Neurological:      Mental Status: He is alert.   Psychiatric:         Behavior: Behavior normal.         I personally reviewed past medical, family and social history.  Medication List with Changes/Refills   Current Medications    ALBUTEROL (PROVENTIL/VENTOLIN HFA) 90 MCG/ACTUATION INHALER    Inhale 1 puff into the lungs every 4 (four) hours as needed for Wheezing or Shortness of Breath.    CHOLECALCIFEROL, VITAMIN D3, (VITAMIN D3) 50 MCG (2,000 UNIT) CAP    Take 1 capsule (2,000 Units total) by mouth once daily.    CYANOCOBALAMIN 500 MCG TABLET    Take 500 mcg by mouth once daily.    DICLOFENAC SODIUM (VOLTAREN) 1 % GEL    Apply 2 grams  topically 4 (four) times daily.    DILTIAZEM (DILACOR XR) 180 MG CDCR    Take 1 capsule (180 mg total) by mouth once daily.    DORZOLAMIDE (TRUSOPT) 2 % OPHTHALMIC SOLUTION    Place 1 drop into both eyes 3 (three) times daily.    ENALAPRIL (VASOTEC) 20 MG TABLET     TAKE 1 TABLET(20 MG) BY MOUTH TWICE DAILY    ESZOPICLONE (LUNESTA) 3 MG TAB    Take 3 mg by mouth nightly as needed.    HYDRALAZINE (APRESOLINE) 50 MG TABLET    Take 2 tablets (100 mg total) by mouth every 12 (twelve) hours.    HYDROCODONE-ACETAMINOPHEN (NORCO)  MG PER TABLET    Take 1 tablet by mouth every 8 (eight) hours as needed for Pain.    HYDROCODONE-ACETAMINOPHEN (NORCO)  MG PER TABLET    Take 1 tablet by mouth every 8 (eight) hours as needed for Pain.    LACTOBAC NO.41/BIFIDOBACT NO.7 (PROBIOTIC-10 ORAL)    Take 1 capsule by mouth once daily.    MOMETASONE-FORMOTEROL (DULERA) 100-5 MCG/ACTUATION HFAA    Inhale 1 puff into the lungs 2 (two) times a day. Controller    MULTIVITAMIN CAPSULE    Take 1 capsule by mouth once daily.    OMEPRAZOLE (PRILOSEC) 20 MG CAPSULE    TAKE 1 CAPSULE(20 MG) BY MOUTH TWICE DAILY BEFORE MEALS    POLYETHYLENE GLYCOL (GLYCOLAX) 17 GRAM PWPK    Take 17 g by mouth once daily.    PRAVASTATIN (PRAVACHOL) 20 MG TABLET    TAKE 1 TABLET BY MOUTH DAILY    PREDNISONE (DELTASONE) 5 MG TABLET    Take 3 tablets (15 mg total) by mouth once daily.    SILDENAFIL (VIAGRA) 100 MG TABLET    Take 1 tablet (100 mg total) by mouth as needed for Erectile Dysfunction.    TAMSULOSIN (FLOMAX) 0.4 MG CAP    Take 1 capsule (0.4 mg total) by mouth once daily.    TIZANIDINE (ZANAFLEX) 4 MG TABLET    TAKE 1 TABLET(4 MG) BY MOUTH EVERY 8 HOURS    TRAVOPROST (TRAVATAN Z) 0.004 % OPHTHALMIC SOLUTION    Place 1 drop into both eyes every evening.

## 2022-08-29 DIAGNOSIS — M05.9 SEROPOSITIVE RHEUMATOID ARTHRITIS: ICD-10-CM

## 2022-08-29 DIAGNOSIS — G89.4 CHRONIC PAIN SYNDROME: ICD-10-CM

## 2022-08-29 DIAGNOSIS — M17.9 OSTEOARTHRITIS OF KNEE, UNSPECIFIED LATERALITY, UNSPECIFIED OSTEOARTHRITIS TYPE: ICD-10-CM

## 2022-08-29 RX ORDER — TIZANIDINE 4 MG/1
4 TABLET ORAL EVERY 8 HOURS
Qty: 270 TABLET | Refills: 1 | Status: CANCELLED | OUTPATIENT
Start: 2022-08-29

## 2022-08-31 DIAGNOSIS — N52.8 OTHER MALE ERECTILE DYSFUNCTION: ICD-10-CM

## 2022-08-31 NOTE — TELEPHONE ENCOUNTER
Care Due:                  Date            Visit Type   Department     Provider  --------------------------------------------------------------------------------                                MYCHART                              FOLLOWUP/OF  [facility] FAMILY  Last Visit: 08-      FICE VISIT   MEDICINE       Salvador Kennedy                              EP -                              PRIMARY      [facility]  Next Visit: 10-      CARE ([facility])   WALTER Kennedy                                                            Last  Test          Frequency    Reason                     Performed    Due Date  --------------------------------------------------------------------------------    Lipid Panel.  12 months..  pravastatin..............  Not Found    Overdue    [employer] Embedded Care Gaps. Reference number: [id number]. 8/31/2022   6:56:40 PM CDT

## 2022-09-01 DIAGNOSIS — N52.8 OTHER MALE ERECTILE DYSFUNCTION: ICD-10-CM

## 2022-09-01 RX ORDER — SILDENAFIL 100 MG/1
100 TABLET, FILM COATED ORAL
Qty: 8 TABLET | Refills: 11 | Status: CANCELLED | OUTPATIENT
Start: 2022-09-01

## 2022-09-01 RX ORDER — SILDENAFIL 100 MG/1
100 TABLET, FILM COATED ORAL
Qty: 8 TABLET | Refills: 0 | Status: SHIPPED | OUTPATIENT
Start: 2022-09-01 | End: 2022-12-01 | Stop reason: SDUPTHER

## 2022-09-01 NOTE — TELEPHONE ENCOUNTER
Refill Routing Note   Medication(s) are not appropriate for processing by [facility] for the following reason(s):      - Drug-Disease Interaction (sildenafiL and Abnormal CT scan, liver)    [facility] action(s):  Defer Medication-related problems identified:   Requires labs  Drug-disease interaction        Medication reconciliation completed: No     Appointments  past 12m or future 3m with PCP    Date Provider   Last Visit   8/8/2022 Salvador Kennedy, DO   Next Visit   10/3/2022 Salvador Kennedy, DO   ED visits in past 90 days: 0        Note composed:11:41 AM 09/01/2022

## 2022-09-01 NOTE — TELEPHONE ENCOUNTER
No new care gaps identified.  [facility] Embedded Care Gaps. Reference number: [id number]. 9/01/2022   3:40:16 PM CDT

## 2022-09-01 NOTE — TELEPHONE ENCOUNTER
Refill Decision Note   Scooter Swan  is requesting a refill authorization.  Brief Assessment and Rationale for Refill:  Approve     Medication Therapy Plan:       Medication Reconciliation Completed: No   Comments:     No Care Gaps recommended.     Note composed:4:18 PM 09/01/2022

## 2022-09-19 DIAGNOSIS — M17.9 OSTEOARTHRITIS OF KNEE, UNSPECIFIED LATERALITY, UNSPECIFIED OSTEOARTHRITIS TYPE: ICD-10-CM

## 2022-09-19 RX ORDER — DICLOFENAC SODIUM 10 MG/G
2 GEL TOPICAL 4 TIMES DAILY
Qty: 100 G | Refills: 5 | Status: CANCELLED | OUTPATIENT
Start: 2022-09-19

## 2022-09-20 ENCOUNTER — HOSPITAL ENCOUNTER (OUTPATIENT)
Dept: RADIOLOGY | Facility: HOSPITAL | Age: 72
Discharge: HOME | End: 2022-09-20
Attending: INTERNAL MEDICINE
Payer: MEDICARE

## 2022-09-20 ENCOUNTER — OFFICE VISIT (OUTPATIENT)
Dept: CARDIOLOGY | Facility: CLINIC | Age: 72
End: 2022-09-20
Payer: MEDICARE

## 2022-09-20 VITALS
WEIGHT: 192.88 LBS | DIASTOLIC BLOOD PRESSURE: 64 MMHG | HEIGHT: 72 IN | BODY MASS INDEX: 26.12 KG/M2 | SYSTOLIC BLOOD PRESSURE: 138 MMHG | HEART RATE: 85 BPM

## 2022-09-20 DIAGNOSIS — I35.0 MODERATE AORTIC STENOSIS: ICD-10-CM

## 2022-09-20 DIAGNOSIS — E66.3 OVERWEIGHT (BMI 25.0-29.9): Chronic | ICD-10-CM

## 2022-09-20 DIAGNOSIS — I35.1 MODERATE AORTIC REGURGITATION: ICD-10-CM

## 2022-09-20 DIAGNOSIS — R06.02 SOB (SHORTNESS OF BREATH): ICD-10-CM

## 2022-09-20 DIAGNOSIS — R94.31 NONSPECIFIC ABNORMAL ELECTROCARDIOGRAM (ECG) (EKG): Chronic | ICD-10-CM

## 2022-09-20 DIAGNOSIS — R09.89 BILATERAL CAROTID BRUITS: ICD-10-CM

## 2022-09-20 DIAGNOSIS — I10 PRIMARY HYPERTENSION: Chronic | ICD-10-CM

## 2022-09-20 DIAGNOSIS — E78.2 MIXED HYPERLIPIDEMIA: Chronic | ICD-10-CM

## 2022-09-20 DIAGNOSIS — R06.02 SOB (SHORTNESS OF BREATH): Primary | ICD-10-CM

## 2022-09-20 PROCEDURE — 4010F ACE/ARB THERAPY RXD/TAKEN: CPT | Mod: CPTII,S$GLB,, | Performed by: INTERNAL MEDICINE

## 2022-09-20 PROCEDURE — 71046 X-RAY EXAM CHEST 2 VIEWS: CPT | Mod: TC,FY,PO

## 2022-09-20 PROCEDURE — 99204 PR OFFICE/OUTPT VISIT, NEW, LEVL IV, 45-59 MIN: ICD-10-PCS | Mod: S$GLB,,, | Performed by: INTERNAL MEDICINE

## 2022-09-20 PROCEDURE — 99999 PR PBB SHADOW E&M-EST. PATIENT-LVL IV: ICD-10-PCS | Mod: PBBFAC,,, | Performed by: INTERNAL MEDICINE

## 2022-09-20 PROCEDURE — 1101F PT FALLS ASSESS-DOCD LE1/YR: CPT | Mod: CPTII,S$GLB,, | Performed by: INTERNAL MEDICINE

## 2022-09-20 PROCEDURE — 71046 XR CHEST PA AND LATERAL: ICD-10-PCS | Mod: 26,,, | Performed by: RADIOLOGY

## 2022-09-20 PROCEDURE — 3078F PR MOST RECENT DIASTOLIC BLOOD PRESSURE < 80 MM HG: ICD-10-PCS | Mod: CPTII,S$GLB,, | Performed by: INTERNAL MEDICINE

## 2022-09-20 PROCEDURE — 3288F PR FALLS RISK ASSESSMENT DOCUMENTED: ICD-10-PCS | Mod: CPTII,S$GLB,, | Performed by: INTERNAL MEDICINE

## 2022-09-20 PROCEDURE — 1126F PR PAIN SEVERITY QUANTIFIED, NO PAIN PRESENT: ICD-10-PCS | Mod: CPTII,S$GLB,, | Performed by: INTERNAL MEDICINE

## 2022-09-20 PROCEDURE — 3075F SYST BP GE 130 - 139MM HG: CPT | Mod: CPTII,S$GLB,, | Performed by: INTERNAL MEDICINE

## 2022-09-20 PROCEDURE — 1101F PR PT FALLS ASSESS DOC 0-1 FALLS W/OUT INJ PAST YR: ICD-10-PCS | Mod: CPTII,S$GLB,, | Performed by: INTERNAL MEDICINE

## 2022-09-20 PROCEDURE — 1159F PR MEDICATION LIST DOCUMENTED IN MEDICAL RECORD: ICD-10-PCS | Mod: CPTII,S$GLB,, | Performed by: INTERNAL MEDICINE

## 2022-09-20 PROCEDURE — 1126F AMNT PAIN NOTED NONE PRSNT: CPT | Mod: CPTII,S$GLB,, | Performed by: INTERNAL MEDICINE

## 2022-09-20 PROCEDURE — 99999 PR PBB SHADOW E&M-EST. PATIENT-LVL IV: CPT | Mod: PBBFAC,,, | Performed by: INTERNAL MEDICINE

## 2022-09-20 PROCEDURE — 99204 OFFICE O/P NEW MOD 45 MIN: CPT | Mod: S$GLB,,, | Performed by: INTERNAL MEDICINE

## 2022-09-20 PROCEDURE — 3288F FALL RISK ASSESSMENT DOCD: CPT | Mod: CPTII,S$GLB,, | Performed by: INTERNAL MEDICINE

## 2022-09-20 PROCEDURE — 4010F PR ACE/ARB THEARPY RXD/TAKEN: ICD-10-PCS | Mod: CPTII,S$GLB,, | Performed by: INTERNAL MEDICINE

## 2022-09-20 PROCEDURE — 3075F PR MOST RECENT SYSTOLIC BLOOD PRESS GE 130-139MM HG: ICD-10-PCS | Mod: CPTII,S$GLB,, | Performed by: INTERNAL MEDICINE

## 2022-09-20 PROCEDURE — 3008F PR BODY MASS INDEX (BMI) DOCUMENTED: ICD-10-PCS | Mod: CPTII,S$GLB,, | Performed by: INTERNAL MEDICINE

## 2022-09-20 PROCEDURE — 71046 X-RAY EXAM CHEST 2 VIEWS: CPT | Mod: 26,,, | Performed by: RADIOLOGY

## 2022-09-20 PROCEDURE — 3008F BODY MASS INDEX DOCD: CPT | Mod: CPTII,S$GLB,, | Performed by: INTERNAL MEDICINE

## 2022-09-20 PROCEDURE — 1159F MED LIST DOCD IN RCRD: CPT | Mod: CPTII,S$GLB,, | Performed by: INTERNAL MEDICINE

## 2022-09-20 PROCEDURE — 3078F DIAST BP <80 MM HG: CPT | Mod: CPTII,S$GLB,, | Performed by: INTERNAL MEDICINE

## 2022-09-20 NOTE — PROGRESS NOTES
Subjective:    Patient ID:  Scooter Swan is a 71 y.o. male who presents for Hypertension, Valvular Heart Disease, and Shortness of Breath        HPI  NEW PATIENT EVALUATION REFERRED FOR SHORTNESS OF BREATH, ECHO FROM FEBRUARY SHOWED MODERATE AI MILD-TO-MODERATE AORTIC STENOSIS NORMAL EF, EKG NONSPECIFIC T-WAVE CHANGE, see ros    Past Medical History:   Diagnosis Date    Cataract     COPD (chronic obstructive pulmonary disease)     Diverticulosis     DUARTE (dyspnea on exertion)     GERD (gastroesophageal reflux disease)     Glaucoma     Hepatitis C     treated; seeing Dr. Carr    Hyperlipidemia     Hypertension     Liver lesion 08/2018    RA (rheumatoid arthritis)     Rectal prolapse     Possible     Past Surgical History:   Procedure Laterality Date    CARPAL TUNNEL RELEASE      Right wrist 2015    COLONOSCOPY  08/2016    Dr. Cardona; in care everywhere    COLONOSCOPY N/A 3/20/2019    Procedure: COLONOSCOPY;  Surgeon: Sotero Arthur MD;  Location: [facility];  Service: Endoscopy;  Laterality: N/A; hemorrhoids, diverticulosis, repeat in 10 years for screening    EXCISIONAL HEMORRHOIDECTOMY N/A 10/18/2018    Procedure: HEMORRHOIDECTOMY, prone;  Surgeon: Gunnar Horton MD;  Location: [address];  Service: Colon and Rectal;  Laterality: N/A;    THYROID SURGERY      UPPER GASTROINTESTINAL ENDOSCOPY  08/2016    Dr. Cardona; in care everywhere     Family History   Problem Relation Age of Onset    Stomach cancer Paternal Cousin     Stomach cancer Paternal Cousin     Cataracts Mother     Diabetes Mother     Hypertension Mother     Stroke Mother     Diabetes Sister     Hypertension Sister     Stroke Sister     Diabetes Brother     Glaucoma Brother     Hypertension Brother     Stroke Brother     Diabetes Maternal Aunt     Hypertension Maternal Aunt     Stroke Maternal Aunt     Diabetes Maternal Uncle     Hypertension Maternal Uncle     Stroke Maternal Uncle     Diabetes Maternal Grandmother     Hypertension Maternal  Grandmother     Stroke Maternal Grandmother     Cancer Cousin         stomach    Colon cancer Neg Hx     Colon polyps Neg Hx     Crohn's disease Neg Hx     Ulcerative colitis Neg Hx     Esophageal cancer Neg Hx     Amblyopia Neg Hx     Blindness Neg Hx     Macular degeneration Neg Hx     Retinal detachment Neg Hx     Strabismus Neg Hx     Thyroid disease Neg Hx      Social History     Socioeconomic History    Marital status:    Tobacco Use    Smoking status: Never    Smokeless tobacco: Never   Substance and Sexual Activity    Alcohol use: Yes     Comment: social    Drug use: Yes     Types: Hydrocodone     Social Determinants of Health     Financial Resource Strain: Low Risk     Difficulty of Paying Living Expenses: Not very hard   Food Insecurity: No Food Insecurity    Worried About Running Out of Food in the Last Year: Never true    Ran Out of Food in the Last Year: Never true   Transportation Needs: Unmet Transportation Needs    Lack of Transportation (Medical): Yes    Lack of Transportation (Non-Medical): Yes   Physical Activity: Sufficiently Active    Days of Exercise per Week: 7 days    Minutes of Exercise per Session: 60 min   Stress: Stress Concern Present    Feeling of Stress : Rather much   Social Connections: Unknown    Frequency of Communication with Friends and Family: More than three times a week    Frequency of Social Gatherings with Friends and Family: More than three times a week    Active Member of Clubs or Organizations: No    Attends Club or Organization Meetings: Never    Marital Status:    Housing Stability: Low Risk     Unable to Pay for Housing in the Last Year: No    Number of Places Lived in the Last Year: 1    Unstable Housing in the Last Year: No       Review of patient's allergies indicates:   Allergen Reactions    Buspar [buspirone] Hallucinations     Nightmares    Fentanyl Hives and Hallucinations    Plaquenil [hydroxychloroquine]      Nausea, insomnia, weight loss 40LBS     Amitiza [lubiprostone] Nausea Only and Other (See Comments)     Fatigue.    Azulfidine [sulfasalazine] Nausea And Vomiting       Current Outpatient Medications:     albuterol (PROVENTIL/VENTOLIN HFA) 90 mcg/actuation inhaler, Inhale 1 puff into the lungs every 4 (four) hours as needed for Wheezing or Shortness of Breath., Disp: 6.7 g, Rfl: 5    cholecalciferol, vitamin D3, (VITAMIN D3) 50 mcg (2,000 unit) Cap, Take 1 capsule (2,000 Units total) by mouth once daily., Disp: 90 capsule, Rfl: 3    diclofenac sodium (VOLTAREN) 1 % Gel, Apply 2 grams  topically 4 (four) times daily., Disp: 100 g, Rfl: 5    diltiaZEM (DILACOR XR) 180 MG CDCR, Take 1 capsule (180 mg total) by mouth once daily., Disp: 30 capsule, Rfl: 11    dorzolamide (TRUSOPT) 2 % ophthalmic solution, Place 1 drop into both eyes 3 (three) times daily., Disp: , Rfl:     hydrALAZINE (APRESOLINE) 50 MG tablet, Take 2 tablets (100 mg total) by mouth every 12 (twelve) hours., Disp: 360 tablet, Rfl: 3    Lactobac no.41/Bifidobact no.7 (PROBIOTIC-10 ORAL), Take 1 capsule by mouth once daily., Disp: , Rfl:     multivitamin capsule, Take 1 capsule by mouth once daily., Disp: , Rfl:     omeprazole (PRILOSEC) 20 MG capsule, TAKE 1 CAPSULE(20 MG) BY MOUTH TWICE DAILY BEFORE MEALS (Patient taking differently: Take 20 mg by mouth 2 (two) times a day.), Disp: 180 capsule, Rfl: 3    polyethylene glycol (GLYCOLAX) 17 gram PwPk, Take 17 g by mouth once daily., Disp: , Rfl:     pravastatin (PRAVACHOL) 20 MG tablet, TAKE 1 TABLET BY MOUTH DAILY (Patient taking differently: Take 20 mg by mouth once daily.), Disp: 90 tablet, Rfl: 3    predniSONE (DELTASONE) 5 MG tablet, Take 3 tablets (15 mg total) by mouth once daily., Disp: 270 tablet, Rfl: 1    sildenafiL (VIAGRA) 100 MG tablet, Take 1 tablet (100 mg total) by mouth as needed for Erectile Dysfunction., Disp: 8 tablet, Rfl: 0    tamsulosin (FLOMAX) 0.4 mg Cap, Take 1 capsule (0.4 mg total) by mouth once daily., Disp: 30  capsule, Rfl: 11    tiZANidine (ZANAFLEX) 4 MG tablet, TAKE 1 TABLET(4 MG) BY MOUTH EVERY 8 HOURS, Disp: 270 tablet, Rfl: 1    travoprost (TRAVATAN Z) 0.004 % ophthalmic solution, Place 1 drop into both eyes every evening., Disp: 5 mL, Rfl: 12    cyanocobalamin 500 MCG tablet, Take 500 mcg by mouth once daily., Disp: , Rfl:     enalapril (VASOTEC) 20 MG tablet, TAKE 1 TABLET(20 MG) BY MOUTH TWICE DAILY, Disp: 180 tablet, Rfl: 3    eszopiclone (LUNESTA) 3 mg Tab, Take 3 mg by mouth nightly as needed., Disp: , Rfl:     mometasone-formoterol (DULERA) 100-5 mcg/actuation HFAA, Inhale 1 puff into the lungs 2 (two) times a day. Controller, Disp: 13 g, Rfl: 3  No current facility-administered medications for this visit.    Facility-Administered Medications Ordered in Other Visits:     0.9%  NaCl infusion, , Intravenous, Continuous, Deedee Sarkar NP, Stopped at 10/18/18 1613    mupirocin 2 % ointment, , Nasal, On Call Procedure, Deedee Sarkar NP, Given at 10/18/18 1348    Review of Systems   Constitutional: Negative for chills, diaphoresis, malaise/fatigue and night sweats.   HENT:  Negative for congestion and nosebleeds.    Eyes:  Positive for blurred vision. Negative for visual disturbance.   Cardiovascular:  Positive for dyspnea on exertion. Negative for chest pain, claudication, cyanosis, irregular heartbeat, leg swelling, near-syncope, orthopnea, palpitations, paroxysmal nocturnal dyspnea and syncope.   Respiratory:  Positive for shortness of breath. Negative for cough, hemoptysis, sleep disturbances due to breathing, sputum production and wheezing.    Endocrine: Negative for cold intolerance, heat intolerance and polyuria.   Hematologic/Lymphatic: Negative for adenopathy. Does not bruise/bleed easily.   Skin:  Negative for color change, itching and nail changes.   Musculoskeletal:  Positive for arthritis and back pain. Negative for falls.   Gastrointestinal:  Negative for bloating, abdominal pain, change  in bowel habit, dysphagia, heartburn, hematemesis, melena, nausea and vomiting.   Genitourinary:  Negative for dysuria and flank pain.   Neurological:  Negative for brief paralysis, dizziness, focal weakness, light-headedness, loss of balance, numbness, paresthesias, tremors (occ) and weakness.   Psychiatric/Behavioral:  Negative for altered mental status, depression and memory loss. The patient is not nervous/anxious.    Allergic/Immunologic: Negative for hives and persistent infections.      Objective:      Vitals:    09/20/22 1348 09/20/22 1402   BP: (!) 144/81 138/64   Pulse: 85    Weight: 87.5 kg (192 lb 14.4 oz)    Height: 6' (1.829 m)    PainSc: 0-No pain      Body mass index is 26.16 kg/m².    Physical Exam  Constitutional:       Appearance: He is well-developed.   HENT:      Head: Normocephalic and atraumatic.   Eyes:      Conjunctiva/sclera: Conjunctivae normal.      Pupils: Pupils are equal, round, and reactive to light.   Neck:      Thyroid: No thyromegaly.      Vascular: Normal carotid pulses. Carotid bruit present. No hepatojugular reflux or JVD.      Trachea: No tracheal deviation.   Cardiovascular:      Rate and Rhythm: Normal rate and regular rhythm. No extrasystoles are present.     Chest Wall: PMI is not displaced.      Pulses:           Carotid pulses are 2+ on the right side with bruit and 2+ on the left side with bruit.       Radial pulses are 2+ on the right side and 2+ on the left side.        Femoral pulses are 2+ on the right side and 2+ on the left side.       Dorsalis pedis pulses are 2+ on the right side and 2+ on the left side.        Posterior tibial pulses are 2+ on the right side and 2+ on the left side.      Heart sounds: Heart sounds not distant. No midsystolic click. Murmur heard.   Harsh midsystolic murmur is present with a grade of 2/6 at the upper right sternal border radiating to the neck.   Diastolic murmur is present with a grade of 2/4 at the upper right sternal border.      No friction rub. No gallop.   Pulmonary:      Effort: Pulmonary effort is normal. No tachypnea, bradypnea, accessory muscle usage or respiratory distress.      Breath sounds: Normal breath sounds.   Abdominal:      General: Bowel sounds are normal. There is no distension.      Palpations: Abdomen is soft. There is no mass.      Tenderness: There is no abdominal tenderness.   Musculoskeletal:         General: No deformity. Normal range of motion.      Cervical back: Normal range of motion and neck supple. No edema or erythema.      Right lower leg: No edema.      Left lower leg: No edema.   Lymphadenopathy:      Cervical: No cervical adenopathy.   Skin:     General: Skin is warm and dry.   Neurological:      Mental Status: He is alert and oriented to person, place, and time.      Cranial Nerves: No cranial nerve deficit.      Motor: No tremor or abnormal muscle tone.      Coordination: Coordination normal.   Psychiatric:         Mood and Affect: Mood normal.         Speech: Speech normal.         Behavior: Behavior normal.         Thought Content: Thought content normal.         Judgment: Judgment normal.               ..    Chemistry        Component Value Date/Time     05/13/2022 0900    K 4.2 05/13/2022 0900     05/13/2022 0900    CO2 27 05/13/2022 0900    BUN 16 05/13/2022 0900    CREATININE 1.1 05/13/2022 0900     05/13/2022 0900        Component Value Date/Time    CALCIUM 9.0 05/13/2022 0900    ALKPHOS 52 (L) 05/13/2022 0900    AST 15 05/13/2022 0900    ALT 8 (L) 05/13/2022 0900    BILITOT 0.5 05/13/2022 0900    ESTGFRAFRICA >60.0 05/13/2022 0900    EGFRNONAA >60.0 05/13/2022 0900            ..  Lab Results   Component Value Date    CHOL 162 02/11/2019     Lab Results   Component Value Date    HDL 76 (H) 02/11/2019     Lab Results   Component Value Date    LDLCALC 77.6 02/11/2019     Lab Results   Component Value Date    TRIG 42 02/11/2019     Lab Results   Component Value Date    CHOLHDL  46.9 02/11/2019     ..  Lab Results   Component Value Date    WBC 12.17 05/13/2022    HGB 10.1 (L) 05/13/2022    HCT 32.6 (L) 05/13/2022    MCV 92 05/13/2022     (L) 05/13/2022       Test(s) Reviewed  I have reviewed the following in detail:  [] Stress test   [] Angiography   [x] Echocardiogram   [x] Labs   [x] Other:       Assessment:         ICD-10-CM ICD-9-CM   1. SOB (shortness of breath)  R06.02 786.05   2. Moderate aortic regurgitation  I35.1 424.1   3. Bilateral carotid bruits  R09.89 785.9   4. Moderate aortic stenosis  I35.0 424.1   5. Mixed hyperlipidemia  E78.2 272.2   6. Primary hypertension  I10 401.9   7. Nonspecific abnormal electrocardiogram (ECG) (EKG)  R94.31 794.31     Problem List Items Addressed This Visit          Pulmonary    SOB (shortness of breath) - Primary    Relevant Orders    Nuclear Stress - Cardiology Interpreted    Echo    X-Ray Chest PA And Lateral (Completed)       Cardiac/Vascular    Mixed hyperlipidemia    Moderate aortic stenosis    Relevant Orders    Echo    Moderate aortic regurgitation    Relevant Orders    Echo    Primary hypertension    Bilateral carotid bruits    Relevant Orders    CV Ultrasound Bilateral Doppler Carotid     Other Visit Diagnoses       Nonspecific abnormal electrocardiogram (ECG) (EKG)  (Chronic)       Relevant Orders    Nuclear Stress - Cardiology Interpreted             Plan:     EKG NOTED WILL NEED FURTHER EVALUATION INCLUDING NUCLEAR STRESS TEST ASSESS FOR ISCHEMIA REPEAT ECHO ASSESS VALVULAR DISEASE CHEST X-RAY, ASSESS ANGINA EQUIVALENT NO OVERT HEART FAILURE NO CLINICAL ARRHYTHMIA DIET EXERCISE DISCUSSED PLAN WITH THE PATIENT HIS WIFE RETURN TO CLINIC IN FEW WEEKS AFTER TESTS.      SOB (shortness of breath)  Comments:  WORKUP  Orders:  -     Ambulatory referral/consult to Cardiology  -     Nuclear Stress - Cardiology Interpreted; Future  -     Echo  -     X-Ray Chest PA And Lateral; Future; Expected date: 09/20/2022    Moderate aortic  regurgitation  -     Ambulatory referral/consult to Cardiology  -     Echo    Bilateral carotid bruits  Comments:  ULTRASOUND  Orders:  -     CV Ultrasound Bilateral Doppler Carotid; Future    Moderate aortic stenosis  -     Ambulatory referral/consult to Cardiology  -     Echo    Mixed hyperlipidemia    Primary hypertension    Nonspecific abnormal electrocardiogram (ECG) (EKG)  -     Nuclear Stress - Cardiology Interpreted; Future  RTC Low level/low impact aerobic exercise 5x's/wk. Heart healthy diet and risk factor modification.    See labs and med orders.    Aerobic exercise 5x's/wk. Heart healthy diet and risk factor modification.    See labs and med orders.

## 2022-09-21 PROBLEM — E66.3 OVERWEIGHT (BMI 25.0-29.9): Status: ACTIVE | Noted: 2022-09-21

## 2022-09-21 PROBLEM — R94.31 NONSPECIFIC ABNORMAL ELECTROCARDIOGRAM (ECG) (EKG): Chronic | Status: ACTIVE | Noted: 2022-09-21

## 2022-09-22 ENCOUNTER — PATIENT MESSAGE (OUTPATIENT)
Dept: CARDIOLOGY | Facility: HOSPITAL | Age: 72
End: 2022-09-22
Payer: MEDICARE

## 2022-09-23 ENCOUNTER — HOSPITAL ENCOUNTER (OUTPATIENT)
Dept: RADIOLOGY | Facility: HOSPITAL | Age: 72
Discharge: HOME | End: 2022-09-23
Attending: INTERNAL MEDICINE
Payer: MEDICARE

## 2022-09-23 ENCOUNTER — CLINICAL SUPPORT (OUTPATIENT)
Dept: CARDIOLOGY | Facility: HOSPITAL | Age: 72
End: 2022-09-23
Attending: INTERNAL MEDICINE
Payer: MEDICARE

## 2022-09-23 VITALS — WEIGHT: 192 LBS | BODY MASS INDEX: 26.01 KG/M2 | HEIGHT: 72 IN

## 2022-09-23 DIAGNOSIS — R06.02 SOB (SHORTNESS OF BREATH): ICD-10-CM

## 2022-09-23 DIAGNOSIS — R94.31 NONSPECIFIC ABNORMAL ELECTROCARDIOGRAM (ECG) (EKG): Chronic | ICD-10-CM

## 2022-09-23 PROCEDURE — 78452 HT MUSCLE IMAGE SPECT MULT: CPT | Mod: 26,,, | Performed by: INTERNAL MEDICINE

## 2022-09-23 PROCEDURE — 78452 STRESS TEST WITH MYOCARDIAL PERFUSION (CUPID ONLY): ICD-10-PCS | Mod: 26,,, | Performed by: INTERNAL MEDICINE

## 2022-09-23 PROCEDURE — 93016 CV STRESS TEST SUPVJ ONLY: CPT | Mod: ,,, | Performed by: INTERNAL MEDICINE

## 2022-09-23 PROCEDURE — 93016 STRESS TEST WITH MYOCARDIAL PERFUSION (CUPID ONLY): ICD-10-PCS | Mod: ,,, | Performed by: INTERNAL MEDICINE

## 2022-09-23 PROCEDURE — A9502 TC99M TETROFOSMIN: HCPCS | Mod: PO

## 2022-09-23 PROCEDURE — 63600175 PHARM REV CODE 636 W HCPCS: Mod: PO | Performed by: INTERNAL MEDICINE

## 2022-09-23 PROCEDURE — 93017 CV STRESS TEST TRACING ONLY: CPT | Mod: PO

## 2022-09-23 RX ORDER — REGADENOSON 0.08 MG/ML
0.4 INJECTION, SOLUTION INTRAVENOUS ONCE
Status: COMPLETED | OUTPATIENT
Start: 2022-09-23 | End: 2022-09-23

## 2022-09-23 RX ADMIN — REGADENOSON 0.4 MG: 0.08 INJECTION, SOLUTION INTRAVENOUS at 02:09

## 2022-09-26 LAB
CV PHARM DOSE: 0.4 MG
CV STRESS BASE HR: 76 BPM
DIASTOLIC BLOOD PRESSURE: 72 MMHG
NUC REST EJECTION FRACTION: 64
OHS CV CPX 1 MINUTE RECOVERY HEART RATE: 106 BPM
OHS CV CPX 85 PERCENT MAX PREDICTED HEART RATE MALE: 126
OHS CV CPX MAX PREDICTED HEART RATE: 148
OHS CV CPX PATIENT IS FEMALE: 0
OHS CV CPX PATIENT IS MALE: 1
OHS CV CPX PEAK DIASTOLIC BLOOD PRESSURE: 66 MMHG
OHS CV CPX PEAK HEAR RATE: 112 BPM
OHS CV CPX PEAK RATE PRESSURE PRODUCT: NORMAL
OHS CV CPX PEAK SYSTOLIC BLOOD PRESSURE: 175 MMHG
OHS CV CPX PERCENT MAX PREDICTED HEART RATE ACHIEVED: 76
OHS CV CPX RATE PRESSURE PRODUCT PRESENTING: NORMAL
OHS CV PHARM TIME: 1415 MIN
SYSTOLIC BLOOD PRESSURE: 158 MMHG

## 2022-09-27 ENCOUNTER — PATIENT MESSAGE (OUTPATIENT)
Dept: CARDIOLOGY | Facility: CLINIC | Age: 72
End: 2022-09-27
Payer: MEDICARE

## 2022-09-27 DIAGNOSIS — R94.39 POSITIVE CARDIAC STRESS TEST: Primary | ICD-10-CM

## 2022-09-27 RX ORDER — SODIUM CHLORIDE 9 MG/ML
INJECTION, SOLUTION INTRAVENOUS ONCE
Status: CANCELLED | OUTPATIENT
Start: 2022-09-27 | End: 2022-09-27

## 2022-09-27 RX ORDER — SODIUM CHLORIDE 0.9 % (FLUSH) 0.9 %
10 SYRINGE (ML) INJECTION
Status: SHIPPED | OUTPATIENT
Start: 2022-09-27

## 2022-09-27 NOTE — PROGRESS NOTES
Angiogram    Arrive for procedure at: [facility] on 10/6/22. Your procedure is at 10 am.    You will receive a phone call from [facility] Pre-Op Department with further instructions prior to your scheduled procedure.    Notify the nurse if you are ALLERGIC TO IODINE.    FASTING: You MAY NOT have anything to eat or drink AFTER MIDNIGHT the day before your procedure. If your procedure is scheduled in the afternoon, you may have a LIGHT BREAKFAST 6-8 hours prior to your procedure.  For example: Two slices of toast; black coffee or black tea.    MEDICATIONS: You may take your regular morning medications with water. If there are any medications that you should not take, you will be instructed to hold them for that morning.    CARDIOLOGY PRE-PROCEDURE MEDICATION ORDERS:  ** Please hold any medications that are checked below:    HOLD   # OF DAYS TO HOLD  Do not use Viagra the day before or the day of the procedure      WHAT TO EXPECT:    How long will the procedure take?  The procedure will take an average of 1 - 2 hours to perform.  After the procedure, you will need to lay flat for around 4 - 6 hours to minimize bleeding from the puncture site. If the wrist is accessed you will need to keep your arm still as instructed by the nurse.    When can I go home?  You may be able to be discharged home that same afternoon if there were no complications.  If you have one of the following: balloon; stent; pacemaker or defibrillator procedures, you may spend one night for observation.  Your doctor will determine your discharge based upon your progress.  The results of your procedure will be discussed with you before you are discharged.  Any further testing or procedures will be scheduled for you either before you leave or you will be instructed to call for a future appointment.      TRANSPORTATION:  PLEASE ARRANGE TO HAVE SOMEONE DRIVE YOU HOME FOLLOWING YOUR PROCEDURE, YOU WILL NOT BE ALLOWED TO DRIVE.

## 2022-10-05 DIAGNOSIS — M05.9 SEROPOSITIVE RHEUMATOID ARTHRITIS: Primary | ICD-10-CM

## 2022-10-05 DIAGNOSIS — G89.4 CHRONIC PAIN SYNDROME: ICD-10-CM

## 2022-10-05 RX ORDER — HYDROCODONE BITARTRATE AND ACETAMINOPHEN 10; 325 MG/1; MG/1
1 TABLET ORAL EVERY 8 HOURS PRN
Qty: 90 TABLET | Refills: 0 | Status: SHIPPED | OUTPATIENT
Start: 2022-10-05 | End: 2022-10-18 | Stop reason: SDUPTHER

## 2022-10-05 NOTE — TELEPHONE ENCOUNTER
----- Message from Carolann Gamble sent at 10/5/2022 10:18 AM CDT -----  Contact: pt  Type:  RX Refill Request    Who Called:   pt  Refill or New Rx:   refill  RX Name and Strength:   HYDROcodone-acetaminophen (NORCO)  mg per tablet  How is the patient currently taking it? (ex. 1XDay):  as ordered  Is this a 30 day or 90 day RX:   30  Preferred Pharmacy with phone number:    [id number] - [place] [address] AT [facility] [address] [place] [address] [address] [facility] [address]  Phone: [phone] Fax: [phone]          Local or Mail Order:  local  Ordering Provider:    Best Call Back Number:  [phone]  Additional Information:

## 2022-10-11 ENCOUNTER — CLINICAL SUPPORT (OUTPATIENT)
Dept: CARDIOLOGY | Facility: HOSPITAL | Age: 72
End: 2022-10-11
Attending: INTERNAL MEDICINE
Payer: MEDICARE

## 2022-10-11 VITALS — WEIGHT: 190 LBS | HEIGHT: 72 IN | BODY MASS INDEX: 25.73 KG/M2

## 2022-10-11 DIAGNOSIS — R09.89 BILATERAL CAROTID BRUITS: ICD-10-CM

## 2022-10-11 LAB
ASCENDING AORTA: 3.17 CM
AV INDEX (PROSTH): 0.31
AV MEAN GRADIENT: 47 MMHG
AV PEAK GRADIENT: 81 MMHG
AV REGURGITATION PRESSURE HALF TIME: 383.8 MS
AV VALVE AREA: 1.35 CM2
AV VELOCITY RATIO: 0.27
BSA FOR ECHO PROCEDURE: 2.09 M2
CV ECHO LV RWT: 0.51 CM
DOP CALC AO PEAK VEL: 4.49 M/S
DOP CALC AO VTI: 104.4 CM
DOP CALC LVOT AREA: 4.3 CM2
DOP CALC LVOT DIAMETER: 2.35 CM
DOP CALC LVOT PEAK VEL: 1.21 M/S
DOP CALC LVOT STROKE VOLUME: 141.33 CM3
DOP CALCLVOT PEAK VEL VTI: 32.6 CM
E WAVE DECELERATION TIME: 391.31 MSEC
E/A RATIO: 0.68
E/E' RATIO: 16.22 M/S
ECHO LV POSTERIOR WALL: 1.3 CM (ref 0.6–1.1)
EJECTION FRACTION: 60 %
FRACTIONAL SHORTENING: 31 % (ref 28–44)
INTERVENTRICULAR SEPTUM: 1.24 CM (ref 0.6–1.1)
IVRT: 104.66 MSEC
LA MAJOR: 5.64 CM
LA MINOR: 5.07 CM
LA WIDTH: 4.7 CM
LEFT ARM DIASTOLIC BLOOD PRESSURE: 65 MMHG
LEFT ARM SYSTOLIC BLOOD PRESSURE: 140 MMHG
LEFT ATRIUM SIZE: 4.07 CM
LEFT ATRIUM VOLUME INDEX: 41.7 ML/M2
LEFT ATRIUM VOLUME: 86.82 CM3
LEFT CBA DIAS: 12 CM/S
LEFT CBA SYS: 52 CM/S
LEFT CCA DIST DIAS: 12 CM/S
LEFT CCA DIST SYS: 59 CM/S
LEFT CCA MID DIAS: 13 CM/S
LEFT CCA MID SYS: 74 CM/S
LEFT CCA PROX DIAS: 12 CM/S
LEFT CCA PROX SYS: 88 CM/S
LEFT ECA DIAS: 8 CM/S
LEFT ECA SYS: 108 CM/S
LEFT ICA DIST DIAS: 27 CM/S
LEFT ICA DIST SYS: 78 CM/S
LEFT ICA MID DIAS: 26 CM/S
LEFT ICA MID SYS: 91 CM/S
LEFT ICA PROX DIAS: 12 CM/S
LEFT ICA PROX SYS: 77 CM/S
LEFT INTERNAL DIMENSION IN SYSTOLE: 3.49 CM (ref 2.1–4)
LEFT VENTRICLE DIASTOLIC VOLUME INDEX: 58.4 ML/M2
LEFT VENTRICLE DIASTOLIC VOLUME: 121.47 ML
LEFT VENTRICLE MASS INDEX: 124 G/M2
LEFT VENTRICLE SYSTOLIC VOLUME INDEX: 24.4 ML/M2
LEFT VENTRICLE SYSTOLIC VOLUME: 50.68 ML
LEFT VENTRICULAR INTERNAL DIMENSION IN DIASTOLE: 5.06 CM (ref 3.5–6)
LEFT VENTRICULAR MASS: 258.04 G
LEFT VERTEBRAL DIAS: 23 CM/S
LEFT VERTEBRAL SYS: 71 CM/S
LV LATERAL E/E' RATIO: 18.25 M/S
LV SEPTAL E/E' RATIO: 14.6 M/S
LVOT MG: 3.53 MMHG
LVOT MV: 0.89 CM/S
MV PEAK A VEL: 1.08 M/S
MV PEAK E VEL: 0.73 M/S
MV STENOSIS PRESSURE HALF TIME: 113.48 MS
MV VALVE AREA P 1/2 METHOD: 1.94 CM2
OHS CV CAROTID RIGHT ICA EDV HIGHEST: 24
OHS CV CAROTID ULTRASOUND LEFT ICA/CCA RATIO: 1.54
OHS CV CAROTID ULTRASOUND RIGHT ICA/CCA RATIO: 1.29
OHS CV PV CAROTID LEFT HIGHEST CCA: 88
OHS CV PV CAROTID LEFT HIGHEST ICA: 91
OHS CV PV CAROTID RIGHT HIGHEST CCA: 65
OHS CV PV CAROTID RIGHT HIGHEST ICA: 72
OHS CV US CAROTID LEFT HIGHEST EDV: 27
PISA AR MAX VEL: 5.11 M/S
PISA TR MAX VEL: 2.6 M/S
PULM VEIN S/D RATIO: 1.28
PV PEAK D VEL: 0.6 M/S
PV PEAK S VEL: 0.77 M/S
RA MAJOR: 4.84 CM
RA PRESSURE: 8 MMHG
RA WIDTH: 3.96 CM
RIGHT ARM DIASTOLIC BLOOD PRESSURE: 65 MMHG
RIGHT ARM SYSTOLIC BLOOD PRESSURE: 140 MMHG
RIGHT CBA DIAS: 16 CM/S
RIGHT CBA SYS: 85 CM/S
RIGHT CCA DIST DIAS: 19 CM/S
RIGHT CCA DIST SYS: 56 CM/S
RIGHT CCA MID DIAS: 10 CM/S
RIGHT CCA MID SYS: 60 CM/S
RIGHT CCA PROX DIAS: 8 CM/S
RIGHT CCA PROX SYS: 65 CM/S
RIGHT ECA DIAS: 10 CM/S
RIGHT ECA SYS: 63 CM/S
RIGHT ICA DIST DIAS: 24 CM/S
RIGHT ICA DIST SYS: 72 CM/S
RIGHT ICA MID DIAS: 19 CM/S
RIGHT ICA MID SYS: 68 CM/S
RIGHT ICA PROX DIAS: 13 CM/S
RIGHT ICA PROX SYS: 59 CM/S
RIGHT VENTRICULAR END-DIASTOLIC DIMENSION: 4.04 CM
RIGHT VENTRICULAR LENGTH IN DIASTOLE (APICAL 4-CHAMBER VIEW): 7.28 CM
RIGHT VERTEBRAL DIAS: 9 CM/S
RIGHT VERTEBRAL SYS: 43 CM/S
RV MID DIAMA: 2.99 CM
RV TISSUE DOPPLER FREE WALL SYSTOLIC VELOCITY 1 (APICAL 4 CHAMBER VIEW): 0.02 CM/S
SINUS: 3.09 CM
STJ: 3.25 CM
TDI LATERAL: 0.04 M/S
TDI SEPTAL: 0.05 M/S
TDI: 0.05 M/S
TR MAX PG: 27 MMHG
TRICUSPID ANNULAR PLANE SYSTOLIC EXCURSION: 2.67 CM
TV REST PULMONARY ARTERY PRESSURE: 35 MMHG

## 2022-10-11 PROCEDURE — 93306 TTE W/DOPPLER COMPLETE: CPT | Mod: PO

## 2022-10-11 PROCEDURE — 93880 CV US DOPPLER CAROTID (CUPID ONLY): ICD-10-PCS | Mod: 26,,, | Performed by: INTERNAL MEDICINE

## 2022-10-11 PROCEDURE — 93880 EXTRACRANIAL BILAT STUDY: CPT | Mod: 26,,, | Performed by: INTERNAL MEDICINE

## 2022-10-11 PROCEDURE — 93880 EXTRACRANIAL BILAT STUDY: CPT | Mod: PO

## 2022-10-18 ENCOUNTER — OFFICE VISIT (OUTPATIENT)
Dept: RHEUMATOLOGY | Facility: CLINIC | Age: 72
End: 2022-10-18
Payer: MEDICARE

## 2022-10-18 ENCOUNTER — OFFICE VISIT (OUTPATIENT)
Dept: CARDIOLOGY | Facility: CLINIC | Age: 72
End: 2022-10-18
Payer: MEDICARE

## 2022-10-18 VITALS
HEART RATE: 77 BPM | SYSTOLIC BLOOD PRESSURE: 147 MMHG | DIASTOLIC BLOOD PRESSURE: 78 MMHG | HEIGHT: 72 IN | WEIGHT: 198 LBS | BODY MASS INDEX: 26.82 KG/M2

## 2022-10-18 VITALS
DIASTOLIC BLOOD PRESSURE: 67 MMHG | WEIGHT: 199.75 LBS | HEART RATE: 77 BPM | SYSTOLIC BLOOD PRESSURE: 136 MMHG | HEIGHT: 72 IN | BODY MASS INDEX: 27.06 KG/M2

## 2022-10-18 DIAGNOSIS — Z79.52 CURRENT CHRONIC USE OF SYSTEMIC STEROIDS: ICD-10-CM

## 2022-10-18 DIAGNOSIS — D72.829 LEUKOCYTOSIS, UNSPECIFIED TYPE: ICD-10-CM

## 2022-10-18 DIAGNOSIS — G47.00 INSOMNIA, UNSPECIFIED TYPE: Primary | ICD-10-CM

## 2022-10-18 DIAGNOSIS — I35.9 AORTIC VALVE DISEASE: Primary | ICD-10-CM

## 2022-10-18 DIAGNOSIS — D69.6 THROMBOCYTOPENIA: ICD-10-CM

## 2022-10-18 DIAGNOSIS — M05.9 SEROPOSITIVE RHEUMATOID ARTHRITIS: ICD-10-CM

## 2022-10-18 DIAGNOSIS — I10 PRIMARY HYPERTENSION: ICD-10-CM

## 2022-10-18 DIAGNOSIS — E66.3 OVERWEIGHT (BMI 25.0-29.9): ICD-10-CM

## 2022-10-18 DIAGNOSIS — E78.2 MIXED HYPERLIPIDEMIA: ICD-10-CM

## 2022-10-18 DIAGNOSIS — G89.4 CHRONIC PAIN SYNDROME: ICD-10-CM

## 2022-10-18 DIAGNOSIS — I35.0 MODERATE AORTIC STENOSIS: ICD-10-CM

## 2022-10-18 DIAGNOSIS — M05.9 SEROPOSITIVE RHEUMATOID ARTHRITIS: Primary | ICD-10-CM

## 2022-10-18 DIAGNOSIS — I65.23 CAROTID ARTERY PLAQUE, BILATERAL: ICD-10-CM

## 2022-10-18 PROCEDURE — 99999 PR PBB SHADOW E&M-EST. PATIENT-LVL III: CPT | Mod: PBBFAC,,, | Performed by: INTERNAL MEDICINE

## 2022-10-18 PROCEDURE — 96372 PR INJECTION,THERAP/PROPH/DIAG2ST, IM OR SUBCUT: ICD-10-PCS | Mod: S$GLB,,, | Performed by: PHYSICIAN ASSISTANT

## 2022-10-18 PROCEDURE — 99999 PR PBB SHADOW E&M-EST. PATIENT-LVL V: CPT | Mod: PBBFAC,,, | Performed by: PHYSICIAN ASSISTANT

## 2022-10-18 PROCEDURE — 3288F PR FALLS RISK ASSESSMENT DOCUMENTED: ICD-10-PCS | Mod: CPTII,S$GLB,, | Performed by: INTERNAL MEDICINE

## 2022-10-18 PROCEDURE — 3288F FALL RISK ASSESSMENT DOCD: CPT | Mod: CPTII,S$GLB,, | Performed by: PHYSICIAN ASSISTANT

## 2022-10-18 PROCEDURE — 1160F PR REVIEW ALL MEDS BY PRESCRIBER/CLIN PHARMACIST DOCUMENTED: ICD-10-PCS | Mod: CPTII,S$GLB,, | Performed by: PHYSICIAN ASSISTANT

## 2022-10-18 PROCEDURE — 1126F PR PAIN SEVERITY QUANTIFIED, NO PAIN PRESENT: ICD-10-PCS | Mod: CPTII,S$GLB,, | Performed by: PHYSICIAN ASSISTANT

## 2022-10-18 PROCEDURE — 99999 PR PBB SHADOW E&M-EST. PATIENT-LVL III: ICD-10-PCS | Mod: PBBFAC,,, | Performed by: INTERNAL MEDICINE

## 2022-10-18 PROCEDURE — 1126F AMNT PAIN NOTED NONE PRSNT: CPT | Mod: CPTII,S$GLB,, | Performed by: PHYSICIAN ASSISTANT

## 2022-10-18 PROCEDURE — 3078F DIAST BP <80 MM HG: CPT | Mod: CPTII,S$GLB,, | Performed by: INTERNAL MEDICINE

## 2022-10-18 PROCEDURE — 99999 PR PBB SHADOW E&M-EST. PATIENT-LVL V: ICD-10-PCS | Mod: PBBFAC,,, | Performed by: PHYSICIAN ASSISTANT

## 2022-10-18 PROCEDURE — 99214 PR OFFICE/OUTPT VISIT, EST, LEVL IV, 30-39 MIN: ICD-10-PCS | Mod: S$GLB,,, | Performed by: INTERNAL MEDICINE

## 2022-10-18 PROCEDURE — 3078F PR MOST RECENT DIASTOLIC BLOOD PRESSURE < 80 MM HG: ICD-10-PCS | Mod: CPTII,S$GLB,, | Performed by: INTERNAL MEDICINE

## 2022-10-18 PROCEDURE — 99214 OFFICE O/P EST MOD 30 MIN: CPT | Mod: S$GLB,,, | Performed by: INTERNAL MEDICINE

## 2022-10-18 PROCEDURE — 4010F ACE/ARB THERAPY RXD/TAKEN: CPT | Mod: CPTII,S$GLB,, | Performed by: PHYSICIAN ASSISTANT

## 2022-10-18 PROCEDURE — 96372 THER/PROPH/DIAG INJ SC/IM: CPT | Mod: S$GLB,,, | Performed by: PHYSICIAN ASSISTANT

## 2022-10-18 PROCEDURE — 1126F PR PAIN SEVERITY QUANTIFIED, NO PAIN PRESENT: ICD-10-PCS | Mod: CPTII,S$GLB,, | Performed by: INTERNAL MEDICINE

## 2022-10-18 PROCEDURE — 1159F MED LIST DOCD IN RCRD: CPT | Mod: CPTII,S$GLB,, | Performed by: PHYSICIAN ASSISTANT

## 2022-10-18 PROCEDURE — 1101F PT FALLS ASSESS-DOCD LE1/YR: CPT | Mod: CPTII,S$GLB,, | Performed by: PHYSICIAN ASSISTANT

## 2022-10-18 PROCEDURE — 3288F PR FALLS RISK ASSESSMENT DOCUMENTED: ICD-10-PCS | Mod: CPTII,S$GLB,, | Performed by: PHYSICIAN ASSISTANT

## 2022-10-18 PROCEDURE — 1101F PR PT FALLS ASSESS DOC 0-1 FALLS W/OUT INJ PAST YR: ICD-10-PCS | Mod: CPTII,S$GLB,, | Performed by: PHYSICIAN ASSISTANT

## 2022-10-18 PROCEDURE — 99215 PR OFFICE/OUTPT VISIT, EST, LEVL V, 40-54 MIN: ICD-10-PCS | Mod: 25,S$GLB,, | Performed by: PHYSICIAN ASSISTANT

## 2022-10-18 PROCEDURE — 3075F PR MOST RECENT SYSTOLIC BLOOD PRESS GE 130-139MM HG: ICD-10-PCS | Mod: CPTII,S$GLB,, | Performed by: INTERNAL MEDICINE

## 2022-10-18 PROCEDURE — 99215 OFFICE O/P EST HI 40 MIN: CPT | Mod: 25,S$GLB,, | Performed by: PHYSICIAN ASSISTANT

## 2022-10-18 PROCEDURE — 4010F PR ACE/ARB THEARPY RXD/TAKEN: ICD-10-PCS | Mod: CPTII,S$GLB,, | Performed by: PHYSICIAN ASSISTANT

## 2022-10-18 PROCEDURE — 3075F SYST BP GE 130 - 139MM HG: CPT | Mod: CPTII,S$GLB,, | Performed by: INTERNAL MEDICINE

## 2022-10-18 PROCEDURE — 1159F PR MEDICATION LIST DOCUMENTED IN MEDICAL RECORD: ICD-10-PCS | Mod: CPTII,S$GLB,, | Performed by: PHYSICIAN ASSISTANT

## 2022-10-18 PROCEDURE — 1101F PT FALLS ASSESS-DOCD LE1/YR: CPT | Mod: CPTII,S$GLB,, | Performed by: INTERNAL MEDICINE

## 2022-10-18 PROCEDURE — 4010F ACE/ARB THERAPY RXD/TAKEN: CPT | Mod: CPTII,S$GLB,, | Performed by: INTERNAL MEDICINE

## 2022-10-18 PROCEDURE — 1126F AMNT PAIN NOTED NONE PRSNT: CPT | Mod: CPTII,S$GLB,, | Performed by: INTERNAL MEDICINE

## 2022-10-18 PROCEDURE — 1160F RVW MEDS BY RX/DR IN RCRD: CPT | Mod: CPTII,S$GLB,, | Performed by: PHYSICIAN ASSISTANT

## 2022-10-18 PROCEDURE — 4010F PR ACE/ARB THEARPY RXD/TAKEN: ICD-10-PCS | Mod: CPTII,S$GLB,, | Performed by: INTERNAL MEDICINE

## 2022-10-18 PROCEDURE — 1101F PR PT FALLS ASSESS DOC 0-1 FALLS W/OUT INJ PAST YR: ICD-10-PCS | Mod: CPTII,S$GLB,, | Performed by: INTERNAL MEDICINE

## 2022-10-18 PROCEDURE — 3288F FALL RISK ASSESSMENT DOCD: CPT | Mod: CPTII,S$GLB,, | Performed by: INTERNAL MEDICINE

## 2022-10-18 RX ORDER — PREDNISONE 5 MG/1
15 TABLET ORAL DAILY
Qty: 270 TABLET | Refills: 1 | Status: SHIPPED | OUTPATIENT
Start: 2022-10-18 | End: 2023-08-17 | Stop reason: SDUPTHER

## 2022-10-18 RX ORDER — CYANOCOBALAMIN 1000 UG/ML
1000 INJECTION, SOLUTION INTRAMUSCULAR; SUBCUTANEOUS
Status: COMPLETED | OUTPATIENT
Start: 2022-10-18 | End: 2022-10-18

## 2022-10-18 RX ORDER — METHYLPREDNISOLONE ACETATE 80 MG/ML
160 INJECTION, SUSPENSION INTRA-ARTICULAR; INTRALESIONAL; INTRAMUSCULAR; SOFT TISSUE
Status: COMPLETED | OUTPATIENT
Start: 2022-10-18 | End: 2022-10-18

## 2022-10-18 RX ORDER — KETOROLAC TROMETHAMINE 30 MG/ML
60 INJECTION, SOLUTION INTRAMUSCULAR; INTRAVENOUS
Status: COMPLETED | OUTPATIENT
Start: 2022-10-18 | End: 2022-10-18

## 2022-10-18 RX ADMIN — CYANOCOBALAMIN 1000 MCG: 1000 INJECTION, SOLUTION INTRAMUSCULAR; SUBCUTANEOUS at 02:10

## 2022-10-18 RX ADMIN — KETOROLAC TROMETHAMINE 60 MG: 30 INJECTION, SOLUTION INTRAMUSCULAR; INTRAVENOUS at 02:10

## 2022-10-18 RX ADMIN — METHYLPREDNISOLONE ACETATE 160 MG: 80 INJECTION, SUSPENSION INTRA-ARTICULAR; INTRALESIONAL; INTRAMUSCULAR; SOFT TISSUE at 02:10

## 2022-10-18 ASSESSMENT — ROUTINE ASSESSMENT OF PATIENT INDEX DATA (RAPID3)
TOTAL RAPID3 SCORE: 5.11
PSYCHOLOGICAL DISTRESS SCORE: 1.1
PATIENT GLOBAL ASSESSMENT SCORE: 6
PAIN SCORE: 7
MDHAQ FUNCTION SCORE: 0.7
FATIGUE SCORE: 2.2

## 2022-10-18 NOTE — PROGRESS NOTES
Subjective:    Patient ID:  Scooter Swan is a 71 y.o. male who presents for Follow-up        Follow-up  Pertinent negatives include no abdominal pain, change in bowel habit, chest pain, chills, congestion, coughing, diaphoresis, nausea, numbness, rash or weakness.     DISCUSSED TESTS, ECHO ABNORMAL IN STRESS TEST WAS ABNORMAL HAD CARDIAC CATHETERIZATION WHICH REVEALED NO SIGNIFICANT CORONARY ARTERY DISEASE, APPEARS TO HAVE SIGNIFICANT OR AT LEAST MODERATE AI IN ADDITION TO AORTIC STENOSIS, MILD CAROTID PLAQUE, MAGGY DUARTE, CMP OK, HB 10.1 CHRONIC, SEE ROS  Past Medical History:   Diagnosis Date    Cataract     COPD (chronic obstructive pulmonary disease)     Diverticulosis     DUARTE (dyspnea on exertion)     GERD (gastroesophageal reflux disease)     Glaucoma     Hepatitis C     treated; seeing Dr. Carr    Hyperlipidemia     Hypertension     Liver lesion 08/2018    RA (rheumatoid arthritis)     Rectal prolapse     Possible     Past Surgical History:   Procedure Laterality Date    ANGIOGRAM, CORONARY, WITH LEFT HEART CATHETERIZATION  10/6/2022    Procedure: Angiogram, Coronary, with Left Heart Cath;  Surgeon: Zac Boucher MD;  Location: [facility] CATH;  Service: Cardiology;;    ARTERIOGRAPHY OF AORTIC ROOT  10/6/2022    Procedure: ARTERIOGRAM, AORTIC ROOT;  Surgeon: Zac Boucher MD;  Location: [facility] CATH;  Service: Cardiology;;    CARPAL TUNNEL RELEASE      Right wrist 2015    COLONOSCOPY  08/2016    Dr. Cardona; in care everywhere    COLONOSCOPY N/A 3/20/2019    Procedure: COLONOSCOPY;  Surgeon: Sotero Arthur MD;  Location: [facility] ENDO;  Service: Endoscopy;  Laterality: N/A; hemorrhoids, diverticulosis, repeat in 10 years for screening    EXCISIONAL HEMORRHOIDECTOMY N/A 10/18/2018    Procedure: HEMORRHOIDECTOMY, prone;  Surgeon: Gunnar Horton MD;  Location: [address];  Service: Colon and Rectal;  Laterality: N/A;    THYROID SURGERY      UPPER GASTROINTESTINAL ENDOSCOPY  08/2016    Dr. Cardona; in care everywhere      Family History   Problem Relation Age of Onset    Stomach cancer Paternal Cousin     Stomach cancer Paternal Cousin     Cataracts Mother     Diabetes Mother     Hypertension Mother     Stroke Mother     Diabetes Sister     Hypertension Sister     Stroke Sister     Diabetes Brother     Glaucoma Brother     Hypertension Brother     Stroke Brother     Diabetes Maternal Aunt     Hypertension Maternal Aunt     Stroke Maternal Aunt     Diabetes Maternal Uncle     Hypertension Maternal Uncle     Stroke Maternal Uncle     Diabetes Maternal Grandmother     Hypertension Maternal Grandmother     Stroke Maternal Grandmother     Cancer Cousin         stomach    Colon cancer Neg Hx     Colon polyps Neg Hx     Crohn's disease Neg Hx     Ulcerative colitis Neg Hx     Esophageal cancer Neg Hx     Amblyopia Neg Hx     Blindness Neg Hx     Macular degeneration Neg Hx     Retinal detachment Neg Hx     Strabismus Neg Hx     Thyroid disease Neg Hx      Social History     Socioeconomic History    Marital status:    Tobacco Use    Smoking status: Never    Smokeless tobacco: Never   Substance and Sexual Activity    Alcohol use: Yes     Alcohol/week: 1.0 standard drink     Types: 1 Shots of liquor per week     Comment: social    Drug use: Yes     Types: Hydrocodone     Social Determinants of Health     Financial Resource Strain: Low Risk     Difficulty of Paying Living Expenses: Not hard at all   Food Insecurity: No Food Insecurity    Worried About Running Out of Food in the Last Year: Never true    Ran Out of Food in the Last Year: Never true   Transportation Needs: Unmet Transportation Needs    Lack of Transportation (Medical): Yes    Lack of Transportation (Non-Medical): Yes   Physical Activity: Sufficiently Active    Days of Exercise per Week: 5 days    Minutes of Exercise per Session: 30 min   Stress: Stress Concern Present    Feeling of Stress : Rather much   Social Connections: Unknown    Frequency of Communication with  Friends and Family: More than three times a week    Frequency of Social Gatherings with Friends and Family: More than three times a week    Active Member of Clubs or Organizations: Patient refused    Attends Club or Organization Meetings: Patient refused    Marital Status:    Housing Stability: Unknown    Unable to Pay for Housing in the Last Year: Patient refused    Number of Places Lived in the Last Year: 1    Unstable Housing in the Last Year: Patient refused       Review of patient's allergies indicates:   Allergen Reactions    Buspar [buspirone] Hallucinations     Nightmares    Fentanyl Hives and Hallucinations    Plaquenil [hydroxychloroquine]      Nausea, insomnia, weight loss 40LBS    Amitiza [lubiprostone] Nausea Only and Other (See Comments)     Fatigue.    Azulfidine [sulfasalazine] Nausea And Vomiting       Current Outpatient Medications:     albuterol (PROVENTIL/VENTOLIN HFA) 90 mcg/actuation inhaler, Inhale 1 puff into the lungs every 4 (four) hours as needed for Wheezing or Shortness of Breath., Disp: 6.7 g, Rfl: 5    cholecalciferol, vitamin D3, (VITAMIN D3) 50 mcg (2,000 unit) Cap, Take 1 capsule (2,000 Units total) by mouth once daily., Disp: 90 capsule, Rfl: 3    cyanocobalamin 500 MCG tablet, Take 500 mcg by mouth once daily., Disp: , Rfl:     diclofenac sodium (VOLTAREN) 1 % Gel, Apply 2 grams  topically 4 (four) times daily., Disp: 100 g, Rfl: 5    diltiaZEM (DILACOR XR) 180 MG CDCR, Take 1 capsule (180 mg total) by mouth once daily., Disp: 30 capsule, Rfl: 11    dorzolamide (TRUSOPT) 2 % ophthalmic solution, Place 1 drop into both eyes 3 (three) times daily., Disp: , Rfl:     enalapril (VASOTEC) 20 MG tablet, TAKE 1 TABLET(20 MG) BY MOUTH TWICE DAILY, Disp: 180 tablet, Rfl: 3    hydrALAZINE (APRESOLINE) 50 MG tablet, Take 2 tablets (100 mg total) by mouth every 12 (twelve) hours., Disp: 360 tablet, Rfl: 3    HYDROcodone-acetaminophen (NORCO)  mg per tablet, Take 1 tablet by mouth  every 8 (eight) hours as needed for Pain., Disp: 90 tablet, Rfl: 0    Lactobac no.41/Bifidobact no.7 (PROBIOTIC-10 ORAL), Take 1 capsule by mouth once daily., Disp: , Rfl:     multivitamin capsule, Take 1 capsule by mouth once daily., Disp: , Rfl:     omeprazole (PRILOSEC) 20 MG capsule, TAKE 1 CAPSULE(20 MG) BY MOUTH TWICE DAILY BEFORE MEALS (Patient taking differently: Take 20 mg by mouth 2 (two) times a day.), Disp: 180 capsule, Rfl: 3    polyethylene glycol (GLYCOLAX) 17 gram PwPk, Take 17 g by mouth daily as needed., Disp: , Rfl:     pravastatin (PRAVACHOL) 20 MG tablet, TAKE 1 TABLET BY MOUTH DAILY (Patient taking differently: Take 20 mg by mouth every evening.), Disp: 90 tablet, Rfl: 3    sildenafiL (VIAGRA) 100 MG tablet, Take 1 tablet (100 mg total) by mouth as needed for Erectile Dysfunction., Disp: 8 tablet, Rfl: 0    tamsulosin (FLOMAX) 0.4 mg Cap, Take 1 capsule (0.4 mg total) by mouth once daily. (Patient taking differently: Take 0.4 mg by mouth every evening.), Disp: 30 capsule, Rfl: 11    tiZANidine (ZANAFLEX) 4 MG tablet, TAKE 1 TABLET(4 MG) BY MOUTH EVERY 8 HOURS, Disp: 270 tablet, Rfl: 1    travoprost (TRAVATAN Z) 0.004 % ophthalmic solution, Place 1 drop into both eyes every evening., Disp: 5 mL, Rfl: 12    predniSONE (DELTASONE) 5 MG tablet, Take 3 tablets (15 mg total) by mouth once daily., Disp: 270 tablet, Rfl: 1    Current Facility-Administered Medications:     sodium chloride 0.9% flush 10 mL, 10 mL, Intravenous, PRN, Zac Boucher MD    Facility-Administered Medications Ordered in Other Visits:     0.9%  NaCl infusion, , Intravenous, Continuous, Deedee Sarkar NP, Stopped at 10/18/18 1613    mupirocin 2 % ointment, , Nasal, On Call Procedure, Deedee Sarkar NP, Given at 10/18/18 1348    Review of Systems   Constitutional: Negative for chills, diaphoresis, malaise/fatigue and night sweats.   HENT:  Negative for congestion and nosebleeds.    Eyes:  Negative for blurred vision  and visual disturbance.   Cardiovascular:  Positive for dyspnea on exertion (MILD). Negative for chest pain, claudication, cyanosis, irregular heartbeat, leg swelling, near-syncope, orthopnea, palpitations, paroxysmal nocturnal dyspnea and syncope.   Respiratory:  Negative for cough, hemoptysis, shortness of breath and wheezing.    Endocrine: Negative for heat intolerance and polyuria.   Hematologic/Lymphatic: Negative for adenopathy. Does not bruise/bleed easily.   Skin:  Negative for color change and rash.   Musculoskeletal:  Positive for arthritis. Negative for back pain and falls.   Gastrointestinal:  Positive for dysphagia. Negative for bloating, abdominal pain, change in bowel habit, hematemesis, melena and nausea.   Genitourinary:  Negative for dysuria and flank pain.   Neurological:  Negative for brief paralysis, dizziness, focal weakness, light-headedness, loss of balance, numbness, paresthesias and weakness.   Psychiatric/Behavioral:  Negative for altered mental status and depression.       Objective:      Vitals:    10/18/22 1208   BP: 136/67   Pulse: 77   Weight: 90.6 kg (199 lb 11.8 oz)   Height: 6' (1.829 m)   PainSc: 0-No pain     Body mass index is 27.09 kg/m².    Physical Exam  Constitutional:       Appearance: He is well-developed.   HENT:      Head: Normocephalic and atraumatic.   Eyes:      General: No scleral icterus.  Neck:      Thyroid: No thyromegaly.      Vascular: Normal carotid pulses. No carotid bruit, hepatojugular reflux or JVD.      Trachea: No tracheal deviation.   Cardiovascular:      Rate and Rhythm: Normal rate and regular rhythm. No extrasystoles are present.     Chest Wall: PMI is not displaced.      Pulses:           Carotid pulses are 2+ on the right side and 2+ on the left side.       Radial pulses are 2+ on the right side and 2+ on the left side.        Posterior tibial pulses are 2+ on the right side and 2+ on the left side.      Heart sounds: Heart sounds not distant. No  midsystolic click. Murmur heard.   Harsh systolic murmur is present with a grade of 2/6 at the upper right sternal border radiating to the neck.   Diastolic murmur is present at the upper right sternal border.     No friction rub. No gallop.   Pulmonary:      Effort: Pulmonary effort is normal. No tachypnea, bradypnea, accessory muscle usage or respiratory distress.      Breath sounds: Normal breath sounds.   Abdominal:      General: Bowel sounds are normal.      Palpations: Abdomen is soft.      Tenderness: There is no abdominal tenderness.   Musculoskeletal:         General: Normal range of motion.      Cervical back: Neck supple. No edema or erythema.      Right lower leg: No edema.      Left lower leg: No edema.   Skin:     General: Skin is warm and dry.   Neurological:      Mental Status: He is alert and oriented to person, place, and time.      Cranial Nerves: No cranial nerve deficit.      Motor: No tremor or abnormal muscle tone.   Psychiatric:         Mood and Affect: Mood normal.         Speech: Speech normal.         Behavior: Behavior normal.         Judgment: Judgment normal.               ..    Chemistry        Component Value Date/Time     10/06/2022 0844    K 3.7 10/06/2022 0844     10/06/2022 0844    CO2 28 10/06/2022 0844    BUN 10 10/06/2022 0844    CREATININE 1.05 10/06/2022 0844     10/06/2022 0844        Component Value Date/Time    CALCIUM 9.1 10/06/2022 0844    ALKPHOS 58 10/06/2022 0844    AST 26 10/06/2022 0844    ALT 14 10/06/2022 0844    BILITOT 0.3 10/06/2022 0844    ESTGFRAFRICA >60.0 05/13/2022 0900    EGFRNONAA >60.0 05/13/2022 0900            ..  Lab Results   Component Value Date    CHOL 162 02/11/2019     Lab Results   Component Value Date    HDL 76 (H) 02/11/2019     Lab Results   Component Value Date    LDLCALC 77.6 02/11/2019     Lab Results   Component Value Date    TRIG 42 02/11/2019     Lab Results   Component Value Date    CHOLHDL 46.9 02/11/2019      ..  Lab Results   Component Value Date    WBC 13.58 (H) 10/06/2022    HGB 10.1 (L) 10/06/2022    HCT 31.7 (L) 10/06/2022    MCV 90 10/06/2022     (L) 10/06/2022       Test(s) Reviewed  I have reviewed the following in detail:  [] Stress test   [x] Angiography   [] Echocardiogram   [x] Labs   [x] Other:       Assessment:         ICD-10-CM ICD-9-CM   1. Aortic valve disease  I35.9 424.1   2. Carotid artery plaque, bilateral  I65.23 433.10     433.30   3. Mixed hyperlipidemia  E78.2 272.2   4. Primary hypertension  I10 401.9   5. Moderate aortic stenosis  I35.0 424.1   6. Overweight (BMI 25.0-29.9)  E66.3 278.02     Problem List Items Addressed This Visit          Cardiac/Vascular    Mixed hyperlipidemia    Moderate aortic stenosis    Primary hypertension    Aortic valve disease - Primary    Carotid artery plaque, bilateral       Endocrine    Overweight (BMI 25.0-29.9)        Plan:     CADY TO ASSESS AV, THAT HAS MAKES PATHOLOGY OF AORTIC STENOSIS AND AORTIC REGURGITATION PATIENT WANTS TO WAIT AT THIS TIME STATES THAT HIS SYMPTOMS ARE BETTER WILL CONTINUE TO MONITOR CLOSELY NO ANGINA NO OVERT HEART FAILURE NO TIA TYPE SYMPTOMS NO NEAR-SYNCOPE DIET EXERCISE AVOID DEHYDRATION, WATCH BLOOD PRESSURE, DISCUSSED PLAN WITH THE PATIENT AND HIS WIFE, RETURN TO CLINIC IN FEW MONTHS      Aortic valve disease    Carotid artery plaque, bilateral    Mixed hyperlipidemia    Primary hypertension    Moderate aortic stenosis    Overweight (BMI 25.0-29.9)  RTC Low level/low impact aerobic exercise 5x's/wk. Heart healthy diet and risk factor modification.    See labs and med orders.    Aerobic exercise 5x's/wk. Heart healthy diet and risk factor modification.    See labs and med orders.

## 2022-10-18 NOTE — PROGRESS NOTES
Subjective:       Patient ID: Scooetr Swan is a 71 y.o. male.    Chief Complaint: Disease Management    Mr. Swan is a 71 year old male who presents to clinic visit for follow up on seropositive rheumatoid arthritis and osteoarthritis. He underwent cardiology eval-- angiogram--normal coronary arteries, moderate to severe AS and AR followed by Dr. Boucher. He continues to have shortness of breath with exertion. Pulmonary referral placed at last visit, but he has not est care yet.     Labs show leukocytosis, anemia, and persistent thrombocytopenia. He has not followed by with Hematology since 2020.    He continues to have pain in his hands, elbows, knees, and ankles. He has significant morning stiffness and he is taking prednisone 10 mg daily. Norco is providing adequate control of his pain without side effects.     He was taking lunesta for sleep, but insurance is not covering this routinely.     MC was too costly.    He had a fall recently walking in the dark hallway in the middle of the night and landed on his R shoulder. No major injuries. No LOC.    Current treatment:  1. Norco TID PRN  2. Prednisone 10 mg  3. Zanaflex PRN    Prior treatment:  1. Plaquenil (WEIGHT LOSS)  2. SSZ caused GI upset    Review of Systems   Constitutional: Positive for activity change. Negative for chills, fatigue and fever.   Eyes: Negative for visual disturbance.   Respiratory: Negative for cough, shortness of breath and wheezing.    Cardiovascular: Negative for chest pain, palpitations and leg swelling.   Gastrointestinal: Negative for abdominal pain, constipation, diarrhea, nausea and vomiting.   Musculoskeletal: Positive for arthralgias, back pain and joint swelling. Negative for myalgias.   Neurological: Negative for dizziness, syncope and headaches.   Hematological: Negative for adenopathy.         Objective:     Vitals:    10/18/22 1303   BP: (!) 147/78   Pulse: 77       Past Medical History:   Diagnosis Date    Cataract     COPD  (chronic obstructive pulmonary disease)     Diverticulosis     DUARTE (dyspnea on exertion)     GERD (gastroesophageal reflux disease)     Glaucoma     Hepatitis C     treated; seeing Dr. Carr    Hyperlipidemia     Hypertension     Liver lesion 08/2018    RA (rheumatoid arthritis)     Rectal prolapse     Possible     Past Surgical History:   Procedure Laterality Date    ANGIOGRAM, CORONARY, WITH LEFT HEART CATHETERIZATION  10/6/2022    Procedure: Angiogram, Coronary, with Left Heart Cath;  Surgeon: Zac Boucher MD;  Location: [facility] CATH;  Service: Cardiology;;    ARTERIOGRAPHY OF AORTIC ROOT  10/6/2022    Procedure: ARTERIOGRAM, AORTIC ROOT;  Surgeon: Zac Boucher MD;  Location: [facility] CATH;  Service: Cardiology;;    CARPAL TUNNEL RELEASE      Right wrist 2015    COLONOSCOPY  08/2016    Dr. Cardona; in care everywhere    COLONOSCOPY N/A 3/20/2019    Procedure: COLONOSCOPY;  Surgeon: Sotero Arthur MD;  Location: [facility];  Service: Endoscopy;  Laterality: N/A; hemorrhoids, diverticulosis, repeat in 10 years for screening    EXCISIONAL HEMORRHOIDECTOMY N/A 10/18/2018    Procedure: HEMORRHOIDECTOMY, prone;  Surgeon: Gunnar Horton MD;  Location: [address];  Service: Colon and Rectal;  Laterality: N/A;    THYROID SURGERY      UPPER GASTROINTESTINAL ENDOSCOPY  08/2016    Dr. Cardona; in care everywhere        Physical Exam   Constitutional: He is well-developed, well-nourished, and in no distress.   Eyes: Right conjunctiva is not injected. Left conjunctiva is not injected.   Neck: No JVD present. No thyromegaly present.   Cardiovascular: Normal rate and regular rhythm.  Exam reveals no decreased pulses.    Pulmonary/Chest: Normal effort        Right Side Rheumatological Exam     Examination finds the shoulder tender.    The patient is tender to palpation of the elbow, wrist, knee, 1st PIP, 1st MCP, 2nd PIP, 2nd MCP, 3rd PIP, 3rd MCP, 4th PIP, 4th MCP, 5th PIP and 5th MCP    He has swelling of the wrist, 1st  PIP, 2nd PIP, 3rd PIP and 4th PIP     Left Side Rheumatological Exam     Examination finds the shoulder normal.    The patient is tender to palpation of the elbow, wrist, knee, 1st PIP, 1st MCP, 2nd PIP, 2nd MCP, 3rd PIP, 3rd MCP, 4th PIP, 4th MCP, 5th PIP and 5th MCP.    He has swelling of the wrist, 1st PIP, 2nd PIP, 3rd PIP, 4th PIP and 5th PIP       Lymphadenopathy:     He has no cervical adenopathy.   Neurological: Gait normal.   Skin: No rash noted.     Psychiatric: Mood and affect normal.     Labs:  Component      Latest Ref Rng & Units 10/6/2022 5/13/2022   WBC      3.90 - 12.70 K/uL 13.58 (H)    RBC      4.60 - 6.20 M/uL 3.52 (L)    Hemoglobin      14.0 - 18.0 g/dL 10.1 (L)    Hematocrit      40.0 - 54.0 % 31.7 (L)    MCV      82 - 98 fL 90    MCH      27.0 - 31.0 pg 28.7    MCHC      32.0 - 36.0 g/dL 31.9 (L)    RDW      11.5 - 14.5 % 14.2    Platelets      150 - 450 K/uL 144 (L)    MPV      9.2 - 12.9 fL 12.8    Immature Granulocytes      0.0 - 0.5 % 2.4 (H)    Gran # (ANC)      1.8 - 7.7 K/uL 8.6 (H)    Immature Grans (Abs)      0.00 - 0.04 K/uL 0.33 (H)    Lymph #      1.0 - 4.8 K/uL 3.2    Mono #      0.3 - 1.0 K/uL 1.3 (H)    Eos #      0.0 - 0.5 K/uL 0.1    Baso #      0.00 - 0.20 K/uL 0.06    nRBC      0 /100 WBC 0    Gran %      38.0 - 73.0 % 63.1    Lymph %      18.0 - 48.0 % 23.8    Mono %      4.0 - 15.0 % 9.9    Eosinophil %      0.0 - 8.0 % 0.4    Basophil %      0.0 - 1.9 % 0.4    Differential Method       Automated    Sodium      136 - 145 mmol/L 141    Potassium      3.5 - 5.1 mmol/L 3.7    Chloride      95 - 110 mmol/L 101    CO2      22 - 31 mmol/L 28    Glucose      70 - 110 mg/dL 107    BUN      9 - 21 mg/dL 10    Creatinine      0.50 - 1.40 mg/dL 1.05    Calcium      8.4 - 10.2 mg/dL 9.1    PROTEIN TOTAL      6.0 - 8.4 g/dL 7.0    Albumin      3.5 - 5.2 g/dL 4.2    BILIRUBIN TOTAL      0.2 - 1.3 mg/dL 0.3    Alkaline Phosphatase      38 - 145 U/L 58    AST      17 - 59 U/L 26    ALT       0 - 50 U/L 14    Anion Gap      8 - 16 mmol/L 12    eGFR      >60 mL/min/1.73 m:2 >60    CRP      0.0 - 8.2 mg/L  12.6 (H)   Sed Rate      0 - 10 mm/Hr  5     Summary         The coronary arteries were normal..    Moderate aortic regurgitation at least on aortic root injection,    Consider reassessing the aortic valve with an echo or CADY.Summary    The left ventricle is normal in size with concentric hypertrophy and normal systolic function.  Moderate left atrial enlargement.  Grade I left ventricular diastolic dysfunction.  The estimated ejection fraction is 60%.  Normal right ventricular size with normal right ventricular systolic function.  There is moderate-to-severe aortic valve stenosis.  Aortic valve area is 1.35 cm2; peak velocity is 4.49 m/s; mean gradient is 47 mmHg.  Moderate aortic regurgitation.  Mild tricuspid regurgitation.  Intermediate central venous pressure (8 mmHg).  The estimated PA systolic pressure is 35 mmHg.  The ascending aorta is mildly dilated.    Assessment:       1. Seropositive rheumatoid arthritis    2. Current chronic use of systemic steroids    3. Chronic pain syndrome    4. Leukocytosis, unspecified type    5. Thrombocytopenia              Plan:       Seropositive rheumatoid arthritis  -     predniSONE (DELTASONE) 5 MG tablet; Take 3 tablets (15 mg total) by mouth once daily.  Dispense: 270 tablet; Refill: 1  -     ketorolac injection 60 mg  -     methylPREDNISolone acetate injection 160 mg  -     cyanocobalamin injection 1,000 mcg    Current chronic use of systemic steroids  -     predniSONE (DELTASONE) 5 MG tablet; Take 3 tablets (15 mg total) by mouth once daily.  Dispense: 270 tablet; Refill: 1    Chronic pain syndrome  -     predniSONE (DELTASONE) 5 MG tablet; Take 3 tablets (15 mg total) by mouth once daily.  Dispense: 270 tablet; Refill: 1    Leukocytosis, unspecified type  -     Ambulatory referral/consult to Hematology / Oncology; Future; Expected date:  "10/25/2022    Thrombocytopenia  -     Ambulatory referral/consult to Hematology / Oncology; Future; Expected date: 10/25/2022        Assessment:  71 year old male with  Seropositive (+RF) rheumatoid arthritis, osteoarthritis on prednisone 10 mg  --thrombocytopenia, anemia  --hx of hep c s/p glen, last hep c viral load undetectable  --chronic pain syndrome on norco  --abnormal CT abd 6/2019 "Multiple areas of early bright arterial enhancement not seen on the later images.."  --moderate to severe aortic stenosis followed by Dr. Boucher    Plan:  1. Cont. Prednisone PRN. Try to taper dose in the future  2. Continue norco PRN per Dr. Morales. I have checked [place] prescription monitoring program site and no unusual or abnormal behavior has occurred pt understand the risk and benefits of taking opioid medications and has decided to continue the medication.  reviewed. Sent x 3. Lunesta pended, may need PA  3. Toradol 60, depo 160, b12  4. Arthritis survey  5. Bone density scan  6. Hematology referral. Leukocytosis from chronic steroids?  7. Pulmonary referral to [place]    Follow up:  4 mo w/labs prior      "

## 2022-10-19 PROBLEM — I65.23 CAROTID ARTERY PLAQUE, BILATERAL: Status: ACTIVE | Noted: 2022-10-19

## 2022-10-21 RX ORDER — ESZOPICLONE 3 MG/1
3 TABLET, FILM COATED ORAL NIGHTLY
Qty: 30 TABLET | Refills: 5 | Status: SHIPPED | OUTPATIENT
Start: 2022-10-21 | End: 2022-10-25

## 2022-10-21 RX ORDER — HYDROCODONE BITARTRATE AND ACETAMINOPHEN 10; 325 MG/1; MG/1
1 TABLET ORAL EVERY 8 HOURS PRN
Qty: 90 TABLET | Refills: 0 | Status: SHIPPED | OUTPATIENT
Start: 2022-12-04 | End: 2023-02-02

## 2022-10-21 RX ORDER — HYDROCODONE BITARTRATE AND ACETAMINOPHEN 10; 325 MG/1; MG/1
1 TABLET ORAL EVERY 8 HOURS PRN
Qty: 90 TABLET | Refills: 0 | Status: SHIPPED | OUTPATIENT
Start: 2022-11-04 | End: 2023-02-02

## 2022-10-21 RX ORDER — HYDROCODONE BITARTRATE AND ACETAMINOPHEN 10; 325 MG/1; MG/1
1 TABLET ORAL EVERY 8 HOURS PRN
Qty: 90 TABLET | Refills: 0 | Status: SHIPPED | OUTPATIENT
Start: 2023-01-03 | End: 2023-01-30 | Stop reason: SDUPTHER

## 2022-10-25 ENCOUNTER — OFFICE VISIT (OUTPATIENT)
Dept: FAMILY MEDICINE | Facility: CLINIC | Age: 72
End: 2022-10-25
Payer: MEDICARE

## 2022-10-25 VITALS
OXYGEN SATURATION: 96 % | HEART RATE: 64 BPM | DIASTOLIC BLOOD PRESSURE: 60 MMHG | SYSTOLIC BLOOD PRESSURE: 106 MMHG | TEMPERATURE: 99 F | RESPIRATION RATE: 18 BRPM | BODY MASS INDEX: 26.88 KG/M2 | WEIGHT: 198.19 LBS

## 2022-10-25 DIAGNOSIS — F51.04 CHRONIC INSOMNIA: Primary | ICD-10-CM

## 2022-10-25 DIAGNOSIS — I1A.0 RESISTANT HYPERTENSION: ICD-10-CM

## 2022-10-25 PROBLEM — J69.0 ASPIRATION PNEUMONIA OF RIGHT LOWER LOBE DUE TO GASTRIC SECRETIONS: Status: RESOLVED | Noted: 2021-10-24 | Resolved: 2022-10-25

## 2022-10-25 PROBLEM — I10 PRIMARY HYPERTENSION: Status: RESOLVED | Noted: 2022-09-20 | Resolved: 2022-10-25

## 2022-10-25 PROCEDURE — 90694 VACC AIIV4 NO PRSRV 0.5ML IM: CPT | Mod: S$GLB,,, | Performed by: INTERNAL MEDICINE

## 2022-10-25 PROCEDURE — 3074F SYST BP LT 130 MM HG: CPT | Mod: CPTII,S$GLB,, | Performed by: INTERNAL MEDICINE

## 2022-10-25 PROCEDURE — 4010F PR ACE/ARB THEARPY RXD/TAKEN: ICD-10-PCS | Mod: CPTII,S$GLB,, | Performed by: INTERNAL MEDICINE

## 2022-10-25 PROCEDURE — 99214 PR OFFICE/OUTPT VISIT, EST, LEVL IV, 30-39 MIN: ICD-10-PCS | Mod: 25,S$GLB,, | Performed by: INTERNAL MEDICINE

## 2022-10-25 PROCEDURE — 3288F PR FALLS RISK ASSESSMENT DOCUMENTED: ICD-10-PCS | Mod: CPTII,S$GLB,, | Performed by: INTERNAL MEDICINE

## 2022-10-25 PROCEDURE — 1125F PR PAIN SEVERITY QUANTIFIED, PAIN PRESENT: ICD-10-PCS | Mod: CPTII,S$GLB,, | Performed by: INTERNAL MEDICINE

## 2022-10-25 PROCEDURE — G0008 FLU VACCINE - QUADRIVALENT - ADJUVANTED: ICD-10-PCS | Mod: S$GLB,,, | Performed by: INTERNAL MEDICINE

## 2022-10-25 PROCEDURE — 1125F AMNT PAIN NOTED PAIN PRSNT: CPT | Mod: CPTII,S$GLB,, | Performed by: INTERNAL MEDICINE

## 2022-10-25 PROCEDURE — 99214 OFFICE O/P EST MOD 30 MIN: CPT | Mod: 25,S$GLB,, | Performed by: INTERNAL MEDICINE

## 2022-10-25 PROCEDURE — 3288F FALL RISK ASSESSMENT DOCD: CPT | Mod: CPTII,S$GLB,, | Performed by: INTERNAL MEDICINE

## 2022-10-25 PROCEDURE — 3078F PR MOST RECENT DIASTOLIC BLOOD PRESSURE < 80 MM HG: ICD-10-PCS | Mod: CPTII,S$GLB,, | Performed by: INTERNAL MEDICINE

## 2022-10-25 PROCEDURE — 1159F PR MEDICATION LIST DOCUMENTED IN MEDICAL RECORD: ICD-10-PCS | Mod: CPTII,S$GLB,, | Performed by: INTERNAL MEDICINE

## 2022-10-25 PROCEDURE — 99999 PR PBB SHADOW E&M-EST. PATIENT-LVL IV: CPT | Mod: PBBFAC,,, | Performed by: INTERNAL MEDICINE

## 2022-10-25 PROCEDURE — 1159F MED LIST DOCD IN RCRD: CPT | Mod: CPTII,S$GLB,, | Performed by: INTERNAL MEDICINE

## 2022-10-25 PROCEDURE — 3074F PR MOST RECENT SYSTOLIC BLOOD PRESSURE < 130 MM HG: ICD-10-PCS | Mod: CPTII,S$GLB,, | Performed by: INTERNAL MEDICINE

## 2022-10-25 PROCEDURE — 4010F ACE/ARB THERAPY RXD/TAKEN: CPT | Mod: CPTII,S$GLB,, | Performed by: INTERNAL MEDICINE

## 2022-10-25 PROCEDURE — 1101F PR PT FALLS ASSESS DOC 0-1 FALLS W/OUT INJ PAST YR: ICD-10-PCS | Mod: CPTII,S$GLB,, | Performed by: INTERNAL MEDICINE

## 2022-10-25 PROCEDURE — 1101F PT FALLS ASSESS-DOCD LE1/YR: CPT | Mod: CPTII,S$GLB,, | Performed by: INTERNAL MEDICINE

## 2022-10-25 PROCEDURE — 90694 FLU VACCINE - QUADRIVALENT - ADJUVANTED: ICD-10-PCS | Mod: S$GLB,,, | Performed by: INTERNAL MEDICINE

## 2022-10-25 PROCEDURE — 3078F DIAST BP <80 MM HG: CPT | Mod: CPTII,S$GLB,, | Performed by: INTERNAL MEDICINE

## 2022-10-25 PROCEDURE — 99999 PR PBB SHADOW E&M-EST. PATIENT-LVL IV: ICD-10-PCS | Mod: PBBFAC,,, | Performed by: INTERNAL MEDICINE

## 2022-10-25 PROCEDURE — 1160F RVW MEDS BY RX/DR IN RCRD: CPT | Mod: CPTII,S$GLB,, | Performed by: INTERNAL MEDICINE

## 2022-10-25 PROCEDURE — 1160F PR REVIEW ALL MEDS BY PRESCRIBER/CLIN PHARMACIST DOCUMENTED: ICD-10-PCS | Mod: CPTII,S$GLB,, | Performed by: INTERNAL MEDICINE

## 2022-10-25 PROCEDURE — G0008 ADMIN INFLUENZA VIRUS VAC: HCPCS | Mod: S$GLB,,, | Performed by: INTERNAL MEDICINE

## 2022-10-25 RX ORDER — ESZOPICLONE 3 MG/1
3 TABLET, FILM COATED ORAL NIGHTLY
Qty: 30 TABLET | Refills: 0 | Status: SHIPPED | OUTPATIENT
Start: 2022-10-25 | End: 2022-11-24

## 2022-10-25 NOTE — PROGRESS NOTES
Patient ID: Scooter Swan is a 71 y.o. male.    Chief Complaint: Follow-up      Chronic Medical Problems   Resistant hypertension, hyperlipidemia, moderate aortic stenosis,  Moderate aortic regurgitation, rheumatoid arthritis, thrombocytopenia, anemia     Assessment HPI / Notes Impression / Plan   Dyspnea Saw cardiology.  Had abnormal stress test but no significant coronary artery disease on heart catheterization. Dr. Boucher suggested CADY to assess aortic valve as he has significant aortic stenosis and aortic insufficiency. Follow up with cardiology    Hypertension Controlled. Running 115/65.  Continue current medication     Insomnia  Tried trazodone- made insomnia worse.  He did try Lunesta which did help but he was only able to get so many per year.  Will retry Lunesta.  Discussed caution with Valium and hydrocodone.  He will take Lunesta by itself at night.     Dx and Orders   Scooter was seen today for follow-up.    Diagnoses and all orders for this visit:    Chronic insomnia  -     eszopiclone (LUNESTA) 3 mg Tab; Take 1 tablet (3 mg total) by mouth every evening.    Resistant hypertension        Review of Systems   Constitutional:  Negative for fever.   Respiratory:  Negative for shortness of breath.    Cardiovascular:  Negative for chest pain.   Gastrointestinal:  Negative for abdominal pain.   Psychiatric/Behavioral:  Positive for sleep disturbance.    Vitals:    10/25/22 1334   BP: 106/60   Pulse: 64   Resp: 18   Temp: 98.7 °F (37.1 °C)     Wt Readings from Last 3 Encounters:   10/25/22 1334 89.9 kg (198 lb 3.1 oz)   10/18/22 1303 89.8 kg (198 lb)   10/18/22 1208 90.6 kg (199 lb 11.8 oz)      Body mass index is 26.88 kg/m².   Physical Exam  Cardiovascular:      Rate and Rhythm: Normal rate and regular rhythm.      Heart sounds: Murmur heard.     No gallop.   Pulmonary:      Breath sounds: Normal breath sounds. No wheezing or rhonchi.   Abdominal:      Palpations: Abdomen is soft.      Tenderness: There is no  abdominal tenderness.   Musculoskeletal:         General: Normal range of motion.      Cervical back: Neck supple.   Skin:     General: Skin is warm.      Findings: No rash.   Neurological:      Mental Status: He is alert.   Psychiatric:         Behavior: Behavior normal.        Hypertension Medications               diltiaZEM (DILACOR XR) 180 MG CDCR Take 1 capsule (180 mg total) by mouth once daily.    enalapril (VASOTEC) 20 MG tablet TAKE 1 TABLET(20 MG) BY MOUTH TWICE DAILY    hydrALAZINE (APRESOLINE) 50 MG tablet Take 2 tablets (100 mg total) by mouth every 12 (twelve) hours.           Hyperlipidemia Medications               pravastatin (PRAVACHOL) 20 MG tablet TAKE 1 TABLET BY MOUTH DAILY           Medication List with Changes/Refills   New Medications    ESZOPICLONE (LUNESTA) 3 MG TAB    Take 1 tablet (3 mg total) by mouth every evening.   Current Medications    ALBUTEROL (PROVENTIL/VENTOLIN HFA) 90 MCG/ACTUATION INHALER    Inhale 1 puff into the lungs every 4 (four) hours as needed for Wheezing or Shortness of Breath.    CHOLECALCIFEROL, VITAMIN D3, (VITAMIN D3) 50 MCG (2,000 UNIT) CAP    Take 1 capsule (2,000 Units total) by mouth once daily.    CYANOCOBALAMIN 500 MCG TABLET    Take 500 mcg by mouth once daily.    DICLOFENAC SODIUM (VOLTAREN) 1 % GEL    Apply 2 grams  topically 4 (four) times daily.    DILTIAZEM (DILACOR XR) 180 MG CDCR    Take 1 capsule (180 mg total) by mouth once daily.    DORZOLAMIDE (TRUSOPT) 2 % OPHTHALMIC SOLUTION    Place 1 drop into both eyes 3 (three) times daily.    ENALAPRIL (VASOTEC) 20 MG TABLET    TAKE 1 TABLET(20 MG) BY MOUTH TWICE DAILY    HYDRALAZINE (APRESOLINE) 50 MG TABLET    Take 2 tablets (100 mg total) by mouth every 12 (twelve) hours.    HYDROCODONE-ACETAMINOPHEN (NORCO)  MG PER TABLET    Take 1 tablet by mouth every 8 (eight) hours as needed for Pain.    HYDROCODONE-ACETAMINOPHEN (NORCO)  MG PER TABLET    Take 1 tablet by mouth every 8 (eight) hours as  needed for Pain.    HYDROCODONE-ACETAMINOPHEN (NORCO)  MG PER TABLET    Take 1 tablet by mouth every 8 (eight) hours as needed for Pain.    LACTOBAC NO.41/BIFIDOBACT NO.7 (PROBIOTIC-10 ORAL)    Take 1 capsule by mouth once daily.    MULTIVITAMIN CAPSULE    Take 1 capsule by mouth once daily.    OMEPRAZOLE (PRILOSEC) 20 MG CAPSULE    TAKE 1 CAPSULE(20 MG) BY MOUTH TWICE DAILY BEFORE MEALS    POLYETHYLENE GLYCOL (GLYCOLAX) 17 GRAM PWPK    Take 17 g by mouth daily as needed.    PRAVASTATIN (PRAVACHOL) 20 MG TABLET    TAKE 1 TABLET BY MOUTH DAILY    PREDNISONE (DELTASONE) 5 MG TABLET    Take 3 tablets (15 mg total) by mouth once daily.    SILDENAFIL (VIAGRA) 100 MG TABLET    Take 1 tablet (100 mg total) by mouth as needed for Erectile Dysfunction.    TAMSULOSIN (FLOMAX) 0.4 MG CAP    Take 1 capsule (0.4 mg total) by mouth once daily.    TIZANIDINE (ZANAFLEX) 4 MG TABLET    TAKE 1 TABLET(4 MG) BY MOUTH EVERY 8 HOURS    TRAVOPROST (TRAVATAN Z) 0.004 % OPHTHALMIC SOLUTION    Place 1 drop into both eyes every evening.   Discontinued Medications    ESZOPICLONE (LUNESTA) 3 MG TAB    Take 1 tablet (3 mg total) by mouth every evening.       I personally reviewed past medical, family and social history.

## 2022-12-01 DIAGNOSIS — N52.8 OTHER MALE ERECTILE DYSFUNCTION: ICD-10-CM

## 2022-12-01 NOTE — TELEPHONE ENCOUNTER
Care Due:                  Date            Visit Type   Department     Provider  --------------------------------------------------------------------------------                                EP [facility]  Last Visit: 10-      CARE ([facility])   WALTER Kennedy                              EP -                              PRIMARY      [facility]  Next Visit: 01-      CARE ([facility])   WALTER Kennedy                                                            Last  Test          Frequency    Reason                     Performed    Due Date  --------------------------------------------------------------------------------    Lipid Panel.  12 months..  pravastatin..............  Not Found    Overdue    [employer] Embedded Care Gaps. Reference number: [id number]. 12/01/2022   9:31:59 AM CST

## 2022-12-02 RX ORDER — SILDENAFIL 100 MG/1
100 TABLET, FILM COATED ORAL
Qty: 8 TABLET | Refills: 11 | Status: SHIPPED | OUTPATIENT
Start: 2022-12-02 | End: 2023-12-06 | Stop reason: SDUPTHER

## 2022-12-02 NOTE — TELEPHONE ENCOUNTER
Refill Decision Note   Scooter Swan  is requesting a refill authorization.  Brief Assessment and Rationale for Refill:  Approve     Medication Therapy Plan:       Medication Reconciliation Completed: No   Comments:     Provider Staff:     Action is required for this patient.   Please see care gap opportunities below in Care Due Message.     Thanks!  [facility]     Appointments      Date Provider   Last Visit   10/25/2022 Salvador Kennedy,    Next Visit   1/4/2023 Salvador Kennedy,      Note composed:7:48 AM 12/02/2022           Note composed:7:48 AM 12/02/2022

## 2023-01-03 ENCOUNTER — TELEPHONE (OUTPATIENT)
Dept: HEMATOLOGY/ONCOLOGY | Facility: CLINIC | Age: 73
End: 2023-01-03
Payer: MEDICARE

## 2023-01-03 NOTE — TELEPHONE ENCOUNTER
LVM for pt to call back.  Pt has hem referral and canceled twice with hem in [place].  Calling to see if pt would like to [facility] in [place] or [place] for hem.

## 2023-01-04 ENCOUNTER — OFFICE VISIT (OUTPATIENT)
Dept: FAMILY MEDICINE | Facility: CLINIC | Age: 73
End: 2023-01-04
Payer: MEDICARE

## 2023-01-04 VITALS
OXYGEN SATURATION: 97 % | BODY MASS INDEX: 25.9 KG/M2 | SYSTOLIC BLOOD PRESSURE: 134 MMHG | HEART RATE: 90 BPM | DIASTOLIC BLOOD PRESSURE: 68 MMHG | WEIGHT: 191.19 LBS | HEIGHT: 72 IN

## 2023-01-04 DIAGNOSIS — Z12.5 SCREENING FOR PROSTATE CANCER: ICD-10-CM

## 2023-01-04 DIAGNOSIS — D72.829 LEUKOCYTOSIS, UNSPECIFIED TYPE: Primary | ICD-10-CM

## 2023-01-04 DIAGNOSIS — K21.9 GASTROESOPHAGEAL REFLUX DISEASE, UNSPECIFIED WHETHER ESOPHAGITIS PRESENT: ICD-10-CM

## 2023-01-04 DIAGNOSIS — G47.00 INSOMNIA, UNSPECIFIED TYPE: ICD-10-CM

## 2023-01-04 DIAGNOSIS — E78.2 MIXED HYPERLIPIDEMIA: ICD-10-CM

## 2023-01-04 PROCEDURE — 1125F PR PAIN SEVERITY QUANTIFIED, PAIN PRESENT: ICD-10-PCS | Mod: CPTII,S$GLB,, | Performed by: INTERNAL MEDICINE

## 2023-01-04 PROCEDURE — 1160F PR REVIEW ALL MEDS BY PRESCRIBER/CLIN PHARMACIST DOCUMENTED: ICD-10-PCS | Mod: CPTII,S$GLB,, | Performed by: INTERNAL MEDICINE

## 2023-01-04 PROCEDURE — 1160F RVW MEDS BY RX/DR IN RCRD: CPT | Mod: CPTII,S$GLB,, | Performed by: INTERNAL MEDICINE

## 2023-01-04 PROCEDURE — 1101F PR PT FALLS ASSESS DOC 0-1 FALLS W/OUT INJ PAST YR: ICD-10-PCS | Mod: CPTII,S$GLB,, | Performed by: INTERNAL MEDICINE

## 2023-01-04 PROCEDURE — 3075F PR MOST RECENT SYSTOLIC BLOOD PRESS GE 130-139MM HG: ICD-10-PCS | Mod: CPTII,S$GLB,, | Performed by: INTERNAL MEDICINE

## 2023-01-04 PROCEDURE — 3078F DIAST BP <80 MM HG: CPT | Mod: CPTII,S$GLB,, | Performed by: INTERNAL MEDICINE

## 2023-01-04 PROCEDURE — 3288F FALL RISK ASSESSMENT DOCD: CPT | Mod: CPTII,S$GLB,, | Performed by: INTERNAL MEDICINE

## 2023-01-04 PROCEDURE — 3288F PR FALLS RISK ASSESSMENT DOCUMENTED: ICD-10-PCS | Mod: CPTII,S$GLB,, | Performed by: INTERNAL MEDICINE

## 2023-01-04 PROCEDURE — 1159F MED LIST DOCD IN RCRD: CPT | Mod: CPTII,S$GLB,, | Performed by: INTERNAL MEDICINE

## 2023-01-04 PROCEDURE — 1101F PT FALLS ASSESS-DOCD LE1/YR: CPT | Mod: CPTII,S$GLB,, | Performed by: INTERNAL MEDICINE

## 2023-01-04 PROCEDURE — 99214 OFFICE O/P EST MOD 30 MIN: CPT | Mod: S$GLB,,, | Performed by: INTERNAL MEDICINE

## 2023-01-04 PROCEDURE — 1125F AMNT PAIN NOTED PAIN PRSNT: CPT | Mod: CPTII,S$GLB,, | Performed by: INTERNAL MEDICINE

## 2023-01-04 PROCEDURE — 99999 PR PBB SHADOW E&M-EST. PATIENT-LVL V: CPT | Mod: PBBFAC,,, | Performed by: INTERNAL MEDICINE

## 2023-01-04 PROCEDURE — 1159F PR MEDICATION LIST DOCUMENTED IN MEDICAL RECORD: ICD-10-PCS | Mod: CPTII,S$GLB,, | Performed by: INTERNAL MEDICINE

## 2023-01-04 PROCEDURE — 3008F BODY MASS INDEX DOCD: CPT | Mod: CPTII,S$GLB,, | Performed by: INTERNAL MEDICINE

## 2023-01-04 PROCEDURE — 99214 PR OFFICE/OUTPT VISIT, EST, LEVL IV, 30-39 MIN: ICD-10-PCS | Mod: S$GLB,,, | Performed by: INTERNAL MEDICINE

## 2023-01-04 PROCEDURE — 3078F PR MOST RECENT DIASTOLIC BLOOD PRESSURE < 80 MM HG: ICD-10-PCS | Mod: CPTII,S$GLB,, | Performed by: INTERNAL MEDICINE

## 2023-01-04 PROCEDURE — 3075F SYST BP GE 130 - 139MM HG: CPT | Mod: CPTII,S$GLB,, | Performed by: INTERNAL MEDICINE

## 2023-01-04 PROCEDURE — 3008F PR BODY MASS INDEX (BMI) DOCUMENTED: ICD-10-PCS | Mod: CPTII,S$GLB,, | Performed by: INTERNAL MEDICINE

## 2023-01-04 PROCEDURE — 99999 PR PBB SHADOW E&M-EST. PATIENT-LVL V: ICD-10-PCS | Mod: PBBFAC,,, | Performed by: INTERNAL MEDICINE

## 2023-01-04 RX ORDER — OMEPRAZOLE 20 MG/1
20 CAPSULE, DELAYED RELEASE ORAL 2 TIMES DAILY
Qty: 180 CAPSULE | Refills: 3 | Status: SHIPPED | OUTPATIENT
Start: 2023-01-04

## 2023-01-04 RX ORDER — DOXEPIN HYDROCHLORIDE 10 MG/1
10 CAPSULE ORAL NIGHTLY
Qty: 30 CAPSULE | Refills: 2 | Status: SHIPPED | OUTPATIENT
Start: 2023-01-04 | End: 2023-02-23

## 2023-01-04 NOTE — PROGRESS NOTES
Subjective       Patient ID: Scooter Swan is a 72 y.o. male.    Chief Complaint: Follow-up (insomnia) and Generalized Body Aches      HPI:    Resistant hypertension, rheumatoid arthritis, moderate aortic stenosis and regurgitation, thrombocytopenia, pulmonary emphysema, glaucoma, history of hepatitis-C    Trial of Lunesta.  Worked well for 2 months.  Now not working as well.  He went ahead and discontinued it and is taking over-the-counter sleep aids at this point.  He has not seen Hematology yet for elevated white cell count.  This has been elevated in the past but normal intermittently.    Review of Systems   Constitutional:  Positive for fatigue. Negative for fever.   Respiratory:  Positive for shortness of breath.    Cardiovascular:  Negative for chest pain.   Gastrointestinal:  Negative for abdominal pain.      Objective     Vitals:    01/04/23 1045   BP: 134/68   Pulse: 90     Wt Readings from Last 3 Encounters:   01/04/23 1045 86.7 kg (191 lb 3.3 oz)   10/25/22 1334 89.9 kg (198 lb 3.1 oz)   10/18/22 1303 89.8 kg (198 lb)      Body mass index is 25.93 kg/m².   Physical Exam  Cardiovascular:      Rate and Rhythm: Normal rate and regular rhythm.      Heart sounds: Murmur heard.     No gallop.   Pulmonary:      Breath sounds: Normal breath sounds. No wheezing or rhonchi.   Abdominal:      Palpations: Abdomen is soft.      Tenderness: There is no abdominal tenderness.   Musculoskeletal:         General: Normal range of motion.      Cervical back: Neck supple.   Skin:     General: Skin is warm.      Findings: No rash.   Neurological:      Mental Status: He is alert.   Psychiatric:         Behavior: Behavior normal.        Assessment and plan     To hematology for leukocytosis.  Discussed importance.    Will try doxepin.  Discontinue Lunesta.    Scooter was seen today for follow-up and generalized body aches.    Diagnoses and all orders for this visit:    Leukocytosis, unspecified type  -     Ambulatory  referral/consult to Hematology / Oncology; Future    Insomnia, unspecified type  -     doxepin (SINEQUAN) 10 MG capsule; Take 1 capsule (10 mg total) by mouth every evening.    Mixed hyperlipidemia  -     Lipid Panel; Future    Screening for prostate cancer  -     PSA, Screening; Future    Gastroesophageal reflux disease, unspecified whether esophagitis present  -     omeprazole (PRILOSEC) 20 MG capsule; Take 1 capsule (20 mg total) by mouth 2 (two) times a day.            Hypertension Medications               diltiaZEM (DILACOR XR) 180 MG CDCR Take 1 capsule (180 mg total) by mouth once daily.    enalapril (VASOTEC) 20 MG tablet TAKE 1 TABLET(20 MG) BY MOUTH TWICE DAILY    hydrALAZINE (APRESOLINE) 50 MG tablet Take 2 tablets (100 mg total) by mouth every 12 (twelve) hours.           Hyperlipidemia Medications               pravastatin (PRAVACHOL) 20 MG tablet TAKE 1 TABLET BY MOUTH DAILY           Medication List with Changes/Refills   New Medications    DOXEPIN (SINEQUAN) 10 MG CAPSULE    Take 1 capsule (10 mg total) by mouth every evening.   Current Medications    ALBUTEROL (PROVENTIL/VENTOLIN HFA) 90 MCG/ACTUATION INHALER    INHALE 1 PUFF BY MOUTH INTO THE LUNGS EVERY 4 HOURS AS NEEDED FOR WHEEZING OR SHORTNESS OF BREATH    CHOLECALCIFEROL, VITAMIN D3, (VITAMIN D3) 50 MCG (2,000 UNIT) CAP    Take 1 capsule (2,000 Units total) by mouth once daily.    CYANOCOBALAMIN 500 MCG TABLET    Take 500 mcg by mouth once daily.    DICLOFENAC SODIUM (VOLTAREN) 1 % GEL    Apply 2 grams  topically 4 (four) times daily.    DILTIAZEM (DILACOR XR) 180 MG CDCR    Take 1 capsule (180 mg total) by mouth once daily.    DORZOLAMIDE (TRUSOPT) 2 % OPHTHALMIC SOLUTION    Place 1 drop into both eyes 3 (three) times daily.    ENALAPRIL (VASOTEC) 20 MG TABLET    TAKE 1 TABLET(20 MG) BY MOUTH TWICE DAILY    HYDRALAZINE (APRESOLINE) 50 MG TABLET    Take 2 tablets (100 mg total) by mouth every 12 (twelve) hours.    HYDROCODONE-ACETAMINOPHEN  (NORCO)  MG PER TABLET    Take 1 tablet by mouth every 8 (eight) hours as needed for Pain.    HYDROCODONE-ACETAMINOPHEN (NORCO)  MG PER TABLET    Take 1 tablet by mouth every 8 (eight) hours as needed for Pain.    HYDROCODONE-ACETAMINOPHEN (NORCO)  MG PER TABLET    Take 1 tablet by mouth every 8 (eight) hours as needed for Pain.    LACTOBAC NO.41/BIFIDOBACT NO.7 (PROBIOTIC-10 ORAL)    Take 1 capsule by mouth once daily.    MULTIVITAMIN CAPSULE    Take 1 capsule by mouth once daily.    POLYETHYLENE GLYCOL (GLYCOLAX) 17 GRAM PWPK    Take 17 g by mouth daily as needed.    PRAVASTATIN (PRAVACHOL) 20 MG TABLET    TAKE 1 TABLET BY MOUTH DAILY    PREDNISONE (DELTASONE) 5 MG TABLET    Take 3 tablets (15 mg total) by mouth once daily.    SILDENAFIL (VIAGRA) 100 MG TABLET    Take 1 tablet (100 mg total) by mouth as needed for Erectile Dysfunction.    TAMSULOSIN (FLOMAX) 0.4 MG CAP    Take 1 capsule (0.4 mg total) by mouth once daily.    TIZANIDINE (ZANAFLEX) 4 MG TABLET    TAKE 1 TABLET(4 MG) BY MOUTH EVERY 8 HOURS    TRAVOPROST (TRAVATAN Z) 0.004 % OPHTHALMIC SOLUTION    Place 1 drop into both eyes every evening.   Changed and/or Refilled Medications    Modified Medication Previous Medication    OMEPRAZOLE (PRILOSEC) 20 MG CAPSULE omeprazole (PRILOSEC) 20 MG capsule       Take 1 capsule (20 mg total) by mouth 2 (two) times a day.    TAKE 1 CAPSULE(20 MG) BY MOUTH TWICE DAILY BEFORE MEALS       I personally reviewed past medical, family and social history.

## 2023-01-06 ENCOUNTER — TELEPHONE (OUTPATIENT)
Dept: HEMATOLOGY/ONCOLOGY | Facility: CLINIC | Age: 73
End: 2023-01-06
Payer: MEDICARE

## 2023-01-06 NOTE — TELEPHONE ENCOUNTER
Called and spoke to pt about hem referral.  Pt was unable to make the last sched hem appt.  Scheduled pt in [place] for Jan 25th, confirmed new appt date and time.

## 2023-01-20 DIAGNOSIS — G89.4 CHRONIC PAIN SYNDROME: Primary | ICD-10-CM

## 2023-01-23 RX ORDER — TAMSULOSIN HYDROCHLORIDE 0.4 MG/1
CAPSULE ORAL
Qty: 30 CAPSULE | Refills: 11 | Status: SHIPPED | OUTPATIENT
Start: 2023-01-23 | End: 2023-10-23 | Stop reason: SDUPTHER

## 2023-01-30 DIAGNOSIS — G89.4 CHRONIC PAIN SYNDROME: ICD-10-CM

## 2023-01-30 DIAGNOSIS — M05.9 SEROPOSITIVE RHEUMATOID ARTHRITIS: ICD-10-CM

## 2023-02-02 RX ORDER — HYDROCODONE BITARTRATE AND ACETAMINOPHEN 10; 325 MG/1; MG/1
1 TABLET ORAL EVERY 8 HOURS PRN
Qty: 90 TABLET | Refills: 0 | Status: SHIPPED | OUTPATIENT
Start: 2023-02-02 | End: 2023-02-03

## 2023-02-03 RX ORDER — HYDROCODONE BITARTRATE AND ACETAMINOPHEN 10; 325 MG/1; MG/1
1 TABLET ORAL EVERY 8 HOURS PRN
Qty: 90 TABLET | Refills: 0 | Status: SHIPPED | OUTPATIENT
Start: 2023-02-03 | End: 2023-02-23 | Stop reason: SDUPTHER

## 2023-02-03 NOTE — TELEPHONE ENCOUNTER
----- Message from Shauna Wall, Patient Care Assistant sent at 2/3/2023  9:25 AM CST -----  Contact: self  Pt is calling to get her refill HYDROcodone-acetaminophen (NORCO)  mg per tablet sent   [facility]  ph [phone] because none of the [facility] have the Rx   Please call back to advise [phone]  thanks

## 2023-02-16 ENCOUNTER — HOSPITAL ENCOUNTER (OUTPATIENT)
Dept: RADIOLOGY | Facility: HOSPITAL | Age: 73
Discharge: HOME | End: 2023-02-16
Attending: INTERNAL MEDICINE
Payer: MEDICARE

## 2023-02-16 DIAGNOSIS — M17.9 OSTEOARTHRITIS OF KNEE, UNSPECIFIED LATERALITY, UNSPECIFIED OSTEOARTHRITIS TYPE: ICD-10-CM

## 2023-02-16 DIAGNOSIS — D69.6 THROMBOPENIA: ICD-10-CM

## 2023-02-16 DIAGNOSIS — D61.818 PANCYTOPENIA: ICD-10-CM

## 2023-02-16 DIAGNOSIS — M05.9 SEROPOSITIVE RHEUMATOID ARTHRITIS: ICD-10-CM

## 2023-02-16 DIAGNOSIS — Z79.52 CURRENT CHRONIC USE OF SYSTEMIC STEROIDS: ICD-10-CM

## 2023-02-16 DIAGNOSIS — M05.741 RHEUMATOID ARTHRITIS INVOLVING BOTH HANDS WITH POSITIVE RHEUMATOID FACTOR: ICD-10-CM

## 2023-02-16 DIAGNOSIS — G89.4 CHRONIC PAIN SYNDROME: ICD-10-CM

## 2023-02-16 DIAGNOSIS — J43.9 PULMONARY EMPHYSEMA, UNSPECIFIED EMPHYSEMA TYPE: ICD-10-CM

## 2023-02-16 DIAGNOSIS — M05.742 RHEUMATOID ARTHRITIS INVOLVING BOTH HANDS WITH POSITIVE RHEUMATOID FACTOR: ICD-10-CM

## 2023-02-16 DIAGNOSIS — F51.01 PRIMARY INSOMNIA: ICD-10-CM

## 2023-02-16 PROCEDURE — 77077 JOINT SURVEY SINGLE VIEW: CPT | Mod: 26,,, | Performed by: RADIOLOGY

## 2023-02-16 PROCEDURE — 77080 DEXA BONE DENSITY SPINE HIP: ICD-10-PCS | Mod: 26,,, | Performed by: RADIOLOGY

## 2023-02-16 PROCEDURE — 77080 DXA BONE DENSITY AXIAL: CPT | Mod: 26,,, | Performed by: RADIOLOGY

## 2023-02-16 PROCEDURE — 77080 DXA BONE DENSITY AXIAL: CPT | Mod: TC,PO

## 2023-02-16 PROCEDURE — 77077 XR ARTHRITIS SURVEY: ICD-10-PCS | Mod: 26,,, | Performed by: RADIOLOGY

## 2023-02-16 PROCEDURE — 77077 JOINT SURVEY SINGLE VIEW: CPT | Mod: TC,PO

## 2023-02-22 ENCOUNTER — OFFICE VISIT (OUTPATIENT)
Dept: HEMATOLOGY/ONCOLOGY | Facility: CLINIC | Age: 73
End: 2023-02-22
Payer: MEDICARE

## 2023-02-22 ENCOUNTER — LAB VISIT (OUTPATIENT)
Dept: LAB | Facility: HOSPITAL | Age: 73
End: 2023-02-22
Attending: NURSE PRACTITIONER
Payer: MEDICARE

## 2023-02-22 VITALS
BODY MASS INDEX: 25.6 KG/M2 | OXYGEN SATURATION: 97 % | HEART RATE: 91 BPM | HEIGHT: 72 IN | DIASTOLIC BLOOD PRESSURE: 77 MMHG | SYSTOLIC BLOOD PRESSURE: 142 MMHG | WEIGHT: 189 LBS

## 2023-02-22 DIAGNOSIS — D72.829 LEUKOCYTOSIS, UNSPECIFIED TYPE: ICD-10-CM

## 2023-02-22 DIAGNOSIS — R20.2 PARESTHESIA OF SKIN: ICD-10-CM

## 2023-02-22 DIAGNOSIS — Z86.19 HISTORY OF HEPATITIS C: ICD-10-CM

## 2023-02-22 DIAGNOSIS — D69.6 THROMBOCYTOPENIA: ICD-10-CM

## 2023-02-22 DIAGNOSIS — F41.8 SITUATIONAL ANXIETY: ICD-10-CM

## 2023-02-22 DIAGNOSIS — R93.2 ABNORMAL FINDINGS ON DIAGNOSTIC IMAGING OF LIVER AND BILIARY TRACT: ICD-10-CM

## 2023-02-22 DIAGNOSIS — D64.9 ANEMIA, UNSPECIFIED TYPE: ICD-10-CM

## 2023-02-22 DIAGNOSIS — Z79.52 CURRENT CHRONIC USE OF SYSTEMIC STEROIDS: ICD-10-CM

## 2023-02-22 DIAGNOSIS — L02.224 BOIL OF GROIN: ICD-10-CM

## 2023-02-22 LAB
AFP SERPL-MCNC: <2 NG/ML (ref 0–8.4)
ALBUMIN SERPL BCP-MCNC: 4 G/DL (ref 3.5–5.2)
ALP SERPL-CCNC: 68 U/L (ref 55–135)
ALT SERPL W/O P-5'-P-CCNC: 9 U/L (ref 10–44)
ANION GAP SERPL CALC-SCNC: 13 MMOL/L (ref 8–16)
AST SERPL-CCNC: 16 U/L (ref 10–40)
BASOPHILS # BLD AUTO: 0.08 K/UL (ref 0–0.2)
BASOPHILS NFR BLD: 0.6 % (ref 0–1.9)
BILIRUB SERPL-MCNC: 0.4 MG/DL (ref 0.1–1)
BUN SERPL-MCNC: 14 MG/DL (ref 8–23)
CALCIUM SERPL-MCNC: 9.3 MG/DL (ref 8.7–10.5)
CHLORIDE SERPL-SCNC: 104 MMOL/L (ref 95–110)
CO2 SERPL-SCNC: 24 MMOL/L (ref 23–29)
CREAT SERPL-MCNC: 1.4 MG/DL (ref 0.5–1.4)
DIFFERENTIAL METHOD: ABNORMAL
EOSINOPHIL # BLD AUTO: 0.1 K/UL (ref 0–0.5)
EOSINOPHIL NFR BLD: 0.5 % (ref 0–8)
ERYTHROCYTE [DISTWIDTH] IN BLOOD BY AUTOMATED COUNT: 14.2 % (ref 11.5–14.5)
EST. GFR  (NO RACE VARIABLE): 53.1 ML/MIN/1.73 M^2
FOLATE SERPL-MCNC: 7.1 NG/ML (ref 4–24)
GLUCOSE SERPL-MCNC: 214 MG/DL (ref 70–110)
HAPTOGLOB SERPL-MCNC: 173 MG/DL (ref 30–250)
HCT VFR BLD AUTO: 34.6 % (ref 40–54)
HGB BLD-MCNC: 10.8 G/DL (ref 14–18)
IMM GRANULOCYTES # BLD AUTO: 0.07 K/UL (ref 0–0.04)
IMM GRANULOCYTES NFR BLD AUTO: 0.5 % (ref 0–0.5)
LDH SERPL L TO P-CCNC: 310 U/L (ref 110–260)
LYMPHOCYTES # BLD AUTO: 2 K/UL (ref 1–4.8)
LYMPHOCYTES NFR BLD: 15.7 % (ref 18–48)
MCH RBC QN AUTO: 29.1 PG (ref 27–31)
MCHC RBC AUTO-ENTMCNC: 31.2 G/DL (ref 32–36)
MCV RBC AUTO: 93 FL (ref 82–98)
MONOCYTES # BLD AUTO: 0.9 K/UL (ref 0.3–1)
MONOCYTES NFR BLD: 6.7 % (ref 4–15)
NEUTROPHILS # BLD AUTO: 9.8 K/UL (ref 1.8–7.7)
NEUTROPHILS NFR BLD: 76.5 % (ref 38–73)
NRBC BLD-RTO: 0 /100 WBC
PATH REV BLD -IMP: NORMAL
PLATELET # BLD AUTO: 140 K/UL (ref 150–450)
PLATELET BLD QL SMEAR: ABNORMAL
PMV BLD AUTO: 13.4 FL (ref 9.2–12.9)
POTASSIUM SERPL-SCNC: 4 MMOL/L (ref 3.5–5.1)
PROT SERPL-MCNC: 6.8 G/DL (ref 6–8.4)
RBC # BLD AUTO: 3.71 M/UL (ref 4.6–6.2)
SODIUM SERPL-SCNC: 141 MMOL/L (ref 136–145)
TSH SERPL DL<=0.005 MIU/L-ACNC: 3 UIU/ML (ref 0.4–4)
WBC # BLD AUTO: 12.76 K/UL (ref 3.9–12.7)

## 2023-02-22 PROCEDURE — 1100F PR PT FALLS ASSESS DOC 2+ FALLS/FALL W/INJURY/YR: ICD-10-PCS | Mod: CPTII,S$GLB,, | Performed by: NURSE PRACTITIONER

## 2023-02-22 PROCEDURE — 82746 ASSAY OF FOLIC ACID SERUM: CPT | Performed by: NURSE PRACTITIONER

## 2023-02-22 PROCEDURE — 99999 PR PBB SHADOW E&M-EST. PATIENT-LVL V: ICD-10-PCS | Mod: PBBFAC,,, | Performed by: NURSE PRACTITIONER

## 2023-02-22 PROCEDURE — 1100F PTFALLS ASSESS-DOCD GE2>/YR: CPT | Mod: CPTII,S$GLB,, | Performed by: NURSE PRACTITIONER

## 2023-02-22 PROCEDURE — 99214 PR OFFICE/OUTPT VISIT, EST, LEVL IV, 30-39 MIN: ICD-10-PCS | Mod: S$GLB,,, | Performed by: NURSE PRACTITIONER

## 2023-02-22 PROCEDURE — 80053 COMPREHEN METABOLIC PANEL: CPT | Performed by: NURSE PRACTITIONER

## 2023-02-22 PROCEDURE — 85025 COMPLETE CBC W/AUTO DIFF WBC: CPT | Mod: PO | Performed by: NURSE PRACTITIONER

## 2023-02-22 PROCEDURE — 1125F PR PAIN SEVERITY QUANTIFIED, PAIN PRESENT: ICD-10-PCS | Mod: CPTII,S$GLB,, | Performed by: NURSE PRACTITIONER

## 2023-02-22 PROCEDURE — 3078F DIAST BP <80 MM HG: CPT | Mod: CPTII,S$GLB,, | Performed by: NURSE PRACTITIONER

## 2023-02-22 PROCEDURE — 3077F SYST BP >= 140 MM HG: CPT | Mod: CPTII,S$GLB,, | Performed by: NURSE PRACTITIONER

## 2023-02-22 PROCEDURE — 82105 ALPHA-FETOPROTEIN SERUM: CPT | Performed by: NURSE PRACTITIONER

## 2023-02-22 PROCEDURE — 3008F BODY MASS INDEX DOCD: CPT | Mod: CPTII,S$GLB,, | Performed by: NURSE PRACTITIONER

## 2023-02-22 PROCEDURE — 1125F AMNT PAIN NOTED PAIN PRSNT: CPT | Mod: CPTII,S$GLB,, | Performed by: NURSE PRACTITIONER

## 2023-02-22 PROCEDURE — 3008F PR BODY MASS INDEX (BMI) DOCUMENTED: ICD-10-PCS | Mod: CPTII,S$GLB,, | Performed by: NURSE PRACTITIONER

## 2023-02-22 PROCEDURE — 3288F PR FALLS RISK ASSESSMENT DOCUMENTED: ICD-10-PCS | Mod: CPTII,S$GLB,, | Performed by: NURSE PRACTITIONER

## 2023-02-22 PROCEDURE — 3077F PR MOST RECENT SYSTOLIC BLOOD PRESSURE >= 140 MM HG: ICD-10-PCS | Mod: CPTII,S$GLB,, | Performed by: NURSE PRACTITIONER

## 2023-02-22 PROCEDURE — 83615 LACTATE (LD) (LDH) ENZYME: CPT | Performed by: NURSE PRACTITIONER

## 2023-02-22 PROCEDURE — 99999 PR PBB SHADOW E&M-EST. PATIENT-LVL V: CPT | Mod: PBBFAC,,, | Performed by: NURSE PRACTITIONER

## 2023-02-22 PROCEDURE — 3078F PR MOST RECENT DIASTOLIC BLOOD PRESSURE < 80 MM HG: ICD-10-PCS | Mod: CPTII,S$GLB,, | Performed by: NURSE PRACTITIONER

## 2023-02-22 PROCEDURE — 85060 PATHOLOGIST REVIEW: ICD-10-PCS | Mod: ,,, | Performed by: PATHOLOGY

## 2023-02-22 PROCEDURE — 84443 ASSAY THYROID STIM HORMONE: CPT | Performed by: NURSE PRACTITIONER

## 2023-02-22 PROCEDURE — 83010 ASSAY OF HAPTOGLOBIN QUANT: CPT | Performed by: NURSE PRACTITIONER

## 2023-02-22 PROCEDURE — 1160F RVW MEDS BY RX/DR IN RCRD: CPT | Mod: CPTII,S$GLB,, | Performed by: NURSE PRACTITIONER

## 2023-02-22 PROCEDURE — 85060 BLOOD SMEAR INTERPRETATION: CPT | Mod: ,,, | Performed by: PATHOLOGY

## 2023-02-22 PROCEDURE — 1159F PR MEDICATION LIST DOCUMENTED IN MEDICAL RECORD: ICD-10-PCS | Mod: CPTII,S$GLB,, | Performed by: NURSE PRACTITIONER

## 2023-02-22 PROCEDURE — 1159F MED LIST DOCD IN RCRD: CPT | Mod: CPTII,S$GLB,, | Performed by: NURSE PRACTITIONER

## 2023-02-22 PROCEDURE — 1160F PR REVIEW ALL MEDS BY PRESCRIBER/CLIN PHARMACIST DOCUMENTED: ICD-10-PCS | Mod: CPTII,S$GLB,, | Performed by: NURSE PRACTITIONER

## 2023-02-22 PROCEDURE — 99214 OFFICE O/P EST MOD 30 MIN: CPT | Mod: S$GLB,,, | Performed by: NURSE PRACTITIONER

## 2023-02-22 PROCEDURE — 3288F FALL RISK ASSESSMENT DOCD: CPT | Mod: CPTII,S$GLB,, | Performed by: NURSE PRACTITIONER

## 2023-02-22 PROCEDURE — 36415 COLL VENOUS BLD VENIPUNCTURE: CPT | Mod: PO | Performed by: NURSE PRACTITIONER

## 2023-02-22 RX ORDER — MUPIROCIN 20 MG/G
OINTMENT TOPICAL 3 TIMES DAILY
Qty: 15 G | Refills: 1 | Status: SHIPPED | OUTPATIENT
Start: 2023-02-22

## 2023-02-22 NOTE — PROGRESS NOTES
Subjective:      Patient ID: Scooter Swan is a 72 y.o. male.    Chief Complaint: insomnia    HPI:  Patient is a 72 year old male who presents today as a new patient for further evaluation and recommendations of leukocytosis.  He has been referred to the hematology clinic by Dr. Salvador Kennedy, his pcp.      Other diagnosis include: glaucoma, pulmonary emphysema, sob, hpld, htn, moderate aortic regurg, bilateral carotid bruits, CAD, RA, thrombocytopenia, anemia, overweight. Hx of hep C treated with Harvoni, lower abdominal pain abnormal CT scan of liver, hemorrhoids, constipation.     Leukocytosis has resolved.  Presents with normocytic anemia and thrombocytopenia.  Granulocytosis present as well.     States that he takes prednisone 10 mg PO daily for a few years now and followed by rheumatology - for RA.    Has a lot of boils that in groin area and up legs; however does not have any now.  Soaks in warm water with bleach until it bursts.    Denies cigarette smoking.  Has occasional alcohol on holiday.  Denies illicit drug use.  Has been around second hand smoke.    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     Family hx of cancer:  Paternal side cousins stomach x 2 cancer and another with unknown cancer.     Social History     Socioeconomic History    Marital status:    Tobacco Use    Smoking status: Never    Smokeless tobacco: Never   Substance and Sexual Activity    Alcohol use: Yes     Alcohol/week: 1.0 standard drink     Types: 1 Shots of liquor per week     Comment: social    Drug use: Yes     Types: Hydrocodone     Social Determinants of Health     Financial Resource Strain: Low Risk     Difficulty of Paying Living Expenses: Not hard at all   Food Insecurity: No Food Insecurity    Worried About Running Out of Food in the Last Year: Never true    Ran Out of Food in the Last Year: Never true   Transportation Needs: Unmet Transportation Needs    Lack of Transportation (Medical): Yes    Lack of Transportation  (Non-Medical): Yes   Physical Activity: Sufficiently Active    Days of Exercise per Week: 5 days    Minutes of Exercise per Session: 30 min   Stress: Stress Concern Present    Feeling of Stress : Rather much   Social Connections: Unknown    Frequency of Communication with Friends and Family: More than three times a week    Frequency of Social Gatherings with Friends and Family: More than three times a week    Active Member of Clubs or Organizations: Patient refused    Attends Club or Organization Meetings: Patient refused    Marital Status:    Housing Stability: Unknown    Unable to Pay for Housing in the Last Year: Patient refused    Number of Places Lived in the Last Year: 1    Unstable Housing in the Last Year: Patient refused       Family History   Problem Relation Age of Onset    Stomach cancer Paternal Cousin     Stomach cancer Paternal Cousin     Cataracts Mother     Diabetes Mother     Hypertension Mother     Stroke Mother     Diabetes Sister     Hypertension Sister     Stroke Sister     Diabetes Brother     Glaucoma Brother     Hypertension Brother     Stroke Brother     Diabetes Maternal Aunt     Hypertension Maternal Aunt     Stroke Maternal Aunt     Diabetes Maternal Uncle     Hypertension Maternal Uncle     Stroke Maternal Uncle     Diabetes Maternal Grandmother     Hypertension Maternal Grandmother     Stroke Maternal Grandmother     Cancer Cousin         stomach    Colon cancer Neg Hx     Colon polyps Neg Hx     Crohn's disease Neg Hx     Ulcerative colitis Neg Hx     Esophageal cancer Neg Hx     Amblyopia Neg Hx     Blindness Neg Hx     Macular degeneration Neg Hx     Retinal detachment Neg Hx     Strabismus Neg Hx     Thyroid disease Neg Hx        Past Surgical History:   Procedure Laterality Date    ANGIOGRAM, CORONARY, WITH LEFT HEART CATHETERIZATION  10/6/2022    Procedure: Angiogram, Coronary, with Left Heart Cath;  Surgeon: Zac Boucher MD;  Location: [facility] CATH;  Service: Cardiology;;     ARTERIOGRAPHY OF AORTIC ROOT  10/6/2022    Procedure: ARTERIOGRAM, AORTIC ROOT;  Surgeon: Zac Boucher MD;  Location: [facility] CATH;  Service: Cardiology;;    CARPAL TUNNEL RELEASE      Right wrist 2015    COLONOSCOPY  08/2016    Dr. Cardona; in care everywhere    COLONOSCOPY N/A 3/20/2019    Procedure: COLONOSCOPY;  Surgeon: Sotero Arthur MD;  Location: [facility] ENDO;  Service: Endoscopy;  Laterality: N/A; hemorrhoids, diverticulosis, repeat in 10 years for screening    EXCISIONAL HEMORRHOIDECTOMY N/A 10/18/2018    Procedure: HEMORRHOIDECTOMY, prone;  Surgeon: Gunnar Horton MD;  Location: [facility] OR [address] [place] [address];  Service: Colon and Rectal;  Laterality: N/A;    THYROID SURGERY      UPPER GASTROINTESTINAL ENDOSCOPY  08/2016    Dr. Cardona; in care everywhere       Past Medical History:   Diagnosis Date    Cataract     COPD (chronic obstructive pulmonary disease)     Diverticulosis     DUARTE (dyspnea on exertion)     GERD (gastroesophageal reflux disease)     Glaucoma     Hepatitis C     treated; seeing Dr. Carr    Hyperlipidemia     Hypertension     Liver lesion 08/2018    RA (rheumatoid arthritis)     Rectal prolapse     Possible       Review of Systems   Constitutional:  Negative for appetite change and unexpected weight change.   HENT:  Negative for mouth sores.    Eyes:  Positive for visual disturbance.   Respiratory:  Positive for cough and shortness of breath (has emphysema).    Cardiovascular:  Positive for chest pain (aortic valve regur - followed by cardiology).   Gastrointestinal:  Negative for abdominal pain and diarrhea.   Endocrine: Negative.    Genitourinary:  Positive for frequency.   Musculoskeletal:  Positive for back pain (chronic - generalized).   Skin:  Negative for rash.   Allergic/Immunologic: Negative.    Neurological:  Positive for headaches (described as pressure - thought to be due to sinuses).   Hematological:  Negative for adenopathy.   Psychiatric/Behavioral:  Positive for sleep disturbance  (insomnia). The patient is nervous/anxious (due to having 4 relatives and friends pass away).         Medication List with Changes/Refills   New Medications    MUPIROCIN (BACTROBAN) 2 % OINTMENT    Apply topically 3 (three) times daily.   Current Medications    ALBUTEROL (PROVENTIL/VENTOLIN HFA) 90 MCG/ACTUATION INHALER    INHALE 1 PUFF BY MOUTH INTO THE LUNGS EVERY 4 HOURS AS NEEDED FOR WHEEZING OR SHORTNESS OF BREATH    CHOLECALCIFEROL, VITAMIN D3, (VITAMIN D3) 50 MCG (2,000 UNIT) CAP    Take 1 capsule (2,000 Units total) by mouth once daily.    CYANOCOBALAMIN 500 MCG TABLET    Take 500 mcg by mouth once daily.    DICLOFENAC SODIUM (VOLTAREN) 1 % GEL    Apply 2 grams  topically 4 (four) times daily.    DILTIAZEM (DILACOR XR) 180 MG CDCR    Take 1 capsule (180 mg total) by mouth once daily.    DORZOLAMIDE (TRUSOPT) 2 % OPHTHALMIC SOLUTION    Place 1 drop into both eyes 3 (three) times daily.    DOXEPIN (SINEQUAN) 10 MG CAPSULE    Take 1 capsule (10 mg total) by mouth every evening.    ENALAPRIL (VASOTEC) 20 MG TABLET    TAKE 1 TABLET(20 MG) BY MOUTH TWICE DAILY    HYDRALAZINE (APRESOLINE) 50 MG TABLET    Take 2 tablets (100 mg total) by mouth every 12 (twelve) hours.    HYDROCODONE-ACETAMINOPHEN (NORCO)  MG PER TABLET    Take 1 tablet by mouth every 8 (eight) hours as needed for Pain.    LACTOBAC NO.41/BIFIDOBACT NO.7 (PROBIOTIC-10 ORAL)    Take 1 capsule by mouth once daily.    MULTIVITAMIN CAPSULE    Take 1 capsule by mouth once daily.    OMEPRAZOLE (PRILOSEC) 20 MG CAPSULE    Take 1 capsule (20 mg total) by mouth 2 (two) times a day.    POLYETHYLENE GLYCOL (GLYCOLAX) 17 GRAM PWPK    Take 17 g by mouth daily as needed.    PRAVASTATIN (PRAVACHOL) 20 MG TABLET    TAKE 1 TABLET BY MOUTH DAILY    PREDNISONE (DELTASONE) 5 MG TABLET    Take 3 tablets (15 mg total) by mouth once daily.    SILDENAFIL (VIAGRA) 100 MG TABLET    Take 1 tablet (100 mg total) by mouth as needed for Erectile Dysfunction.    TAMSULOSIN  (FLOMAX) 0.4 MG CAP    TAKE 1 CAPSULE(0.4 MG) BY MOUTH EVERY DAY    TIZANIDINE (ZANAFLEX) 4 MG TABLET    TAKE 1 TABLET(4 MG) BY MOUTH EVERY 8 HOURS    TRAVOPROST (TRAVATAN Z) 0.004 % OPHTHALMIC SOLUTION    Place 1 drop into both eyes every evening.        Objective:     Vitals:    02/22/23 1051   BP: (!) 142/77   Pulse: 91       Physical Exam  Vitals reviewed.   Constitutional:       Appearance: Normal appearance.   HENT:      Head: Normocephalic and atraumatic.      Right Ear: External ear normal.      Left Ear: External ear normal.   Cardiovascular:      Rate and Rhythm: Normal rate and regular rhythm.      Heart sounds: Normal heart sounds, S1 normal and S2 normal.   Pulmonary:      Effort: Pulmonary effort is normal.      Breath sounds: Decreased breath sounds present.   Abdominal:      General: There is no distension.   Musculoskeletal:         General: Normal range of motion.      Cervical back: Normal range of motion.   Skin:     General: Skin is warm and dry.   Neurological:      General: No focal deficit present.      Mental Status: He is alert and oriented to person, place, and time.   Psychiatric:         Attention and Perception: Attention and perception normal.         Mood and Affect: Mood and affect normal.         Speech: Speech normal.         Behavior: Behavior normal. Behavior is cooperative.         Thought Content: Thought content normal.         Cognition and Memory: Cognition and memory normal.         Judgment: Judgment normal.       Assessment:     Problem List Items Addressed This Visit          Hematology    Thrombocytopenia    Relevant Orders    US Abdomen Complete       Oncology    Anemia    Relevant Orders    CBC Auto Differential    Comprehensive Metabolic Panel    Pathologist Interpretation Differential    Haptoglobin    Lactate Dehydrogenase    Folate    TSH    US Abdomen Complete       GI    History of hepatitis C    Relevant Orders    AFP TUMOR MARKER    US Abdomen Complete      Other Visit Diagnoses       Leukocytosis, unspecified type        Boil of groin        Relevant Medications    mupirocin (BACTROBAN) 2 % ointment    Current chronic use of systemic steroids        Paresthesia of skin        Relevant Orders    Folate    Abnormal findings on diagnostic imaging of liver and biliary tract        Relevant Orders    AFP TUMOR MARKER    Situational anxiety                Lab Results   Component Value Date    WBC 12.54 02/16/2023    RBC 3.37 (L) 02/16/2023    HGB 9.6 (L) 02/16/2023    HCT 31.7 (L) 02/16/2023    MCV 94 02/16/2023    MCH 28.5 02/16/2023    MCHC 30.3 (L) 02/16/2023    RDW 13.9 02/16/2023     (L) 02/16/2023    MPV 13.0 (H) 02/16/2023    GRAN 10.3 (H) 02/16/2023    GRAN 82.5 (H) 02/16/2023    LYMPH 1.3 02/16/2023    LYMPH 10.3 (L) 02/16/2023    MONO 0.8 02/16/2023    MONO 6.1 02/16/2023    EOS 0.0 02/16/2023    BASO 0.04 02/16/2023    EOSINOPHIL 0.2 02/16/2023    BASOPHIL 0.3 02/16/2023      Lab Results   Component Value Date     02/16/2023    K 4.1 02/16/2023     02/16/2023    CO2 25 02/16/2023    BUN 19 02/16/2023    CREATININE 1.3 02/16/2023    CALCIUM 9.3 02/16/2023    ANIONGAP 10 02/16/2023    ESTGFRAFRICA >60.0 05/13/2022    EGFRNONAA >60.0 05/13/2022     Lab Results   Component Value Date    ALT 10 02/16/2023    AST 14 02/16/2023    ALKPHOS 62 02/16/2023    BILITOT 0.3 02/16/2023     Lab Results   Component Value Date    IRON 72 02/16/2023    TRANSFERRIN 179 (L) 02/16/2023    TIBC 265 02/16/2023    LABIRON 31 02/07/2020    FESATURATED 27 02/16/2023      Lab Results   Component Value Date    FERRITIN 55 02/07/2020     Lab Results   Component Value Date    CQTEQCUN29 911 02/07/2020       Plan:   Leukocytosis, unspecified type  -     Ambulatory referral/consult to Hematology / Oncology    Boil of groin  -     mupirocin (BACTROBAN) 2 % ointment; Apply topically 3 (three) times daily.  Dispense: 15 g; Refill: 1    Current chronic use of systemic  steroids    Anemia, unspecified type  -     CBC Auto Differential; Future; Expected date: 02/22/2023  -     Comprehensive Metabolic Panel; Future; Expected date: 02/22/2023  -     Pathologist Interpretation Differential; Future; Expected date: 02/22/2023  -     Haptoglobin; Future; Expected date: 02/22/2023  -     Lactate Dehydrogenase; Future; Expected date: 02/22/2023  -     Folate; Future; Expected date: 02/22/2023  -     TSH; Future; Expected date: 02/22/2023  -     Cancel: US Abdomen Complete; Future; Expected date: 02/22/2023  -     US Abdomen Complete; Future; Expected date: 02/22/2023    History of hepatitis C  -     AFP TUMOR MARKER; Future; Expected date: 02/22/2023  -     Cancel: US Abdomen Complete; Future; Expected date: 02/22/2023  -     US Abdomen Complete; Future; Expected date: 02/22/2023    Paresthesia of skin  -     Folate; Future; Expected date: 02/22/2023    Abnormal findings on diagnostic imaging of liver and biliary tract  -     AFP TUMOR MARKER; Future; Expected date: 02/22/2023    Thrombocytopenia  -     Cancel: US Abdomen Complete; Future; Expected date: 02/22/2023  -     US Abdomen Complete; Future; Expected date: 02/22/2023    Situational anxiety    If hgb drops <9 g/dL or platelet count drops <100 g/dL will proceed with BMBx to evaluate..  Abdominal US to assess if lab abnormalities are due to h/o hep C - which I what I think is going on.  Labs today.  F/u in 1 week to discuss results.    Med Onc Chart Routing      Follow up with physician    Follow up with ROBYN . F/u in 1 week to review results   Infusion scheduling note n/a   Injection scheduling note n/a   Labs   Lab interval:  Labs today   Imaging   Abdominal US   Pharmacy appointment No pharmacy appointment needed      Other referrals No additional referrals needed        Collaborating Provider:  Dr. Tank Alvarez    Thank You,  Skip Khanna, FNP-C  Hematology Oncology

## 2023-02-23 ENCOUNTER — OFFICE VISIT (OUTPATIENT)
Dept: RHEUMATOLOGY | Facility: CLINIC | Age: 73
End: 2023-02-23
Payer: MEDICARE

## 2023-02-23 ENCOUNTER — OFFICE VISIT (OUTPATIENT)
Dept: CARDIOLOGY | Facility: CLINIC | Age: 73
End: 2023-02-23
Payer: MEDICARE

## 2023-02-23 VITALS
DIASTOLIC BLOOD PRESSURE: 77 MMHG | HEIGHT: 72 IN | BODY MASS INDEX: 25.73 KG/M2 | HEART RATE: 87 BPM | WEIGHT: 190 LBS | SYSTOLIC BLOOD PRESSURE: 142 MMHG

## 2023-02-23 VITALS
BODY MASS INDEX: 25.38 KG/M2 | WEIGHT: 187.38 LBS | HEART RATE: 72 BPM | SYSTOLIC BLOOD PRESSURE: 174 MMHG | HEIGHT: 72 IN | DIASTOLIC BLOOD PRESSURE: 84 MMHG

## 2023-02-23 DIAGNOSIS — N18.31 STAGE 3A CHRONIC KIDNEY DISEASE: Chronic | ICD-10-CM

## 2023-02-23 DIAGNOSIS — D61.818 PANCYTOPENIA: ICD-10-CM

## 2023-02-23 DIAGNOSIS — I65.23 CAROTID ARTERY PLAQUE, BILATERAL: Primary | Chronic | ICD-10-CM

## 2023-02-23 DIAGNOSIS — I1A.0 RESISTANT HYPERTENSION: Chronic | ICD-10-CM

## 2023-02-23 DIAGNOSIS — F51.01 PRIMARY INSOMNIA: ICD-10-CM

## 2023-02-23 DIAGNOSIS — D64.9 ANEMIA, UNSPECIFIED TYPE: ICD-10-CM

## 2023-02-23 DIAGNOSIS — I35.1 MODERATE AORTIC REGURGITATION: Chronic | ICD-10-CM

## 2023-02-23 DIAGNOSIS — L40.50 PSA (PSORIATIC ARTHRITIS): ICD-10-CM

## 2023-02-23 DIAGNOSIS — G89.4 CHRONIC PAIN SYNDROME: ICD-10-CM

## 2023-02-23 DIAGNOSIS — I35.9 AORTIC VALVE DISEASE: ICD-10-CM

## 2023-02-23 DIAGNOSIS — G56.00 CARPAL TUNNEL SYNDROME, UNSPECIFIED LATERALITY: ICD-10-CM

## 2023-02-23 DIAGNOSIS — Z79.52 CURRENT CHRONIC USE OF SYSTEMIC STEROIDS: Primary | ICD-10-CM

## 2023-02-23 DIAGNOSIS — M05.9 SEROPOSITIVE RHEUMATOID ARTHRITIS: ICD-10-CM

## 2023-02-23 LAB — PATH REV BLD -IMP: NORMAL

## 2023-02-23 PROCEDURE — 3079F PR MOST RECENT DIASTOLIC BLOOD PRESSURE 80-89 MM HG: ICD-10-PCS | Mod: CPTII,S$GLB,, | Performed by: INTERNAL MEDICINE

## 2023-02-23 PROCEDURE — 1159F MED LIST DOCD IN RCRD: CPT | Mod: CPTII,S$GLB,, | Performed by: INTERNAL MEDICINE

## 2023-02-23 PROCEDURE — 1159F PR MEDICATION LIST DOCUMENTED IN MEDICAL RECORD: ICD-10-PCS | Mod: CPTII,S$GLB,, | Performed by: INTERNAL MEDICINE

## 2023-02-23 PROCEDURE — 1101F PT FALLS ASSESS-DOCD LE1/YR: CPT | Mod: CPTII,S$GLB,, | Performed by: INTERNAL MEDICINE

## 2023-02-23 PROCEDURE — 99215 OFFICE O/P EST HI 40 MIN: CPT | Mod: 25,S$GLB,, | Performed by: INTERNAL MEDICINE

## 2023-02-23 PROCEDURE — 1125F AMNT PAIN NOTED PAIN PRSNT: CPT | Mod: CPTII,S$GLB,, | Performed by: INTERNAL MEDICINE

## 2023-02-23 PROCEDURE — 3288F FALL RISK ASSESSMENT DOCD: CPT | Mod: CPTII,S$GLB,, | Performed by: INTERNAL MEDICINE

## 2023-02-23 PROCEDURE — 99999 PR PBB SHADOW E&M-EST. PATIENT-LVL IV: CPT | Mod: PBBFAC,,, | Performed by: INTERNAL MEDICINE

## 2023-02-23 PROCEDURE — 3008F PR BODY MASS INDEX (BMI) DOCUMENTED: ICD-10-PCS | Mod: CPTII,S$GLB,, | Performed by: INTERNAL MEDICINE

## 2023-02-23 PROCEDURE — 3077F SYST BP >= 140 MM HG: CPT | Mod: CPTII,S$GLB,, | Performed by: INTERNAL MEDICINE

## 2023-02-23 PROCEDURE — 3077F PR MOST RECENT SYSTOLIC BLOOD PRESSURE >= 140 MM HG: ICD-10-PCS | Mod: CPTII,S$GLB,, | Performed by: INTERNAL MEDICINE

## 2023-02-23 PROCEDURE — 3008F BODY MASS INDEX DOCD: CPT | Mod: CPTII,S$GLB,, | Performed by: INTERNAL MEDICINE

## 2023-02-23 PROCEDURE — 1101F PR PT FALLS ASSESS DOC 0-1 FALLS W/OUT INJ PAST YR: ICD-10-PCS | Mod: CPTII,S$GLB,, | Performed by: INTERNAL MEDICINE

## 2023-02-23 PROCEDURE — 96372 PR INJECTION,THERAP/PROPH/DIAG2ST, IM OR SUBCUT: ICD-10-PCS | Mod: S$GLB,,, | Performed by: INTERNAL MEDICINE

## 2023-02-23 PROCEDURE — 3079F DIAST BP 80-89 MM HG: CPT | Mod: CPTII,S$GLB,, | Performed by: INTERNAL MEDICINE

## 2023-02-23 PROCEDURE — 99215 PR OFFICE/OUTPT VISIT, EST, LEVL V, 40-54 MIN: ICD-10-PCS | Mod: 25,S$GLB,, | Performed by: INTERNAL MEDICINE

## 2023-02-23 PROCEDURE — 3288F PR FALLS RISK ASSESSMENT DOCUMENTED: ICD-10-PCS | Mod: CPTII,S$GLB,, | Performed by: INTERNAL MEDICINE

## 2023-02-23 PROCEDURE — 3078F PR MOST RECENT DIASTOLIC BLOOD PRESSURE < 80 MM HG: ICD-10-PCS | Mod: CPTII,S$GLB,, | Performed by: INTERNAL MEDICINE

## 2023-02-23 PROCEDURE — 99999 PR PBB SHADOW E&M-EST. PATIENT-LVL III: ICD-10-PCS | Mod: PBBFAC,,, | Performed by: INTERNAL MEDICINE

## 2023-02-23 PROCEDURE — 99214 PR OFFICE/OUTPT VISIT, EST, LEVL IV, 30-39 MIN: ICD-10-PCS | Mod: S$GLB,,, | Performed by: INTERNAL MEDICINE

## 2023-02-23 PROCEDURE — 1125F PR PAIN SEVERITY QUANTIFIED, PAIN PRESENT: ICD-10-PCS | Mod: CPTII,S$GLB,, | Performed by: INTERNAL MEDICINE

## 2023-02-23 PROCEDURE — 99214 OFFICE O/P EST MOD 30 MIN: CPT | Mod: PBBFAC,PO | Performed by: INTERNAL MEDICINE

## 2023-02-23 PROCEDURE — 1160F PR REVIEW ALL MEDS BY PRESCRIBER/CLIN PHARMACIST DOCUMENTED: ICD-10-PCS | Mod: CPTII,S$GLB,, | Performed by: INTERNAL MEDICINE

## 2023-02-23 PROCEDURE — 99214 OFFICE O/P EST MOD 30 MIN: CPT | Mod: S$GLB,,, | Performed by: INTERNAL MEDICINE

## 2023-02-23 PROCEDURE — 3078F DIAST BP <80 MM HG: CPT | Mod: CPTII,S$GLB,, | Performed by: INTERNAL MEDICINE

## 2023-02-23 PROCEDURE — 96372 THER/PROPH/DIAG INJ SC/IM: CPT | Mod: S$GLB,,, | Performed by: INTERNAL MEDICINE

## 2023-02-23 PROCEDURE — 99999 PR PBB SHADOW E&M-EST. PATIENT-LVL IV: ICD-10-PCS | Mod: PBBFAC,,, | Performed by: INTERNAL MEDICINE

## 2023-02-23 PROCEDURE — 1160F RVW MEDS BY RX/DR IN RCRD: CPT | Mod: CPTII,S$GLB,, | Performed by: INTERNAL MEDICINE

## 2023-02-23 PROCEDURE — 99999 PR PBB SHADOW E&M-EST. PATIENT-LVL III: CPT | Mod: PBBFAC,,, | Performed by: INTERNAL MEDICINE

## 2023-02-23 RX ORDER — KETOROLAC TROMETHAMINE 30 MG/ML
60 INJECTION, SOLUTION INTRAMUSCULAR; INTRAVENOUS
Status: COMPLETED | OUTPATIENT
Start: 2023-02-23 | End: 2023-02-23

## 2023-02-23 RX ORDER — METHYLPREDNISOLONE ACETATE 80 MG/ML
160 INJECTION, SUSPENSION INTRA-ARTICULAR; INTRALESIONAL; INTRAMUSCULAR; SOFT TISSUE
Status: COMPLETED | OUTPATIENT
Start: 2023-02-23 | End: 2023-02-23

## 2023-02-23 RX ORDER — HYDROCODONE BITARTRATE AND ACETAMINOPHEN 10; 325 MG/1; MG/1
1 TABLET ORAL EVERY 8 HOURS PRN
Qty: 90 TABLET | Refills: 0 | Status: SHIPPED | OUTPATIENT
Start: 2023-03-01 | End: 2023-03-31

## 2023-02-23 RX ORDER — ETANERCEPT 50 MG/ML
50 SOLUTION SUBCUTANEOUS WEEKLY
Qty: 4 ML | Refills: 11 | Status: SHIPPED | OUTPATIENT
Start: 2023-02-23 | End: 2023-06-19

## 2023-02-23 RX ORDER — HYDROCODONE BITARTRATE AND ACETAMINOPHEN 10; 325 MG/1; MG/1
1 TABLET ORAL EVERY 8 HOURS PRN
Qty: 90 TABLET | Refills: 0 | Status: SHIPPED | OUTPATIENT
Start: 2023-04-01 | End: 2023-05-01

## 2023-02-23 RX ORDER — ZALEPLON 10 MG/1
10 CAPSULE ORAL NIGHTLY
Qty: 30 CAPSULE | Refills: 3 | Status: SHIPPED | OUTPATIENT
Start: 2023-02-23 | End: 2023-03-25

## 2023-02-23 RX ORDER — QUETIAPINE FUMARATE 25 MG/1
25 TABLET, FILM COATED ORAL NIGHTLY
Qty: 30 TABLET | Refills: 11 | Status: SHIPPED | OUTPATIENT
Start: 2023-02-23 | End: 2023-04-27

## 2023-02-23 RX ORDER — CYANOCOBALAMIN 1000 UG/ML
1000 INJECTION, SOLUTION INTRAMUSCULAR; SUBCUTANEOUS
Status: COMPLETED | OUTPATIENT
Start: 2023-02-23 | End: 2023-02-23

## 2023-02-23 RX ORDER — HYDROCODONE BITARTRATE AND ACETAMINOPHEN 10; 325 MG/1; MG/1
1 TABLET ORAL EVERY 8 HOURS PRN
Qty: 90 TABLET | Refills: 0 | Status: SHIPPED | OUTPATIENT
Start: 2023-05-01 | End: 2023-05-29 | Stop reason: SDUPTHER

## 2023-02-23 RX ORDER — DOXAZOSIN 2 MG/1
2 TABLET ORAL NIGHTLY
Qty: 90 TABLET | Refills: 1 | Status: SHIPPED | OUTPATIENT
Start: 2023-02-23 | End: 2023-08-14

## 2023-02-23 RX ADMIN — METHYLPREDNISOLONE ACETATE 160 MG: 80 INJECTION, SUSPENSION INTRA-ARTICULAR; INTRALESIONAL; INTRAMUSCULAR; SOFT TISSUE at 04:02

## 2023-02-23 RX ADMIN — KETOROLAC TROMETHAMINE 60 MG: 30 INJECTION, SOLUTION INTRAMUSCULAR; INTRAVENOUS at 04:02

## 2023-02-23 RX ADMIN — CYANOCOBALAMIN 1000 MCG: 1000 INJECTION, SOLUTION INTRAMUSCULAR; SUBCUTANEOUS at 04:02

## 2023-02-23 NOTE — PROGRESS NOTES
2 pt identifiers used  Allergies reviewed      Administered 2 cc ( 30 mg/ml ) of toradol to the left upper outer gluteal. Patient tolerated injections well. Informed of s/s to report verbalized understanding. No adverse reactions noted. Left facility in stable condition.    Administered 2 cc ( 80 mg/ml ) of depomedrol to the right upper outer gluteal. Informed of s/s to report verbalized understanding. No adverse reactions noted.    Administered 1 cc ( 1000 mcg/ml ) of b12 to the right ventroouter gluteal. Informed of s/s to report verbalized understanding. No adverse reactions noted.      Answers submitted by the patient for this visit:  Rheumatology Questionnaire (Submitted on 2/21/2023)  fever: No  eye redness: Yes  mouth sores: No  headaches: Yes  shortness of breath: Yes  chest pain: No  trouble swallowing: Yes  diarrhea: No  constipation: No  unexpected weight change: No  genital sore: No  During the last 3 days, have you had a skin rash?: No  Bruises or bleeds easily: No  cough: Yes

## 2023-02-23 NOTE — PROGRESS NOTES
Subjective:    Patient ID:  Scooter Swan is a 72 y.o. male who presents for Hyperlipidemia and Hypertension (4MTH F/U)        HPI    RECENT LABS NOTED TSH NORMAL CMP BLOOD GLUCOSE 214 GFR 53, LDL 96 UP FROM 77, HDL 62 DOWN FROM 76, TRIGLYCERIDE 87, BP LOG OK, SEE ROS  Past Medical History:   Diagnosis Date    Cataract     COPD (chronic obstructive pulmonary disease)     Diverticulosis     DUARTE (dyspnea on exertion)     GERD (gastroesophageal reflux disease)     Glaucoma     Hepatitis C     treated; seeing Dr. Carr    Hyperlipidemia     Hypertension     Liver lesion 08/2018    RA (rheumatoid arthritis)     Rectal prolapse     Possible     Past Surgical History:   Procedure Laterality Date    ANGIOGRAM, CORONARY, WITH LEFT HEART CATHETERIZATION  10/6/2022    Procedure: Angiogram, Coronary, with Left Heart Cath;  Surgeon: Zac Boucher MD;  Location: [facility] CATH;  Service: Cardiology;;    ARTERIOGRAPHY OF AORTIC ROOT  10/6/2022    Procedure: ARTERIOGRAM, AORTIC ROOT;  Surgeon: Zac Boucher MD;  Location: [facility] CATH;  Service: Cardiology;;    CARPAL TUNNEL RELEASE      Right wrist 2015    COLONOSCOPY  08/2016    Dr. Cardona; in care everywhere    COLONOSCOPY N/A 3/20/2019    Procedure: COLONOSCOPY;  Surgeon: Sotero Arthur MD;  Location: [facility] ENDO;  Service: Endoscopy;  Laterality: N/A; hemorrhoids, diverticulosis, repeat in 10 years for screening    EXCISIONAL HEMORRHOIDECTOMY N/A 10/18/2018    Procedure: HEMORRHOIDECTOMY, prone;  Surgeon: Gunnar Horton MD;  Location: [facility] OR [address] [place] [address];  Service: Colon and Rectal;  Laterality: N/A;    THYROID SURGERY      UPPER GASTROINTESTINAL ENDOSCOPY  08/2016    Dr. Cardona; in care everywhere     Family History   Problem Relation Age of Onset    Stomach cancer Paternal Cousin     Stomach cancer Paternal Cousin     Cataracts Mother     Diabetes Mother     Hypertension Mother     Stroke Mother     Diabetes Sister     Hypertension Sister     Stroke Sister     Diabetes Brother      Glaucoma Brother     Hypertension Brother     Stroke Brother     Diabetes Maternal Aunt     Hypertension Maternal Aunt     Stroke Maternal Aunt     Diabetes Maternal Uncle     Hypertension Maternal Uncle     Stroke Maternal Uncle     Diabetes Maternal Grandmother     Hypertension Maternal Grandmother     Stroke Maternal Grandmother     Cancer Cousin         stomach    Colon cancer Neg Hx     Colon polyps Neg Hx     Crohn's disease Neg Hx     Ulcerative colitis Neg Hx     Esophageal cancer Neg Hx     Amblyopia Neg Hx     Blindness Neg Hx     Macular degeneration Neg Hx     Retinal detachment Neg Hx     Strabismus Neg Hx     Thyroid disease Neg Hx      Social History     Socioeconomic History    Marital status:    Tobacco Use    Smoking status: Never    Smokeless tobacco: Never   Substance and Sexual Activity    Alcohol use: Yes     Alcohol/week: 1.0 standard drink     Types: 1 Shots of liquor per week     Comment: social    Drug use: Yes     Types: Hydrocodone     Social Determinants of Health     Financial Resource Strain: Low Risk     Difficulty of Paying Living Expenses: Not hard at all   Food Insecurity: No Food Insecurity    Worried About Running Out of Food in the Last Year: Never true    Ran Out of Food in the Last Year: Never true   Transportation Needs: Unmet Transportation Needs    Lack of Transportation (Medical): Yes    Lack of Transportation (Non-Medical): Yes   Physical Activity: Sufficiently Active    Days of Exercise per Week: 5 days    Minutes of Exercise per Session: 30 min   Stress: Stress Concern Present    Feeling of Stress : Rather much   Social Connections: Unknown    Frequency of Communication with Friends and Family: More than three times a week    Frequency of Social Gatherings with Friends and Family: More than three times a week    Active Member of Clubs or Organizations: Patient refused    Attends Club or Organization Meetings: Patient refused    Marital Status:    Housing  Stability: Unknown    Unable to Pay for Housing in the Last Year: Patient refused    Number of Places Lived in the Last Year: 1    Unstable Housing in the Last Year: Patient refused       Review of patient's allergies indicates:   Allergen Reactions    Buspar [buspirone] Hallucinations     Nightmares    Fentanyl Hives and Hallucinations    Plaquenil [hydroxychloroquine]      Nausea, insomnia, weight loss 40LBS    Amitiza [lubiprostone] Nausea Only and Other (See Comments)     Fatigue.    Azulfidine [sulfasalazine] Nausea And Vomiting    Trazodone Other (See Comments)     Insomnia         Current Outpatient Medications:     albuterol (PROVENTIL/VENTOLIN HFA) 90 mcg/actuation inhaler, INHALE 1 PUFF BY MOUTH INTO THE LUNGS EVERY 4 HOURS AS NEEDED FOR WHEEZING OR SHORTNESS OF BREATH, Disp: 6.7 g, Rfl: 5    cholecalciferol, vitamin D3, (VITAMIN D3) 50 mcg (2,000 unit) Cap, Take 1 capsule (2,000 Units total) by mouth once daily., Disp: 90 capsule, Rfl: 3    cyanocobalamin 500 MCG tablet, Take 500 mcg by mouth once daily., Disp: , Rfl:     diclofenac sodium (VOLTAREN) 1 % Gel, Apply 2 grams  topically 4 (four) times daily., Disp: 100 g, Rfl: 5    diltiaZEM (DILACOR XR) 180 MG CDCR, Take 1 capsule (180 mg total) by mouth once daily., Disp: 30 capsule, Rfl: 11    dorzolamide (TRUSOPT) 2 % ophthalmic solution, Place 1 drop into both eyes 3 (three) times daily., Disp: , Rfl:     enalapril (VASOTEC) 20 MG tablet, TAKE 1 TABLET(20 MG) BY MOUTH TWICE DAILY, Disp: 180 tablet, Rfl: 3    etanercept (ENBREL SURECLICK) 50 mg/mL (1 mL), Inject 1 mL (50 mg total) into the skin once a week., Disp: 4 mL, Rfl: 11    hydrALAZINE (APRESOLINE) 50 MG tablet, Take 2 tablets (100 mg total) by mouth every 12 (twelve) hours., Disp: 360 tablet, Rfl: 3    [START ON 3/1/2023] HYDROcodone-acetaminophen (NORCO)  mg per tablet, Take 1 tablet by mouth every 8 (eight) hours as needed for Pain., Disp: 90 tablet, Rfl: 0    [START ON 4/1/2023]  HYDROcodone-acetaminophen (NORCO)  mg per tablet, Take 1 tablet by mouth every 8 (eight) hours as needed for Pain., Disp: 90 tablet, Rfl: 0    [START ON 5/1/2023] HYDROcodone-acetaminophen (NORCO)  mg per tablet, Take 1 tablet by mouth every 8 (eight) hours as needed for Pain., Disp: 90 tablet, Rfl: 0    Lactobac no.41/Bifidobact no.7 (PROBIOTIC-10 ORAL), Take 1 capsule by mouth once daily., Disp: , Rfl:     multivitamin capsule, Take 1 capsule by mouth once daily., Disp: , Rfl:     mupirocin (BACTROBAN) 2 % ointment, Apply topically 3 (three) times daily., Disp: 15 g, Rfl: 1    omeprazole (PRILOSEC) 20 MG capsule, Take 1 capsule (20 mg total) by mouth 2 (two) times a day., Disp: 180 capsule, Rfl: 3    polyethylene glycol (GLYCOLAX) 17 gram PwPk, Take 17 g by mouth daily as needed., Disp: , Rfl:     pravastatin (PRAVACHOL) 20 MG tablet, TAKE 1 TABLET BY MOUTH DAILY, Disp: 90 tablet, Rfl: 3    predniSONE (DELTASONE) 5 MG tablet, Take 3 tablets (15 mg total) by mouth once daily., Disp: 270 tablet, Rfl: 1    QUEtiapine (SEROQUEL) 25 MG Tab, Take 1 tablet (25 mg total) by mouth every evening., Disp: 30 tablet, Rfl: 11    sildenafiL (VIAGRA) 100 MG tablet, Take 1 tablet (100 mg total) by mouth as needed for Erectile Dysfunction., Disp: 8 tablet, Rfl: 11    tamsulosin (FLOMAX) 0.4 mg Cap, TAKE 1 CAPSULE(0.4 MG) BY MOUTH EVERY DAY, Disp: 30 capsule, Rfl: 11    tiZANidine (ZANAFLEX) 4 MG tablet, TAKE 1 TABLET(4 MG) BY MOUTH EVERY 8 HOURS, Disp: 270 tablet, Rfl: 1    travoprost (TRAVATAN Z) 0.004 % ophthalmic solution, Place 1 drop into both eyes every evening., Disp: 5 mL, Rfl: 12    zaleplon (SONATA) 10 MG capsule, Take 1 capsule (10 mg total) by mouth every evening., Disp: 30 capsule, Rfl: 3    doxazosin (CARDURA) 2 MG tablet, Take 1 tablet (2 mg total) by mouth every evening., Disp: 90 tablet, Rfl: 1    Current Facility-Administered Medications:     sodium chloride 0.9% flush 10 mL, 10 mL, Intravenous, PRN,  Zac Boucher MD    Facility-Administered Medications Ordered in Other Visits:     0.9%  NaCl infusion, , Intravenous, Continuous, Deedee Sarkar NP, Stopped at 10/18/18 1613    mupirocin 2 % ointment, , Nasal, On Call Procedure, Deedee Sarkar NP, Given at 10/18/18 1348    Review of Systems   Constitutional: Negative for chills, diaphoresis, malaise/fatigue and night sweats.   HENT:  Negative for congestion and nosebleeds.    Eyes:  Negative for blurred vision and visual disturbance.   Cardiovascular:  Positive for dyspnea on exertion (MILD, COPD). Negative for chest pain (RARE), claudication, cyanosis, irregular heartbeat, leg swelling, near-syncope, orthopnea, palpitations, paroxysmal nocturnal dyspnea and syncope.   Respiratory:  Negative for cough, hemoptysis, shortness of breath and wheezing.    Endocrine: Negative for heat intolerance and polyuria.   Hematologic/Lymphatic: Negative for adenopathy. Does not bruise/bleed easily.   Skin:  Negative for color change and rash.   Musculoskeletal:  Positive for arthritis (RA). Negative for back pain and falls.   Gastrointestinal:  Positive for dysphagia. Negative for bloating, abdominal pain, change in bowel habit, melena and nausea.   Genitourinary:  Negative for dysuria and flank pain.   Neurological:  Negative for brief paralysis, dizziness, focal weakness, light-headedness, loss of balance, paresthesias and weakness.   Psychiatric/Behavioral:  Negative for altered mental status and depression.    Allergic/Immunologic: Negative for hives and persistent infections.      Objective:      Vitals:    02/23/23 1654 02/23/23 1705   BP: (!) 198/91 (!) 174/84   Pulse: 72    Weight: 85 kg (187 lb 6.3 oz)    Height: 6' (1.829 m)    PainSc:   6    PainLoc: Generalized      Body mass index is 25.41 kg/m².    Physical Exam  Constitutional:       Appearance: He is well-developed.   HENT:      Head: Normocephalic and atraumatic.   Eyes:      Extraocular Movements:  Extraocular movements intact.      Conjunctiva/sclera: Conjunctivae normal.   Neck:      Thyroid: No thyromegaly.      Vascular: Normal carotid pulses. No carotid bruit, hepatojugular reflux or JVD.      Trachea: No tracheal deviation.   Cardiovascular:      Rate and Rhythm: Normal rate and regular rhythm. No extrasystoles are present.     Chest Wall: PMI is not displaced.      Pulses:           Carotid pulses are 2+ on the right side and 2+ on the left side.       Radial pulses are 2+ on the right side and 2+ on the left side.        Posterior tibial pulses are 2+ on the right side and 2+ on the left side.      Heart sounds: Heart sounds not distant. No midsystolic click. Murmur heard.   Harsh systolic murmur is present with a grade of 2/6 at the upper right sternal border radiating to the neck.   Diastolic murmur is present at the upper right sternal border.     No friction rub. No gallop.   Pulmonary:      Effort: Pulmonary effort is normal. No tachypnea, bradypnea, accessory muscle usage or respiratory distress.      Breath sounds: Normal breath sounds.   Abdominal:      General: Bowel sounds are normal.      Palpations: Abdomen is soft.      Tenderness: There is no abdominal tenderness.   Musculoskeletal:         General: Normal range of motion.      Cervical back: Neck supple. No edema or erythema.      Right lower leg: No edema.      Left lower leg: No edema.   Skin:     General: Skin is warm and dry.   Neurological:      Mental Status: He is alert and oriented to person, place, and time.      Cranial Nerves: Cranial nerves 2-12 are intact. No cranial nerve deficit.   Psychiatric:         Mood and Affect: Mood normal.         Speech: Speech normal.         Behavior: Behavior normal.         Judgment: Judgment normal.               ..    Chemistry        Component Value Date/Time     02/22/2023 1150    K 4.0 02/22/2023 1150     02/22/2023 1150    CO2 24 02/22/2023 1150    BUN 14 02/22/2023 1150     CREATININE 1.4 02/22/2023 1150     (H) 02/22/2023 1150        Component Value Date/Time    CALCIUM 9.3 02/22/2023 1150    ALKPHOS 68 02/22/2023 1150    AST 16 02/22/2023 1150    ALT 9 (L) 02/22/2023 1150    BILITOT 0.4 02/22/2023 1150    ESTGFRAFRICA >60.0 05/13/2022 0900    EGFRNONAA >60.0 05/13/2022 0900            ..  Lab Results   Component Value Date    CHOL 176 02/16/2023    CHOL 162 02/11/2019     Lab Results   Component Value Date    HDL 62 02/16/2023    HDL 76 (H) 02/11/2019     Lab Results   Component Value Date    LDLCALC 96.6 02/16/2023    LDLCALC 77.6 02/11/2019     Lab Results   Component Value Date    TRIG 87 02/16/2023    TRIG 42 02/11/2019     Lab Results   Component Value Date    CHOLHDL 35.2 02/16/2023    CHOLHDL 46.9 02/11/2019     ..  Lab Results   Component Value Date    WBC 12.76 (H) 02/22/2023    HGB 10.8 (L) 02/22/2023    HCT 34.6 (L) 02/22/2023    MCV 93 02/22/2023     (L) 02/22/2023       Test(s) Reviewed  I have reviewed the following in detail:  [] Stress test   [] Angiography   [] Echocardiogram   [x] Labs   [] Other:       Assessment:         ICD-10-CM ICD-9-CM   1. Carotid artery plaque, bilateral  I65.23 433.10     433.30   2. Moderate aortic regurgitation  I35.1 424.1   3. Resistant hypertension  I10 401.9   4. Aortic valve disease  I35.9 424.1   5. Stage 3a chronic kidney disease  N18.31 585.3     Problem List Items Addressed This Visit          Cardiac/Vascular    Resistant hypertension    Relevant Orders    CV US Renal Artery Stenosis Hypertension Complete    Moderate aortic regurgitation    Aortic valve disease    Carotid artery plaque, bilateral - Primary       Renal/    Stage 3a chronic kidney disease    Relevant Orders    CV US Renal Artery Stenosis Hypertension Complete        Plan:     ADD CARDURA, CHECK FOR ANA, BRING HOME MACHINE, DISCUSSED PLAN WITH THE PATIENT AND HIS WIFE,ALL OTHER CV CLINICALLY STABLE, NO ANGINA, NO HF, NO TIA, NO CLINICAL  ARRHYTHMIA,CONTINUE CURRENT MEDS, EDUCATION, DIET, EXERCISE , RETURN TO CLINIC IN FEW MONTHS      Carotid artery plaque, bilateral    Moderate aortic regurgitation    Resistant hypertension  -     CV US Renal Artery Stenosis Hypertension Complete; Future    Aortic valve disease    Stage 3a chronic kidney disease  -     CV US Renal Artery Stenosis Hypertension Complete; Future    Other orders  -     doxazosin (CARDURA) 2 MG tablet; Take 1 tablet (2 mg total) by mouth every evening.  Dispense: 90 tablet; Refill: 1    RTC Low level/low impact aerobic exercise 5x's/wk. Heart healthy diet and risk factor modification.    See labs and med orders.    Aerobic exercise 5x's/wk. Heart healthy diet and risk factor modification.    See labs and med orders.

## 2023-02-23 NOTE — PROGRESS NOTES
Subjective:       Patient ID: Scooter Swan is a 72 y.o. male.    Chief Complaint: Disease Management    Follow up: 71 year old male who presents to clinic visit for follow up on seropositive rheumatoid arthritis and osteoarthritis.  On prednisone 2016 initially starte with tx plaquenil.He underwent cardiology eval-- angiogram--normal coronary arteries, moderate to severe AS and AR followed by Dr. Boucher. He continues to have shortness of breath with exertion. Pulmonary referral placed at last visit, but he has not est care yet.     Labs show leukocytosis, anemia, and persistent thrombocytopenia. He has not followed by with Hematology since 2020.    He continues to have pain in his hands, elbows, knees, and ankles. He has significant morning stiffness and he is taking prednisone 10 mg daily. Norco is providing adequate control of his pain without side effects.     He was taking lunesta for sleep, but insurance is not covering this routinely.     MC was too costly.    He had a fall recently walking in the dark hallway in the middle of the night and landed on his R shoulder. No major injuries. No LOC.    Current treatment:  1. Norco TID PRN  2. Prednisone 10 mg  3. Zanaflex PRN    Prior treatment:  1. Plaquenil (WEIGHT LOSS)  2. SSZ caused GI upset        Review of Systems   Constitutional:  Negative for fever and unexpected weight change.   HENT:  Positive for trouble swallowing. Negative for mouth sores.    Eyes:  Positive for redness.   Respiratory:  Positive for cough and shortness of breath.    Cardiovascular:  Negative for chest pain.   Gastrointestinal:  Negative for constipation and diarrhea.   Genitourinary:  Negative for genital sores.   Skin:  Negative for rash.   Neurological:  Positive for headaches.   Hematological:  Does not bruise/bleed easily.       Objective:   BP (!) 142/77   Pulse 87   Ht 6' (1.829 m)   Wt 86.2 kg (190 lb)   BMI 25.77 kg/m²      Physical Exam   Constitutional: He is oriented to  person, place, and time.   HENT:   Head: Normocephalic and atraumatic.   Mouth/Throat: Oropharynx is clear and moist.   Eyes: Pupils are equal, round, and reactive to light.   Neck: No thyromegaly present.   Cardiovascular: Normal rate, regular rhythm and normal heart sounds. Exam reveals no gallop and no friction rub.   No murmur heard.  Pulmonary/Chest: Breath sounds normal. He has no wheezes. He has no rales. He exhibits no tenderness.   Abdominal: There is no abdominal tenderness. There is no rebound and no guarding.   Musculoskeletal:         General: Tenderness present.      Right shoulder: Tenderness present.      Left shoulder: Tenderness present.      Right elbow: Swelling present. Tenderness present.      Left elbow: Tenderness present.      Right wrist: Swelling and tenderness present.      Left wrist: Swelling and tenderness present.      Cervical back: Neck supple.      Right knee: Swelling and effusion present. Tenderness present.      Left knee: Swelling and effusion present. Tenderness present.   Lymphadenopathy:     He has no cervical adenopathy.   Neurological: He is alert and oriented to person, place, and time. Gait normal.   Skin: Bruising noted. No rash noted. There is erythema. No pallor.   Psychiatric: Mood and affect normal.   Vitals reviewed.      Right Side Rheumatological Exam     The patient is tender to palpation of the shoulder, elbow, wrist, knee, 1st PIP, 1st MCP, 2nd PIP, 2nd MCP, 3rd PIP, 3rd MCP, 4th PIP, 4th MCP and 5th PIP    He has swelling of the elbow, wrist, knee, 1st PIP, 1st MCP, 2nd PIP, 2nd MCP, 3rd PIP, 3rd MCP, 4th PIP, 4th MCP, 5th PIP and 5th MCP    The patient has an enlarged wrist and knee    Shoulder Exam   Tenderness Location: no tenderness    Range of Motion   Active abduction:  abnormal   Adduction: abnormal  Sensation: normal    Knee Exam   Tenderness Location: medial joint line and LCL  Patellofemoral Crepitus: positive  Effusion: positive  Sensation:  normal    Hip Exam   Tenderness Location: posterior and anterior  Sensation: normal    Elbow/Wrist Exam   Tenderness Location: no tenderness  Sensation: normal    Left Side Rheumatological Exam     The patient is tender to palpation of the shoulder, elbow, wrist, knee, 1st PIP, 1st MCP, 2nd PIP, 2nd MCP, 3rd PIP, 3rd MCP, 4th PIP, 4th MCP, 5th PIP and 5th MCP.    He has swelling of the wrist, knee, 1st PIP, 1st MCP, 2nd PIP, 2nd MCP, 3rd PIP, 3rd MCP, 4th PIP, 4th MCP, 5th PIP and 5th MCP    The patient has an enlarged knee.    Shoulder Exam   Tenderness Location: no tenderness    Range of Motion   Active abduction:  abnormal   Sensation: normal    Knee Exam   Tenderness Location: lateral joint line and medial joint line    Patellofemoral Crepitus: positive  Effusion: positive  Sensation: normal    Hip Exam   Tenderness Location: posterior and anterior  Sensation: normal    Elbow/Wrist Exam   Sensation: normal      Back/Neck Exam   General Inspection   Gait: normal       Tenderness Right paramedian tenderness of the Upper C-Spine, Lower C-Spine, Lower L-Spine and SI Joint.Left paramedian tenderness of the Upper C-Spine, Lower L-Spine, Lower C-Spine and SI Joint.    Neck Range of Motion   Flexion:  Limited  Extension:  Limited     PACS Images for [facility] Viewer     Show images for DXA Bone Density Spine And Hip  DXA Bone Density Spine And Hip  Order: [id number]  Status: Final result     Visible to patient: Yes (not seen)     Next appt: Today at 04:45 PM in Cardiology (Zac Boucher MD)     Dx: Thrombopenia; Chronic pain syndrome; ...     0 Result Notes  Details    Reading Physician Reading Date Result Priority   Jim Dent MD  [phone] 2/16/2023 Routine     Narrative & Impression  EXAMINATION:  DXA BONE DENSITY AXIAL SKELETON 1 OR MORE SITES     CLINICAL HISTORY:  Rheumatoid arthritis with rheumatoid factor, unspecified     TECHNIQUE:  DXA scanning was performed over the left hip and lumbar spine.   Review of the images confirms satisfactory positioning and technique.     COMPARISON:  None     FINDINGS:  The L1 to L4 vertebral bone mineral density is equal to 1.011 g/cm squared with a T score of -1.7.     The left femoral neck bone mineral density is equal to 0.924 g/cm squared with a T score of -1.0.     The total hip bone mineral density is equal to 1.116 g/cm squared with a T score of -0.4.     There is a 2.3% risk of a major osteoporotic fracture and a 0.3% risk of hip fracture in the next 10 years (FRAX).     Impression:     Osteopenia     Consider FDA approved medical therapies in postmenopausal women and men aged 50 years and older, based on the following:     *A hip or vertebral (clinical or morphometric) fracture  *T score less than or equal to -2.5 at the femoral neck or spine after appropriate evaluation to exclude secondary causes.  *Low bone mass -- also known as osteopenia (T score between -1.0 and -2.5 at the femoral neck or spine) and a 10 year probability of hip fracture greater than or equal to 3% or a 10 year probability of major osteoporosis-related fracture greater than or equal to 20% based on the [place]-adapted WHO algorithm.  *Clinicians judgment and/or patient preference may indicate treatment for people with 10 year fracture probabilities is above or below these levels.        Electronically signed by: Jim Dent MD  Date:                                            02/16/2023  Time:                                           11:52           Exam Ended: 02/16/23 10:59 Last Resulted: 02/16/23 11:52    Javier as an Unsuccessful Attempt                  Assessment:       1. Current chronic use of systemic steroids    2. Seropositive rheumatoid arthritis    3. Pancytopenia    4. Chronic pain syndrome    5. Primary insomnia    6. Carpal tunnel syndrome, unspecified laterality    7. Anemia, unspecified type    8. PSA (psoriatic arthritis)            Plan:       Scooter was seen today for disease  management.    Diagnoses and all orders for this visit:    Current chronic use of systemic steroids  -     ketorolac injection 60 mg  -     methylPREDNISolone acetate injection 160 mg  -     cyanocobalamin injection 1,000 mcg    Seropositive rheumatoid arthritis  -     ketorolac injection 60 mg  -     methylPREDNISolone acetate injection 160 mg  -     cyanocobalamin injection 1,000 mcg  -     Lymphocyte Profile II; Future  -     IgA; Future  -     Humoral Immune Eval (Pneumo Serotypes) With H. Flu; Future  -     IgM; Future  -     IgG; Future  -     IgG 1, 2, 3, and 4; Future  -     Hepatitis C Antibody; Future  -     Quantiferon Gold TB; Future  -     Hepatitis B Surface Antigen; Future  -     Hepatitis B Surface Antibody, Qual/Quant; Future  -     Hepatitis B Core Antibody, Total; Future  -     etanercept (ENBREL SURECLICK) 50 mg/mL (1 mL); Inject 1 mL (50 mg total) into the skin once a week.    Pancytopenia  -     ketorolac injection 60 mg  -     methylPREDNISolone acetate injection 160 mg  -     cyanocobalamin injection 1,000 mcg  -     Lymphocyte Profile II; Future  -     IgA; Future  -     Humoral Immune Eval (Pneumo Serotypes) With H. Flu; Future  -     IgM; Future  -     IgG; Future  -     IgG 1, 2, 3, and 4; Future  -     Hepatitis C Antibody; Future  -     Quantiferon Gold TB; Future  -     Hepatitis B Surface Antigen; Future  -     Hepatitis B Surface Antibody, Qual/Quant; Future  -     Hepatitis B Core Antibody, Total; Future  -     etanercept (ENBREL SURECLICK) 50 mg/mL (1 mL); Inject 1 mL (50 mg total) into the skin once a week.    Chronic pain syndrome  -     ketorolac injection 60 mg  -     methylPREDNISolone acetate injection 160 mg  -     cyanocobalamin injection 1,000 mcg  -     HYDROcodone-acetaminophen (NORCO)  mg per tablet; Take 1 tablet by mouth every 8 (eight) hours as needed for Pain.  -     HYDROcodone-acetaminophen (NORCO)  mg per tablet; Take 1 tablet by mouth every 8 (eight)  hours as needed for Pain.  -     HYDROcodone-acetaminophen (NORCO)  mg per tablet; Take 1 tablet by mouth every 8 (eight) hours as needed for Pain.  -     Lymphocyte Profile II; Future  -     IgA; Future  -     Humoral Immune Eval (Pneumo Serotypes) With H. Flu; Future  -     IgM; Future  -     IgG; Future  -     IgG 1, 2, 3, and 4; Future  -     Hepatitis C Antibody; Future  -     Quantiferon Gold TB; Future  -     Hepatitis B Surface Antigen; Future  -     Hepatitis B Surface Antibody, Qual/Quant; Future  -     Hepatitis B Core Antibody, Total; Future  -     etanercept (ENBREL SURECLICK) 50 mg/mL (1 mL); Inject 1 mL (50 mg total) into the skin once a week.    Primary insomnia  -     ketorolac injection 60 mg  -     methylPREDNISolone acetate injection 160 mg  -     cyanocobalamin injection 1,000 mcg  -     zaleplon (SONATA) 10 MG capsule; Take 1 capsule (10 mg total) by mouth every evening.  -     QUEtiapine (SEROQUEL) 25 MG Tab; Take 1 tablet (25 mg total) by mouth every evening.    Carpal tunnel syndrome, unspecified laterality  -     ketorolac injection 60 mg  -     methylPREDNISolone acetate injection 160 mg  -     cyanocobalamin injection 1,000 mcg    Anemia, unspecified type  -     Lymphocyte Profile II; Future  -     IgA; Future  -     Humoral Immune Eval (Pneumo Serotypes) With H. Flu; Future  -     IgM; Future  -     IgG; Future  -     IgG 1, 2, 3, and 4; Future  -     Hepatitis C Antibody; Future  -     Quantiferon Gold TB; Future  -     Hepatitis B Surface Antigen; Future  -     Hepatitis B Surface Antibody, Qual/Quant; Future  -     Hepatitis B Core Antibody, Total; Future  -     etanercept (ENBREL SURECLICK) 50 mg/mL (1 mL); Inject 1 mL (50 mg total) into the skin once a week.    PSA (psoriatic arthritis)  -     Lymphocyte Profile II; Future  -     IgA; Future  -     Humoral Immune Eval (Pneumo Serotypes) With H. Flu; Future  -     IgM; Future  -     IgG; Future  -     IgG 1, 2, 3, and 4;  Future  -     Hepatitis C Antibody; Future  -     Quantiferon Gold TB; Future  -     Hepatitis B Surface Antigen; Future  -     Hepatitis B Surface Antibody, Qual/Quant; Future  -     Hepatitis B Core Antibody, Total; Future  -     etanercept (ENBREL SURECLICK) 50 mg/mL (1 mL); Inject 1 mL (50 mg total) into the skin once a week.        1.Pt appears to have anemia of chronic dz will get enbrel approved  2. Will slowly try to wean down prednisone  3. Scripts for  sonata and seroquel to try. May need another script to cover  4. Norco  . I have  check [place] prescription monitoring program site and no unusual or abnormal behavior has occured

## 2023-02-24 ENCOUNTER — SPECIALTY PHARMACY (OUTPATIENT)
Dept: PHARMACY | Facility: CLINIC | Age: 73
End: 2023-02-24

## 2023-02-24 DIAGNOSIS — M06.9 RHEUMATOID ARTHRITIS, INVOLVING UNSPECIFIED SITE, UNSPECIFIED WHETHER RHEUMATOID FACTOR PRESENT: Primary | ICD-10-CM

## 2023-02-27 ENCOUNTER — HOSPITAL ENCOUNTER (OUTPATIENT)
Dept: RADIOLOGY | Facility: HOSPITAL | Age: 73
Discharge: HOME | End: 2023-02-27
Attending: NURSE PRACTITIONER
Payer: MEDICARE

## 2023-02-27 DIAGNOSIS — D64.9 ANEMIA, UNSPECIFIED TYPE: ICD-10-CM

## 2023-02-27 DIAGNOSIS — D69.6 THROMBOCYTOPENIA: ICD-10-CM

## 2023-02-27 DIAGNOSIS — Z86.19 HISTORY OF HEPATITIS C: ICD-10-CM

## 2023-02-27 PROCEDURE — 76700 US ABDOMEN COMPLETE: ICD-10-PCS | Mod: 26,,, | Performed by: RADIOLOGY

## 2023-02-27 PROCEDURE — 76700 US EXAM ABDOM COMPLETE: CPT | Mod: TC,PO

## 2023-02-27 PROCEDURE — 76700 US EXAM ABDOM COMPLETE: CPT | Mod: 26,,, | Performed by: RADIOLOGY

## 2023-03-01 ENCOUNTER — OFFICE VISIT (OUTPATIENT)
Dept: HEMATOLOGY/ONCOLOGY | Facility: CLINIC | Age: 73
End: 2023-03-01
Payer: MEDICARE

## 2023-03-01 DIAGNOSIS — D69.6 THROMBOCYTOPENIA: ICD-10-CM

## 2023-03-01 DIAGNOSIS — N28.1 RENAL CYST, RIGHT: Primary | ICD-10-CM

## 2023-03-01 DIAGNOSIS — D64.9 NORMOCYTIC ANEMIA: ICD-10-CM

## 2023-03-01 DIAGNOSIS — D72.829 LEUKOCYTOSIS, UNSPECIFIED TYPE: ICD-10-CM

## 2023-03-01 PROCEDURE — 1159F MED LIST DOCD IN RCRD: CPT | Mod: CPTII,95,, | Performed by: NURSE PRACTITIONER

## 2023-03-01 PROCEDURE — 99214 PR OFFICE/OUTPT VISIT, EST, LEVL IV, 30-39 MIN: ICD-10-PCS | Mod: 95,,, | Performed by: NURSE PRACTITIONER

## 2023-03-01 PROCEDURE — 1159F PR MEDICATION LIST DOCUMENTED IN MEDICAL RECORD: ICD-10-PCS | Mod: CPTII,95,, | Performed by: NURSE PRACTITIONER

## 2023-03-01 PROCEDURE — 1160F PR REVIEW ALL MEDS BY PRESCRIBER/CLIN PHARMACIST DOCUMENTED: ICD-10-PCS | Mod: CPTII,95,, | Performed by: NURSE PRACTITIONER

## 2023-03-01 PROCEDURE — 1160F RVW MEDS BY RX/DR IN RCRD: CPT | Mod: CPTII,95,, | Performed by: NURSE PRACTITIONER

## 2023-03-01 PROCEDURE — 99214 OFFICE O/P EST MOD 30 MIN: CPT | Mod: 95,,, | Performed by: NURSE PRACTITIONER

## 2023-03-01 NOTE — PROGRESS NOTES
The patient location is: home  The chief complaint leading to consultation is: lab discussion    Visit type: audiovisual    Face to Face time with patient: 20  40 minutes of total time spent on the encounter, which includes face to face time and non-face to face time preparing to see the patient (eg, review of tests), Obtaining and/or reviewing separately obtained history, Documenting clinical information in the electronic or other health record, Independently interpreting results (not separately reported) and communicating results to the patient/family/caregiver, or Care coordination (not separately reported).     Each patient to whom he or she provides medical services by telemedicine is:  (1) informed of the relationship between the physician and patient and the respective role of any other health care provider with respect to management of the patient; and (2) notified that he or she may decline to receive medical services by telemedicine and may withdraw from such care at any time.    Patient ID: Scooter Swan is a 72 y.o. male.    Chief Complaint: lab discussion    HPI:  Patient is a 72 year old male who presents today as a new patient for further evaluation and recommendations of leukocytosis.  He has been referred to the hematology clinic by Dr. Salvador Kennedy, his pcp.       Other diagnosis include: glaucoma, pulmonary emphysema, sob, hpld, htn, moderate aortic regurg, bilateral carotid bruits, CAD, RA, thrombocytopenia, anemia, overweight. Hx of hep C treated with Harvoni, lower abdominal pain abnormal CT scan of liver, hemorrhoids, constipation.      Leukocytosis has resolved.  Presents with normocytic anemia and thrombocytopenia.  Granulocytosis present as well.      States that he takes prednisone 10 mg PO daily for a few years now and followed by rheumatology - for RA.     Has a lot of boils that in groin area and up legs; however does not have any now.  Soaks in warm water with bleach until it bursts.      Denies cigarette smoking.  Has occasional alcohol on holiday.  Denies illicit drug use.  Has been around second hand smoke.     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      Family hx of cancer:  Paternal side cousins stomach x 2 cancer and another with unknown cancer.    3/1/2023  Patient presents today to review results of recent workup for leukocytosis  Also found with anemia and thrombocytopenia.  Overall workup has been unremarkable except for elevated LDH and a complex right renal cyst. He has no complaints today        Social History     Socioeconomic History    Marital status:    Tobacco Use    Smoking status: Never    Smokeless tobacco: Never   Substance and Sexual Activity    Alcohol use: Yes     Alcohol/week: 1.0 standard drink     Types: 1 Shots of liquor per week     Comment: social    Drug use: Yes     Types: Hydrocodone     Social Determinants of Health     Financial Resource Strain: Low Risk     Difficulty of Paying Living Expenses: Not hard at all   Food Insecurity: No Food Insecurity    Worried About Running Out of Food in the Last Year: Never true    Ran Out of Food in the Last Year: Never true   Transportation Needs: Unmet Transportation Needs    Lack of Transportation (Medical): Yes    Lack of Transportation (Non-Medical): Yes   Physical Activity: Sufficiently Active    Days of Exercise per Week: 5 days    Minutes of Exercise per Session: 30 min   Stress: Stress Concern Present    Feeling of Stress : Rather much   Social Connections: Unknown    Frequency of Communication with Friends and Family: More than three times a week    Frequency of Social Gatherings with Friends and Family: More than three times a week    Active Member of Clubs or Organizations: Patient refused    Attends Club or Organization Meetings: Patient refused    Marital Status:    Housing Stability: Unknown    Unable to Pay for Housing in the Last Year: Patient refused    Number of Places Lived in the Last Year:  1    Unstable Housing in the Last Year: Patient refused       Family History   Problem Relation Age of Onset    Stomach cancer Paternal Cousin     Stomach cancer Paternal Cousin     Cataracts Mother     Diabetes Mother     Hypertension Mother     Stroke Mother     Diabetes Sister     Hypertension Sister     Stroke Sister     Diabetes Brother     Glaucoma Brother     Hypertension Brother     Stroke Brother     Diabetes Maternal Aunt     Hypertension Maternal Aunt     Stroke Maternal Aunt     Diabetes Maternal Uncle     Hypertension Maternal Uncle     Stroke Maternal Uncle     Diabetes Maternal Grandmother     Hypertension Maternal Grandmother     Stroke Maternal Grandmother     Cancer Cousin         stomach    Colon cancer Neg Hx     Colon polyps Neg Hx     Crohn's disease Neg Hx     Ulcerative colitis Neg Hx     Esophageal cancer Neg Hx     Amblyopia Neg Hx     Blindness Neg Hx     Macular degeneration Neg Hx     Retinal detachment Neg Hx     Strabismus Neg Hx     Thyroid disease Neg Hx        Past Surgical History:   Procedure Laterality Date    ANGIOGRAM, CORONARY, WITH LEFT HEART CATHETERIZATION  10/6/2022    Procedure: Angiogram, Coronary, with Left Heart Cath;  Surgeon: Zac Boucher MD;  Location: [facility] CATH;  Service: Cardiology;;    ARTERIOGRAPHY OF AORTIC ROOT  10/6/2022    Procedure: ARTERIOGRAM, AORTIC ROOT;  Surgeon: Zac Boucher MD;  Location: [facility] CATH;  Service: Cardiology;;    CARPAL TUNNEL RELEASE      Right wrist 2015    COLONOSCOPY  08/2016    Dr. Cardona; in care everywhere    COLONOSCOPY N/A 3/20/2019    Procedure: COLONOSCOPY;  Surgeon: Sotero Arthur MD;  Location: [facility];  Service: Endoscopy;  Laterality: N/A; hemorrhoids, diverticulosis, repeat in 10 years for screening    EXCISIONAL HEMORRHOIDECTOMY N/A 10/18/2018    Procedure: HEMORRHOIDECTOMY, prone;  Surgeon: Gunnar Horton MD;  Location: [address];  Service: Colon and Rectal;  Laterality: N/A;    THYROID SURGERY       UPPER GASTROINTESTINAL ENDOSCOPY  08/2016    Dr. Cardona; in care everywhere       Past Medical History:   Diagnosis Date    Cataract     COPD (chronic obstructive pulmonary disease)     Diverticulosis     DUARTE (dyspnea on exertion)     GERD (gastroesophageal reflux disease)     Glaucoma     Hepatitis C     treated; seeing Dr. Carr    Hyperlipidemia     Hypertension     Liver lesion 08/2018    RA (rheumatoid arthritis)     Rectal prolapse     Possible       Review of Systems   Constitutional:  Negative for appetite change and unexpected weight change.   HENT:  Negative for mouth sores.    Eyes:  Negative for visual disturbance.   Respiratory:  Positive for shortness of breath. Negative for cough.    Cardiovascular:  Negative for chest pain.   Gastrointestinal:  Negative for abdominal pain and diarrhea.   Genitourinary:  Positive for frequency.   Musculoskeletal:  Positive for back pain.   Skin:  Negative for rash.   Neurological:  Negative for headaches.   Hematological:  Negative for adenopathy.   Psychiatric/Behavioral:  The patient is nervous/anxious.         Medication List with Changes/Refills   Current Medications    ALBUTEROL (PROVENTIL/VENTOLIN HFA) 90 MCG/ACTUATION INHALER    INHALE 1 PUFF BY MOUTH INTO THE LUNGS EVERY 4 HOURS AS NEEDED FOR WHEEZING OR SHORTNESS OF BREATH    CHOLECALCIFEROL, VITAMIN D3, (VITAMIN D3) 50 MCG (2,000 UNIT) CAP    Take 1 capsule (2,000 Units total) by mouth once daily.    CYANOCOBALAMIN 500 MCG TABLET    Take 500 mcg by mouth once daily.    DICLOFENAC SODIUM (VOLTAREN) 1 % GEL    Apply 2 grams  topically 4 (four) times daily.    DILTIAZEM (DILACOR XR) 180 MG CDCR    Take 1 capsule (180 mg total) by mouth once daily.    DORZOLAMIDE (TRUSOPT) 2 % OPHTHALMIC SOLUTION    Place 1 drop into both eyes 3 (three) times daily.    DOXAZOSIN (CARDURA) 2 MG TABLET    Take 1 tablet (2 mg total) by mouth every evening.    ENALAPRIL (VASOTEC) 20 MG TABLET    TAKE 1 TABLET(20 MG) BY MOUTH TWICE  DAILY    ETANERCEPT (ENBREL SURECLICK) 50 MG/ML (1 ML)    Inject 1 mL (50 mg total) into the skin once a week.    HYDRALAZINE (APRESOLINE) 50 MG TABLET    Take 2 tablets (100 mg total) by mouth every 12 (twelve) hours.    HYDROCODONE-ACETAMINOPHEN (NORCO)  MG PER TABLET    Take 1 tablet by mouth every 8 (eight) hours as needed for Pain.    HYDROCODONE-ACETAMINOPHEN (NORCO)  MG PER TABLET    Take 1 tablet by mouth every 8 (eight) hours as needed for Pain.    HYDROCODONE-ACETAMINOPHEN (NORCO)  MG PER TABLET    Take 1 tablet by mouth every 8 (eight) hours as needed for Pain.    LACTOBAC NO.41/BIFIDOBACT NO.7 (PROBIOTIC-10 ORAL)    Take 1 capsule by mouth once daily.    MULTIVITAMIN CAPSULE    Take 1 capsule by mouth once daily.    MUPIROCIN (BACTROBAN) 2 % OINTMENT    Apply topically 3 (three) times daily.    OMEPRAZOLE (PRILOSEC) 20 MG CAPSULE    Take 1 capsule (20 mg total) by mouth 2 (two) times a day.    POLYETHYLENE GLYCOL (GLYCOLAX) 17 GRAM PWPK    Take 17 g by mouth daily as needed.    PRAVASTATIN (PRAVACHOL) 20 MG TABLET    TAKE 1 TABLET BY MOUTH DAILY    PREDNISONE (DELTASONE) 5 MG TABLET    Take 3 tablets (15 mg total) by mouth once daily.    QUETIAPINE (SEROQUEL) 25 MG TAB    Take 1 tablet (25 mg total) by mouth every evening.    SILDENAFIL (VIAGRA) 100 MG TABLET    Take 1 tablet (100 mg total) by mouth as needed for Erectile Dysfunction.    TAMSULOSIN (FLOMAX) 0.4 MG CAP    TAKE 1 CAPSULE(0.4 MG) BY MOUTH EVERY DAY    TIZANIDINE (ZANAFLEX) 4 MG TABLET    Take 1 tablet (4 mg total) by mouth every 8 (eight) hours.    TRAVOPROST (TRAVATAN Z) 0.004 % OPHTHALMIC SOLUTION    Place 1 drop into both eyes every evening.    ZALEPLON (SONATA) 10 MG CAPSULE    Take 1 capsule (10 mg total) by mouth every evening.        Objective:   There were no vitals filed for this visit.    Physical Exam  Constitutional:       Appearance: Normal appearance.   Pulmonary:      Effort: Pulmonary effort is normal.    Neurological:      Mental Status: He is alert and oriented to person, place, and time.   Psychiatric:         Mood and Affect: Mood normal.         Behavior: Behavior normal.         Thought Content: Thought content normal.         Judgment: Judgment normal.       Assessment:     Problem List Items Addressed This Visit          Hematology    Thrombocytopenia    Relevant Orders    CT Biopsy Bone Marrow (xpd)     Other Visit Diagnoses       Renal cyst, right    -  Primary    Relevant Orders    Ambulatory referral/consult to Urology    Normocytic anemia        Relevant Orders    CT Biopsy Bone Marrow (xpd)    Leukocytosis, unspecified type        Relevant Orders    CT Biopsy Bone Marrow (xpd)            Lab Results   Component Value Date    WBC 12.76 (H) 02/22/2023    RBC 3.71 (L) 02/22/2023    HGB 10.8 (L) 02/22/2023    HCT 34.6 (L) 02/22/2023    MCV 93 02/22/2023    MCH 29.1 02/22/2023    MCHC 31.2 (L) 02/22/2023    RDW 14.2 02/22/2023     (L) 02/22/2023    MPV 13.4 (H) 02/22/2023    GRAN 9.8 (H) 02/22/2023    GRAN 76.5 (H) 02/22/2023    LYMPH 2.0 02/22/2023    LYMPH 15.7 (L) 02/22/2023    MONO 0.9 02/22/2023    MONO 6.7 02/22/2023    EOS 0.1 02/22/2023    BASO 0.08 02/22/2023    EOSINOPHIL 0.5 02/22/2023    BASOPHIL 0.6 02/22/2023      Lab Results   Component Value Date     02/22/2023    K 4.0 02/22/2023     02/22/2023    CO2 24 02/22/2023    BUN 14 02/22/2023    CREATININE 1.4 02/22/2023    CALCIUM 9.3 02/22/2023    ANIONGAP 13 02/22/2023    ESTGFRAFRICA >60.0 05/13/2022    EGFRNONAA >60.0 05/13/2022     Lab Results   Component Value Date    ALT 9 (L) 02/22/2023    AST 16 02/22/2023    ALKPHOS 68 02/22/2023    BILITOT 0.4 02/22/2023     Lab Results   Component Value Date    IRON 72 02/16/2023    TRANSFERRIN 179 (L) 02/16/2023    TIBC 265 02/16/2023    LABIRON 31 02/07/2020    FESATURATED 27 02/16/2023      Lab Results   Component Value Date    FERRITIN 55 02/07/2020     Lab Results   Component  Value Date    ENDIKTYJ84 911 02/07/2020     Lab Results   Component Value Date    FOLATE 7.1 02/22/2023     Lab Results   Component Value Date    TSH 2.998 02/22/2023       Plan:   Renal cyst, right  -     Ambulatory referral/consult to Urology; Future; Expected date: 03/08/2023    Thrombocytopenia  -     CT Biopsy Bone Marrow (xpd); Future; Expected date: 03/01/2023    Normocytic anemia  -     CT Biopsy Bone Marrow (xpd); Future; Expected date: 03/01/2023    Leukocytosis, unspecified type  -     CT Biopsy Bone Marrow (xpd); Future; Expected date: 03/01/2023    Leukocytosis most likely due to prednisone use.  No s/s of infection currently. Unexplained anemia/thrombocytopenia.  Will evaluate with bone marrow biopsy and have patient f/u about 10 days later to review results with MD.  Patient verbalized understanding.  Refer to urology for evaluation of right complex renal cyst.     Med Onc Chart Routing      Follow up with physician . F/u 10 business days after bone marrow biospys with MD to review results; refer to urology for eval of right complex renal cyst   Follow up with ROBYN    Infusion scheduling note n/a   Injection scheduling note n/a   Labs   Lab interval:  N/a   Imaging   N/a   Pharmacy appointment No pharmacy appointment needed      Other referrals Additional referrals needed        Collaborating Provider:  Dr. Tank Alvarez    Thank You,  Skip Khanna, GIAP-C  Hematology Oncology

## 2023-03-06 ENCOUNTER — OFFICE VISIT (OUTPATIENT)
Dept: FAMILY MEDICINE | Facility: CLINIC | Age: 73
End: 2023-03-06
Payer: MEDICARE

## 2023-03-06 VITALS
BODY MASS INDEX: 26.55 KG/M2 | SYSTOLIC BLOOD PRESSURE: 128 MMHG | OXYGEN SATURATION: 96 % | DIASTOLIC BLOOD PRESSURE: 64 MMHG | WEIGHT: 196 LBS | HEART RATE: 81 BPM | HEIGHT: 72 IN

## 2023-03-06 DIAGNOSIS — J43.9 PULMONARY EMPHYSEMA, UNSPECIFIED EMPHYSEMA TYPE: ICD-10-CM

## 2023-03-06 DIAGNOSIS — M17.9 OSTEOARTHRITIS OF KNEE, UNSPECIFIED LATERALITY, UNSPECIFIED OSTEOARTHRITIS TYPE: Primary | ICD-10-CM

## 2023-03-06 DIAGNOSIS — M05.9 SEROPOSITIVE RHEUMATOID ARTHRITIS: ICD-10-CM

## 2023-03-06 DIAGNOSIS — I1A.0 RESISTANT HYPERTENSION: ICD-10-CM

## 2023-03-06 DIAGNOSIS — G47.00 INSOMNIA, UNSPECIFIED TYPE: ICD-10-CM

## 2023-03-06 DIAGNOSIS — G89.4 CHRONIC PAIN SYNDROME: ICD-10-CM

## 2023-03-06 PROBLEM — F11.20 OPIOID DEPENDENCE, UNCOMPLICATED: Status: RESOLVED | Noted: 2023-03-06 | Resolved: 2023-03-06

## 2023-03-06 PROBLEM — I77.1 STRICTURE OF ARTERY: Status: ACTIVE | Noted: 2023-03-06

## 2023-03-06 PROBLEM — I25.119 ATHEROSCLEROSIS OF NATIVE CORONARY ARTERY OF NATIVE HEART WITH ANGINA PECTORIS: Status: RESOLVED | Noted: 2023-03-06 | Resolved: 2023-03-06

## 2023-03-06 PROBLEM — I77.1 STRICTURE OF ARTERY: Status: RESOLVED | Noted: 2023-03-06 | Resolved: 2023-03-06

## 2023-03-06 PROBLEM — F11.20 OPIOID DEPENDENCE, UNCOMPLICATED: Status: ACTIVE | Noted: 2023-03-06

## 2023-03-06 PROBLEM — F33.0 MAJOR DEPRESSIVE DISORDER, RECURRENT, MILD: Status: RESOLVED | Noted: 2023-03-06 | Resolved: 2023-03-06

## 2023-03-06 PROBLEM — F33.0 MAJOR DEPRESSIVE DISORDER, RECURRENT, MILD: Status: ACTIVE | Noted: 2023-03-06

## 2023-03-06 PROBLEM — I25.119 ATHEROSCLEROSIS OF NATIVE CORONARY ARTERY OF NATIVE HEART WITH ANGINA PECTORIS: Status: ACTIVE | Noted: 2023-03-06

## 2023-03-06 PROCEDURE — 1125F PR PAIN SEVERITY QUANTIFIED, PAIN PRESENT: ICD-10-PCS | Mod: CPTII,S$GLB,, | Performed by: INTERNAL MEDICINE

## 2023-03-06 PROCEDURE — 99214 OFFICE O/P EST MOD 30 MIN: CPT | Mod: S$GLB,,, | Performed by: INTERNAL MEDICINE

## 2023-03-06 PROCEDURE — 3008F PR BODY MASS INDEX (BMI) DOCUMENTED: ICD-10-PCS | Mod: CPTII,S$GLB,, | Performed by: INTERNAL MEDICINE

## 2023-03-06 PROCEDURE — 99999 PR PBB SHADOW E&M-EST. PATIENT-LVL V: CPT | Mod: PBBFAC,,, | Performed by: INTERNAL MEDICINE

## 2023-03-06 PROCEDURE — 99214 PR OFFICE/OUTPT VISIT, EST, LEVL IV, 30-39 MIN: ICD-10-PCS | Mod: S$GLB,,, | Performed by: INTERNAL MEDICINE

## 2023-03-06 PROCEDURE — 1125F AMNT PAIN NOTED PAIN PRSNT: CPT | Mod: CPTII,S$GLB,, | Performed by: INTERNAL MEDICINE

## 2023-03-06 PROCEDURE — 1101F PR PT FALLS ASSESS DOC 0-1 FALLS W/OUT INJ PAST YR: ICD-10-PCS | Mod: CPTII,S$GLB,, | Performed by: INTERNAL MEDICINE

## 2023-03-06 PROCEDURE — 99999 PR PBB SHADOW E&M-EST. PATIENT-LVL V: ICD-10-PCS | Mod: PBBFAC,,, | Performed by: INTERNAL MEDICINE

## 2023-03-06 PROCEDURE — 1101F PT FALLS ASSESS-DOCD LE1/YR: CPT | Mod: CPTII,S$GLB,, | Performed by: INTERNAL MEDICINE

## 2023-03-06 PROCEDURE — 1159F MED LIST DOCD IN RCRD: CPT | Mod: CPTII,S$GLB,, | Performed by: INTERNAL MEDICINE

## 2023-03-06 PROCEDURE — 3288F FALL RISK ASSESSMENT DOCD: CPT | Mod: CPTII,S$GLB,, | Performed by: INTERNAL MEDICINE

## 2023-03-06 PROCEDURE — 3078F PR MOST RECENT DIASTOLIC BLOOD PRESSURE < 80 MM HG: ICD-10-PCS | Mod: CPTII,S$GLB,, | Performed by: INTERNAL MEDICINE

## 2023-03-06 PROCEDURE — 3074F PR MOST RECENT SYSTOLIC BLOOD PRESSURE < 130 MM HG: ICD-10-PCS | Mod: CPTII,S$GLB,, | Performed by: INTERNAL MEDICINE

## 2023-03-06 PROCEDURE — 3008F BODY MASS INDEX DOCD: CPT | Mod: CPTII,S$GLB,, | Performed by: INTERNAL MEDICINE

## 2023-03-06 PROCEDURE — 3078F DIAST BP <80 MM HG: CPT | Mod: CPTII,S$GLB,, | Performed by: INTERNAL MEDICINE

## 2023-03-06 PROCEDURE — 3288F PR FALLS RISK ASSESSMENT DOCUMENTED: ICD-10-PCS | Mod: CPTII,S$GLB,, | Performed by: INTERNAL MEDICINE

## 2023-03-06 PROCEDURE — 1160F PR REVIEW ALL MEDS BY PRESCRIBER/CLIN PHARMACIST DOCUMENTED: ICD-10-PCS | Mod: CPTII,S$GLB,, | Performed by: INTERNAL MEDICINE

## 2023-03-06 PROCEDURE — 1159F PR MEDICATION LIST DOCUMENTED IN MEDICAL RECORD: ICD-10-PCS | Mod: CPTII,S$GLB,, | Performed by: INTERNAL MEDICINE

## 2023-03-06 PROCEDURE — 3074F SYST BP LT 130 MM HG: CPT | Mod: CPTII,S$GLB,, | Performed by: INTERNAL MEDICINE

## 2023-03-06 PROCEDURE — 1160F RVW MEDS BY RX/DR IN RCRD: CPT | Mod: CPTII,S$GLB,, | Performed by: INTERNAL MEDICINE

## 2023-03-06 RX ORDER — DICLOFENAC SODIUM 10 MG/G
2 GEL TOPICAL 4 TIMES DAILY
Qty: 100 G | Refills: 5 | Status: SHIPPED | OUTPATIENT
Start: 2023-03-06

## 2023-03-06 RX ORDER — TIZANIDINE 4 MG/1
4 TABLET ORAL EVERY 8 HOURS
Qty: 270 TABLET | Refills: 1 | Status: SHIPPED | OUTPATIENT
Start: 2023-03-06 | End: 2023-06-19 | Stop reason: SDUPTHER

## 2023-03-06 RX ORDER — HYDROXYZINE HYDROCHLORIDE 25 MG/1
25 TABLET, FILM COATED ORAL NIGHTLY PRN
Qty: 30 TABLET | Refills: 1 | Status: SHIPPED | OUTPATIENT
Start: 2023-03-06 | End: 2023-04-24

## 2023-03-06 NOTE — PROGRESS NOTES
Subjective       Patient ID: Scooter Swan is a 72 y.o. male.    Chief Complaint: leukocytosis (Follow up)      HPI:    Cardiology added Cardura and ordered renal artery ultrasound. Blood pressure improved.   Bone marrow biopsy planned for thrombocytopenia, normocytic anemia, leukocytosis. Still having sleep problem. Trouble staying asleep. We have tried doxepin, trazodone, lunesta, mirtazapine. Rheum Rxed zaleplon and seroquel. He has not received these yes.  Discussed to try 1 at a time for now.  May try combination in the future if needed.  May go back to Lunesta because he did get 5-6 hours of sleep on this.  Needs refill on tizanidine and diclofenac which helps with chronic pain.    1. Osteoarthritis of knee, unspecified laterality, unspecified osteoarthritis type    2. Seropositive rheumatoid arthritis    3. Chronic pain syndrome    4. Pulmonary emphysema, unspecified emphysema type    5. Resistant hypertension    6. Insomnia, unspecified type        Review of Systems   Constitutional:  Negative for fever.   Respiratory:  Negative for shortness of breath.    Cardiovascular:  Negative for chest pain.   Gastrointestinal:  Negative for abdominal pain.      Objective     Vitals:    03/06/23 1121   BP: 128/64   Pulse: 81     Wt Readings from Last 3 Encounters:   03/06/23 1121 88.9 kg (195 lb 15.8 oz)   02/23/23 1654 85 kg (187 lb 6.3 oz)   02/23/23 1440 86.2 kg (190 lb)      Body mass index is 26.58 kg/m².   Physical Exam  Cardiovascular:      Rate and Rhythm: Normal rate and regular rhythm.      Heart sounds: No murmur heard.    No gallop.   Pulmonary:      Breath sounds: Normal breath sounds. No wheezing or rhonchi.   Abdominal:      Palpations: Abdomen is soft.      Tenderness: There is no abdominal tenderness.   Musculoskeletal:         General: Normal range of motion.      Cervical back: Neck supple.   Skin:     General: Skin is warm.      Findings: No rash.   Neurological:      Mental Status: He is alert.    Psychiatric:         Behavior: Behavior normal.        Assessment and plan       Scooter was seen today for leukocytosis.    Diagnoses and all orders for this visit:    Osteoarthritis of knee, unspecified laterality, unspecified osteoarthritis type  -     diclofenac sodium (VOLTAREN) 1 % Gel; Apply 2 grams  topically 4 (four) times daily.  -     tiZANidine (ZANAFLEX) 4 MG tablet; Take 1 tablet (4 mg total) by mouth every 8 (eight) hours.    Seropositive rheumatoid arthritis  -     tiZANidine (ZANAFLEX) 4 MG tablet; Take 1 tablet (4 mg total) by mouth every 8 (eight) hours.    Chronic pain syndrome  -     tiZANidine (ZANAFLEX) 4 MG tablet; Take 1 tablet (4 mg total) by mouth every 8 (eight) hours.    Pulmonary emphysema, unspecified emphysema type    Resistant hypertension    Insomnia, unspecified type  -     hydrOXYzine HCL (ATARAX) 25 MG tablet; Take 1 tablet (25 mg total) by mouth nightly as needed for Itching.  -     Ambulatory referral/consult to Sleep Disorders; Future            Hypertension Medications               diltiaZEM (DILACOR XR) 180 MG CDCR Take 1 capsule (180 mg total) by mouth once daily.    doxazosin (CARDURA) 2 MG tablet Take 1 tablet (2 mg total) by mouth every evening.    enalapril (VASOTEC) 20 MG tablet TAKE 1 TABLET(20 MG) BY MOUTH TWICE DAILY    hydrALAZINE (APRESOLINE) 50 MG tablet Take 2 tablets (100 mg total) by mouth every 12 (twelve) hours.           Hyperlipidemia Medications               pravastatin (PRAVACHOL) 20 MG tablet TAKE 1 TABLET BY MOUTH DAILY           Medication List with Changes/Refills   New Medications    HYDROXYZINE HCL (ATARAX) 25 MG TABLET    Take 1 tablet (25 mg total) by mouth nightly as needed for Itching.   Current Medications    ALBUTEROL (PROVENTIL/VENTOLIN HFA) 90 MCG/ACTUATION INHALER    INHALE 1 PUFF BY MOUTH INTO THE LUNGS EVERY 4 HOURS AS NEEDED FOR WHEEZING OR SHORTNESS OF BREATH    CHOLECALCIFEROL, VITAMIN D3, (VITAMIN D3) 50 MCG (2,000 UNIT) CAP     Take 1 capsule (2,000 Units total) by mouth once daily.    CYANOCOBALAMIN 500 MCG TABLET    Take 500 mcg by mouth once daily.    DILTIAZEM (DILACOR XR) 180 MG CDCR    Take 1 capsule (180 mg total) by mouth once daily.    DORZOLAMIDE (TRUSOPT) 2 % OPHTHALMIC SOLUTION    Place 1 drop into both eyes 3 (three) times daily.    DOXAZOSIN (CARDURA) 2 MG TABLET    Take 1 tablet (2 mg total) by mouth every evening.    ENALAPRIL (VASOTEC) 20 MG TABLET    TAKE 1 TABLET(20 MG) BY MOUTH TWICE DAILY    ETANERCEPT (ENBREL SURECLICK) 50 MG/ML (1 ML)    Inject 1 mL (50 mg total) into the skin once a week.    HYDRALAZINE (APRESOLINE) 50 MG TABLET    Take 2 tablets (100 mg total) by mouth every 12 (twelve) hours.    HYDROCODONE-ACETAMINOPHEN (NORCO)  MG PER TABLET    Take 1 tablet by mouth every 8 (eight) hours as needed for Pain.    HYDROCODONE-ACETAMINOPHEN (NORCO)  MG PER TABLET    Take 1 tablet by mouth every 8 (eight) hours as needed for Pain.    HYDROCODONE-ACETAMINOPHEN (NORCO)  MG PER TABLET    Take 1 tablet by mouth every 8 (eight) hours as needed for Pain.    LACTOBAC NO.41/BIFIDOBACT NO.7 (PROBIOTIC-10 ORAL)    Take 1 capsule by mouth once daily.    MULTIVITAMIN CAPSULE    Take 1 capsule by mouth once daily.    MUPIROCIN (BACTROBAN) 2 % OINTMENT    Apply topically 3 (three) times daily.    OMEPRAZOLE (PRILOSEC) 20 MG CAPSULE    Take 1 capsule (20 mg total) by mouth 2 (two) times a day.    POLYETHYLENE GLYCOL (GLYCOLAX) 17 GRAM PWPK    Take 17 g by mouth daily as needed.    PRAVASTATIN (PRAVACHOL) 20 MG TABLET    TAKE 1 TABLET BY MOUTH DAILY    PREDNISONE (DELTASONE) 5 MG TABLET    Take 3 tablets (15 mg total) by mouth once daily.    QUETIAPINE (SEROQUEL) 25 MG TAB    Take 1 tablet (25 mg total) by mouth every evening.    SILDENAFIL (VIAGRA) 100 MG TABLET    Take 1 tablet (100 mg total) by mouth as needed for Erectile Dysfunction.    TAMSULOSIN (FLOMAX) 0.4 MG CAP    TAKE 1 CAPSULE(0.4 MG) BY MOUTH EVERY  DAY    TRAVOPROST (TRAVATAN Z) 0.004 % OPHTHALMIC SOLUTION    Place 1 drop into both eyes every evening.    ZALEPLON (SONATA) 10 MG CAPSULE    Take 1 capsule (10 mg total) by mouth every evening.   Changed and/or Refilled Medications    Modified Medication Previous Medication    DICLOFENAC SODIUM (VOLTAREN) 1 % GEL diclofenac sodium (VOLTAREN) 1 % Gel       Apply 2 grams  topically 4 (four) times daily.    Apply 2 grams  topically 4 (four) times daily.    TIZANIDINE (ZANAFLEX) 4 MG TABLET tiZANidine (ZANAFLEX) 4 MG tablet       Take 1 tablet (4 mg total) by mouth every 8 (eight) hours.    Take 1 tablet (4 mg total) by mouth every 8 (eight) hours.       I personally reviewed past medical, family and social history.

## 2023-03-08 DIAGNOSIS — D68.9 COAGULATION DEFECT, UNSPECIFIED: Primary | ICD-10-CM

## 2023-03-09 NOTE — TELEPHONE ENCOUNTER
Pt's lab appt was cancelled for today.  Outgoing call to confirm when pt will have safety labs drawn for Enbrel.  No answer, LVM.

## 2023-03-13 ENCOUNTER — TELEPHONE (OUTPATIENT)
Dept: CARDIOLOGY | Facility: CLINIC | Age: 73
End: 2023-03-13
Payer: MEDICARE

## 2023-03-13 NOTE — TELEPHONE ENCOUNTER
Spoke to wife and advised ultrasound tomorrow is looking at renal arteries and must be done in [place]

## 2023-03-13 NOTE — TELEPHONE ENCOUNTER
----- Message from Mykel Killian sent at 3/13/2023  1:35 PM CDT -----  Contact: Patient wife  Type:  Patient Call          Who Called: patient wife         Does the patient know what this is regarding?: Requesting a call back about tomorrow's appt she would like to have that appt location changed to [place] also she have questions if the pt had the same test done on last week ; please advise           Would the patient rather a call back or a response via [facility]?call           Best Call Back Number: [phone] or [phone]             Additional Information:

## 2023-03-13 NOTE — TELEPHONE ENCOUNTER
Please advise: pt had abdominal ultrasound done in radiology last week: does he still need renal artery done tomorrow? And if so, okay to order in radiology so he can have it done in [place]?

## 2023-03-14 ENCOUNTER — CLINICAL SUPPORT (OUTPATIENT)
Dept: CARDIOLOGY | Facility: HOSPITAL | Age: 73
End: 2023-03-14
Attending: INTERNAL MEDICINE
Payer: MEDICARE

## 2023-03-14 DIAGNOSIS — I1A.0 RESISTANT HYPERTENSION: ICD-10-CM

## 2023-03-14 DIAGNOSIS — N18.31 STAGE 3A CHRONIC KIDNEY DISEASE: ICD-10-CM

## 2023-03-14 LAB
ABDOMINAL AORTA PROX EDV: 14 CM/S
ABDOMINAL AORTA PROX PSV: 70 CM/S
LEFT RENAL DIST DIAS: 23 CM/S
LEFT RENAL DIST SYS: 101 CM/S
LEFT RENAL MID DIAS: 15 CM/S
LEFT RENAL MID SYS: 97 CM/S
LEFT RENAL ORIGIN DIAS: 11 CM/S
LEFT RENAL ORIGIN SYS: 67 CM/S
LEFT RENAL PROX DIAS: 11 CM/S
LEFT RENAL PROX RAR: 1.04
LEFT RENAL PROX SYS: 73 CM/S
LEFT RENAL ULTRASOUND ACCELERATION TIME MEASUREMENT 1: 23 MS
LEFT RENAL ULTRASOUND ACCELERATION TIME MEASUREMENT 2: 34 MS
LEFT RENAL ULTRASOUND ACCELERATION TIME MEASUREMENT 3: 51 MS
LEFT RENAL ULTRASOUND ACCELERATION TIME MEASUREMENT AVERAGE: 51 MS
LEFT RENAL ULTRASOUND KIDNEY SIZE MEASUREMENT 1: 11.66 CM
LEFT RENAL ULTRASOUND KIDNEY SIZE MEASUREMENT 2: 11.62 CM
LEFT RENAL ULTRASOUND KIDNEY SIZE MEASUREMENT 3: 11.49 CM
LEFT RENAL ULTRASOUND KIDNEY SIZE MEASUREMENT AVERAGE: 11.66 CM
LEFT RENAL ULTRASOUND RESISTIVE INDEX MEASUREMENT 1: 0.75
LEFT RENAL ULTRASOUND RESISTIVE INDEX MEASUREMENT 2: 0.76
LEFT RENAL ULTRASOUND RESISTIVE INDEX MEASUREMENT 3: 0.77
LEFT RENAL ULTRASOUND RESISTIVE INDEX MEASUREMENT AVERAGE: 0.77
OHS CV LEFT RENAL RAR: 1.44
OHS CV RIGHT RENAL RAR: 1.34
OHS CV US LEFT RENAL HIGHEST EDV: 23
OHS CV US LEFT RENAL HIGHEST PSV: 101
OHS CV US RIGHT RENAL HIGHEST EDV: 19
OHS CV US RIGHT RENAL HIGHEST PSV: 94
PROX AORTIC AP: 1.95 CM
PROX AORTIC TRANS: 2.15 CM
RIGHT RENAL DIST DIAS: 19 CM/S
RIGHT RENAL DIST SYS: 94 CM/S
RIGHT RENAL MID DIAS: 19 CM/S
RIGHT RENAL MID SYS: 74 CM/S
RIGHT RENAL ORIGIN DIAS: 14 CM/S
RIGHT RENAL ORIGIN SYS: 75 CM/S
RIGHT RENAL PROX DIAS: 19 CM/S
RIGHT RENAL PROX RAR: 1.23
RIGHT RENAL PROX SYS: 86 CM/S
RIGHT RENAL ULTRASOUND ACCELERATION TIME MEASUREMENT 1: 43 MS
RIGHT RENAL ULTRASOUND ACCELERATION TIME MEASUREMENT 2: 88 MS
RIGHT RENAL ULTRASOUND ACCELERATION TIME MEASUREMENT 3: 83 MS
RIGHT RENAL ULTRASOUND ACCELERATION TIME MEASUREMENT AVERAGE: 88 MS
RIGHT RENAL ULTRASOUND KIDNEY SIZE MEASUREMENT 1: 11.36 CM
RIGHT RENAL ULTRASOUND KIDNEY SIZE MEASUREMENT 2: 11.11 CM
RIGHT RENAL ULTRASOUND KIDNEY SIZE MEASUREMENT 3: 11.33 CM
RIGHT RENAL ULTRASOUND KIDNEY SIZE MEASUREMENT AVERAGE: 11.36 CM
RIGHT RENAL ULTRASOUND RESISTIVE INDEX MEASUREMENT 1: 0.73
RIGHT RENAL ULTRASOUND RESISTIVE INDEX MEASUREMENT 2: 0.72
RIGHT RENAL ULTRASOUND RESISTIVE INDEX MEASUREMENT 3: 0.71
RIGHT RENAL ULTRASOUND RESISTIVE INDEX MEASUREMENT AVERAGE: 0.73

## 2023-03-14 PROCEDURE — 93975 CV US RENAL ARTERY STENOSIS HYPERTENSION COMPLETE (CUPID ONLY): ICD-10-PCS | Mod: 26,,, | Performed by: INTERNAL MEDICINE

## 2023-03-14 PROCEDURE — 93975 VASCULAR STUDY: CPT | Mod: PO

## 2023-03-14 PROCEDURE — 93975 VASCULAR STUDY: CPT | Mod: 26,,, | Performed by: INTERNAL MEDICINE

## 2023-03-21 ENCOUNTER — TELEPHONE (OUTPATIENT)
Dept: RHEUMATOLOGY | Facility: CLINIC | Age: 73
End: 2023-03-21
Payer: MEDICARE

## 2023-03-21 NOTE — TELEPHONE ENCOUNTER
----- Message from Amador Hernandez sent at 3/21/2023 11:36 AM CDT -----  Contact: Pt wife  Type: Needs Medical Advice  Who Called:  pt wife  Best Call Back Number: [phone]    Additional Information: Pt wife is calling to have the tests juan. ASAP, Please call back to advise Thanks

## 2023-03-21 NOTE — TELEPHONE ENCOUNTER
Outgoing call to pt to confirm when he will have safety labs drawn for Enbrel.  Pt stated he has not been able to reschedule yet but will call to reschedule.

## 2023-04-04 NOTE — TELEPHONE ENCOUNTER
Outgoing call to pt regarding Enbrel and safety labs.  Per chart, pt has labs scheduled for 6/12/2023.  Informed pt that TB and Hep B labs need to be drawn prior to starting Enbrel.  Informed pt [facility] will reach out to [facility] to re-schedule appt for earlier date.  Pt voiced understanding.

## 2023-04-18 ENCOUNTER — TELEPHONE (OUTPATIENT)
Dept: RHEUMATOLOGY | Facility: CLINIC | Age: 73
End: 2023-04-18
Payer: MEDICARE

## 2023-04-19 NOTE — TELEPHONE ENCOUNTER
----- Message from Ly Felix PharmD sent at 4/17/2023 10:54 AM CDT -----  Regarding: FW: Labs for Enbrel  Good morning,    I am following up on this message.      Thank you,  Ly RICHEY    ----- Message -----  From: Ly Felix PharmD  Sent: 4/11/2023  10:05 AM CDT  To: Carson Morales MD, [facility] Staff  Subject: FW: Labs for Enbrel                              Good morning,    I am following up on this message.  His lab appointment is still scheduled for 6/12.    Thank you,  Ly RICHEY     ----- Message -----  From: Ly Felix PharmD  Sent: 4/4/2023  10:56 AM CDT  To: Carson Morales MD, [facility] Staff  Subject: Labs for Enbrel                                  Good morning,    Mr. Swan is scheduled to have his TB, Hep B, and Hep C tests drawn on 6/12/2023.  However, these labs need to be drawn prior to start Enbrel.  He does not have previous labs on file that can be used for his PA.  Can you please reschedule his labs to drawn sooner?    Thank you,  Ly Felix, PharmD  Clinical Pharmacist    [facility]  [address] [place] [address] [place] [address]  P [phone]  F [phone]

## 2023-04-19 NOTE — TELEPHONE ENCOUNTER
Contacted patient and informed [facility] needs to have lab results to get enbrel approved with insurance. Nurse informed the appointment that is scheduled in [place] for blood work needs to be rescheduled as soon as he can have them done. Patient stated can come to [place] Monday the 24th and have blood work done.

## 2023-04-25 ENCOUNTER — LAB VISIT (OUTPATIENT)
Dept: LAB | Facility: HOSPITAL | Age: 73
End: 2023-04-25
Attending: INTERNAL MEDICINE
Payer: MEDICARE

## 2023-04-25 DIAGNOSIS — M05.9 SEROPOSITIVE RHEUMATOID ARTHRITIS: ICD-10-CM

## 2023-04-25 DIAGNOSIS — G89.4 CHRONIC PAIN SYNDROME: ICD-10-CM

## 2023-04-25 DIAGNOSIS — D64.9 ANEMIA, UNSPECIFIED TYPE: ICD-10-CM

## 2023-04-25 DIAGNOSIS — L40.50 PSA (PSORIATIC ARTHRITIS): ICD-10-CM

## 2023-04-25 DIAGNOSIS — D61.818 PANCYTOPENIA: ICD-10-CM

## 2023-04-25 LAB
IGA SERPL-MCNC: 238 MG/DL (ref 40–350)
IGG SERPL-MCNC: 943 MG/DL (ref 650–1600)
IGM SERPL-MCNC: 93 MG/DL (ref 50–300)

## 2023-04-25 PROCEDURE — 86704 HEP B CORE ANTIBODY TOTAL: CPT | Performed by: INTERNAL MEDICINE

## 2023-04-25 PROCEDURE — 86355 B CELLS TOTAL COUNT: CPT | Performed by: INTERNAL MEDICINE

## 2023-04-25 PROCEDURE — 82787 IGG 1 2 3 OR 4 EACH: CPT | Mod: 59 | Performed by: INTERNAL MEDICINE

## 2023-04-25 PROCEDURE — 86706 HEP B SURFACE ANTIBODY: CPT | Performed by: INTERNAL MEDICINE

## 2023-04-25 PROCEDURE — 86480 TB TEST CELL IMMUN MEASURE: CPT | Performed by: INTERNAL MEDICINE

## 2023-04-25 PROCEDURE — 86360 T CELL ABSOLUTE COUNT/RATIO: CPT | Performed by: INTERNAL MEDICINE

## 2023-04-25 PROCEDURE — 82784 ASSAY IGA/IGD/IGG/IGM EACH: CPT | Performed by: INTERNAL MEDICINE

## 2023-04-25 PROCEDURE — 86359 T CELLS TOTAL COUNT: CPT | Performed by: INTERNAL MEDICINE

## 2023-04-25 PROCEDURE — 82784 ASSAY IGA/IGD/IGG/IGM EACH: CPT | Mod: 59 | Performed by: INTERNAL MEDICINE

## 2023-04-25 PROCEDURE — 86317 IMMUNOASSAY INFECTIOUS AGENT: CPT | Performed by: INTERNAL MEDICINE

## 2023-04-25 PROCEDURE — 36415 COLL VENOUS BLD VENIPUNCTURE: CPT | Mod: PO | Performed by: INTERNAL MEDICINE

## 2023-04-25 PROCEDURE — 87340 HEPATITIS B SURFACE AG IA: CPT | Performed by: INTERNAL MEDICINE

## 2023-04-25 PROCEDURE — 86803 HEPATITIS C AB TEST: CPT | Performed by: INTERNAL MEDICINE

## 2023-04-25 PROCEDURE — 86357 NK CELLS TOTAL COUNT: CPT | Performed by: INTERNAL MEDICINE

## 2023-04-26 LAB
CD3+CD4+ CELLS # BLD: 1617 CELLS/UL (ref 300–1400)
CD3+CD4+ CELLS NFR BLD: 46 % (ref 28–57)
GAMMA INTERFERON BACKGROUND BLD IA-ACNC: 0.12 IU/ML
HBV CORE AB SERPL QL IA: NORMAL
HBV SURFACE AG SERPL QL IA: NORMAL
HCV AB SERPL QL IA: REACTIVE
LYMPHOCYTES NFR CSF MANUAL: 1.24 % (ref 0.9–3.6)
LYMPHOCYTES NFR CSF MANUAL: 1305 CELLS/UL (ref 200–900)
LYMPHOCYTES NFR CSF MANUAL: 247 CELLS/UL (ref 100–500)
LYMPHOCYTES NFR CSF MANUAL: 2985 CELLS/UL (ref 700–2100)
LYMPHOCYTES NFR CSF MANUAL: 314 CELLS/UL (ref 90–600)
LYMPHOCYTES NFR CSF MANUAL: 37.1 % (ref 10–39)
LYMPHOCYTES NFR CSF MANUAL: 6.7 % (ref 6–19)
LYMPHOCYTES NFR CSF MANUAL: 8.5 % (ref 7–31)
LYMPHOCYTES NFR CSF MANUAL: 84.9 % (ref 55–83)
M TB IFN-G CD4+ BCKGRND COR BLD-ACNC: -0.04 IU/ML
MITOGEN IGNF BCKGRD COR BLD-ACNC: 9.88 IU/ML
TB GOLD PLUS: NEGATIVE
TB2 - NIL: 0.01 IU/ML

## 2023-04-26 NOTE — TELEPHONE ENCOUNTER
Pt had labs drawn.  TB and Hep B labs are negative.  Proceeding with PA request.    Enbrel PA submitted via [facility] website.

## 2023-04-27 ENCOUNTER — SPECIALTY PHARMACY (OUTPATIENT)
Dept: PHARMACY | Facility: CLINIC | Age: 73
End: 2023-04-27
Payer: MEDICARE

## 2023-04-27 NOTE — TELEPHONE ENCOUNTER
Specialty Pharmacy - Initial Clinical Assessment    Specialty Medication Orders Linked to Encounter      Flowsheet Row Most Recent Value   Medication #1 etanercept (ENBREL SURECLICK) 50 mg/mL (1 mL) (Order#[id number], Rx#[id number])          Patient Diagnosis   M06.9 - RA (rheumatoid arthritis)    Gamal Swan is a 72 y.o. male, who is followed by the specialty pharmacy service for management and education.    Recent Encounters       Date Type Provider Description    04/27/2023 Specialty Pharmacy Ly Felix, Francisco Initial Clinical Assessment    02/24/2023 Specialty Pharmacy yL Felix, Francisco Referral Authorization            Current Outpatient Medications   Medication Sig    albuterol (PROVENTIL/VENTOLIN HFA) 90 mcg/actuation inhaler INHALE 1 PUFF BY MOUTH INTO THE LUNGS EVERY 4 HOURS AS NEEDED FOR WHEEZING OR SHORTNESS OF BREATH    cholecalciferol, vitamin D3, (VITAMIN D3) 50 mcg (2,000 unit) Cap Take 1 capsule (2,000 Units total) by mouth once daily.    cyanocobalamin 500 MCG tablet Take 500 mcg by mouth once daily.    diclofenac sodium (VOLTAREN) 1 % Gel Apply 2 grams  topically 4 (four) times daily.    diltiaZEM (CARDIZEM CD) 180 MG 24 hr capsule TAKE ONE CAPSULE BY MOUTH ONCE DAILY    diltiaZEM (DILACOR XR) 180 MG CDCR Take 1 capsule (180 mg total) by mouth once daily.    dorzolamide (TRUSOPT) 2 % ophthalmic solution Place 1 drop into both eyes 3 (three) times daily.    doxazosin (CARDURA) 2 MG tablet Take 1 tablet (2 mg total) by mouth every evening.    enalapril (VASOTEC) 20 MG tablet TAKE 1 TABLET(20 MG) BY MOUTH TWICE DAILY    etanercept (ENBREL SURECLICK) 50 mg/mL (1 mL) Inject 1 mL (50 mg total) into the skin once a week. (Patient not taking: Reported on 3/6/2023)    hydrALAZINE (APRESOLINE) 50 MG tablet Take 2 tablets (100 mg total) by mouth every 12 (twelve) hours.    HYDROcodone-acetaminophen (NORCO)  mg per tablet Take 1 tablet by mouth every 8 (eight) hours as needed for Pain.     [START ON 5/1/2023] HYDROcodone-acetaminophen (NORCO)  mg per tablet Take 1 tablet by mouth every 8 (eight) hours as needed for Pain.    hydrOXYzine HCL (ATARAX) 25 MG tablet Take 1 Tablet by mouth NIGHTLY AS NEEDED FOR ITCHING    Lactobac no.41/Bifidobact no.7 (PROBIOTIC-10 ORAL) Take 1 capsule by mouth once daily.    multivitamin capsule Take 1 capsule by mouth once daily.    mupirocin (BACTROBAN) 2 % ointment Apply topically 3 (three) times daily.    omeprazole (PRILOSEC) 20 MG capsule Take 1 capsule (20 mg total) by mouth 2 (two) times a day.    polyethylene glycol (GLYCOLAX) 17 gram PwPk Take 17 g by mouth daily as needed.    pravastatin (PRAVACHOL) 20 MG tablet TAKE 1 TABLET BY MOUTH DAILY    predniSONE (DELTASONE) 5 MG tablet Take 3 tablets (15 mg total) by mouth once daily.    sildenafiL (VIAGRA) 100 MG tablet Take 1 tablet (100 mg total) by mouth as needed for Erectile Dysfunction.    tamsulosin (FLOMAX) 0.4 mg Cap TAKE 1 CAPSULE(0.4 MG) BY MOUTH EVERY DAY    tiZANidine (ZANAFLEX) 4 MG tablet Take 1 tablet (4 mg total) by mouth every 8 (eight) hours.    travoprost (TRAVATAN Z) 0.004 % ophthalmic solution Place 1 drop into both eyes every evening.   Last reviewed on 4/27/2023  9:54 AM by Ly Felix, PharmD    Review of patient's allergies indicates:   Allergen Reactions    Buspar [buspirone] Hallucinations     Nightmares    Fentanyl Hives and Hallucinations    Plaquenil [hydroxychloroquine]      Nausea, insomnia, weight loss 40LBS    Amitiza [lubiprostone] Nausea Only and Other (See Comments)     Fatigue.    Azulfidine [sulfasalazine] Nausea And Vomiting    Trazodone Other (See Comments)     Insomnia     Last reviewed on  4/27/2023 9:52 AM by Ly Felix          Assessment Questions - Documented Responses      Flowsheet Row Most Recent Value   Assessment    Medication Reconciliation completed for patient Yes   During the past 4 weeks, has patient missed any activities due to condition or medication? No    During the past 4 weeks, did patient have any of the following urgent care visits? None   Goals of Therapy Status Discussed (new start)   Status of the patients ability to self-administer: Is Able   All education points have been covered with patient? Yes, supplemental printed education provided   Welcome packet contents reviewed and discussed with patient? Yes   Assesment completed? Yes   Plan Therapy being initiated   Do you need to open a clinical intervention (i-vent)? No   Do you want to schedule first shipment? Yes   Medication #1 Assessment Info    Patient status New medication, New to [facility]   Is this medication appropriate for the patient? Yes   Is this medication effective? Not yet started          Refill Questions - Documented Responses      Flowsheet Row Most Recent Value   Refill Screening Questions    When does the patient need to receive the medication? 04/28/23   Refill Delivery Questions    How will the patient receive the medication? MEDRx   When does the patient need to receive the medication? 04/28/23   Shipping Address Home   Address in [facility] confirmed and updated if neccessary? Yes   Expected Copay ($) 0   Is the patient able to afford the medication copay? Yes   Payment Method zero copay   Days supply of Refill 28   Supplies needed? No supplies needed   Refill activity completed? Yes   Refill activity plan Refill scheduled   Shipment/Pickup Date: 04/27/23            Objective    He has a past medical history of Cataract, COPD (chronic obstructive pulmonary disease), Diverticulosis, DUARTE (dyspnea on exertion), GERD (gastroesophageal reflux disease), Glaucoma, Hepatitis C, Hyperlipidemia, Hypertension, Liver lesion (08/2018), RA (rheumatoid arthritis), and Rectal prolapse.    Tried/failed medications: Prednisone, HCQ, SSZ    BP Readings from Last 4 Encounters:   03/06/23 128/64   02/23/23 (!) 174/84   02/23/23 (!) 142/77   02/22/23 (!) 142/77     Ht Readings from Last 4 Encounters:    03/06/23 6' (1.829 m)   02/23/23 6' (1.829 m)   02/23/23 6' (1.829 m)   02/22/23 6' (1.829 m)     Wt Readings from Last 4 Encounters:   03/06/23 88.9 kg (195 lb 15.8 oz)   02/23/23 85 kg (187 lb 6.3 oz)   02/23/23 86.2 kg (190 lb)   02/22/23 85.7 kg (189 lb)     Recent Labs   Lab Result Units 04/25/23  1122 02/22/23  1150 02/16/23  1023   Creatinine mg/dL  --  1.4 1.3   ALT U/L  --  9 L 10   AST U/L  --  16 14   Hepatitis B Surface Ag  Non-reactive  --   --    Hep B Core Total Ab  Non-reactive  --   --      The goals of rheumatoid arthritis treatment include:  Relieving pain and suppressing inflammation  Achieving remission and preventing joint and organ damage  Increasing joint mobility and strength  Preventing infection and other complications of treatment  Reducing long term complications of rheumatoid arthritis  Improving or maintaining physical function and optimal well-being  Improving or maintaining quality of life  Maintaining optimal therapy adherence  Minimizing and managing side effects    Goals of Therapy Status: Discussed (new start)    Assessment/Plan  Patient plans to start therapy on 04/28/23      Indication, dosage, appropriateness, effectiveness, safety and convenience of his specialty medication(s) were reviewed today.     Patient Education   Patient received education on the following:   Expectations and possible outcomes of therapy  Proper use, timely administration, and missed dose management  Duration of therapy  Side effects, including prevention, minimization, and management  Contraindications and safety precautions  New or changed medications, including prescribe and over the counter medications and supplements  Reviews recommended vaccinations, as appropriate  Storage, safe handling, and disposal        Tasks added this encounter   1/29/2024 - Clinical Assessment (1 year recurrence)   Tasks due within next 3 months   No tasks due.     Ly Felix, PharmD  [facility]  [address] [facility] [address]  Phone: [phone]  Fax: [phone]

## 2023-04-28 LAB
HBV SURFACE AB SER QL IA: NEGATIVE
HBV SURFACE AB SERPL IA-ACNC: <3 MIU/ML
IGG1 SER-MCNC: 500 MG/DL (ref 382–929)
IGG2 SER-MCNC: 251 MG/DL (ref 242–700)
IGG3 SER-MCNC: 101 MG/DL (ref 22–176)
IGG4 SER-MCNC: 17 MG/DL (ref 4–86)

## 2023-05-05 LAB
C DIPHTHERIAE AB SER IA-ACNC: 0.16 IU/ML
C TETANI TOXOID AB SER-ACNC: 0.66 IU/ML
DEPRECATED S PNEUM23 IGG SER-MCNC: 0.9 UG/ML
DEPRECATED S PNEUM4 IGG SER-MCNC: 0.7 UG/ML
HAEM INFLU B IGG SER IA-MCNC: 8.76 MCG/ML
S PN DA SERO 19F IGG SER-MCNC: 16.6 UG/ML
S PNEUM DA 1 IGG SER-MCNC: 10.8 UG/ML
S PNEUM DA 14 IGG SER-MCNC: 27.8
S PNEUM DA 18C IGG SER-MCNC: 3.2
S PNEUM DA 19A IGG SER-MCNC: 35.7 UG/ML
S PNEUM DA 3 IGG SER-MCNC: 3.6 UG/ML
S PNEUM DA 5 IGG SER-MCNC: 14.8 UG/ML
S PNEUM DA 6A IGG SER-MCNC: 2.2 UG/ML
S PNEUM DA 6B IGG SER-MCNC: 2.4 UG/ML
S PNEUM DA 7F IGG SER-MCNC: 1.1 UG/ML
S PNEUM DA 9V IGG SER-MCNC: 3.6 UG/ML

## 2023-05-09 NOTE — PROGRESS NOTES
Subjective    Reason for visit: Biologic Education, Training, and Observation    Rheumatology treatment to be initiate: Enbrel 50 mg SC weekly    Rheumatology Provider: Dr. Morales    HPI: Scooter Swan is a 72 y.o. male who presents to the clinic for injection training and education. He is a new patient to me but has been following with Dr. Morales and MALCOLM Ta for management of his rheumatic disease. Pt is presenting with a history of treated Hep C (Harvoni), COPD, GERD, HLD, HTN, OA, PsA, and RA. Pt present today with his wife who will give him the injection.    Pt's wife attempted to give pt injection prior but was holding pen incorrectly. Seems that it was a misfire. Advised pt to call [facility] to report as he may be 1 pen short due to not being able to use the other pen.     Prior Rheum Therapies:   Plaquenil - weight loss, nausea, insomnia  SSZ - GI upset    Pertinent History:  Rheum Disease: RA and PsA  Dispensing pharmacy: [facility]   Current therapy: prednisone 10 mg daily    Denies s/sx of infection (current or past week)   Recent Antibiotics use in the last 30 days: Yes - Prophylactic antibiotics for dental procedure. Completed antibiotics    Vaccinations:  Immunization History   Administered Date(s) Administered    COVID-19, MRNA, LN-S, PF (Pfizer) (Gray Cap) 07/08/2022    COVID-19, MRNA, LN-S, PF (Pfizer) (Purple Cap) 01/09/2021, 01/30/2021, 11/18/2021    Influenza 12/01/2010, 11/15/2012, 09/22/2020    Influenza (FLUAD) - Quadrivalent - Adjuvanted - PF *Preferred* (65+) 10/26/2021, 10/25/2022    Influenza (FLUAD) - Trivalent - Adjuvanted - PF (65+) 10/28/2019    Influenza - High Dose - PF (65 years and older) 01/29/2019    Influenza - Quadrivalent - PF *Preferred* (6 months and older) 12/01/2010, 10/03/2015, 10/04/2017    Influenza Split 10/03/2015    Pneumococcal Conjugate - 13 Valent 01/30/2019    Pneumococcal Polysaccharide - 23 Valent 08/24/2020     Influenza: Completed 10/25/22   PCV 20: Discuss and recommend at  least 5 years after last pneumonia vaccine. Based on share clinical decision making. Either way Pneumonia vaccines are complete. Due 8/2025 if pt would like to receive  PCV 13: Received 1 dose on 1/2019  PPSV 23: Received 1 dose on 8/2020  Tetanus (TdaP): Discuss and recommend booster every 10 years. Due now  Hepatitis B: Not immune. Discuss and recommend. Heplisav B - 2 doses at least 1 month apart. Due now    Shingrix: Discuss and recommend. 2 dose series 2-6 months apart. Due now  Covid: CDC recommends 2 bivalent doses at least 2 months apart in immunocompromised pts. Due now for 1st COVID dose      Reviewed allergies and all current medications including OTC, herbals and supplements.     Objective    Vitals:    05/10/23 1251   BP: (!) 148/78   Pulse: 98   Weight: 85.1 kg (187 lb 9.8 oz)   Height: 6' (1.829 m)   PainSc:   8   PainLoc: Generalized     BP Readings from Last 4 Encounters:   05/10/23 (!) 148/78   03/06/23 128/64   02/23/23 (!) 174/84   02/23/23 (!) 142/77     Lab Results   Component Value Date    HEPBSAG Non-reactive 04/25/2023    HEPBCAB Non-reactive 04/25/2023    HEPBSURFABQU Negative 04/25/2023    HEPBSURFABQU <3 04/25/2023     Lab Results   Component Value Date    HEPCAB Reactive (A) 04/25/2023     Lab Results   Component Value Date    TBGOLDPLUS Negative 04/25/2023     Lab Results   Component Value Date    WBC 12.76 (H) 02/22/2023    RBC 3.71 (L) 02/22/2023    HGB 10.8 (L) 02/22/2023    HCT 34.6 (L) 02/22/2023    MCV 93 02/22/2023    MCH 29.1 02/22/2023    MCHC 31.2 (L) 02/22/2023    RDW 14.2 02/22/2023     (L) 02/22/2023    MPV 13.4 (H) 02/22/2023    IMMGR 0.5 02/22/2023    GRAN 9.8 (H) 02/22/2023    GRAN 76.5 (H) 02/22/2023    IGABS 0.07 (H) 02/22/2023    LYMPH 2.0 02/22/2023    LYMPH 15.7 (L) 02/22/2023    MONO 0.9 02/22/2023    MONO 6.7 02/22/2023    EOS 0.1 02/22/2023    BASO 0.08 02/22/2023    NRBC 0 02/22/2023    EOSINOPHIL 0.5 02/22/2023    BASOPHIL 0.6 02/22/2023    DIFFMETHOD  Automated 02/22/2023      Lab Results   Component Value Date     02/22/2023    K 4.0 02/22/2023     02/22/2023    CO2 24 02/22/2023     (H) 02/22/2023    BUN 14 02/22/2023    CREATININE 1.4 02/22/2023    CALCIUM 9.3 02/22/2023    PROT 6.8 02/22/2023    ALBUMIN 4.0 02/22/2023    BILITOT 0.4 02/22/2023    ALKPHOS 68 02/22/2023    AST 16 02/22/2023    ALT 9 (L) 02/22/2023    ANIONGAP 13 02/22/2023    EGFRNORACEVR 53.1 (A) 02/22/2023     Lab Results   Component Value Date    SEDRATE 5 02/16/2023    CRP 7.1 02/16/2023      Lab Results   Component Value Date    PLQXLKJV32WR 35 10/08/2021    HYGRNEYI90 911 02/07/2020      Lab Results   Component Value Date    CHOL 176 02/16/2023    HDL 62 02/16/2023      Assessment/Plan    1. Injection Training/Education: New start Enbrel for rheumatoid arthritis and psoriatic arthritis. Pt was referred to the Rheumatology pharmacist for injection education. [facility] sent biologic to pt's home and was brought in for injection. Provided pt with education (MOA, frequency, route of administration, onset of action, injection instructions and safety profile) on Enbrel. Educated patient to inject 50 mg SC weekly.   Pt's wife injected pt in the right arm appropriately. Checked injection site after 20 mins and no reaction observed. Pt left office in stable condition.   Recommend pt to observe injection site for the next few days and call the office if they notice any injection site issues or side effects   ND: 5/17  Encouraged pt to practice proper hand hygiene considering increased risk of infection with biologics and to avoid raw seafood/live vaccines.   Pt to make us aware if they ever experiences s/sx of an infection including fever, chills, URI symptoms or UTI symptoms.  Pt verbalized understanding instructions  Provided pt with handouts on education about their biologic and how to administer it  Sent msg to [facility] Rheum Supervisor about possible misfire  Pt's wife comfortable with  giving injection and declined follow-up appt with me     2. Vaccinations: Explained importance of vaccines considering the increased risk of infections.    Vaccines needed: PCV 8/2025, two COVID bivalent doses, Shingles, TdaP, and Hep B   Pt agreed to complete vaccines   Advised pt to wait at least 7 days before getting any immunizations after receiving first dose of current biologic     3. Medication Rec: Medication list reconciled per patient and EHR. Indications and dosages reviewed with patient.     Follow-up scheduled on 6/19/23 with MALCOLM Nam, PharmD, BCPS  Rheumatology Clinical Pharmacist  [facility]

## 2023-05-10 ENCOUNTER — CLINICAL SUPPORT (OUTPATIENT)
Dept: RHEUMATOLOGY | Facility: CLINIC | Age: 73
End: 2023-05-10
Payer: MEDICARE

## 2023-05-10 VITALS
WEIGHT: 187.63 LBS | BODY MASS INDEX: 25.41 KG/M2 | DIASTOLIC BLOOD PRESSURE: 78 MMHG | HEART RATE: 98 BPM | HEIGHT: 72 IN | SYSTOLIC BLOOD PRESSURE: 148 MMHG

## 2023-05-10 DIAGNOSIS — M05.9 SEROPOSITIVE RHEUMATOID ARTHRITIS: Primary | ICD-10-CM

## 2023-05-10 DIAGNOSIS — M05.742 RHEUMATOID ARTHRITIS INVOLVING BOTH HANDS WITH POSITIVE RHEUMATOID FACTOR: ICD-10-CM

## 2023-05-10 DIAGNOSIS — M05.741 RHEUMATOID ARTHRITIS INVOLVING BOTH HANDS WITH POSITIVE RHEUMATOID FACTOR: ICD-10-CM

## 2023-05-10 DIAGNOSIS — L40.50 PSA (PSORIATIC ARTHRITIS): ICD-10-CM

## 2023-05-10 PROCEDURE — 99999 PR PBB SHADOW E&M-EST. PATIENT-LVL IV: ICD-10-PCS | Mod: PBBFAC,,,

## 2023-05-10 PROCEDURE — 99999 PR PBB SHADOW E&M-EST. PATIENT-LVL IV: CPT | Mod: PBBFAC,,,

## 2023-05-10 NOTE — PATIENT INSTRUCTIONS
It was a pleasure meeting you today. As a reminder, my name is Dr. Mei Mota. I'm the new clinical pharmacist in the Rheumatology office. If you have any questions or need any assistance, please reach out to the office.    Next Enbrel injection on 5/17/2023     Please wait at least 7 days after receiving your first Enbrel injection before receiving any vaccines  - Vaccines:   COVID vaccine: 2 bivalent doses at least 2 months apart - Local pharmacy    Shingles (Shingrix): 2 doses 2-6 months apart - PCP's office or local pharmacy  Hepatitis B (Heplisav B): 2 doses at least 1 month apart - Local pharmacy   Tetanus (Tdap): 1 dose every 10 years - Local pharmacy

## 2023-05-23 ENCOUNTER — TELEPHONE (OUTPATIENT)
Dept: RHEUMATOLOGY | Facility: CLINIC | Age: 73
End: 2023-05-23

## 2023-05-23 NOTE — TELEPHONE ENCOUNTER
----- Message from Ellen Brandt sent at 5/23/2023  7:38 AM CDT -----  Regarding: medical advice  Contact: wife, Jacqueline  Patient want to speak with nurse regarding ENBREL SURECLICK) 50 mg making him sick need some advice on what to do, please call back at Telephone Information:  [employer]          [phone]    Case number [id number]

## 2023-05-24 ENCOUNTER — SPECIALTY PHARMACY (OUTPATIENT)
Dept: PHARMACY | Facility: CLINIC | Age: 73
End: 2023-05-24
Payer: MEDICARE

## 2023-05-24 ENCOUNTER — NURSE TRIAGE (OUTPATIENT)
Dept: ADMINISTRATIVE | Facility: CLINIC | Age: 73
End: 2023-05-24
Payer: MEDICARE

## 2023-05-24 NOTE — TELEPHONE ENCOUNTER
Incoming call from patient due for Enbrel injection but does not want to inject due to side effects (headache and Fatigue). Reports reached out to provider, but has not yet heard back. Transferred to [facility].

## 2023-05-24 NOTE — TELEPHONE ENCOUNTER
Pt is having reaction to Enbrel. He gets a really bad headache and chronic fatigue. He has 2 injections so far. Pt is refusing to take it today due to the side effects from the past. [id number]. Please call and advise pt on if its ok to stop or if he needs a replacement med or any advice in reference to medication. Pt and cg awaiting a return call.      Pt is also trying to not have medication delivered today. Pt connected to the specialty pharmacy.   Reason for Disposition   Caller has URGENT medicine question about med that PCP or specialist prescribed and triager unable to answer question    Protocols used: Medication Question Call-A-[place]

## 2023-05-24 NOTE — TELEPHONE ENCOUNTER
Spoke to pt about headache and fatigue with Enbrel injection. Pt said that his headache is on and off but is not relieved by his Norco. Pt said that the fatigue does not go away. He is always exhausted. Informed him that those are common side effects that usually go away with time. Offered to reach out to the provider for assistance.Pt is due today. Pt also reached out to the provider. He has only injected 2 times and has one pen remaining. Pt misfired the first pen.

## 2023-05-26 ENCOUNTER — TELEPHONE (OUTPATIENT)
Dept: RHEUMATOLOGY | Facility: CLINIC | Age: 73
End: 2023-05-26
Payer: MEDICARE

## 2023-05-26 DIAGNOSIS — L40.50 PSA (PSORIATIC ARTHRITIS): ICD-10-CM

## 2023-05-26 DIAGNOSIS — M05.9 SEROPOSITIVE RHEUMATOID ARTHRITIS: Primary | ICD-10-CM

## 2023-05-26 NOTE — TELEPHONE ENCOUNTER
Per chart, [facility] was suppose to reach out to pt regarding Enbrel SE.  Outgoing call to pt regarding Enbrel.  Pt stated he has not heard from [facility] and did not inject Enbrel yesterday.  Informed pt that [facility] will reach out to [facility] to request a call back for him.  Pt voiced understanding.

## 2023-05-26 NOTE — TELEPHONE ENCOUNTER
Called patient. Still having headaches and fatigue. Explained that side effects should get better with time and usually resolve after 1 month of tx. Patient ok with continue. Will inject today and take aleve to help with headaches. Will call to f/u with patient next Friday.

## 2023-05-26 NOTE — TELEPHONE ENCOUNTER
----- Message from aMry Kam LPN sent at 5/24/2023  4:52 PM CDT -----  Regarding: FW: Enbrel    ----- Message -----  From: Caroline Akbar PharmD  Sent: 5/24/2023   1:43 PM CDT  To: Carson Morales MD, Cely Bautista PA-C, #  Subject: Enbrel                                           Good afternoon,     Patient reports headache and fatigue with Enbrel injection. Pt has only injected 2 times. He said that his headache lasts for hours and then is on and off but is not relieved by his Norco. Pt also reports   fatigue does not go away and he is always exhausted. Informed him that those are common side effects that usually go away with time. Offered to reach out to the provider for assistance.Pt is due today. Pt also reached out to the provider. Would you recommend having the patient continue and see if resolves or would the office reach out to assist?    Thanks,     Caroline Akbar, PharmD   [facility] [place] [address]  [phone] (phone)  [phone] (fax)

## 2023-05-29 DIAGNOSIS — G89.4 CHRONIC PAIN SYNDROME: ICD-10-CM

## 2023-05-29 RX ORDER — HYDROCODONE BITARTRATE AND ACETAMINOPHEN 10; 325 MG/1; MG/1
1 TABLET ORAL EVERY 8 HOURS PRN
Qty: 90 TABLET | Refills: 0 | Status: SHIPPED | OUTPATIENT
Start: 2023-05-29 | End: 2023-06-19 | Stop reason: SDUPTHER

## 2023-05-29 NOTE — TELEPHONE ENCOUNTER
"Per chart, ambulatory pharmacist contacted pt to discuss SEs of HA and fatigue.  Pharmacist response is "Called patient. Still having headaches and fatigue. Explained that side effects should get better with time and usually resolve after 1 month of tx. Patient ok with continue. Will inject today and take aleve to help with headaches. Will call to f/u with patient next Friday."  Outgoing call to pt to see how pt is feeling and to see if he would like to proceed with Enbrel refill.  No answer, LVM.   "

## 2023-05-31 NOTE — TELEPHONE ENCOUNTER
Outgoing call to pt to see how pt is feeling and to see if he would like to proceed with Enbrel refill.  No answer, LVM.

## 2023-06-02 NOTE — TELEPHONE ENCOUNTER
Outgoing call to pt to discuss Enbrel SE.  He stated he injected on Friday and HA was worse the following day.  Pt stated he could not get out of bed last weekend and is still experiencing HA.  Pt declined to proceed with Enbrel.  Informed pt will notify [facility] and close him out.  Pt voiced understanding.

## 2023-06-05 NOTE — TELEPHONE ENCOUNTER
Called patient. Stated that he is doing worse and that he is so tired and fatigue that he is unable to get out of bed and do his usually daily tasks. Also, still having headaches not relieved by pain medication. Last took Enbrel 5/26. Unclear if symptoms are related to Enbrel. Patient holding Enbrel. Advised patient to call his PCP to schedule an appointment for further evaluation of symptoms in case infection or other issue may be present. Patient stated that he will call PCP. Aware to go to ER is symptoms worsens    Patient has upcoming appointment on 6/19 with Cely Bautista PA-C to discuss possible alternatives.

## 2023-06-19 ENCOUNTER — OFFICE VISIT (OUTPATIENT)
Dept: RHEUMATOLOGY | Facility: CLINIC | Age: 73
End: 2023-06-19
Payer: MEDICARE

## 2023-06-19 DIAGNOSIS — M05.9 SEROPOSITIVE RHEUMATOID ARTHRITIS: Primary | ICD-10-CM

## 2023-06-19 DIAGNOSIS — D69.6 THROMBOCYTOPENIA: ICD-10-CM

## 2023-06-19 DIAGNOSIS — M17.9 OSTEOARTHRITIS OF KNEE, UNSPECIFIED LATERALITY, UNSPECIFIED OSTEOARTHRITIS TYPE: ICD-10-CM

## 2023-06-19 DIAGNOSIS — G47.00 INSOMNIA, UNSPECIFIED TYPE: ICD-10-CM

## 2023-06-19 DIAGNOSIS — F51.04 CHRONIC INSOMNIA: Primary | ICD-10-CM

## 2023-06-19 DIAGNOSIS — G89.4 CHRONIC PAIN SYNDROME: ICD-10-CM

## 2023-06-19 DIAGNOSIS — Z79.899 IMMUNOCOMPROMISED STATE DUE TO DRUG THERAPY: ICD-10-CM

## 2023-06-19 DIAGNOSIS — R53.83 FATIGUE, UNSPECIFIED TYPE: ICD-10-CM

## 2023-06-19 DIAGNOSIS — D84.821 IMMUNOCOMPROMISED STATE DUE TO DRUG THERAPY: ICD-10-CM

## 2023-06-19 PROCEDURE — 99214 PR OFFICE/OUTPT VISIT, EST, LEVL IV, 30-39 MIN: ICD-10-PCS | Mod: 95,,, | Performed by: PHYSICIAN ASSISTANT

## 2023-06-19 PROCEDURE — 1160F RVW MEDS BY RX/DR IN RCRD: CPT | Mod: CPTII,95,, | Performed by: PHYSICIAN ASSISTANT

## 2023-06-19 PROCEDURE — 4010F PR ACE/ARB THEARPY RXD/TAKEN: ICD-10-PCS | Mod: CPTII,95,, | Performed by: PHYSICIAN ASSISTANT

## 2023-06-19 PROCEDURE — 4010F ACE/ARB THERAPY RXD/TAKEN: CPT | Mod: CPTII,95,, | Performed by: PHYSICIAN ASSISTANT

## 2023-06-19 PROCEDURE — 1159F MED LIST DOCD IN RCRD: CPT | Mod: CPTII,95,, | Performed by: PHYSICIAN ASSISTANT

## 2023-06-19 PROCEDURE — 1160F PR REVIEW ALL MEDS BY PRESCRIBER/CLIN PHARMACIST DOCUMENTED: ICD-10-PCS | Mod: CPTII,95,, | Performed by: PHYSICIAN ASSISTANT

## 2023-06-19 PROCEDURE — 1159F PR MEDICATION LIST DOCUMENTED IN MEDICAL RECORD: ICD-10-PCS | Mod: CPTII,95,, | Performed by: PHYSICIAN ASSISTANT

## 2023-06-19 PROCEDURE — 99214 OFFICE O/P EST MOD 30 MIN: CPT | Mod: 95,,, | Performed by: PHYSICIAN ASSISTANT

## 2023-06-19 RX ORDER — HYDROCODONE BITARTRATE AND ACETAMINOPHEN 10; 325 MG/1; MG/1
1 TABLET ORAL EVERY 8 HOURS PRN
Qty: 90 TABLET | Refills: 0 | Status: SHIPPED | OUTPATIENT
Start: 2023-08-28 | End: 2023-09-28 | Stop reason: SDUPTHER

## 2023-06-19 RX ORDER — GOLIMUMAB 50 MG/.5ML
50 INJECTION, SOLUTION SUBCUTANEOUS
Qty: 0.5 ML | Refills: 11 | Status: SHIPPED | OUTPATIENT
Start: 2023-06-19 | End: 2023-06-28

## 2023-06-19 RX ORDER — HYDROCODONE BITARTRATE AND ACETAMINOPHEN 10; 325 MG/1; MG/1
1 TABLET ORAL EVERY 8 HOURS PRN
Qty: 90 TABLET | Refills: 0 | Status: SHIPPED | OUTPATIENT
Start: 2023-07-29 | End: 2023-08-28

## 2023-06-19 RX ORDER — TIZANIDINE 4 MG/1
4 TABLET ORAL EVERY 8 HOURS
Qty: 270 TABLET | Refills: 1 | Status: SHIPPED | OUTPATIENT
Start: 2023-06-19 | End: 2024-02-19 | Stop reason: SDUPTHER

## 2023-06-19 RX ORDER — HYDROCODONE BITARTRATE AND ACETAMINOPHEN 10; 325 MG/1; MG/1
1 TABLET ORAL EVERY 8 HOURS PRN
Qty: 90 TABLET | Refills: 0 | Status: SHIPPED | OUTPATIENT
Start: 2023-06-29 | End: 2023-07-29

## 2023-06-19 RX ORDER — ZALEPLON 10 MG/1
10 CAPSULE ORAL NIGHTLY
Qty: 30 CAPSULE | Refills: 3 | Status: SHIPPED | OUTPATIENT
Start: 2023-06-19 | End: 2023-10-11

## 2023-06-19 NOTE — PROGRESS NOTES
The patient location is: home  The chief complaint leading to consultation is: RA    Visit type: audiovisual    Face to Face time with patient: 15 minutes  25 minutes of total time spent on the encounter, which includes face to face time and non-face to face time preparing to see the patient (eg, review of tests), Obtaining and/or reviewing separately obtained history, Documenting clinical information in the electronic or other health record, Independently interpreting results (not separately reported) and communicating results to the patient/family/caregiver, or Care coordination (not separately reported).    Each patient to whom he or she provides medical services by telemedicine is:  (1) informed of the relationship between the physician and patient and the respective role of any other health care provider with respect to management of the patient; and (2) notified that he or she may decline to receive medical services by telemedicine and may withdraw from such care at any time.    Notes:     Subjective:       Patient ID: Scooter Swan is a 72 y.o. male.    Chief Complaint: Disease Management      Mr. Swan is a 72 year old male who presents to telemedicine visit for follow up on seropositive rheumatoid arthritis and osteoarthritis. He started Enbrel and took 2 injections and reports significant fatigue and HA following each injection. His last injection was 2 weeks ago and he feels he is not back to his baseline. He did not complete labs for this visit.    Hematology planned to proceed with BMB to evaluate anemia and thrombocytopenia but this needs to be rescheduled.     He continues to have pain in his hands, elbows, knees, and ankles. He has significant morning stiffness and he is taking prednisone 10 mg daily. Norco is providing adequate control of his pain without side effects.     He never received seroquel or sontata. Atarax prescribed by PCP was not helpful.     We reviewed arthritis survey.    Current  treatment:  1. Norco TID PRN  2. Prednisone 10 mg  3. Zanaflex PRN    Prior treatment:  1. Plaquenil (WEIGHT LOSS)  2. SSZ caused GI upset    Review of Systems   Constitutional: Positive for activity change. Negative for chills, fatigue and fever.   Eyes: Negative for visual disturbance.   Respiratory: Negative for cough, shortness of breath and wheezing.    Cardiovascular: Negative for chest pain, palpitations and leg swelling.   Gastrointestinal: Negative for abdominal pain, constipation, diarrhea, nausea and vomiting.   Musculoskeletal: Positive for arthralgias, back pain and joint swelling. Negative for myalgias.   Neurological: Negative for dizziness, syncope and headaches.   Hematological: Negative for adenopathy.         Objective:     There were no vitals filed for this visit.      Past Medical History:   Diagnosis Date    Cataract     COPD (chronic obstructive pulmonary disease)     Diverticulosis     DUARTE (dyspnea on exertion)     GERD (gastroesophageal reflux disease)     Glaucoma     Hepatitis C     treated; seeing Dr. Carr    Hyperlipidemia     Hypertension     Liver lesion 08/2018    RA (rheumatoid arthritis)     Rectal prolapse     Possible     Past Surgical History:   Procedure Laterality Date    ANGIOGRAM, CORONARY, WITH LEFT HEART CATHETERIZATION  10/6/2022    Procedure: Angiogram, Coronary, with Left Heart Cath;  Surgeon: Zac Boucher MD;  Location: [facility] CATH;  Service: Cardiology;;    ARTERIOGRAPHY OF AORTIC ROOT  10/6/2022    Procedure: ARTERIOGRAM, AORTIC ROOT;  Surgeon: Zac Boucher MD;  Location: [facility] CATH;  Service: Cardiology;;    CARPAL TUNNEL RELEASE      Right wrist 2015    COLONOSCOPY  08/2016    Dr. Cardona; in care everywhere    COLONOSCOPY N/A 3/20/2019    Procedure: COLONOSCOPY;  Surgeon: Sotero Arthur MD;  Location: [facility] ENDO;  Service: Endoscopy;  Laterality: N/A; hemorrhoids, diverticulosis, repeat in 10 years for screening    EXCISIONAL HEMORRHOIDECTOMY N/A 10/18/2018     Procedure: HEMORRHOIDECTOMY, prone;  Surgeon: Gunnar Horton MD;  Location: [facility] OR [address] [place] [address];  Service: Colon and Rectal;  Laterality: N/A;    THYROID SURGERY      UPPER GASTROINTESTINAL ENDOSCOPY  08/2016    Dr. Cardona; in care everywhere        Physical Exam   Constitutional: He is well-developed, well-nourished, and in no distress.     Labs:  Component      Latest Ref Rng & Units 4/25/2023 2/22/2023 2/16/2023   WBC      3.90 - 12.70 K/uL  12.76 (H)    RBC      4.60 - 6.20 M/uL  3.71 (L)    Hemoglobin      14.0 - 18.0 g/dL  10.8 (L)    Hematocrit      40.0 - 54.0 %  34.6 (L)    MCV      82 - 98 fL  93    MCH      27.0 - 31.0 pg  29.1    MCHC      32.0 - 36.0 g/dL  31.2 (L)    RDW      11.5 - 14.5 %  14.2    Platelets      150 - 450 K/uL  140 (L)    MPV      9.2 - 12.9 fL  13.4 (H)    Immature Granulocytes      0.0 - 0.5 %  0.5    Gran # (ANC)      1.8 - 7.7 K/uL  9.8 (H)    Immature Grans (Abs)      0.00 - 0.04 K/uL  0.07 (H)    Lymph #      1.0 - 4.8 K/uL  2.0    Mono #      0.3 - 1.0 K/uL  0.9    Eos #      0.0 - 0.5 K/uL  0.1    Baso #      0.00 - 0.20 K/uL  0.08    nRBC      0 /100 WBC  0    Gran %      38.0 - 73.0 %  76.5 (H)    Lymph %      18.0 - 48.0 %  15.7 (L)    Mono %      4.0 - 15.0 %  6.7    Eosinophil %      0.0 - 8.0 %  0.5    Basophil %      0.0 - 1.9 %  0.6    Platelet Estimate        Appears normal    Differential Method        Automated    Sodium      136 - 145 mmol/L  141    Potassium      3.5 - 5.1 mmol/L  4.0    Chloride      95 - 110 mmol/L  104    CO2      23 - 29 mmol/L  24    Glucose      70 - 110 mg/dL  214 (H)    BUN      8 - 23 mg/dL  14    Creatinine      0.5 - 1.4 mg/dL  1.4    Calcium      8.7 - 10.5 mg/dL  9.3    PROTEIN TOTAL      6.0 - 8.4 g/dL  6.8    Albumin      3.5 - 5.2 g/dL  4.0    BILIRUBIN TOTAL      0.1 - 1.0 mg/dL  0.4    Alkaline Phosphatase      55 - 135 U/L  68    AST      10 - 40 U/L  16    ALT      10 - 44 U/L  9 (L)    Anion Gap      8 - 16 mmol/L  13     eGFR      >60 mL/min/1.73 m:2  53.1 (A)    NIL      IU/mL 0.60528     TB1 - Nil      IU/mL -0.042     TB2 - Nil      IU/mL 0.007     Mitogen - Nil      IU/mL 9.883     TB Gold Plus      Negative Negative     IgG 1      382 - 929 mg/dL 500     IgG 2      242 - 700 mg/dL 251     IgG 3      22 - 176 mg/dL 101     IgG 4      4 - 86 mg/dL 17     Hep. B Surf Ab, Qual       Negative     Hep. B Surf Ab, Quant.      mIU/mL <3     CRP      0.0 - 8.2 mg/L   7.1   Sed Rate      0 - 23 mm/Hr   5   IgA      40 - 350 mg/dL 238     IgM      50 - 300 mg/dL 93     IgG      650 - 1600 mg/dL 943     Hepatitis C Ab      Non-reactive Reactive (A)     Hepatitis B Surface Ag      Non-reactive Non-reactive     Hep B Core Total Ab      Non-reactive Non-reactive         Summary         The coronary arteries were normal..    Moderate aortic regurgitation at least on aortic root injection,    Consider reassessing the aortic valve with an echo or CADY.Summary    The left ventricle is normal in size with concentric hypertrophy and normal systolic function.  Moderate left atrial enlargement.  Grade I left ventricular diastolic dysfunction.  The estimated ejection fraction is 60%.  Normal right ventricular size with normal right ventricular systolic function.  There is moderate-to-severe aortic valve stenosis.  Aortic valve area is 1.35 cm2; peak velocity is 4.49 m/s; mean gradient is 47 mmHg.  Moderate aortic regurgitation.  Mild tricuspid regurgitation.  Intermediate central venous pressure (8 mmHg).  The estimated PA systolic pressure is 35 mmHg.  The ascending aorta is mildly dilated.      Imaging:  XR ARTHRITIS SURVEY     FINDINGS:  Cervical spine in flexion:     The atlas and odontoid appearing good relationship to each other.  Intervertebral disc height loss is noted at the C4-C5 C5-C6 C6-C7 levels.  The C7-T1 level is not ideally displayed, no obvious abnormality is noted.     Knees AP:     Mild medial compartment narrowing on the right is  noted.     Hands AP:     Interphalangeal joint space loss is noted diffusely.  Some periarticular erosive changes questioned in multiple locations including at the distal interphalangeal joints of the 2nd through 5th digits bilaterally.  These changes are minimal.  First metacarpal carpal joint space loss is noted with osseous hypertrophy suggesting degenerative change.  Radiocarpal joint space loss is noted suggesting degenerative change.  Mild osseous demineralization may be present     Feet AP:     Interphalangeal joint space loss is noted diffusely suggesting degenerative change.  Some cortical irregularity/thickening is noted involving the proximal phalanges of the 4th digit on the left suggestive of history of fracture age indeterminate.  No obvious osteolytic changes noted.  Mild osseous demineralization may be present.     Impression:     Mild periarticular erosive changes particularly along the distal interphalangeal joints of the hands is questioned.  This could relate to an inflammatory arthropathy such as psoriasis, Diego's disease, erosive osteoarthropathy or rheumatoid.  Please clinically correlate.     Assessment:       1. Seropositive rheumatoid arthritis    2. Thrombocytopenia    3. Immunocompromised state due to drug therapy    4. Fatigue, unspecified type    5. Insomnia, unspecified type    6. Chronic pain syndrome    7. Osteoarthritis of knee, unspecified laterality, unspecified osteoarthritis type                Plan:       Seropositive rheumatoid arthritis  -     golimumab (SIMPONI) 50 mg/0.5 mL PnIj; Inject 50 mg into the skin every 28 days.  Dispense: 0.5 mL; Refill: 11  -     tiZANidine (ZANAFLEX) 4 MG tablet; Take 1 tablet (4 mg total) by mouth every 8 (eight) hours.  Dispense: 270 tablet; Refill: 1    Thrombocytopenia    Immunocompromised state due to drug therapy    Fatigue, unspecified type    Insomnia, unspecified type    Chronic pain syndrome  -     tiZANidine (ZANAFLEX) 4 MG tablet;  "Take 1 tablet (4 mg total) by mouth every 8 (eight) hours.  Dispense: 270 tablet; Refill: 1    Osteoarthritis of knee, unspecified laterality, unspecified osteoarthritis type  -     tiZANidine (ZANAFLEX) 4 MG tablet; Take 1 tablet (4 mg total) by mouth every 8 (eight) hours.  Dispense: 270 tablet; Refill: 1            Assessment:  72 year old male with  Seropositive (+RF) rheumatoid arthritis, osteoarthritis on prednisone 10 mg  --thrombocytopenia, anemia  --hx of hep c s/p harvoni, last hep c viral load undetectable  --chronic pain syndrome on norco  --abnormal CT abd 6/2019 "Multiple areas of early bright arterial enhancement not seen on the later images.."  --moderate to severe aortic stenosis followed by Dr. Boucher    Plan:  1. Cont. Prednisone PRN. Try to taper dose in the future  2. Continue norco PRN per Dr. Morales. I have checked [place] prescription monitoring program site and no unusual or abnormal behavior has occurred pt understand the risk and benefits of taking opioid medications and has decided to continue the medication.  reviewed. Sent x 3. Sonata pended. Seroquel sent.  3. D/c Enbrel  4. Check labs soon  5. Consider Simponi after BMB    Follow up:  4 mo w/labs prior        "

## 2023-06-27 ENCOUNTER — TELEPHONE (OUTPATIENT)
Dept: RHEUMATOLOGY | Facility: CLINIC | Age: 73
End: 2023-06-27
Payer: MEDICARE

## 2023-06-27 NOTE — TELEPHONE ENCOUNTER
----- Message from Migel Cason PharmD sent at 6/26/2023  6:35 PM CDT -----  Regarding: Simponi  Good afternoon,    [facility] received an order for Simponi. Per the most recent note, we are awaiting a bone marrow biopsy before considering Simponi. [facility] would like to confirm that we are awaiting this process before proceeding with the order.    Thank you!  Migel Cason, [place]  [facility]  [phone]

## 2023-06-28 DIAGNOSIS — G47.00 INSOMNIA, UNSPECIFIED TYPE: ICD-10-CM

## 2023-06-28 RX ORDER — HYDROXYZINE HYDROCHLORIDE 25 MG/1
TABLET, FILM COATED ORAL
Qty: 30 TABLET | Refills: 1 | Status: SHIPPED | OUTPATIENT
Start: 2023-06-28 | End: 2023-07-14 | Stop reason: SDUPTHER

## 2023-06-28 NOTE — TELEPHONE ENCOUNTER
Rx has been discontinued for now.  Please re-send Simponi Rx to [facility] when appropriate based upon the biopsy.    Thank you!  Maryann Jean, PharmD  [facility]  [phone]

## 2023-07-14 DIAGNOSIS — G47.00 INSOMNIA, UNSPECIFIED TYPE: ICD-10-CM

## 2023-07-17 RX ORDER — HYDROXYZINE HYDROCHLORIDE 25 MG/1
25 TABLET, FILM COATED ORAL NIGHTLY PRN
Qty: 30 TABLET | Refills: 1 | Status: SHIPPED | OUTPATIENT
Start: 2023-07-17 | End: 2023-09-13

## 2023-07-26 ENCOUNTER — TELEPHONE (OUTPATIENT)
Dept: HEMATOLOGY/ONCOLOGY | Facility: CLINIC | Age: 73
End: 2023-07-26
Payer: MEDICARE

## 2023-07-26 NOTE — TELEPHONE ENCOUNTER
Nurse spoke with Albino with Radiology in regards to getting the pt rescheduled for his bmbx. Nurse was informed that facundo will reach out to the pt in regards to scheduling. Nurse verbalized understanding. Call ended well.

## 2023-07-26 NOTE — TELEPHONE ENCOUNTER
----- Message from Gerson Helms MA sent at 7/24/2023  4:46 PM CDT -----  Pt is needing to be rescheduled for bmbx

## 2023-07-31 ENCOUNTER — TELEPHONE (OUTPATIENT)
Dept: RADIOLOGY | Facility: HOSPITAL | Age: 73
End: 2023-07-31
Payer: MEDICARE

## 2023-07-31 NOTE — TELEPHONE ENCOUNTER
Interventional Radiology:    Spoke to pt and got him scheduled for bone marrow biopsy on 8/4 @ 9:30am.     Informed pt to be NPO after midnight, show up to [facility] on [address] at 8:15am, either have a ride with them or have a phone number for the person driving them home so that we can get in contact with them to keep them updated. Pt denies the use of any aspirin, blood thinners, or fish oil. Answered all questions that the pt had and pt verbalized understanding of all discussed.

## 2023-08-02 ENCOUNTER — TELEPHONE (OUTPATIENT)
Dept: HEMATOLOGY/ONCOLOGY | Facility: CLINIC | Age: 73
End: 2023-08-02
Payer: MEDICARE

## 2023-08-02 NOTE — TELEPHONE ENCOUNTER
Nurse spoke with pt in regards to  confirming his appt . Pt has been scheduled verbalized understanding of appts.

## 2023-08-03 ENCOUNTER — TELEPHONE (OUTPATIENT)
Dept: RADIOLOGY | Facility: HOSPITAL | Age: 73
End: 2023-08-03
Payer: MEDICARE

## 2023-08-03 DIAGNOSIS — D69.6 THROMBOCYTOPENIA: ICD-10-CM

## 2023-08-03 DIAGNOSIS — I1A.0 RESISTANT HYPERTENSION: ICD-10-CM

## 2023-08-03 DIAGNOSIS — D68.9 COAGULATION DEFECT, UNSPECIFIED: Primary | ICD-10-CM

## 2023-08-03 DIAGNOSIS — E78.2 MIXED HYPERLIPIDEMIA: ICD-10-CM

## 2023-08-03 NOTE — TELEPHONE ENCOUNTER
INTERVENTIONAL RADIOLOGY    CONFIRMED 0930 APPOINTMENT ON 8/3/2023 WITH MR. TJ CASTRO. INSTRUCTED PT ARRIVE @ 0830 TO FRONT ENTRANCE OF HOSPITAL ([address] OFF [address]) . INSTRUCTED NOT TO EAT OR DRINK ANYTHING AFTER MIDNIGHT  THE NIGHT PRIOR TO PROCEDURE AND HAVE A  ON MORNING OF PROCEDURE TO DRIVE HOME.  QUESTIONS ANSWERED.  INSTRUCTED PT MAY TAKE NECESSARY MEDICATIONS ON MORNING OF PROCEDURE WITH A SMALL SIP OF WATER.  PT VERBALIZED UNDERSTANDING.

## 2023-08-04 ENCOUNTER — HOSPITAL ENCOUNTER (OUTPATIENT)
Dept: RADIOLOGY | Facility: HOSPITAL | Age: 73
Discharge: HOME | End: 2023-08-04
Attending: NURSE PRACTITIONER
Payer: MEDICARE

## 2023-08-04 VITALS
SYSTOLIC BLOOD PRESSURE: 163 MMHG | HEIGHT: 72 IN | BODY MASS INDEX: 25.73 KG/M2 | RESPIRATION RATE: 15 BRPM | WEIGHT: 190 LBS | HEART RATE: 72 BPM | DIASTOLIC BLOOD PRESSURE: 82 MMHG | OXYGEN SATURATION: 95 %

## 2023-08-04 DIAGNOSIS — D72.829 LEUKOCYTOSIS, UNSPECIFIED TYPE: ICD-10-CM

## 2023-08-04 DIAGNOSIS — D69.6 THROMBOCYTOPENIA: ICD-10-CM

## 2023-08-04 DIAGNOSIS — D64.9 NORMOCYTIC ANEMIA: ICD-10-CM

## 2023-08-04 PROCEDURE — 63600175 PHARM REV CODE 636 W HCPCS: Performed by: RADIOLOGY

## 2023-08-04 PROCEDURE — 88364 INSITU HYBRIDIZATION (FISH): CPT | Mod: 59 | Performed by: PATHOLOGY

## 2023-08-04 PROCEDURE — 88365 PR  TISSUE HYBRIDIZATION: ICD-10-PCS | Mod: 26,,, | Performed by: PATHOLOGY

## 2023-08-04 PROCEDURE — 88237 TISSUE CULTURE BONE MARROW: CPT | Performed by: NURSE PRACTITIONER

## 2023-08-04 PROCEDURE — 77012 CT SCAN FOR NEEDLE BIOPSY: CPT | Mod: TC

## 2023-08-04 PROCEDURE — 88341 PR IHC OR ICC EACH ADD'L SINGLE ANTIBODY  STAINPR: ICD-10-PCS | Mod: 26,,, | Performed by: PATHOLOGY

## 2023-08-04 PROCEDURE — 88185 FLOWCYTOMETRY/TC ADD-ON: CPT | Mod: 59 | Performed by: PATHOLOGY

## 2023-08-04 PROCEDURE — 88341 IMHCHEM/IMCYTCHM EA ADD ANTB: CPT | Mod: 59 | Performed by: PATHOLOGY

## 2023-08-04 PROCEDURE — 88341 IMHCHEM/IMCYTCHM EA ADD ANTB: CPT | Mod: 26,,, | Performed by: PATHOLOGY

## 2023-08-04 PROCEDURE — 77012 CT BIOPSY BONE MARROW (XPD): ICD-10-PCS | Mod: 26,,, | Performed by: RADIOLOGY

## 2023-08-04 PROCEDURE — 38221 CT BIOPSY BONE MARROW (XPD): ICD-10-PCS | Mod: RT,,, | Performed by: RADIOLOGY

## 2023-08-04 PROCEDURE — 88189 PR  FLOWCYTOMETRY/READ, 16 & > MARKERS: ICD-10-PCS | Mod: ,,, | Performed by: PATHOLOGY

## 2023-08-04 PROCEDURE — 88342 IMHCHEM/IMCYTCHM 1ST ANTB: CPT | Performed by: PATHOLOGY

## 2023-08-04 PROCEDURE — 88311 DECALCIFY TISSUE: CPT | Performed by: PATHOLOGY

## 2023-08-04 PROCEDURE — 85097 BONE MARROW INTERPRETATION: CPT | Mod: ,,, | Performed by: PATHOLOGY

## 2023-08-04 PROCEDURE — 88313 PR  SPECIAL STAINS,GROUP II: ICD-10-PCS | Mod: 26,,, | Performed by: PATHOLOGY

## 2023-08-04 PROCEDURE — 88311 PR  DECALCIFY TISSUE: ICD-10-PCS | Mod: 26,,, | Performed by: PATHOLOGY

## 2023-08-04 PROCEDURE — 88264 CHROMOSOME ANALYSIS 20-25: CPT | Performed by: NURSE PRACTITIONER

## 2023-08-04 PROCEDURE — 88189 FLOWCYTOMETRY/READ 16 & >: CPT | Mod: ,,, | Performed by: PATHOLOGY

## 2023-08-04 PROCEDURE — 88305 TISSUE EXAM BY PATHOLOGIST: CPT | Mod: 59 | Performed by: PATHOLOGY

## 2023-08-04 PROCEDURE — 88364 INSITU HYBRIDIZATION (FISH): CPT | Mod: 26,,, | Performed by: PATHOLOGY

## 2023-08-04 PROCEDURE — 88365 INSITU HYBRIDIZATION (FISH): CPT | Mod: 26,,, | Performed by: PATHOLOGY

## 2023-08-04 PROCEDURE — 77012 CT SCAN FOR NEEDLE BIOPSY: CPT | Mod: 26,,, | Performed by: RADIOLOGY

## 2023-08-04 PROCEDURE — 88313 SPECIAL STAINS GROUP 2: CPT | Mod: 26,,, | Performed by: PATHOLOGY

## 2023-08-04 PROCEDURE — 88365 INSITU HYBRIDIZATION (FISH): CPT | Performed by: PATHOLOGY

## 2023-08-04 PROCEDURE — 88364 CHG INSITU HYBRIDIZATION (FISH: ICD-10-PCS | Mod: 26,,, | Performed by: PATHOLOGY

## 2023-08-04 PROCEDURE — 88305 TISSUE EXAM BY PATHOLOGIST: CPT | Mod: 26,,, | Performed by: PATHOLOGY

## 2023-08-04 PROCEDURE — 88184 FLOWCYTOMETRY/ TC 1 MARKER: CPT | Performed by: PATHOLOGY

## 2023-08-04 PROCEDURE — 81450 HL NEO GSAP 5-50DNA/DNA&RNA: CPT | Performed by: NURSE PRACTITIONER

## 2023-08-04 PROCEDURE — 88342 CHG IMMUNOCYTOCHEMISTRY: ICD-10-PCS | Mod: 26,59,, | Performed by: PATHOLOGY

## 2023-08-04 PROCEDURE — 88305 TISSUE EXAM BY PATHOLOGIST: ICD-10-PCS | Mod: 26,,, | Performed by: PATHOLOGY

## 2023-08-04 PROCEDURE — 88311 DECALCIFY TISSUE: CPT | Mod: 26,,, | Performed by: PATHOLOGY

## 2023-08-04 PROCEDURE — 85097 PR  BONE MARROW,SMEAR INTERPRETATION: ICD-10-PCS | Mod: ,,, | Performed by: PATHOLOGY

## 2023-08-04 PROCEDURE — 88313 SPECIAL STAINS GROUP 2: CPT | Performed by: PATHOLOGY

## 2023-08-04 PROCEDURE — 38221 DX BONE MARROW BIOPSIES: CPT | Mod: RT,,, | Performed by: RADIOLOGY

## 2023-08-04 PROCEDURE — 25000003 PHARM REV CODE 250: Performed by: RADIOLOGY

## 2023-08-04 PROCEDURE — 88342 IMHCHEM/IMCYTCHM 1ST ANTB: CPT | Mod: 26,59,, | Performed by: PATHOLOGY

## 2023-08-04 RX ORDER — HYDROMORPHONE HYDROCHLORIDE 2 MG/ML
1 INJECTION, SOLUTION INTRAMUSCULAR; INTRAVENOUS; SUBCUTANEOUS ONCE
Status: COMPLETED | OUTPATIENT
Start: 2023-08-04 | End: 2023-08-04

## 2023-08-04 RX ORDER — HYDROMORPHONE HYDROCHLORIDE 2 MG/ML
INJECTION, SOLUTION INTRAMUSCULAR; INTRAVENOUS; SUBCUTANEOUS CODE/TRAUMA/SEDATION MEDICATION
Status: COMPLETED | OUTPATIENT
Start: 2023-08-04 | End: 2023-08-04

## 2023-08-04 RX ORDER — LIDOCAINE HYDROCHLORIDE 10 MG/ML
INJECTION INFILTRATION; PERINEURAL CODE/TRAUMA/SEDATION MEDICATION
Status: COMPLETED | OUTPATIENT
Start: 2023-08-04 | End: 2023-08-04

## 2023-08-04 RX ORDER — MIDAZOLAM HYDROCHLORIDE 1 MG/ML
INJECTION INTRAMUSCULAR; INTRAVENOUS CODE/TRAUMA/SEDATION MEDICATION
Status: COMPLETED | OUTPATIENT
Start: 2023-08-04 | End: 2023-08-04

## 2023-08-04 RX ADMIN — MIDAZOLAM HYDROCHLORIDE 1 MG: 1 INJECTION, SOLUTION INTRAMUSCULAR; INTRAVENOUS at 10:08

## 2023-08-04 RX ADMIN — LIDOCAINE HYDROCHLORIDE 5 ML: 10 INJECTION, SOLUTION INFILTRATION; PERINEURAL at 10:08

## 2023-08-04 RX ADMIN — HYDROMORPHONE HYDROCHLORIDE 1 MG: 2 INJECTION INTRAMUSCULAR; INTRAVENOUS; SUBCUTANEOUS at 09:08

## 2023-08-04 RX ADMIN — HYDROMORPHONE HYDROCHLORIDE 1 MG: 2 INJECTION INTRAMUSCULAR; INTRAVENOUS; SUBCUTANEOUS at 10:08

## 2023-08-04 NOTE — DISCHARGE SUMMARY
[facility])  Discharge Note  Short Stay    CT Biopsy Bone Marrow (xpd)      OUTCOME: Patient tolerated treatment/procedure well without complication and is now ready for discharge.    DISPOSITION: Home or Self Care    FINAL DIAGNOSIS:  <principal problem not specified>    FOLLOWUP: In clinic    DISCHARGE INSTRUCTIONS:  No discharge procedures on file.      Clinical Reference Documents Added to Patient Instructions         Document    BONE MARROW ASPIRATION OR BIOPSY (ENGLISH)    PROCEDURAL SEDATION, ADULT ED (ENGLISH)            TIME SPENT ON DISCHARGE: 15 minutes    Pre Op Diagnosis: Thrombocytopenia; Normocytic anemia; Leukocytosis     Post Op Diagnosis: same     Procedure:  Bone marrow biopsy     Procedure performed by: Karen BALL, Gadiel FOWLER     Written Informed Consent Obtained: Yes     Specimen Removed:  yes     Estimated Blood Loss:  minimal     Findings: Local anesthesia and moderate sedation were used.     The patient tolerated the procedure well and there were no complications.      Disposition:  F/U in clinic    Discharge instructions:  Light activity for 24 hours.  Remove band aid in 24 hours.  No baths (showers are appropriate).    F/U with ordering physician    Sterile technique was performed in the right iliac, lidocaine was used as a local anesthetic.  Multiple samples taken percutaneously from the right iliac bone.  Pt tolerated the procedure well without immediate complications.  Please see radiologist report for details. F/u with PCP and/or ordering physician.

## 2023-08-04 NOTE — H&P
[facility])  History & Physical    Subjective:      Chief Complaint/Reason for Admission: Thrombocytopenia; Normocytic anemia; Leukocytosis    Scooter Swan is a 72 y.o. male.    Past Medical History:   Diagnosis Date    Cataract     COPD (chronic obstructive pulmonary disease)     Diverticulosis     DUARTE (dyspnea on exertion)     GERD (gastroesophageal reflux disease)     Glaucoma     Hepatitis C     treated; seeing Dr. Carr    Hyperlipidemia     Hypertension     Liver lesion 08/2018    RA (rheumatoid arthritis)     Rectal prolapse     Possible     Past Surgical History:   Procedure Laterality Date    ANGIOGRAM, CORONARY, WITH LEFT HEART CATHETERIZATION  10/6/2022    Procedure: Angiogram, Coronary, with Left Heart Cath;  Surgeon: Zac Boucher MD;  Location: [facility] CATH;  Service: Cardiology;;    ARTERIOGRAPHY OF AORTIC ROOT  10/6/2022    Procedure: ARTERIOGRAM, AORTIC ROOT;  Surgeon: Zac Boucher MD;  Location: [facility] CATH;  Service: Cardiology;;    CARPAL TUNNEL RELEASE      Right wrist 2015    COLONOSCOPY  08/2016    Dr. Cardona; in care everywhere    COLONOSCOPY N/A 3/20/2019    Procedure: COLONOSCOPY;  Surgeon: Sotero Arthur MD;  Location: [facility];  Service: Endoscopy;  Laterality: N/A; hemorrhoids, diverticulosis, repeat in 10 years for screening    EXCISIONAL HEMORRHOIDECTOMY N/A 10/18/2018    Procedure: HEMORRHOIDECTOMY, prone;  Surgeon: Gunnar Horton MD;  Location: [address];  Service: Colon and Rectal;  Laterality: N/A;    THYROID SURGERY      UPPER GASTROINTESTINAL ENDOSCOPY  08/2016    Dr. Cardona; in care everywhere     Family History   Problem Relation Age of Onset    Stomach cancer Paternal Cousin     Stomach cancer Paternal Cousin     Cataracts Mother     Diabetes Mother     Hypertension Mother     Stroke Mother     Diabetes Sister     Hypertension Sister     Stroke Sister     Diabetes Brother     Glaucoma Brother     Hypertension Brother     Stroke Brother      Diabetes Maternal Aunt     Hypertension Maternal Aunt     Stroke Maternal Aunt     Diabetes Maternal Uncle     Hypertension Maternal Uncle     Stroke Maternal Uncle     Diabetes Maternal Grandmother     Hypertension Maternal Grandmother     Stroke Maternal Grandmother     Cancer Cousin         stomach    Colon cancer Neg Hx     Colon polyps Neg Hx     Crohn's disease Neg Hx     Ulcerative colitis Neg Hx     Esophageal cancer Neg Hx     Amblyopia Neg Hx     Blindness Neg Hx     Macular degeneration Neg Hx     Retinal detachment Neg Hx     Strabismus Neg Hx     Thyroid disease Neg Hx      Social History     Tobacco Use    Smoking status: Never    Smokeless tobacco: Never   Substance Use Topics    Alcohol use: Yes     Alcohol/week: 1.0 standard drink of alcohol     Types: 1 Shots of liquor per week     Comment: social    Drug use: Yes     Types: Hydrocodone       (Not in a hospital admission)    Review of patient's allergies indicates:   Allergen Reactions    Buspar [buspirone] Hallucinations     Nightmares    Fentanyl Hives and Hallucinations    Plaquenil [hydroxychloroquine]      Nausea, insomnia, weight loss 40LBS    Amitiza [lubiprostone] Nausea Only and Other (See Comments)     Fatigue.    Azulfidine [sulfasalazine] Nausea And Vomiting    Trazodone Other (See Comments)     Insomnia          Review of Systems   Constitutional: Negative.    Respiratory: Negative.     Cardiovascular: Negative.    Gastrointestinal: Negative.    Genitourinary: Negative.        Objective:      Vital Signs (Most Recent)  Pulse: 83 (08/04/23 0846)  Resp: 16 (08/04/23 0846)  BP: (!) 176/84 (08/04/23 0846)  SpO2: 97 % (08/04/23 0846)    Vital Signs Range (Last 24H):  Pulse:  [83]   Resp:  [16]   BP: (176)/(84)   SpO2:  [97 %]     Physical Exam  Constitutional:       Appearance: Normal appearance.   Eyes:      Extraocular Movements: Extraocular movements intact.   Cardiovascular:      Pulses: Normal pulses.   Pulmonary:      Breath sounds:  Normal breath sounds.   Abdominal:      General: Bowel sounds are normal.      Palpations: Abdomen is soft.   Musculoskeletal:      Cervical back: Normal range of motion.   Neurological:      Mental Status: He is alert.         Data Review:  CBC:   Lab Results   Component Value Date    WBC 14.50 (H) 08/04/2023    RBC 3.52 (L) 08/04/2023    HGB 9.9 (L) 08/04/2023    HCT 31.8 (L) 08/04/2023     08/04/2023      ECG: no prior ECG.     Assessment:      There are no hospital problems to display for this patient.      Plan:    Bone marrow biopsy

## 2023-08-04 NOTE — DISCHARGE INSTRUCTIONS
Please return to ER if any of these symptoms occur:  Fever over 101 degrees,  Bleeding from the puncture site not controlled, (Hold pressure for 5 minutes, if bleeding does not stop then go to the ER.)  Pain not controlled with Aleve or Tylenol,    No driving for 24 hours after procedure due to sedation given during procedure.     Do not submerge in standing water for 2 days after biopsy but you may shower.    Resume home medications and diet    Biopsy results will be with Genia Khanna NP, in 5-7 days, please follow up with her for results and any other questions or concerns that you may have.

## 2023-08-07 ENCOUNTER — OFFICE VISIT (OUTPATIENT)
Dept: FAMILY MEDICINE | Facility: CLINIC | Age: 73
End: 2023-08-07
Payer: MEDICARE

## 2023-08-07 VITALS
OXYGEN SATURATION: 96 % | HEIGHT: 72 IN | WEIGHT: 198.19 LBS | SYSTOLIC BLOOD PRESSURE: 122 MMHG | DIASTOLIC BLOOD PRESSURE: 68 MMHG | HEART RATE: 82 BPM | BODY MASS INDEX: 26.84 KG/M2

## 2023-08-07 DIAGNOSIS — E78.2 MIXED HYPERLIPIDEMIA: ICD-10-CM

## 2023-08-07 DIAGNOSIS — H61.23 BILATERAL IMPACTED CERUMEN: ICD-10-CM

## 2023-08-07 DIAGNOSIS — J43.9 PULMONARY EMPHYSEMA, UNSPECIFIED EMPHYSEMA TYPE: ICD-10-CM

## 2023-08-07 DIAGNOSIS — Z00.00 ANNUAL PHYSICAL EXAM: ICD-10-CM

## 2023-08-07 DIAGNOSIS — Z12.5 SCREENING FOR PROSTATE CANCER: Primary | ICD-10-CM

## 2023-08-07 DIAGNOSIS — E53.8 LOW SERUM VITAMIN B12: ICD-10-CM

## 2023-08-07 DIAGNOSIS — I1A.0 RESISTANT HYPERTENSION: ICD-10-CM

## 2023-08-07 DIAGNOSIS — M06.9 RHEUMATOID ARTHRITIS, INVOLVING UNSPECIFIED SITE, UNSPECIFIED WHETHER RHEUMATOID FACTOR PRESENT: ICD-10-CM

## 2023-08-07 DIAGNOSIS — N18.31 STAGE 3A CHRONIC KIDNEY DISEASE: ICD-10-CM

## 2023-08-07 LAB
BODY SITE - BONE MARROW: NORMAL
CLINICAL DIAGNOSIS - BONE MARROW: NORMAL
FLOW CYTOMETRY ANTIBODIES ANALYZED - BONE MARROW: NORMAL
FLOW CYTOMETRY COMMENT - BONE MARROW: NORMAL
FLOW CYTOMETRY INTERPRETATION - BONE MARROW: NORMAL

## 2023-08-07 PROCEDURE — 99999 PR PBB SHADOW E&M-EST. PATIENT-LVL V: CPT | Mod: PBBFAC,,, | Performed by: INTERNAL MEDICINE

## 2023-08-07 PROCEDURE — 1125F PR PAIN SEVERITY QUANTIFIED, PAIN PRESENT: ICD-10-PCS | Mod: CPTII,S$GLB,, | Performed by: INTERNAL MEDICINE

## 2023-08-07 PROCEDURE — 3074F PR MOST RECENT SYSTOLIC BLOOD PRESSURE < 130 MM HG: ICD-10-PCS | Mod: CPTII,S$GLB,, | Performed by: INTERNAL MEDICINE

## 2023-08-07 PROCEDURE — 3078F DIAST BP <80 MM HG: CPT | Mod: CPTII,S$GLB,, | Performed by: INTERNAL MEDICINE

## 2023-08-07 PROCEDURE — 99214 OFFICE O/P EST MOD 30 MIN: CPT | Mod: S$GLB,,, | Performed by: INTERNAL MEDICINE

## 2023-08-07 PROCEDURE — 4010F ACE/ARB THERAPY RXD/TAKEN: CPT | Mod: CPTII,S$GLB,, | Performed by: INTERNAL MEDICINE

## 2023-08-07 PROCEDURE — 3288F PR FALLS RISK ASSESSMENT DOCUMENTED: ICD-10-PCS | Mod: CPTII,S$GLB,, | Performed by: INTERNAL MEDICINE

## 2023-08-07 PROCEDURE — 1159F PR MEDICATION LIST DOCUMENTED IN MEDICAL RECORD: ICD-10-PCS | Mod: CPTII,S$GLB,, | Performed by: INTERNAL MEDICINE

## 2023-08-07 PROCEDURE — 99214 PR OFFICE/OUTPT VISIT, EST, LEVL IV, 30-39 MIN: ICD-10-PCS | Mod: S$GLB,,, | Performed by: INTERNAL MEDICINE

## 2023-08-07 PROCEDURE — 1101F PR PT FALLS ASSESS DOC 0-1 FALLS W/OUT INJ PAST YR: ICD-10-PCS | Mod: CPTII,S$GLB,, | Performed by: INTERNAL MEDICINE

## 2023-08-07 PROCEDURE — 1125F AMNT PAIN NOTED PAIN PRSNT: CPT | Mod: CPTII,S$GLB,, | Performed by: INTERNAL MEDICINE

## 2023-08-07 PROCEDURE — 1160F RVW MEDS BY RX/DR IN RCRD: CPT | Mod: CPTII,S$GLB,, | Performed by: INTERNAL MEDICINE

## 2023-08-07 PROCEDURE — 4010F PR ACE/ARB THEARPY RXD/TAKEN: ICD-10-PCS | Mod: CPTII,S$GLB,, | Performed by: INTERNAL MEDICINE

## 2023-08-07 PROCEDURE — 3074F SYST BP LT 130 MM HG: CPT | Mod: CPTII,S$GLB,, | Performed by: INTERNAL MEDICINE

## 2023-08-07 PROCEDURE — 36415 COLL VENOUS BLD VENIPUNCTURE: CPT | Performed by: NURSE PRACTITIONER

## 2023-08-07 PROCEDURE — 1159F MED LIST DOCD IN RCRD: CPT | Mod: CPTII,S$GLB,, | Performed by: INTERNAL MEDICINE

## 2023-08-07 PROCEDURE — 3078F PR MOST RECENT DIASTOLIC BLOOD PRESSURE < 80 MM HG: ICD-10-PCS | Mod: CPTII,S$GLB,, | Performed by: INTERNAL MEDICINE

## 2023-08-07 PROCEDURE — 3288F FALL RISK ASSESSMENT DOCD: CPT | Mod: CPTII,S$GLB,, | Performed by: INTERNAL MEDICINE

## 2023-08-07 PROCEDURE — 99999 PR PBB SHADOW E&M-EST. PATIENT-LVL V: ICD-10-PCS | Mod: PBBFAC,,, | Performed by: INTERNAL MEDICINE

## 2023-08-07 PROCEDURE — 3008F BODY MASS INDEX DOCD: CPT | Mod: CPTII,S$GLB,, | Performed by: INTERNAL MEDICINE

## 2023-08-07 PROCEDURE — 1101F PT FALLS ASSESS-DOCD LE1/YR: CPT | Mod: CPTII,S$GLB,, | Performed by: INTERNAL MEDICINE

## 2023-08-07 PROCEDURE — 1160F PR REVIEW ALL MEDS BY PRESCRIBER/CLIN PHARMACIST DOCUMENTED: ICD-10-PCS | Mod: CPTII,S$GLB,, | Performed by: INTERNAL MEDICINE

## 2023-08-07 PROCEDURE — 3008F PR BODY MASS INDEX (BMI) DOCUMENTED: ICD-10-PCS | Mod: CPTII,S$GLB,, | Performed by: INTERNAL MEDICINE

## 2023-08-07 NOTE — PROGRESS NOTES
Patient ID: Scooter Swan is a 72 y.o. male.    Chief Complaint: Annual Exam        Assessment and Plan      1. Screening for prostate cancer    2. Pulmonary emphysema, unspecified emphysema type   - Stable continue p.r.n. albuterol   3. Mixed hyperlipidemia   - controlled, continue pravastatin   4. Resistant hypertension   - controlled, continue enalapril, hydralazine, Cardura   5. Stage 3a chronic kidney disease   - monitor   6. Rheumatoid arthritis, involving unspecified site, unspecified whether rheumatoid factor present   - follow-up with Rheumatology   7. Annual physical exam    8. Bilateral impacted cerumen    9. Low serum vitamin B12       Fatigue- likely multifactorial.  Valvular dysfunction, COPD, polypharmacy  - check B12 and reassurance     HPI     Annual    Right ear fulness and tried debrox.     Note history of anemia and leukocytosis.  Saw Hematology.  Bone marrow biopsy done and pending results.    Insomnia better on THC gummies and hydroxyzine    Having exhaustion since taking Enbrel.  He is working with Rheumatology to reduce prednisone.  Simponi trial planned.    Hypertension- controlled   Hyperlipidemia- controlled.   Moderate aortic regurgitation and stenosis, see echo, followed by Cardiology- murmur noted on exam  CKD stable      Review of Systems   Constitutional:  Negative for fever.   Respiratory:  Negative for shortness of breath.    Cardiovascular:  Negative for chest pain.   Gastrointestinal:  Negative for abdominal pain.        Objective     Vitals:    08/07/23 0858   BP: 122/68   Pulse: 82     Wt Readings from Last 3 Encounters:   08/07/23 0858 89.9 kg (198 lb 3.1 oz)   08/04/23 0846 86.2 kg (190 lb)   05/10/23 1251 85.1 kg (187 lb 9.8 oz)      Body mass index is 26.88 kg/m².   Physical Exam  HENT:      Right Ear: There is impacted cerumen.      Left Ear: There is impacted cerumen.   Cardiovascular:      Rate and Rhythm: Normal rate and regular rhythm.      Heart sounds: Murmur heard.       No gallop.   Pulmonary:      Breath sounds: Normal breath sounds. No wheezing or rhonchi.   Abdominal:      Palpations: Abdomen is soft.      Tenderness: There is no abdominal tenderness.          Jesus Helms was seen today for annual exam.    Diagnoses and all orders for this visit:    Screening for prostate cancer  -     PSA, Screening; Future    Pulmonary emphysema, unspecified emphysema type    Mixed hyperlipidemia  -     Lipid Panel; Future    Resistant hypertension    Stage 3a chronic kidney disease    Rheumatoid arthritis, involving unspecified site, unspecified whether rheumatoid factor present    Annual physical exam    Bilateral impacted cerumen  -     Ambulatory referral/consult to ENT; Future    Low serum vitamin B12  -     Vitamin B12; Future         Medication List with Changes/Refills   Current Medications    ALBUTEROL (PROVENTIL/VENTOLIN HFA) 90 MCG/ACTUATION INHALER    INHALE 1 PUFF BY MOUTH INTO THE LUNGS EVERY 4 HOURS AS NEEDED FOR WHEEZING OR SHORTNESS OF BREATH    CHOLECALCIFEROL, VITAMIN D3, (VITAMIN D3) 50 MCG (2,000 UNIT) CAP    Take 1 capsule (2,000 Units total) by mouth once daily.    CYANOCOBALAMIN 500 MCG TABLET    Take 500 mcg by mouth once daily.    DICLOFENAC SODIUM (VOLTAREN) 1 % GEL    Apply 2 grams  topically 4 (four) times daily.    DILTIAZEM (CARDIZEM CD) 180 MG 24 HR CAPSULE    TAKE ONE CAPSULE BY MOUTH ONCE DAILY    DORZOLAMIDE (TRUSOPT) 2 % OPHTHALMIC SOLUTION    Place 1 drop into both eyes 3 (three) times daily.    DOXAZOSIN (CARDURA) 2 MG TABLET    Take 1 tablet (2 mg total) by mouth every evening.    ENALAPRIL (VASOTEC) 20 MG TABLET    TAKE 1 TABLET(20 MG) BY MOUTH TWICE DAILY    HYDRALAZINE (APRESOLINE) 50 MG TABLET    Take 2 tablets (100 mg total) by mouth every 12 (twelve) hours.    HYDROCODONE-ACETAMINOPHEN (NORCO)  MG PER TABLET    Take 1 tablet by mouth every 8 (eight) hours as needed for Pain.    HYDROCODONE-ACETAMINOPHEN (NORCO)  MG PER TABLET     Take 1 tablet by mouth every 8 (eight) hours as needed for Pain.    HYDROXYZINE HCL (ATARAX) 25 MG TABLET    Take 1 tablet (25 mg total) by mouth nightly as needed for Itching.    LACTOBAC NO.41/BIFIDOBACT NO.7 (PROBIOTIC-10 ORAL)    Take 1 capsule by mouth once daily.    MULTIVITAMIN CAPSULE    Take 1 capsule by mouth once daily.    MUPIROCIN (BACTROBAN) 2 % OINTMENT    Apply topically 3 (three) times daily.    OMEPRAZOLE (PRILOSEC) 20 MG CAPSULE    Take 1 capsule (20 mg total) by mouth 2 (two) times a day.    POLYETHYLENE GLYCOL (GLYCOLAX) 17 GRAM PWPK    Take 17 g by mouth daily as needed.    PRAVASTATIN (PRAVACHOL) 20 MG TABLET    TAKE 1 TABLET BY MOUTH DAILY    PREDNISONE (DELTASONE) 5 MG TABLET    Take 3 tablets (15 mg total) by mouth once daily.    SILDENAFIL (VIAGRA) 100 MG TABLET    Take 1 tablet (100 mg total) by mouth as needed for Erectile Dysfunction.    TAMSULOSIN (FLOMAX) 0.4 MG CAP    TAKE 1 CAPSULE(0.4 MG) BY MOUTH EVERY DAY    TIZANIDINE (ZANAFLEX) 4 MG TABLET    Take 1 tablet (4 mg total) by mouth every 8 (eight) hours.    TRAVOPROST (TRAVATAN Z) 0.004 % OPHTHALMIC SOLUTION    Place 1 drop into both eyes every evening.    ZALEPLON (SONATA) 10 MG CAPSULE    Take 1 capsule (10 mg total) by mouth every evening.       I personally reviewed past medical, family and social history.    Patient Active Problem List   Diagnosis    Hemorrhoids    Mixed hyperlipidemia    Resistant hypertension    RA (rheumatoid arthritis)    Lower abdominal pain    Other constipation    Thrombocytopenia    Abdominal pain    Anemia    History of hepatitis C    Abnormal CT scan, liver    Pulmonary emphysema    Moderate aortic stenosis    Other specified glaucoma    Moderate aortic regurgitation    SOB (shortness of breath)    Bilateral carotid bruits    Overweight (BMI 25.0-29.9)    Nonspecific abnormal electrocardiogram (ECG) (EKG)    Aortic valve disease    Carotid artery plaque, bilateral    Stage 3a chronic kidney disease

## 2023-08-14 RX ORDER — DOXAZOSIN 2 MG/1
2 TABLET ORAL NIGHTLY
Qty: 90 TABLET | Refills: 1 | Status: SHIPPED | OUTPATIENT
Start: 2023-08-14 | End: 2024-02-02

## 2023-08-14 RX ORDER — DILTIAZEM HYDROCHLORIDE 180 MG/1
CAPSULE, COATED, EXTENDED RELEASE ORAL
Qty: 30 CAPSULE | Refills: 2 | Status: SHIPPED | OUTPATIENT
Start: 2023-08-14 | End: 2023-11-06

## 2023-08-15 LAB
CHROM BANDING METHOD: NORMAL
CHROMOSOME ANALYSIS BM ADDITIONAL INFORMATION: NORMAL
CHROMOSOME ANALYSIS BM RELEASED BY: NORMAL
CHROMOSOME ANALYSIS BM RESULT SUMMARY: NORMAL
CLINICAL CYTOGENETICIST REVIEW: NORMAL
KARYOTYP MAR: NORMAL
REASON FOR REFERRAL (NARRATIVE): NORMAL
REF LAB TEST METHOD: NORMAL
SPECIMEN SOURCE: NORMAL
SPECIMEN: NORMAL

## 2023-08-16 ENCOUNTER — TELEPHONE (OUTPATIENT)
Dept: HEMATOLOGY/ONCOLOGY | Facility: CLINIC | Age: 73
End: 2023-08-16
Payer: MEDICARE

## 2023-08-16 NOTE — TELEPHONE ENCOUNTER
Vm left advising pt that tomorrows appt has been rescheduled per Dr Botello. Pathology results are incomplete. Appt rescheduled advised pt if appt date and time does not work to contact clinic for assistance rescheduling

## 2023-08-17 DIAGNOSIS — G89.4 CHRONIC PAIN SYNDROME: ICD-10-CM

## 2023-08-17 DIAGNOSIS — M05.9 SEROPOSITIVE RHEUMATOID ARTHRITIS: ICD-10-CM

## 2023-08-17 DIAGNOSIS — Z79.52 CURRENT CHRONIC USE OF SYSTEMIC STEROIDS: ICD-10-CM

## 2023-08-17 RX ORDER — PREDNISONE 5 MG/1
10 TABLET ORAL DAILY
Qty: 270 TABLET | Refills: 1 | Status: SHIPPED | OUTPATIENT
Start: 2023-08-17 | End: 2024-02-19 | Stop reason: SDUPTHER

## 2023-08-21 LAB
ANNOTATION COMMENT IMP: NORMAL
NGS CLINCIAL TRIALS: NORMAL
NGS INDICATION OF TEST: NORMAL
NGS INTERPRETATION: NORMAL
NGS ONCOHEME PANEL GENE LIST: NORMAL
NGS PATHOGENIC MUTATIONS DETECTED: NORMAL
NGS REVIEWED BY:: NORMAL
NGS VARIANTS OF UNKNOWN SIGNIFICANCE: NORMAL
NGSHM RESULT, BONE MARROW: NORMAL
REF LAB TEST METHOD: NORMAL
SPECIMEN SOURCE: NORMAL
TEST PERFORMANCE INFO SPEC: NORMAL

## 2023-08-22 LAB
COMMENT: NORMAL
FINAL PATHOLOGIC DIAGNOSIS: NORMAL
GROSS: NORMAL
Lab: NORMAL
MICROSCOPIC EXAM: NORMAL
SUPPLEMENTAL DIAGNOSIS: NORMAL

## 2023-08-24 ENCOUNTER — OFFICE VISIT (OUTPATIENT)
Dept: CARDIOLOGY | Facility: CLINIC | Age: 73
End: 2023-08-24
Payer: MEDICARE

## 2023-08-24 VITALS
SYSTOLIC BLOOD PRESSURE: 132 MMHG | HEART RATE: 94 BPM | HEIGHT: 72 IN | BODY MASS INDEX: 26.51 KG/M2 | DIASTOLIC BLOOD PRESSURE: 72 MMHG | WEIGHT: 195.75 LBS

## 2023-08-24 DIAGNOSIS — E78.2 MIXED HYPERLIPIDEMIA: Chronic | ICD-10-CM

## 2023-08-24 DIAGNOSIS — I77.810 MILD ASCENDING AORTA DILATATION: Chronic | ICD-10-CM

## 2023-08-24 DIAGNOSIS — J43.8 OTHER EMPHYSEMA: Chronic | ICD-10-CM

## 2023-08-24 DIAGNOSIS — I10 PRIMARY HYPERTENSION: Chronic | ICD-10-CM

## 2023-08-24 DIAGNOSIS — I65.23 CAROTID ARTERY PLAQUE, BILATERAL: Chronic | ICD-10-CM

## 2023-08-24 DIAGNOSIS — I35.0 MODERATE AORTIC STENOSIS: Primary | Chronic | ICD-10-CM

## 2023-08-24 DIAGNOSIS — N18.31 STAGE 3A CHRONIC KIDNEY DISEASE: Chronic | ICD-10-CM

## 2023-08-24 PROCEDURE — 3008F PR BODY MASS INDEX (BMI) DOCUMENTED: ICD-10-PCS | Mod: CPTII,S$GLB,, | Performed by: INTERNAL MEDICINE

## 2023-08-24 PROCEDURE — 3288F PR FALLS RISK ASSESSMENT DOCUMENTED: ICD-10-PCS | Mod: CPTII,S$GLB,, | Performed by: INTERNAL MEDICINE

## 2023-08-24 PROCEDURE — 4010F ACE/ARB THERAPY RXD/TAKEN: CPT | Mod: CPTII,S$GLB,, | Performed by: INTERNAL MEDICINE

## 2023-08-24 PROCEDURE — 3075F SYST BP GE 130 - 139MM HG: CPT | Mod: CPTII,S$GLB,, | Performed by: INTERNAL MEDICINE

## 2023-08-24 PROCEDURE — 3008F BODY MASS INDEX DOCD: CPT | Mod: CPTII,S$GLB,, | Performed by: INTERNAL MEDICINE

## 2023-08-24 PROCEDURE — 3078F PR MOST RECENT DIASTOLIC BLOOD PRESSURE < 80 MM HG: ICD-10-PCS | Mod: CPTII,S$GLB,, | Performed by: INTERNAL MEDICINE

## 2023-08-24 PROCEDURE — 1126F AMNT PAIN NOTED NONE PRSNT: CPT | Mod: CPTII,S$GLB,, | Performed by: INTERNAL MEDICINE

## 2023-08-24 PROCEDURE — 99999 PR PBB SHADOW E&M-EST. PATIENT-LVL IV: ICD-10-PCS | Mod: PBBFAC,,, | Performed by: INTERNAL MEDICINE

## 2023-08-24 PROCEDURE — 3078F DIAST BP <80 MM HG: CPT | Mod: CPTII,S$GLB,, | Performed by: INTERNAL MEDICINE

## 2023-08-24 PROCEDURE — 1159F MED LIST DOCD IN RCRD: CPT | Mod: CPTII,S$GLB,, | Performed by: INTERNAL MEDICINE

## 2023-08-24 PROCEDURE — 1101F PR PT FALLS ASSESS DOC 0-1 FALLS W/OUT INJ PAST YR: ICD-10-PCS | Mod: CPTII,S$GLB,, | Performed by: INTERNAL MEDICINE

## 2023-08-24 PROCEDURE — 99214 PR OFFICE/OUTPT VISIT, EST, LEVL IV, 30-39 MIN: ICD-10-PCS | Mod: S$GLB,,, | Performed by: INTERNAL MEDICINE

## 2023-08-24 PROCEDURE — 99214 OFFICE O/P EST MOD 30 MIN: CPT | Mod: S$GLB,,, | Performed by: INTERNAL MEDICINE

## 2023-08-24 PROCEDURE — 1159F PR MEDICATION LIST DOCUMENTED IN MEDICAL RECORD: ICD-10-PCS | Mod: CPTII,S$GLB,, | Performed by: INTERNAL MEDICINE

## 2023-08-24 PROCEDURE — 3288F FALL RISK ASSESSMENT DOCD: CPT | Mod: CPTII,S$GLB,, | Performed by: INTERNAL MEDICINE

## 2023-08-24 PROCEDURE — 1101F PT FALLS ASSESS-DOCD LE1/YR: CPT | Mod: CPTII,S$GLB,, | Performed by: INTERNAL MEDICINE

## 2023-08-24 PROCEDURE — 1126F PR PAIN SEVERITY QUANTIFIED, NO PAIN PRESENT: ICD-10-PCS | Mod: CPTII,S$GLB,, | Performed by: INTERNAL MEDICINE

## 2023-08-24 PROCEDURE — 99999 PR PBB SHADOW E&M-EST. PATIENT-LVL IV: CPT | Mod: PBBFAC,,, | Performed by: INTERNAL MEDICINE

## 2023-08-24 PROCEDURE — 3075F PR MOST RECENT SYSTOLIC BLOOD PRESS GE 130-139MM HG: ICD-10-PCS | Mod: CPTII,S$GLB,, | Performed by: INTERNAL MEDICINE

## 2023-08-24 PROCEDURE — 4010F PR ACE/ARB THEARPY RXD/TAKEN: ICD-10-PCS | Mod: CPTII,S$GLB,, | Performed by: INTERNAL MEDICINE

## 2023-08-24 NOTE — PROGRESS NOTES
Subjective:    Patient ID:  Scooter Swan is a 72 y.o. male who presents for Follow-up (Carotid artery plaque, bilateral)        Follow-up  Pertinent negatives include no change in bowel habit, chest pain, chills, coughing, diaphoresis, nausea, numbness, rash or weakness.     RECENT LABS CMP OK, RECENT BM BX, DOING WELL, NO CHEST PAIN NO TIA TYPE SYMPTOMS NO NEAR-SYNCOPE, MILD DISTANT EXERTION, SEE ROS    Past Medical History:   Diagnosis Date    Cataract     COPD (chronic obstructive pulmonary disease)     Diverticulosis     DUARTE (dyspnea on exertion)     GERD (gastroesophageal reflux disease)     Glaucoma     Hepatitis C     treated; seeing Dr. Carr    Hyperlipidemia     Hypertension     Liver lesion 08/2018    RA (rheumatoid arthritis)     Rectal prolapse     Possible     Past Surgical History:   Procedure Laterality Date    ANGIOGRAM, CORONARY, WITH LEFT HEART CATHETERIZATION  10/6/2022    Procedure: Angiogram, Coronary, with Left Heart Cath;  Surgeon: Zac Boucher MD;  Location: [facility] CATH;  Service: Cardiology;;    ARTERIOGRAPHY OF AORTIC ROOT  10/6/2022    Procedure: ARTERIOGRAM, AORTIC ROOT;  Surgeon: Zac Boucher MD;  Location: [facility] CATH;  Service: Cardiology;;    CARPAL TUNNEL RELEASE      Right wrist 2015    COLONOSCOPY  08/2016    Dr. Cardona; in care everywhere    COLONOSCOPY N/A 3/20/2019    Procedure: COLONOSCOPY;  Surgeon: Sotero Arthur MD;  Location: [facility];  Service: Endoscopy;  Laterality: N/A; hemorrhoids, diverticulosis, repeat in 10 years for screening    EXCISIONAL HEMORRHOIDECTOMY N/A 10/18/2018    Procedure: HEMORRHOIDECTOMY, prone;  Surgeon: Gunnar Horton MD;  Location: [address];  Service: Colon and Rectal;  Laterality: N/A;    THYROID SURGERY      UPPER GASTROINTESTINAL ENDOSCOPY  08/2016    Dr. Cardona; in care everywhere     Family History   Problem Relation Age of Onset    Stomach cancer Paternal Cousin     Stomach cancer Paternal Cousin     Cataracts Mother      Diabetes Mother     Hypertension Mother     Stroke Mother     Diabetes Sister     Hypertension Sister     Stroke Sister     Diabetes Brother     Glaucoma Brother     Hypertension Brother     Stroke Brother     Diabetes Maternal Aunt     Hypertension Maternal Aunt     Stroke Maternal Aunt     Diabetes Maternal Uncle     Hypertension Maternal Uncle     Stroke Maternal Uncle     Diabetes Maternal Grandmother     Hypertension Maternal Grandmother     Stroke Maternal Grandmother     Cancer Cousin         stomach    Colon cancer Neg Hx     Colon polyps Neg Hx     Crohn's disease Neg Hx     Ulcerative colitis Neg Hx     Esophageal cancer Neg Hx     Amblyopia Neg Hx     Blindness Neg Hx     Macular degeneration Neg Hx     Retinal detachment Neg Hx     Strabismus Neg Hx     Thyroid disease Neg Hx      Social History     Socioeconomic History    Marital status:    Tobacco Use    Smoking status: Never    Smokeless tobacco: Never   Substance and Sexual Activity    Alcohol use: Yes     Alcohol/week: 1.0 standard drink of alcohol     Types: 1 Shots of liquor per week     Comment: social    Drug use: Yes     Types: Hydrocodone     Social Determinants of Health     Financial Resource Strain: Low Risk  (9/27/2022)    Overall Financial Resource Strain (CARDIA)     Difficulty of Paying Living Expenses: Not hard at all   Food Insecurity: No Food Insecurity (9/27/2022)    Hunger Vital Sign     Worried About Running Out of Food in the Last Year: Never true     Ran Out of Food in the Last Year: Never true   Transportation Needs: Unmet Transportation Needs (9/27/2022)    PRAPARE - Transportation     Lack of Transportation (Medical): Yes     Lack of Transportation (Non-Medical): Yes   Physical Activity: Sufficiently Active (9/27/2022)    Exercise Vital Sign     Days of Exercise per Week: 5 days     Minutes of Exercise per Session: 30 min   Stress: Stress Concern Present (9/19/2022)    Citizen of [place] [employer] -  Occupational Stress Questionnaire     Feeling of Stress : Rather much   Social Connections: Unknown (9/27/2022)    Social Connection and Isolation Panel [NHANES]     Frequency of Communication with Friends and Family: More than three times a week     Frequency of Social Gatherings with Friends and Family: More than three times a week     Active Member of Clubs or Organizations: Patient refused     Attends Club or Organization Meetings: Patient refused     Marital Status:    Housing Stability: Unknown (9/27/2022)    Housing Stability Vital Sign     Unable to Pay for Housing in the Last Year: Patient refused     Number of Places Lived in the Last Year: 1     Unstable Housing in the Last Year: Patient refused       Review of patient's allergies indicates:   Allergen Reactions    Buspar [buspirone] Hallucinations     Nightmares    Fentanyl Hives and Hallucinations    Plaquenil [hydroxychloroquine]      Nausea, insomnia, weight loss 40LBS    Amitiza [lubiprostone] Nausea Only and Other (See Comments)     Fatigue.    Azulfidine [sulfasalazine] Nausea And Vomiting    Trazodone Other (See Comments)     Insomnia         Current Outpatient Medications:     albuterol (PROVENTIL/VENTOLIN HFA) 90 mcg/actuation inhaler, INHALE 1 PUFF BY MOUTH INTO THE LUNGS EVERY 4 HOURS AS NEEDED FOR WHEEZING OR SHORTNESS OF BREATH, Disp: 6.7 g, Rfl: 5    cholecalciferol, vitamin D3, (VITAMIN D3) 50 mcg (2,000 unit) Cap, Take 1 capsule (2,000 Units total) by mouth once daily., Disp: 90 capsule, Rfl: 3    cyanocobalamin 500 MCG tablet, Take 500 mcg by mouth once daily., Disp: , Rfl:     diclofenac sodium (VOLTAREN) 1 % Gel, Apply 2 grams  topically 4 (four) times daily., Disp: 100 g, Rfl: 5    diltiaZEM (CARDIZEM CD) 180 MG 24 hr capsule, TAKE ONE CAPSULE BY MOUTH ONCE DAILY, Disp: 30 capsule, Rfl: 2    dorzolamide (TRUSOPT) 2 % ophthalmic solution, Place 1 drop into both eyes 3 (three) times daily., Disp: , Rfl:     doxazosin (CARDURA) 2  MG tablet, TAKE 1 TABLET (2 MG TOTAL) BY MOUTH EVERY EVENING, Disp: 90 tablet, Rfl: 1    enalapril (VASOTEC) 20 MG tablet, TAKE 1 TABLET(20 MG) BY MOUTH TWICE DAILY, Disp: 180 tablet, Rfl: 3    hydrALAZINE (APRESOLINE) 50 MG tablet, Take 2 tablets (100 mg total) by mouth every 12 (twelve) hours., Disp: 360 tablet, Rfl: 3    [START ON 8/28/2023] HYDROcodone-acetaminophen (NORCO)  mg per tablet, Take 1 tablet by mouth every 8 (eight) hours as needed for Pain., Disp: 90 tablet, Rfl: 0    HYDROcodone-acetaminophen (NORCO)  mg per tablet, Take 1 tablet by mouth every 8 (eight) hours as needed for Pain., Disp: 90 tablet, Rfl: 0    hydrOXYzine HCL (ATARAX) 25 MG tablet, Take 1 tablet (25 mg total) by mouth nightly as needed for Itching., Disp: 30 tablet, Rfl: 1    Lactobac no.41/Bifidobact no.7 (PROBIOTIC-10 ORAL), Take 1 capsule by mouth once daily., Disp: , Rfl:     multivitamin capsule, Take 1 capsule by mouth once daily., Disp: , Rfl:     mupirocin (BACTROBAN) 2 % ointment, Apply topically 3 (three) times daily., Disp: 15 g, Rfl: 1    omeprazole (PRILOSEC) 20 MG capsule, Take 1 capsule (20 mg total) by mouth 2 (two) times a day., Disp: 180 capsule, Rfl: 3    polyethylene glycol (GLYCOLAX) 17 gram PwPk, Take 17 g by mouth daily as needed., Disp: , Rfl:     pravastatin (PRAVACHOL) 20 MG tablet, TAKE 1 TABLET BY MOUTH DAILY, Disp: 90 tablet, Rfl: 3    predniSONE (DELTASONE) 5 MG tablet, Take 2 tablets (10 mg total) by mouth once daily., Disp: 270 tablet, Rfl: 1    sildenafiL (VIAGRA) 100 MG tablet, Take 1 tablet (100 mg total) by mouth as needed for Erectile Dysfunction., Disp: 8 tablet, Rfl: 11    tamsulosin (FLOMAX) 0.4 mg Cap, TAKE 1 CAPSULE(0.4 MG) BY MOUTH EVERY DAY, Disp: 30 capsule, Rfl: 11    tiZANidine (ZANAFLEX) 4 MG tablet, Take 1 tablet (4 mg total) by mouth every 8 (eight) hours., Disp: 270 tablet, Rfl: 1    travoprost (TRAVATAN Z) 0.004 % ophthalmic solution, Place 1 drop into both eyes every  evening., Disp: 5 mL, Rfl: 12    zaleplon (SONATA) 10 MG capsule, Take 1 capsule (10 mg total) by mouth every evening. (Patient not taking: Reported on 8/7/2023), Disp: 30 capsule, Rfl: 3    Current Facility-Administered Medications:     sodium chloride 0.9% flush 10 mL, 10 mL, Intravenous, PRN, Zac Boucher MD    Facility-Administered Medications Ordered in Other Visits:     0.9%  NaCl infusion, , Intravenous, Continuous, Deedee Sarkar NP, Stopped at 10/18/18 1613    mupirocin 2 % ointment, , Nasal, On Call Procedure, Deedee Sarkar NP, Given at 10/18/18 1348    Review of Systems   Constitutional: Negative for chills, diaphoresis, malaise/fatigue and night sweats.   HENT: Negative.     Eyes:  Negative for blurred vision and visual disturbance.   Cardiovascular:  Negative for chest pain, claudication, cyanosis, dyspnea on exertion (MILD), irregular heartbeat, leg swelling, near-syncope, orthopnea, palpitations, paroxysmal nocturnal dyspnea and syncope.   Respiratory:  Negative for cough, hemoptysis, shortness of breath and wheezing.    Endocrine: Negative for heat intolerance and polyuria.   Hematologic/Lymphatic: Negative for adenopathy. Does not bruise/bleed easily.   Skin:  Negative for color change and rash.   Musculoskeletal:  Positive for arthritis (RA). Negative for back pain and falls.   Gastrointestinal:  Positive for dysphagia. Negative for change in bowel habit, melena and nausea.   Genitourinary:  Negative for dysuria and flank pain.   Neurological:  Negative for brief paralysis, dizziness, focal weakness, light-headedness, loss of balance, numbness and weakness.   Psychiatric/Behavioral:  Negative for altered mental status and depression. Nervous/anxious: SOME.    Allergic/Immunologic: Negative.         Objective:      Vitals:    08/24/23 1433   BP: 132/72   Pulse: 94   Weight: 88.8 kg (195 lb 12.3 oz)   Height: 6' (1.829 m)   PainSc: 0-No pain     Body mass index is 26.55  kg/m².    Physical Exam  Constitutional:       Appearance: He is well-developed.   HENT:      Head: Normocephalic and atraumatic.   Eyes:      Extraocular Movements: Extraocular movements intact.      Conjunctiva/sclera: Conjunctivae normal.   Neck:      Thyroid: No thyromegaly.      Vascular: Normal carotid pulses. No hepatojugular reflux or JVD.      Trachea: No tracheal deviation.   Cardiovascular:      Rate and Rhythm: Normal rate and regular rhythm. No extrasystoles are present.     Pulses:           Carotid pulses are 2+ on the right side and 2+ on the left side.       Radial pulses are 2+ on the right side and 2+ on the left side.        Posterior tibial pulses are 2+ on the right side and 2+ on the left side.      Heart sounds: Heart sounds not distant. No midsystolic click. Murmur heard.      Harsh systolic murmur is present with a grade of 3/6 at the upper right sternal border radiating to the neck.      Diastolic murmur is present at the upper right sternal border.      No friction rub. No gallop.   Pulmonary:      Effort: Pulmonary effort is normal. Prolonged expiration present. No respiratory distress.      Breath sounds: Normal breath sounds. No rales.   Abdominal:      General: Bowel sounds are normal.      Palpations: Abdomen is soft.      Tenderness: There is no abdominal tenderness.   Musculoskeletal:         General: Normal range of motion.      Cervical back: Neck supple. No edema or erythema.      Right lower leg: No edema.      Left lower leg: No edema.   Skin:     General: Skin is warm and dry.   Neurological:      Mental Status: He is alert and oriented to person, place, and time.      Cranial Nerves: Cranial nerves 2-12 are intact. No cranial nerve deficit.   Psychiatric:         Mood and Affect: Mood normal.         Speech: Speech normal.         Behavior: Behavior normal.         Judgment: Judgment normal.               ..    Chemistry        Component Value Date/Time     08/04/2023  0828    K 3.8 08/04/2023 0828     08/04/2023 0828    CO2 28 08/04/2023 0828    BUN 12 08/04/2023 0828    CREATININE 1.2 08/04/2023 0828    GLU 97 08/04/2023 0828        Component Value Date/Time    CALCIUM 9.2 08/04/2023 0828    ALKPHOS 61 08/04/2023 0828    AST 17 08/04/2023 0828    ALT 15 08/04/2023 0828    BILITOT 0.4 08/04/2023 0828    ESTGFRAFRICA >60.0 05/13/2022 0900    EGFRNONAA >60.0 05/13/2022 0900            ..  Lab Results   Component Value Date    CHOL 176 02/16/2023    CHOL 162 02/11/2019     Lab Results   Component Value Date    HDL 62 02/16/2023    HDL 76 (H) 02/11/2019     Lab Results   Component Value Date    LDLCALC 96.6 02/16/2023    LDLCALC 77.6 02/11/2019     Lab Results   Component Value Date    TRIG 87 02/16/2023    TRIG 42 02/11/2019     Lab Results   Component Value Date    CHOLHDL 35.2 02/16/2023    CHOLHDL 46.9 02/11/2019     ..  Lab Results   Component Value Date    WBC 14.50 (H) 08/04/2023    HGB 9.9 (L) 08/04/2023    HCT 31.8 (L) 08/04/2023    MCV 90 08/04/2023     08/04/2023       Test(s) Reviewed  I have reviewed the following in detail:  [] Stress test   [] Angiography   [] Echocardiogram   [] Labs   [] Other:       Assessment:         ICD-10-CM ICD-9-CM   1. Moderate aortic stenosis  I35.0 424.1   2. Carotid artery plaque, bilateral  I65.23 433.10     433.30   3. Mixed hyperlipidemia  E78.2 272.2   4. Stage 3a chronic kidney disease  N18.31 585.3   5. Other emphysema  J43.8 492.8   6. Primary hypertension  I10 401.9   7. Mild ascending aorta dilatation  I77.810 447.71     Problem List Items Addressed This Visit          Pulmonary    Pulmonary emphysema       Cardiac/Vascular    Mixed hyperlipidemia    Moderate aortic stenosis - Primary    Relevant Orders    Echo    Carotid artery plaque, bilateral       Renal/    Stage 3a chronic kidney disease     Other Visit Diagnoses       Primary hypertension  (Chronic)       CONTROLLED    Relevant Orders    Ankle Brachial  Indices (TALON)    Mild ascending aorta dilatation  (Chronic)       BB             Plan:     ALL CV CLINICALLY STABLE, NO ANGINA, NO HF, NO TIA, NO CLINICAL ARRHYTHMIA,CONTINUE CURRENT MEDS, EDUCATION, DIET, EXERCISE , STAY ACTIVE AVOID DEHYDRATION INCREASED CV RISK WITH CKD RETURN TO CLINIC IN 6 MONTHS WITH ECHO REASSESS AORTIC STENOSIS, ALSO TALON      Moderate aortic stenosis  Comments:  REASSESS  Orders:  -     Echo; Future; Expected date: 02/24/2024    Carotid artery plaque, bilateral    Mixed hyperlipidemia    Stage 3a chronic kidney disease    Other emphysema    Primary hypertension  Comments:  CONTROLLED  Orders:  -     Ankle Brachial Indices (TALON); Future    Mild ascending aorta dilatation  Comments:  BB    RTC Low level/low impact aerobic exercise 5x's/wk. Heart healthy diet and risk factor modification.    See labs and med orders.    Aerobic exercise 5x's/wk. Heart healthy diet and risk factor modification.    See labs and med orders.

## 2023-08-30 DIAGNOSIS — G89.4 CHRONIC PAIN SYNDROME: ICD-10-CM

## 2023-08-30 RX ORDER — HYDROCODONE BITARTRATE AND ACETAMINOPHEN 10; 325 MG/1; MG/1
1 TABLET ORAL EVERY 8 HOURS PRN
Qty: 90 TABLET | Refills: 0 | Status: CANCELLED | OUTPATIENT
Start: 2023-08-30 | End: 2023-09-29

## 2023-08-31 NOTE — PROGRESS NOTES
HEMATOLOGY / ONCOLOGY   CLINIC NOTE     ONCOLOGICAL HISTORY:     Diagnosis:  - Leukocytosis  - Anemia and thrombocytopenia     Subjective:       Chief Complaint: Anemia (Leukocytosis and thrombocytopenia)      ADY Swan  72 y.o.  male with past medical history significant for HTN, AR, CAD, RA, HCV for follow up of anemia, thrombocytopenia and leukocytosis    Patient has history of rheumatoid arthritis and is on treatment prednisone 10 mg daily for few years now and followed by rheumatology.  He previously had cells now resolved recurrent skin infections, which has now resolved. Denies cigarette smoking.  Has occasional alcohol on holiday.  Denies illicit drug use.     Interval History:     Patient here for follow-up.  Denies any fevers, night sweats or weight loss.  Denies noticing any lumps.  Discussed the finding of the bone marrow biopsy with the patient    Past Medical History:   Diagnosis Date    Cataract     COPD (chronic obstructive pulmonary disease)     Diverticulosis     DUARTE (dyspnea on exertion)     GERD (gastroesophageal reflux disease)     Glaucoma     Hepatitis C     treated; seeing Dr. Carr    Hyperlipidemia     Hypertension     Liver lesion 08/2018    RA (rheumatoid arthritis)     Rectal prolapse     Possible      Past Surgical History:   Procedure Laterality Date    ANGIOGRAM, CORONARY, WITH LEFT HEART CATHETERIZATION  10/6/2022    Procedure: Angiogram, Coronary, with Left Heart Cath;  Surgeon: Zac Boucher MD;  Location[facility] CATH;  Service: Cardiology;;    ARTERIOGRAPHY OF AORTIC ROOT  10/6/2022    Procedure: ARTERIOGRAM, AORTIC ROOT;  Surgeon: Zac Boucher MD;  Location: [facility] CATH;  Service: Cardiology;;    CARPAL TUNNEL RELEASE      Right wrist 2015    COLONOSCOPY  08/2016    Dr. Cardona; in care everywhere    COLONOSCOPY N/A 3/20/2019    Procedure: COLONOSCOPY;  Surgeon: Sotero Arthur MD;  Location: [facility] ENDO;  Service: Endoscopy;  Laterality: N/A; hemorrhoids,  diverticulosis, repeat in 10 years for screening    EXCISIONAL HEMORRHOIDECTOMY N/A 10/18/2018    Procedure: HEMORRHOIDECTOMY, prone;  Surgeon: Gunnar Horton MD;  Location: [facility] OR [address] [place] [address];  Service: Colon and Rectal;  Laterality: N/A;    THYROID SURGERY      UPPER GASTROINTESTINAL ENDOSCOPY  08/2016    Dr. Cardona; in care everywhere     Social History     Socioeconomic History    Marital status:    Tobacco Use    Smoking status: Never    Smokeless tobacco: Never   Substance and Sexual Activity    Alcohol use: Yes     Alcohol/week: 1.0 standard drink of alcohol     Types: 1 Shots of liquor per week     Comment: social    Drug use: Yes     Types: Hydrocodone     Social Determinants of Health     Financial Resource Strain: Low Risk  (9/27/2022)    Overall Financial Resource Strain (CARDIA)     Difficulty of Paying Living Expenses: Not hard at all   Food Insecurity: No Food Insecurity (9/27/2022)    Hunger Vital Sign     Worried About Running Out of Food in the Last Year: Never true     Ran Out of Food in the Last Year: Never true   Transportation Needs: Unmet Transportation Needs (9/27/2022)    PRAPARE - Transportation     Lack of Transportation (Medical): Yes     Lack of Transportation (Non-Medical): Yes   Physical Activity: Sufficiently Active (9/27/2022)    Exercise Vital Sign     Days of Exercise per Week: 5 days     Minutes of Exercise per Session: 30 min   Stress: Stress Concern Present (9/19/2022)    [employer] - Occupational Stress Questionnaire     Feeling of Stress : Rather much   Social Connections: Unknown (9/27/2022)    Social Connection and Isolation Panel [NHANES]     Frequency of Communication with Friends and Family: More than three times a week     Frequency of Social Gatherings with Friends and Family: More than three times a week     Active Member of Clubs or Organizations: Patient refused     Attends Club or Organization Meetings: Patient refused      Marital Status:    Housing Stability: Unknown (9/27/2022)    Housing Stability Vital Sign     Unable to Pay for Housing in the Last Year: Patient refused     Number of Places Lived in the Last Year: 1     Unstable Housing in the Last Year: Patient refused      Family History   Problem Relation Age of Onset    Stomach cancer Paternal Cousin     Stomach cancer Paternal Cousin     Cataracts Mother     Diabetes Mother     Hypertension Mother     Stroke Mother     Diabetes Sister     Hypertension Sister     Stroke Sister     Diabetes Brother     Glaucoma Brother     Hypertension Brother     Stroke Brother     Diabetes Maternal Aunt     Hypertension Maternal Aunt     Stroke Maternal Aunt     Diabetes Maternal Uncle     Hypertension Maternal Uncle     Stroke Maternal Uncle     Diabetes Maternal Grandmother     Hypertension Maternal Grandmother     Stroke Maternal Grandmother     Cancer Cousin         stomach    Colon cancer Neg Hx     Colon polyps Neg Hx     Crohn's disease Neg Hx     Ulcerative colitis Neg Hx     Esophageal cancer Neg Hx     Amblyopia Neg Hx     Blindness Neg Hx     Macular degeneration Neg Hx     Retinal detachment Neg Hx     Strabismus Neg Hx     Thyroid disease Neg Hx       Review of patient's allergies indicates:   Allergen Reactions    Buspar [buspirone] Hallucinations     Nightmares    Fentanyl Hives and Hallucinations    Plaquenil [hydroxychloroquine]      Nausea, insomnia, weight loss 40LBS    Amitiza [lubiprostone] Nausea Only and Other (See Comments)     Fatigue.    Azulfidine [sulfasalazine] Nausea And Vomiting    Trazodone Other (See Comments)     Insomnia        Review of Systems   Constitutional: Negative.  Negative for activity change, chills, fatigue and fever.   HENT: Negative.     Eyes: Negative.    Respiratory:  Negative for cough and shortness of breath.    Cardiovascular:  Negative for chest pain and leg swelling.   Gastrointestinal:  Negative for constipation, diarrhea, nausea  and vomiting.   Endocrine: Negative.    Genitourinary: Negative.    Musculoskeletal:  Negative for arthralgias and myalgias.   Integumentary:  Negative.   Allergic/Immunologic: Negative.    Neurological:  Negative for light-headedness, numbness and headaches.   Hematological: Negative.    Psychiatric/Behavioral: Negative.           Objective:        Vitals:    09/01/23 0858   BP: 119/70   Pulse: 68   Temp: 97.9 °F (36.6 °C)        Physical Exam  Constitutional:       General: He is not in acute distress.     Appearance: He is well-developed. He is not ill-appearing.   HENT:      Head: Normocephalic and atraumatic.      Mouth/Throat:      Mouth: Mucous membranes are moist.   Eyes:      Extraocular Movements: Extraocular movements intact.      Conjunctiva/sclera: Conjunctivae normal.   Cardiovascular:      Rate and Rhythm: Normal rate and regular rhythm.      Heart sounds: Normal heart sounds.   Pulmonary:      Effort: Pulmonary effort is normal. No respiratory distress.      Breath sounds: Normal breath sounds. No wheezing.   Abdominal:      General: There is no distension.      Palpations: Abdomen is soft.      Tenderness: There is no abdominal tenderness.   Musculoskeletal:         General: Normal range of motion.      Cervical back: Normal range of motion and neck supple.   Skin:     General: Skin is warm.   Neurological:      General: No focal deficit present.      Mental Status: He is alert and oriented to person, place, and time. Mental status is at baseline.      Cranial Nerves: No cranial nerve deficit.   Psychiatric:         Mood and Affect: Mood normal.           LABS / IMAGING      - 8/4/2023 RIGHT ILIAC CREST BONE MARROW ASPIRATE, BONE MARROW CLOT, AND BONE MARROW CORE BIOPSY WITH:     CELLULARITY=40%, TRILINEAGE HEMATOPOIETIC ACTIVITY (M/E= 2.4:1).  SEE COMMENT.   INCREASED STORAGE IRON.   ADEQUATE NUMBER OF MEGAKARYOCYTES  Bone marrow karyotype results: 46, XY[20], male karyotype.  NGS: No pathogenic  genetic alteration is detected in the listed gene regions.  Findings are nondiagnostic for lymphoma or plasma cell dyscrasia.      Assessment:       LEUKOCYTOSIS  - intermittent mild leukocytosis with elevated ANC since 2018, stable   - Pt is on chronic prednisone use for management of RA and had recurrent infections.   - 8/4/2023 BMB:  40% cellularity with trilineage hematopoietic activity with normal karyotype and no significant pathogenic alteration on NGS.  Nondiagnostic for lymphoma or plasma cell dyscrasia.    ANEMIA / THROMBOCYTOPENIA  - mild normocytic anemia and intermittent mild thrombocytopenia since 2018, stable  - Previously workup with no evidence of iron, folic acid or B12 deficiency and negative for monoclonal gammopathy. Normal kidney function but does have history of HCV and liver scarring on scan. Bone marrow biopsy with normal cellularity and no abnormality on cytogenetics and NGS.       Plan:       - Intermittent chronic leukocytosis which could be because of steroids and infections, now resolved  -  Chronic anemia and thrombocytopenia likely because of rheumatoid arthritis but will check for deficiencies again. Also history of HCV and previous FibroScan in April 2019 documented as F3 (severe liver scarring) which can lead to pancytopenia. Thus referred to hepatology for re-evaluation.   - Follow with urology for evaluation of right complex renal cyst.   - repeat workup for anemia / thrombocytopenia and treat if noted any deficiencies   - MD / LABS VISIT - 4 WEEKS         Med Onc Chart Routing      Follow up with physician 4 weeks. VIRTUAL   Follow up with ROBYN    Infusion scheduling note    Injection scheduling note    Labs   Scheduling:  Preferred lab:  Lab interval:  LABS TODAY   Imaging    Pharmacy appointment    Other referrals                The patient was seen, interviewed and examined. Pertinent lab and radiology studies were reviewed. Pt instructed to call should develop concerning  signs/symptoms or have further questions.       Portions of the record may have been created with voice recognition software. Occasional wrong-word or sound-a-like substitutions may have occurred due to the inherent limitations of voice recognition software. Read the chart carefully and recognize, using context, where substitutions have occurred.    Kye Botello MD  Hematology / Oncology

## 2023-09-01 ENCOUNTER — OFFICE VISIT (OUTPATIENT)
Dept: HEMATOLOGY/ONCOLOGY | Facility: CLINIC | Age: 73
End: 2023-09-01
Payer: MEDICARE

## 2023-09-01 ENCOUNTER — LAB VISIT (OUTPATIENT)
Dept: LAB | Facility: HOSPITAL | Age: 73
End: 2023-09-01
Attending: INTERNAL MEDICINE
Payer: MEDICARE

## 2023-09-01 VITALS
HEART RATE: 68 BPM | DIASTOLIC BLOOD PRESSURE: 70 MMHG | HEIGHT: 72 IN | SYSTOLIC BLOOD PRESSURE: 119 MMHG | TEMPERATURE: 98 F | BODY MASS INDEX: 26.92 KG/M2 | WEIGHT: 198.75 LBS | OXYGEN SATURATION: 95 %

## 2023-09-01 DIAGNOSIS — Z86.19 HISTORY OF HEPATITIS C: ICD-10-CM

## 2023-09-01 DIAGNOSIS — D69.6 THROMBOCYTOPENIA: ICD-10-CM

## 2023-09-01 DIAGNOSIS — N28.1 RENAL CYST: ICD-10-CM

## 2023-09-01 DIAGNOSIS — D69.6 THROMBOCYTOPENIA: Primary | ICD-10-CM

## 2023-09-01 LAB
ALBUMIN SERPL BCP-MCNC: 3.8 G/DL (ref 3.5–5.2)
ALP SERPL-CCNC: 57 U/L (ref 55–135)
ALT SERPL W/O P-5'-P-CCNC: 10 U/L (ref 10–44)
ANION GAP SERPL CALC-SCNC: 5 MMOL/L (ref 8–16)
AST SERPL-CCNC: 15 U/L (ref 10–40)
BASOPHILS # BLD AUTO: 0.05 K/UL (ref 0–0.2)
BASOPHILS NFR BLD: 0.5 % (ref 0–1.9)
BILIRUB SERPL-MCNC: 0.4 MG/DL (ref 0.1–1)
BUN SERPL-MCNC: 13 MG/DL (ref 8–23)
CALCIUM SERPL-MCNC: 9.1 MG/DL (ref 8.7–10.5)
CHLORIDE SERPL-SCNC: 110 MMOL/L (ref 95–110)
CO2 SERPL-SCNC: 28 MMOL/L (ref 23–29)
CREAT SERPL-MCNC: 1.1 MG/DL (ref 0.5–1.4)
DIFFERENTIAL METHOD: ABNORMAL
EOSINOPHIL # BLD AUTO: 0.1 K/UL (ref 0–0.5)
EOSINOPHIL NFR BLD: 0.7 % (ref 0–8)
ERYTHROCYTE [DISTWIDTH] IN BLOOD BY AUTOMATED COUNT: 14.5 % (ref 11.5–14.5)
EST. GFR  (NO RACE VARIABLE): >60 ML/MIN/1.73 M^2
FERRITIN SERPL-MCNC: 129 NG/ML (ref 20–300)
GLUCOSE SERPL-MCNC: 107 MG/DL (ref 70–110)
HCT VFR BLD AUTO: 30.5 % (ref 40–54)
HGB BLD-MCNC: 9.5 G/DL (ref 14–18)
IMM GRANULOCYTES # BLD AUTO: 0.1 K/UL (ref 0–0.04)
IMM GRANULOCYTES NFR BLD AUTO: 1 % (ref 0–0.5)
IRON SERPL-MCNC: 81 UG/DL (ref 45–160)
LDH SERPL L TO P-CCNC: 254 U/L (ref 110–260)
LYMPHOCYTES # BLD AUTO: 2 K/UL (ref 1–4.8)
LYMPHOCYTES NFR BLD: 19.4 % (ref 18–48)
MCH RBC QN AUTO: 28.3 PG (ref 27–31)
MCHC RBC AUTO-ENTMCNC: 31.1 G/DL (ref 32–36)
MCV RBC AUTO: 91 FL (ref 82–98)
MONOCYTES # BLD AUTO: 1.4 K/UL (ref 0.3–1)
MONOCYTES NFR BLD: 13.3 % (ref 4–15)
NEUTROPHILS # BLD AUTO: 6.6 K/UL (ref 1.8–7.7)
NEUTROPHILS NFR BLD: 65.1 % (ref 38–73)
NRBC BLD-RTO: 0 /100 WBC
PLATELET # BLD AUTO: 135 K/UL (ref 150–450)
PMV BLD AUTO: 12.7 FL (ref 9.2–12.9)
POTASSIUM SERPL-SCNC: 3.7 MMOL/L (ref 3.5–5.1)
PROT SERPL-MCNC: 6.5 G/DL (ref 6–8.4)
RBC # BLD AUTO: 3.36 M/UL (ref 4.6–6.2)
RETICS/RBC NFR AUTO: 3.1 % (ref 0.4–2)
SATURATED IRON: 31 % (ref 20–50)
SODIUM SERPL-SCNC: 143 MMOL/L (ref 136–145)
TOTAL IRON BINDING CAPACITY: 259 UG/DL (ref 250–450)
TRANSFERRIN SERPL-MCNC: 175 MG/DL (ref 200–375)
WBC # BLD AUTO: 10.12 K/UL (ref 3.9–12.7)

## 2023-09-01 PROCEDURE — 3288F FALL RISK ASSESSMENT DOCD: CPT | Mod: CPTII,S$GLB,, | Performed by: INTERNAL MEDICINE

## 2023-09-01 PROCEDURE — 4010F ACE/ARB THERAPY RXD/TAKEN: CPT | Mod: CPTII,S$GLB,, | Performed by: INTERNAL MEDICINE

## 2023-09-01 PROCEDURE — 82668 ASSAY OF ERYTHROPOIETIN: CPT | Performed by: INTERNAL MEDICINE

## 2023-09-01 PROCEDURE — 84466 ASSAY OF TRANSFERRIN: CPT | Performed by: INTERNAL MEDICINE

## 2023-09-01 PROCEDURE — 1101F PR PT FALLS ASSESS DOC 0-1 FALLS W/OUT INJ PAST YR: ICD-10-PCS | Mod: CPTII,S$GLB,, | Performed by: INTERNAL MEDICINE

## 2023-09-01 PROCEDURE — 1159F MED LIST DOCD IN RCRD: CPT | Mod: CPTII,S$GLB,, | Performed by: INTERNAL MEDICINE

## 2023-09-01 PROCEDURE — 1160F PR REVIEW ALL MEDS BY PRESCRIBER/CLIN PHARMACIST DOCUMENTED: ICD-10-PCS | Mod: CPTII,S$GLB,, | Performed by: INTERNAL MEDICINE

## 2023-09-01 PROCEDURE — 99999 PR PBB SHADOW E&M-EST. PATIENT-LVL V: CPT | Mod: PBBFAC,,, | Performed by: INTERNAL MEDICINE

## 2023-09-01 PROCEDURE — 99999 PR PBB SHADOW E&M-EST. PATIENT-LVL V: ICD-10-PCS | Mod: PBBFAC,,, | Performed by: INTERNAL MEDICINE

## 2023-09-01 PROCEDURE — 3288F PR FALLS RISK ASSESSMENT DOCUMENTED: ICD-10-PCS | Mod: CPTII,S$GLB,, | Performed by: INTERNAL MEDICINE

## 2023-09-01 PROCEDURE — 82607 VITAMIN B-12: CPT | Performed by: INTERNAL MEDICINE

## 2023-09-01 PROCEDURE — 99214 OFFICE O/P EST MOD 30 MIN: CPT | Mod: S$GLB,,, | Performed by: INTERNAL MEDICINE

## 2023-09-01 PROCEDURE — 83010 ASSAY OF HAPTOGLOBIN QUANT: CPT | Performed by: INTERNAL MEDICINE

## 2023-09-01 PROCEDURE — 83615 LACTATE (LD) (LDH) ENZYME: CPT | Performed by: INTERNAL MEDICINE

## 2023-09-01 PROCEDURE — 83540 ASSAY OF IRON: CPT | Performed by: INTERNAL MEDICINE

## 2023-09-01 PROCEDURE — 80053 COMPREHEN METABOLIC PANEL: CPT | Performed by: INTERNAL MEDICINE

## 2023-09-01 PROCEDURE — 3078F PR MOST RECENT DIASTOLIC BLOOD PRESSURE < 80 MM HG: ICD-10-PCS | Mod: CPTII,S$GLB,, | Performed by: INTERNAL MEDICINE

## 2023-09-01 PROCEDURE — 83921 ORGANIC ACID SINGLE QUANT: CPT | Performed by: INTERNAL MEDICINE

## 2023-09-01 PROCEDURE — 99214 PR OFFICE/OUTPT VISIT, EST, LEVL IV, 30-39 MIN: ICD-10-PCS | Mod: S$GLB,,, | Performed by: INTERNAL MEDICINE

## 2023-09-01 PROCEDURE — 1126F AMNT PAIN NOTED NONE PRSNT: CPT | Mod: CPTII,S$GLB,, | Performed by: INTERNAL MEDICINE

## 2023-09-01 PROCEDURE — 85025 COMPLETE CBC W/AUTO DIFF WBC: CPT | Performed by: INTERNAL MEDICINE

## 2023-09-01 PROCEDURE — 4010F PR ACE/ARB THEARPY RXD/TAKEN: ICD-10-PCS | Mod: CPTII,S$GLB,, | Performed by: INTERNAL MEDICINE

## 2023-09-01 PROCEDURE — 86880 COOMBS TEST DIRECT: CPT | Performed by: INTERNAL MEDICINE

## 2023-09-01 PROCEDURE — 3008F PR BODY MASS INDEX (BMI) DOCUMENTED: ICD-10-PCS | Mod: CPTII,S$GLB,, | Performed by: INTERNAL MEDICINE

## 2023-09-01 PROCEDURE — 1160F RVW MEDS BY RX/DR IN RCRD: CPT | Mod: CPTII,S$GLB,, | Performed by: INTERNAL MEDICINE

## 2023-09-01 PROCEDURE — 3074F PR MOST RECENT SYSTOLIC BLOOD PRESSURE < 130 MM HG: ICD-10-PCS | Mod: CPTII,S$GLB,, | Performed by: INTERNAL MEDICINE

## 2023-09-01 PROCEDURE — 82728 ASSAY OF FERRITIN: CPT | Performed by: INTERNAL MEDICINE

## 2023-09-01 PROCEDURE — 85045 AUTOMATED RETICULOCYTE COUNT: CPT | Performed by: INTERNAL MEDICINE

## 2023-09-01 PROCEDURE — 3008F BODY MASS INDEX DOCD: CPT | Mod: CPTII,S$GLB,, | Performed by: INTERNAL MEDICINE

## 2023-09-01 PROCEDURE — 36415 COLL VENOUS BLD VENIPUNCTURE: CPT | Performed by: INTERNAL MEDICINE

## 2023-09-01 PROCEDURE — 82746 ASSAY OF FOLIC ACID SERUM: CPT | Performed by: INTERNAL MEDICINE

## 2023-09-01 PROCEDURE — 3074F SYST BP LT 130 MM HG: CPT | Mod: CPTII,S$GLB,, | Performed by: INTERNAL MEDICINE

## 2023-09-01 PROCEDURE — 82525 ASSAY OF COPPER: CPT | Performed by: INTERNAL MEDICINE

## 2023-09-01 PROCEDURE — 1126F PR PAIN SEVERITY QUANTIFIED, NO PAIN PRESENT: ICD-10-PCS | Mod: CPTII,S$GLB,, | Performed by: INTERNAL MEDICINE

## 2023-09-01 PROCEDURE — 3078F DIAST BP <80 MM HG: CPT | Mod: CPTII,S$GLB,, | Performed by: INTERNAL MEDICINE

## 2023-09-01 PROCEDURE — 1101F PT FALLS ASSESS-DOCD LE1/YR: CPT | Mod: CPTII,S$GLB,, | Performed by: INTERNAL MEDICINE

## 2023-09-01 PROCEDURE — 1159F PR MEDICATION LIST DOCUMENTED IN MEDICAL RECORD: ICD-10-PCS | Mod: CPTII,S$GLB,, | Performed by: INTERNAL MEDICINE

## 2023-09-02 LAB
DAT IGG-SP REAG RBC-IMP: NORMAL
FOLATE SERPL-MCNC: 6 NG/ML (ref 4–24)
HAPTOGLOB SERPL-MCNC: 144 MG/DL (ref 30–250)
VIT B12 SERPL-MCNC: 1031 PG/ML (ref 210–950)

## 2023-09-03 NOTE — TELEPHONE ENCOUNTER
No care due was identified.  Health [facility] Embedded Care Due Messages. Reference number: [id number].   9/03/2023 8:02:32 AM CDT

## 2023-09-04 RX ORDER — ALBUTEROL SULFATE 90 UG/1
AEROSOL, METERED RESPIRATORY (INHALATION)
Qty: 20.1 G | Refills: 3 | Status: SHIPPED | OUTPATIENT
Start: 2023-09-04

## 2023-09-04 NOTE — TELEPHONE ENCOUNTER
Refill Decision Note   Scooter Swan  is requesting a refill authorization.  Brief Assessment and Rationale for Refill:  Approve     Medication Therapy Plan:         Comments:     Note composed:7:40 AM 09/04/2023

## 2023-09-05 ENCOUNTER — PATIENT MESSAGE (OUTPATIENT)
Dept: HEPATOLOGY | Facility: CLINIC | Age: 73
End: 2023-09-05
Payer: MEDICARE

## 2023-09-05 LAB — EPO SERPL-ACNC: 15.9 MIU/ML (ref 2.6–18.5)

## 2023-09-06 ENCOUNTER — PATIENT MESSAGE (OUTPATIENT)
Dept: HEPATOLOGY | Facility: CLINIC | Age: 73
End: 2023-09-06
Payer: MEDICARE

## 2023-09-06 LAB — METHYLMALONATE SERPL-SCNC: <0.1 UMOL/L

## 2023-09-07 DIAGNOSIS — D64.9 ANEMIA, UNSPECIFIED TYPE: Primary | ICD-10-CM

## 2023-09-08 ENCOUNTER — TELEPHONE (OUTPATIENT)
Dept: HEPATOLOGY | Facility: CLINIC | Age: 73
End: 2023-09-08
Payer: MEDICARE

## 2023-09-11 ENCOUNTER — PATIENT MESSAGE (OUTPATIENT)
Dept: FAMILY MEDICINE | Facility: CLINIC | Age: 73
End: 2023-09-11
Payer: MEDICARE

## 2023-09-11 ENCOUNTER — OFFICE VISIT (OUTPATIENT)
Dept: UROLOGY | Facility: CLINIC | Age: 73
End: 2023-09-11
Payer: MEDICARE

## 2023-09-11 VITALS
HEIGHT: 72 IN | HEART RATE: 98 BPM | WEIGHT: 198 LBS | BODY MASS INDEX: 26.82 KG/M2 | DIASTOLIC BLOOD PRESSURE: 76 MMHG | SYSTOLIC BLOOD PRESSURE: 171 MMHG

## 2023-09-11 DIAGNOSIS — N28.89 OTHER SPECIFIED DISORDERS OF KIDNEY AND URETER: ICD-10-CM

## 2023-09-11 DIAGNOSIS — R39.15 URGENCY OF URINATION: ICD-10-CM

## 2023-09-11 DIAGNOSIS — N40.0 BENIGN PROSTATIC HYPERPLASIA, UNSPECIFIED WHETHER LOWER URINARY TRACT SYMPTOMS PRESENT: ICD-10-CM

## 2023-09-11 DIAGNOSIS — G47.00 INSOMNIA, UNSPECIFIED TYPE: ICD-10-CM

## 2023-09-11 DIAGNOSIS — R35.1 NOCTURIA: ICD-10-CM

## 2023-09-11 DIAGNOSIS — N28.1 RENAL CYST: Primary | ICD-10-CM

## 2023-09-11 PROCEDURE — 1101F PT FALLS ASSESS-DOCD LE1/YR: CPT | Mod: CPTII,S$GLB,, | Performed by: UROLOGY

## 2023-09-11 PROCEDURE — 1125F AMNT PAIN NOTED PAIN PRSNT: CPT | Mod: CPTII,S$GLB,, | Performed by: UROLOGY

## 2023-09-11 PROCEDURE — 1101F PR PT FALLS ASSESS DOC 0-1 FALLS W/OUT INJ PAST YR: ICD-10-PCS | Mod: CPTII,S$GLB,, | Performed by: UROLOGY

## 2023-09-11 PROCEDURE — 4010F PR ACE/ARB THEARPY RXD/TAKEN: ICD-10-PCS | Mod: CPTII,S$GLB,, | Performed by: UROLOGY

## 2023-09-11 PROCEDURE — 4010F ACE/ARB THERAPY RXD/TAKEN: CPT | Mod: CPTII,S$GLB,, | Performed by: UROLOGY

## 2023-09-11 PROCEDURE — 3008F BODY MASS INDEX DOCD: CPT | Mod: CPTII,S$GLB,, | Performed by: UROLOGY

## 2023-09-11 PROCEDURE — 1159F PR MEDICATION LIST DOCUMENTED IN MEDICAL RECORD: ICD-10-PCS | Mod: CPTII,S$GLB,, | Performed by: UROLOGY

## 2023-09-11 PROCEDURE — 3077F SYST BP >= 140 MM HG: CPT | Mod: CPTII,S$GLB,, | Performed by: UROLOGY

## 2023-09-11 PROCEDURE — 3288F FALL RISK ASSESSMENT DOCD: CPT | Mod: CPTII,S$GLB,, | Performed by: UROLOGY

## 2023-09-11 PROCEDURE — 3288F PR FALLS RISK ASSESSMENT DOCUMENTED: ICD-10-PCS | Mod: CPTII,S$GLB,, | Performed by: UROLOGY

## 2023-09-11 PROCEDURE — 99204 OFFICE O/P NEW MOD 45 MIN: CPT | Mod: S$GLB,,, | Performed by: UROLOGY

## 2023-09-11 PROCEDURE — 3077F PR MOST RECENT SYSTOLIC BLOOD PRESSURE >= 140 MM HG: ICD-10-PCS | Mod: CPTII,S$GLB,, | Performed by: UROLOGY

## 2023-09-11 PROCEDURE — 99204 PR OFFICE/OUTPT VISIT, NEW, LEVL IV, 45-59 MIN: ICD-10-PCS | Mod: S$GLB,,, | Performed by: UROLOGY

## 2023-09-11 PROCEDURE — 99999 PR PBB SHADOW E&M-EST. PATIENT-LVL V: ICD-10-PCS | Mod: PBBFAC,,, | Performed by: UROLOGY

## 2023-09-11 PROCEDURE — 1125F PR PAIN SEVERITY QUANTIFIED, PAIN PRESENT: ICD-10-PCS | Mod: CPTII,S$GLB,, | Performed by: UROLOGY

## 2023-09-11 PROCEDURE — 99999 PR PBB SHADOW E&M-EST. PATIENT-LVL V: CPT | Mod: PBBFAC,,, | Performed by: UROLOGY

## 2023-09-11 PROCEDURE — 3008F PR BODY MASS INDEX (BMI) DOCUMENTED: ICD-10-PCS | Mod: CPTII,S$GLB,, | Performed by: UROLOGY

## 2023-09-11 PROCEDURE — 3078F DIAST BP <80 MM HG: CPT | Mod: CPTII,S$GLB,, | Performed by: UROLOGY

## 2023-09-11 PROCEDURE — 3078F PR MOST RECENT DIASTOLIC BLOOD PRESSURE < 80 MM HG: ICD-10-PCS | Mod: CPTII,S$GLB,, | Performed by: UROLOGY

## 2023-09-11 PROCEDURE — 1159F MED LIST DOCD IN RCRD: CPT | Mod: CPTII,S$GLB,, | Performed by: UROLOGY

## 2023-09-11 RX ORDER — MIRABEGRON 25 MG/1
25 TABLET, FILM COATED, EXTENDED RELEASE ORAL DAILY
Qty: 30 TABLET | Refills: 3 | Status: SHIPPED | OUTPATIENT
Start: 2023-09-11 | End: 2023-10-23 | Stop reason: SDUPTHER

## 2023-09-11 NOTE — PROGRESS NOTES
Chief Complaint:   Encounter Diagnoses   Name Primary?    Renal cyst     Other specified disorders of kidney and ureter        HPI:  HPI Scooter Swan sid 72 y.o. male who presents with follow up for a renal cyst which was seen on abdominal ultrasound.  This was noted to be mildly complex.  Looking over his past records he has had renal cysts in that kidney on the right since 2016.  At that time it was noted to be simple.  He saw Urology in 2019 in the cyst was noted to be larger but once again deemed simple.  He has had subsequent CT scans of his abdomen and pelvis a couple of times which showed that this cyst does have some complexity on the anterior upper aspect of it.  He is had no flank pain or hematuria.      Patient does have BPH and has been on tamsulosin for many years.  He does complain of nocturia 3-5 times at night with urgency at night and occasional urge incontinence.  He states his stream is pretty strong.  He occasionally has some hesitancy.    History:  Social History     Tobacco Use    Smoking status: Never    Smokeless tobacco: Never   Substance Use Topics    Alcohol use: Yes     Alcohol/week: 1.0 standard drink of alcohol     Types: 1 Shots of liquor per week     Comment: social    Drug use: Yes     Types: Hydrocodone     Past Medical History:   Diagnosis Date    Cataract     COPD (chronic obstructive pulmonary disease)     Diverticulosis     DUARTE (dyspnea on exertion)     GERD (gastroesophageal reflux disease)     Glaucoma     Hepatitis C     treated; seeing Dr. Carr    Hyperlipidemia     Hypertension     Liver lesion 08/2018    RA (rheumatoid arthritis)     Rectal prolapse     Possible     Past Surgical History:   Procedure Laterality Date    ANGIOGRAM, CORONARY, WITH LEFT HEART CATHETERIZATION  10/6/2022    Procedure: Angiogram, Coronary, with Left Heart Cath;  Surgeon: Zac Boucher MD;  Location: [facility] CATH;  Service: Cardiology;;    ARTERIOGRAPHY OF AORTIC ROOT  10/6/2022    Procedure:  ARTERIOGRAM, AORTIC ROOT;  Surgeon: Zac Boucher MD;  Location: [facility] CATH;  Service: Cardiology;;    CARPAL TUNNEL RELEASE      Right wrist 2015    COLONOSCOPY  08/2016    Dr. Cardona; in care everywhere    COLONOSCOPY N/A 3/20/2019    Procedure: COLONOSCOPY;  Surgeon: Sotero Arthur MD;  Location: [facility] ENDO;  Service: Endoscopy;  Laterality: N/A; hemorrhoids, diverticulosis, repeat in 10 years for screening    EXCISIONAL HEMORRHOIDECTOMY N/A 10/18/2018    Procedure: HEMORRHOIDECTOMY, prone;  Surgeon: Gunnar Horton MD;  [facility];  Service: Colon and Rectal;  Laterality: N/A;    THYROID SURGERY      UPPER GASTROINTESTINAL ENDOSCOPY  08/2016    Dr. Cardona; in care everywhere     Family History   Problem Relation Age of Onset    Stomach cancer Paternal Cousin     Stomach cancer Paternal Cousin     Cataracts Mother     Diabetes Mother     Hypertension Mother     Stroke Mother     Diabetes Sister     Hypertension Sister     Stroke Sister     Diabetes Brother     Glaucoma Brother     Hypertension Brother     Stroke Brother     Diabetes Maternal Aunt     Hypertension Maternal Aunt     Stroke Maternal Aunt     Diabetes Maternal Uncle     Hypertension Maternal Uncle     Stroke Maternal Uncle     Diabetes Maternal Grandmother     Hypertension Maternal Grandmother     Stroke Maternal Grandmother     Cancer Cousin         stomach    Colon cancer Neg Hx     Colon polyps Neg Hx     Crohn's disease Neg Hx     Ulcerative colitis Neg Hx     Esophageal cancer Neg Hx     Amblyopia Neg Hx     Blindness Neg Hx     Macular degeneration Neg Hx     Retinal detachment Neg Hx     Strabismus Neg Hx     Thyroid disease Neg Hx        Current Outpatient Medications on File Prior to Visit   Medication Sig Dispense Refill    albuterol (PROVENTIL/VENTOLIN HFA) 90 mcg/actuation inhaler INHALE 1 PUFF BY MOUTH EVERY 4 HOURS AS NEEDED FOR WHEEZING OR SHORTNESS OF BREATH 20.1 g 3    cholecalciferol, vitamin D3, (VITAMIN D3)  50 mcg (2,000 unit) Cap Take 1 capsule (2,000 Units total) by mouth once daily. 90 capsule 3    cyanocobalamin 500 MCG tablet Take 500 mcg by mouth once daily.      diclofenac sodium (VOLTAREN) 1 % Gel Apply 2 grams  topically 4 (four) times daily. 100 g 5    diltiaZEM (CARDIZEM CD) 180 MG 24 hr capsule TAKE ONE CAPSULE BY MOUTH ONCE DAILY 30 capsule 2    dorzolamide (TRUSOPT) 2 % ophthalmic solution Place 1 drop into both eyes 3 (three) times daily.      doxazosin (CARDURA) 2 MG tablet TAKE 1 TABLET (2 MG TOTAL) BY MOUTH EVERY EVENING 90 tablet 1    enalapril (VASOTEC) 20 MG tablet TAKE 1 TABLET(20 MG) BY MOUTH TWICE DAILY 180 tablet 3    hydrALAZINE (APRESOLINE) 50 MG tablet Take 2 tablets (100 mg total) by mouth every 12 (twelve) hours. 360 tablet 3    HYDROcodone-acetaminophen (NORCO)  mg per tablet Take 1 tablet by mouth every 8 (eight) hours as needed for Pain. 90 tablet 0    hydrOXYzine HCL (ATARAX) 25 MG tablet Take 1 tablet (25 mg total) by mouth nightly as needed for Itching. 30 tablet 1    Lactobac no.41/Bifidobact no.7 (PROBIOTIC-10 ORAL) Take 1 capsule by mouth once daily.      multivitamin capsule Take 1 capsule by mouth once daily.      mupirocin (BACTROBAN) 2 % ointment Apply topically 3 (three) times daily. 15 g 1    omeprazole (PRILOSEC) 20 MG capsule Take 1 capsule (20 mg total) by mouth 2 (two) times a day. 180 capsule 3    polyethylene glycol (GLYCOLAX) 17 gram PwPk Take 17 g by mouth daily as needed.      pravastatin (PRAVACHOL) 20 MG tablet TAKE 1 TABLET BY MOUTH DAILY 90 tablet 3    predniSONE (DELTASONE) 5 MG tablet Take 2 tablets (10 mg total) by mouth once daily. 270 tablet 1    sildenafiL (VIAGRA) 100 MG tablet Take 1 tablet (100 mg total) by mouth as needed for Erectile Dysfunction. 8 tablet 11    tamsulosin (FLOMAX) 0.4 mg Cap TAKE 1 CAPSULE(0.4 MG) BY MOUTH EVERY DAY 30 capsule 11    tiZANidine (ZANAFLEX) 4 MG tablet Take 1 tablet (4 mg total) by mouth every 8 (eight) hours. 270  tablet 1    travoprost (TRAVATAN Z) 0.004 % ophthalmic solution Place 1 drop into both eyes every evening. 5 mL 12    zaleplon (SONATA) 10 MG capsule Take 1 capsule (10 mg total) by mouth every evening. (Patient not taking: Reported on 8/7/2023) 30 capsule 3    [DISCONTINUED] golimumab (SIMPONI) 50 mg/0.5 mL PnIj Inject 50 mg into the skin every 28 days. 0.5 mL 11     Current Facility-Administered Medications on File Prior to Visit   Medication Dose Route Frequency Provider Last Rate Last Admin    0.9%  NaCl infusion   Intravenous Continuous Deedee Sarkar NP   Stopped at 10/18/18 1613    mupirocin 2 % ointment   Nasal On Call Procedure Deedee Sarkar NP   Given at 10/18/18 1348    sodium chloride 0.9% flush 10 mL  10 mL Intravenous PRN Zac Boucher MD            Objective:     Vitals:    09/11/23 1510   BP: (!) 171/76   Pulse: 98   Weight: 89.8 kg (197 lb 15.6 oz)   Height: 6' (1.829 m)      BMI Readings from Last 1 Encounters:   09/11/23 26.85 kg/m²          Physical Exam  No acute distress alert and oriented   Respirations even unlabored   Abdomen is soft nontender   Digital rectal exam reveals a 30 g prostate which is smooth    Lab Results   Component Value Date    PSA 1.6 02/16/2023        Lab Results   Component Value Date    CREATININE 1.1 09/01/2023      Assessment:       1. Renal cyst    2. Other specified disorders of kidney and ureter        Plan:     1. Renal cyst    2. Other specified disorders of kidney and ureter       Orders Placed This Encounter    CT Abdomen With Without Contrast    mirabegron (MYRBETRIQ) 25 mg Tb24 ER tablet      Complex renal cyst.  I have personally reviewed all prior imaging studies.  I  will need CT renal mass protocol to evaluate in more detail.  We will schedule and scheduled follow up.  BPH with nocturia and urgency.  Trial of Motegrity 25 mg.  We will see him back in 6 weeks for follow-up.

## 2023-09-13 RX ORDER — HYDROXYZINE HYDROCHLORIDE 25 MG/1
25 TABLET, FILM COATED ORAL NIGHTLY PRN
Qty: 30 TABLET | Refills: 1 | Status: SHIPPED | OUTPATIENT
Start: 2023-09-13 | End: 2023-11-06

## 2023-09-15 LAB — COPPER SERPL-MCNC: 953 UG/L (ref 665–1480)

## 2023-09-21 ENCOUNTER — HOSPITAL ENCOUNTER (OUTPATIENT)
Dept: RADIOLOGY | Facility: HOSPITAL | Age: 73
Discharge: HOME | End: 2023-09-21
Attending: UROLOGY
Payer: MEDICARE

## 2023-09-21 DIAGNOSIS — N28.89 OTHER SPECIFIED DISORDERS OF KIDNEY AND URETER: ICD-10-CM

## 2023-09-21 PROCEDURE — 25500020 PHARM REV CODE 255: Performed by: UROLOGY

## 2023-09-21 PROCEDURE — 74170 CT ABD WO CNTRST FLWD CNTRST: CPT | Mod: TC

## 2023-09-21 PROCEDURE — 74170 CT ABD WO CNTRST FLWD CNTRST: CPT | Mod: 26,,, | Performed by: RADIOLOGY

## 2023-09-21 PROCEDURE — 74170 CT ABDOMEN W WO CONTRAST: ICD-10-PCS | Mod: 26,,, | Performed by: RADIOLOGY

## 2023-09-21 RX ADMIN — IOHEXOL 100 ML: 350 INJECTION, SOLUTION INTRAVENOUS at 09:09

## 2023-09-28 DIAGNOSIS — M05.9 SEROPOSITIVE RHEUMATOID ARTHRITIS: Primary | ICD-10-CM

## 2023-09-28 DIAGNOSIS — G89.4 CHRONIC PAIN SYNDROME: ICD-10-CM

## 2023-09-29 RX ORDER — HYDROCODONE BITARTRATE AND ACETAMINOPHEN 10; 325 MG/1; MG/1
1 TABLET ORAL EVERY 8 HOURS PRN
Qty: 90 TABLET | Refills: 0 | Status: SHIPPED | OUTPATIENT
Start: 2023-09-29 | End: 2023-10-24 | Stop reason: SDUPTHER

## 2023-09-29 NOTE — PROGRESS NOTES
HEMATOLOGY / ONCOLOGY   CLINIC NOTE       Established Patient - Telehealth Visit     The patient location is: home  The chief complaint leading to consultation is: follow up   Visit type: Virtual visit with synchronous audio and video    Face to Face time with patient: 30  minutes of total time spent on the encounter, which includes face to face time and non-face to face time preparing to see the patient (eg, review of tests), Obtaining and/or reviewing separately obtained history, Documenting clinical information in the electronic or other health record, Independently interpreting results (not separately reported) and communicating results to the patient/family/caregiver, or Care coordination (not separately reported).        The reason for the audio only service rather than synchronous audio and video virtual visit was related to technical difficulties or patient preference/necessity.     Each patient to whom I provide medical services by telemedicine is:  (1) informed of the relationship between the physician and patient and the respective role of any other health care provider with respect to management of the patient; and (2) notified that they may decline to receive medical services by telemedicine and may withdraw from such care at any time. Patient verbally consented to receive this service via voice-only telephone call.             This service was not originating from a related E/M service provided within the previous 7 days nor will  to an E/M service or procedure within the next 24 hours or my soonest available appointment.  Prevailing standard of care was able to be met in this audio-only visit.    ONCOLOGICAL HISTORY:     Diagnosis:  - Leukocytosis  - Anemia and thrombocytopenia     Subjective:       Chief Complaint: evaluation for thrombocytopenia       HPI    Scooter Swan  72 y.o.  male with past medical history significant for HTN, AR, CAD, RA, HCV for follow up of anemia,  thrombocytopenia and leukocytosis    Patient has history of rheumatoid arthritis and is on treatment prednisone 10 mg daily for few years now and followed by rheumatology.  He previously had cells now resolved recurrent skin infections, which has now resolved. Denies cigarette smoking.  Has occasional alcohol on holiday.  Denies illicit drug use.     Interval History:     Patient here for follow-up.  Denies any fevers, night sweats or weight loss.  Denies noticing any lumps.  Discussed the finding of the bone marrow biopsy with the patient    Past Medical History:   Diagnosis Date    Cataract     COPD (chronic obstructive pulmonary disease)     Diverticulosis     DUARTE (dyspnea on exertion)     GERD (gastroesophageal reflux disease)     Glaucoma     Hepatitis C     treated; seeing Dr. Carr    Hyperlipidemia     Hypertension     Liver lesion 08/2018    RA (rheumatoid arthritis)     Rectal prolapse     Possible      Past Surgical History:   Procedure Laterality Date    ANGIOGRAM, CORONARY, WITH LEFT HEART CATHETERIZATION  10/6/2022    Procedure: Angiogram, Coronary, with Left Heart Cath;  Surgeon: Zac Boucher MD;  Location: [facility] CATH;  Service: Cardiology;;    ARTERIOGRAPHY OF AORTIC ROOT  10/6/2022    Procedure: ARTERIOGRAM, AORTIC ROOT;  Surgeon: Zac Boucher MD;  Location: [facility] CATH;  Service: Cardiology;;    CARPAL TUNNEL RELEASE      Right wrist 2015    COLONOSCOPY  08/2016    Dr. Cardona; in care everywhere    COLONOSCOPY N/A 3/20/2019    Procedure: COLONOSCOPY;  Surgeon: Sotero Arthur MD;  Location: [facility];  Service: Endoscopy;  Laterality: N/A; hemorrhoids, diverticulosis, repeat in 10 years for screening    EXCISIONAL HEMORRHOIDECTOMY N/A 10/18/2018    Procedure: HEMORRHOIDECTOMY, prone;  Surgeon: Gunnar Horton MD;  Location: [address];  Service: Colon and Rectal;  Laterality: N/A;    THYROID SURGERY      UPPER GASTROINTESTINAL ENDOSCOPY  08/2016    Dr. Cardona; in care everywhere      Social History     Socioeconomic History    Marital status:    Tobacco Use    Smoking status: Never    Smokeless tobacco: Never   Substance and Sexual Activity    Alcohol use: Yes     Alcohol/week: 1.0 standard drink of alcohol     Types: 1 Shots of liquor per week     Comment: social    Drug use: Yes     Types: Hydrocodone     Social Determinants of Health     Financial Resource Strain: Low Risk  (9/27/2022)    Overall Financial Resource Strain (CARDIA)     Difficulty of Paying Living Expenses: Not hard at all   Food Insecurity: No Food Insecurity (9/27/2022)    Hunger Vital Sign     Worried About Running Out of Food in the Last Year: Never true     Ran Out of Food in the Last Year: Never true   Transportation Needs: Unmet Transportation Needs (9/27/2022)    PRAPARE - Transportation     Lack of Transportation (Medical): Yes     Lack of Transportation (Non-Medical): Yes   Physical Activity: Sufficiently Active (9/27/2022)    Exercise Vital Sign     Days of Exercise per Week: 5 days     Minutes of Exercise per Session: 30 min   Stress: Stress Concern Present (9/19/2022)    [employer] [place] [employer] - Occupational Stress Questionnaire     Feeling of Stress : Rather much   Social Connections: Unknown (9/27/2022)    Social Connection and Isolation Panel [NHANES]     Frequency of Communication with Friends and Family: More than three times a week     Frequency of Social Gatherings with Friends and Family: More than three times a week     Active Member of Clubs or Organizations: Patient refused     Attends Club or Organization Meetings: Patient refused     Marital Status:    Housing Stability: Unknown (9/27/2022)    Housing Stability Vital Sign     Unable to Pay for Housing in the Last Year: Patient refused     Unstable Housing in the Last Year: Patient refused      Family History   Problem Relation Age of Onset    Stomach cancer Paternal Cousin     Stomach cancer Paternal Cousin      Cataracts Mother     Diabetes Mother     Hypertension Mother     Stroke Mother     Diabetes Sister     Hypertension Sister     Stroke Sister     Diabetes Brother     Glaucoma Brother     Hypertension Brother     Stroke Brother     Diabetes Maternal Aunt     Hypertension Maternal Aunt     Stroke Maternal Aunt     Diabetes Maternal Uncle     Hypertension Maternal Uncle     Stroke Maternal Uncle     Diabetes Maternal Grandmother     Hypertension Maternal Grandmother     Stroke Maternal Grandmother     Cancer Cousin         stomach    Colon cancer Neg Hx     Colon polyps Neg Hx     Crohn's disease Neg Hx     Ulcerative colitis Neg Hx     Esophageal cancer Neg Hx     Amblyopia Neg Hx     Blindness Neg Hx     Macular degeneration Neg Hx     Retinal detachment Neg Hx     Strabismus Neg Hx     Thyroid disease Neg Hx       Review of patient's allergies indicates:   Allergen Reactions    Buspar [buspirone] Hallucinations     Nightmares    Fentanyl Hives and Hallucinations    Plaquenil [hydroxychloroquine]      Nausea, insomnia, weight loss 40LBS    Amitiza [lubiprostone] Nausea Only and Other (See Comments)     Fatigue.    Azulfidine [sulfasalazine] Nausea And Vomiting    Trazodone Other (See Comments)     Insomnia        Review of Systems   Constitutional: Negative.  Negative for activity change, chills, fatigue and fever.   HENT: Negative.     Eyes: Negative.    Respiratory:  Negative for cough and shortness of breath.    Cardiovascular:  Negative for chest pain and leg swelling.   Gastrointestinal:  Negative for constipation, diarrhea, nausea and vomiting.   Endocrine: Negative.    Genitourinary: Negative.    Musculoskeletal:  Negative for arthralgias and myalgias.   Integumentary:  Negative.   Allergic/Immunologic: Negative.    Neurological:  Negative for light-headedness, numbness and headaches.   Hematological: Negative.    Psychiatric/Behavioral: Negative.           Objective:        There were no vitals filed for this  visit.       Physical Exam  Constitutional:       General: He is not in acute distress.     Appearance: Normal appearance. He is not ill-appearing.   HENT:      Head: Normocephalic.      Mouth/Throat:      Mouth: Mucous membranes are moist.   Eyes:      Extraocular Movements: Extraocular movements intact.   Pulmonary:      Effort: Pulmonary effort is normal. No respiratory distress.   Neurological:      Mental Status: He is alert and oriented to person, place, and time.      Cranial Nerves: No cranial nerve deficit.   Psychiatric:         Mood and Affect: Mood normal.           LABS / IMAGING      - 8/4/2023 RIGHT ILIAC CREST BONE MARROW ASPIRATE, BONE MARROW CLOT, AND BONE MARROW CORE BIOPSY WITH:     CELLULARITY=40%, TRILINEAGE HEMATOPOIETIC ACTIVITY (M/E= 2.4:1).  SEE COMMENT.   INCREASED STORAGE IRON.   ADEQUATE NUMBER OF MEGAKARYOCYTES  Bone marrow karyotype results: 46, XY[20], male karyotype.  NGS: No pathogenic genetic alteration is detected in the listed gene regions.  Findings are nondiagnostic for lymphoma or plasma cell dyscrasia.      Assessment:       LEUKOCYTOSIS  - intermittent mild leukocytosis with elevated ANC since 2018, stable   - Pt is on chronic prednisone use for management of RA and had recurrent infections.   - 8/4/2023 BMB:  40% cellularity with trilineage hematopoietic activity with normal karyotype and no significant pathogenic alteration on NGS.  Nondiagnostic for lymphoma or plasma cell dyscrasia.      ANEMIA / THROMBOCYTOPENIA  - mild normocytic anemia and intermittent mild thrombocytopenia since 2018, stable  - Previously workup with no evidence of iron, folic acid or B12 deficiency and negative for monoclonal gammopathy. Normal kidney function but does have history of HCV and liver scarring on scan. Bone marrow biopsy with normal cellularity and no abnormality on cytogenetics and NGS.       Plan:       - Intermittent chronic leukocytosis which could be because of steroids /  infections / RA, now resolved. Chronic anemia and thrombocytopenia could be multifactorial as no deficiencies noted and no abnormality detected on the bone marrow biopsy as pt have multiple medical problems leading to AOCD & thrombocytopenia like RA, HCV and liver disease as previous FibroScan in April 2019 documented as F3 (severe liver scarring) which can lead to pancytopenia. Thus referred to hepatology for re-evaluation, appointment on 10/12/2023  - Follow with urology for evaluation of right complex renal cyst, urology planning to have further workup done.   - MD / LABS VISIT - 16 WEEKS         Med Onc Chart Routing      Follow up with physician 4 months.   Follow up with ROBYN    Infusion scheduling note    Injection scheduling note    Labs CBC and CMP   Scheduling:  Preferred lab:  Lab interval:  labs on follow up visit   Imaging    Pharmacy appointment    Other referrals                    The patient was seen, interviewed and examined. Pertinent lab and radiology studies were reviewed. Pt instructed to call should develop concerning signs/symptoms or have further questions.       Portions of the record may have been created with voice recognition software. Occasional wrong-word or sound-a-like substitutions may have occurred due to the inherent limitations of voice recognition software. Read the chart carefully and recognize, using context, where substitutions have occurred.    Kye Botello MD  Hematology / Oncology

## 2023-10-02 ENCOUNTER — OFFICE VISIT (OUTPATIENT)
Dept: HEMATOLOGY/ONCOLOGY | Facility: CLINIC | Age: 73
End: 2023-10-02
Payer: MEDICARE

## 2023-10-02 DIAGNOSIS — D69.6 THROMBOCYTOPENIA: ICD-10-CM

## 2023-10-02 DIAGNOSIS — D64.9 ANEMIA, UNSPECIFIED TYPE: Primary | ICD-10-CM

## 2023-10-02 PROCEDURE — 4010F PR ACE/ARB THEARPY RXD/TAKEN: ICD-10-PCS | Mod: CPTII,95,, | Performed by: INTERNAL MEDICINE

## 2023-10-02 PROCEDURE — 99213 OFFICE O/P EST LOW 20 MIN: CPT | Mod: 95,,, | Performed by: INTERNAL MEDICINE

## 2023-10-02 PROCEDURE — 99213 PR OFFICE/OUTPT VISIT, EST, LEVL III, 20-29 MIN: ICD-10-PCS | Mod: 95,,, | Performed by: INTERNAL MEDICINE

## 2023-10-02 PROCEDURE — 4010F ACE/ARB THERAPY RXD/TAKEN: CPT | Mod: CPTII,95,, | Performed by: INTERNAL MEDICINE

## 2023-10-09 DIAGNOSIS — F51.04 CHRONIC INSOMNIA: ICD-10-CM

## 2023-10-11 RX ORDER — ZALEPLON 10 MG/1
10 CAPSULE ORAL NIGHTLY
Qty: 30 CAPSULE | Refills: 3 | Status: SHIPPED | OUTPATIENT
Start: 2023-10-11 | End: 2024-02-19

## 2023-10-16 ENCOUNTER — PROCEDURE VISIT (OUTPATIENT)
Dept: OTOLARYNGOLOGY | Facility: CLINIC | Age: 73
End: 2023-10-16
Payer: MEDICARE

## 2023-10-16 DIAGNOSIS — H61.23 BILATERAL IMPACTED CERUMEN: ICD-10-CM

## 2023-10-16 PROCEDURE — 69210 EAR CERUMEN REMOVAL: ICD-10-PCS | Mod: S$GLB,,, | Performed by: NURSE PRACTITIONER

## 2023-10-16 PROCEDURE — 69210 REMOVE IMPACTED EAR WAX UNI: CPT | Mod: S$GLB,,, | Performed by: NURSE PRACTITIONER

## 2023-10-16 NOTE — PROCEDURES
Ear Cerumen Removal    Date/Time: 10/16/2023 9:15 AM    Performed by: Katty Haji NP  Authorized by: Katty Haji NP    Consent Done?:  Not Needed    Local anesthetic:  None  Location details:  Both ears  Procedure type: curette    Cerumen  Removal Results:  Cerumen completely removed  Patient tolerance:  Patient tolerated the procedure well with no immediate complications

## 2023-10-17 ENCOUNTER — LAB VISIT (OUTPATIENT)
Dept: LAB | Facility: HOSPITAL | Age: 73
End: 2023-10-17
Attending: INTERNAL MEDICINE
Payer: MEDICAID

## 2023-10-17 DIAGNOSIS — E53.8 LOW SERUM VITAMIN B12: ICD-10-CM

## 2023-10-17 DIAGNOSIS — L40.50 PSA (PSORIATIC ARTHRITIS): ICD-10-CM

## 2023-10-17 DIAGNOSIS — M05.9 SEROPOSITIVE RHEUMATOID ARTHRITIS: ICD-10-CM

## 2023-10-17 LAB
ALBUMIN SERPL BCP-MCNC: 3.9 G/DL (ref 3.5–5.2)
ALP SERPL-CCNC: 57 U/L (ref 55–135)
ALT SERPL W/O P-5'-P-CCNC: 10 U/L (ref 10–44)
ANION GAP SERPL CALC-SCNC: 9 MMOL/L (ref 8–16)
AST SERPL-CCNC: 15 U/L (ref 10–40)
BASOPHILS # BLD AUTO: 0.03 K/UL (ref 0–0.2)
BASOPHILS NFR BLD: 0.3 % (ref 0–1.9)
BILIRUB SERPL-MCNC: 0.4 MG/DL (ref 0.1–1)
BUN SERPL-MCNC: 14 MG/DL (ref 8–23)
CALCIUM SERPL-MCNC: 9.4 MG/DL (ref 8.7–10.5)
CHLORIDE SERPL-SCNC: 107 MMOL/L (ref 95–110)
CO2 SERPL-SCNC: 25 MMOL/L (ref 23–29)
CREAT SERPL-MCNC: 1.4 MG/DL (ref 0.5–1.4)
CRP SERPL-MCNC: 6.8 MG/L (ref 0–8.2)
DIFFERENTIAL METHOD: ABNORMAL
EOSINOPHIL # BLD AUTO: 0 K/UL (ref 0–0.5)
EOSINOPHIL NFR BLD: 0.3 % (ref 0–8)
ERYTHROCYTE [DISTWIDTH] IN BLOOD BY AUTOMATED COUNT: 14.7 % (ref 11.5–14.5)
ERYTHROCYTE [SEDIMENTATION RATE] IN BLOOD BY WESTERGREN METHOD: 5 MM/HR (ref 0–10)
EST. GFR  (NO RACE VARIABLE): 53.1 ML/MIN/1.73 M^2
GLUCOSE SERPL-MCNC: 230 MG/DL (ref 70–110)
HCT VFR BLD AUTO: 35.1 % (ref 40–54)
HGB BLD-MCNC: 10.6 G/DL (ref 14–18)
IMM GRANULOCYTES # BLD AUTO: 0.04 K/UL (ref 0–0.04)
IMM GRANULOCYTES NFR BLD AUTO: 0.4 % (ref 0–0.5)
LYMPHOCYTES # BLD AUTO: 1.3 K/UL (ref 1–4.8)
LYMPHOCYTES NFR BLD: 13.2 % (ref 18–48)
MCH RBC QN AUTO: 28.5 PG (ref 27–31)
MCHC RBC AUTO-ENTMCNC: 30.2 G/DL (ref 32–36)
MCV RBC AUTO: 94 FL (ref 82–98)
MONOCYTES # BLD AUTO: 0.6 K/UL (ref 0.3–1)
MONOCYTES NFR BLD: 6.3 % (ref 4–15)
NEUTROPHILS # BLD AUTO: 7.5 K/UL (ref 1.8–7.7)
NEUTROPHILS NFR BLD: 79.5 % (ref 38–73)
NRBC BLD-RTO: 0 /100 WBC
PLATELET # BLD AUTO: 116 K/UL (ref 150–450)
PMV BLD AUTO: 14.3 FL (ref 9.2–12.9)
POTASSIUM SERPL-SCNC: 4.3 MMOL/L (ref 3.5–5.1)
PROT SERPL-MCNC: 6.8 G/DL (ref 6–8.4)
RBC # BLD AUTO: 3.72 M/UL (ref 4.6–6.2)
SODIUM SERPL-SCNC: 141 MMOL/L (ref 136–145)
WBC # BLD AUTO: 9.47 K/UL (ref 3.9–12.7)

## 2023-10-17 PROCEDURE — 85651 RBC SED RATE NONAUTOMATED: CPT | Mod: PO | Performed by: PHYSICIAN ASSISTANT

## 2023-10-17 PROCEDURE — 80053 COMPREHEN METABOLIC PANEL: CPT | Performed by: PHYSICIAN ASSISTANT

## 2023-10-17 PROCEDURE — 36415 COLL VENOUS BLD VENIPUNCTURE: CPT | Mod: PO | Performed by: INTERNAL MEDICINE

## 2023-10-17 PROCEDURE — 85025 COMPLETE CBC W/AUTO DIFF WBC: CPT | Performed by: PHYSICIAN ASSISTANT

## 2023-10-17 PROCEDURE — 86140 C-REACTIVE PROTEIN: CPT | Performed by: PHYSICIAN ASSISTANT

## 2023-10-17 PROCEDURE — 82607 VITAMIN B-12: CPT | Performed by: INTERNAL MEDICINE

## 2023-10-18 LAB — VIT B12 SERPL-MCNC: 1139 PG/ML (ref 210–950)

## 2023-10-23 ENCOUNTER — OFFICE VISIT (OUTPATIENT)
Dept: UROLOGY | Facility: CLINIC | Age: 73
End: 2023-10-23
Payer: MEDICARE

## 2023-10-23 VITALS
BODY MASS INDEX: 26.85 KG/M2 | HEART RATE: 67 BPM | SYSTOLIC BLOOD PRESSURE: 132 MMHG | WEIGHT: 198 LBS | DIASTOLIC BLOOD PRESSURE: 72 MMHG

## 2023-10-23 DIAGNOSIS — N28.1 RENAL CYST: Primary | ICD-10-CM

## 2023-10-23 DIAGNOSIS — N40.0 BENIGN PROSTATIC HYPERPLASIA, UNSPECIFIED WHETHER LOWER URINARY TRACT SYMPTOMS PRESENT: ICD-10-CM

## 2023-10-23 PROCEDURE — 1160F RVW MEDS BY RX/DR IN RCRD: CPT | Mod: CPTII,S$GLB,, | Performed by: UROLOGY

## 2023-10-23 PROCEDURE — 3075F PR MOST RECENT SYSTOLIC BLOOD PRESS GE 130-139MM HG: ICD-10-PCS | Mod: CPTII,S$GLB,, | Performed by: UROLOGY

## 2023-10-23 PROCEDURE — 3075F SYST BP GE 130 - 139MM HG: CPT | Mod: CPTII,S$GLB,, | Performed by: UROLOGY

## 2023-10-23 PROCEDURE — 4010F ACE/ARB THERAPY RXD/TAKEN: CPT | Mod: CPTII,S$GLB,, | Performed by: UROLOGY

## 2023-10-23 PROCEDURE — 4010F PR ACE/ARB THEARPY RXD/TAKEN: ICD-10-PCS | Mod: CPTII,S$GLB,, | Performed by: UROLOGY

## 2023-10-23 PROCEDURE — 3078F DIAST BP <80 MM HG: CPT | Mod: CPTII,S$GLB,, | Performed by: UROLOGY

## 2023-10-23 PROCEDURE — 1101F PR PT FALLS ASSESS DOC 0-1 FALLS W/OUT INJ PAST YR: ICD-10-PCS | Mod: CPTII,S$GLB,, | Performed by: UROLOGY

## 2023-10-23 PROCEDURE — 99999 PR PBB SHADOW E&M-EST. PATIENT-LVL IV: CPT | Mod: PBBFAC,,, | Performed by: UROLOGY

## 2023-10-23 PROCEDURE — 3288F PR FALLS RISK ASSESSMENT DOCUMENTED: ICD-10-PCS | Mod: CPTII,S$GLB,, | Performed by: UROLOGY

## 2023-10-23 PROCEDURE — 3078F PR MOST RECENT DIASTOLIC BLOOD PRESSURE < 80 MM HG: ICD-10-PCS | Mod: CPTII,S$GLB,, | Performed by: UROLOGY

## 2023-10-23 PROCEDURE — 1159F MED LIST DOCD IN RCRD: CPT | Mod: CPTII,S$GLB,, | Performed by: UROLOGY

## 2023-10-23 PROCEDURE — 99214 OFFICE O/P EST MOD 30 MIN: CPT | Mod: S$GLB,,, | Performed by: UROLOGY

## 2023-10-23 PROCEDURE — 1101F PT FALLS ASSESS-DOCD LE1/YR: CPT | Mod: CPTII,S$GLB,, | Performed by: UROLOGY

## 2023-10-23 PROCEDURE — 3288F FALL RISK ASSESSMENT DOCD: CPT | Mod: CPTII,S$GLB,, | Performed by: UROLOGY

## 2023-10-23 PROCEDURE — 1159F PR MEDICATION LIST DOCUMENTED IN MEDICAL RECORD: ICD-10-PCS | Mod: CPTII,S$GLB,, | Performed by: UROLOGY

## 2023-10-23 PROCEDURE — 99214 PR OFFICE/OUTPT VISIT, EST, LEVL IV, 30-39 MIN: ICD-10-PCS | Mod: S$GLB,,, | Performed by: UROLOGY

## 2023-10-23 PROCEDURE — 99999 PR PBB SHADOW E&M-EST. PATIENT-LVL IV: ICD-10-PCS | Mod: PBBFAC,,, | Performed by: UROLOGY

## 2023-10-23 PROCEDURE — 3008F PR BODY MASS INDEX (BMI) DOCUMENTED: ICD-10-PCS | Mod: CPTII,S$GLB,, | Performed by: UROLOGY

## 2023-10-23 PROCEDURE — 3008F BODY MASS INDEX DOCD: CPT | Mod: CPTII,S$GLB,, | Performed by: UROLOGY

## 2023-10-23 PROCEDURE — 1160F PR REVIEW ALL MEDS BY PRESCRIBER/CLIN PHARMACIST DOCUMENTED: ICD-10-PCS | Mod: CPTII,S$GLB,, | Performed by: UROLOGY

## 2023-10-23 RX ORDER — TAMSULOSIN HYDROCHLORIDE 0.4 MG/1
0.4 CAPSULE ORAL DAILY
Qty: 30 CAPSULE | Refills: 11 | Status: SHIPPED | OUTPATIENT
Start: 2023-10-23

## 2023-10-23 RX ORDER — MIRABEGRON 25 MG/1
25 TABLET, FILM COATED, EXTENDED RELEASE ORAL DAILY
Qty: 30 TABLET | Refills: 11 | Status: SHIPPED | OUTPATIENT
Start: 2023-10-23 | End: 2024-10-22

## 2023-10-23 NOTE — PROGRESS NOTES
Chief Complaint:   Encounter Diagnoses   Name Primary?    Renal cyst Yes    Benign prostatic hyperplasia, unspecified whether lower urinary tract symptoms present        HPI:  HPI Scooter Swan sid 72 y.o. male who presents with follow up to CT scan.  He had a complex renal cyst which has been undergoing some sort to follow up for many years.  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    He also has known history of BPH on tamsulosin.  He was started on Myrbetriq 25 mg.  Overall he states this seems to be helping him.  He is overall satisfied with his lower urinary tract symptoms.    History:  Social History     Tobacco Use    Smoking status: Never    Smokeless tobacco: Never   Substance Use Topics    Alcohol use: Yes     Alcohol/week: 1.0 standard drink of alcohol     Types: 1 Shots of liquor per week     Comment: social    Drug use: Yes     Types: Hydrocodone     Past Medical History:   Diagnosis Date    Cataract     COPD (chronic obstructive pulmonary disease)     Diverticulosis     DUARTE (dyspnea on exertion)     GERD (gastroesophageal reflux disease)     Glaucoma     Hepatitis C     treated; seeing Dr. Carr    Hyperlipidemia     Hypertension     Liver lesion 08/2018    RA (rheumatoid arthritis)     Rectal prolapse     Possible     Past Surgical History:   Procedure Laterality Date    ANGIOGRAM, CORONARY, WITH LEFT HEART CATHETERIZATION  10/6/2022    Procedure: Angiogram, Coronary, with Left Heart Cath;  Surgeon: Zac Boucher MD;  Location: [facility] CATH;  Service: Cardiology;;    ARTERIOGRAPHY OF AORTIC ROOT  10/6/2022    Procedure: ARTERIOGRAM, AORTIC ROOT;  Surgeon: Zac Boucher MD;  Location: [facility] CATH;  Service: Cardiology;;    CARPAL TUNNEL RELEASE      Right wrist 2015    COLONOSCOPY  08/2016    Dr. Cardona; in care everywhere    COLONOSCOPY N/A 3/20/2019    Procedure: COLONOSCOPY;  Surgeon: Sotero  ABIOLA Arthur MD;  Location: [facility] ENDO;  Service: Endoscopy;  Laterality: N/A; hemorrhoids, diverticulosis, repeat in 10 years for screening    EXCISIONAL HEMORRHOIDECTOMY N/A 10/18/2018    Procedure: HEMORRHOIDECTOMY, prone;  Surgeon: Gunnar Horton MD;  Location: [facility] OR [address] [place] [address];  Service: Colon and Rectal;  Laterality: N/A;    THYROID SURGERY      UPPER GASTROINTESTINAL ENDOSCOPY  08/2016    Dr. Cardona; in care everywhere     Family History   Problem Relation Age of Onset    Stomach cancer Paternal Cousin     Stomach cancer Paternal Cousin     Cataracts Mother     Diabetes Mother     Hypertension Mother     Stroke Mother     Diabetes Sister     Hypertension Sister     Stroke Sister     Diabetes Brother     Glaucoma Brother     Hypertension Brother     Stroke Brother     Diabetes Maternal Aunt     Hypertension Maternal Aunt     Stroke Maternal Aunt     Diabetes Maternal Uncle     Hypertension Maternal Uncle     Stroke Maternal Uncle     Diabetes Maternal Grandmother     Hypertension Maternal Grandmother     Stroke Maternal Grandmother     Cancer Cousin         stomach    Colon cancer Neg Hx     Colon polyps Neg Hx     Crohn's disease Neg Hx     Ulcerative colitis Neg Hx     Esophageal cancer Neg Hx     Amblyopia Neg Hx     Blindness Neg Hx     Macular degeneration Neg Hx     Retinal detachment Neg Hx     Strabismus Neg Hx     Thyroid disease Neg Hx        Current Outpatient Medications on File Prior to Visit   Medication Sig Dispense Refill    albuterol (PROVENTIL/VENTOLIN HFA) 90 mcg/actuation inhaler INHALE 1 PUFF BY MOUTH EVERY 4 HOURS AS NEEDED FOR WHEEZING OR SHORTNESS OF BREATH 20.1 g 3    cholecalciferol, vitamin D3, (VITAMIN D3) 50 mcg (2,000 unit) Cap Take 1 capsule (2,000 Units total) by mouth once daily. 90 capsule 3    cyanocobalamin 500 MCG tablet Take 500 mcg by mouth once daily.      diclofenac sodium (VOLTAREN) 1 % Gel Apply 2 grams  topically 4 (four) times daily. 100 g 5    diltiaZEM  (CARDIZEM CD) 180 MG 24 hr capsule TAKE ONE CAPSULE BY MOUTH ONCE DAILY 30 capsule 2    dorzolamide (TRUSOPT) 2 % ophthalmic solution Place 1 drop into both eyes 3 (three) times daily.      doxazosin (CARDURA) 2 MG tablet TAKE 1 TABLET (2 MG TOTAL) BY MOUTH EVERY EVENING 90 tablet 1    enalapril (VASOTEC) 20 MG tablet TAKE 1 TABLET(20 MG) BY MOUTH TWICE DAILY 180 tablet 3    hydrALAZINE (APRESOLINE) 50 MG tablet Take 2 tablets (100 mg total) by mouth every 12 (twelve) hours. 360 tablet 3    HYDROcodone-acetaminophen (NORCO)  mg per tablet Take 1 tablet by mouth every 8 (eight) hours as needed for Pain. 90 tablet 0    hydrOXYzine HCL (ATARAX) 25 MG tablet Take 1 Tablet by mouth NIGHTLY AS NEEDED FOR ITCHING 30 tablet 1    Lactobac no.41/Bifidobact no.7 (PROBIOTIC-10 ORAL) Take 1 capsule by mouth once daily.      multivitamin capsule Take 1 capsule by mouth once daily.      mupirocin (BACTROBAN) 2 % ointment Apply topically 3 (three) times daily. 15 g 1    omeprazole (PRILOSEC) 20 MG capsule Take 1 capsule (20 mg total) by mouth 2 (two) times a day. 180 capsule 3    polyethylene glycol (GLYCOLAX) 17 gram PwPk Take 17 g by mouth daily as needed.      pravastatin (PRAVACHOL) 20 MG tablet TAKE 1 TABLET BY MOUTH DAILY 90 tablet 3    predniSONE (DELTASONE) 5 MG tablet Take 2 tablets (10 mg total) by mouth once daily. 270 tablet 1    sildenafiL (VIAGRA) 100 MG tablet Take 1 tablet (100 mg total) by mouth as needed for Erectile Dysfunction. 8 tablet 11    tiZANidine (ZANAFLEX) 4 MG tablet Take 1 tablet (4 mg total) by mouth every 8 (eight) hours. 270 tablet 1    travoprost (TRAVATAN Z) 0.004 % ophthalmic solution Place 1 drop into both eyes every evening. 5 mL 12    zaleplon (SONATA) 10 MG capsule TAKE ONE CAPSULE BY MOUTH EVERY EVENING 30 capsule 3    [DISCONTINUED] mirabegron (MYRBETRIQ) 25 mg Tb24 ER tablet Take 1 tablet (25 mg total) by mouth once daily. 30 tablet 3    [DISCONTINUED] tamsulosin (FLOMAX) 0.4 mg Cap  TAKE 1 CAPSULE(0.4 MG) BY MOUTH EVERY DAY 30 capsule 11    [DISCONTINUED] golimumab (SIMPONI) 50 mg/0.5 mL PnIj Inject 50 mg into the skin every 28 days. 0.5 mL 11     Current Facility-Administered Medications on File Prior to Visit   Medication Dose Route Frequency Provider Last Rate Last Admin    0.9%  NaCl infusion   Intravenous Continuous Deedee Sarkar NP   Stopped at 10/18/18 1613    mupirocin 2 % ointment   Nasal On Call Procedure Deedee Sarkar NP   Given at 10/18/18 1348    sodium chloride 0.9% flush 10 mL  10 mL Intravenous PRN Zac Boucher MD            Objective:     Vitals:    10/23/23 1106   BP: 132/72   Pulse: 67   Weight: 89.8 kg (197 lb 15.6 oz)      BMI Readings from Last 1 Encounters:   10/23/23 26.85 kg/m²          Physical Exam    Lab Results   Component Value Date    PSA 1.6 02/16/2023        Lab Results   Component Value Date    CREATININE 1.4 10/17/2023      Assessment:       1. Renal cyst    2. Benign prostatic hyperplasia, unspecified whether lower urinary tract symptoms present        Plan:     1. Renal cyst    2. Benign prostatic hyperplasia, unspecified whether lower urinary tract symptoms present       Orders Placed This Encounter    tamsulosin (FLOMAX) 0.4 mg Cap    mirabegron (MYRBETRIQ) 25 mg Tb24 ER tablet      BPH on tamsulosin and Myrbetriq.  Overall stable.  Have refilled his medicines for a whole year we will see him back at that time.  PSA is scheduled through his PCP.  Have reassured him that renal cyst is benign based on imaging findings; no further imaging needed.  I have independently reviewed the CT scan.

## 2023-10-24 ENCOUNTER — OFFICE VISIT (OUTPATIENT)
Dept: RHEUMATOLOGY | Facility: CLINIC | Age: 73
End: 2023-10-24
Payer: MEDICARE

## 2023-10-24 VITALS
HEIGHT: 72 IN | WEIGHT: 192.44 LBS | HEART RATE: 66 BPM | BODY MASS INDEX: 26.07 KG/M2 | SYSTOLIC BLOOD PRESSURE: 145 MMHG | DIASTOLIC BLOOD PRESSURE: 72 MMHG

## 2023-10-24 DIAGNOSIS — Z79.899 IMMUNOCOMPROMISED STATE DUE TO DRUG THERAPY: ICD-10-CM

## 2023-10-24 DIAGNOSIS — D69.6 THROMBOCYTOPENIA: ICD-10-CM

## 2023-10-24 DIAGNOSIS — M05.9 SEROPOSITIVE RHEUMATOID ARTHRITIS: Primary | ICD-10-CM

## 2023-10-24 DIAGNOSIS — G89.4 CHRONIC PAIN SYNDROME: ICD-10-CM

## 2023-10-24 DIAGNOSIS — L40.50 PSA (PSORIATIC ARTHRITIS): ICD-10-CM

## 2023-10-24 DIAGNOSIS — D84.821 IMMUNOCOMPROMISED STATE DUE TO DRUG THERAPY: ICD-10-CM

## 2023-10-24 PROCEDURE — 99215 OFFICE O/P EST HI 40 MIN: CPT | Mod: 25,S$GLB,, | Performed by: INTERNAL MEDICINE

## 2023-10-24 PROCEDURE — 1160F PR REVIEW ALL MEDS BY PRESCRIBER/CLIN PHARMACIST DOCUMENTED: ICD-10-PCS | Mod: CPTII,S$GLB,, | Performed by: INTERNAL MEDICINE

## 2023-10-24 PROCEDURE — 3008F PR BODY MASS INDEX (BMI) DOCUMENTED: ICD-10-PCS | Mod: CPTII,S$GLB,, | Performed by: INTERNAL MEDICINE

## 2023-10-24 PROCEDURE — 1125F PR PAIN SEVERITY QUANTIFIED, PAIN PRESENT: ICD-10-PCS | Mod: CPTII,S$GLB,, | Performed by: INTERNAL MEDICINE

## 2023-10-24 PROCEDURE — 3288F PR FALLS RISK ASSESSMENT DOCUMENTED: ICD-10-PCS | Mod: CPTII,S$GLB,, | Performed by: INTERNAL MEDICINE

## 2023-10-24 PROCEDURE — 1159F PR MEDICATION LIST DOCUMENTED IN MEDICAL RECORD: ICD-10-PCS | Mod: CPTII,S$GLB,, | Performed by: INTERNAL MEDICINE

## 2023-10-24 PROCEDURE — 99215 PR OFFICE/OUTPT VISIT, EST, LEVL V, 40-54 MIN: ICD-10-PCS | Mod: 25,S$GLB,, | Performed by: INTERNAL MEDICINE

## 2023-10-24 PROCEDURE — 3008F BODY MASS INDEX DOCD: CPT | Mod: CPTII,S$GLB,, | Performed by: INTERNAL MEDICINE

## 2023-10-24 PROCEDURE — 96372 THER/PROPH/DIAG INJ SC/IM: CPT | Mod: S$GLB,,, | Performed by: INTERNAL MEDICINE

## 2023-10-24 PROCEDURE — 4010F PR ACE/ARB THEARPY RXD/TAKEN: ICD-10-PCS | Mod: CPTII,S$GLB,, | Performed by: INTERNAL MEDICINE

## 2023-10-24 PROCEDURE — 96372 PR INJECTION,THERAP/PROPH/DIAG2ST, IM OR SUBCUT: ICD-10-PCS | Mod: S$GLB,,, | Performed by: INTERNAL MEDICINE

## 2023-10-24 PROCEDURE — 1100F PTFALLS ASSESS-DOCD GE2>/YR: CPT | Mod: CPTII,S$GLB,, | Performed by: INTERNAL MEDICINE

## 2023-10-24 PROCEDURE — 1100F PR PT FALLS ASSESS DOC 2+ FALLS/FALL W/INJURY/YR: ICD-10-PCS | Mod: CPTII,S$GLB,, | Performed by: INTERNAL MEDICINE

## 2023-10-24 PROCEDURE — 1160F RVW MEDS BY RX/DR IN RCRD: CPT | Mod: CPTII,S$GLB,, | Performed by: INTERNAL MEDICINE

## 2023-10-24 PROCEDURE — 3077F SYST BP >= 140 MM HG: CPT | Mod: CPTII,S$GLB,, | Performed by: INTERNAL MEDICINE

## 2023-10-24 PROCEDURE — 99999 PR PBB SHADOW E&M-EST. PATIENT-LVL IV: ICD-10-PCS | Mod: PBBFAC,,, | Performed by: INTERNAL MEDICINE

## 2023-10-24 PROCEDURE — 1159F MED LIST DOCD IN RCRD: CPT | Mod: CPTII,S$GLB,, | Performed by: INTERNAL MEDICINE

## 2023-10-24 PROCEDURE — 3078F DIAST BP <80 MM HG: CPT | Mod: CPTII,S$GLB,, | Performed by: INTERNAL MEDICINE

## 2023-10-24 PROCEDURE — 3288F FALL RISK ASSESSMENT DOCD: CPT | Mod: CPTII,S$GLB,, | Performed by: INTERNAL MEDICINE

## 2023-10-24 PROCEDURE — 3077F PR MOST RECENT SYSTOLIC BLOOD PRESSURE >= 140 MM HG: ICD-10-PCS | Mod: CPTII,S$GLB,, | Performed by: INTERNAL MEDICINE

## 2023-10-24 PROCEDURE — 3078F PR MOST RECENT DIASTOLIC BLOOD PRESSURE < 80 MM HG: ICD-10-PCS | Mod: CPTII,S$GLB,, | Performed by: INTERNAL MEDICINE

## 2023-10-24 PROCEDURE — 99999 PR PBB SHADOW E&M-EST. PATIENT-LVL IV: CPT | Mod: PBBFAC,,, | Performed by: INTERNAL MEDICINE

## 2023-10-24 PROCEDURE — 4010F ACE/ARB THERAPY RXD/TAKEN: CPT | Mod: CPTII,S$GLB,, | Performed by: INTERNAL MEDICINE

## 2023-10-24 PROCEDURE — 1125F AMNT PAIN NOTED PAIN PRSNT: CPT | Mod: CPTII,S$GLB,, | Performed by: INTERNAL MEDICINE

## 2023-10-24 RX ORDER — CYANOCOBALAMIN 1000 UG/ML
1000 INJECTION, SOLUTION INTRAMUSCULAR; SUBCUTANEOUS
Status: COMPLETED | OUTPATIENT
Start: 2023-10-24 | End: 2023-10-24

## 2023-10-24 RX ORDER — KETOROLAC TROMETHAMINE 30 MG/ML
60 INJECTION, SOLUTION INTRAMUSCULAR; INTRAVENOUS
Status: COMPLETED | OUTPATIENT
Start: 2023-10-24 | End: 2023-10-24

## 2023-10-24 RX ORDER — METHYLPREDNISOLONE ACETATE 80 MG/ML
160 INJECTION, SUSPENSION INTRA-ARTICULAR; INTRALESIONAL; INTRAMUSCULAR; SOFT TISSUE
Status: COMPLETED | OUTPATIENT
Start: 2023-10-24 | End: 2023-10-24

## 2023-10-24 RX ORDER — HYDROCODONE BITARTRATE AND ACETAMINOPHEN 10; 325 MG/1; MG/1
1 TABLET ORAL EVERY 8 HOURS PRN
Qty: 90 TABLET | Refills: 0 | Status: SHIPPED | OUTPATIENT
Start: 2023-12-21 | End: 2024-01-27 | Stop reason: SDUPTHER

## 2023-10-24 RX ORDER — HYDROCODONE BITARTRATE AND ACETAMINOPHEN 10; 325 MG/1; MG/1
1 TABLET ORAL EVERY 8 HOURS PRN
Qty: 90 TABLET | Refills: 0 | Status: SHIPPED | OUTPATIENT
Start: 2023-10-24 | End: 2023-11-23

## 2023-10-24 RX ORDER — HYDROCODONE BITARTRATE AND ACETAMINOPHEN 10; 325 MG/1; MG/1
1 TABLET ORAL EVERY 8 HOURS PRN
Qty: 90 TABLET | Refills: 0 | Status: SHIPPED | OUTPATIENT
Start: 2023-11-23 | End: 2023-12-23

## 2023-10-24 RX ADMIN — METHYLPREDNISOLONE ACETATE 160 MG: 80 INJECTION, SUSPENSION INTRA-ARTICULAR; INTRALESIONAL; INTRAMUSCULAR; SOFT TISSUE at 12:10

## 2023-10-24 RX ADMIN — KETOROLAC TROMETHAMINE 60 MG: 30 INJECTION, SOLUTION INTRAMUSCULAR; INTRAVENOUS at 12:10

## 2023-10-24 RX ADMIN — CYANOCOBALAMIN 1000 MCG: 1000 INJECTION, SOLUTION INTRAMUSCULAR; SUBCUTANEOUS at 12:10

## 2023-10-24 NOTE — PROGRESS NOTES
Subjective:      Patient ID: Scooter Swan is a 72 y.o. male.    Chief Complaint: Disease Management    Follow up:72 year old male seropositive rheumatoid arthritis and osteoarthritis.  Patient complains of arthralgias and myalgias for which has been present for a few years. Pain is located in multiple joints, both shoulder(s), both elbow(s), both wrist(s), both MCP(s): 1st, 2nd, 3rd, 4th and 5th, both PIP(s): 1st, 2nd, 3rd, 4th and 5th, both DIP(s): 1st and 2nd, both hip(s), both knee(s) and both MTP(s): 1st, 2nd, 3rd, 4th and 5th, is described as aching, pulsating, shooting and throbbing, and is constant, moderate .  Associated symptoms include: crepitation, decreased range of motion, edema, effusion, tenderness and warmth.  He has been flaring since weather changes. Plts are low but no bleeding and no bruising.  He continues to have pain in his hands, elbows, knees, and ankles. He has significant morning stiffness and he is taking prednisone 10 mg daily. Norco is providing adequate control of his pain without side effects.     He tried sonata,  tried doxepin, trazodone, lunesta, mirtazapine. Rheum Rxed zaleplon and seroquel    We reviewed arthritis survey.    Current treatment:  1. Norco TID PRN  2. Prednisone 10 mg  3. Zanaflex PRN    Prior treatment:  1. Plaquenil (WEIGHT LOSS)  2. SSZ caused GI upset  3. enbrel        Review of Systems   Constitutional:  Positive for activity change. Negative for fever and unexpected weight change.   HENT:  Positive for trouble swallowing. Negative for mouth sores.    Eyes:  Negative for redness.   Respiratory:  Positive for cough. Negative for shortness of breath.    Cardiovascular:  Negative for chest pain.   Gastrointestinal:  Positive for constipation. Negative for diarrhea.   Genitourinary:  Negative for genital sores.   Skin:  Negative for rash.   Neurological:  Negative for headaches.   Hematological:  Does not bruise/bleed easily.        Objective:   BP (!) 145/72    "Pulse 66   Ht 6' 0.01" (1.829 m)   Wt 87.3 kg (192 lb 7.4 oz)   BMI 26.10 kg/m²   Physical Exam   Constitutional: He is oriented to person, place, and time. No distress.   HENT:   Head: Normocephalic and atraumatic.   Mouth/Throat: Oropharynx is clear and moist.   Eyes: Pupils are equal, round, and reactive to light.   Neck: No thyromegaly present.   Cardiovascular: Normal rate, regular rhythm and normal heart sounds. Exam reveals no gallop and no friction rub.   No murmur heard.  Pulmonary/Chest: Breath sounds normal. He has no wheezes. He has no rales. He exhibits no tenderness.   Abdominal: There is no abdominal tenderness. There is no rebound and no guarding.   Musculoskeletal:         General: Tenderness and deformity present.      Right shoulder: Tenderness present.      Left shoulder: Tenderness present.      Right elbow: Tenderness present.      Left elbow: Normal.      Left wrist: Normal.      Cervical back: Neck supple.      Right knee: Swelling and effusion present.      Left knee: Swelling and effusion present.   Lymphadenopathy:     He has no cervical adenopathy.   Neurological: He is alert and oriented to person, place, and time. He displays weakness. He exhibits abnormal muscle tone.   Reflex Scores:       Patellar reflexes are 2+ on the right side and 2+ on the left side.  Skin: No rash noted. No erythema. No pallor.   Psychiatric: Mood and affect normal.   Vitals reviewed.      Right Side Rheumatological Exam     Examination finds the 2nd MCP, 3rd MCP, 4th MCP and 5th MCP normal.    The patient is tender to palpation of the shoulder and elbow    He has swelling of the knee    The patient has an enlarged wrist, 1st PIP, 2nd PIP, 3rd PIP, 4th PIP and 5th PIP    Shoulder Exam   Tenderness Location: acromioclavicular joint and posterior shoulder    Range of Motion   Active abduction:  abnormal   Adduction: abnormal  Sensation: normal    Knee Exam     Range of Motion   Extension:  normal   Flexion:  " normal   Patellofemoral Crepitus: positive  Effusion: positive  Sensation: normal    Hip Exam   Tenderness Location: anterior and posterior    Range of Motion   Extension:  abnormal   Flexion:  abnormal   Sensation: normal    Elbow/Wrist Exam   Tenderness Location: no tenderness    Range of Motion   Elbow   Flexion:  normal   Sensation: normal    Muscle Strength (0-5 scale):  Neck Flexion:  3  Neck Extension: 3  Deltoid:  3  Biceps: 3/5   Triceps:  3  Quadriceps:  3   Distal Lower Extremity: 3    Left Side Rheumatological Exam     Examination finds the elbow, wrist, 1st MCP, 2nd MCP, 3rd MCP, 4th MCP and 5th MCP normal.    The patient is tender to palpation of the shoulder.    He has swelling of the knee    The patient has an enlarged 1st PIP, 2nd PIP, 3rd PIP, 4th PIP and 5th PIP.    Shoulder Exam   Tenderness Location: acromioclavicular joint and posterior shoulder    Range of Motion   Active abduction:  abnormal   Extension:  abnormal   Sensation: normal    Knee Exam     Range of Motion   Extension:  normal   Flexion:  normal     Patellofemoral Crepitus: positive  Effusion: positive  Sensation: normal    Hip Exam   Tenderness Location: anterior and posterior    Range of Motion   Extension:  abnormal   Flexion:  abnormal   Sensation: normal    Elbow/Wrist Exam     Range of Motion   Elbow   Flexion:  normal   Sensation: normal    Muscle Strength (0-5 scale):  Neck Flexion:  3  Neck Extension: 3  Deltoid:  3  Biceps: 3/5   Triceps:  3  Quadriceps:  3   Distal Lower Extremity: 3      Back/Neck Exam   General Inspection   Gait: normal       Tenderness Right paramedian tenderness of the Lower C-Spine, Lower L-Spine, Upper C-Spine, Upper L-Spine and SI Joint.Left paramedian tenderness of the Lower C-Spine, Lower L-Spine, Upper C-Spine, Upper L-Spine and SI Joint.    Back Range of Motion   Extension:  abnormal  Flexion:  abnormal  Lateral Bend Right:  abnormal  Lateral Bend Left:  abnormal  Rotation Right:   abnormal  Rotation Left:  abnormal    Neck Range of Motion   Flexion:  Limited and moderate  Extension:  Limited and moderate  Right Lateral Bend: abnormal  Left Lateral Bend: abnormal  Right Rotation: abnormal  Left Rotation: abnormal      Results for orders placed or performed in visit on 10/17/23   CBC Auto Differential   Result Value Ref Range    WBC 9.47 3.90 - 12.70 K/uL    RBC 3.72 (L) 4.60 - 6.20 M/uL    Hemoglobin 10.6 (L) 14.0 - 18.0 g/dL    Hematocrit 35.1 (L) 40.0 - 54.0 %    MCV 94 82 - 98 fL    MCH 28.5 27.0 - 31.0 pg    MCHC 30.2 (L) 32.0 - 36.0 g/dL    RDW 14.7 (H) 11.5 - 14.5 %    Platelets 116 (L) 150 - 450 K/uL    MPV 14.3 (H) 9.2 - 12.9 fL    Immature Granulocytes 0.4 0.0 - 0.5 %    Gran # (ANC) 7.5 1.8 - 7.7 K/uL    Immature Grans (Abs) 0.04 0.00 - 0.04 K/uL    Lymph # 1.3 1.0 - 4.8 K/uL    Mono # 0.6 0.3 - 1.0 K/uL    Eos # 0.0 0.0 - 0.5 K/uL    Baso # 0.03 0.00 - 0.20 K/uL    nRBC 0 0 /100 WBC    Gran % 79.5 (H) 38.0 - 73.0 %    Lymph % 13.2 (L) 18.0 - 48.0 %    Mono % 6.3 4.0 - 15.0 %    Eosinophil % 0.3 0.0 - 8.0 %    Basophil % 0.3 0.0 - 1.9 %    Differential Method Automated    Comprehensive Metabolic Panel   Result Value Ref Range    Sodium 141 136 - 145 mmol/L    Potassium 4.3 3.5 - 5.1 mmol/L    Chloride 107 95 - 110 mmol/L    CO2 25 23 - 29 mmol/L    Glucose 230 (H) 70 - 110 mg/dL    BUN 14 8 - 23 mg/dL    Creatinine 1.4 0.5 - 1.4 mg/dL    Calcium 9.4 8.7 - 10.5 mg/dL    Total Protein 6.8 6.0 - 8.4 g/dL    Albumin 3.9 3.5 - 5.2 g/dL    Total Bilirubin 0.4 0.1 - 1.0 mg/dL    Alkaline Phosphatase 57 55 - 135 U/L    AST 15 10 - 40 U/L    ALT 10 10 - 44 U/L    eGFR 53.1 (A) >60 mL/min/1.73 m^2    Anion Gap 9 8 - 16 mmol/L   C-Reactive Protein   Result Value Ref Range    CRP 6.8 0.0 - 8.2 mg/L   Sedimentation rate   Result Value Ref Range    Sed Rate 5 0 - 10 mm/Hr   Vitamin B12   Result Value Ref Range    Vitamin B-12 1139 (H) 210 - 950 pg/mL          Assessment:     1. Seropositive  rheumatoid arthritis    2. Thrombocytopenia    3. Immunocompromised state due to drug therapy    4. PSA (psoriatic arthritis)          Plan:     Scooter was seen today for disease management.    Diagnoses and all orders for this visit:    Seropositive rheumatoid arthritis  -     PHARMACOGENOMICS PANEL; Future  -     ketorolac injection 60 mg  -     methylPREDNISolone acetate injection 160 mg  -     cyanocobalamin injection 1,000 mcg  -     T4, Free; Future  -     TSH; Future  -     Vitamin D; Future  -     Cyclic Citrullinated Peptide Antibody, IgG; Future  -     Rheumatoid Factor; Future  -     Sedimentation rate; Future  -     C-Reactive Protein; Future  -     Comprehensive Metabolic Panel; Future  -     CBC Auto Differential; Future    Thrombocytopenia  -     PHARMACOGENOMICS PANEL; Future  -     ketorolac injection 60 mg  -     methylPREDNISolone acetate injection 160 mg  -     cyanocobalamin injection 1,000 mcg  -     T4, Free; Future  -     TSH; Future  -     Vitamin D; Future  -     Cyclic Citrullinated Peptide Antibody, IgG; Future  -     Rheumatoid Factor; Future  -     Sedimentation rate; Future  -     C-Reactive Protein; Future  -     Comprehensive Metabolic Panel; Future  -     CBC Auto Differential; Future    Immunocompromised state due to drug therapy  -     PHARMACOGENOMICS PANEL; Future  -     ketorolac injection 60 mg  -     methylPREDNISolone acetate injection 160 mg  -     cyanocobalamin injection 1,000 mcg  -     T4, Free; Future  -     TSH; Future  -     Vitamin D; Future  -     Cyclic Citrullinated Peptide Antibody, IgG; Future  -     Rheumatoid Factor; Future  -     Sedimentation rate; Future  -     C-Reactive Protein; Future  -     Comprehensive Metabolic Panel; Future  -     CBC Auto Differential; Future    PSA (psoriatic arthritis)  -     PHARMACOGENOMICS PANEL; Future  -     ketorolac injection 60 mg  -     methylPREDNISolone acetate injection 160 mg  -     cyanocobalamin injection 1,000  mcg  -     T4, Free; Future  -     TSH; Future  -     Vitamin D; Future  -     Cyclic Citrullinated Peptide Antibody, IgG; Future  -     Rheumatoid Factor; Future  -     Sedimentation rate; Future  -     C-Reactive Protein; Future  -     Comprehensive Metabolic Panel; Future  -     CBC Auto Differential; Future        More than 50% of the  40 minute encounter was spent face to face counseling the patient regarding current status and future plan of care as well as side effects  of the medications. All questions were answered to patient's satisfaction also includes  non-face to face time preparing to see the patient (eg, review of tests), Obtaining and/or reviewing separately obtained history, Documenting clinical information in the electronic or other health record, Independently interpreting results

## 2023-11-06 DIAGNOSIS — E78.49 OTHER HYPERLIPIDEMIA: ICD-10-CM

## 2023-11-06 DIAGNOSIS — G47.00 INSOMNIA, UNSPECIFIED TYPE: ICD-10-CM

## 2023-11-06 RX ORDER — HYDROXYZINE HYDROCHLORIDE 25 MG/1
TABLET, FILM COATED ORAL
Qty: 30 TABLET | Refills: 2 | Status: SHIPPED | OUTPATIENT
Start: 2023-11-06

## 2023-11-06 RX ORDER — DILTIAZEM HYDROCHLORIDE 180 MG/1
CAPSULE, COATED, EXTENDED RELEASE ORAL
Qty: 30 CAPSULE | Refills: 2 | Status: SHIPPED | OUTPATIENT
Start: 2023-11-06 | End: 2024-01-22 | Stop reason: SDUPTHER

## 2023-11-06 RX ORDER — PRAVASTATIN SODIUM 20 MG/1
20 TABLET ORAL
Qty: 90 TABLET | Refills: 0 | Status: SHIPPED | OUTPATIENT
Start: 2023-11-06 | End: 2024-01-30

## 2023-11-06 NOTE — TELEPHONE ENCOUNTER
Refill Routing Note   Medication(s) are not appropriate for processing by [facility] for the following reason(s):      Medication outside of protocol    [facility] action(s):  Approve  Route Care Due:  None identified            Appointments  past 12m or future 3m with PCP    Date Provider   Last Visit   8/7/2023 Salvador Kennedy, DO   Next Visit   2/23/2024 Salvador Kennedy, DO   ED visits in past 90 days: 0        Note composed:12:49 PM 11/06/2023

## 2023-11-06 NOTE — TELEPHONE ENCOUNTER
No care due was identified.  [facility] Embedded Care Due Messages. Reference number: [id number].   11/06/2023 8:20:27 AM CST

## 2023-11-11 NOTE — TELEPHONE ENCOUNTER
Refill Routing Note   Medication(s) are not appropriate for processing by [facility] for the following reason(s):      Required vitals abnormal    [facility] action(s):  Defer Care Due:  None identified     Medication Therapy Plan: BP 10/24/23 (!) 145/72      Appointments  past 12m or future 3m with PCP    Date Provider   Last Visit   8/7/2023 Salvador Kennedy, DO   Next Visit   2/23/2024 Salvador Kennedy, DO   ED visits in past 90 days: 0        Note composed:2:42 PM 11/11/2023

## 2023-11-11 NOTE — TELEPHONE ENCOUNTER
No care due was identified.  [facility] Embedded Care Due Messages. Reference number: [id number].   11/11/2023 8:03:43 AM CST

## 2023-11-13 RX ORDER — ENALAPRIL MALEATE 20 MG/1
20 TABLET ORAL 2 TIMES DAILY
Qty: 180 TABLET | Refills: 2 | Status: SHIPPED | OUTPATIENT
Start: 2023-11-13

## 2023-11-14 ENCOUNTER — IMMUNIZATION (OUTPATIENT)
Dept: FAMILY MEDICINE | Facility: CLINIC | Age: 73
End: 2023-11-14
Payer: MEDICARE

## 2023-11-14 DIAGNOSIS — Z23 NEED FOR VACCINATION: Primary | ICD-10-CM

## 2023-11-14 PROCEDURE — 90694 FLU VACCINE - QUADRIVALENT - ADJUVANTED: ICD-10-PCS | Mod: S$GLB,,, | Performed by: INTERNAL MEDICINE

## 2023-11-14 PROCEDURE — 90694 VACC AIIV4 NO PRSRV 0.5ML IM: CPT | Mod: S$GLB,,, | Performed by: INTERNAL MEDICINE

## 2023-11-14 PROCEDURE — G0008 ADMIN INFLUENZA VIRUS VAC: HCPCS | Mod: S$GLB,,, | Performed by: INTERNAL MEDICINE

## 2023-11-14 PROCEDURE — G0008 FLU VACCINE - QUADRIVALENT - ADJUVANTED: ICD-10-PCS | Mod: S$GLB,,, | Performed by: INTERNAL MEDICINE

## 2023-12-04 DIAGNOSIS — I1A.0 RESISTANT HYPERTENSION: ICD-10-CM

## 2023-12-04 RX ORDER — HYDRALAZINE HYDROCHLORIDE 50 MG/1
100 TABLET, FILM COATED ORAL EVERY 12 HOURS
Qty: 360 TABLET | Refills: 3 | Status: SHIPPED | OUTPATIENT
Start: 2023-12-04

## 2023-12-04 NOTE — TELEPHONE ENCOUNTER
Care Due:                  Date            Visit Type   Department     Provider  --------------------------------------------------------------------------------                                EP -                              PRIMARY      [facility] FAMILY  Last Visit: 08-      CARE ([facility])   MEDICINE       Salvador Kennedy                              ESTABLISHED   [facility]  Next Visit: 02-      PATIENT      MEDICINE       Salvador Kennedy                                                            Last  Test          Frequency    Reason                     Performed    Due Date  --------------------------------------------------------------------------------    Lipid Panel.  12 months..  pravastatin..............  02- 02-    [facility] Embedded Care Due Messages. Reference number: [id number].   12/04/2023 8:30:47 AM CST

## 2023-12-06 DIAGNOSIS — N52.8 OTHER MALE ERECTILE DYSFUNCTION: ICD-10-CM

## 2023-12-06 RX ORDER — SILDENAFIL 100 MG/1
100 TABLET, FILM COATED ORAL
Qty: 24 TABLET | Refills: 2 | Status: SHIPPED | OUTPATIENT
Start: 2023-12-06

## 2023-12-06 NOTE — TELEPHONE ENCOUNTER
No care due was identified.  [facility] Embedded Care Due Messages. Reference number: [id number].   12/06/2023 12:20:02 PM CST

## 2023-12-06 NOTE — TELEPHONE ENCOUNTER
Refill Decision Note   Scooter Miken  is requesting a refill authorization.  Brief Assessment and Rationale for Refill:  Approve     Medication Therapy Plan:       Medication Reconciliation Completed: No   Comments:     No Care Gaps recommended.     Note composed:3:25 PM 12/06/2023

## 2023-12-15 ENCOUNTER — PATIENT OUTREACH (OUTPATIENT)
Dept: ADMINISTRATIVE | Facility: HOSPITAL | Age: 73
End: 2023-12-15
Payer: MEDICARE

## 2023-12-15 NOTE — PROGRESS NOTES
Population Health Chart Review & Patient Outreach Details    Outreach Performed: NO    Additional Pop Health Notes:           Updates Requested / Reviewed:             Health Maintenance Topics Overdue:    Health Maintenance Due   Topic Date Due    TETANUS VACCINE  Never done    Aspirin/Antiplatelet Therapy  Never done    Shingles Vaccine (1 of 2) Never done    High Dose Statin  Never done    RSV Vaccine (Age 60+ and Pregnant patients) (1 - 1-dose 60+ series) Never done    COVID-19 Vaccine (5 - 2023-24 season) 09/01/2023         Health Maintenance Topic(s) Outreach Outcomes & Actions Taken:    Medication Adherence / Statins - Outreach Outcomes & Actions Taken  : ATTESTATION SENT

## 2024-01-09 ENCOUNTER — HOSPITAL ENCOUNTER (INPATIENT)
Facility: HOSPITAL | Age: 74
LOS: 7 days | Discharge: HOME HEALTH | DRG: 191 | End: 2024-01-16
Attending: INTERNAL MEDICINE | Admitting: INTERNAL MEDICINE
Payer: MEDICARE

## 2024-01-09 DIAGNOSIS — J44.1 COPD EXACERBATION: ICD-10-CM

## 2024-01-09 DIAGNOSIS — R78.81 BACTEREMIA: Primary | ICD-10-CM

## 2024-01-09 DIAGNOSIS — R06.00 DYSPNEA: ICD-10-CM

## 2024-01-09 DIAGNOSIS — R06.02 SOB (SHORTNESS OF BREATH): ICD-10-CM

## 2024-01-09 DIAGNOSIS — B95.7 COAG NEGATIVE STAPHYLOCOCCUS BACTEREMIA: ICD-10-CM

## 2024-01-09 DIAGNOSIS — R78.81 COAG NEGATIVE STAPHYLOCOCCUS BACTEREMIA: ICD-10-CM

## 2024-01-09 DIAGNOSIS — I47.10 SVT (SUPRAVENTRICULAR TACHYCARDIA): ICD-10-CM

## 2024-01-09 PROCEDURE — 21400001 HC TELEMETRY ROOM

## 2024-01-09 NOTE — PROVIDER TRANSFER
(Physician in Lead of Transfers)  Outside Transfer Acceptance Note / Regional Referral Center    Upon patient arrival, please contact Hospital Medicine on call.    Referring facility: [facility]   Referring provider: EVERETT SABILLON  Accepting facility[facility]  Accepting provider: BETHANIE AMEZCUA  Admitting provider: BK YUN  Reason for transfer: Higher Level of Care   Transfer diagnosis: copd excerbation  Transfer specialty requested: Cardiology  Transfer specialty notified: Yes  Transfer level: NUMBER 1-5: 2  Bed type requested: tele  Isolation status: No active isolations   Admission class or status: IP- Inpatient      Narrative     73-year-old male with a history of seropositive rheumatoid arthritis, thrombocytopenia, anemia , BPH, COPD, gastroesophageal reflux disease, hypertension, aortic stenosis, and hyperlipidemia initially presented to the [facility] on January 9 with dyspnea, cough, and body aches.  He had negative respiratory virus panel, negative COVID, negative flu, and negative influenza swabs.  He left there and subsequently went to [facility].  He presented there with dyspnea and flu-like symptoms for 1 week.  He reported chest discomfort earlier in the day but none during his emergency department stay.  Labs included elevated D-dimer and mild leukocytosis along with mild elevation in troponin that trended down during his stay.  CTA of the chest had no evidence of PE, though there was an opacified bronchus in the left lower lobe that may be due to mucus. In the emergency department he received albuterol, aspirin, azithromycin, Rocephin, Norco, DuoNeb, and Solu-Medrol.  They are requesting transfer to Hospital Medicine at [facility] for further treatment of dyspnea along with evaluation of elevation in troponin/aortic stenosis.  With the history of aortic stenosis and elevated troponinis, case discussed  with Cardiology at [facility], and they are available for consultation.    Sodium 141, potassium 3.6, chloride 106, CO2 24, BUN 15, creatinine 1.22, glucose 114, AST 16, ALT 11, high sensitivity troponin 31 with repeat 26 (normal range 2-19), BNP 73, D-dimer 49228, white blood cells 13.1, hemoglobin 10.4, hematocrit 32.3, platelets 192, influenza negative, COVID negative    CT chest for PE protocol noted patient motion artifact.  No evidence of pulmonary embolism.  No mediastinal or hilar adenopathy.  Thoracic aorta is normal caliber.  Heart size is normal.  Opacified bronchus in the left lower lobe may be due to mucus.  There a few bands of subsegmental atelectasis in the lingula.  No airspace consolidation.  2 cm thin walled air cyst is apparent in the right lower lobe.  0 pleural effusions.  Bones are intact.    October 2022: Echocardiogram had EF 60%.  Grade 1 diastolic dysfunction.  Moderate to severe aortic valve stenosis with aortic valve area 1.35 cm2, mean gradient 47 mmHg, peak velocity 4.49 M/S.    VS:  Temperature 98.1°, pulse 109, respirations 26, blood pressure 133/75, O2 sats 99%    Instructions    Admit to Hospital Medicine      KYLE Parekh MD  Hospital Medicine Staff  Cell: [phone]

## 2024-01-10 ENCOUNTER — CLINICAL SUPPORT (OUTPATIENT)
Dept: CARDIOLOGY | Facility: HOSPITAL | Age: 74
DRG: 191 | End: 2024-01-10
Attending: INTERNAL MEDICINE
Payer: MEDICARE

## 2024-01-10 VITALS — HEIGHT: 72 IN | WEIGHT: 177.94 LBS | BODY MASS INDEX: 24.1 KG/M2

## 2024-01-10 PROBLEM — D72.829 LEUKOCYTOSIS: Status: ACTIVE | Noted: 2024-01-10

## 2024-01-10 PROBLEM — R79.89 ELEVATED TROPONIN: Status: ACTIVE | Noted: 2024-01-10

## 2024-01-10 PROBLEM — R79.89 ELEVATED D-DIMER: Status: ACTIVE | Noted: 2024-01-10

## 2024-01-10 LAB
ADENOVIRUS: NOT DETECTED
ALBUMIN SERPL BCP-MCNC: 3.8 G/DL (ref 3.5–5.2)
ALP SERPL-CCNC: 51 U/L (ref 55–135)
ALT SERPL W/O P-5'-P-CCNC: 10 U/L (ref 10–44)
ANION GAP SERPL CALC-SCNC: 8 MMOL/L (ref 8–16)
AST SERPL-CCNC: 14 U/L (ref 10–40)
AV INDEX (PROSTH): 0.38
AV MEAN GRADIENT: 32 MMHG
AV PEAK GRADIENT: 54 MMHG
AV REGURGITATION PRESSURE HALF TIME: 257 MS
AV VALVE AREA BY VELOCITY RATIO: 1.32 CM²
AV VALVE AREA: 1.32 CM²
AV VELOCITY RATIO: 0.38
BACTERIA #/AREA URNS HPF: NEGATIVE /HPF
BASOPHILS # BLD AUTO: 0.02 K/UL (ref 0–0.2)
BASOPHILS NFR BLD: 0.1 % (ref 0–1.9)
BILIRUB SERPL-MCNC: 0.5 MG/DL (ref 0.1–1)
BILIRUB UR QL STRIP: NEGATIVE
BNP SERPL-MCNC: 82 PG/ML (ref 0–99)
BORDETELLA PARAPERTUSSIS (IS1001): NOT DETECTED
BORDETELLA PERTUSSIS (PTXP): NOT DETECTED
BSA FOR ECHO PROCEDURE: 2.02 M2
BUN SERPL-MCNC: 20 MG/DL (ref 8–23)
CALCIUM SERPL-MCNC: 9 MG/DL (ref 8.7–10.5)
CHLAMYDIA PNEUMONIAE: NOT DETECTED
CHLORIDE SERPL-SCNC: 105 MMOL/L (ref 95–110)
CLARITY UR: CLEAR
CO2 SERPL-SCNC: 25 MMOL/L (ref 23–29)
COLOR UR: YELLOW
CORONAVIRUS 229E, COMMON COLD VIRUS: NOT DETECTED
CORONAVIRUS HKU1, COMMON COLD VIRUS: NOT DETECTED
CORONAVIRUS NL63, COMMON COLD VIRUS: NOT DETECTED
CORONAVIRUS OC43, COMMON COLD VIRUS: NOT DETECTED
CREAT SERPL-MCNC: 1.1 MG/DL (ref 0.5–1.4)
CV ECHO LV RWT: 0.29 CM
DIFFERENTIAL METHOD BLD: ABNORMAL
DOP CALC AO PEAK VEL: 3.68 M/S
DOP CALC AO VTI: 68.9 CM
DOP CALC LVOT AREA: 3.5 CM2
DOP CALC LVOT DIAMETER: 2.1 CM
DOP CALC LVOT PEAK VEL: 1.4 M/S
DOP CALC LVOT STROKE VOLUME: 91.05 CM3
DOP CALC MV VTI: 22.4 CM
DOP CALCLVOT PEAK VEL VTI: 26.3 CM
E WAVE DECELERATION TIME: 190 MSEC
E/A RATIO: 0.56
E/E' RATIO: 9.69 M/S
ECHO LV POSTERIOR WALL: 0.57 CM (ref 0.6–1.1)
EOSINOPHIL # BLD AUTO: 0 K/UL (ref 0–0.5)
EOSINOPHIL NFR BLD: 0 % (ref 0–8)
ERYTHROCYTE [DISTWIDTH] IN BLOOD BY AUTOMATED COUNT: 14.2 % (ref 11.5–14.5)
EST. GFR  (NO RACE VARIABLE): >60 ML/MIN/1.73 M^2
ESTIMATED AVG GLUCOSE: 120 MG/DL (ref 68–131)
FLUBV RNA NPH QL NAA+NON-PROBE: NOT DETECTED
FRACTIONAL SHORTENING: 20 % (ref 28–44)
GLUCOSE SERPL-MCNC: 149 MG/DL (ref 70–110)
GLUCOSE UR QL STRIP: NEGATIVE
HBA1C MFR BLD: 5.8 % (ref 4.5–6.2)
HCT VFR BLD AUTO: 29 % (ref 40–54)
HGB BLD-MCNC: 9.1 G/DL (ref 14–18)
HGB UR QL STRIP: NEGATIVE
HPIV1 RNA NPH QL NAA+NON-PROBE: NOT DETECTED
HPIV2 RNA NPH QL NAA+NON-PROBE: NOT DETECTED
HPIV3 RNA NPH QL NAA+NON-PROBE: NOT DETECTED
HPIV4 RNA NPH QL NAA+NON-PROBE: NOT DETECTED
HUMAN METAPNEUMOVIRUS: NOT DETECTED
HYALINE CASTS #/AREA URNS LPF: 5 /LPF
IMM GRANULOCYTES # BLD AUTO: 0.16 K/UL (ref 0–0.04)
IMM GRANULOCYTES NFR BLD AUTO: 0.9 % (ref 0–0.5)
INFLUENZA A (SUBTYPES H1,H1-2009,H3): NOT DETECTED
INTERVENTRICULAR SEPTUM: 1.52 CM (ref 0.6–1.1)
KETONES UR QL STRIP: ABNORMAL
LEFT ATRIUM SIZE: 2.9 CM
LEFT INTERNAL DIMENSION IN SYSTOLE: 3.19 CM (ref 2.1–4)
LEFT VENTRICLE DIASTOLIC VOLUME INDEX: 34.48 ML/M2
LEFT VENTRICLE DIASTOLIC VOLUME: 70 ML
LEFT VENTRICLE MASS INDEX: 67 G/M2
LEFT VENTRICLE SYSTOLIC VOLUME INDEX: 20 ML/M2
LEFT VENTRICLE SYSTOLIC VOLUME: 40.6 ML
LEFT VENTRICULAR INTERNAL DIMENSION IN DIASTOLE: 4 CM (ref 3.5–6)
LEFT VENTRICULAR MASS: 135.27 G
LEUKOCYTE ESTERASE UR QL STRIP: NEGATIVE
LV LATERAL E/E' RATIO: 9 M/S
LV SEPTAL E/E' RATIO: 10.5 M/S
LVOT MG: 5 MMHG
LVOT MV: 1 CM/S
LYMPHOCYTES # BLD AUTO: 1.2 K/UL (ref 1–4.8)
LYMPHOCYTES NFR BLD: 6.9 % (ref 18–48)
MCH RBC QN AUTO: 28.5 PG (ref 27–31)
MCHC RBC AUTO-ENTMCNC: 31.4 G/DL (ref 32–36)
MCV RBC AUTO: 91 FL (ref 82–98)
MICROSCOPIC COMMENT: ABNORMAL
MONOCYTES # BLD AUTO: 0.5 K/UL (ref 0.3–1)
MONOCYTES NFR BLD: 2.7 % (ref 4–15)
MV MEAN GRADIENT: 2 MMHG
MV PEAK A VEL: 1.12 M/S
MV PEAK E VEL: 0.63 M/S
MV PEAK GRADIENT: 5 MMHG
MV STENOSIS PRESSURE HALF TIME: 97 MS
MV VALVE AREA BY CONTINUITY EQUATION: 4.06 CM2
MV VALVE AREA P 1/2 METHOD: 2.27 CM2
MYCOPLASMA PNEUMONIAE: NOT DETECTED
NEUTROPHILS # BLD AUTO: 15.1 K/UL (ref 1.8–7.7)
NEUTROPHILS NFR BLD: 89.4 % (ref 38–73)
NITRITE UR QL STRIP: NEGATIVE
NRBC BLD-RTO: 0 /100 WBC
PH UR STRIP: 7 [PH] (ref 5–8)
PISA AR MAX VEL: 4.7 M/S
PLATELET # BLD AUTO: 176 K/UL (ref 150–450)
PMV BLD AUTO: 12.4 FL (ref 9.2–12.9)
POTASSIUM SERPL-SCNC: 4.9 MMOL/L (ref 3.5–5.1)
PROT SERPL-MCNC: 6.6 G/DL (ref 6–8.4)
PROT UR QL STRIP: ABNORMAL
RBC # BLD AUTO: 3.19 M/UL (ref 4.6–6.2)
RBC #/AREA URNS HPF: 5 /HPF (ref 0–4)
RESPIRATORY INFECTION PANEL SOURCE: NORMAL
RSV RNA NPH QL NAA+NON-PROBE: NOT DETECTED
RV+EV RNA NPH QL NAA+NON-PROBE: NOT DETECTED
SARS-COV-2 RNA RESP QL NAA+PROBE: NOT DETECTED
SODIUM SERPL-SCNC: 138 MMOL/L (ref 136–145)
SP GR UR STRIP: >1.03 (ref 1–1.03)
SQUAMOUS #/AREA URNS HPF: 1 /HPF
TDI LATERAL: 0.07 M/S
TDI SEPTAL: 0.06 M/S
TDI: 0.07 M/S
TROPONIN I SERPL HS-MCNC: 14.5 PG/ML (ref 0–14.9)
TROPONIN I SERPL HS-MCNC: 15.5 PG/ML (ref 0–14.9)
TSH SERPL DL<=0.005 MIU/L-ACNC: 0.58 UIU/ML (ref 0.34–5.6)
URN SPEC COLLECT METH UR: ABNORMAL
UROBILINOGEN UR STRIP-ACNC: NEGATIVE EU/DL
WBC # BLD AUTO: 16.93 K/UL (ref 3.9–12.7)
WBC #/AREA URNS HPF: 1 /HPF (ref 0–5)
Z-SCORE OF LEFT VENTRICULAR DIMENSION IN END DIASTOLE: -4.08
Z-SCORE OF LEFT VENTRICULAR DIMENSION IN END SYSTOLE: -1.15

## 2024-01-10 PROCEDURE — 99900035 HC TECH TIME PER 15 MIN (STAT)

## 2024-01-10 PROCEDURE — 83036 HEMOGLOBIN GLYCOSYLATED A1C: CPT | Performed by: INTERNAL MEDICINE

## 2024-01-10 PROCEDURE — 94760 N-INVAS EAR/PLS OXIMETRY 1: CPT

## 2024-01-10 PROCEDURE — 85025 COMPLETE CBC W/AUTO DIFF WBC: CPT | Performed by: INTERNAL MEDICINE

## 2024-01-10 PROCEDURE — 84443 ASSAY THYROID STIM HORMONE: CPT | Performed by: INTERNAL MEDICINE

## 2024-01-10 PROCEDURE — 87154 CUL TYP ID BLD PTHGN 6+ TRGT: CPT | Performed by: STUDENT IN AN ORGANIZED HEALTH CARE EDUCATION/TRAINING PROGRAM

## 2024-01-10 PROCEDURE — 93306 TTE W/DOPPLER COMPLETE: CPT

## 2024-01-10 PROCEDURE — 84484 ASSAY OF TROPONIN QUANT: CPT | Performed by: INTERNAL MEDICINE

## 2024-01-10 PROCEDURE — 87633 RESP VIRUS 12-25 TARGETS: CPT | Performed by: STUDENT IN AN ORGANIZED HEALTH CARE EDUCATION/TRAINING PROGRAM

## 2024-01-10 PROCEDURE — 81001 URINALYSIS AUTO W/SCOPE: CPT | Performed by: STUDENT IN AN ORGANIZED HEALTH CARE EDUCATION/TRAINING PROGRAM

## 2024-01-10 PROCEDURE — C9113 INJ PANTOPRAZOLE SODIUM, VIA: HCPCS | Performed by: INTERNAL MEDICINE

## 2024-01-10 PROCEDURE — 84484 ASSAY OF TROPONIN QUANT: CPT | Mod: 91 | Performed by: STUDENT IN AN ORGANIZED HEALTH CARE EDUCATION/TRAINING PROGRAM

## 2024-01-10 PROCEDURE — 94799 UNLISTED PULMONARY SVC/PX: CPT

## 2024-01-10 PROCEDURE — 94761 N-INVAS EAR/PLS OXIMETRY MLT: CPT

## 2024-01-10 PROCEDURE — 25000003 PHARM REV CODE 250: Performed by: INTERNAL MEDICINE

## 2024-01-10 PROCEDURE — 93005 ELECTROCARDIOGRAM TRACING: CPT | Performed by: GENERAL PRACTICE

## 2024-01-10 PROCEDURE — 21400001 HC TELEMETRY ROOM

## 2024-01-10 PROCEDURE — 63600175 PHARM REV CODE 636 W HCPCS: Performed by: INTERNAL MEDICINE

## 2024-01-10 PROCEDURE — 36415 COLL VENOUS BLD VENIPUNCTURE: CPT | Performed by: INTERNAL MEDICINE

## 2024-01-10 PROCEDURE — 99223 1ST HOSP IP/OBS HIGH 75: CPT | Mod: ,,, | Performed by: STUDENT IN AN ORGANIZED HEALTH CARE EDUCATION/TRAINING PROGRAM

## 2024-01-10 PROCEDURE — 93010 ELECTROCARDIOGRAM REPORT: CPT | Mod: ,,, | Performed by: GENERAL PRACTICE

## 2024-01-10 PROCEDURE — 87186 SC STD MICRODIL/AGAR DIL: CPT | Performed by: STUDENT IN AN ORGANIZED HEALTH CARE EDUCATION/TRAINING PROGRAM

## 2024-01-10 PROCEDURE — 94640 AIRWAY INHALATION TREATMENT: CPT

## 2024-01-10 PROCEDURE — 87077 CULTURE AEROBIC IDENTIFY: CPT | Performed by: STUDENT IN AN ORGANIZED HEALTH CARE EDUCATION/TRAINING PROGRAM

## 2024-01-10 PROCEDURE — 87040 BLOOD CULTURE FOR BACTERIA: CPT | Mod: 59 | Performed by: STUDENT IN AN ORGANIZED HEALTH CARE EDUCATION/TRAINING PROGRAM

## 2024-01-10 PROCEDURE — 25000242 PHARM REV CODE 250 ALT 637 W/ HCPCS: Performed by: STUDENT IN AN ORGANIZED HEALTH CARE EDUCATION/TRAINING PROGRAM

## 2024-01-10 PROCEDURE — 36415 COLL VENOUS BLD VENIPUNCTURE: CPT | Performed by: STUDENT IN AN ORGANIZED HEALTH CARE EDUCATION/TRAINING PROGRAM

## 2024-01-10 PROCEDURE — 80053 COMPREHEN METABOLIC PANEL: CPT | Performed by: INTERNAL MEDICINE

## 2024-01-10 PROCEDURE — 83880 ASSAY OF NATRIURETIC PEPTIDE: CPT | Performed by: INTERNAL MEDICINE

## 2024-01-10 PROCEDURE — 99900031 HC PATIENT EDUCATION (STAT)

## 2024-01-10 PROCEDURE — 93306 TTE W/DOPPLER COMPLETE: CPT | Mod: 26,,, | Performed by: GENERAL PRACTICE

## 2024-01-10 RX ORDER — HEPARIN SODIUM 5000 [USP'U]/ML
5000 INJECTION, SOLUTION INTRAVENOUS; SUBCUTANEOUS EVERY 8 HOURS
Status: DISCONTINUED | OUTPATIENT
Start: 2024-01-10 | End: 2024-01-16 | Stop reason: HOSPADM

## 2024-01-10 RX ORDER — ONDANSETRON 4 MG/1
8 TABLET, ORALLY DISINTEGRATING ORAL EVERY 8 HOURS PRN
Status: DISCONTINUED | OUTPATIENT
Start: 2024-01-10 | End: 2024-01-16 | Stop reason: HOSPADM

## 2024-01-10 RX ORDER — SODIUM,POTASSIUM PHOSPHATES 280-250MG
2 POWDER IN PACKET (EA) ORAL
Status: DISCONTINUED | OUTPATIENT
Start: 2024-01-10 | End: 2024-01-16 | Stop reason: HOSPADM

## 2024-01-10 RX ORDER — ACETAMINOPHEN 325 MG/1
650 TABLET ORAL EVERY 8 HOURS PRN
Status: DISCONTINUED | OUTPATIENT
Start: 2024-01-10 | End: 2024-01-16 | Stop reason: HOSPADM

## 2024-01-10 RX ORDER — HYDROCODONE BITARTRATE AND ACETAMINOPHEN 5; 325 MG/1; MG/1
2 TABLET ORAL EVERY 4 HOURS PRN
Status: DISCONTINUED | OUTPATIENT
Start: 2024-01-10 | End: 2024-01-16 | Stop reason: HOSPADM

## 2024-01-10 RX ORDER — DILTIAZEM HYDROCHLORIDE 180 MG/1
180 CAPSULE, COATED, EXTENDED RELEASE ORAL DAILY
Status: DISCONTINUED | OUTPATIENT
Start: 2024-01-10 | End: 2024-01-16 | Stop reason: HOSPADM

## 2024-01-10 RX ORDER — PREDNISONE 5 MG/1
10 TABLET ORAL DAILY
Status: DISCONTINUED | OUTPATIENT
Start: 2024-01-10 | End: 2024-01-16 | Stop reason: HOSPADM

## 2024-01-10 RX ORDER — PANTOPRAZOLE SODIUM 40 MG/10ML
40 INJECTION, POWDER, LYOPHILIZED, FOR SOLUTION INTRAVENOUS DAILY
Status: DISCONTINUED | OUTPATIENT
Start: 2024-01-10 | End: 2024-01-16 | Stop reason: HOSPADM

## 2024-01-10 RX ORDER — LANOLIN ALCOHOL/MO/W.PET/CERES
800 CREAM (GRAM) TOPICAL
Status: DISCONTINUED | OUTPATIENT
Start: 2024-01-10 | End: 2024-01-16 | Stop reason: HOSPADM

## 2024-01-10 RX ORDER — MORPHINE SULFATE 4 MG/ML
4 INJECTION, SOLUTION INTRAMUSCULAR; INTRAVENOUS EVERY 4 HOURS PRN
Status: DISCONTINUED | OUTPATIENT
Start: 2024-01-10 | End: 2024-01-16 | Stop reason: HOSPADM

## 2024-01-10 RX ORDER — TALC
6 POWDER (GRAM) TOPICAL NIGHTLY PRN
Status: DISCONTINUED | OUTPATIENT
Start: 2024-01-10 | End: 2024-01-16 | Stop reason: HOSPADM

## 2024-01-10 RX ORDER — SODIUM CHLORIDE 0.9 % (FLUSH) 0.9 %
10 SYRINGE (ML) INJECTION EVERY 12 HOURS
Status: DISCONTINUED | OUTPATIENT
Start: 2024-01-10 | End: 2024-01-16 | Stop reason: HOSPADM

## 2024-01-10 RX ORDER — HYDRALAZINE HYDROCHLORIDE 25 MG/1
100 TABLET, FILM COATED ORAL EVERY 12 HOURS
Status: DISCONTINUED | OUTPATIENT
Start: 2024-01-10 | End: 2024-01-16 | Stop reason: HOSPADM

## 2024-01-10 RX ORDER — ATORVASTATIN CALCIUM 20 MG/1
20 TABLET, FILM COATED ORAL DAILY
Status: DISCONTINUED | OUTPATIENT
Start: 2024-01-10 | End: 2024-01-16 | Stop reason: HOSPADM

## 2024-01-10 RX ORDER — IPRATROPIUM BROMIDE AND ALBUTEROL SULFATE 2.5; .5 MG/3ML; MG/3ML
3 SOLUTION RESPIRATORY (INHALATION) EVERY 6 HOURS
Status: DISCONTINUED | OUTPATIENT
Start: 2024-01-10 | End: 2024-01-16 | Stop reason: HOSPADM

## 2024-01-10 RX ORDER — TIZANIDINE 4 MG/1
4 TABLET ORAL EVERY 8 HOURS
Status: DISCONTINUED | OUTPATIENT
Start: 2024-01-10 | End: 2024-01-16 | Stop reason: HOSPADM

## 2024-01-10 RX ADMIN — HEPARIN SODIUM 5000 UNITS: 5000 INJECTION, SOLUTION INTRAVENOUS; SUBCUTANEOUS at 03:01

## 2024-01-10 RX ADMIN — MORPHINE SULFATE 4 MG: 4 INJECTION, SOLUTION INTRAMUSCULAR; INTRAVENOUS at 10:01

## 2024-01-10 RX ADMIN — HEPARIN SODIUM 5000 UNITS: 5000 INJECTION, SOLUTION INTRAVENOUS; SUBCUTANEOUS at 10:01

## 2024-01-10 RX ADMIN — TIZANIDINE 4 MG: 4 TABLET ORAL at 03:01

## 2024-01-10 RX ADMIN — HYDRALAZINE HYDROCHLORIDE 100 MG: 25 TABLET ORAL at 08:01

## 2024-01-10 RX ADMIN — ATORVASTATIN CALCIUM 20 MG: 20 TABLET, FILM COATED ORAL at 08:01

## 2024-01-10 RX ADMIN — HYDROCODONE BITARTRATE AND ACETAMINOPHEN 2 TABLET: 5; 325 TABLET ORAL at 05:01

## 2024-01-10 RX ADMIN — PREDNISONE 10 MG: 5 TABLET ORAL at 08:01

## 2024-01-10 RX ADMIN — TIZANIDINE 4 MG: 4 TABLET ORAL at 10:01

## 2024-01-10 RX ADMIN — TIZANIDINE 4 MG: 4 TABLET ORAL at 05:01

## 2024-01-10 RX ADMIN — PANTOPRAZOLE SODIUM 40 MG: 40 INJECTION, POWDER, FOR SOLUTION INTRAVENOUS at 08:01

## 2024-01-10 RX ADMIN — MORPHINE SULFATE 4 MG: 4 INJECTION, SOLUTION INTRAMUSCULAR; INTRAVENOUS at 01:01

## 2024-01-10 RX ADMIN — HYDRALAZINE HYDROCHLORIDE 100 MG: 25 TABLET ORAL at 10:01

## 2024-01-10 RX ADMIN — HEPARIN SODIUM 5000 UNITS: 5000 INJECTION, SOLUTION INTRAVENOUS; SUBCUTANEOUS at 05:01

## 2024-01-10 RX ADMIN — IPRATROPIUM BROMIDE AND ALBUTEROL SULFATE 3 ML: 2.5; .5 SOLUTION RESPIRATORY (INHALATION) at 01:01

## 2024-01-10 RX ADMIN — Medication 6 MG: at 10:01

## 2024-01-10 RX ADMIN — DILTIAZEM HYDROCHLORIDE 180 MG: 180 CAPSULE, COATED, EXTENDED RELEASE ORAL at 08:01

## 2024-01-10 NOTE — SUBJECTIVE & OBJECTIVE
Past Medical History:   Diagnosis Date    Cataract     COPD (chronic obstructive pulmonary disease)     Diverticulosis     DUARTE (dyspnea on exertion)     GERD (gastroesophageal reflux disease)     Glaucoma     Hepatitis C     treated; seeing Dr. Carr    Hyperlipidemia     Hypertension     Liver lesion 08/2018    RA (rheumatoid arthritis)     Rectal prolapse     Possible       Past Surgical History:   Procedure Laterality Date    ANGIOGRAM, CORONARY, WITH LEFT HEART CATHETERIZATION  10/6/2022    Procedure: Angiogram, Coronary, with Left Heart Cath;  Surgeon: Zac Boucher MD;  Location: [facility] CATH;  Service: Cardiology;;    ARTERIOGRAPHY OF AORTIC ROOT  10/6/2022    Procedure: ARTERIOGRAM, AORTIC ROOT;  Surgeon: Zac Boucher MD;  Location: [facility] CATH;  Service: Cardiology;;    CARPAL TUNNEL RELEASE      Right wrist 2015    COLONOSCOPY  08/2016    Dr. Cardona; in care everywhere    COLONOSCOPY N/A 3/20/2019    Procedure: COLONOSCOPY;  Surgeon: Sotero Arthur MD;  Location: [facility] ENDO;  Service: Endoscopy;  Laterality: N/A; hemorrhoids, diverticulosis, repeat in 10 years for screening    EXCISIONAL HEMORRHOIDECTOMY N/A 10/18/2018    Procedure: HEMORRHOIDECTOMY, prone;  Surgeon: Gunnar Horton MD;  Location: [address];  Service: Colon and Rectal;  Laterality: N/A;    THYROID SURGERY      UPPER GASTROINTESTINAL ENDOSCOPY  08/2016    Dr. Cardona; in care everywhere       Review of patient's allergies indicates:   Allergen Reactions    Buspar [buspirone] Hallucinations     Nightmares    Enbrel [etanercept] Other (See Comments)     Severe headaches    Fentanyl Hives and Hallucinations    Plaquenil [hydroxychloroquine]      Nausea, insomnia, weight loss 40LBS    Amitiza [lubiprostone] Nausea Only and Other (See Comments)     Fatigue.    Azulfidine [sulfasalazine] Nausea And Vomiting    Trazodone Other (See Comments)     Insomnia         Current Facility-Administered Medications on File Prior to Encounter    Medication    0.9%  NaCl infusion    mupirocin 2 % ointment     Current Outpatient Medications on File Prior to Encounter   Medication Sig    albuterol (PROVENTIL/VENTOLIN HFA) 90 mcg/actuation inhaler INHALE 1 PUFF BY MOUTH EVERY 4 HOURS AS NEEDED FOR WHEEZING OR SHORTNESS OF BREATH    enalapril (VASOTEC) 20 MG tablet TAKE ONE TABLET BY MOUTH TWO TIMES A DAY    hydrALAZINE (APRESOLINE) 50 MG tablet TAKE 2 TABLETS BY MOUTH EVERY 12 HOURS    cholecalciferol, vitamin D3, (VITAMIN D3) 50 mcg (2,000 unit) Cap Take 1 capsule (2,000 Units total) by mouth once daily.    cyanocobalamin 500 MCG tablet Take 500 mcg by mouth once daily.    diclofenac sodium (VOLTAREN) 1 % Gel Apply 2 grams  topically 4 (four) times daily.    diltiaZEM (CARDIZEM CD) 180 MG 24 hr capsule TAKE ONE CAPSULE BY MOUTH ONCE DAILY    dorzolamide (TRUSOPT) 2 % ophthalmic solution Place 1 drop into both eyes 3 (three) times daily.    doxazosin (CARDURA) 2 MG tablet TAKE 1 TABLET (2 MG TOTAL) BY MOUTH EVERY EVENING    HYDROcodone-acetaminophen (NORCO)  mg per tablet Take 1 tablet by mouth every 8 (eight) hours as needed for Pain.    hydrOXYzine HCL (ATARAX) 25 MG tablet TAKE ONE TABLET BY MOUTH NIGHTLY AS NEEDED FOR ITCHING    Lactobac no.41/Bifidobact no.7 (PROBIOTIC-10 ORAL) Take 1 capsule by mouth once daily.    mirabegron (MYRBETRIQ) 25 mg Tb24 ER tablet Take 1 tablet (25 mg total) by mouth once daily.    multivitamin capsule Take 1 capsule by mouth once daily.    mupirocin (BACTROBAN) 2 % ointment Apply topically 3 (three) times daily.    omeprazole (PRILOSEC) 20 MG capsule Take 1 capsule (20 mg total) by mouth 2 (two) times a day.    polyethylene glycol (GLYCOLAX) 17 gram PwPk Take 17 g by mouth daily as needed.    pravastatin (PRAVACHOL) 20 MG tablet TAKE ONE TABLET BY MOUTH ONCE DAILY    predniSONE (DELTASONE) 5 MG tablet Take 2 tablets (10 mg total) by mouth once daily.    sildenafiL (VIAGRA) 100 MG tablet Take 1 tablet (100 mg total) by  mouth as needed for Erectile Dysfunction.    tamsulosin (FLOMAX) 0.4 mg Cap Take 1 capsule (0.4 mg total) by mouth once daily.    tiZANidine (ZANAFLEX) 4 MG tablet Take 1 tablet (4 mg total) by mouth every 8 (eight) hours.    travoprost (TRAVATAN Z) 0.004 % ophthalmic solution Place 1 drop into both eyes every evening.    zaleplon (SONATA) 10 MG capsule TAKE ONE CAPSULE BY MOUTH EVERY EVENING    [DISCONTINUED] golimumab (SIMPONI) 50 mg/0.5 mL PnIj Inject 50 mg into the skin every 28 days.     Family History       Problem Relation (Age of Onset)    Cancer Cousin    Cataracts Mother    Diabetes Mother, Sister, Brother, Maternal Aunt, Maternal Uncle, Maternal Grandmother    Glaucoma Brother    Hypertension Mother, Sister, Brother, Maternal Aunt, Maternal Uncle, Maternal Grandmother    Stomach cancer Paternal Cousin, Paternal Cousin    Stroke Mother, Sister, Brother, Maternal Aunt, Maternal Uncle, Maternal Grandmother          Tobacco Use    Smoking status: Never    Smokeless tobacco: Never   Substance and Sexual Activity    Alcohol use: Yes     Alcohol/week: 1.0 standard drink of alcohol     Types: 1 Shots of liquor per week     Comment: social    Drug use: Yes     Types: Hydrocodone    Sexual activity: Not on file     Review of Systems   Constitutional:  Negative for diaphoresis, fatigue and fever.   Respiratory:  Positive for shortness of breath. Negative for chest tightness.    Cardiovascular:  Negative for chest pain, palpitations and leg swelling.   Gastrointestinal:  Negative for abdominal pain.   Musculoskeletal:  Positive for arthralgias.     Objective:     Vital Signs (Most Recent):  Temp: 97.9 °F (36.6 °C) (01/09/24 2318)  Pulse: 101 (01/09/24 2318)  Resp: (!) 22 (01/09/24 2318)  BP: 138/82 (94) (01/09/24 2318)  SpO2: 98 % (01/09/24 2318) Vital Signs (24h Range):  Temp:  [97.9 °F (36.6 °C)-98.1 °F (36.7 °C)] 97.9 °F (36.6 °C)  Pulse:  [] 101  Resp:  [21-26] 22  SpO2:  [98 %-99 %] 98 %  BP:  (130-138)/(74-82) 138/82        There is no height or weight on file to calculate BMI.     Physical Exam  Constitutional:       Appearance: Normal appearance. He is normal weight.   HENT:      Head: Normocephalic and atraumatic.      Comments: Well-healed surgical scar at base of neck  Eyes:      Pupils: Pupils are equal, round, and reactive to light.   Cardiovascular:      Rate and Rhythm: Normal rate and regular rhythm.      Pulses: Normal pulses.      Heart sounds: Murmur heard.   Pulmonary:      Effort: Pulmonary effort is normal. No respiratory distress.      Breath sounds: Normal breath sounds. No stridor. No wheezing, rhonchi or rales.   Chest:      Chest wall: No tenderness.   Abdominal:      General: Abdomen is flat. Bowel sounds are normal. There is no distension.      Palpations: Abdomen is soft.      Tenderness: There is no abdominal tenderness. There is no guarding.   Musculoskeletal:         General: Normal range of motion.   Skin:     General: Skin is warm and dry.   Neurological:      General: No focal deficit present.      Mental Status: He is alert and oriented to person, place, and time. Mental status is at baseline.   Psychiatric:         Mood and Affect: Mood normal.         Behavior: Behavior normal.         Judgment: Judgment normal.              CRANIAL NERVES     CN III, IV, VI   Pupils are equal, round, and reactive to light.       Significant Labs: All pertinent labs within the past 24 hours have been reviewed.    Significant Imaging: I have reviewed all pertinent imaging results/findings within the past 24 hours.

## 2024-01-10 NOTE — ASSESSMENT & PLAN NOTE
Creatine stable for now. BMP reviewed- noted CrCl cannot be calculated (Patient's most recent lab result is older than the maximum 7 days allowed.). according to latest data. Based on current GFR, CKD stage is stage 3 - GFR 30-59.  Monitor UOP and serial BMP and adjust therapy as needed. Renally dose meds. Avoid nephrotoxic medications and procedures.    Continue to monitor with daily CMP.

## 2024-01-10 NOTE — H&P
Critical access hospital Medicine  History & Physical    Patient Name: Scooter Swan  MRN: [id number]  Patient Class: IP- Inpatient  Admission Date: 1/9/2024  Attending Physician: Sy Luz MD   Primary Care Provider: Salvador Kennedy DO         Patient information was obtained from patient and ER records.     Subjective:     Principal Problem:SOB (shortness of breath)    Chief Complaint: No chief complaint on file.       HPI: 73-year-old male with a history of seropositive rheumatoid arthritis, thrombocytopenia, anemia , BPH, COPD, gastroesophageal reflux disease, hypertension, aortic stenosis, and hyperlipidemia initially presented to the [facility] on January 9 with dyspnea, cough, and body aches.  He had negative respiratory virus panel, negative COVID, negative flu, and negative influenza swabs.  He left there and subsequently went to [facility].  He presented there with dyspnea and flu-like symptoms for 1 week.  He reported chest discomfort earlier in the day but none during his emergency department stay.  Labs included elevated D-dimer and mild leukocytosis along with mild elevation in troponin that trended down during his stay.  CTA of the chest had no evidence of PE, though there was an opacified bronchus in the left lower lobe that may be due to mucus. In the emergency department he received albuterol, aspirin, azithromycin, Rocephin, Norco, DuoNeb, and Solu-Medrol.  They are requesting transfer to Hospital Medicine at [facility] for further treatment of dyspnea along with evaluation of elevation in troponin/aortic stenosis.  With the history of aortic stenosis and elevated troponinis, case discussed with Cardiology at [facility], and they are available for consultation.     Sodium 141, potassium 3.6, chloride 106, CO2 24, BUN 15, creatinine 1.22, glucose 114, AST 16, ALT 11, high sensitivity troponin 31 with repeat 26 (normal  range 2-19), BNP 73, D-dimer 28395, white blood cells 13.1, hemoglobin 10.4, hematocrit 32.3, platelets 192, influenza negative, COVID negative     CT chest for PE protocol noted patient motion artifact.  No evidence of pulmonary embolism.  No mediastinal or hilar adenopathy.  Thoracic aorta is normal caliber.  Heart size is normal.  Opacified bronchus in the left lower lobe may be due to mucus.  There a few bands of subsegmental atelectasis in the lingula.  No airspace consolidation.  2 cm thin walled air cyst is apparent in the right lower lobe.  0 pleural effusions.  Bones are intact.     October 2022: Echocardiogram had EF 60%.  Grade 1 diastolic dysfunction.  Moderate to severe aortic valve stenosis with aortic valve area 1.35 cm2, mean gradient 47 mmHg, peak velocity 4.49 M/S.     VS:  Temperature 98.1°, pulse 109, respirations 26, blood pressure 133/75, O2 sats 99%    In my encounter, patient complains of shortness a breath that has been occurring for the past 1 month.  Worse with any kind of activity.  Associated with a cough and congestion.  Denies any fever, chills, nausea, vomiting, abdominal pains, chest pains, or lower extremity edema.  He also states he was rheumatoid arthritis in his pain throughout his body and takes Norco 10 mg regularly.    Past Medical History:   Diagnosis Date    Cataract     COPD (chronic obstructive pulmonary disease)     Diverticulosis     DUARTE (dyspnea on exertion)     GERD (gastroesophageal reflux disease)     Glaucoma     Hepatitis C     treated; seeing Dr. Carr    Hyperlipidemia     Hypertension     Liver lesion 08/2018    RA (rheumatoid arthritis)     Rectal prolapse     Possible       Past Surgical History:   Procedure Laterality Date    ANGIOGRAM, CORONARY, WITH LEFT HEART CATHETERIZATION  10/6/2022    Procedure: Angiogram, Coronary, with Left Heart Cath;  Surgeon: Zac Boucher MD;  Location: [facility] CATH;  Service: Cardiology;;    ARTERIOGRAPHY OF AORTIC ROOT  10/6/2022     Procedure: ARTERIOGRAM, AORTIC ROOT;  Surgeon: Zac Boucher MD;  Location: [facility] CATH;  Service: Cardiology;;    CARPAL TUNNEL RELEASE      Right wrist 2015    COLONOSCOPY  08/2016    Dr. Cardona; in care everywhere    COLONOSCOPY N/A 3/20/2019    Procedure: COLONOSCOPY;  Surgeon: Sotero Arthur MD;  Location: [facility] ENDO;  Service: Endoscopy;  Laterality: N/A; hemorrhoids, diverticulosis, repeat in 10 years for screening    EXCISIONAL HEMORRHOIDECTOMY N/A 10/18/2018    Procedure: HEMORRHOIDECTOMY, prone;  Surgeon: Gunnar Horton MD;  Location: [facility] OR [address] [place] [address];  Service: Colon and Rectal;  Laterality: N/A;    THYROID SURGERY      UPPER GASTROINTESTINAL ENDOSCOPY  08/2016    Dr. Cardona; in care everywhere       Review of patient's allergies indicates:   Allergen Reactions    Buspar [buspirone] Hallucinations     Nightmares    Enbrel [etanercept] Other (See Comments)     Severe headaches    Fentanyl Hives and Hallucinations    Plaquenil [hydroxychloroquine]      Nausea, insomnia, weight loss 40LBS    Amitiza [lubiprostone] Nausea Only and Other (See Comments)     Fatigue.    Azulfidine [sulfasalazine] Nausea And Vomiting    Trazodone Other (See Comments)     Insomnia         Current Facility-Administered Medications on File Prior to Encounter   Medication    0.9%  NaCl infusion    mupirocin 2 % ointment     Current Outpatient Medications on File Prior to Encounter   Medication Sig    albuterol (PROVENTIL/VENTOLIN HFA) 90 mcg/actuation inhaler INHALE 1 PUFF BY MOUTH EVERY 4 HOURS AS NEEDED FOR WHEEZING OR SHORTNESS OF BREATH    enalapril (VASOTEC) 20 MG tablet TAKE ONE TABLET BY MOUTH TWO TIMES A DAY    hydrALAZINE (APRESOLINE) 50 MG tablet TAKE 2 TABLETS BY MOUTH EVERY 12 HOURS    cholecalciferol, vitamin D3, (VITAMIN D3) 50 mcg (2,000 unit) Cap Take 1 capsule (2,000 Units total) by mouth once daily.    cyanocobalamin 500 MCG tablet Take 500 mcg by mouth once daily.    diclofenac sodium (VOLTAREN) 1 % Gel  Apply 2 grams  topically 4 (four) times daily.    diltiaZEM (CARDIZEM CD) 180 MG 24 hr capsule TAKE ONE CAPSULE BY MOUTH ONCE DAILY    dorzolamide (TRUSOPT) 2 % ophthalmic solution Place 1 drop into both eyes 3 (three) times daily.    doxazosin (CARDURA) 2 MG tablet TAKE 1 TABLET (2 MG TOTAL) BY MOUTH EVERY EVENING    HYDROcodone-acetaminophen (NORCO)  mg per tablet Take 1 tablet by mouth every 8 (eight) hours as needed for Pain.    hydrOXYzine HCL (ATARAX) 25 MG tablet TAKE ONE TABLET BY MOUTH NIGHTLY AS NEEDED FOR ITCHING    Lactobac no.41/Bifidobact no.7 (PROBIOTIC-10 ORAL) Take 1 capsule by mouth once daily.    mirabegron (MYRBETRIQ) 25 mg Tb24 ER tablet Take 1 tablet (25 mg total) by mouth once daily.    multivitamin capsule Take 1 capsule by mouth once daily.    mupirocin (BACTROBAN) 2 % ointment Apply topically 3 (three) times daily.    omeprazole (PRILOSEC) 20 MG capsule Take 1 capsule (20 mg total) by mouth 2 (two) times a day.    polyethylene glycol (GLYCOLAX) 17 gram PwPk Take 17 g by mouth daily as needed.    pravastatin (PRAVACHOL) 20 MG tablet TAKE ONE TABLET BY MOUTH ONCE DAILY    predniSONE (DELTASONE) 5 MG tablet Take 2 tablets (10 mg total) by mouth once daily.    sildenafiL (VIAGRA) 100 MG tablet Take 1 tablet (100 mg total) by mouth as needed for Erectile Dysfunction.    tamsulosin (FLOMAX) 0.4 mg Cap Take 1 capsule (0.4 mg total) by mouth once daily.    tiZANidine (ZANAFLEX) 4 MG tablet Take 1 tablet (4 mg total) by mouth every 8 (eight) hours.    travoprost (TRAVATAN Z) 0.004 % ophthalmic solution Place 1 drop into both eyes every evening.    zaleplon (SONATA) 10 MG capsule TAKE ONE CAPSULE BY MOUTH EVERY EVENING    [DISCONTINUED] golimumab (SIMPONI) 50 mg/0.5 mL PnIj Inject 50 mg into the skin every 28 days.     Family History       Problem Relation (Age of Onset)    Cancer Cousin    Cataracts Mother    Diabetes Mother, Sister, Brother, Maternal Aunt, Maternal Uncle, Maternal Grandmother     Glaucoma Brother    Hypertension Mother, Sister, Brother, Maternal Aunt, Maternal Uncle, Maternal Grandmother    Stomach cancer Paternal Cousin, Paternal Cousin    Stroke Mother, Sister, Brother, Maternal Aunt, Maternal Uncle, Maternal Grandmother          Tobacco Use    Smoking status: Never    Smokeless tobacco: Never   Substance and Sexual Activity    Alcohol use: Yes     Alcohol/week: 1.0 standard drink of alcohol     Types: 1 Shots of liquor per week     Comment: social    Drug use: Yes     Types: Hydrocodone    Sexual activity: Not on file     Review of Systems   Constitutional:  Negative for diaphoresis, fatigue and fever.   Respiratory:  Positive for shortness of breath. Negative for chest tightness.    Cardiovascular:  Negative for chest pain, palpitations and leg swelling.   Gastrointestinal:  Negative for abdominal pain.   Musculoskeletal:  Positive for arthralgias.     Objective:     Vital Signs (Most Recent):  Temp: 97.9 °F (36.6 °C) (01/09/24 2318)  Pulse: 101 (01/09/24 2318)  Resp: (!) 22 (01/09/24 2318)  BP: 138/82 (94) (01/09/24 2318)  SpO2: 98 % (01/09/24 2318) Vital Signs (24h Range):  Temp:  [97.9 °F (36.6 °C)-98.1 °F (36.7 °C)] 97.9 °F (36.6 °C)  Pulse:  [] 101  Resp:  [21-26] 22  SpO2:  [98 %-99 %] 98 %  BP: (130-138)/(74-82) 138/82        There is no height or weight on file to calculate BMI.     Physical Exam  Constitutional:       Appearance: Normal appearance. He is normal weight.   HENT:      Head: Normocephalic and atraumatic.      Comments: Well-healed surgical scar at base of neck  Eyes:      Pupils: Pupils are equal, round, and reactive to light.   Cardiovascular:      Rate and Rhythm: Normal rate and regular rhythm.      Pulses: Normal pulses.      Heart sounds: Murmur heard.   Pulmonary:      Effort: Pulmonary effort is normal. No respiratory distress.      Breath sounds: Normal breath sounds. No stridor. No wheezing, rhonchi or rales.   Chest:      Chest wall: No  tenderness.   Abdominal:      General: Abdomen is flat. Bowel sounds are normal. There is no distension.      Palpations: Abdomen is soft.      Tenderness: There is no abdominal tenderness. There is no guarding.   Musculoskeletal:         General: Normal range of motion.   Skin:     General: Skin is warm and dry.   Neurological:      General: No focal deficit present.      Mental Status: He is alert and oriented to person, place, and time. Mental status is at baseline.   Psychiatric:         Mood and Affect: Mood normal.         Behavior: Behavior normal.         Judgment: Judgment normal.              CRANIAL NERVES     CN III, IV, VI   Pupils are equal, round, and reactive to light.       Significant Labs: All pertinent labs within the past 24 hours have been reviewed.    Significant Imaging: I have reviewed all pertinent imaging results/findings within the past 24 hours.  Assessment/Plan:     * SOB (shortness of breath)  Echo 2022 Summary    The left ventricle is normal in size with concentric hypertrophy and normal systolic function.  Moderate left atrial enlargement.  Grade I left ventricular diastolic dysfunction.  The estimated ejection fraction is 60%.  Normal right ventricular size with normal right ventricular systolic function.  There is moderate-to-severe aortic valve stenosis.  Aortic valve area is 1.35 cm2; peak velocity is 4.49 m/s; mean gradient is 47 mmHg.  Moderate aortic regurgitation.  Mild tricuspid regurgitation.  Intermediate central venous pressure (8 mmHg).  The estimated PA systolic pressure is 35 mmHg.  The ascending aorta is mildly dilated.    Concerning for worsening aortic stenosis.    We will order echocardiogram and consult Cardiology.    PE ruled out on CT angiogram.        Elevated d-dimer, PE ruled out  D-dimer 10,369.    CT angiogram chest shows no evidence of PE.    We will order DVT ultrasound.    No asymmetrical swelling noted.      Elevated troponin  Troponin trended from  02/31/2026.  We will repeat troponin on admission.    No active chest pains.      Stage 3a chronic kidney disease  Creatine stable for now. BMP reviewed- noted CrCl cannot be calculated (Patient's most recent lab result is older than the maximum 7 days allowed.). according to latest data. Based on current GFR, CKD stage is stage 3 - GFR 30-59.  Monitor UOP and serial BMP and adjust therapy as needed. Renally dose meds. Avoid nephrotoxic medications and procedures.    Continue to monitor with daily CMP.    Moderate aortic stenosis  We will re-evaluate with echocardiogram      RA (rheumatoid arthritis)  Continue home tizanidine, prednisone, and Norco      Mixed hyperlipidemia  Resume statin        VTE Risk Mitigation (From admission, onward)           Ordered     heparin (porcine) injection 5,000 Units  Every 8 hours         01/10/24 0121     IP VTE HIGH RISK PATIENT  Once         01/10/24 0121     Place sequential compression device  Until discontinued         01/10/24 0121                                    Tej Medina DO  Department of Hospital Medicine  [facility]

## 2024-01-10 NOTE — ASSESSMENT & PLAN NOTE
Echo 2022 Summary    The left ventricle is normal in size with concentric hypertrophy and normal systolic function.  Moderate left atrial enlargement.  Grade I left ventricular diastolic dysfunction.  The estimated ejection fraction is 60%.  Normal right ventricular size with normal right ventricular systolic function.  There is moderate-to-severe aortic valve stenosis.  Aortic valve area is 1.35 cm2; peak velocity is 4.49 m/s; mean gradient is 47 mmHg.  Moderate aortic regurgitation.  Mild tricuspid regurgitation.  Intermediate central venous pressure (8 mmHg).  The estimated PA systolic pressure is 35 mmHg.  The ascending aorta is mildly dilated.    Ddx includes COPD, emphysema exacerbation, worsening AS  -duonebs   -repeat TTE  -cards consulted   -consider increasing home steroid dose in AM if respiratory status not improving   -on RA

## 2024-01-10 NOTE — ASSESSMENT & PLAN NOTE
Creatine stable for now. BMP reviewed- noted Estimated Creatinine Clearance: 64.7 mL/min (based on SCr of 1.1 mg/dL). according to latest data. Based on current GFR, CKD stage is stage 3 - GFR 30-59.  Monitor UOP and serial BMP and adjust therapy as needed. Renally dose meds. Avoid nephrotoxic medications and procedures.    Continue to monitor with daily CMP.

## 2024-01-10 NOTE — ASSESSMENT & PLAN NOTE
Echo 2022 Summary    The left ventricle is normal in size with concentric hypertrophy and normal systolic function.  Moderate left atrial enlargement.  Grade I left ventricular diastolic dysfunction.  The estimated ejection fraction is 60%.  Normal right ventricular size with normal right ventricular systolic function.  There is moderate-to-severe aortic valve stenosis.  Aortic valve area is 1.35 cm2; peak velocity is 4.49 m/s; mean gradient is 47 mmHg.  Moderate aortic regurgitation.  Mild tricuspid regurgitation.  Intermediate central venous pressure (8 mmHg).  The estimated PA systolic pressure is 35 mmHg.  The ascending aorta is mildly dilated.    Concerning for worsening aortic stenosis.    We will order echocardiogram and consult Cardiology.    PE ruled out on CT angiogram.

## 2024-01-10 NOTE — ASSESSMENT & PLAN NOTE
D-dimer 10,369.    CT angiogram chest shows no evidence of PE.    We will order DVT ultrasound.    No asymmetrical swelling noted.

## 2024-01-10 NOTE — PROGRESS NOTES
[facility] Medicine  Progress Note    Patient Name: Scooter Swan  MRN: [id number]  Patient Class: IP- Inpatient   Admission Date: 1/9/2024  Length of Stay: 1 days  Attending Physician: Calderon Jay MD  Primary Care Provider: Salvador Kennedy DO        Subjective:     Principal Problem:SOB (shortness of breath)        HPI:  73-year-old male with a history of seropositive rheumatoid arthritis, thrombocytopenia, anemia , BPH, COPD, gastroesophageal reflux disease, hypertension, aortic stenosis, and hyperlipidemia initially presented to the [facility] on January 9 with dyspnea, cough, and body aches.  He had negative respiratory virus panel, negative COVID, negative flu, and negative influenza swabs.  He left there and subsequently went to [facility].  He presented there with dyspnea and flu-like symptoms for 1 week.  He reported chest discomfort earlier in the day but none during his emergency department stay.  Labs included elevated D-dimer and mild leukocytosis along with mild elevation in troponin that trended down during his stay.  CTA of the chest had no evidence of PE, though there was an opacified bronchus in the left lower lobe that may be due to mucus. In the emergency department he received albuterol, aspirin, azithromycin, Rocephin, Norco, DuoNeb, and Solu-Medrol.  They are requesting transfer to Hospital Medicine at [facility] for further treatment of dyspnea along with evaluation of elevation in troponin/aortic stenosis.  With the history of aortic stenosis and elevated troponinis, case discussed with Cardiology at [facility], and they are available for consultation.     Sodium 141, potassium 3.6, chloride 106, CO2 24, BUN 15, creatinine 1.22, glucose 114, AST 16, ALT 11, high sensitivity troponin 31 with repeat 26 (normal range 2-19), BNP 73, D-dimer 98828, white blood cells 13.1, hemoglobin 10.4, hematocrit 32.3,  platelets 192, influenza negative, COVID negative     CT chest for PE protocol noted patient motion artifact.  No evidence of pulmonary embolism.  No mediastinal or hilar adenopathy.  Thoracic aorta is normal caliber.  Heart size is normal.  Opacified bronchus in the left lower lobe may be due to mucus.  There a few bands of subsegmental atelectasis in the lingula.  No airspace consolidation.  2 cm thin walled air cyst is apparent in the right lower lobe.  0 pleural effusions.  Bones are intact.     October 2022: Echocardiogram had EF 60%.  Grade 1 diastolic dysfunction.  Moderate to severe aortic valve stenosis with aortic valve area 1.35 cm2, mean gradient 47 mmHg, peak velocity 4.49 M/S.     VS:  Temperature 98.1°, pulse 109, respirations 26, blood pressure 133/75, O2 sats 99%    In my encounter, patient complains of shortness a breath that has been occurring for the past 1 month.  Worse with any kind of activity.  Associated with a cough and congestion.  Denies any fever, chills, nausea, vomiting, abdominal pains, chest pains, or lower extremity edema.  He also states he was rheumatoid arthritis in his pain throughout his body and takes Norco 10 mg regularly.    Overview/Hospital Course:  Mr. Swan is a 73-year-old male with a history of seropositive rheumatoid arthritis, thrombocytopenia, anemia , BPH, COPD, gastroesophageal reflux disease, hypertension, aortic stenosis, and hyperlipidemia who presents w/SOB. He initially presented to [facility] but was transferred here for cardiology evaluation in setting of elevated troponins and aortic stenosis. CTPE was negative for PE but did note opacified bronchus likely 2/2 mucus. Troponin 31-->26 at OSH and stable here. Negative respiratory viral panel. Cardiology consulted and repeat echo ordered. Per cardiology SOB likely 2/2 COPD/emphysema. Duonebs ordered. TTE from October 2022: Echocardiogram had EF 60%.  Grade 1 diastolic dysfunction.  Moderate to severe aortic  valve stenosis with aortic valve area 1.35 cm2, mean gradient 47 mmHg, peak velocity 4.49 M/S.        Interval History: Patient denies any chest pain this AM and reports SOB is improving. Reports he got a breathing treatment last night which helped.     Review of Systems   All other systems reviewed and are negative.    Objective:     Vital Signs (Most Recent):  Temp: 97.9 °F (36.6 °C) (01/10/24 1500)  Pulse: 91 (01/10/24 1500)  Resp: 17 (01/10/24 1500)  BP: 110/72 (01/10/24 1500)  SpO2: 96 % (01/10/24 1500) Vital Signs (24h Range):  Temp:  [97.7 °F (36.5 °C)-97.9 °F (36.6 °C)] 97.9 °F (36.6 °C)  Pulse:  [] 91  Resp:  [16-22] 17  SpO2:  [96 %-99 %] 96 %  BP: (110-141)/(72-92) 110/72     Weight: 80.7 kg (177 lb 14.6 oz)  Body mass index is 24.13 kg/m².    Intake/Output Summary (Last 24 hours) at 1/10/2024 1700  Last data filed at 1/10/2024 1631  Gross per 24 hour   Intake 360 ml   Output --   Net 360 ml         Physical Exam  Constitutional:       Appearance: Normal appearance.   HENT:      Head: Normocephalic and atraumatic.      Nose: Nose normal.      Mouth/Throat:      Mouth: Mucous membranes are moist.   Eyes:      Extraocular Movements: Extraocular movements intact.   Cardiovascular:      Rate and Rhythm: Normal rate and regular rhythm.      Heart sounds: Murmur heard.   Pulmonary:      Effort: Pulmonary effort is normal. No respiratory distress.      Comments: On RA   No wheezing appreciated   Abdominal:      General: Abdomen is flat. Bowel sounds are normal. There is no distension.      Tenderness: There is no abdominal tenderness.   Musculoskeletal:      Right lower leg: No edema.      Left lower leg: No edema.   Skin:     General: Skin is warm.   Neurological:      General: No focal deficit present.      Mental Status: He is alert. Mental status is at baseline.   Psychiatric:         Mood and Affect: Mood normal.         Behavior: Behavior normal.             Significant Labs: All pertinent labs  within the past 24 hours have been reviewed.    Significant Imaging: I have reviewed all pertinent imaging results/findings within the past 24 hours.    Assessment/Plan:      * SOB (shortness of breath)  Echo 2022 Summary    The left ventricle is normal in size with concentric hypertrophy and normal systolic function.  Moderate left atrial enlargement.  Grade I left ventricular diastolic dysfunction.  The estimated ejection fraction is 60%.  Normal right ventricular size with normal right ventricular systolic function.  There is moderate-to-severe aortic valve stenosis.  Aortic valve area is 1.35 cm2; peak velocity is 4.49 m/s; mean gradient is 47 mmHg.  Moderate aortic regurgitation.  Mild tricuspid regurgitation.  Intermediate central venous pressure (8 mmHg).  The estimated PA systolic pressure is 35 mmHg.  The ascending aorta is mildly dilated.    Ddx includes COPD, emphysema exacerbation, worsening AS  -duonebs   -repeat TTE  -cards consulted   -consider increasing home steroid dose in AM if respiratory status not improving   -on RA       Leukocytosis  Likely 2/2 chronic steroids  -f/u blood culture, UA       Elevated d-dimer, PE ruled out  D-dimer 10,369.    CT angiogram chest shows no evidence of PE.    Dvt US negative         Elevated troponin  Static      Stage 3a chronic kidney disease  Creatine stable for now. BMP reviewed- noted Estimated Creatinine Clearance: 64.7 mL/min (based on SCr of 1.1 mg/dL). according to latest data. Based on current GFR, CKD stage is stage 3 - GFR 30-59.  Monitor UOP and serial BMP and adjust therapy as needed. Renally dose meds. Avoid nephrotoxic medications and procedures.    Continue to monitor with daily CMP.    Moderate aortic stenosis  Repeat TTE ordered and read pending   Cardiology consulted       RA (rheumatoid arthritis)  Continue home tizanidine, prednisone, and Norco      Mixed hyperlipidemia  Resume statin        VTE Risk Mitigation (From admission, onward)            Ordered     heparin (porcine) injection 5,000 Units  Every 8 hours         01/10/24 0121     IP VTE HIGH RISK PATIENT  Once         01/10/24 0121     Place sequential compression device  Until discontinued         01/10/24 0121                    Discharge Planning   AMY: 1/12/2024     Code Status: Full Code   Is the patient medically ready for discharge?:     Reason for patient still in hospital (select all that apply): Treatment and Consult recommendations  Discharge Plan A: Home with family                  Calderon Jay MD  Department of Hospital Medicine   [facility]

## 2024-01-10 NOTE — HOSPITAL COURSE
Mr. Swan is a 73-year-old male with a history of seropositive rheumatoid arthritis, thrombocytopenia, anemia , BPH, COPD, gastroesophageal reflux disease, hypertension, aortic stenosis, and hyperlipidemia who presents w/SOB. He initially presented to [facility] but was transferred here for cardiology evaluation in setting of elevated troponins and aortic stenosis. CTPE was negative for PE but did note opacified bronchus likely 2/2 mucus. Troponin 31-->26 at OSH and stable here. Negative respiratory viral panel. Cardiology consulted and repeat echo ordered. Per cardiology SOB likely 2/2 COPD/emphysema. Duonebs ordered. TTE from October 2022: Echocardiogram had EF 60%.  Grade 1 diastolic dysfunction.  Moderate to severe aortic valve stenosis with aortic valve area 1.35 cm2, mean gradient 47 mmHg, peak velocity 4.49 M/S.  Repeat echocardiogram (1/10/24) per Cardiology showed no significant progression compared to previous.  No further inpatient cardiac workup necessary, follow up with cardiologist outpatient.  Blood cultures showed Gram-positive cocci, PCR positive for Staphylococcus lugdunensis.  Infectious disease consulted, patient started on vancomycin.  Cardiology reconsulted for GLENN per Infectious Disease.  Glenn done on 01/12/2024 showed no evidence of vegetation.  Patient developed SVT on 01/13, status post adenosine with improvement in heart rates. Discussed with Infectious Disease given calcifications seen on GLENN, we will anticipate IV antibiotics for 6 weeks.  Patient to follow up with Infectious Disease outpatient.  Patient also given referral to pulmonology for COPD evaluation, also given referral to Hematology for anemia evaluation.       Physical Exam  Constitutional:       Appearance: Normal appearance.   Cardiovascular:      Rate and Rhythm: Normal rate.   Pulmonary:      Effort: Pulmonary effort is normal. No respiratory distress.   Abdominal:      General: Abdomen is flat. Bowel sounds are normal.    Skin:     General: Skin is warm.   Neurological:      General: No focal deficit present.      Mental Status: He is alert.   Psychiatric:         Mood and Affect: Mood normal.         Behavior: Behavior normal.

## 2024-01-10 NOTE — CONSULTS
[facility]  Department of Cardiology  Consult Note      PATIENT NAME: Scooter Swan  MRN: [id number]  TODAY'S DATE: 01/10/2024  ADMIT DATE: 1/9/2024                          CONSULT REQUESTED BY: Calderon Jay MD    SUBJECTIVE     PRINCIPAL PROBLEM: SOB (shortness of breath)      REASON FOR CONSULT:  Aortic stenosis      HPI:    Patient is a 73-year-old male with past medical history of rheumatoid arthritis, thrombocytopenia, anemia, BPH, COPD, GERD, hypertension, aortic stenosis, hyperlipidemia who presented to the ED at [facility] on January 9th with complaints of shortness of breath, cough and body aches.  Negative for flu COVID and viral panel.  Patient left in subsequently went to [facility] with same complaints.  He was treated with albuterol, aspirin, azithromycin, Rocephin, and steroids and transferred to [facility] for further management of dyspnea, elevated troponin HS, and aortic stenosis.  Troponin HS 31, repeat 26.  Elevated D-dimer.  CT of chest was negative for PE, opacified bronchus and left lower lobe secondary to mucus.  Patient states dyspnea on exertion has been worsening over the past month and associated with cough and congestion.  Denies chest pain, jaw neck or arm pain, orthopnea, palpitations, or edema.   Patient had cardiac catheterization October of 2022 and was found to have normal coronary arteries and moderate aortic regurgitation.    ECHO 10/11/22  The left ventricle is normal in size with concentric hypertrophy and normal systolic function.  Moderate left atrial enlargement.  Grade I left ventricular diastolic dysfunction.  The estimated ejection fraction is 60%.  Normal right ventricular size with normal right ventricular systolic function.  There is moderate-to-severe aortic valve stenosis.  Aortic valve area is 1.35 cm2; peak velocity is 4.49 m/s; mean gradient is 47 mmHg.  Moderate aortic regurgitation.  Mild tricuspid  regurgitation.  Intermediate central venous pressure (8 mmHg).  The estimated PA systolic pressure is 35 mmHg.  The ascending aorta is mildly dilated.    CARDIAC CATH 10/6/22    The coronary arteries were normal..    Moderate aortic regurgitation at least on aortic root injection,    Consider reassessing the aortic valve with an echo or CADY.      FROM H&P  HPI: 73-year-old male with a history of seropositive rheumatoid arthritis, thrombocytopenia, anemia , BPH, COPD, gastroesophageal reflux disease, hypertension, aortic stenosis, and hyperlipidemia initially presented to the [facility] on January 9 with dyspnea, cough, and body aches.  He had negative respiratory virus panel, negative COVID, negative flu, and negative influenza swabs.  He left there and subsequently went to [facility].  He presented there with dyspnea and flu-like symptoms for 1 week.  He reported chest discomfort earlier in the day but none during his emergency department stay.  Labs included elevated D-dimer and mild leukocytosis along with mild elevation in troponin that trended down during his stay.  CTA of the chest had no evidence of PE, though there was an opacified bronchus in the left lower lobe that may be due to mucus. In the emergency department he received albuterol, aspirin, azithromycin, Rocephin, Norco, DuoNeb, and Solu-Medrol.  They are requesting transfer to Hospital Medicine at [facility] for further treatment of dyspnea along with evaluation of elevation in troponin/aortic stenosis.  With the history of aortic stenosis and elevated troponinis, case discussed with Cardiology at [facility], and they are available for consultation.     Sodium 141, potassium 3.6, chloride 106, CO2 24, BUN 15, creatinine 1.22, glucose 114, AST 16, ALT 11, high sensitivity troponin 31 with repeat 26 (normal range 2-19), BNP 73, D-dimer 43276, white blood cells 13.1, hemoglobin 10.4,  hematocrit 32.3, platelets 192, influenza negative, COVID negative     CT chest for PE protocol noted patient motion artifact.  No evidence of pulmonary embolism.  No mediastinal or hilar adenopathy.  Thoracic aorta is normal caliber.  Heart size is normal.  Opacified bronchus in the left lower lobe may be due to mucus.  There a few bands of subsegmental atelectasis in the lingula.  No airspace consolidation.  2 cm thin walled air cyst is apparent in the right lower lobe.  0 pleural effusions.  Bones are intact.     October 2022: Echocardiogram had EF 60%.  Grade 1 diastolic dysfunction.  Moderate to severe aortic valve stenosis with aortic valve area 1.35 cm2, mean gradient 47 mmHg, peak velocity 4.49 M/S.     VS:  Temperature 98.1°, pulse 109, respirations 26, blood pressure 133/75, O2 sats 99%     In my encounter, patient complains of shortness a breath that has been occurring for the past 1 month.  Worse with any kind of activity.  Associated with a cough and congestion.  Denies any fever, chills, nausea, vomiting, abdominal pains, chest pains, or lower extremity edema.  He also states he was rheumatoid arthritis in his pain throughout his body and takes Norco 10 mg regularly.        Review of patient's allergies indicates:   Allergen Reactions    Buspar [buspirone] Hallucinations     Nightmares    Enbrel [etanercept] Other (See Comments)     Severe headaches    Fentanyl Hives and Hallucinations    Plaquenil [hydroxychloroquine]      Nausea, insomnia, weight loss 40LBS    Amitiza [lubiprostone] Nausea Only and Other (See Comments)     Fatigue.    Azulfidine [sulfasalazine] Nausea And Vomiting    Trazodone Other (See Comments)     Insomnia         Past Medical History:   Diagnosis Date    Cataract     COPD (chronic obstructive pulmonary disease)     Diverticulosis     DUARTE (dyspnea on exertion)     GERD (gastroesophageal reflux disease)     Glaucoma     Hepatitis C     treated; seeing Dr. Carr    Hyperlipidemia      Hypertension     Liver lesion 08/2018    RA (rheumatoid arthritis)     Rectal prolapse     Possible     Past Surgical History:   Procedure Laterality Date    ANGIOGRAM, CORONARY, WITH LEFT HEART CATHETERIZATION  10/6/2022    Procedure: Angiogram, Coronary, with Left Heart Cath;  Surgeon: Zac Boucher MD;  [email or web address];  Service: Cardiology;;    ARTERIOGRAPHY OF AORTIC ROOT  10/6/2022    Procedure: ARTERIOGRAM, AORTIC ROOT;  Surgeon: Zac Boucher MD;  Location: [email or web address] CATH;  Service: Cardiology;;    CARPAL TUNNEL RELEASE      Right wrist 2015    COLONOSCOPY  08/2016    Dr. Cardona; in care everywhere    COLONOSCOPY N/A 3/20/2019    Procedure: COLONOSCOPY;  Surgeon: Sotero Arthur MD;  Location: [facility] ENDO;  Service: Endoscopy;  Laterality: N/A; hemorrhoids, diverticulosis, repeat in 10 years for screening    EXCISIONAL HEMORRHOIDECTOMY N/A 10/18/2018    Procedure: HEMORRHOIDECTOMY, prone;  Surgeon: Gunnar Horton MD;  Location: [address];  Service: Colon and Rectal;  Laterality: N/A;    THYROID SURGERY      UPPER GASTROINTESTINAL ENDOSCOPY  08/2016    Dr. Cardona; in care everywhere     Social History     Tobacco Use    Smoking status: Never    Smokeless tobacco: Never   Substance Use Topics    Alcohol use: Yes     Alcohol/week: 1.0 standard drink of alcohol     Types: 1 Shots of liquor per week     Comment: social    Drug use: Yes     Types: Hydrocodone        REVIEW OF SYSTEMS    As mentioned in HPI    OBJECTIVE     VITAL SIGNS (Most Recent)  Temp: 97.8 °F (36.6 °C) (01/10/24 0749)  Pulse: 95 (01/10/24 0749)  Resp: 16 (01/10/24 1043)  BP: (!) 141/92 (01/10/24 0749)  SpO2: 97 % (01/10/24 0749)    VENTILATION STATUS  Resp: 16 (01/10/24 1043)  SpO2: 97 % (01/10/24 0749)           I & O (Last 24H):  Intake/Output Summary (Last 24 hours) at 1/10/2024 1134  Last data filed at 1/10/2024 0926  Gross per 24 hour   Intake 120 ml   Output --   Net 120 ml       WEIGHTS  Wt Readings from Last 3  Encounters:   01/10/24 0137 80.7 kg (177 lb 14.6 oz)   01/10/24 0920 80.7 kg (177 lb 14.6 oz)   10/24/23 1028 87.3 kg (192 lb 7.4 oz)       PHYSICAL EXAM    CONSTITUTIONAL: NAD  HEENT: Normocephalic. No pallor  NECK: no JVD  LUNGS: CTA b/l  HEART: regular rate and rhythm, S1, S2 normal, no murmur   ABDOMEN: soft, non-tender, bowel sounds normal  EXTREMITIES: No edema  SKIN: No rash  NEURO: AAO X 3  PSYCH: normal affect      HOME MEDICATIONS:  Current Facility-Administered Medications on File Prior to Encounter   Medication Dose Route Frequency Provider Last Rate Last Admin    0.9%  NaCl infusion   Intravenous Continuous Deedee Sarkar NP   Stopped at 10/18/18 1613    mupirocin 2 % ointment   Nasal On Call Procedure Deedee Sarkar NP   Given at 10/18/18 1348     Current Outpatient Medications on File Prior to Encounter   Medication Sig Dispense Refill    albuterol (PROVENTIL/VENTOLIN HFA) 90 mcg/actuation inhaler INHALE 1 PUFF BY MOUTH EVERY 4 HOURS AS NEEDED FOR WHEEZING OR SHORTNESS OF BREATH 20.1 g 3    enalapril (VASOTEC) 20 MG tablet TAKE ONE TABLET BY MOUTH TWO TIMES A  tablet 2    hydrALAZINE (APRESOLINE) 50 MG tablet TAKE 2 TABLETS BY MOUTH EVERY 12 HOURS 360 tablet 3    cholecalciferol, vitamin D3, (VITAMIN D3) 50 mcg (2,000 unit) Cap Take 1 capsule (2,000 Units total) by mouth once daily. 90 capsule 3    cyanocobalamin 500 MCG tablet Take 500 mcg by mouth once daily.      diclofenac sodium (VOLTAREN) 1 % Gel Apply 2 grams  topically 4 (four) times daily. 100 g 5    diltiaZEM (CARDIZEM CD) 180 MG 24 hr capsule TAKE ONE CAPSULE BY MOUTH ONCE DAILY 30 capsule 2    dorzolamide (TRUSOPT) 2 % ophthalmic solution Place 1 drop into both eyes 3 (three) times daily.      doxazosin (CARDURA) 2 MG tablet TAKE 1 TABLET (2 MG TOTAL) BY MOUTH EVERY EVENING 90 tablet 1    HYDROcodone-acetaminophen (NORCO)  mg per tablet Take 1 tablet by mouth every 8 (eight) hours as needed for Pain. 90 tablet 0     hydrOXYzine HCL (ATARAX) 25 MG tablet TAKE ONE TABLET BY MOUTH NIGHTLY AS NEEDED FOR ITCHING 30 tablet 2    Lactobac no.41/Bifidobact no.7 (PROBIOTIC-10 ORAL) Take 1 capsule by mouth once daily.      mirabegron (MYRBETRIQ) 25 mg Tb24 ER tablet Take 1 tablet (25 mg total) by mouth once daily. 30 tablet 11    multivitamin capsule Take 1 capsule by mouth once daily.      mupirocin (BACTROBAN) 2 % ointment Apply topically 3 (three) times daily. 15 g 1    omeprazole (PRILOSEC) 20 MG capsule Take 1 capsule (20 mg total) by mouth 2 (two) times a day. 180 capsule 3    polyethylene glycol (GLYCOLAX) 17 gram PwPk Take 17 g by mouth daily as needed.      pravastatin (PRAVACHOL) 20 MG tablet TAKE ONE TABLET BY MOUTH ONCE DAILY 90 tablet 0    predniSONE (DELTASONE) 5 MG tablet Take 2 tablets (10 mg total) by mouth once daily. 270 tablet 1    sildenafiL (VIAGRA) 100 MG tablet Take 1 tablet (100 mg total) by mouth as needed for Erectile Dysfunction. 24 tablet 2    tamsulosin (FLOMAX) 0.4 mg Cap Take 1 capsule (0.4 mg total) by mouth once daily. 30 capsule 11    tiZANidine (ZANAFLEX) 4 MG tablet Take 1 tablet (4 mg total) by mouth every 8 (eight) hours. 270 tablet 1    travoprost (TRAVATAN Z) 0.004 % ophthalmic solution Place 1 drop into both eyes every evening. 5 mL 12    zaleplon (SONATA) 10 MG capsule TAKE ONE CAPSULE BY MOUTH EVERY EVENING 30 capsule 3    [DISCONTINUED] golimumab (SIMPONI) 50 mg/0.5 mL PnIj Inject 50 mg into the skin every 28 days. 0.5 mL 11       SCHEDULED MEDS:   atorvastatin  20 mg Oral Daily    diltiaZEM  180 mg Oral Daily    heparin (porcine)  5,000 Units Subcutaneous Q8H    hydrALAZINE  100 mg Oral Q12H    pantoprazole  40 mg Intravenous Daily    predniSONE  10 mg Oral Daily    sodium chloride 0.9%  10 mL Intravenous Q12H    tiZANidine  4 mg Oral Q8H       CONTINUOUS INFUSIONS:    PRN MEDS:acetaminophen, HYDROcodone-acetaminophen, magnesium oxide, magnesium oxide, melatonin, morphine, ondansetron,  "potassium bicarbonate, potassium bicarbonate, potassium bicarbonate, potassium, sodium phosphates, potassium, sodium phosphates, potassium, sodium phosphates    LABS AND DIAGNOSTICS     CBC LAST 3 DAYS  Recent Labs   Lab 01/10/24  0408   WBC 16.93*   RBC 3.19*   HGB 9.1*   HCT 29.0*   MCV 91   MCH 28.5   MCHC 31.4*   RDW 14.2      MPV 12.4   GRAN 89.4*  15.1*   LYMPH 6.9*  1.2   MONO 2.7*  0.5   BASO 0.02   NRBC 0       COAGULATION LAST 3 DAYS  No results for input(s): "LABPT", "INR", "APTT" in the last 168 hours.    CHEMISTRY LAST 3 DAYS  Recent Labs   Lab 01/10/24  0408      K 4.9      CO2 25   ANIONGAP 8   BUN 20   CREATININE 1.1   *   CALCIUM 9.0   ALBUMIN 3.8   PROT 6.6   ALKPHOS 51*   ALT 10   AST 14   BILITOT 0.5       CARDIAC PROFILE LAST 3 DAYS  Recent Labs   Lab 01/10/24  0409 01/10/24  0904   TROPONINIHS 15.5* 14.5       ENDOCRINE LAST 3 DAYS  Recent Labs   Lab 01/10/24  0408   TSH 0.579       LAST ARTERIAL BLOOD GAS  ABG  No results for input(s): "PH", "PO2", "PCO2", "HCO3", "BE" in the last 168 hours.    LAST 7 DAYS MICROBIOLOGY   Microbiology Results (last 7 days)       Procedure Component Value Units Date/Time    Respiratory Infection Panel (PCR), Nasopharyngeal [[id number]] Collected: 01/10/24 1047    Order Status: Completed Specimen: Nasopharyngeal Swab Updated: 01/10/24 1052     Respiratory Infection Panel Source NP swab    Narrative:      Respiratory Infection Panel source->NP Swab    Blood culture [[id number]] Collected: 01/10/24 0904    Order Status: Sent Specimen: Blood from Arm Updated: 01/10/24 0912    Blood culture [[id number]] Collected: 01/10/24 0903    Order Status: Sent Specimen: Blood from Arm Updated: 01/10/24 0912            MOST RECENT IMAGING  US Lower Extremity Veins Bilateral  REASON: elevated DD    FINDINGS:    Grayscale, color and spectral Doppler analysis of the bilateral lower extremity deep venous system was performed.    There is normal " compressibility, color and spectral Doppler analysis, and augmentation in the bilateral lower extremity deep venous system.    IMPRESSION:    No DVT of the bilateral lower extremity veins.    Electronically signed by:  Emeka Casillas DO  01/10/2024 07:05 AM [facility] Workstation: [id number]      ECHOCARDIOGRAM RESULTS (last 5)  Results for orders placed in visit on 09/20/22    Echo    Interpretation Summary  · The left ventricle is normal in size with concentric hypertrophy and normal systolic function.  · Moderate left atrial enlargement.  · Grade I left ventricular diastolic dysfunction.  · The estimated ejection fraction is 60%.  · Normal right ventricular size with normal right ventricular systolic function.  · There is moderate-to-severe aortic valve stenosis.  · Aortic valve area is 1.35 cm2; peak velocity is 4.49 m/s; mean gradient is 47 mmHg.  · Moderate aortic regurgitation.  · Mild tricuspid regurgitation.  · Intermediate central venous pressure (8 mmHg).  · The estimated PA systolic pressure is 35 mmHg.  · The ascending aorta is mildly dilated.      Results for orders placed during the hospital encounter of 05/13/22    Echo    Interpretation Summary  · The left ventricle is normal in size with concentric hypertrophy and normal systolic function.  · Mild left atrial enlargement.  · Mild pulmonic regurgitation.  · The estimated ejection fraction is 55%.  · Indeterminate left ventricular diastolic function.  · Normal right ventricular size with normal right ventricular systolic function.  · Moderate aortic regurgitation.  · There is mild-to-moderate aortic valve stenosis.  · Aortic valve area is 1.54 cm2; peak velocity is 4.84 m/s; mean gradient is 61 mmHg.  · Normal central venous pressure (3 mmHg).      Results for orders placed during the hospital encounter of 03/18/21    Echo Color Flow Doppler? Yes    Interpretation Summary  · Concentric hypertrophy and normal systolic function. The estimated ejection  fraction is 55%  · Mild left atrial enlargement.  · Indeterminate left ventricular diastolic function.  · With normal right ventricular systolic function.  · Normal central venous pressure (3 mmHg).  · The aortic root is mildly dilated.  · Moderate aortic regurgitation.  · There is mild aortic valve stenosis.  · Aortic valve area is 1.65 cm2; peak velocity is 4.34 m/s; mean gradient is 48 mmHg.      Results for orders placed in visit on 10/05/18    Transthoracic echo (TTE) complete    Interpretation Summary  · Left ventricle ejection fraction is normal at 60%  · Normal LV diastolic function.  · RV systolic function is normal.  · There is moderate aortic valve stenosis.  · EUNICE is 1.19 cm2; peak velocity is 4.04 m/s; mean gradient is 32.58 mmHg.  · Mild regurgitation is present in the aortic valve..  · Mitral valve shows mild regurgitation.  · The estimated PA systolic pressure is 33.40 mm Hg      CURRENT/PREVIOUS VISIT EKG  Results for orders placed or performed in visit on 08/08/22   IN OFFICE EKG 12-LEAD (to Ary)    Collection Time: 08/08/22  2:19 PM    Narrative    Test Reason : R06.00,    Vent. Rate : 074 BPM     Atrial Rate : 074 BPM     P-R Int : 148 ms          QRS Dur : 080 ms      QT Int : 370 ms       P-R-T Axes : 054 -08 069 degrees     QTc Int : 410 ms    Normal sinus rhythm  Nonspecific T wave abnormality  Abnormal ECG  When compared with ECG of 08-OCT-2021 09:01,  Nonspecific T wave abnormality no longer evident in Inferior leads  Confirmed by SHAUN MUNOZ MD ([id number]) on 8/9/2022 12:06:57 PM    Referred By:  MARILYNN           Confirmed By:SHAUN MUNOZ MD           ASSESSMENT/PLAN:     Active Hospital Problems    Diagnosis    *SOB (shortness of breath)    Elevated troponin    Elevated d-dimer, PE ruled out    Stage 3a chronic kidney disease    Moderate aortic stenosis    RA (rheumatoid arthritis)    Mixed hyperlipidemia       ASSESSMENT & PLAN:     Dyspnea on exertion  COPD exacerbation  Elevated  troponin HS- resolved  Aortic Stenosis  Hypertension  Elevated D-Dimer- negative for PE  Hyperlipidemia  Leukocytosis  Chronic Anemia    RECOMMENDATIONS:    Patient transferred from [facility] for further evaluation of elevated troponin (31 >26), dyspnea, and aortic stenosis.  Patient denies CP.  Troponin HS now normalized.  No EKG this admission.  Obtain EKG today.  History of moderate to severe aortic stenosis and moderate aortic regurgitation.  Repeat ECHO pending.  If severe AS, patient will need OP eval for TAVR.    Shortness of breath improved with breathing treatments. Likely COPD exacerbation.  No BNP this admission.  Add on BNP to AM labs.  Patient had cardiac cath 2022 that showed normal coronaries.  Dyspnea is unlikely secondary to coronary blockages.  Thank you for the consult.  We will continue to follow.       Anitha Harmon NP  Department of Cardiology  Date of Service: 01/10/2024

## 2024-01-10 NOTE — PLAN OF CARE
Critical access hospital  Initial Discharge Assessment       Primary Care Provider: Salvador Kennedy DO    Admission Diagnosis: COPD exacerbation [J44.1]    Admission Date: 1/9/2024    DC assessment completed with patient at bedside.  Information verified as correct on facesheet.  Patient's chart shows Medicaid, but patient stated he also has PHN.   notified Caroline with Registration who is looking into it.  Patient denies HPOA.  Denies HH/HD/Coumadin.  Uses cane at home.  Patient independent in ADL's.  DC plan is home. Spouse, Jacqueline, to provide transport on discharge.      [facility]-  received message from Caroline stating PHN insurance has now been added to patient's encounter.        Transition of Care Barriers: None    Payor: MEDICAID / Plan: MEDICA[place] OF [place] / Product Type: Government /     Extended Emergency Contact Information  Primary Emergency Contact: Jacqueline Swan  Address: [address] [place] [address] [facility]  Mobile Phone: [phone]  Relation: Spouse    Discharge Plan A: Home with family  Discharge Plan B: Home Health      [facility]  [place]  [address] [id number] [facility] [place] [address]  Phone: [phone] Fax: [phone]      Initial Assessment (most recent)       Adult Discharge Assessment - 01/10/24 0957          Discharge Assessment    Assessment Type Discharge Planning Assessment     Confirmed/corrected address, phone number and insurance Yes     Confirmed Demographics Correct on Facesheet     Source of Information patient     Reason For Admission shortness of breath     People in Home spouse     Facility Arrived From: home     Do you expect to return to your current living situation? Yes     Do you have help at home or someone to help you manage your care at home? Yes     Current cognitive status: Alert/Oriented     Equipment Currently Used at Home cane, straight     Readmission within 30 days? No     Patient currently being  followed by outpatient case management? No     Do you currently have service(s) that help you manage your care at home? No     Do you take prescription medications? Yes     Do you have prescription coverage? Yes     Do you have any problems affording any of your prescribed medications? No     Is the patient taking medications as prescribed? yes     Who is going to help you get home at discharge? spouse, Jacqueline     How do you get to doctors appointments? car, drives self     Are you on dialysis? No     Do you take coumadin? No     Discharge Plan A Home with family     Discharge Plan B Home Health     DME Needed Upon Discharge  none     Discharge Plan discussed with: Patient     Transition of Care Barriers None

## 2024-01-10 NOTE — NURSING
Nurses Note -- 4 Eyes      1/10/2024   6:12 AM      Skin assessed during: Admit      [x] No Altered Skin Integrity Present    [x]Prevention Measures Documented      [] Yes- Altered Skin Integrity Present or Discovered   [] LDA Added if Not in Epic (Describe Wound)   [] New Altered Skin Integrity was Present on Admit and Documented in LDA   [] Wound Image Taken    Wound Care Consulted? No    Attending Nurse:  David Esparza RN/Staff Member:  [id number]

## 2024-01-10 NOTE — HPI
73-year-old male with a history of seropositive rheumatoid arthritis, thrombocytopenia, anemia , BPH, COPD, gastroesophageal reflux disease, hypertension, aortic stenosis, and hyperlipidemia initially presented to the [facility] on January 9 with dyspnea, cough, and body aches.  He had negative respiratory virus panel, negative COVID, negative flu, and negative influenza swabs.  He left there and subsequently went to [facility].  He presented there with dyspnea and flu-like symptoms for 1 week.  He reported chest discomfort earlier in the day but none during his emergency department stay.  Labs included elevated D-dimer and mild leukocytosis along with mild elevation in troponin that trended down during his stay.  CTA of the chest had no evidence of PE, though there was an opacified bronchus in the left lower lobe that may be due to mucus. In the emergency department he received albuterol, aspirin, azithromycin, Rocephin, Norco, DuoNeb, and Solu-Medrol.  They are requesting transfer to Hospital Medicine at [facility] for further treatment of dyspnea along with evaluation of elevation in troponin/aortic stenosis.  With the history of aortic stenosis and elevated troponinis, case discussed with Cardiology at [facility], and they are available for consultation.     Sodium 141, potassium 3.6, chloride 106, CO2 24, BUN 15, creatinine 1.22, glucose 114, AST 16, ALT 11, high sensitivity troponin 31 with repeat 26 (normal range 2-19), BNP 73, D-dimer 48481, white blood cells 13.1, hemoglobin 10.4, hematocrit 32.3, platelets 192, influenza negative, COVID negative     CT chest for PE protocol noted patient motion artifact.  No evidence of pulmonary embolism.  No mediastinal or hilar adenopathy.  Thoracic aorta is normal caliber.  Heart size is normal.  Opacified bronchus in the left lower lobe may be due to mucus.  There a few bands of subsegmental atelectasis in  the lingula.  No airspace consolidation.  2 cm thin walled air cyst is apparent in the right lower lobe.  0 pleural effusions.  Bones are intact.     October 2022: Echocardiogram had EF 60%.  Grade 1 diastolic dysfunction.  Moderate to severe aortic valve stenosis with aortic valve area 1.35 cm2, mean gradient 47 mmHg, peak velocity 4.49 M/S.     VS:  Temperature 98.1°, pulse 109, respirations 26, blood pressure 133/75, O2 sats 99%    In my encounter, patient complains of shortness a breath that has been occurring for the past 1 month.  Worse with any kind of activity.  Associated with a cough and congestion.  Denies any fever, chills, nausea, vomiting, abdominal pains, chest pains, or lower extremity edema.  He also states he was rheumatoid arthritis in his pain throughout his body and takes Norco 10 mg regularly.

## 2024-01-10 NOTE — SUBJECTIVE & OBJECTIVE
Interval History: Patient denies any chest pain this AM and reports SOB is improving. Reports he got a breathing treatment last night which helped.     Review of Systems   All other systems reviewed and are negative.    Objective:     Vital Signs (Most Recent):  Temp: 97.9 °F (36.6 °C) (01/10/24 1500)  Pulse: 91 (01/10/24 1500)  Resp: 17 (01/10/24 1500)  BP: 110/72 (01/10/24 1500)  SpO2: 96 % (01/10/24 1500) Vital Signs (24h Range):  Temp:  [97.7 °F (36.5 °C)-97.9 °F (36.6 °C)] 97.9 °F (36.6 °C)  Pulse:  [] 91  Resp:  [16-22] 17  SpO2:  [96 %-99 %] 96 %  BP: (110-141)/(72-92) 110/72     Weight: 80.7 kg (177 lb 14.6 oz)  Body mass index is 24.13 kg/m².    Intake/Output Summary (Last 24 hours) at 1/10/2024 1700  Last data filed at 1/10/2024 1631  Gross per 24 hour   Intake 360 ml   Output --   Net 360 ml         Physical Exam  Constitutional:       Appearance: Normal appearance.   HENT:      Head: Normocephalic and atraumatic.      Nose: Nose normal.      Mouth/Throat:      Mouth: Mucous membranes are moist.   Eyes:      Extraocular Movements: Extraocular movements intact.   Cardiovascular:      Rate and Rhythm: Normal rate and regular rhythm.      Heart sounds: Murmur heard.   Pulmonary:      Effort: Pulmonary effort is normal. No respiratory distress.      Comments: On RA   No wheezing appreciated   Abdominal:      General: Abdomen is flat. Bowel sounds are normal. There is no distension.      Tenderness: There is no abdominal tenderness.   Musculoskeletal:      Right lower leg: No edema.      Left lower leg: No edema.   Skin:     General: Skin is warm.   Neurological:      General: No focal deficit present.      Mental Status: He is alert. Mental status is at baseline.   Psychiatric:         Mood and Affect: Mood normal.         Behavior: Behavior normal.             Significant Labs: All pertinent labs within the past 24 hours have been reviewed.    Significant Imaging: I have reviewed all pertinent imaging  results/findings within the past 24 hours.

## 2024-01-10 NOTE — ASSESSMENT & PLAN NOTE
Troponin trended from 02/31/2026.  We will repeat troponin on admission.    No active chest pains.

## 2024-01-10 NOTE — CARE UPDATE
01/10/24 1311   Patient Assessment/Suction   Level of Consciousness (AVPU) alert   Respiratory Effort Normal;Unlabored   Expansion/Accessory Muscles/Retractions no use of accessory muscles;no retractions;expansion symmetric   All Lung Fields Breath Sounds coarse   Rhythm/Pattern, Respiratory unlabored;pattern regular;depth regular   Cough Frequency infrequent   PRE-TX-O2   Device (Oxygen Therapy) room air   SpO2 99 %   Pulse Oximetry Type Intermittent   $ Pulse Oximetry - Single Charge Pulse Oximetry - Single   Pulse 102   Resp 17   Aerosol Therapy   $ Aerosol Therapy Charges Aerosol Treatment   Daily Review of Necessity (SVN) completed   Respiratory Treatment Status (SVN) given   Treatment Route (SVN) oxygen;mask   Patient Position (SVN) HOB elevated   Post Treatment Assessment (SVN) breath sounds unchanged   Signs of Intolerance (SVN) none   Education   $ Education Bronchodilator;15 min   Tobacco Cessation Intervention   Do you use any type of tobacco product? No   Respiratory Evaluation   $ Care Plan Tech Time 15 min   $ Eval/Re-eval Charges Evaluation   Evaluation For Transfer   Admitting Diagnosis shortness of breath   Home Oxygen   Has Home Oxygen? No   Bronchodilator Care Plan   MDI Meds w/ frequency   (albuterol mdi 90mcg prn)

## 2024-01-10 NOTE — NURSING
Pt had a bit of confusion this morning of time and situation. Pt doesn't understand why he wsa transferred to this hospital, even though it was explained upon admission. Pt had a few complaints about not getting dinner or evening medicine last night. Informed pt he came to the hospital around 11pm and that was after dinner time. Pt states that is not the time he he got here. And knows he was here earlier. Also states someone gave him two pills. He is adamant about someone giving him two pills. Attempted to orient pt to time and place. Pt is unwilling to be educated.

## 2024-01-11 PROBLEM — B95.7 COAG NEGATIVE STAPHYLOCOCCUS BACTEREMIA: Status: ACTIVE | Noted: 2024-01-11

## 2024-01-11 PROBLEM — R78.81 COAG NEGATIVE STAPHYLOCOCCUS BACTEREMIA: Status: ACTIVE | Noted: 2024-01-11

## 2024-01-11 LAB
ACINETOBACTER CALCOACETICUS/BAUMANNII COMPLEX: NOT DETECTED
ALBUMIN SERPL BCP-MCNC: 3.5 G/DL (ref 3.5–5.2)
ALP SERPL-CCNC: 44 U/L (ref 55–135)
ALT SERPL W/O P-5'-P-CCNC: 12 U/L (ref 10–44)
ANION GAP SERPL CALC-SCNC: 8 MMOL/L (ref 8–16)
AST SERPL-CCNC: 18 U/L (ref 10–40)
BACTEROIDES FRAGILIS: NOT DETECTED
BASOPHILS # BLD AUTO: 0.02 K/UL (ref 0–0.2)
BASOPHILS NFR BLD: 0.1 % (ref 0–1.9)
BILIRUB SERPL-MCNC: 0.5 MG/DL (ref 0.1–1)
BUN SERPL-MCNC: 24 MG/DL (ref 8–23)
CALCIUM SERPL-MCNC: 9 MG/DL (ref 8.7–10.5)
CANDIDA ALBICANS: NOT DETECTED
CANDIDA AURIS: NOT DETECTED
CANDIDA GLABRATA: NOT DETECTED
CANDIDA KRUSEI: NOT DETECTED
CANDIDA PARAPSILOSIS: NOT DETECTED
CANDIDA TROPICALIS: NOT DETECTED
CHLORIDE SERPL-SCNC: 106 MMOL/L (ref 95–110)
CO2 SERPL-SCNC: 23 MMOL/L (ref 23–29)
CREAT SERPL-MCNC: 1.3 MG/DL (ref 0.5–1.4)
CRYPTOCOCCUS NEOFORMANS/GATTII: NOT DETECTED
CTX-M GENE (ESBL PRODUCER): ABNORMAL
DIFFERENTIAL METHOD BLD: ABNORMAL
ENTEROBACTER CLOACAE COMPLEX: NOT DETECTED
ENTEROBACTERALES: NOT DETECTED
ENTEROCOCCUS FAECALIS: NOT DETECTED
ENTEROCOCCUS FAECIUM: NOT DETECTED
EOSINOPHIL # BLD AUTO: 0 K/UL (ref 0–0.5)
EOSINOPHIL NFR BLD: 0 % (ref 0–8)
ERYTHROCYTE [DISTWIDTH] IN BLOOD BY AUTOMATED COUNT: 14.3 % (ref 11.5–14.5)
ESCHERICHIA COLI: NOT DETECTED
EST. GFR  (NO RACE VARIABLE): 57.6 ML/MIN/1.73 M^2
GLUCOSE SERPL-MCNC: 131 MG/DL (ref 70–110)
HAEMOPHILUS INFLUENZAE: NOT DETECTED
HCT VFR BLD AUTO: 26.4 % (ref 40–54)
HGB BLD-MCNC: 8.3 G/DL (ref 14–18)
IMM GRANULOCYTES # BLD AUTO: 0.2 K/UL (ref 0–0.04)
IMM GRANULOCYTES NFR BLD AUTO: 0.8 % (ref 0–0.5)
IMP GENE (CARBAPENEM RESISTANT): ABNORMAL
KLEBSIELLA AEROGENES: NOT DETECTED
KLEBSIELLA OXYTOCA: NOT DETECTED
KLEBSIELLA PNEUMONIAE GROUP: NOT DETECTED
KPC RESISTANCE GENE (CARBAPENEM): ABNORMAL
LISTERIA MONOCYTOGENES: NOT DETECTED
LYMPHOCYTES # BLD AUTO: 2 K/UL (ref 1–4.8)
LYMPHOCYTES NFR BLD: 8.5 % (ref 18–48)
MCH RBC QN AUTO: 28.7 PG (ref 27–31)
MCHC RBC AUTO-ENTMCNC: 31.4 G/DL (ref 32–36)
MCR-1: ABNORMAL
MCV RBC AUTO: 91 FL (ref 82–98)
MEC A/C AND MREJ (MRSA): ABNORMAL
MEC A/C: NOT DETECTED
MONOCYTES # BLD AUTO: 1.9 K/UL (ref 0.3–1)
MONOCYTES NFR BLD: 8.1 % (ref 4–15)
MRSA SCREEN BY PCR: NEGATIVE
NDM GENE (CARBAPENEM RESISTANT): ABNORMAL
NEISSERIA MENINGITIDIS: NOT DETECTED
NEUTROPHILS # BLD AUTO: 19.5 K/UL (ref 1.8–7.7)
NEUTROPHILS NFR BLD: 82.5 % (ref 38–73)
NRBC BLD-RTO: 0 /100 WBC
OXA-48-LIKE (CARBAPENEM RESISTANT): ABNORMAL
PLATELET # BLD AUTO: 165 K/UL (ref 150–450)
PMV BLD AUTO: 12 FL (ref 9.2–12.9)
POTASSIUM SERPL-SCNC: 4.7 MMOL/L (ref 3.5–5.1)
PROCALCITONIN SERPL IA-MCNC: 0.09 NG/ML (ref 0–0.5)
PROT SERPL-MCNC: 6.3 G/DL (ref 6–8.4)
PROTEUS SPECIES: NOT DETECTED
PSEUDOMONAS AERUGINOSA: NOT DETECTED
RBC # BLD AUTO: 2.89 M/UL (ref 4.6–6.2)
SALMONELLA SP: NOT DETECTED
SERRATIA MARCESCENS: NOT DETECTED
SODIUM SERPL-SCNC: 137 MMOL/L (ref 136–145)
STAPHYLOCOCCUS AUREUS: NOT DETECTED
STAPHYLOCOCCUS EPIDERMIDIS: NOT DETECTED
STAPHYLOCOCCUS LUGDUNESIS: DETECTED
STAPHYLOCOCCUS SPECIES: ABNORMAL
STENOTROPHOMONAS MALTOPHILIA: NOT DETECTED
STREPTOCOCCUS AGALACTIAE: NOT DETECTED
STREPTOCOCCUS PNEUMONIAE: NOT DETECTED
STREPTOCOCCUS PYOGENES: NOT DETECTED
STREPTOCOCCUS SPECIES: NOT DETECTED
VAN A/B (VRE GENE): ABNORMAL
VIM GENE (CARBAPENEM RESISTANT): ABNORMAL
WBC # BLD AUTO: 23.62 K/UL (ref 3.9–12.7)

## 2024-01-11 PROCEDURE — 87641 MR-STAPH DNA AMP PROBE: CPT | Performed by: NURSE PRACTITIONER

## 2024-01-11 PROCEDURE — 94640 AIRWAY INHALATION TREATMENT: CPT

## 2024-01-11 PROCEDURE — 63600175 PHARM REV CODE 636 W HCPCS: Performed by: STUDENT IN AN ORGANIZED HEALTH CARE EDUCATION/TRAINING PROGRAM

## 2024-01-11 PROCEDURE — 21400001 HC TELEMETRY ROOM

## 2024-01-11 PROCEDURE — 25000242 PHARM REV CODE 250 ALT 637 W/ HCPCS: Performed by: STUDENT IN AN ORGANIZED HEALTH CARE EDUCATION/TRAINING PROGRAM

## 2024-01-11 PROCEDURE — 36415 COLL VENOUS BLD VENIPUNCTURE: CPT | Performed by: STUDENT IN AN ORGANIZED HEALTH CARE EDUCATION/TRAINING PROGRAM

## 2024-01-11 PROCEDURE — 84145 PROCALCITONIN (PCT): CPT | Performed by: STUDENT IN AN ORGANIZED HEALTH CARE EDUCATION/TRAINING PROGRAM

## 2024-01-11 PROCEDURE — 85025 COMPLETE CBC W/AUTO DIFF WBC: CPT | Performed by: INTERNAL MEDICINE

## 2024-01-11 PROCEDURE — 87040 BLOOD CULTURE FOR BACTERIA: CPT | Mod: 59 | Performed by: STUDENT IN AN ORGANIZED HEALTH CARE EDUCATION/TRAINING PROGRAM

## 2024-01-11 PROCEDURE — 25000003 PHARM REV CODE 250: Performed by: INTERNAL MEDICINE

## 2024-01-11 PROCEDURE — 94761 N-INVAS EAR/PLS OXIMETRY MLT: CPT

## 2024-01-11 PROCEDURE — C9113 INJ PANTOPRAZOLE SODIUM, VIA: HCPCS | Performed by: INTERNAL MEDICINE

## 2024-01-11 PROCEDURE — 86140 C-REACTIVE PROTEIN: CPT | Performed by: STUDENT IN AN ORGANIZED HEALTH CARE EDUCATION/TRAINING PROGRAM

## 2024-01-11 PROCEDURE — 87040 BLOOD CULTURE FOR BACTERIA: CPT | Performed by: STUDENT IN AN ORGANIZED HEALTH CARE EDUCATION/TRAINING PROGRAM

## 2024-01-11 PROCEDURE — 99223 1ST HOSP IP/OBS HIGH 75: CPT | Mod: FS,,, | Performed by: NURSE PRACTITIONER

## 2024-01-11 PROCEDURE — 63600175 PHARM REV CODE 636 W HCPCS: Performed by: INTERNAL MEDICINE

## 2024-01-11 PROCEDURE — 80053 COMPREHEN METABOLIC PANEL: CPT | Performed by: INTERNAL MEDICINE

## 2024-01-11 PROCEDURE — 25000003 PHARM REV CODE 250: Performed by: NURSE PRACTITIONER

## 2024-01-11 PROCEDURE — 99233 SBSQ HOSP IP/OBS HIGH 50: CPT | Mod: ,,, | Performed by: STUDENT IN AN ORGANIZED HEALTH CARE EDUCATION/TRAINING PROGRAM

## 2024-01-11 PROCEDURE — 25000003 PHARM REV CODE 250: Performed by: STUDENT IN AN ORGANIZED HEALTH CARE EDUCATION/TRAINING PROGRAM

## 2024-01-11 PROCEDURE — A4216 STERILE WATER/SALINE, 10 ML: HCPCS | Performed by: INTERNAL MEDICINE

## 2024-01-11 PROCEDURE — 36415 COLL VENOUS BLD VENIPUNCTURE: CPT | Performed by: INTERNAL MEDICINE

## 2024-01-11 PROCEDURE — 99900031 HC PATIENT EDUCATION (STAT)

## 2024-01-11 RX ORDER — POLYETHYLENE GLYCOL 3350 17 G/17G
17 POWDER, FOR SOLUTION ORAL DAILY
Status: DISCONTINUED | OUTPATIENT
Start: 2024-01-12 | End: 2024-01-16 | Stop reason: HOSPADM

## 2024-01-11 RX ORDER — CHLORHEXIDINE GLUCONATE ORAL RINSE 1.2 MG/ML
15 SOLUTION DENTAL 2 TIMES DAILY
Status: DISCONTINUED | OUTPATIENT
Start: 2024-01-11 | End: 2024-01-16 | Stop reason: HOSPADM

## 2024-01-11 RX ORDER — SENNOSIDES 8.6 MG/1
8.6 TABLET ORAL DAILY PRN
Status: DISCONTINUED | OUTPATIENT
Start: 2024-01-11 | End: 2024-01-16 | Stop reason: HOSPADM

## 2024-01-11 RX ADMIN — HEPARIN SODIUM 5000 UNITS: 5000 INJECTION, SOLUTION INTRAVENOUS; SUBCUTANEOUS at 05:01

## 2024-01-11 RX ADMIN — Medication 6 MG: at 10:01

## 2024-01-11 RX ADMIN — PANTOPRAZOLE SODIUM 40 MG: 40 INJECTION, POWDER, FOR SOLUTION INTRAVENOUS at 08:01

## 2024-01-11 RX ADMIN — HEPARIN SODIUM 5000 UNITS: 5000 INJECTION, SOLUTION INTRAVENOUS; SUBCUTANEOUS at 08:01

## 2024-01-11 RX ADMIN — TIZANIDINE 4 MG: 4 TABLET ORAL at 01:01

## 2024-01-11 RX ADMIN — IPRATROPIUM BROMIDE AND ALBUTEROL SULFATE 3 ML: 2.5; .5 SOLUTION RESPIRATORY (INHALATION) at 07:01

## 2024-01-11 RX ADMIN — TIZANIDINE 4 MG: 4 TABLET ORAL at 10:01

## 2024-01-11 RX ADMIN — HYDRALAZINE HYDROCHLORIDE 100 MG: 25 TABLET ORAL at 08:01

## 2024-01-11 RX ADMIN — CHLORHEXIDINE GLUCONATE 15 ML: 1.2 RINSE ORAL at 08:01

## 2024-01-11 RX ADMIN — SODIUM CHLORIDE 10 ML: 9 INJECTION INTRAMUSCULAR; INTRAVENOUS; SUBCUTANEOUS at 09:01

## 2024-01-11 RX ADMIN — HEPARIN SODIUM 5000 UNITS: 5000 INJECTION, SOLUTION INTRAVENOUS; SUBCUTANEOUS at 01:01

## 2024-01-11 RX ADMIN — IPRATROPIUM BROMIDE AND ALBUTEROL SULFATE 3 ML: 2.5; .5 SOLUTION RESPIRATORY (INHALATION) at 01:01

## 2024-01-11 RX ADMIN — TIZANIDINE 4 MG: 4 TABLET ORAL at 05:01

## 2024-01-11 RX ADMIN — PREDNISONE 10 MG: 5 TABLET ORAL at 08:01

## 2024-01-11 RX ADMIN — ATORVASTATIN CALCIUM 20 MG: 20 TABLET, FILM COATED ORAL at 08:01

## 2024-01-11 RX ADMIN — DILTIAZEM HYDROCHLORIDE 180 MG: 180 CAPSULE, COATED, EXTENDED RELEASE ORAL at 08:01

## 2024-01-11 RX ADMIN — HYDROCODONE BITARTRATE AND ACETAMINOPHEN 2 TABLET: 5; 325 TABLET ORAL at 01:01

## 2024-01-11 RX ADMIN — VANCOMYCIN HYDROCHLORIDE 2000 MG: 500 INJECTION, POWDER, LYOPHILIZED, FOR SOLUTION INTRAVENOUS at 01:01

## 2024-01-11 NOTE — CONSULTS
Former[facility]   Department of Infectious Disease  Consult Note        PATIENT NAME: Scooter Swan  MRN: [id number]  TODAY'S DATE: 01/11/2024  ADMIT DATE: 1/9/2024  LENGTH OF STAY: 2 DAYS      CHIEF COMPLAINT: No chief complaint on file.    PRINCIPLE PROBLEM: SOB (shortness of breath)    REASON FOR CONSULT: Staph bacteremia    ASSESSMENT and PLAN     1. Bacteremia with 1/4 blood cultures positive for Staph lugdunensis  -1/10 blood cultures x4 bottles with 1 of 4 bottles positive for Staph lugdunensis per [employer]  -1/11 blood cultures x2 sets repeated today  -WBC 16.93-->23.62    2. Moderate to severe aortic stenosis  -recommended to follow-up with cardiologist when discharged    3. Psoriatic arthritis and Rheumatoid arthritis on daily 10 mg prednisone  -follows with Rheumatology in [place]    4. Chronic medical problems including BPH, anemia, thrombocytopenia, GERD, hypertension, hyperlipidemia and COPD and a history of Hep C treated with Harvoni      RECOMMENDATIONS:  Continue vancomycin with pharmacy to dose, keep levels 15-20  Obtain total CK in a.m.   Consult Cardiology for CADY  Repeat blood cultures x2 today  Obtain procalcitonin and CRP   Obtain sputum culture  Repeat blood cultures until negative  Obtain PA and lateral chest x-ray  Peridex BID    D/W Dr Pedroza    Thank you for this consult. Please send [employer] secure chat with any questions.    Antibiotics (From admission, onward)      Start     Stop Route Frequency Ordered    01/12/24 1230  vancomycin (VANCOCIN) 1,750 mg in dextrose 5 % (D5W) 500 mL IVPB         -- IV Every 24 hours (non-standard times) 01/11/24 1154    01/11/24 1215  vancomycin (VANCOCIN) 2,000 mg in dextrose 5 % (D5W) 500 mL IVPB         -- IV Once 01/11/24 1108    01/11/24 1201  vancomycin - pharmacy to dose  (vancomycin IVPB (PEDS and ADULTS))        See Hyperspace for full Linked Orders Report.    -- IV pharmacy to manage frequency 01/11/24 1102            HPI      Scooter Swan is a  73 y.o. male with chronic medical problems including hypertension, hyperlipidemia, aortic stenosis, rheumatoid arthritis and psoriatic arthritis on daily prednisone, thrombocytopenia, anemia, BPH, GERD and emphysema who went to [facility] on January 9th complaining of shortness a breath with exertion, cough and body aches.  He left without being seen and presented to [facility] with complaints of flu-like illness including fevers, chills, body aches, productive cough and left-sided chest pain.  He was given ceftriaxone and azithromycin as well as steroids and had an elevated troponin and negative chest CT with a left lower lobe occluded bronchus and bedside echo with thickened aortic valve.  [place] by cardiologist because of elevated troponins and aortic stenosis.  Patient states that he has had shortness of breath with exertion for months.  He states he can hardly walk 2 blocks without having to rest.  It seems to have gotten worse lately.  He also complains of intermittent sharp chest pain to left side of chest that resolves quickly with no aggravating or relieving factors.  He follows with a cardiologist in [place].  He has white sputum production this morning with coughing but states he has not really been coughing that much except for this morning.  He complains of associated night sweats that has been ongoing for months.  He said he was mentioned it to several of his doctors and nothing much has been said about it.  He follows with rheumatology for psoriatic/rheumatoid arthritis and is on 10 mg of prednisone daily as well as hydrocodone and tizanidine.  He denies nausea, vomiting, fevers or chills, abdominal pain, diarrhea, difficulty voiding or lower extremity swelling, or recent dental procedures or pain.  He is sitting up in bed awake and alert and feels much better.  He was on room air and says that shortness of  breath is much improved.      An echocardiogram from yesterday showed moderate to severe aortic stenosis with mean gradient 32 mmHg and peak gradient 54 mmHg with grade 1 diastolic dysfunction and 65-70% ejection fraction.  Mild tricuspid regurgitation and anterior leaflet sclerosis of mitral valve.  An echocardiogram from 2022 also mentions a PA pressure of 35 mmHg.  CTA chest with no evidence of PE and opacified bronchus in the left lower lobe due to mucus in a few bands of subsegmental atelectasis but no consolidation and no pleural effusions.  T-max 99.5° since admission, Today WBC 23.62, yesterday 16.93, possibly some IV steroids given at previous facility, H&H 8.3/26.4, platelets 165, BUN/CR 24/1.3, estimated creatinine clearance 54.7.  Hemoglobin A1c 5.8 RVP negative, blood cultures x2 sets drawn with 1 bottle of 1 set positive for staph lugdunensis per [employer].  He was started on IV vancomycin in his prednisone was continued.  He has been seen by Cardiology who recommend follow-up with primary cardiologist outpatient and no to previous heart catheterization in 2022 with no obstructive disease.    Outdoor activities:  Lives at home with wife and son.  No pets, retired .  Never smoker, rare alcohol use.  Travel:  None recent  Implants:  None, no recent dental procedures  Antibiotic history:  None recent show    Past Medical History:   Diagnosis Date    Cataract     COPD (chronic obstructive pulmonary disease)     Diverticulosis     DUARTE (dyspnea on exertion)     GERD (gastroesophageal reflux disease)     Glaucoma     Hepatitis C     treated; seeing Dr. Carr    Hyperlipidemia     Hypertension     Liver lesion 08/2018    RA (rheumatoid arthritis)     Rectal prolapse     Possible       Past Surgical History:   Procedure Laterality Date    ANGIOGRAM, CORONARY, WITH LEFT HEART CATHETERIZATION  10/6/2022    Procedure: Angiogram, Coronary, with Left Heart Cath;  Surgeon: Zac Boucher MD;  Location:  [facility] CATH;  Service: Cardiology;;    ARTERIOGRAPHY OF AORTIC ROOT  10/6/2022    Procedure: ARTERIOGRAM, AORTIC ROOT;  Surgeon: Zac Boucher MD;  Location: [facility] CATH;  Service: Cardiology;;    CARPAL TUNNEL RELEASE      Right wrist 2015    COLONOSCOPY  08/2016    Dr. Cardona; in care everywhere    COLONOSCOPY N/A 3/20/2019    Procedure: COLONOSCOPY;  Surgeon: Sotero Arthur MD;  Location: [facility] ENDO;  Service: Endoscopy;  Laterality: N/A; hemorrhoids, diverticulosis, repeat in 10 years for screening    EXCISIONAL HEMORRHOIDECTOMY N/A 10/18/2018    Procedure: HEMORRHOIDECTOMY, prone;  Surgeon: Gunnar Horton MD;  Location: [facility] OR [address] [place] [address];  Service: Colon and Rectal;  Laterality: N/A;    THYROID SURGERY      UPPER GASTROINTESTINAL ENDOSCOPY  08/2016    Dr. Cardona; in care everywhere       Family History   Problem Relation Age of Onset    Stomach cancer Paternal Cousin     Stomach cancer Paternal Cousin     Cataracts Mother     Diabetes Mother     Hypertension Mother     Stroke Mother     Diabetes Sister     Hypertension Sister     Stroke Sister     Diabetes Brother     Glaucoma Brother     Hypertension Brother     Stroke Brother     Diabetes Maternal Aunt     Hypertension Maternal Aunt     Stroke Maternal Aunt     Diabetes Maternal Uncle     Hypertension Maternal Uncle     Stroke Maternal Uncle     Diabetes Maternal Grandmother     Hypertension Maternal Grandmother     Stroke Maternal Grandmother     Cancer Cousin         stomach    Colon cancer Neg Hx     Colon polyps Neg Hx     Crohn's disease Neg Hx     Ulcerative colitis Neg Hx     Esophageal cancer Neg Hx     Amblyopia Neg Hx     Blindness Neg Hx     Macular degeneration Neg Hx     Retinal detachment Neg Hx     Strabismus Neg Hx     Thyroid disease Neg Hx        Social History     Socioeconomic History    Marital status:    Tobacco Use    Smoking status: Never    Smokeless tobacco: Never   Substance and Sexual Activity    Alcohol use: Yes      Alcohol/week: 1.0 standard drink of alcohol     Types: 1 Shots of liquor per week     Comment: social    Drug use: Yes     Types: Hydrocodone     Social Determinants of Health     Financial Resource Strain: Low Risk  (9/27/2022)    Overall Financial Resource Strain (CARDIA)     Difficulty of Paying Living Expenses: Not hard at all   Food Insecurity: No Food Insecurity (9/27/2022)    Hunger Vital Sign     Worried About Running Out of Food in the Last Year: Never true     Ran Out of Food in the Last Year: Never true   Transportation Needs: Unmet Transportation Needs (9/27/2022)    PRAPARE - Transportation     Lack of Transportation (Medical): Yes     Lack of Transportation (Non-Medical): Yes   Physical Activity: Sufficiently Active (9/27/2022)    Exercise Vital Sign     Days of Exercise per Week: 5 days     Minutes of Exercise per Session: 30 min   Stress: Stress Concern Present (9/19/2022)    [employer] - Occupational Stress Questionnaire     Feeling of Stress : Rather much   Social Connections: Unknown (9/27/2022)    Social Connection and Isolation Panel [NHANES]     Frequency of Communication with Friends and Family: More than three times a week     Frequency of Social Gatherings with Friends and Family: More than three times a week     Active Member of Clubs or Organizations: Patient declined     Attends Club or Organization Meetings: Patient declined     Marital Status:    Housing Stability: Unknown (9/27/2022)    Housing Stability Vital Sign     Unable to Pay for Housing in the Last Year: Patient refused     Unstable Housing in the Last Year: Patient refused       Review of patient's allergies indicates:   Allergen Reactions    Buspar [buspirone] Hallucinations     Nightmares    Enbrel [etanercept] Other (See Comments)     Severe headaches    Fentanyl Hives and Hallucinations    Plaquenil [hydroxychloroquine]      Nausea, insomnia, weight loss 40LBS    Amitiza [lubiprostone]  Nausea Only and Other (See Comments)     Fatigue.    Azulfidine [sulfasalazine] Nausea And Vomiting    Trazodone Other (See Comments)     Insomnia         Current Outpatient Medications   Medication Instructions    albuterol (PROVENTIL/VENTOLIN HFA) 90 mcg/actuation inhaler INHALE 1 PUFF BY MOUTH EVERY 4 HOURS AS NEEDED FOR WHEEZING OR SHORTNESS OF BREATH    cholecalciferol (vitamin D3) (VITAMIN D3) 2,000 Units, Oral, Daily    cyanocobalamin 500 mcg, Oral, Daily    diclofenac sodium (VOLTAREN) 1 % Gel Apply 2 grams  topically 4 (four) times daily.    diltiaZEM (CARDIZEM CD) 180 MG 24 hr capsule TAKE ONE CAPSULE BY MOUTH ONCE DAILY    dorzolamide (TRUSOPT) 2 % ophthalmic solution 1 drop, Both Eyes, 3 times daily    doxazosin (CARDURA) 2 mg, Oral, Nightly    enalapril (VASOTEC) 20 mg, Oral, 2 times daily    hydrALAZINE (APRESOLINE) 100 mg, Oral, Every 12 hours    HYDROcodone-acetaminophen (NORCO)  mg per tablet 1 tablet, Oral, Every 8 hours PRN    hydrOXYzine HCL (ATARAX) 25 MG tablet TAKE ONE TABLET BY MOUTH NIGHTLY AS NEEDED FOR ITCHING    Lactobac no.41/Bifidobact no.7 (PROBIOTIC-10 ORAL) 1 capsule, Oral, Daily    multivitamin capsule 1 capsule, Oral, Daily    mupirocin (BACTROBAN) 2 % ointment Topical (Top), 3 times daily    MYRBETRIQ 25 mg, Oral, Daily    omeprazole (PRILOSEC) 20 mg, Oral, 2 times daily    polyethylene glycol (GLYCOLAX) 17 g, Oral, Daily PRN    pravastatin (PRAVACHOL) 20 mg, Oral    predniSONE (DELTASONE) 10 mg, Oral, Daily    sildenafiL (VIAGRA) 100 mg, Oral, As needed (PRN)    tamsulosin (FLOMAX) 0.4 mg, Oral, Daily    tiZANidine (ZANAFLEX) 4 mg, Oral, Every 8 hours    travoprost (TRAVATAN Z) 0.004 % ophthalmic solution 1 drop, Both Eyes, Nightly    zaleplon (SONATA) 10 mg, Oral, Nightly     Scheduled Meds:   albuterol-ipratropium  3 mL Nebulization Q6H    atorvastatin  20 mg Oral Daily    chlorhexidine  15 mL Mouth/Throat BID    diltiaZEM  180 mg Oral Daily    heparin (porcine)  5,000  Units Subcutaneous Q8H    hydrALAZINE  100 mg Oral Q12H    pantoprazole  40 mg Intravenous Daily    predniSONE  10 mg Oral Daily    sodium chloride 0.9%  10 mL Intravenous Q12H    tiZANidine  4 mg Oral Q8H    [START ON 1/12/2024] vancomycin (VANCOCIN) IV (PEDS and ADULTS)  1,750 mg Intravenous Q24H     Continuous Infusions:  PRN Meds:.acetaminophen, HYDROcodone-acetaminophen, magnesium oxide, magnesium oxide, melatonin, morphine, ondansetron, potassium bicarbonate, potassium bicarbonate, potassium bicarbonate, potassium, sodium phosphates, potassium, sodium phosphates, potassium, sodium phosphates, Pharmacy to dose Vancomycin consult **AND** vancomycin - pharmacy to dose      Review of Systems  Constitutional:  Denies fevers, chills, loss of appetite, +  night sweats,  HEENT: Denies visual changes,ear pain, sinus congestion, mouth pain or trouble swallowing, sore throat or  dental pain.  Neck: Denies neck pain or lumps.  Respiratory: Denies wheezing or hemoptysis. +shortness of breath with exertion, cough today with white sputum.,  Cardiovascular:  Denies palpitations or edema. + chest pain  Gastrointestinal: Denies  nausea, vomiting,  or diarrhea.+ intermittent constipation  Genitourinary:  Denies dysuria, frequency, urgency or hematuria   Musculoskeletal:  + joint pain and swelling, + hx psoriatic/Rheumatoid arthritis. No difficulty walking.    Skin:  Denies wounds or itching. + rash to chest  Neurologic:  Denies motor sensory loss or dizziness. + frontal and left sided headaches  Psychiatric:  Denies changes in mood or behavior.    OBJECTIVE     Temp:  [97.7 °F (36.5 °C)-99.5 °F (37.5 °C)] 97.7 °F (36.5 °C)  Pulse:  [] 100  Resp:  [16-18] 16  SpO2:  [96 %-99 %] 96 %  BP: (107-170)/(60-79) 120/60  Temp:  [97.7 °F (36.5 °C)-99.5 °F (37.5 °C)]   Temp: 97.7 °F (36.5 °C) (01/11/24 0724)  Pulse: 100 (01/11/24 0724)  Resp: 16 (01/11/24 0724)  BP: 120/60 (01/11/24 0835)  SpO2: 96 % (01/11/24 0724)    Intake/Output  Summary (Last 24 hours) at 1/11/2024 1236  Last data filed at 1/10/2024 1745  Gross per 24 hour   Intake 360 ml   Output --   Net 360 ml      Examined at 1142  Physical Exam  General: Pleasant elderly male, sitting up in bed awake and alert, good historian, his wife is at the bedside.  Eyes: Eyes with no icterus or injection. Vision grossly normal.  Arcus senilis.  Ears: Hearing grossly normal.  Nose: Nares patent  Mouth: Moist mucous membranes, dentition is poor with missing teeth. No ulcerations, erythema or exudates.  Neck: Supple, no tenderness to palpation.  Cardiovascular: Regular rate and rhythm, + harsh murmur, no edema.    Respiratory:  Clear to auscultation bilaterally anterior and posterior, no tachypnea or increased work of breathing.  On room air.  Gastrointestinal:  Soft and obese with active bowel sounds, no tenderness to palpation, no distention.  Genitourinary:  No suprapubic tenderness.  Musculoskeletal:  Moves all extremities with equal strength.    Skin:  Warm and dry, light brown patches to anterior chest.   Neuro:   Oriented, conversant, follows commands.  Psych: Good mood, normal affect.  VAD: PIV  Isolation: None    Wounds      Significant Labs: All pertinent labs within the past 24 hours have been reviewed.    CBC LAST 7 DAYS  Recent Labs   Lab 01/10/24  0408 01/11/24 0452   WBC 16.93* 23.62*   RBC 3.19* 2.89*   HGB 9.1* 8.3*   HCT 29.0* 26.4*   MCV 91 91   MCH 28.5 28.7   MCHC 31.4* 31.4*   RDW 14.2 14.3    165   MPV 12.4 12.0   GRAN 89.4*  15.1* 82.5*  19.5*   LYMPH 6.9*  1.2 8.5*  2.0   MONO 2.7*  0.5 8.1  1.9*   BASO 0.02 0.02   NRBC 0 0       CHEMISTRY LAST 7 DAYS  Recent Labs   Lab 01/10/24  0408 01/11/24 0452    137   K 4.9 4.7    106   CO2 25 23   ANIONGAP 8 8   BUN 20 24*   CREATININE 1.1 1.3   * 131*   CALCIUM 9.0 9.0   ALBUMIN 3.8 3.5   PROT 6.6 6.3   ALKPHOS 51* 44*   ALT 10 12   AST 14 18   BILITOT 0.5 0.5       Estimated Creatinine Clearance:  "54.7 mL/min (based on SCr of 1.3 mg/dL).    INFLAMMATORY/PROCAL  LAST 7 DAYS  No results for input(s): "PROCAL", "ESR", "CRP" in the last 168 hours.  No results found for: "ESR"  CRP   Date Value Ref Range Status   10/17/2023 6.8 0.0 - 8.2 mg/L Final   02/16/2023 7.1 0.0 - 8.2 mg/L Final   05/13/2022 12.6 (H) 0.0 - 8.2 mg/L Final   10/09/2020 2.0 0.0 - 8.2 mg/L Final   07/09/2020 7.5 0.0 - 8.2 mg/L Final   01/24/2020 5.8 0.0 - 8.2 mg/L Final   10/11/2019 0.7 0.0 - 8.2 mg/L Final       PRIOR MICROBIOLOGY:  No results found for the last 90 days.      LAST 7 DAYS MICROBIOLOGY   Microbiology Results (last 7 days)       Procedure Component Value Units Date/Time    Blood culture [[id number]]     Order Status: Sent Specimen: Blood     Blood culture [[id number]] Collected: 01/11/24 1109    Order Status: Sent Specimen: Blood from Antecubital, Right Arm Updated: 01/11/24 1142    Blood culture [[id number]] Collected: 01/10/24 0903    Order Status: Completed Specimen: Blood from Arm Updated: 01/11/24 1032     Blood Culture, Routine No Growth to date      No Growth to date    Rapid Organism ID by PCR (from Blood culture) [[id number]]  (Abnormal) Collected: 01/10/24 0904    Order Status: Completed Updated: 01/11/24 0803     Enterococcus faecalis Not Detected     Enterococcus faecium Not Detected     Listeria monocytogenes Not Detected     Staphylococcus spp. See species for ID     Staphylococcus aureus Not Detected     Staphylococcus epidermidis Not Detected     Staphylococcus lugdunensis Detected     Streptococcus species Not Detected     Streptococcus agalactiae Not Detected     Streptococcus pneumoniae Not Detected     Streptococcus pyogenes Not Detected     Acinetobacter calcoaceticus/baumannii complex Not Detected     Bacteroides fragilis Not Detected     Enterobacterales Not Detected     Enterobacter cloacae complex Not Detected     Escherichia coli Not Detected     Klebsiella aerogenes Not Detected     Klebsiella oxytoca " Not Detected     Klebsiella pneumoniae group Not Detected     Proteus Not Detected     Salmonella sp Not Detected     Serratia marcescens Not Detected     Haemophilus influenzae Not Detected     Neisseria meningtidis Not Detected     Pseudomonas aeruginosa Not Detected     Stenotrophomonas maltophilia Not Detected     Candida albicans Not Detected     Candida auris Not Detected     Candida glabrata Not Detected     Candida krusei Not Detected     Candida parapsilosis Not Detected     Candida tropicalis Not Detected     Cryptococcus neoformans/gattii Not Detected     CTX-M (ESBL ) Test not applicable     IMP (Carbapenem resistant) Test not applicable     KPC resistance gene (Carbapenem resistant) Test not applicable     mcr-1  Test not applicable     mec A/C  Not Detected     mec A/C and MREJ (MRSA) gene Test not applicable     NDM (Carbapenem resistant) Test not applicable     OXA-48-like (Carbapenem resistant) Test not applicable     van A/B (VRE gene) Test not applicable     VIM (Carbapenem resistant) Test not applicable    Blood culture [[id number]] Collected: 01/10/24 0904    Order Status: Completed Specimen: Blood from Arm Updated: 01/11/24 0605     Blood Culture, Routine Gram stain aer bottle: Gram positive cocci      Results called to and read back by: DEEDEE CORDERO RN [facility].      01/11/2024  06:05 TGC    Respiratory Infection Panel (PCR), Nasopharyngeal [[id number]] Collected: 01/10/24 1047    Order Status: Completed Specimen: Nasopharyngeal Swab Updated: 01/10/24 1222     Respiratory Infection Panel Source NP swab     Adenovirus Not Detected     Coronavirus 229E, Common Cold Virus Not Detected     Coronavirus HKU1, Common Cold Virus Not Detected     Coronavirus NL63, Common Cold Virus Not Detected     Coronavirus OC43, Common Cold Virus Not Detected     Comment: Coronavirus strains 229E, HKU1, NL63, and OC43 can cause the common   cold   and are not associated with the respiratory disease outbreak  caused   by  the COVID-19 (SARS-CoV-2 novel Coronavirus) strain.           SARS-CoV2 (COVID-19) Qualitative PCR Not Detected     Human Metapneumovirus Not Detected     Human Rhinovirus/Enterovirus Not Detected     Influenza A (subtypes H1, H1-2009,H3) Not Detected     Influenza B Not Detected     Parainfluenza Virus 1 Not Detected     Parainfluenza Virus 2 Not Detected     Parainfluenza Virus 3 Not Detected     Parainfluenza Virus 4 Not Detected     Respiratory Syncytial Virus Not Detected     Bordetella Parapertussis ([id number]) Not Detected     Bordetella pertussis (ptxP) Not Detected     Chlamydia pneumoniae Not Detected     Mycoplasma pneumoniae Not Detected     Comment: Respiratory Infection Panel testing performed by Multiplex PCR.       Narrative:      Respiratory Infection Panel source->NP Swab              CURRENT/PREVIOUS VISIT EKG  Results for orders placed or performed during the hospital encounter of 01/09/24   EKG 12-lead    Collection Time: 01/10/24  1:55 PM    Narrative    Test Reason : R06.02,    Vent. Rate : 104 BPM     Atrial Rate : 104 BPM     P-R Int : 142 ms          QRS Dur : 086 ms      QT Int : 352 ms       P-R-T Axes : 111 -07 104 degrees     QTc Int : 462 ms    Sinus tachycardia  Possible Lateral infarct ,age undetermined  Inferior injury pattern    ACUTE MI / STEMI    Consider right ventricular involvement in acute inferior infarct  Abnormal ECG  When compared with ECG of 06-OCT-2022 08:09,  Borderline criteria for Lateral infarct are now Present  ST now depressed in Lateral leads  Nonspecific T wave abnormality no longer evident in Inferior leads  Inverted T waves have replaced nonspecific T wave abnormality in Lateral  leads    Referred By: EVERETT SABILLON           Confirmed By:          Significant Imaging: I have reviewed all relevant and available imaging results/findings within the past 24 hours.          Sena Johnson NP  Date of Service: 01/11/2024

## 2024-01-11 NOTE — PROGRESS NOTES
Pharmacokinetic Initial Assessment: IV Vancomycin    Assessment/Plan:    Initiate intravenous vancomycin with loading dose of 2000 mg once followed by a maintenance dose of vancomycin 1750mg IV every 24 hours  Desired empiric serum trough concentration is 15 to 20 mcg/mL  Draw vancomycin trough level 60 min prior to fourth dose on 1/14 at approximately 11:30  Pharmacy will continue to follow and monitor vancomycin.      Please contact pharmacy at extension [phone] with any questions regarding this assessment.     Thank you for the consult,   Keren Canseco       Patient brief summary:  Scooter Swan is a 73 y.o. male initiated on antimicrobial therapy with IV Vancomycin for treatment of suspected bacteremia    Drug Allergies:   Review of patient's allergies indicates:   Allergen Reactions    Buspar [buspirone] Hallucinations     Nightmares    Enbrel [etanercept] Other (See Comments)     Severe headaches    Fentanyl Hives and Hallucinations    Plaquenil [hydroxychloroquine]      Nausea, insomnia, weight loss 40LBS    Amitiza [lubiprostone] Nausea Only and Other (See Comments)     Fatigue.    Azulfidine [sulfasalazine] Nausea And Vomiting    Trazodone Other (See Comments)     Insomnia         Actual Body Weight:   80.7kg    Renal Function:   Estimated Creatinine Clearance: 54.7 mL/min (based on SCr of 1.3 mg/dL).,     Dialysis Method (if applicable):  N/A    CBC (last 72 hours):  Recent Labs   Lab Result Units 01/10/24  0408 01/10/24  0409 01/11/24  0452   WBC K/uL 16.93*  --  23.62*   Hemoglobin g/dL 9.1*  --  8.3*   Hemoglobin A1C %  --  5.8  --    Hematocrit % 29.0*  --  26.4*   Platelets K/uL 176  --  165   Gran % % 89.4*  --  82.5*   Lymph % % 6.9*  --  8.5*   Mono % % 2.7*  --  8.1   Eosinophil % % 0.0  --  0.0   Basophil % % 0.1  --  0.1   Differential Method  Automated  --  Automated       Metabolic Panel (last 72 hours):  Recent Labs   Lab Result Units 01/10/24  0408 01/10/24  1104 01/11/24  0452   Sodium mmol/L 138   "--  137   Potassium mmol/L 4.9  --  4.7   Chloride mmol/L 105  --  106   CO2 mmol/L 25  --  23   Glucose mg/dL 149*  --  131*   Glucose, UA   --  Negative  --    BUN mg/dL 20  --  24*   Creatinine mg/dL 1.1  --  1.3   Albumin g/dL 3.8  --  3.5   Total Bilirubin mg/dL 0.5  --  0.5   Alkaline Phosphatase U/L 51*  --  44*   AST U/L 14  --  18   ALT U/L 10  --  12       Drug levels (last 3 results):  No results for input(s): "VANCOMYCINRA", "VANCORANDOM", "VANCOMYCINPE", "VANCOPEAK", "VANCOMYCINTR", "VANCOTROUGH" in the last 72 hours.    Microbiologic Results:  Microbiology Results (last 7 days)       Procedure Component Value Units Date/Time    Blood culture [[id number]]     Order Status: Sent Specimen: Blood     Blood culture [[id number]] Collected: 01/11/24 1109    Order Status: Sent Specimen: Blood from Antecubital, Right Arm Updated: 01/11/24 1142    Blood culture [[id number]] Collected: 01/10/24 0903    Order Status: Completed Specimen: Blood from Arm Updated: 01/11/24 1032     Blood Culture, Routine No Growth to date      No Growth to date    Rapid Organism ID by PCR (from Blood culture) [[id number]]  (Abnormal) Collected: 01/10/24 0904    Order Status: Completed Updated: 01/11/24 0803     Enterococcus faecalis Not Detected     Enterococcus faecium Not Detected     Listeria monocytogenes Not Detected     Staphylococcus spp. See species for ID     Staphylococcus aureus Not Detected     Staphylococcus epidermidis Not Detected     Staphylococcus lugdunensis Detected     Streptococcus species Not Detected     Streptococcus agalactiae Not Detected     Streptococcus pneumoniae Not Detected     Streptococcus pyogenes Not Detected     Acinetobacter calcoaceticus/baumannii complex Not Detected     Bacteroides fragilis Not Detected     Enterobacterales Not Detected     Enterobacter cloacae complex Not Detected     Escherichia coli Not Detected     Klebsiella aerogenes Not Detected     Klebsiella oxytoca Not Detected     " Klebsiella pneumoniae group Not Detected     Proteus Not Detected     Salmonella sp Not Detected     Serratia marcescens Not Detected     Haemophilus influenzae Not Detected     Neisseria meningtidis Not Detected     Pseudomonas aeruginosa Not Detected     Stenotrophomonas maltophilia Not Detected     Candida albicans Not Detected     Candida auris Not Detected     Candida glabrata Not Detected     Candida krusei Not Detected     Candida parapsilosis Not Detected     Candida tropicalis Not Detected     Cryptococcus neoformans/gattii Not Detected     CTX-M (ESBL ) Test not applicable     IMP (Carbapenem resistant) Test not applicable     KPC resistance gene (Carbapenem resistant) Test not applicable     mcr-1  Test not applicable     mec A/C  Not Detected     mec A/C and MREJ (MRSA) gene Test not applicable     NDM (Carbapenem resistant) Test not applicable     OXA-48-like (Carbapenem resistant) Test not applicable     van A/B (VRE gene) Test not applicable     VIM (Carbapenem resistant) Test not applicable    Blood culture [[id number]] Collected: 01/10/24 0904    Order Status: Completed Specimen: Blood from Arm Updated: 01/11/24 0605     Blood Culture, Routine Gram stain aer bottle: Gram positive cocci      Results called to and read back by: DEEDEE CORDERO RN [facility].      01/11/2024  06:05 TGC    Respiratory Infection Panel (PCR), Nasopharyngeal [[id number]] Collected: 01/10/24 1047    Order Status: Completed Specimen: Nasopharyngeal Swab Updated: 01/10/24 1222     Respiratory Infection Panel Source NP swab     Adenovirus Not Detected     Coronavirus 229E, Common Cold Virus Not Detected     Coronavirus HKU1, Common Cold Virus Not Detected     Coronavirus NL63, Common Cold Virus Not Detected     Coronavirus OC43, Common Cold Virus Not Detected     Comment: Coronavirus strains 229E, HKU1, NL63, and OC43 can cause the common   cold   and are not associated with the respiratory disease outbreak caused   by  the  COVID-19 (SARS-CoV-2 novel Coronavirus) strain.           SARS-CoV2 (COVID-19) Qualitative PCR Not Detected     Human Metapneumovirus Not Detected     Human Rhinovirus/Enterovirus Not Detected     Influenza A (subtypes H1, H1-2009,H3) Not Detected     Influenza B Not Detected     Parainfluenza Virus 1 Not Detected     Parainfluenza Virus 2 Not Detected     Parainfluenza Virus 3 Not Detected     Parainfluenza Virus 4 Not Detected     Respiratory Syncytial Virus Not Detected     Bordetella Parapertussis (MG6846) Not Detected     Bordetella pertussis (ptxP) Not Detected     Chlamydia pneumoniae Not Detected     Mycoplasma pneumoniae Not Detected     Comment: Respiratory Infection Panel testing performed by Multiplex PCR.       Narrative:      Respiratory Infection Panel source->NP Swab

## 2024-01-11 NOTE — ASSESSMENT & PLAN NOTE
Blood cultures grew Gram-positive cocci   Rapid PCR grew Staphylococcus lugdunensis   Vancomycin started   Infectious disease consulted  Repeat blood cultures   cardiology consulted for CADY

## 2024-01-11 NOTE — NURSING
Notified by lab of positive blood culture. Gram positive cocci in aerobic bottle and WBC increased to 23.62 in morning labs. Dr. Medina notified.

## 2024-01-11 NOTE — ASSESSMENT & PLAN NOTE
Creatine stable for now. BMP reviewed- noted Estimated Creatinine Clearance: 54.7 mL/min (based on SCr of 1.3 mg/dL). according to latest data. Based on current GFR, CKD stage is stage 3 - GFR 30-59.  Monitor UOP and serial BMP and adjust therapy as needed. Renally dose meds. Avoid nephrotoxic medications and procedures.    Continue to monitor with daily CMP.

## 2024-01-11 NOTE — SUBJECTIVE & OBJECTIVE
Interval History:  Patient reports improvement in shortness of breath.  WBC 23.62.  Blood cultures grew staph, started on vancomycin.  Infectious Disease consulted.    Review of Systems   Constitutional:  Negative for chills and diaphoresis.   Respiratory:  Negative for cough, choking and shortness of breath.    Cardiovascular:  Negative for chest pain.     Objective:     Vital Signs (Most Recent):  Temp: 98.2 °F (36.8 °C) (01/11/24 1100)  Pulse: 99 (01/11/24 1319)  Resp: 18 (01/11/24 1350)  BP: (!) 144/83 (01/11/24 1100)  SpO2: 96 % (01/11/24 1319) Vital Signs (24h Range):  Temp:  [97.7 °F (36.5 °C)-99.5 °F (37.5 °C)] 98.2 °F (36.8 °C)  Pulse:  [] 99  Resp:  [16-18] 18  SpO2:  [96 %-98 %] 96 %  BP: (107-170)/(60-83) 144/83     Weight: 80.7 kg (177 lb 14.6 oz)  Body mass index is 24.13 kg/m².    Intake/Output Summary (Last 24 hours) at 1/11/2024 1422  Last data filed at 1/11/2024 1300  Gross per 24 hour   Intake 480 ml   Output --   Net 480 ml         Physical Exam  Constitutional:       Appearance: Normal appearance.   Cardiovascular:      Rate and Rhythm: Normal rate and regular rhythm.      Heart sounds: Murmur heard.   Pulmonary:      Effort: Pulmonary effort is normal. No respiratory distress.      Comments: On RA   No wheezing appreciated   Abdominal:      General: Abdomen is flat. Bowel sounds are normal.   Skin:     General: Skin is warm.   Neurological:      General: No focal deficit present.      Mental Status: He is alert.   Psychiatric:         Mood and Affect: Mood normal.         Behavior: Behavior normal.             Significant Labs: All pertinent labs within the past 24 hours have been reviewed.  CBC:   Recent Labs   Lab 01/10/24  0408 01/11/24  0452   WBC 16.93* 23.62*   HGB 9.1* 8.3*   HCT 29.0* 26.4*    165     CMP:   Recent Labs   Lab 01/10/24  0408 01/11/24  0452    137   K 4.9 4.7    106   CO2 25 23   * 131*   BUN 20 24*   CREATININE 1.1 1.3   CALCIUM 9.0 9.0    PROT 6.6 6.3   ALBUMIN 3.8 3.5   BILITOT 0.5 0.5   ALKPHOS 51* 44*   AST 14 18   ALT 10 12   ANIONGAP 8 8       Significant Imaging: I have reviewed all pertinent imaging results/findings within the past 24 hours.

## 2024-01-11 NOTE — PROGRESS NOTES
[facility]  Department of Cardiology  Consult Note      PATIENT NAME: Scooter Swan  MRN: [id number]  TODAY'S DATE: 01/11/2024  ADMIT DATE: 1/9/2024                          CONSULT REQUESTED BY: Austin Rabago MD    SUBJECTIVE     PRINCIPAL PROBLEM: Coag negative Staphylococcus bacteremia      REASON FOR CONSULT:  Aortic stenosis    INTERVAL HISTORY    1/11/24    Resting in bed.  NAD.  SR on tele.  No events.  Denies complaints.  Dyspnea improved. BC grew staph and patient has been started on vanc.  ID consulted.       HPI:    Patient is a 73-year-old male with past medical history of rheumatoid arthritis, thrombocytopenia, anemia, BPH, COPD, GERD, hypertension, aortic stenosis, hyperlipidemia who presented to the ED at [facility] on January 9th with complaints of shortness of breath, cough and body aches.  Negative for flu COVID and viral panel.  Patient left in subsequently went to [facility] with same complaints.  He was treated with albuterol, aspirin, azithromycin, Rocephin, and steroids and transferred to [facility] for further management of dyspnea, elevated troponin HS, and aortic stenosis.  Troponin HS 31, repeat 26.  Elevated D-dimer.  CT of chest was negative for PE, opacified bronchus and left lower lobe secondary to mucus.  Patient states dyspnea on exertion has been worsening over the past month and associated with cough and congestion.  Denies chest pain, jaw neck or arm pain, orthopnea, palpitations, or edema.   Patient had cardiac catheterization October of 2022 and was found to have normal coronary arteries and moderate aortic regurgitation.    ECHO 10/11/22  The left ventricle is normal in size with concentric hypertrophy and normal systolic function.  Moderate left atrial enlargement.  Grade I left ventricular diastolic dysfunction.  The estimated ejection fraction is 60%.  Normal right ventricular size with normal right ventricular systolic  function.  There is moderate-to-severe aortic valve stenosis.  Aortic valve area is 1.35 cm2; peak velocity is 4.49 m/s; mean gradient is 47 mmHg.  Moderate aortic regurgitation.  Mild tricuspid regurgitation.  Intermediate central venous pressure (8 mmHg).  The estimated PA systolic pressure is 35 mmHg.  The ascending aorta is mildly dilated.    CARDIAC CATH 10/6/22    The coronary arteries were normal..    Moderate aortic regurgitation at least on aortic root injection,    Consider reassessing the aortic valve with an echo or CADY.      FROM H&P  HPI: 73-year-old male with a history of seropositive rheumatoid arthritis, thrombocytopenia, anemia , BPH, COPD, gastroesophageal reflux disease, hypertension, aortic stenosis, and hyperlipidemia initially presented to the [facility] on January 9 with dyspnea, cough, and body aches.  He had negative respiratory virus panel, negative COVID, negative flu, and negative influenza swabs.  He left there and subsequently went to [facility].  He presented there with dyspnea and flu-like symptoms for 1 week.  He reported chest discomfort earlier in the day but none during his emergency department stay.  Labs included elevated D-dimer and mild leukocytosis along with mild elevation in troponin that trended down during his stay.  CTA of the chest had no evidence of PE, though there was an opacified bronchus in the left lower lobe that may be due to mucus. In the emergency department he received albuterol, aspirin, azithromycin, Rocephin, Norco, DuoNeb, and Solu-Medrol.  They are requesting transfer to Hospital Medicine at [facility] for further treatment of dyspnea along with evaluation of elevation in troponin/aortic stenosis.  With the history of aortic stenosis and elevated troponinis, case discussed with Cardiology at [facility], and they are available for consultation.     Sodium 141, potassium 3.6, chloride 106, CO2  24, BUN 15, creatinine 1.22, glucose 114, AST 16, ALT 11, high sensitivity troponin 31 with repeat 26 (normal range 2-19), BNP 73, D-dimer 66018, white blood cells 13.1, hemoglobin 10.4, hematocrit 32.3, platelets 192, influenza negative, COVID negative     CT chest for PE protocol noted patient motion artifact.  No evidence of pulmonary embolism.  No mediastinal or hilar adenopathy.  Thoracic aorta is normal caliber.  Heart size is normal.  Opacified bronchus in the left lower lobe may be due to mucus.  There a few bands of subsegmental atelectasis in the lingula.  No airspace consolidation.  2 cm thin walled air cyst is apparent in the right lower lobe.  0 pleural effusions.  Bones are intact.     October 2022: Echocardiogram had EF 60%.  Grade 1 diastolic dysfunction.  Moderate to severe aortic valve stenosis with aortic valve area 1.35 cm2, mean gradient 47 mmHg, peak velocity 4.49 M/S.     VS:  Temperature 98.1°, pulse 109, respirations 26, blood pressure 133/75, O2 sats 99%     In my encounter, patient complains of shortness a breath that has been occurring for the past 1 month.  Worse with any kind of activity.  Associated with a cough and congestion.  Denies any fever, chills, nausea, vomiting, abdominal pains, chest pains, or lower extremity edema.  He also states he was rheumatoid arthritis in his pain throughout his body and takes Norco 10 mg regularly.        Review of patient's allergies indicates:   Allergen Reactions    Buspar [buspirone] Hallucinations     Nightmares    Enbrel [etanercept] Other (See Comments)     Severe headaches    Fentanyl Hives and Hallucinations    Plaquenil [hydroxychloroquine]      Nausea, insomnia, weight loss 40LBS    Amitiza [lubiprostone] Nausea Only and Other (See Comments)     Fatigue.    Azulfidine [sulfasalazine] Nausea And Vomiting    Trazodone Other (See Comments)     Insomnia         Past Medical History:   Diagnosis Date    Cataract     COPD (chronic obstructive  pulmonary disease)     Diverticulosis     DUARTE (dyspnea on exertion)     GERD (gastroesophageal reflux disease)     Glaucoma     Hepatitis C     treated; seeing Dr. Carr    Hyperlipidemia     Hypertension     Liver lesion 08/2018    RA (rheumatoid arthritis)     Rectal prolapse     Possible     Past Surgical History:   Procedure Laterality Date    ANGIOGRAM, CORONARY, WITH LEFT HEART CATHETERIZATION  10/6/2022    Procedure: Angiogram, Coronary, with Left Heart Cath;  Surgeon: Zac Boucher MD;  Location: [facility] CATH;  Service: Cardiology;;    ARTERIOGRAPHY OF AORTIC ROOT  10/6/2022    Procedure: ARTERIOGRAM, AORTIC ROOT;  Surgeon: Zac Boucher MD;  Location: [facility] CATH;  Service: Cardiology;;    CARPAL TUNNEL RELEASE      Right wrist 2015    COLONOSCOPY  08/2016    Dr. Cardona; in care everywhere    COLONOSCOPY N/A 3/20/2019    Procedure: COLONOSCOPY;  Surgeon: Sotero Arthur MD;  Location: [facility];  Service: Endoscopy;  Laterality: N/A; hemorrhoids, diverticulosis, repeat in 10 years for screening    EXCISIONAL HEMORRHOIDECTOMY N/A 10/18/2018    Procedure: HEMORRHOIDECTOMY, prone;  Surgeon: Gunnar Horton MD;  Location: [address];  Service: Colon and Rectal;  Laterality: N/A;    THYROID SURGERY      UPPER GASTROINTESTINAL ENDOSCOPY  08/2016    Dr. Cardona; in care everywhere     Social History     Tobacco Use    Smoking status: Never    Smokeless tobacco: Never   Substance Use Topics    Alcohol use: Yes     Alcohol/week: 1.0 standard drink of alcohol     Types: 1 Shots of liquor per week     Comment: social    Drug use: Yes     Types: Hydrocodone        REVIEW OF SYSTEMS    As mentioned in HPI    OBJECTIVE     VITAL SIGNS (Most Recent)  Temp: 98.2 °F (36.8 °C) (01/11/24 1100)  Pulse: 99 (01/11/24 1319)  Resp: 18 (01/11/24 1350)  BP: (!) 144/83 (01/11/24 1100)  SpO2: 96 % (01/11/24 1319)    VENTILATION STATUS  Resp: 18 (01/11/24 1350)  SpO2: 96 % (01/11/24 1319)           I & O (Last  24H):  Intake/Output Summary (Last 24 hours) at 1/11/2024 1509  Last data filed at 1/11/2024 1300  Gross per 24 hour   Intake 720 ml   Output --   Net 720 ml       WEIGHTS  Wt Readings from Last 3 Encounters:   01/10/24 0137 80.7 kg (177 lb 14.6 oz)   01/10/24 0920 80.7 kg (177 lb 14.6 oz)   10/24/23 1028 87.3 kg (192 lb 7.4 oz)       PHYSICAL EXAM    CONSTITUTIONAL: NAD  HEENT: Normocephalic. No pallor  NECK: no JVD  LUNGS: CTA b/l  HEART: regular rate and rhythm, S1, S2 normal, no murmur   ABDOMEN: soft, non-tender, bowel sounds normal  EXTREMITIES: No edema  SKIN: No rash  NEURO: AAO X 3  PSYCH: normal affect      HOME MEDICATIONS:  Current Facility-Administered Medications on File Prior to Encounter   Medication Dose Route Frequency Provider Last Rate Last Admin    0.9%  NaCl infusion   Intravenous Continuous Deedee Sarkar NP   Stopped at 10/18/18 1613    mupirocin 2 % ointment   Nasal On Call Procedure Deedee Sarkar NP   Given at 10/18/18 1348     Current Outpatient Medications on File Prior to Encounter   Medication Sig Dispense Refill    albuterol (PROVENTIL/VENTOLIN HFA) 90 mcg/actuation inhaler INHALE 1 PUFF BY MOUTH EVERY 4 HOURS AS NEEDED FOR WHEEZING OR SHORTNESS OF BREATH 20.1 g 3    enalapril (VASOTEC) 20 MG tablet TAKE ONE TABLET BY MOUTH TWO TIMES A  tablet 2    hydrALAZINE (APRESOLINE) 50 MG tablet TAKE 2 TABLETS BY MOUTH EVERY 12 HOURS 360 tablet 3    cholecalciferol, vitamin D3, (VITAMIN D3) 50 mcg (2,000 unit) Cap Take 1 capsule (2,000 Units total) by mouth once daily. 90 capsule 3    cyanocobalamin 500 MCG tablet Take 500 mcg by mouth once daily.      diclofenac sodium (VOLTAREN) 1 % Gel Apply 2 grams  topically 4 (four) times daily. 100 g 5    diltiaZEM (CARDIZEM CD) 180 MG 24 hr capsule TAKE ONE CAPSULE BY MOUTH ONCE DAILY 30 capsule 2    dorzolamide (TRUSOPT) 2 % ophthalmic solution Place 1 drop into both eyes 3 (three) times daily.      doxazosin (CARDURA) 2 MG tablet  TAKE 1 TABLET (2 MG TOTAL) BY MOUTH EVERY EVENING 90 tablet 1    HYDROcodone-acetaminophen (NORCO)  mg per tablet Take 1 tablet by mouth every 8 (eight) hours as needed for Pain. 90 tablet 0    hydrOXYzine HCL (ATARAX) 25 MG tablet TAKE ONE TABLET BY MOUTH NIGHTLY AS NEEDED FOR ITCHING 30 tablet 2    Lactobac no.41/Bifidobact no.7 (PROBIOTIC-10 ORAL) Take 1 capsule by mouth once daily.      mirabegron (MYRBETRIQ) 25 mg Tb24 ER tablet Take 1 tablet (25 mg total) by mouth once daily. 30 tablet 11    multivitamin capsule Take 1 capsule by mouth once daily.      mupirocin (BACTROBAN) 2 % ointment Apply topically 3 (three) times daily. 15 g 1    omeprazole (PRILOSEC) 20 MG capsule Take 1 capsule (20 mg total) by mouth 2 (two) times a day. 180 capsule 3    polyethylene glycol (GLYCOLAX) 17 gram PwPk Take 17 g by mouth daily as needed.      pravastatin (PRAVACHOL) 20 MG tablet TAKE ONE TABLET BY MOUTH ONCE DAILY 90 tablet 0    predniSONE (DELTASONE) 5 MG tablet Take 2 tablets (10 mg total) by mouth once daily. 270 tablet 1    sildenafiL (VIAGRA) 100 MG tablet Take 1 tablet (100 mg total) by mouth as needed for Erectile Dysfunction. 24 tablet 2    tamsulosin (FLOMAX) 0.4 mg Cap Take 1 capsule (0.4 mg total) by mouth once daily. 30 capsule 11    tiZANidine (ZANAFLEX) 4 MG tablet Take 1 tablet (4 mg total) by mouth every 8 (eight) hours. 270 tablet 1    travoprost (TRAVATAN Z) 0.004 % ophthalmic solution Place 1 drop into both eyes every evening. 5 mL 12    zaleplon (SONATA) 10 MG capsule TAKE ONE CAPSULE BY MOUTH EVERY EVENING 30 capsule 3    [DISCONTINUED] golimumab (SIMPONI) 50 mg/0.5 mL PnIj Inject 50 mg into the skin every 28 days. 0.5 mL 11       SCHEDULED MEDS:   albuterol-ipratropium  3 mL Nebulization Q6H    atorvastatin  20 mg Oral Daily    chlorhexidine  15 mL Mouth/Throat BID    diltiaZEM  180 mg Oral Daily    heparin (porcine)  5,000 Units Subcutaneous Q8H    hydrALAZINE  100 mg Oral Q12H    pantoprazole   "40 mg Intravenous Daily    predniSONE  10 mg Oral Daily    sodium chloride 0.9%  10 mL Intravenous Q12H    tiZANidine  4 mg Oral Q8H    [START ON 1/12/2024] vancomycin (VANCOCIN) IV (PEDS and ADULTS)  1,750 mg Intravenous Q24H       CONTINUOUS INFUSIONS:    PRN MEDS:acetaminophen, HYDROcodone-acetaminophen, magnesium oxide, magnesium oxide, melatonin, morphine, ondansetron, potassium bicarbonate, potassium bicarbonate, potassium bicarbonate, potassium, sodium phosphates, potassium, sodium phosphates, potassium, sodium phosphates, Pharmacy to dose Vancomycin consult **AND** vancomycin - pharmacy to dose    LABS AND DIAGNOSTICS     CBC LAST 3 DAYS  Recent Labs   Lab 01/10/24  0408 01/11/24  0452   WBC 16.93* 23.62*   RBC 3.19* 2.89*   HGB 9.1* 8.3*   HCT 29.0* 26.4*   MCV 91 91   MCH 28.5 28.7   MCHC 31.4* 31.4*   RDW 14.2 14.3    165   MPV 12.4 12.0   GRAN 89.4*  15.1* 82.5*  19.5*   LYMPH 6.9*  1.2 8.5*  2.0   MONO 2.7*  0.5 8.1  1.9*   BASO 0.02 0.02   NRBC 0 0       COAGULATION LAST 3 DAYS  No results for input(s): "LABPT", "INR", "APTT" in the last 168 hours.    CHEMISTRY LAST 3 DAYS  Recent Labs   Lab 01/10/24  0408 01/11/24  0452    137   K 4.9 4.7    106   CO2 25 23   ANIONGAP 8 8   BUN 20 24*   CREATININE 1.1 1.3   * 131*   CALCIUM 9.0 9.0   ALBUMIN 3.8 3.5   PROT 6.6 6.3   ALKPHOS 51* 44*   ALT 10 12   AST 14 18   BILITOT 0.5 0.5       CARDIAC PROFILE LAST 3 DAYS  Recent Labs   Lab 01/10/24  0409 01/10/24  0423 01/10/24  0904   BNP  --  82  --    TROPONINIHS 15.5*  --  14.5       ENDOCRINE LAST 3 DAYS  Recent Labs   Lab 01/10/24  0408   TSH 0.579       LAST ARTERIAL BLOOD GAS  ABG  No results for input(s): "PH", "PO2", "PCO2", "HCO3", "BE" in the last 168 hours.    LAST 7 DAYS MICROBIOLOGY   Microbiology Results (last 7 days)       Procedure Component Value Units Date/Time    MRSA Screen by PCR [[id number]]     Order Status: No result Specimen: Nasopharyngeal Swab from Nasal  "    Culture, Respiratory with Gram Stain [[id number]]     Order Status: No result Specimen: Respiratory     Blood culture [[id number]]     Order Status: Sent Specimen: Blood     Blood culture [[id number]] Collected: 01/11/24 1109    Order Status: Sent Specimen: Blood from Antecubital, Right Arm Updated: 01/11/24 1142    Blood culture [[id number]] Collected: 01/10/24 0903    Order Status: Completed Specimen: Blood from Arm Updated: 01/11/24 1032     Blood Culture, Routine No Growth to date      No Growth to date    Rapid Organism ID by PCR (from Blood culture) [[id number]]  (Abnormal) Collected: 01/10/24 0904    Order Status: Completed Updated: 01/11/24 0803     Enterococcus faecalis Not Detected     Enterococcus faecium Not Detected     Listeria monocytogenes Not Detected     Staphylococcus spp. See species for ID     Staphylococcus aureus Not Detected     Staphylococcus epidermidis Not Detected     Staphylococcus lugdunensis Detected     Streptococcus species Not Detected     Streptococcus agalactiae Not Detected     Streptococcus pneumoniae Not Detected     Streptococcus pyogenes Not Detected     Acinetobacter calcoaceticus/baumannii complex Not Detected     Bacteroides fragilis Not Detected     Enterobacterales Not Detected     Enterobacter cloacae complex Not Detected     Escherichia coli Not Detected     Klebsiella aerogenes Not Detected     Klebsiella oxytoca Not Detected     Klebsiella pneumoniae group Not Detected     Proteus Not Detected     Salmonella sp Not Detected     Serratia marcescens Not Detected     Haemophilus influenzae Not Detected     Neisseria meningtidis Not Detected     Pseudomonas aeruginosa Not Detected     Stenotrophomonas maltophilia Not Detected     Candida albicans Not Detected     Candida auris Not Detected     Candida glabrata Not Detected     Candida krusei Not Detected     Candida parapsilosis Not Detected     Candida tropicalis Not Detected     Cryptococcus neoformans/gattii Not  Detected     CTX-M (ESBL ) Test not applicable     IMP (Carbapenem resistant) Test not applicable     KPC resistance gene (Carbapenem resistant) Test not applicable     mcr-1  Test not applicable     mec A/C  Not Detected     mec A/C and MREJ (MRSA) gene Test not applicable     NDM (Carbapenem resistant) Test not applicable     OXA-48-like (Carbapenem resistant) Test not applicable     van A/B (VRE gene) Test not applicable     VIM (Carbapenem resistant) Test not applicable    Blood culture [[id number]] Collected: 01/10/24 0904    Order Status: Completed Specimen: Blood from Arm Updated: 01/11/24 0605     Blood Culture, Routine Gram stain aer bottle: Gram positive cocci      Results called to and read back by: SHAJI DOUGLASS.      01/11/2024  06:05 TGC    Respiratory Infection Panel (PCR), Nasopharyngeal [[id number]] Collected: 01/10/24 1047    Order Status: Completed Specimen: Nasopharyngeal Swab Updated: 01/10/24 1222     Respiratory Infection Panel Source NP swab     Adenovirus Not Detected     Coronavirus 229E, Common Cold Virus Not Detected     Coronavirus HKU1, Common Cold Virus Not Detected     Coronavirus NL63, Common Cold Virus Not Detected     Coronavirus OC43, Common Cold Virus Not Detected     Comment: Coronavirus strains 229E, HKU1, NL63, and OC43 can cause the common   cold   and are not associated with the respiratory disease outbreak caused   by  the COVID-19 (SARS-CoV-2 novel Coronavirus) strain.           SARS-CoV2 (COVID-19) Qualitative PCR Not Detected     Human Metapneumovirus Not Detected     Human Rhinovirus/Enterovirus Not Detected     Influenza A (subtypes H1, H1-2009,H3) Not Detected     Influenza B Not Detected     Parainfluenza Virus 1 Not Detected     Parainfluenza Virus 2 Not Detected     Parainfluenza Virus 3 Not Detected     Parainfluenza Virus 4 Not Detected     Respiratory Syncytial Virus Not Detected     Bordetella Parapertussis (HN9796) Not Detected     Bordetella  pertussis (ptxP) Not Detected     Chlamydia pneumoniae Not Detected     Mycoplasma pneumoniae Not Detected     Comment: Respiratory Infection Panel testing performed by Multiplex PCR.       Narrative:      Respiratory Infection Panel source->NP Swab            MOST RECENT IMAGING  Echo    Left Ventricle: Moderately increased wall thickness. There is   concentric hypertrophy. There is normal systolic function with a visually   estimated ejection fraction of 65 - 70%. Grade I diastolic dysfunction.   E/A ratio is 0.56.    Aortic Valve: There is aortic valve sclerosis. There is moderate to   severe stenosis. Aortic valve area by VTI is 1.32 cm². Aortic valve peak   velocity is 3.68 m/s. Mean gradient is 32 mmHg. Aortic Valve peak gradient   is 54 mmHg. The dimensionless index is 0.38. There is mild aortic   regurgitation.    Right Ventricle: Right ventricle was not assessed.    Right Atrium: Not assessed.    Mitral Valve: There is anterior leaflet sclerosis.    Tricuspid Valve: Not assessed. There is mild regurgitation.    Overall the study quality was technically difficult. The study was   difficult due to patient's body habitus  US Lower Extremity Veins Bilateral  REASON: elevated DD    FINDINGS:    Grayscale, color and spectral Doppler analysis of the bilateral lower extremity deep venous system was performed.    There is normal compressibility, color and spectral Doppler analysis, and augmentation in the bilateral lower extremity deep venous system.    IMPRESSION:    No DVT of the bilateral lower extremity veins.    Electronically signed by:  Emeka Casillas DO  01/10/2024 07:05 AM CST Workstation: [id number]      ECHOCARDIOGRAM RESULTS (last 5)  Results for orders placed in visit on 09/20/22    Echo    Interpretation Summary  · The left ventricle is normal in size with concentric hypertrophy and normal systolic function.  · Moderate left atrial enlargement.  · Grade I left ventricular diastolic dysfunction.  · The  estimated ejection fraction is 60%.  · Normal right ventricular size with normal right ventricular systolic function.  · There is moderate-to-severe aortic valve stenosis.  · Aortic valve area is 1.35 cm2; peak velocity is 4.49 m/s; mean gradient is 47 mmHg.  · Moderate aortic regurgitation.  · Mild tricuspid regurgitation.  · Intermediate central venous pressure (8 mmHg).  · The estimated PA systolic pressure is 35 mmHg.  · The ascending aorta is mildly dilated.      Results for orders placed during the hospital encounter of 05/13/22    Echo    Interpretation Summary  · The left ventricle is normal in size with concentric hypertrophy and normal systolic function.  · Mild left atrial enlargement.  · Mild pulmonic regurgitation.  · The estimated ejection fraction is 55%.  · Indeterminate left ventricular diastolic function.  · Normal right ventricular size with normal right ventricular systolic function.  · Moderate aortic regurgitation.  · There is mild-to-moderate aortic valve stenosis.  · Aortic valve area is 1.54 cm2; peak velocity is 4.84 m/s; mean gradient is 61 mmHg.  · Normal central venous pressure (3 mmHg).      Results for orders placed during the hospital encounter of 03/18/21    Echo Color Flow Doppler? Yes    Interpretation Summary  · Concentric hypertrophy and normal systolic function. The estimated ejection fraction is 55%  · Mild left atrial enlargement.  · Indeterminate left ventricular diastolic function.  · With normal right ventricular systolic function.  · Normal central venous pressure (3 mmHg).  · The aortic root is mildly dilated.  · Moderate aortic regurgitation.  · There is mild aortic valve stenosis.  · Aortic valve area is 1.65 cm2; peak velocity is 4.34 m/s; mean gradient is 48 mmHg.      Results for orders placed in visit on 10/05/18    Transthoracic echo (TTE) complete    Interpretation Summary  · Left ventricle ejection fraction is normal at 60%  · Normal LV diastolic function.  · RV  systolic function is normal.  · There is moderate aortic valve stenosis.  · EUNICE is 1.19 cm2; peak velocity is 4.04 m/s; mean gradient is 32.58 mmHg.  · Mild regurgitation is present in the aortic valve..  · Mitral valve shows mild regurgitation.  · The estimated PA systolic pressure is 33.40 mm Hg      CURRENT/PREVIOUS VISIT EKG  Results for orders placed or performed during the hospital encounter of 01/09/24   EKG 12-lead    Collection Time: 01/10/24  1:55 PM    Narrative    Test Reason : R06.02,    Vent. Rate : 104 BPM     Atrial Rate : 104 BPM     P-R Int : 142 ms          QRS Dur : 086 ms      QT Int : 352 ms       P-R-T Axes : 111 -07 104 degrees     QTc Int : 462 ms    Sinus tachycardia  Possible Lateral infarct ,age undetermined  Inferior injury pattern    ACUTE MI / STEMI    Consider right ventricular involvement in acute inferior infarct  Abnormal ECG  When compared with ECG of 06-OCT-2022 08:09,  Borderline criteria for Lateral infarct are now Present  ST now depressed in Lateral leads  Nonspecific T wave abnormality no longer evident in Inferior leads  Inverted T waves have replaced nonspecific T wave abnormality in Lateral  leads    Referred By: EVERETT SABILLON           Confirmed By:            ASSESSMENT/PLAN:     Active Hospital Problems    Diagnosis    *Coag negative Staphylococcus bacteremia    Elevated troponin    Elevated d-dimer, PE ruled out    Leukocytosis    Stage 3a chronic kidney disease    SOB (shortness of breath)    Moderate aortic stenosis    RA (rheumatoid arthritis)    Mixed hyperlipidemia       ASSESSMENT & PLAN:     Dyspnea on exertion  COPD exacerbation  Elevated troponin HS- resolved  Aortic Stenosis  Hypertension  Elevated D-Dimer- negative for PE  Hyperlipidemia  Leukocytosis  Chronic Anemia    RECOMMENDATIONS:    Dyspnea improved.  Denies CP.  Troponins normal now.  BNP normal.  ECHO this admission shows Aortic stenosis is moderate to severe with no significant change from previous  echo.  Blood cultures + staph.  ID following.  Request for CADY for further evaluation.  Will perform CADY next am. Risks of CADY were discussed with patient the risks include but not limited to oropharyngeal trauma, dental trauma, trauma to the esophagus, possible aspiration, and reaction to anesthesia. Patient is agreeable to proceeding with CADY next am.      Anitha Harmon NP  Department of Cardiology  Date of Service: 01/11/2024

## 2024-01-11 NOTE — ASSESSMENT & PLAN NOTE
Repeat TTE showed EF 65-70%, grade 1 diastolic dysfunction.  Moderate to severe aortic stenosis.  Per Cardiology no significant progression compared to prior echocardiogram   Cardiology consulted for CADY to evaluate for possible vegetation given positive blood cultures

## 2024-01-11 NOTE — CARE UPDATE
01/11/24 0724   Patient Assessment/Suction   Level of Consciousness (AVPU) alert   Respiratory Effort Normal;Unlabored   All Lung Fields Breath Sounds Anterior:;Lateral:;coarse   Cough Type no productive sputum   PRE-TX-O2   Device (Oxygen Therapy) room air   SpO2 96 %   Pulse Oximetry Type Intermittent   $ Pulse Oximetry - Multiple Charge Pulse Oximetry - Multiple   Pulse 100   Resp 16   Aerosol Therapy   $ Aerosol Therapy Charges Aerosol Treatment   Daily Review of Necessity (SVN) completed   Respiratory Treatment Status (SVN) given   Treatment Route (SVN) mask;oxygen   Patient Position (SVN) HOB elevated   Post Treatment Assessment (SVN) breath sounds improved   Signs of Intolerance (SVN) none   Breath Sounds Post-Respiratory Treatment   Throughout All Fields Post-Treatment All Fields   Throughout All Fields Post-Treatment aeration increased   Post-treatment Heart Rate (beats/min) 90   Post-treatment Resp Rate (breaths/min) 16

## 2024-01-11 NOTE — ASSESSMENT & PLAN NOTE
Echo 2022 Summary    The left ventricle is normal in size with concentric hypertrophy and normal systolic function.  Moderate left atrial enlargement.  Grade I left ventricular diastolic dysfunction.  The estimated ejection fraction is 60%.  Normal right ventricular size with normal right ventricular systolic function.  There is moderate-to-severe aortic valve stenosis.  Aortic valve area is 1.35 cm2; peak velocity is 4.49 m/s; mean gradient is 47 mmHg.  Moderate aortic regurgitation.  Mild tricuspid regurgitation.  Intermediate central venous pressure (8 mmHg).  The estimated PA systolic pressure is 35 mmHg.  The ascending aorta is mildly dilated.    Shortness of breath likely secondary to COPD exacerbation  -cont duonebs   On RA

## 2024-01-11 NOTE — NURSING
RN Navigator met with patient and his wife Jacqueline,at bedside to discuss scheduling tcc/hospital follow up appt. upon discharge. Pt is A&Ox4 and sitting up in bed w/hob elevated.    Pt is agreeable to schedule hospital follow up appt at [facility]. RN Navigator informed pt of scheduled appointment Date: 1/17 Time: 1:30 p.m.  and patient reminded to bring portable home O2 if used, as well as all medication bottles to Discharge Clinic follow up appointment.  Discharge Clinic information handout, appointment letter and folder provided to patient.  Pt able to drive himself to medical appts, and wife able to drive also. Pt. reminded to call . [facility] Contact number, [phone], with any questions or if appiontment needs to be rescheduled.

## 2024-01-12 ENCOUNTER — ANESTHESIA (OUTPATIENT)
Dept: CARDIOLOGY | Facility: HOSPITAL | Age: 74
DRG: 191 | End: 2024-01-12
Payer: MEDICARE

## 2024-01-12 ENCOUNTER — ANESTHESIA EVENT (OUTPATIENT)
Dept: CARDIOLOGY | Facility: HOSPITAL | Age: 74
DRG: 191 | End: 2024-01-12
Payer: MEDICARE

## 2024-01-12 ENCOUNTER — CLINICAL SUPPORT (OUTPATIENT)
Dept: CARDIOLOGY | Facility: HOSPITAL | Age: 74
DRG: 191 | End: 2024-01-12
Attending: INTERNAL MEDICINE
Payer: MEDICARE

## 2024-01-12 LAB
ALBUMIN SERPL BCP-MCNC: 3.5 G/DL (ref 3.5–5.2)
ALP SERPL-CCNC: 42 U/L (ref 55–135)
ALT SERPL W/O P-5'-P-CCNC: 10 U/L (ref 10–44)
ANION GAP SERPL CALC-SCNC: 5 MMOL/L (ref 8–16)
AST SERPL-CCNC: 15 U/L (ref 10–40)
BASOPHILS # BLD AUTO: 0.01 K/UL (ref 0–0.2)
BASOPHILS NFR BLD: 0.1 % (ref 0–1.9)
BILIRUB SERPL-MCNC: 0.5 MG/DL (ref 0.1–1)
BUN SERPL-MCNC: 16 MG/DL (ref 8–23)
CALCIUM SERPL-MCNC: 8.4 MG/DL (ref 8.7–10.5)
CHLORIDE SERPL-SCNC: 105 MMOL/L (ref 95–110)
CK SERPL-CCNC: 77 U/L (ref 20–200)
CO2 SERPL-SCNC: 27 MMOL/L (ref 23–29)
CREAT SERPL-MCNC: 1.1 MG/DL (ref 0.5–1.4)
CRP SERPL-MCNC: 14.2 MG/L (ref 0–8.2)
DIFFERENTIAL METHOD BLD: ABNORMAL
EOSINOPHIL # BLD AUTO: 0 K/UL (ref 0–0.5)
EOSINOPHIL NFR BLD: 0 % (ref 0–8)
ERYTHROCYTE [DISTWIDTH] IN BLOOD BY AUTOMATED COUNT: 14.3 % (ref 11.5–14.5)
EST. GFR  (NO RACE VARIABLE): >60 ML/MIN/1.73 M^2
FERRITIN SERPL-MCNC: 303 NG/ML (ref 20–300)
FOLATE SERPL-MCNC: 8.6 NG/ML (ref 4–24)
GLUCOSE SERPL-MCNC: 123 MG/DL (ref 70–110)
HCT VFR BLD AUTO: 25.5 % (ref 40–54)
HGB BLD-MCNC: 7.9 G/DL (ref 14–18)
IMM GRANULOCYTES # BLD AUTO: 0.15 K/UL (ref 0–0.04)
IMM GRANULOCYTES NFR BLD AUTO: 0.9 % (ref 0–0.5)
IRON SERPL-MCNC: 100 UG/DL (ref 45–160)
LDH SERPL L TO P-CCNC: 232 U/L (ref 110–260)
LYMPHOCYTES # BLD AUTO: 1.9 K/UL (ref 1–4.8)
LYMPHOCYTES NFR BLD: 11 % (ref 18–48)
MCH RBC QN AUTO: 28.2 PG (ref 27–31)
MCHC RBC AUTO-ENTMCNC: 31 G/DL (ref 32–36)
MCV RBC AUTO: 91 FL (ref 82–98)
MONOCYTES # BLD AUTO: 1.8 K/UL (ref 0.3–1)
MONOCYTES NFR BLD: 10.4 % (ref 4–15)
NEUTROPHILS # BLD AUTO: 13.4 K/UL (ref 1.8–7.7)
NEUTROPHILS NFR BLD: 77.6 % (ref 38–73)
NRBC BLD-RTO: 0 /100 WBC
PLATELET # BLD AUTO: 146 K/UL (ref 150–450)
PMV BLD AUTO: 12.4 FL (ref 9.2–12.9)
POTASSIUM SERPL-SCNC: 4 MMOL/L (ref 3.5–5.1)
PROT SERPL-MCNC: 6.1 G/DL (ref 6–8.4)
RBC # BLD AUTO: 2.8 M/UL (ref 4.6–6.2)
RETICS/RBC NFR AUTO: 2.7 % (ref 0.4–2)
SATURATED IRON: 47 % (ref 20–50)
SODIUM SERPL-SCNC: 137 MMOL/L (ref 136–145)
TOTAL IRON BINDING CAPACITY: 214 UG/DL (ref 250–450)
TRANSFERRIN SERPL-MCNC: 153 MG/DL (ref 200–375)
VIT B12 SERPL-MCNC: 652 PG/ML (ref 210–950)
WBC # BLD AUTO: 17.29 K/UL (ref 3.9–12.7)

## 2024-01-12 PROCEDURE — 94640 AIRWAY INHALATION TREATMENT: CPT

## 2024-01-12 PROCEDURE — D9220A PRA ANESTHESIA: Mod: ANES,,, | Performed by: ANESTHESIOLOGY

## 2024-01-12 PROCEDURE — 93325 DOPPLER ECHO COLOR FLOW MAPG: CPT | Mod: 26,,, | Performed by: STUDENT IN AN ORGANIZED HEALTH CARE EDUCATION/TRAINING PROGRAM

## 2024-01-12 PROCEDURE — D9220A PRA ANESTHESIA: Mod: CRNA,,, | Performed by: NURSE ANESTHETIST, CERTIFIED REGISTERED

## 2024-01-12 PROCEDURE — 82728 ASSAY OF FERRITIN: CPT | Performed by: STUDENT IN AN ORGANIZED HEALTH CARE EDUCATION/TRAINING PROGRAM

## 2024-01-12 PROCEDURE — 37000009 HC ANESTHESIA EA ADD 15 MINS: Performed by: STUDENT IN AN ORGANIZED HEALTH CARE EDUCATION/TRAINING PROGRAM

## 2024-01-12 PROCEDURE — 63600175 PHARM REV CODE 636 W HCPCS: Performed by: NURSE ANESTHETIST, CERTIFIED REGISTERED

## 2024-01-12 PROCEDURE — 63600175 PHARM REV CODE 636 W HCPCS: Performed by: INTERNAL MEDICINE

## 2024-01-12 PROCEDURE — 99233 SBSQ HOSP IP/OBS HIGH 50: CPT | Mod: ,,, | Performed by: STUDENT IN AN ORGANIZED HEALTH CARE EDUCATION/TRAINING PROGRAM

## 2024-01-12 PROCEDURE — 25000003 PHARM REV CODE 250: Performed by: STUDENT IN AN ORGANIZED HEALTH CARE EDUCATION/TRAINING PROGRAM

## 2024-01-12 PROCEDURE — 25000003 PHARM REV CODE 250: Performed by: NURSE PRACTITIONER

## 2024-01-12 PROCEDURE — 99900031 HC PATIENT EDUCATION (STAT)

## 2024-01-12 PROCEDURE — 82746 ASSAY OF FOLIC ACID SERUM: CPT | Performed by: STUDENT IN AN ORGANIZED HEALTH CARE EDUCATION/TRAINING PROGRAM

## 2024-01-12 PROCEDURE — 99900035 HC TECH TIME PER 15 MIN (STAT)

## 2024-01-12 PROCEDURE — B245ZZ4 ULTRASONOGRAPHY OF LEFT HEART, TRANSESOPHAGEAL: ICD-10-PCS | Performed by: STUDENT IN AN ORGANIZED HEALTH CARE EDUCATION/TRAINING PROGRAM

## 2024-01-12 PROCEDURE — 93321 DOPPLER ECHO F-UP/LMTD STD: CPT | Mod: 26,,, | Performed by: STUDENT IN AN ORGANIZED HEALTH CARE EDUCATION/TRAINING PROGRAM

## 2024-01-12 PROCEDURE — 82607 VITAMIN B-12: CPT | Performed by: STUDENT IN AN ORGANIZED HEALTH CARE EDUCATION/TRAINING PROGRAM

## 2024-01-12 PROCEDURE — 85045 AUTOMATED RETICULOCYTE COUNT: CPT | Performed by: STUDENT IN AN ORGANIZED HEALTH CARE EDUCATION/TRAINING PROGRAM

## 2024-01-12 PROCEDURE — 83615 LACTATE (LD) (LDH) ENZYME: CPT | Performed by: STUDENT IN AN ORGANIZED HEALTH CARE EDUCATION/TRAINING PROGRAM

## 2024-01-12 PROCEDURE — 36415 COLL VENOUS BLD VENIPUNCTURE: CPT | Performed by: INTERNAL MEDICINE

## 2024-01-12 PROCEDURE — 83540 ASSAY OF IRON: CPT | Performed by: STUDENT IN AN ORGANIZED HEALTH CARE EDUCATION/TRAINING PROGRAM

## 2024-01-12 PROCEDURE — 63600175 PHARM REV CODE 636 W HCPCS: Performed by: STUDENT IN AN ORGANIZED HEALTH CARE EDUCATION/TRAINING PROGRAM

## 2024-01-12 PROCEDURE — 80053 COMPREHEN METABOLIC PANEL: CPT | Performed by: INTERNAL MEDICINE

## 2024-01-12 PROCEDURE — 25000242 PHARM REV CODE 250 ALT 637 W/ HCPCS: Performed by: STUDENT IN AN ORGANIZED HEALTH CARE EDUCATION/TRAINING PROGRAM

## 2024-01-12 PROCEDURE — 94761 N-INVAS EAR/PLS OXIMETRY MLT: CPT

## 2024-01-12 PROCEDURE — 93312 ECHO TRANSESOPHAGEAL: CPT | Mod: 26,,, | Performed by: STUDENT IN AN ORGANIZED HEALTH CARE EDUCATION/TRAINING PROGRAM

## 2024-01-12 PROCEDURE — 83010 ASSAY OF HAPTOGLOBIN QUANT: CPT | Performed by: STUDENT IN AN ORGANIZED HEALTH CARE EDUCATION/TRAINING PROGRAM

## 2024-01-12 PROCEDURE — 85025 COMPLETE CBC W/AUTO DIFF WBC: CPT | Performed by: INTERNAL MEDICINE

## 2024-01-12 PROCEDURE — C9113 INJ PANTOPRAZOLE SODIUM, VIA: HCPCS | Performed by: INTERNAL MEDICINE

## 2024-01-12 PROCEDURE — 82550 ASSAY OF CK (CPK): CPT | Performed by: NURSE PRACTITIONER

## 2024-01-12 PROCEDURE — 36415 COLL VENOUS BLD VENIPUNCTURE: CPT | Performed by: STUDENT IN AN ORGANIZED HEALTH CARE EDUCATION/TRAINING PROGRAM

## 2024-01-12 PROCEDURE — 93321 DOPPLER ECHO F-UP/LMTD STD: CPT

## 2024-01-12 PROCEDURE — 37000008 HC ANESTHESIA 1ST 15 MINUTES: Performed by: STUDENT IN AN ORGANIZED HEALTH CARE EDUCATION/TRAINING PROGRAM

## 2024-01-12 PROCEDURE — A4216 STERILE WATER/SALINE, 10 ML: HCPCS | Performed by: INTERNAL MEDICINE

## 2024-01-12 PROCEDURE — 25000003 PHARM REV CODE 250: Performed by: INTERNAL MEDICINE

## 2024-01-12 PROCEDURE — 21400001 HC TELEMETRY ROOM

## 2024-01-12 RX ORDER — PROPOFOL 10 MG/ML
VIAL (ML) INTRAVENOUS
Status: DISCONTINUED | OUTPATIENT
Start: 2024-01-12 | End: 2024-01-12

## 2024-01-12 RX ADMIN — PROPOFOL 50 MG: 10 INJECTION, EMULSION INTRAVENOUS at 12:01

## 2024-01-12 RX ADMIN — CHLORHEXIDINE GLUCONATE 15 ML: 1.2 RINSE ORAL at 02:01

## 2024-01-12 RX ADMIN — HYDRALAZINE HYDROCHLORIDE 100 MG: 25 TABLET ORAL at 09:01

## 2024-01-12 RX ADMIN — ATORVASTATIN CALCIUM 20 MG: 20 TABLET, FILM COATED ORAL at 02:01

## 2024-01-12 RX ADMIN — PANTOPRAZOLE SODIUM 40 MG: 40 INJECTION, POWDER, FOR SOLUTION INTRAVENOUS at 02:01

## 2024-01-12 RX ADMIN — VANCOMYCIN HYDROCHLORIDE 1750 MG: 1 INJECTION, POWDER, LYOPHILIZED, FOR SOLUTION INTRAVENOUS at 02:01

## 2024-01-12 RX ADMIN — PROPOFOL 80 MG: 10 INJECTION, EMULSION INTRAVENOUS at 11:01

## 2024-01-12 RX ADMIN — IPRATROPIUM BROMIDE AND ALBUTEROL SULFATE 3 ML: 2.5; .5 SOLUTION RESPIRATORY (INHALATION) at 07:01

## 2024-01-12 RX ADMIN — PROPOFOL 50 MG: 10 INJECTION, EMULSION INTRAVENOUS at 11:01

## 2024-01-12 RX ADMIN — HEPARIN SODIUM 5000 UNITS: 5000 INJECTION, SOLUTION INTRAVENOUS; SUBCUTANEOUS at 02:01

## 2024-01-12 RX ADMIN — PREDNISONE 10 MG: 5 TABLET ORAL at 02:01

## 2024-01-12 RX ADMIN — IPRATROPIUM BROMIDE AND ALBUTEROL SULFATE 3 ML: 2.5; .5 SOLUTION RESPIRATORY (INHALATION) at 01:01

## 2024-01-12 RX ADMIN — TIZANIDINE 4 MG: 4 TABLET ORAL at 09:01

## 2024-01-12 RX ADMIN — HEPARIN SODIUM 5000 UNITS: 5000 INJECTION, SOLUTION INTRAVENOUS; SUBCUTANEOUS at 09:01

## 2024-01-12 RX ADMIN — SODIUM CHLORIDE 10 ML: 9 INJECTION INTRAMUSCULAR; INTRAVENOUS; SUBCUTANEOUS at 09:01

## 2024-01-12 RX ADMIN — HYDRALAZINE HYDROCHLORIDE 100 MG: 25 TABLET ORAL at 02:01

## 2024-01-12 RX ADMIN — MORPHINE SULFATE 4 MG: 4 INJECTION, SOLUTION INTRAMUSCULAR; INTRAVENOUS at 09:01

## 2024-01-12 RX ADMIN — DILTIAZEM HYDROCHLORIDE 180 MG: 180 CAPSULE, COATED, EXTENDED RELEASE ORAL at 02:01

## 2024-01-12 RX ADMIN — POLYETHYLENE GLYCOL 3350 17 G: 17 POWDER, FOR SOLUTION ORAL at 02:01

## 2024-01-12 RX ADMIN — TIZANIDINE 4 MG: 4 TABLET ORAL at 02:01

## 2024-01-12 RX ADMIN — SODIUM CHLORIDE, SODIUM LACTATE, POTASSIUM CHLORIDE, AND CALCIUM CHLORIDE: .6; .31; .03; .02 INJECTION, SOLUTION INTRAVENOUS at 11:01

## 2024-01-12 RX ADMIN — CHLORHEXIDINE GLUCONATE 15 ML: 1.2 RINSE ORAL at 09:01

## 2024-01-12 RX ADMIN — HYDROCODONE BITARTRATE AND ACETAMINOPHEN 2 TABLET: 5; 325 TABLET ORAL at 07:01

## 2024-01-12 NOTE — PROGRESS NOTES
[facility]  Department of Infectious Disease  Progress Note        PATIENT NAME: Scooter Swan  MRN: [id number]  TODAY'S DATE: 01/12/2024  ADMIT DATE: 1/9/2024    PRINCIPLE PROBLEM: Coag negative Staphylococcus bacteremia    INTERVAL HISTORY      1/12 @ 11:28:  Interim reviewed, patient seen before CADY today, he is a little somnolent, complaining of generalized weakness, fatigue, bilateral wrist, shoulder pain.  Discussed with Cardiology, preliminary CADY report with out evidence of vegetations although all cardiac valves are calcified.  Stable, afebrile, adequate urinary output, had one bowel movement yesterday.  Labs reviewed, leukocytosis down to 17 2, left shift 77 6%, H&H 7.9/25, platelet count 146, thrombocytopenia.  Creatinine 1.1, normal LFTs, CRP yesterday 14.2, slightly high, procalcitonin negative.  MRSA nasal swab negative.  Repeat blood cultures x2 sets no growth to date, pending final.  Awaiting sensitivities of Staph lugdunensis, results available over the weekend.       Antibiotics (From admission, onward)      Start     Stop Route Frequency Ordered    01/12/24 1230  vancomycin (VANCOCIN) 1,750 mg in dextrose 5 % (D5W) 500 mL IVPB         -- IV Every 24 hours (non-standard times) 01/11/24 1154    01/11/24 1201  vancomycin - pharmacy to dose  (vancomycin IVPB (PEDS and ADULTS))        See Hyperspace for full Linked Orders Report.    -- IV pharmacy to manage frequency 01/11/24 1102          Antifungals (From admission, onward)      None             Antivirals (From admission, onward)      None            ASSESSMENT and PLAN     Staph lugdinensis (1/4 bottles) bacteremia rule out IE in the setting of very poor oral hygiene  Repeat blood 1/11 cultures x2 sets no growth to date, pending final   MRSA nasal swab negative   Echo with moderate to severe AS, not all valves visualized  CADY preliminary report with no evidence of vegetations, all cardiac valves with calcifications  Procal negative  CRP  14    Moderate to severe aortic stenosis    Psoriatic arthritis, rheumatoid arthritis on daily 10 mg prednisone    PMHx: BPH, anemia, thrombocytopenia, GERD, hypertension, hyperlipidemia and COPD and a history of Hep C treated with Harvoni       Recommendations:    Follow official CADY report   Follow repeat blood cultures   Continue vancomycin IV, keep level 15-20   Follow Staph lugdunensis sensitivities available over the weekend   Anticipating PICC line to be placed on Monday 1/15 to continue IV antibiotics for at least 6 weeks given CADY findings  Monitor WBC  Aspiration precautions   Pain management as per hospitalist     Dr. Gonzalez will follow over the weekend    D/w patient, Dr Rabago, Dr Slaughter       SUBJECTIVE        Review of Systems  Review of systems obtained and negative except as stated above in Interval History       OBJECTIVE     Temp:  [98.2 °F (36.8 °C)-98.6 °F (37 °C)] 98.2 °F (36.8 °C)  Pulse:  [] 82  Resp:  [14-26] 26  SpO2:  [94 %-100 %] 96 %  BP: (107-147)/(58-84) 126/61    Physical Exam    General: Pleasant elderly male, lying in bed, awaiting CADY, breathing comfortable on room air, reports feeling tired today  Eyes: Eyes with no icterus or injection. Vision grossly normal.  Arcus senilis.  Ears: Hearing grossly normal.  Nose: Nares patent  Mouth: Moist mucous membranes, dentition is poor with missing teeth. No ulcerations, erythema or exudates.  Neck: Supple, no tenderness to palpation.  Cardiovascular: Regular rate and rhythm, + harsh murmur, no edema.    Respiratory:  Clear to auscultation bilaterally anterior and posterior, no tachypnea or increased work of breathing.  On room air.  Gastrointestinal:  Soft and obese with active bowel sounds, no tenderness to palpation, no distention.  Genitourinary:  No suprapubic tenderness.  Musculoskeletal:  Moves all extremities with equal strength.    Skin:  Warm and dry, light brown patches to anterior chest. Clubbing b/l   Neuro:   Oriented,  "conversant, follows commands.  Psych: Good mood, normal affect.  VAD: PIV  Isolation: None    WOUNDS: none  VAD: PIV  ISOLATION:  None    CBC LAST 7 DAYS  Recent Labs   Lab 01/10/24  0408 01/11/24  0452 01/12/24  0358   WBC 16.93* 23.62* 17.29*   RBC 3.19* 2.89* 2.80*   HGB 9.1* 8.3* 7.9*   HCT 29.0* 26.4* 25.5*   MCV 91 91 91   MCH 28.5 28.7 28.2   MCHC 31.4* 31.4* 31.0*   RDW 14.2 14.3 14.3    165 146*   MPV 12.4 12.0 12.4   GRAN 89.4*  15.1* 82.5*  19.5* 77.6*  13.4*   LYMPH 6.9*  1.2 8.5*  2.0 11.0*  1.9   MONO 2.7*  0.5 8.1  1.9* 10.4  1.8*   BASO 0.02 0.02 0.01   NRBC 0 0 0       CHEMISTRY LAST 7 DAYS  Recent Labs   Lab 01/10/24  0408 01/11/24  0452 01/12/24  0358    137 137   K 4.9 4.7 4.0    106 105   CO2 25 23 27   ANIONGAP 8 8 5*   BUN 20 24* 16   CREATININE 1.1 1.3 1.1   * 131* 123*   CALCIUM 9.0 9.0 8.4*   ALBUMIN 3.8 3.5 3.5   PROT 6.6 6.3 6.1   ALKPHOS 51* 44* 42*   ALT 10 12 10   AST 14 18 15   BILITOT 0.5 0.5 0.5       Estimated Creatinine Clearance: 64.7 mL/min (based on SCr of 1.1 mg/dL).    INFLAMMATORY/PROCAL  LAST 7 DAYS  Recent Labs   Lab 01/11/24  1636   PROCAL 0.086   CRP 14.2*     No results found for: "ESR"  CRP   Date Value Ref Range Status   01/11/2024 14.2 (H) 0.0 - 8.2 mg/L Final   10/17/2023 6.8 0.0 - 8.2 mg/L Final   02/16/2023 7.1 0.0 - 8.2 mg/L Final   05/13/2022 12.6 (H) 0.0 - 8.2 mg/L Final   10/09/2020 2.0 0.0 - 8.2 mg/L Final   07/09/2020 7.5 0.0 - 8.2 mg/L Final   01/24/2020 5.8 0.0 - 8.2 mg/L Final         LAST 7 DAYS MICROBIOLOGY   Microbiology Results (last 7 days)       Procedure Component Value Units Date/Time    Blood culture [[id number]] Collected: 01/11/24 1109    Order Status: Completed Specimen: Blood from Antecubital, Right Arm Updated: 01/12/24 1232     Blood Culture, Routine No Growth to date      No Growth to date    Blood culture [[id number]] Collected: 01/10/24 0903    Order Status: Completed Specimen: Blood from Arm Updated: " 01/12/24 1032     Blood Culture, Routine No Growth to date      No Growth to date      No Growth to date    Blood culture [[id number]]  (Abnormal) Collected: 01/10/24 0904    Order Status: Completed Specimen: Blood from Arm Updated: 01/12/24 0943     Blood Culture, Routine Gram stain aer bottle: Gram positive cocci      Results called to and read back by: SHAJI DOUGLASS.      01/11/2024  06:05 TGC      STAPHYLOCOCCUS LUGDUNENSIS  susceptibility pending       Comment: Previous comment was modified by CD3 at 09:43 on 01/12/2024 Organism   is a probable contaminant         Blood culture [[id number]] Collected: 01/11/24 1645    Order Status: Completed Specimen: Blood from Peripheral, Left Hand Updated: 01/11/24 2317     Blood Culture, Routine No Growth to date    Narrative:      Collection has been rescheduled by ARSH at 01/11/2024 15:32 Reason:   Patient unavailable. Patient going into X-ray.  Collection has been rescheduled by ARSH at 01/11/2024 15:32 Reason:   Patient unavailable. Patient going into X-ray.    MRSA Screen by PCR [[id number]] Collected: 01/11/24 1642    Order Status: Completed Specimen: Nasopharyngeal Swab from Nasal Updated: 01/11/24 1901     MRSA SCREEN BY PCR Negative    Culture, Respiratory with Gram Stain [[id number]]     Order Status: No result Specimen: Respiratory     Rapid Organism ID by PCR (from Blood culture) [[id number]]  (Abnormal) Collected: 01/10/24 0904    Order Status: Completed Updated: 01/11/24 0803     Enterococcus faecalis Not Detected     Enterococcus faecium Not Detected     Listeria monocytogenes Not Detected     Staphylococcus spp. See species for ID     Staphylococcus aureus Not Detected     Staphylococcus epidermidis Not Detected     Staphylococcus lugdunensis Detected     Streptococcus species Not Detected     Streptococcus agalactiae Not Detected     Streptococcus pneumoniae Not Detected     Streptococcus pyogenes Not Detected     Acinetobacter  calcoaceticus/baumannii complex Not Detected     Bacteroides fragilis Not Detected     Enterobacterales Not Detected     Enterobacter cloacae complex Not Detected     Escherichia coli Not Detected     Klebsiella aerogenes Not Detected     Klebsiella oxytoca Not Detected     Klebsiella pneumoniae group Not Detected     Proteus Not Detected     Salmonella sp Not Detected     Serratia marcescens Not Detected     Haemophilus influenzae Not Detected     Neisseria meningtidis Not Detected     Pseudomonas aeruginosa Not Detected     Stenotrophomonas maltophilia Not Detected     Candida albicans Not Detected     Candida auris Not Detected     Candida glabrata Not Detected     Candida krusei Not Detected     Candida parapsilosis Not Detected     Candida tropicalis Not Detected     Cryptococcus neoformans/gattii Not Detected     CTX-M (ESBL ) Test not applicable     IMP (Carbapenem resistant) Test not applicable     KPC resistance gene (Carbapenem resistant) Test not applicable     mcr-1  Test not applicable     mec A/C  Not Detected     mec A/C and MREJ (MRSA) gene Test not applicable     NDM (Carbapenem resistant) Test not applicable     OXA-48-like (Carbapenem resistant) Test not applicable     van A/B (VRE gene) Test not applicable     VIM (Carbapenem resistant) Test not applicable    Respiratory Infection Panel (PCR), Nasopharyngeal [[id number]] Collected: 01/10/24 1047    Order Status: Completed Specimen: Nasopharyngeal Swab Updated: 01/10/24 1222     Respiratory Infection Panel Source NP swab     Adenovirus Not Detected     Coronavirus 229E, Common Cold Virus Not Detected     Coronavirus HKU1, Common Cold Virus Not Detected     Coronavirus NL63, Common Cold Virus Not Detected     Coronavirus OC43, Common Cold Virus Not Detected     Comment: Coronavirus strains 229E, HKU1, NL63, and OC43 can cause the common   cold   and are not associated with the respiratory disease outbreak caused   by  the COVID-19  (SARS-CoV-2 novel Coronavirus) strain.           SARS-CoV2 (COVID-19) Qualitative PCR Not Detected     Human Metapneumovirus Not Detected     Human Rhinovirus/Enterovirus Not Detected     Influenza A (subtypes H1, H1-2009,H3) Not Detected     Influenza B Not Detected     Parainfluenza Virus 1 Not Detected     Parainfluenza Virus 2 Not Detected     Parainfluenza Virus 3 Not Detected     Parainfluenza Virus 4 Not Detected     Respiratory Syncytial Virus Not Detected     Bordetella Parapertussis (UG6399) Not Detected     Bordetella pertussis (ptxP) Not Detected     Chlamydia pneumoniae Not Detected     Mycoplasma pneumoniae Not Detected     Comment: Respiratory Infection Panel testing performed by Multiplex PCR.       Narrative:      Respiratory Infection Panel source->NP Swab            SUSCEPTIBILITY  Susceptibility data from last 90 days.  Collected Specimen Info Organism   01/10/24 Blood from Arm Staphylococcus enrico       CURRENT/PREVIOUS VISIT EKG  Results for orders placed or performed during the hospital encounter of 01/09/24   EKG 12-lead    Collection Time: 01/10/24  1:55 PM    Narrative    Test Reason : R06.02,    Vent. Rate : 104 BPM     Atrial Rate : 104 BPM     P-R Int : 142 ms          QRS Dur : 086 ms      QT Int : 352 ms       P-R-T Axes : 111 -07 104 degrees     QTc Int : 462 ms    Sinus tachycardia  Possible Lateral infarct ,age undetermined  Inferior injury pattern    ACUTE MI / STEMI    Consider right ventricular involvement in acute inferior infarct  Abnormal ECG  When compared with ECG of 06-OCT-2022 08:09,  Borderline criteria for Lateral infarct are now Present  ST now depressed in Lateral leads  Nonspecific T wave abnormality no longer evident in Inferior leads  Inverted T waves have replaced nonspecific T wave abnormality in Lateral  leads    Referred By: EVERETT SABILLON           Confirmed By:        Significant Labs: All pertinent labs within the past 24 hours have been reviewed.      Significant Imaging: I have reviewed all relevant and available imaging results/findings within the past 24 hours.    ECHO 1/10/24:    Left Ventricle: Moderately increased wall thickness. There is concentric hypertrophy. There is normal systolic function with a visually estimated ejection fraction of 65 - 70%. Grade I diastolic dysfunction. E/A ratio is 0.56.    Aortic Valve: There is aortic valve sclerosis. There is moderate to severe stenosis. Aortic valve area by VTI is 1.32 cm². Aortic valve peak velocity is 3.68 m/s. Mean gradient is 32 mmHg. Aortic Valve peak gradient is 54 mmHg. The dimensionless index is 0.38. There is mild aortic regurgitation.    Right Ventricle: Right ventricle was not assessed.    Right Atrium: Not assessed.    Mitral Valve: There is anterior leaflet sclerosis.    Tricuspid Valve: Not assessed. There is mild regurgitation.    Overall the study quality was technically difficult. The study was difficult due to patient's body habitus    CXR: Normal chest.     LE venous US: No DVT of the bilateral lower extremity veins.       Denisse Palm MD  Date of Service: 01/12/2024

## 2024-01-12 NOTE — ASSESSMENT & PLAN NOTE
Blood cultures Staphylococcus lugdunensis   Vancomycin started   Infectious disease consulted  Repeat blood cultures   Plan for PICC line placement on Monday and we will anticipate 6 weeks of IV antibiotics.

## 2024-01-12 NOTE — PLAN OF CARE
Problem: Adult Inpatient Plan of Care  Goal: Plan of Care Review  Outcome: Ongoing, Progressing  Flowsheets (Taken 1/11/2024 1807)  Plan of Care Reviewed With: patient  Goal: Optimal Comfort and Wellbeing  Outcome: Ongoing, Progressing  Intervention: Provide Person-Centered Care  Flowsheets (Taken 1/11/2024 1807)  Trust Relationship/Rapport:   care explained   choices provided   thoughts/feelings acknowledged   reassurance provided  Goal: Readiness for Transition of Care  Outcome: Ongoing, Progressing     Problem: Fall Injury Risk  Goal: Absence of Fall and Fall-Related Injury  Outcome: Ongoing, Progressing

## 2024-01-12 NOTE — ANESTHESIA PREPROCEDURE EVALUATION
01/12/2024  Scooter Swan is a 73 y.o., male.    Results for orders placed or performed during the hospital encounter of 01/09/24   EKG 12-lead    Collection Time: 01/10/24  1:55 PM    Narrative    Test Reason : R06.02,    Vent. Rate : 104 BPM     Atrial Rate : 104 BPM     P-R Int : 142 ms          QRS Dur : 086 ms      QT Int : 352 ms       P-R-T Axes : 111 -07 104 degrees     QTc Int : 462 ms    Sinus tachycardia  Possible Lateral infarct ,age undetermined  Inferior injury pattern    ACUTE MI / STEMI    Consider right ventricular involvement in acute inferior infarct  Abnormal ECG  When compared with ECG of 06-OCT-2022 08:09,  Borderline criteria for Lateral infarct are now Present  ST now depressed in Lateral leads  Nonspecific T wave abnormality no longer evident in Inferior leads  Inverted T waves have replaced nonspecific T wave abnormality in Lateral  leads    Referred By: EVERETT SABILLON           Confirmed By:              Lab Results   Component Value Date    WBC 17.29 (H) 01/12/2024    HGB 7.9 (L) 01/12/2024    HCT 25.5 (L) 01/12/2024    MCV 91 01/12/2024     (L) 01/12/2024     BMP  Lab Results   Component Value Date     01/12/2024    K 4.0 01/12/2024     01/12/2024    CO2 27 01/12/2024    BUN 16 01/12/2024    CREATININE 1.1 01/12/2024    CALCIUM 8.4 (L) 01/12/2024    ANIONGAP 5 (L) 01/12/2024     (H) 01/12/2024     (H) 01/11/2024     (H) 01/10/2024       Results for orders placed during the hospital encounter of 01/09/24    Echo    Interpretation Summary    Left Ventricle: Moderately increased wall thickness. There is concentric hypertrophy. There is normal systolic function with a visually estimated ejection fraction of 65 - 70%. Grade I diastolic dysfunction. E/A ratio is 0.56.    Aortic Valve: There is aortic valve sclerosis. There is moderate to severe  stenosis. Aortic valve area by VTI is 1.32 cm². Aortic valve peak velocity is 3.68 m/s. Mean gradient is 32 mmHg. Aortic Valve peak gradient is 54 mmHg. The dimensionless index is 0.38. There is mild aortic regurgitation.    Right Ventricle: Right ventricle was not assessed.    Right Atrium: Not assessed.    Mitral Valve: There is anterior leaflet sclerosis.    Tricuspid Valve: Not assessed. There is mild regurgitation.    Overall the study quality was technically difficult. The study was difficult due to patient's body habitus         Pre-op Assessment    I have reviewed the Patient Summary Reports.     I have reviewed the Nursing Notes. I have reviewed the NPO Status.   I have reviewed the Medications.     Review of Systems  Anesthesia Hx:  No problems with previous Anesthesia             Denies Family Hx of Anesthesia complications.    Denies Personal Hx of Anesthesia complications.                    Social:  Non-Smoker, Alcohol Use       Hematology/Oncology:    Oncology Normal    -- Anemia:       --  Thrombocytopenia:                               EENT/Dental:  EENT/Dental Normal  Eyes: Visual Impairment   Has Bilateral and S/P Extraction - Bilateral Catarract        Eye Disease:    Glaucoma:                 Cardiovascular:     Hypertension, poorly controlled Valvular problems/Murmurs, AS, AI         PVD hyperlipidemia DUARTE  ECG has been reviewed. Coag negative Staphylococcus bacteremia    Aortic Stenosis (AS), moderate, severe , EUNICE(cm2) = 1.32. Aortic Regurgitation (AR), mild           Carotoid Artery Disease, bilateral               Pulmonary:   COPD, moderate   Shortness of breath   Home Albuterol Inhaler use 3-4 x per day     No home Oxygen Therapy     Duo Neb therapy in hospital     Oxygen saturation 98% prior to procedure    Chronic Obstructive Pulmonary Disease (COPD):   Emphysema                   Renal/:  Chronic Renal Disease, CKD        Kidney Function/Disease, Chronic Kidney Disease (CKD) , CKD  Stage III (GFR 30-59)            Hepatic/GI:     GERD, poorly controlled Liver Disease, Hepatitis, C        Liver Disease, Hepatitis, Viral Hepatitis Type C       Musculoskeletal:     Joint Disease:  Arthritis, Rheumatoid Arthritis     Spine Disorders: cervical and lumbar Chronic Pain           Neurological:  Neurology Normal              Rheumatoid Arthritis                           Endocrine:  Endocrine Normal            Dermatological:  Skin Normal    Psych:  Psychiatric Normal                    Physical Exam  General: Cooperative, Alert and Oriented    Airway:  Mallampati: III   Mouth Opening: Normal  TM Distance: > 6 cm  Tongue: Normal  Neck ROM: Extension Decreased    Dental:  Periodontal disease    Chest/Lungs:  Clear to auscultation, Normal Respiratory Rate    Heart:  Rate: Tachycardia  Rhythm: Regular Rhythm        Anesthesia Plan  Type of Anesthesia, risks & benefits discussed:    Anesthesia Type: Gen Natural Airway  Intra-op Monitoring Plan: Standard ASA Monitors  Induction:  IV  Informed Consent: Informed consent signed with the Patient and all parties understand the risks and agree with anesthesia plan.  All questions answered.   ASA Score: 4  Anesthesia Plan Notes:       GNA  POM  Propofol  Severe AS Precautions   Jeffrey-Synephrine and esmolol available  Monitor IVF Administration Closely     Ready For Surgery From Anesthesia Perspective.     .

## 2024-01-12 NOTE — RESPIRATORY THERAPY
01/11/24 1947   Patient Assessment/Suction   Level of Consciousness (AVPU) alert   Respiratory Effort Normal;Unlabored   Expansion/Accessory Muscles/Retractions no retractions;no use of accessory muscles   All Lung Fields Breath Sounds Anterior:;Posterior:;Lateral:;equal bilaterally;diminished   Rhythm/Pattern, Respiratory depth regular;pattern regular;unlabored   Cough Frequency no cough   PRE-TX-O2   Device (Oxygen Therapy) room air   SpO2 96 %   Pulse Oximetry Type Intermittent   $ Pulse Oximetry - Multiple Charge Pulse Oximetry - Multiple   Pulse 88   Resp 18   Aerosol Therapy   $ Aerosol Therapy Charges Aerosol Treatment   Daily Review of Necessity (SVN) completed   Respiratory Treatment Status (SVN) given   Treatment Route (SVN) oxygen;mask   Patient Position (SVN) HOB elevated   Post Treatment Assessment (SVN) breath sounds improved   Signs of Intolerance (SVN) none   Breath Sounds Post-Respiratory Treatment   Throughout All Fields Post-Treatment All Fields   Throughout All Fields Post-Treatment aeration increased   Post-treatment Heart Rate (beats/min) 96   Post-treatment Resp Rate (breaths/min) 18   Education   $ Education Bronchodilator;15 min

## 2024-01-12 NOTE — ANESTHESIA POSTPROCEDURE EVALUATION
Anesthesia Post Evaluation    Patient: Scooter Swan    Procedure(s) Performed: Procedure(s) (LRB):  Transesophageal echo (CADY) intra-procedure log documentation (N/A)    Final Anesthesia Type: general      Patient location: [facility].  Patient participation: Yes- Able to Participate  Level of consciousness: awake and alert, oriented and awake  Post-procedure vital signs: reviewed and stable  Pain management: adequate  Airway patency: patent    PONV status at discharge: No PONV  Anesthetic complications: no      Cardiovascular status: blood pressure returned to baseline, hemodynamically stable and stable  Respiratory status: unassisted, spontaneous ventilation and room air  Hydration status: euvolemic  Follow-up not needed.              Vitals Value Taken Time   /68 01/12/24 1230   Temp 98.2 01/12/24 1239   Pulse 87 01/12/24 1237   Resp 22 01/12/24 1237   SpO2 98 % 01/12/24 1237   Vitals shown include unvalidated device data.      No case tracking events are documented in the log.      Pain/Carisa Score: Pain Rating Prior to Med Admin: 10 (1/12/2024  9:01 AM)  Carisa Score: 10 (1/12/2024  9:54 AM)

## 2024-01-12 NOTE — PROCEDURES
Brief CADY note:    No evidence of vegetations to suggest endocarditis.     Patient tolerated the procedure well.    Full report to follow.

## 2024-01-12 NOTE — SUBJECTIVE & OBJECTIVE
Interval History:  Patient seen after CADY, reports some fatigue.  Hemoglobin this morning 7.9.  Per reports, no vegetation seen on CADY.  Repeat blood cultures showed no growth, blood cultures on 01/10 show Staphylococcus lugdunensis.    Review of Systems   Constitutional:  Negative for chills and diaphoresis.   Respiratory:  Negative for cough, choking and shortness of breath.    Cardiovascular:  Negative for chest pain.     Objective:     Vital Signs (Most Recent):  Temp: 98.1 °F (36.7 °C) (01/12/24 1512)  Pulse: 87 (01/12/24 1512)  Resp: 20 (01/12/24 1512)  BP: (!) 156/76 (01/12/24 1512)  SpO2: (!) 94 % (01/12/24 1512) Vital Signs (24h Range):  Temp:  [98.1 °F (36.7 °C)-98.6 °F (37 °C)] 98.1 °F (36.7 °C)  Pulse:  [82-93] 87  Resp:  [14-26] 20  SpO2:  [94 %-100 %] 94 %  BP: (107-156)/(58-84) 156/76     Weight: 80.7 kg (177 lb 14.6 oz)  Body mass index is 24.13 kg/m².    Intake/Output Summary (Last 24 hours) at 1/12/2024 1540  Last data filed at 1/12/2024 1211  Gross per 24 hour   Intake 400 ml   Output 103 ml   Net 297 ml         Physical Exam  Constitutional:       Appearance: Normal appearance.   Cardiovascular:      Rate and Rhythm: Normal rate and regular rhythm.      Heart sounds: Murmur heard.   Pulmonary:      Effort: Pulmonary effort is normal. No respiratory distress.      Comments: On RA   No wheezing appreciated   Abdominal:      General: Abdomen is flat. Bowel sounds are normal.   Skin:     General: Skin is warm.   Neurological:      General: No focal deficit present.      Mental Status: He is alert.   Psychiatric:         Mood and Affect: Mood normal.         Behavior: Behavior normal.             Significant Labs: All pertinent labs within the past 24 hours have been reviewed.  CBC:   Recent Labs   Lab 01/11/24 0452 01/12/24  0358   WBC 23.62* 17.29*   HGB 8.3* 7.9*   HCT 26.4* 25.5*    146*     CMP:   Recent Labs   Lab 01/11/24 0452 01/12/24  0358    137   K 4.7 4.0    105   CO2  23 27   * 123*   BUN 24* 16   CREATININE 1.3 1.1   CALCIUM 9.0 8.4*   PROT 6.3 6.1   ALBUMIN 3.5 3.5   BILITOT 0.5 0.5   ALKPHOS 44* 42*   AST 18 15   ALT 12 10   ANIONGAP 8 5*       Significant Imaging: I have reviewed all pertinent imaging results/findings within the past 24 hours.

## 2024-01-12 NOTE — CARE UPDATE
01/12/24 0143   Patient Assessment/Suction   Level of Consciousness (AVPU) alert   Respiratory Effort Normal;Unlabored   MICHAEL Breath Sounds diminished   LLL Breath Sounds diminished   RUL Breath Sounds clear   RML Breath Sounds clear;diminished   RLL Breath Sounds diminished   Rhythm/Pattern, Respiratory pattern regular;unlabored   Cough Frequency no cough   PRE-TX-O2   Device (Oxygen Therapy) room air   SpO2 97 %   Pulse Oximetry Type Intermittent   $ Pulse Oximetry - Multiple Charge Pulse Oximetry - Multiple   Education   $ Education Bronchodilator;15 min

## 2024-01-12 NOTE — CARE UPDATE
01/12/24 0719   Patient Assessment/Suction   Level of Consciousness (AVPU) alert   Respiratory Effort Normal;Unlabored   All Lung Fields Breath Sounds Anterior:;Lateral:;clear;diminished   Cough Type no productive sputum   PRE-TX-O2   Device (Oxygen Therapy) room air   SpO2 98 %   Pulse Oximetry Type Intermittent   $ Pulse Oximetry - Multiple Charge Pulse Oximetry - Multiple   Pulse 93   Resp 17   Aerosol Therapy   $ Aerosol Therapy Charges Aerosol Treatment   Daily Review of Necessity (SVN) completed   Respiratory Treatment Status (SVN) given   Treatment Route (SVN) mask;oxygen   Patient Position (SVN) HOB elevated   Post Treatment Assessment (SVN) breath sounds improved   Signs of Intolerance (SVN) none   Breath Sounds Post-Respiratory Treatment   Throughout All Fields Post-Treatment All Fields   Throughout All Fields Post-Treatment aeration increased   Post-treatment Heart Rate (beats/min) 95   Post-treatment Resp Rate (breaths/min) 17

## 2024-01-12 NOTE — ASSESSMENT & PLAN NOTE
Echo 2022 Summary    The left ventricle is normal in size with concentric hypertrophy and normal systolic function.  Moderate left atrial enlargement.  Grade I left ventricular diastolic dysfunction.  The estimated ejection fraction is 60%.  Normal right ventricular size with normal right ventricular systolic function.  There is moderate-to-severe aortic valve stenosis.  Aortic valve area is 1.35 cm2; peak velocity is 4.49 m/s; mean gradient is 47 mmHg.  Moderate aortic regurgitation.  Mild tricuspid regurgitation.  Intermediate central venous pressure (8 mmHg).  The estimated PA systolic pressure is 35 mmHg.  The ascending aorta is mildly dilated.    Shortness of breath likely secondary to COPD exacerbation  -cont duonebs   On RA  will need pulmonology follow up outpatient.

## 2024-01-12 NOTE — TRANSFER OF CARE
Anesthesia Transfer of Care Note    Patient: Scooter Swan    Procedure(s) Performed: Procedure(s) (LRB):  Transesophageal echo (CADY) intra-procedure log documentation (N/A)    Patient location: Other: heart center    Anesthesia Type: general    Transport from OR: Transported from OR on room air with adequate spontaneous ventilation    Post pain: adequate analgesia    Post assessment: no apparent anesthetic complications    Post vital signs: stable    Level of consciousness: awake    Nausea/Vomiting: no nausea/vomiting    Complications: none    Transfer of care protocol was followed    Last vitals: Visit Vitals  BP (!) 147/84 (BP Location: Right arm, Patient Position: Sitting)   Pulse 93   Temp 36.8 °C (98.2 °F) (Oral)   Resp 16   Ht 6' (1.829 m)   Wt 80.7 kg (177 lb 14.6 oz)   SpO2 98%   BMI 24.13 kg/m²

## 2024-01-12 NOTE — ASSESSMENT & PLAN NOTE
Repeat TTE showed EF 65-70%, grade 1 diastolic dysfunction.  Moderate to severe aortic stenosis.  Per Cardiology no significant progression compared to prior echocardiogram   Cardiology consulted for WINSTON to evaluate for possible vegetation given positive blood cultures, winston on 01/12 showed no vegetations.

## 2024-01-12 NOTE — PROGRESS NOTES
[facility]  Progress Note    Patient Name: Scooter Swan  MRN: [id number]  Patient Class: IP- Inpatient   Admission Date: 1/9/2024  Length of Stay: 3 days  Attending Physician: Austin Rabago MD  Primary Care Provider: Salvador Kennedy DO        Subjective:     Principal Problem:Coag negative Staphylococcus bacteremia        HPI:  73-year-old male with a history of seropositive rheumatoid arthritis, thrombocytopenia, anemia , BPH, COPD, gastroesophageal reflux disease, hypertension, aortic stenosis, and hyperlipidemia initially presented to the [facility] on January 9 with dyspnea, cough, and body aches.  He had negative respiratory virus panel, negative COVID, negative flu, and negative influenza swabs.  He left there and subsequently went to [facility].  He presented there with dyspnea and flu-like symptoms for 1 week.  He reported chest discomfort earlier in the day but none during his emergency department stay.  Labs included elevated D-dimer and mild leukocytosis along with mild elevation in troponin that trended down during his stay.  CTA of the chest had no evidence of PE, though there was an opacified bronchus in the left lower lobe that may be due to mucus. In the emergency department he received albuterol, aspirin, azithromycin, Rocephin, Norco, DuoNeb, and Solu-Medrol.  They are requesting transfer to Hospital Medicine at [facility] for further treatment of dyspnea along with evaluation of elevation in troponin/aortic stenosis.  With the history of aortic stenosis and elevated troponinis, case discussed with Cardiology at [facility], and they are available for consultation.     Sodium 141, potassium 3.6, chloride 106, CO2 24, BUN 15, creatinine 1.22, glucose 114, AST 16, ALT 11, high sensitivity troponin 31 with repeat 26 (normal range 2-19), BNP 73, D-dimer 18980, white blood cells 13.1, hemoglobin 10.4, hematocrit  32.3, platelets 192, influenza negative, COVID negative     CT chest for PE protocol noted patient motion artifact.  No evidence of pulmonary embolism.  No mediastinal or hilar adenopathy.  Thoracic aorta is normal caliber.  Heart size is normal.  Opacified bronchus in the left lower lobe may be due to mucus.  There a few bands of subsegmental atelectasis in the lingula.  No airspace consolidation.  2 cm thin walled air cyst is apparent in the right lower lobe.  0 pleural effusions.  Bones are intact.     October 2022: Echocardiogram had EF 60%.  Grade 1 diastolic dysfunction.  Moderate to severe aortic valve stenosis with aortic valve area 1.35 cm2, mean gradient 47 mmHg, peak velocity 4.49 M/S.     VS:  Temperature 98.1°, pulse 109, respirations 26, blood pressure 133/75, O2 sats 99%    In my encounter, patient complains of shortness a breath that has been occurring for the past 1 month.  Worse with any kind of activity.  Associated with a cough and congestion.  Denies any fever, chills, nausea, vomiting, abdominal pains, chest pains, or lower extremity edema.  He also states he was rheumatoid arthritis in his pain throughout his body and takes Norco 10 mg regularly.    Overview/Hospital Course:  Mr. Swan is a 73-year-old male with a history of seropositive rheumatoid arthritis, thrombocytopenia, anemia , BPH, COPD, gastroesophageal reflux disease, hypertension, aortic stenosis, and hyperlipidemia who presents w/SOB. He initially presented to [facility] but was transferred here for cardiology evaluation in setting of elevated troponins and aortic stenosis. CTPE was negative for PE but did note opacified bronchus likely 2/2 mucus. Troponin 31-->26 at OSH and stable here. Negative respiratory viral panel. Cardiology consulted and repeat echo ordered. Per cardiology SOB likely 2/2 COPD/emphysema. Duonebs ordered. TTE from October 2022: Echocardiogram had EF 60%.  Grade 1 diastolic dysfunction.  Moderate to severe  aortic valve stenosis with aortic valve area 1.35 cm2, mean gradient 47 mmHg, peak velocity 4.49 M/S.  Repeat echocardiogram (1/10/24) per Cardiology showed no significant progression compared to previous.  No further inpatient cardiac workup necessary, follow up with cardiologist outpatient.  Blood cultures showed Gram-positive cocci, PCR positive for Staphylococcus lugdunensis.  Infectious disease consulted, patient started on vancomycin.  Cardiology reconsulted for GLENN per Infectious Disease.  Glenn done on 01/12/2024 showed no evidence of vegetation.  Discussed with Infectious Disease given calcifications seen on GLENN, we will anticipate IV antibiotics for 6 weeks.  Follow up repeat cultures.       Interval History:  Patient seen after GLENN, reports some fatigue.  Hemoglobin this morning 7.9.  Per reports, no vegetation seen on GLENN.  Repeat blood cultures showed no growth, blood cultures on 01/10 show Staphylococcus lugdunensis.    Review of Systems   Constitutional:  Negative for chills and diaphoresis.   Respiratory:  Negative for cough, choking and shortness of breath.    Cardiovascular:  Negative for chest pain.     Objective:     Vital Signs (Most Recent):  Temp: 98.1 °F (36.7 °C) (01/12/24 1512)  Pulse: 87 (01/12/24 1512)  Resp: 20 (01/12/24 1512)  BP: (!) 156/76 (01/12/24 1512)  SpO2: (!) 94 % (01/12/24 1512) Vital Signs (24h Range):  Temp:  [98.1 °F (36.7 °C)-98.6 °F (37 °C)] 98.1 °F (36.7 °C)  Pulse:  [82-93] 87  Resp:  [14-26] 20  SpO2:  [94 %-100 %] 94 %  BP: (107-156)/(58-84) 156/76     Weight: 80.7 kg (177 lb 14.6 oz)  Body mass index is 24.13 kg/m².    Intake/Output Summary (Last 24 hours) at 1/12/2024 1540  Last data filed at 1/12/2024 1211  Gross per 24 hour   Intake 400 ml   Output 103 ml   Net 297 ml         Physical Exam  Constitutional:       Appearance: Normal appearance.   Cardiovascular:      Rate and Rhythm: Normal rate and regular rhythm.      Heart sounds: Murmur heard.   Pulmonary:       Effort: Pulmonary effort is normal. No respiratory distress.      Comments: On RA   No wheezing appreciated   Abdominal:      General: Abdomen is flat. Bowel sounds are normal.   Skin:     General: Skin is warm.   Neurological:      General: No focal deficit present.      Mental Status: He is alert.   Psychiatric:         Mood and Affect: Mood normal.         Behavior: Behavior normal.             Significant Labs: All pertinent labs within the past 24 hours have been reviewed.  CBC:   Recent Labs   Lab 01/11/24 0452 01/12/24  0358   WBC 23.62* 17.29*   HGB 8.3* 7.9*   HCT 26.4* 25.5*    146*     CMP:   Recent Labs   Lab 01/11/24 0452 01/12/24  0358    137   K 4.7 4.0    105   CO2 23 27   * 123*   BUN 24* 16   CREATININE 1.3 1.1   CALCIUM 9.0 8.4*   PROT 6.3 6.1   ALBUMIN 3.5 3.5   BILITOT 0.5 0.5   ALKPHOS 44* 42*   AST 18 15   ALT 12 10   ANIONGAP 8 5*       Significant Imaging: I have reviewed all pertinent imaging results/findings within the past 24 hours.    Assessment/Plan:      * Coag negative Staphylococcus bacteremia  Blood cultures Staphylococcus lugdunensis   Vancomycin started   Infectious disease consulted  Repeat blood cultures   Plan for PICC line placement on Monday and we will anticipate 6 weeks of IV antibiotics.      Leukocytosis  Blood cultures positive for Staph bacteremia  Started on vancomycin    Elevated d-dimer, PE ruled out  D-dimer 10,369.    CT angiogram chest shows no evidence of PE.    Dvt US negative         Elevated troponin  Static      Stage 3a chronic kidney disease  Creatine stable for now. BMP reviewed- noted Estimated Creatinine Clearance: 64.7 mL/min (based on SCr of 1.1 mg/dL). according to latest data. Based on current GFR, CKD stage is stage 3 - GFR 30-59.  Monitor UOP and serial BMP and adjust therapy as needed. Renally dose meds. Avoid nephrotoxic medications and procedures.    Continue to monitor with daily CMP.    SOB (shortness of  breath)  Echo 2022 Summary    The left ventricle is normal in size with concentric hypertrophy and normal systolic function.  Moderate left atrial enlargement.  Grade I left ventricular diastolic dysfunction.  The estimated ejection fraction is 60%.  Normal right ventricular size with normal right ventricular systolic function.  There is moderate-to-severe aortic valve stenosis.  Aortic valve area is 1.35 cm2; peak velocity is 4.49 m/s; mean gradient is 47 mmHg.  Moderate aortic regurgitation.  Mild tricuspid regurgitation.  Intermediate central venous pressure (8 mmHg).  The estimated PA systolic pressure is 35 mmHg.  The ascending aorta is mildly dilated.    Shortness of breath likely secondary to COPD exacerbation  -cont duonebs   On RA  will need pulmonology follow up outpatient.    Moderate aortic stenosis  Repeat TTE showed EF 65-70%, grade 1 diastolic dysfunction.  Moderate to severe aortic stenosis.  Per Cardiology no significant progression compared to prior echocardiogram   Cardiology consulted for WINSTON to evaluate for possible vegetation given positive blood cultures, winston on 01/12 showed no vegetations.      RA (rheumatoid arthritis)  Continue home tizanidine, prednisone, and Norco      Mixed hyperlipidemia  Cont statin        VTE Risk Mitigation (From admission, onward)           Ordered     heparin (porcine) injection 5,000 Units  Every 8 hours         01/10/24 0121     IP VTE HIGH RISK PATIENT  Once         01/10/24 0121     Place sequential compression device  Until discontinued         01/10/24 0121                    Discharge Planning   AMY: 1/14/2024     Code Status: Full Code   Is the patient medically ready for discharge?:     Reason for patient still in hospital (select all that apply): Patient trending condition  Discharge Plan A: Home with family                  Austin Rabago MD  Department of Hospital Medicine   [facility]

## 2024-01-13 PROBLEM — I47.10 SVT (SUPRAVENTRICULAR TACHYCARDIA): Status: ACTIVE | Noted: 2024-01-13

## 2024-01-13 LAB
ALBUMIN SERPL BCP-MCNC: 3.6 G/DL (ref 3.5–5.2)
ALP SERPL-CCNC: 46 U/L (ref 55–135)
ALT SERPL W/O P-5'-P-CCNC: 15 U/L (ref 10–44)
ANION GAP SERPL CALC-SCNC: 7 MMOL/L (ref 8–16)
AST SERPL-CCNC: 20 U/L (ref 10–40)
AV INDEX (PROSTH): 0.26
AV MEAN GRADIENT: 30 MMHG
AV PEAK GRADIENT: 48 MMHG
AV REGURGITATION PRESSURE HALF TIME: 362 MS
AV VALVE AREA BY VELOCITY RATIO: 1.2 CM²
AV VALVE AREA: 1.16 CM²
AV VELOCITY RATIO: 0.26
BASOPHILS # BLD AUTO: 0.03 K/UL (ref 0–0.2)
BASOPHILS NFR BLD: 0.2 % (ref 0–1.9)
BILIRUB SERPL-MCNC: 0.5 MG/DL (ref 0.1–1)
BSA FOR ECHO PROCEDURE: 2.02 M2
BUN SERPL-MCNC: 12 MG/DL (ref 8–23)
CALCIUM SERPL-MCNC: 8.5 MG/DL (ref 8.7–10.5)
CHLORIDE SERPL-SCNC: 106 MMOL/L (ref 95–110)
CO2 SERPL-SCNC: 26 MMOL/L (ref 23–29)
CREAT SERPL-MCNC: 1 MG/DL (ref 0.5–1.4)
CRP SERPL-MCNC: 13.6 MG/L (ref 0–8.2)
DIFFERENTIAL METHOD BLD: ABNORMAL
DOP CALC AO PEAK VEL: 3.48 M/S
DOP CALC AO VTI: 73.1 CM
DOP CALC LVOT AREA: 4.5 CM2
DOP CALC LVOT DIAMETER: 2.4 CM
DOP CALC LVOT PEAK VEL: 0.92 M/S
DOP CALC LVOT STROKE VOLUME: 85.01 CM3
DOP CALCLVOT PEAK VEL VTI: 18.8 CM
EOSINOPHIL # BLD AUTO: 0 K/UL (ref 0–0.5)
EOSINOPHIL NFR BLD: 0.1 % (ref 0–8)
ERYTHROCYTE [DISTWIDTH] IN BLOOD BY AUTOMATED COUNT: 14.4 % (ref 11.5–14.5)
EST. GFR  (NO RACE VARIABLE): >60 ML/MIN/1.73 M^2
GLUCOSE SERPL-MCNC: 97 MG/DL (ref 70–110)
HCT VFR BLD AUTO: 28 % (ref 40–54)
HGB BLD-MCNC: 8.8 G/DL (ref 14–18)
IMM GRANULOCYTES # BLD AUTO: 0.17 K/UL (ref 0–0.04)
IMM GRANULOCYTES NFR BLD AUTO: 1.2 % (ref 0–0.5)
LVOT MG: 2 MMHG
LVOT MV: 0.62 CM/S
LYMPHOCYTES # BLD AUTO: 3 K/UL (ref 1–4.8)
LYMPHOCYTES NFR BLD: 20.8 % (ref 18–48)
MAGNESIUM SERPL-MCNC: 2.1 MG/DL (ref 1.6–2.6)
MCH RBC QN AUTO: 28.6 PG (ref 27–31)
MCHC RBC AUTO-ENTMCNC: 31.4 G/DL (ref 32–36)
MCV RBC AUTO: 91 FL (ref 82–98)
MONOCYTES # BLD AUTO: 1.9 K/UL (ref 0.3–1)
MONOCYTES NFR BLD: 13.3 % (ref 4–15)
NEUTROPHILS # BLD AUTO: 9.2 K/UL (ref 1.8–7.7)
NEUTROPHILS NFR BLD: 64.4 % (ref 38–73)
NRBC BLD-RTO: 0 /100 WBC
PISA AR MAX VEL: 3.22 M/S
PLATELET # BLD AUTO: 143 K/UL (ref 150–450)
PMV BLD AUTO: 13.1 FL (ref 9.2–12.9)
POTASSIUM SERPL-SCNC: 3.9 MMOL/L (ref 3.5–5.1)
PROT SERPL-MCNC: 6.4 G/DL (ref 6–8.4)
RBC # BLD AUTO: 3.08 M/UL (ref 4.6–6.2)
SODIUM SERPL-SCNC: 139 MMOL/L (ref 136–145)
WBC # BLD AUTO: 14.26 K/UL (ref 3.9–12.7)

## 2024-01-13 PROCEDURE — 85025 COMPLETE CBC W/AUTO DIFF WBC: CPT | Performed by: INTERNAL MEDICINE

## 2024-01-13 PROCEDURE — 93005 ELECTROCARDIOGRAM TRACING: CPT | Performed by: INTERNAL MEDICINE

## 2024-01-13 PROCEDURE — 83735 ASSAY OF MAGNESIUM: CPT | Performed by: STUDENT IN AN ORGANIZED HEALTH CARE EDUCATION/TRAINING PROGRAM

## 2024-01-13 PROCEDURE — 94640 AIRWAY INHALATION TREATMENT: CPT

## 2024-01-13 PROCEDURE — 99900035 HC TECH TIME PER 15 MIN (STAT)

## 2024-01-13 PROCEDURE — 93010 ELECTROCARDIOGRAM REPORT: CPT | Mod: 76,,, | Performed by: INTERNAL MEDICINE

## 2024-01-13 PROCEDURE — 93010 ELECTROCARDIOGRAM REPORT: CPT | Mod: ,,, | Performed by: INTERNAL MEDICINE

## 2024-01-13 PROCEDURE — 36415 COLL VENOUS BLD VENIPUNCTURE: CPT | Performed by: INTERNAL MEDICINE

## 2024-01-13 PROCEDURE — C9113 INJ PANTOPRAZOLE SODIUM, VIA: HCPCS | Performed by: INTERNAL MEDICINE

## 2024-01-13 PROCEDURE — 63600175 PHARM REV CODE 636 W HCPCS

## 2024-01-13 PROCEDURE — 36415 COLL VENOUS BLD VENIPUNCTURE: CPT | Performed by: STUDENT IN AN ORGANIZED HEALTH CARE EDUCATION/TRAINING PROGRAM

## 2024-01-13 PROCEDURE — 21400001 HC TELEMETRY ROOM

## 2024-01-13 PROCEDURE — 86140 C-REACTIVE PROTEIN: CPT | Performed by: STUDENT IN AN ORGANIZED HEALTH CARE EDUCATION/TRAINING PROGRAM

## 2024-01-13 PROCEDURE — A4216 STERILE WATER/SALINE, 10 ML: HCPCS | Performed by: INTERNAL MEDICINE

## 2024-01-13 PROCEDURE — 25000003 PHARM REV CODE 250: Performed by: STUDENT IN AN ORGANIZED HEALTH CARE EDUCATION/TRAINING PROGRAM

## 2024-01-13 PROCEDURE — 63600175 PHARM REV CODE 636 W HCPCS: Performed by: STUDENT IN AN ORGANIZED HEALTH CARE EDUCATION/TRAINING PROGRAM

## 2024-01-13 PROCEDURE — 63600175 PHARM REV CODE 636 W HCPCS: Performed by: INTERNAL MEDICINE

## 2024-01-13 PROCEDURE — 25000003 PHARM REV CODE 250: Performed by: INTERNAL MEDICINE

## 2024-01-13 PROCEDURE — 25000003 PHARM REV CODE 250: Performed by: NURSE PRACTITIONER

## 2024-01-13 PROCEDURE — 25000242 PHARM REV CODE 250 ALT 637 W/ HCPCS: Performed by: STUDENT IN AN ORGANIZED HEALTH CARE EDUCATION/TRAINING PROGRAM

## 2024-01-13 PROCEDURE — 99233 SBSQ HOSP IP/OBS HIGH 50: CPT | Mod: ,,, | Performed by: INTERNAL MEDICINE

## 2024-01-13 PROCEDURE — 80053 COMPREHEN METABOLIC PANEL: CPT | Performed by: INTERNAL MEDICINE

## 2024-01-13 PROCEDURE — 94761 N-INVAS EAR/PLS OXIMETRY MLT: CPT

## 2024-01-13 RX ORDER — SODIUM CHLORIDE 9 MG/ML
INJECTION, SOLUTION INTRAVENOUS ONCE
Status: COMPLETED | OUTPATIENT
Start: 2024-01-13 | End: 2024-01-13

## 2024-01-13 RX ORDER — ADENOSINE 3 MG/ML
INJECTION, SOLUTION INTRAVENOUS
Status: COMPLETED
Start: 2024-01-13 | End: 2024-01-13

## 2024-01-13 RX ORDER — ADENOSINE 3 MG/ML
6 INJECTION, SOLUTION INTRAVENOUS ONCE
Status: COMPLETED | OUTPATIENT
Start: 2024-01-13 | End: 2024-01-13

## 2024-01-13 RX ADMIN — IPRATROPIUM BROMIDE AND ALBUTEROL SULFATE 3 ML: 2.5; .5 SOLUTION RESPIRATORY (INHALATION) at 01:01

## 2024-01-13 RX ADMIN — ATORVASTATIN CALCIUM 20 MG: 20 TABLET, FILM COATED ORAL at 08:01

## 2024-01-13 RX ADMIN — POLYETHYLENE GLYCOL 3350 17 G: 17 POWDER, FOR SOLUTION ORAL at 08:01

## 2024-01-13 RX ADMIN — ADENOSINE: 3 INJECTION, SOLUTION INTRAVENOUS at 08:01

## 2024-01-13 RX ADMIN — ADENOSINE 6 MG: 3 INJECTION, SOLUTION INTRAVENOUS at 08:01

## 2024-01-13 RX ADMIN — SODIUM CHLORIDE 10 ML: 9 INJECTION INTRAMUSCULAR; INTRAVENOUS; SUBCUTANEOUS at 09:01

## 2024-01-13 RX ADMIN — HYDRALAZINE HYDROCHLORIDE 100 MG: 25 TABLET ORAL at 09:01

## 2024-01-13 RX ADMIN — TIZANIDINE 4 MG: 4 TABLET ORAL at 09:01

## 2024-01-13 RX ADMIN — Medication 6 MG: at 09:01

## 2024-01-13 RX ADMIN — TIZANIDINE 4 MG: 4 TABLET ORAL at 06:01

## 2024-01-13 RX ADMIN — HEPARIN SODIUM 5000 UNITS: 5000 INJECTION, SOLUTION INTRAVENOUS; SUBCUTANEOUS at 09:01

## 2024-01-13 RX ADMIN — PREDNISONE 10 MG: 5 TABLET ORAL at 08:01

## 2024-01-13 RX ADMIN — IPRATROPIUM BROMIDE AND ALBUTEROL SULFATE 3 ML: 2.5; .5 SOLUTION RESPIRATORY (INHALATION) at 07:01

## 2024-01-13 RX ADMIN — VANCOMYCIN HYDROCHLORIDE 1750 MG: 1 INJECTION, POWDER, LYOPHILIZED, FOR SOLUTION INTRAVENOUS at 01:01

## 2024-01-13 RX ADMIN — HEPARIN SODIUM 5000 UNITS: 5000 INJECTION, SOLUTION INTRAVENOUS; SUBCUTANEOUS at 01:01

## 2024-01-13 RX ADMIN — HEPARIN SODIUM 5000 UNITS: 5000 INJECTION, SOLUTION INTRAVENOUS; SUBCUTANEOUS at 06:01

## 2024-01-13 RX ADMIN — SENNOSIDES 8.6 MG: 8.6 TABLET ORAL at 08:01

## 2024-01-13 RX ADMIN — HYDRALAZINE HYDROCHLORIDE 100 MG: 25 TABLET ORAL at 08:01

## 2024-01-13 RX ADMIN — DILTIAZEM HYDROCHLORIDE 180 MG: 180 CAPSULE, COATED, EXTENDED RELEASE ORAL at 08:01

## 2024-01-13 RX ADMIN — TIZANIDINE 4 MG: 4 TABLET ORAL at 01:01

## 2024-01-13 RX ADMIN — PANTOPRAZOLE SODIUM 40 MG: 40 INJECTION, POWDER, FOR SOLUTION INTRAVENOUS at 08:01

## 2024-01-13 RX ADMIN — CHLORHEXIDINE GLUCONATE 15 ML: 1.2 RINSE ORAL at 09:01

## 2024-01-13 RX ADMIN — HYDROCODONE BITARTRATE AND ACETAMINOPHEN 2 TABLET: 5; 325 TABLET ORAL at 06:01

## 2024-01-13 RX ADMIN — SODIUM CHLORIDE: 9 INJECTION, SOLUTION INTRAVENOUS at 09:01

## 2024-01-13 RX ADMIN — CHLORHEXIDINE GLUCONATE 15 ML: 1.2 RINSE ORAL at 08:01

## 2024-01-13 RX ADMIN — SODIUM CHLORIDE: 9 INJECTION, SOLUTION INTRAVENOUS at 08:01

## 2024-01-13 NOTE — NURSING
Pt lying in bed. Telemetry monitor 164 heart rate. NAD noted. EKG done. Heart rate 184. Dr Minor notified and coming to bedside.

## 2024-01-13 NOTE — SUBJECTIVE & OBJECTIVE
Interval History:   Notified by nurse that patient was SVT this morning with heart rates in the 160s to 180s.    Patient asymptomatic and blood pressure stable. Discussed with Cardiology Dr. Slaughter, patient given adenosine 6 mg with improvement in heart rates.  Repeat EKG showed sinus tachycardia.    Review of Systems   Constitutional:  Negative for chills and diaphoresis.   Respiratory:  Negative for cough, choking and shortness of breath.    Cardiovascular:  Negative for chest pain.     Objective:     Vital Signs (Most Recent):  Temp: 98.3 °F (36.8 °C) (01/13/24 1201)  Pulse: 97 (01/13/24 1201)  Resp: 20 (01/13/24 1201)  BP: (!) 141/71 (01/13/24 1201)  SpO2: 97 % (01/13/24 1201) Vital Signs (24h Range):  Temp:  [98.1 °F (36.7 °C)-98.6 °F (37 °C)] 98.3 °F (36.8 °C)  Pulse:  [] 97  Resp:  [16-26] 20  SpO2:  [94 %-100 %] 97 %  BP: (107-156)/(58-96) 141/71     Weight: 80.7 kg (177 lb 14.6 oz)  Body mass index is 24.13 kg/m².    Intake/Output Summary (Last 24 hours) at 1/13/2024 1204  Last data filed at 1/13/2024 0926  Gross per 24 hour   Intake 1240 ml   Output 300 ml   Net 940 ml         Physical Exam  Constitutional:       Appearance: Normal appearance.   Cardiovascular:      Rate and Rhythm: Tachycardia present.   Pulmonary:      Effort: Pulmonary effort is normal. No respiratory distress.      Comments: On RA   No wheezing appreciated   Abdominal:      General: Abdomen is flat. Bowel sounds are normal.   Skin:     General: Skin is warm.   Neurological:      General: No focal deficit present.      Mental Status: He is alert.   Psychiatric:         Mood and Affect: Mood normal.         Behavior: Behavior normal.             Significant Labs: All pertinent labs within the past 24 hours have been reviewed.  CBC:   Recent Labs   Lab 01/12/24 0358 01/13/24  0435   WBC 17.29* 14.26*   HGB 7.9* 8.8*   HCT 25.5* 28.0*   * 143*     CMP:   Recent Labs   Lab 01/12/24  0358 01/13/24  0435    139   K 4.0 3.9     106   CO2 27 26   * 97   BUN 16 12   CREATININE 1.1 1.0   CALCIUM 8.4* 8.5*   PROT 6.1 6.4   ALBUMIN 3.5 3.6   BILITOT 0.5 0.5   ALKPHOS 42* 46*   AST 15 20   ALT 10 15   ANIONGAP 5* 7*       Significant Imaging: I have reviewed all pertinent imaging results/findings within the past 24 hours.

## 2024-01-13 NOTE — CARE UPDATE
01/13/24 1339   Patient Assessment/Suction   Level of Consciousness (AVPU) alert   Respiratory Effort Normal;Unlabored   Expansion/Accessory Muscles/Retractions no use of accessory muscles   PRE-TX-O2   Device (Oxygen Therapy) room air   SpO2 98 %   Pulse Oximetry Type Intermittent   $ Pulse Oximetry - Multiple Charge Pulse Oximetry - Multiple   Pulse 100   Resp 20   Aerosol Therapy   $ Aerosol Therapy Charges Aerosol Treatment   Daily Review of Necessity (SVN) completed   Respiratory Treatment Status (SVN) given   Treatment Route (SVN) mask;oxygen   Patient Position (SVN) HOB elevated   Post Treatment Assessment (SVN) breath sounds improved   Signs of Intolerance (SVN) none   Breath Sounds Post-Respiratory Treatment   Throughout All Fields Post-Treatment All Fields   Throughout All Fields Post-Treatment no change   Post-treatment Heart Rate (beats/min) 85   Post-treatment Resp Rate (breaths/min) 18

## 2024-01-13 NOTE — PLAN OF CARE
Problem: Adult Inpatient Plan of Care  Goal: Plan of Care Review  Outcome: Ongoing, Progressing  Goal: Patient-Specific Goal (Individualized)  Outcome: Ongoing, Progressing  Goal: Absence of Hospital-Acquired Illness or Injury  Outcome: Ongoing, Progressing  Goal: Optimal Comfort and Wellbeing  Outcome: Ongoing, Progressing  Goal: Readiness for Transition of Care  Outcome: Ongoing, Progressing     Problem: Fall Injury Risk  Goal: Absence of Fall and Fall-Related Injury  Outcome: Ongoing, Progressing   Plan reviewed with patient

## 2024-01-13 NOTE — ASSESSMENT & PLAN NOTE
Creatine stable for now. BMP reviewed- noted Estimated Creatinine Clearance: 71.1 mL/min (based on SCr of 1 mg/dL). according to latest data. Based on current GFR, CKD stage is stage 3 - GFR 30-59.  Monitor UOP and serial BMP and adjust therapy as needed. Renally dose meds. Avoid nephrotoxic medications and procedures.    Continue to monitor with daily CMP.

## 2024-01-13 NOTE — ASSESSMENT & PLAN NOTE
SVT developed on  01/13/2024, status post adenosine 6 mg   Heart rate currently better, sinus tachycardia   Cardiology notified

## 2024-01-13 NOTE — PROGRESS NOTES
[facility] Medicine  Progress Note    Patient Name: Scooter Swan  MRN: [id number]  Patient Class: IP- Inpatient   Admission Date: 1/9/2024  Length of Stay: 4 days  Attending Physician: Austin Rabago MD  Primary Care Provider: Salvador Kennedy DO        Subjective:     Principal Problem:Coag negative Staphylococcus bacteremia        HPI:  73-year-old male with a history of seropositive rheumatoid arthritis, thrombocytopenia, anemia , BPH, COPD, gastroesophageal reflux disease, hypertension, aortic stenosis, and hyperlipidemia initially presented to the [facility] on January 9 with dyspnea, cough, and body aches.  He had negative respiratory virus panel, negative COVID, negative flu, and negative influenza swabs.  He left there and subsequently went to [facility].  He presented there with dyspnea and flu-like symptoms for 1 week.  He reported chest discomfort earlier in the day but none during his emergency department stay.  Labs included elevated D-dimer and mild leukocytosis along with mild elevation in troponin that trended down during his stay.  CTA of the chest had no evidence of PE, though there was an opacified bronchus in the left lower lobe that may be due to mucus. In the emergency department he received albuterol, aspirin, azithromycin, Rocephin, Norco, DuoNeb, and Solu-Medrol.  They are requesting transfer to Hospital Medicine at [facility] for further treatment of dyspnea along with evaluation of elevation in troponin/aortic stenosis.  With the history of aortic stenosis and elevated troponinis, case discussed with Cardiology at [facility], and they are available for consultation.     Sodium 141, potassium 3.6, chloride 106, CO2 24, BUN 15, creatinine 1.22, glucose 114, AST 16, ALT 11, high sensitivity troponin 31 with repeat 26 (normal range 2-19), BNP 73, D-dimer 34947, white blood cells 13.1, hemoglobin 10.4, hematocrit  32.3, platelets 192, influenza negative, COVID negative     CT chest for PE protocol noted patient motion artifact.  No evidence of pulmonary embolism.  No mediastinal or hilar adenopathy.  Thoracic aorta is normal caliber.  Heart size is normal.  Opacified bronchus in the left lower lobe may be due to mucus.  There a few bands of subsegmental atelectasis in the lingula.  No airspace consolidation.  2 cm thin walled air cyst is apparent in the right lower lobe.  0 pleural effusions.  Bones are intact.     October 2022: Echocardiogram had EF 60%.  Grade 1 diastolic dysfunction.  Moderate to severe aortic valve stenosis with aortic valve area 1.35 cm2, mean gradient 47 mmHg, peak velocity 4.49 M/S.     VS:  Temperature 98.1°, pulse 109, respirations 26, blood pressure 133/75, O2 sats 99%    In my encounter, patient complains of shortness a breath that has been occurring for the past 1 month.  Worse with any kind of activity.  Associated with a cough and congestion.  Denies any fever, chills, nausea, vomiting, abdominal pains, chest pains, or lower extremity edema.  He also states he was rheumatoid arthritis in his pain throughout his body and takes Norco 10 mg regularly.    Overview/Hospital Course:  Mr. Swan is a 73-year-old male with a history of seropositive rheumatoid arthritis, thrombocytopenia, anemia , BPH, COPD, gastroesophageal reflux disease, hypertension, aortic stenosis, and hyperlipidemia who presents w/SOB. He initially presented to [facility] but was transferred here for cardiology evaluation in setting of elevated troponins and aortic stenosis. CTPE was negative for PE but did note opacified bronchus likely 2/2 mucus. Troponin 31-->26 at OSH and stable here. Negative respiratory viral panel. Cardiology consulted and repeat echo ordered. Per cardiology SOB likely 2/2 COPD/emphysema. Duonebs ordered. TTE from October 2022: Echocardiogram had EF 60%.  Grade 1 diastolic dysfunction.  Moderate to severe  aortic valve stenosis with aortic valve area 1.35 cm2, mean gradient 47 mmHg, peak velocity 4.49 M/S.  Repeat echocardiogram (1/10/24) per Cardiology showed no significant progression compared to previous.  No further inpatient cardiac workup necessary, follow up with cardiologist outpatient.  Blood cultures showed Gram-positive cocci, PCR positive for Staphylococcus lugdunensis.  Infectious disease consulted, patient started on vancomycin.  Cardiology reconsulted for GLENN per Infectious Disease.  Glenn done on 01/12/2024 showed no evidence of vegetation.  Discussed with Infectious Disease given calcifications seen on GLENN, we will anticipate IV antibiotics for 6 weeks.  Follow up repeat cultures.Patient developed SVT on 01/13, status post adenosine with improvement in heart rates.       Interval History:   Notified by nurse that patient was SVT this morning with heart rates in the 160s to 180s.    Patient asymptomatic and blood pressure stable. Discussed with Cardiology Dr. Slaughter, patient given adenosine 6 mg with improvement in heart rates.  Repeat EKG showed sinus tachycardia.    Review of Systems   Constitutional:  Negative for chills and diaphoresis.   Respiratory:  Negative for cough, choking and shortness of breath.    Cardiovascular:  Negative for chest pain.     Objective:     Vital Signs (Most Recent):  Temp: 98.3 °F (36.8 °C) (01/13/24 1201)  Pulse: 97 (01/13/24 1201)  Resp: 20 (01/13/24 1201)  BP: (!) 141/71 (01/13/24 1201)  SpO2: 97 % (01/13/24 1201) Vital Signs (24h Range):  Temp:  [98.1 °F (36.7 °C)-98.6 °F (37 °C)] 98.3 °F (36.8 °C)  Pulse:  [] 97  Resp:  [16-26] 20  SpO2:  [94 %-100 %] 97 %  BP: (107-156)/(58-96) 141/71     Weight: 80.7 kg (177 lb 14.6 oz)  Body mass index is 24.13 kg/m².    Intake/Output Summary (Last 24 hours) at 1/13/2024 1204  Last data filed at 1/13/2024 0926  Gross per 24 hour   Intake 1240 ml   Output 300 ml   Net 940 ml         Physical Exam  Constitutional:        Appearance: Normal appearance.   Cardiovascular:      Rate and Rhythm: Tachycardia present.   Pulmonary:      Effort: Pulmonary effort is normal. No respiratory distress.      Comments: On RA   No wheezing appreciated   Abdominal:      General: Abdomen is flat. Bowel sounds are normal.   Skin:     General: Skin is warm.   Neurological:      General: No focal deficit present.      Mental Status: He is alert.   Psychiatric:         Mood and Affect: Mood normal.         Behavior: Behavior normal.             Significant Labs: All pertinent labs within the past 24 hours have been reviewed.  CBC:   Recent Labs   Lab 01/12/24 0358 01/13/24 0435   WBC 17.29* 14.26*   HGB 7.9* 8.8*   HCT 25.5* 28.0*   * 143*     CMP:   Recent Labs   Lab 01/12/24 0358 01/13/24 0435    139   K 4.0 3.9    106   CO2 27 26   * 97   BUN 16 12   CREATININE 1.1 1.0   CALCIUM 8.4* 8.5*   PROT 6.1 6.4   ALBUMIN 3.5 3.6   BILITOT 0.5 0.5   ALKPHOS 42* 46*   AST 15 20   ALT 10 15   ANIONGAP 5* 7*       Significant Imaging: I have reviewed all pertinent imaging results/findings within the past 24 hours.    Assessment/Plan:      * Coag negative Staphylococcus bacteremia  Blood cultures Staphylococcus lugdunensis   Vancomycin started   Infectious disease consulted  Repeat blood cultures   Plan for PICC line placement on Monday and we will anticipate 6 weeks of IV antibiotics.      SVT (supraventricular tachycardia)   SVT developed on  01/13/2024, status post adenosine 6 mg   Heart rate currently better, sinus tachycardia   Cardiology notified      Leukocytosis  Blood cultures positive for Staph bacteremia  Started on vancomycin    Elevated d-dimer, PE ruled out  D-dimer 10,369.    CT angiogram chest shows no evidence of PE.    Dvt US negative         Elevated troponin  Static      Stage 3a chronic kidney disease  Creatine stable for now. BMP reviewed- noted Estimated Creatinine Clearance: 71.1 mL/min (based on SCr of 1  mg/dL). according to latest data. Based on current GFR, CKD stage is stage 3 - GFR 30-59.  Monitor UOP and serial BMP and adjust therapy as needed. Renally dose meds. Avoid nephrotoxic medications and procedures.    Continue to monitor with daily CMP.    SOB (shortness of breath)  Echo 2022 Summary    The left ventricle is normal in size with concentric hypertrophy and normal systolic function.  Moderate left atrial enlargement.  Grade I left ventricular diastolic dysfunction.  The estimated ejection fraction is 60%.  Normal right ventricular size with normal right ventricular systolic function.  There is moderate-to-severe aortic valve stenosis.  Aortic valve area is 1.35 cm2; peak velocity is 4.49 m/s; mean gradient is 47 mmHg.  Moderate aortic regurgitation.  Mild tricuspid regurgitation.  Intermediate central venous pressure (8 mmHg).  The estimated PA systolic pressure is 35 mmHg.  The ascending aorta is mildly dilated.    Shortness of breath likely secondary to COPD exacerbation  -cont duonebs   On RA  will need pulmonology follow up outpatient.    Moderate aortic stenosis  Repeat TTE showed EF 65-70%, grade 1 diastolic dysfunction.  Moderate to severe aortic stenosis.  Per Cardiology no significant progression compared to prior echocardiogram   Cardiology consulted for WINSTON to evaluate for possible vegetation given positive blood cultures, winston on 01/12 showed no vegetations.      RA (rheumatoid arthritis)  Continue home tizanidine, prednisone, and Norco      Mixed hyperlipidemia  Cont statin        VTE Risk Mitigation (From admission, onward)           Ordered     heparin (porcine) injection 5,000 Units  Every 8 hours         01/10/24 0121     IP VTE HIGH RISK PATIENT  Once         01/10/24 0121     Place sequential compression device  Until discontinued         01/10/24 0121                    Discharge Planning   AMY: 1/14/2024     Code Status: Full Code   Is the patient medically ready for discharge?:      Reason for patient still in hospital (select all that apply): Patient trending condition  Discharge Plan A: Home with family                  Austin Rabago MD  Department of Hospital Medicine   [facility]

## 2024-01-13 NOTE — PROGRESS NOTES
[facility]  Department of Infectious Disease  Progress Note        PATIENT NAME: Scooter Swan  MRN: [id number]  TODAY'S DATE: 01/13/2024  ADMIT DATE: 1/9/2024    PRINCIPLE PROBLEM: Coag negative Staphylococcus bacteremia    INTERVAL HISTORY      1/12 @ 11:28:  Interim reviewed, patient seen before CADY today, he is a little somnolent, complaining of generalized weakness, fatigue, bilateral wrist, shoulder pain.  Discussed with Cardiology, preliminary CADY report with out evidence of vegetations although all cardiac valves are calcified.  Stable, afebrile, adequate urinary output, had one bowel movement yesterday.  Labs reviewed, leukocytosis down to 17 2, left shift 77 6%, H&H 7.9/25, platelet count 146, thrombocytopenia.  Creatinine 1.1, normal LFTs, CRP yesterday 14.2, slightly high, procalcitonin negative.  MRSA nasal swab negative.  Repeat blood cultures x2 sets no growth to date, pending final.  Awaiting sensitivities of Staph lugdunensis, results available over the weekend.     01/13/2023 dr Gonzalez Awake. No new complaints. Wife in room. We discussed treatment,  abx, duration, PICC line and care, labs etc.   Afebrile, Same aches and pains. WBC 16--23--17--14, CRP 13,   As per chart he had SVT that broke with adenosine  Vancomycin trough gets measured tomorrow on 14 th    Antibiotics (From admission, onward)      Start     Stop Route Frequency Ordered    01/12/24 1230  vancomycin (VANCOCIN) 1,750 mg in dextrose 5 % (D5W) 500 mL IVPB         -- IV Every 24 hours (non-standard times) 01/11/24 1154    01/11/24 1201  vancomycin - pharmacy to dose  (vancomycin IVPB (PEDS and ADULTS))        See Hyperspace for full Linked Orders Report.    -- IV pharmacy to manage frequency 01/11/24 1102          Antifungals (From admission, onward)      None             Antivirals (From admission, onward)      None            ASSESSMENT and PLAN     Staph lugdinensis (1/4 bottles) Bacteremia, In an immunocompromised patient  (chronically on prednisone), Uncertain origin, No vegetation  on echo ( but abnormal valves),  so we have to treat as endocarditis  No skin abscesses, but ho psoriasis,   Poor dental condition, advised  Repeat blood 1/11 cultures x2 sets no growth to date   MRSA nasal swab negative   Echo with moderate to severe AS, not all valves visualized  CADY preliminary report with no evidence of vegetations, all cardiac valves with calcifications  Procal negative  CRP 14    Moderate to severe aortic stenosis    Psoriatic arthritis, rheumatoid arthritis on daily 10 mg prednisone    PMHx: BPH, anemia, thrombocytopenia, GERD, hypertension, hyperlipidemia and COPD and a history of Hep C treated with Harvoni       Recommendations:    Follow official CADY report   Follow repeat blood cultures   Continue vancomycin IV, keep level 15-20   Follow Staph lugdunensis sensitivities available over the weekend   Anticipating PICC line to be placed on Monday 1/15 to continue IV antibiotics for at least 6 weeks given CADY findings  Monitor WBC  Aspiration precautions   Pain management as per hospitalist   He will need home health for weekly labs and PICC care(will rite order soon)  Dw patient and wife      SUBJECTIVE        Review of Systems  Review of systems obtained and negative except as stated above in Interval History       OBJECTIVE     Temp:  [98.1 °F (36.7 °C)-98.6 °F (37 °C)] 98.3 °F (36.8 °C)  Pulse:  [] 100  Resp:  [16-20] 20  SpO2:  [94 %-100 %] 98 %  BP: (136-156)/(71-96) 141/71    Physical Exam    General: Pleasant elderly male, lying in bed,  breathing comfortable on room air, reports feeling tired today  Eyes: Eyes with no icterus or injection. Vision grossly normal.  Arcus senilis.  Ears: Hearing grossly normal.  Nose: Nares patent  Mouth: Moist mucous membranes, dentition is poor with missing teeth. No ulcerations, erythema or exudates.  Neck: Supple, no tenderness to palpation.  Cardiovascular: Regular rate and rhythm,  "+ harsh murmur, no edema.    Respiratory:  Clear to auscultation bilaterally anterior and posterior, no tachypnea or increased work of breathing.  On room air.  Gastrointestinal:  Soft and obese with active bowel sounds, no tenderness to palpation, no distention.  Genitourinary:  No suprapubic tenderness.  Musculoskeletal:  Moves all extremities with equal strength.    Skin:  Warm and dry, light brown patches to anterior chest. Clubbing b/l   Neuro:   Oriented, conversant, follows commands.  Psych: Good mood, normal affect.  VAD: PIV  Isolation: None    WOUNDS: none  VAD: PIV  ISOLATION:  None    CBC LAST 7 DAYS  Recent Labs   Lab 01/10/24  0408 01/11/24  0452 01/12/24  0358 01/13/24  0435   WBC 16.93* 23.62* 17.29* 14.26*   RBC 3.19* 2.89* 2.80* 3.08*   HGB 9.1* 8.3* 7.9* 8.8*   HCT 29.0* 26.4* 25.5* 28.0*   MCV 91 91 91 91   MCH 28.5 28.7 28.2 28.6   MCHC 31.4* 31.4* 31.0* 31.4*   RDW 14.2 14.3 14.3 14.4    165 146* 143*   MPV 12.4 12.0 12.4 13.1*   GRAN 89.4*  15.1* 82.5*  19.5* 77.6*  13.4* 64.4  9.2*   LYMPH 6.9*  1.2 8.5*  2.0 11.0*  1.9 20.8  3.0   MONO 2.7*  0.5 8.1  1.9* 10.4  1.8* 13.3  1.9*   BASO 0.02 0.02 0.01 0.03   NRBC 0 0 0 0         CHEMISTRY LAST 7 DAYS  Recent Labs   Lab 01/10/24  0408 01/11/24  0452 01/12/24  0358 01/13/24  0435    137 137 139   K 4.9 4.7 4.0 3.9    106 105 106   CO2 25 23 27 26   ANIONGAP 8 8 5* 7*   BUN 20 24* 16 12   CREATININE 1.1 1.3 1.1 1.0   * 131* 123* 97   CALCIUM 9.0 9.0 8.4* 8.5*   MG  --   --   --  2.1   ALBUMIN 3.8 3.5 3.5 3.6   PROT 6.6 6.3 6.1 6.4   ALKPHOS 51* 44* 42* 46*   ALT 10 12 10 15   AST 14 18 15 20   BILITOT 0.5 0.5 0.5 0.5         Estimated Creatinine Clearance: 71.1 mL/min (based on SCr of 1 mg/dL).    INFLAMMATORY/PROCAL  LAST 7 DAYS  Recent Labs   Lab 01/11/24  1636 01/13/24  0435   PROCAL 0.086  --    CRP 14.2* 13.6*       No results found for: "ESR"  CRP   Date Value Ref Range Status   01/13/2024 13.6 (H) 0.0 - " 8.2 mg/L Final   01/11/2024 14.2 (H) 0.0 - 8.2 mg/L Final   10/17/2023 6.8 0.0 - 8.2 mg/L Final   02/16/2023 7.1 0.0 - 8.2 mg/L Final   05/13/2022 12.6 (H) 0.0 - 8.2 mg/L Final   10/09/2020 2.0 0.0 - 8.2 mg/L Final   07/09/2020 7.5 0.0 - 8.2 mg/L Final         LAST 7 DAYS MICROBIOLOGY   Microbiology Results (last 7 days)       Procedure Component Value Units Date/Time    Blood culture [[id number]] Collected: 01/11/24 1109    Order Status: Completed Specimen: Blood from Antecubital, Right Arm Updated: 01/13/24 1232     Blood Culture, Routine No Growth to date      No Growth to date      No Growth to date    Blood culture [[id number]] Collected: 01/10/24 0903    Order Status: Completed Specimen: Blood from Arm Updated: 01/13/24 1032     Blood Culture, Routine No Growth to date      No Growth to date      No Growth to date      No Growth to date    Blood culture [[id number]]  (Abnormal)  (Susceptibility) Collected: 01/10/24 0904    Order Status: Completed Specimen: Blood from Arm Updated: 01/13/24 0627     Blood Culture, Routine Gram stain aer bottle: Gram positive cocci      Results called to and read back by: SHAJI DOUGLASS.      01/11/2024  06:05 TGC      Gram stain nikita bottle: Gram positive cocci      Positive results previously called 01/13/2024  06:18 KS3      STAPHYLOCOCCUS LUGDUNENSIS    Blood culture [[id number]] Collected: 01/11/24 1645    Order Status: Completed Specimen: Blood from Peripheral, Left Hand Updated: 01/12/24 1832     Blood Culture, Routine No Growth to date      No Growth to date    Narrative:      Collection has been rescheduled by ARSH at 01/11/2024 15:32 Reason:   Patient unavailable. Patient going into X-ray.  Collection has been rescheduled by ARSH at 01/11/2024 15:32 Reason:   Patient unavailable. Patient going into X-ray.    MRSA Screen by PCR [[id number]] Collected: 01/11/24 1642    Order Status: Completed Specimen: Nasopharyngeal Swab from Nasal Updated: 01/11/24 1901     MRSA  SCREEN BY PCR Negative    Culture, Respiratory with Gram Stain [[id number]]     Order Status: No result Specimen: Respiratory     Rapid Organism ID by PCR (from Blood culture) [[id number]]  (Abnormal) Collected: 01/10/24 0904    Order Status: Completed Updated: 01/11/24 0803     Enterococcus faecalis Not Detected     Enterococcus faecium Not Detected     Listeria monocytogenes Not Detected     Staphylococcus spp. See species for ID     Staphylococcus aureus Not Detected     Staphylococcus epidermidis Not Detected     Staphylococcus lugdunensis Detected     Streptococcus species Not Detected     Streptococcus agalactiae Not Detected     Streptococcus pneumoniae Not Detected     Streptococcus pyogenes Not Detected     Acinetobacter calcoaceticus/baumannii complex Not Detected     Bacteroides fragilis Not Detected     Enterobacterales Not Detected     Enterobacter cloacae complex Not Detected     Escherichia coli Not Detected     Klebsiella aerogenes Not Detected     Klebsiella oxytoca Not Detected     Klebsiella pneumoniae group Not Detected     Proteus Not Detected     Salmonella sp Not Detected     Serratia marcescens Not Detected     Haemophilus influenzae Not Detected     Neisseria meningtidis Not Detected     Pseudomonas aeruginosa Not Detected     Stenotrophomonas maltophilia Not Detected     Candida albicans Not Detected     Candida auris Not Detected     Candida glabrata Not Detected     Candida krusei Not Detected     Candida parapsilosis Not Detected     Candida tropicalis Not Detected     Cryptococcus neoformans/gattii Not Detected     CTX-M (ESBL ) Test not applicable     IMP (Carbapenem resistant) Test not applicable     KPC resistance gene (Carbapenem resistant) Test not applicable     mcr-1  Test not applicable     mec A/C  Not Detected     mec A/C and MREJ (MRSA) gene Test not applicable     NDM (Carbapenem resistant) Test not applicable     OXA-48-like (Carbapenem resistant) Test not  applicable     van A/B (VRE gene) Test not applicable     VIM (Carbapenem resistant) Test not applicable    Respiratory Infection Panel (PCR), Nasopharyngeal [[id number]] Collected: 01/10/24 1047    Order Status: Completed Specimen: Nasopharyngeal Swab Updated: 01/10/24 1222     Respiratory Infection Panel Source NP swab     Adenovirus Not Detected     Coronavirus 229E, Common Cold Virus Not Detected     Coronavirus HKU1, Common Cold Virus Not Detected     Coronavirus NL63, Common Cold Virus Not Detected     Coronavirus OC43, Common Cold Virus Not Detected     Comment: Coronavirus strains 229E, HKU1, NL63, and OC43 can cause the common   cold   and are not associated with the respiratory disease outbreak caused   by  the COVID-19 (SARS-CoV-2 novel Coronavirus) strain.           SARS-CoV2 (COVID-19) Qualitative PCR Not Detected     Human Metapneumovirus Not Detected     Human Rhinovirus/Enterovirus Not Detected     Influenza A (subtypes H1, H1-2009,H3) Not Detected     Influenza B Not Detected     Parainfluenza Virus 1 Not Detected     Parainfluenza Virus 2 Not Detected     Parainfluenza Virus 3 Not Detected     Parainfluenza Virus 4 Not Detected     Respiratory Syncytial Virus Not Detected     Bordetella Parapertussis (QO0471) Not Detected     Bordetella pertussis (ptxP) Not Detected     Chlamydia pneumoniae Not Detected     Mycoplasma pneumoniae Not Detected     Comment: Respiratory Infection Panel testing performed by Multiplex PCR.       Narrative:      Respiratory Infection Panel source->NP Swab            SUSCEPTIBILITY  Susceptibility data from last 90 days.  Collected Specimen Info Organism Ceftriaxone Clindamycin Erythromycin Oxacillin Penicillin Tetracycline Trimeth/Sulfa Vancomycin   01/10/24 Blood from Arm Staphylococcus lugdunensis  S  S  S  S  S  S  S  S         CURRENT/PREVIOUS VISIT EKG  Results for orders placed or performed during the hospital encounter of 01/09/24   EKG 12-lead    Collection Time:  01/13/24  9:21 AM    Narrative    Test Reason : R06.00,    Vent. Rate : 104 BPM     Atrial Rate : 104 BPM     P-R Int : 142 ms          QRS Dur : 084 ms      QT Int : 342 ms       P-R-T Axes : 032 -24 021 degrees     QTc Int : 449 ms    Sinus tachycardia  Otherwise normal ECG  When compared with ECG of 13-JAN-2024 08:54,  Vent. rate has decreased BY  60 BPM  ST no longer depressed in Inferior leads  ST no longer depressed in Anterior leads  Nonspecific T wave abnormality, improved in Lateral leads    Referred By: EVERETT SABILLON           Confirmed By:        Significant Labs: All pertinent labs within the past 24 hours have been reviewed.          ECHO 1/10/24:    Left Ventricle: Moderately increased wall thickness. There is concentric hypertrophy. There is normal systolic function with a visually estimated ejection fraction of 65 - 70%. Grade I diastolic dysfunction. E/A ratio is 0.56.    Aortic Valve: There is aortic valve sclerosis. There is moderate to severe stenosis. Aortic valve area by VTI is 1.32 cm². Aortic valve peak velocity is 3.68 m/s. Mean gradient is 32 mmHg. Aortic Valve peak gradient is 54 mmHg. The dimensionless index is 0.38. There is mild aortic regurgitation.    Right Ventricle: Right ventricle was not assessed.    Right Atrium: Not assessed.    Mitral Valve: There is anterior leaflet sclerosis.    Tricuspid Valve: Not assessed. There is mild regurgitation.    Overall the study quality was technically difficult. The study was difficult due to patient's body habitus    CXR: Normal chest.     LE venous US: No DVT of the bilateral lower extremity veins.       Lena Gonzalez MD  Date of Service: 01/13/2024

## 2024-01-14 LAB
ALBUMIN SERPL BCP-MCNC: 3.4 G/DL (ref 3.5–5.2)
ALP SERPL-CCNC: 44 U/L (ref 55–135)
ALT SERPL W/O P-5'-P-CCNC: 13 U/L (ref 10–44)
ANION GAP SERPL CALC-SCNC: 9 MMOL/L (ref 8–16)
AST SERPL-CCNC: 16 U/L (ref 10–40)
BACTERIA BLD CULT: ABNORMAL
BASOPHILS # BLD AUTO: 0.04 K/UL (ref 0–0.2)
BASOPHILS NFR BLD: 0.3 % (ref 0–1.9)
BILIRUB SERPL-MCNC: 0.5 MG/DL (ref 0.1–1)
BUN SERPL-MCNC: 9 MG/DL (ref 8–23)
CALCIUM SERPL-MCNC: 8.6 MG/DL (ref 8.7–10.5)
CHLORIDE SERPL-SCNC: 105 MMOL/L (ref 95–110)
CO2 SERPL-SCNC: 23 MMOL/L (ref 23–29)
CREAT SERPL-MCNC: 0.9 MG/DL (ref 0.5–1.4)
DIFFERENTIAL METHOD BLD: ABNORMAL
EOSINOPHIL # BLD AUTO: 0 K/UL (ref 0–0.5)
EOSINOPHIL NFR BLD: 0.2 % (ref 0–8)
ERYTHROCYTE [DISTWIDTH] IN BLOOD BY AUTOMATED COUNT: 14.4 % (ref 11.5–14.5)
EST. GFR  (NO RACE VARIABLE): >60 ML/MIN/1.73 M^2
GLUCOSE SERPL-MCNC: 123 MG/DL (ref 70–110)
HCT VFR BLD AUTO: 24.4 % (ref 40–54)
HGB BLD-MCNC: 7.8 G/DL (ref 14–18)
IMM GRANULOCYTES # BLD AUTO: 0.24 K/UL (ref 0–0.04)
IMM GRANULOCYTES NFR BLD AUTO: 1.7 % (ref 0–0.5)
LYMPHOCYTES # BLD AUTO: 2.5 K/UL (ref 1–4.8)
LYMPHOCYTES NFR BLD: 17.5 % (ref 18–48)
MCH RBC QN AUTO: 28.9 PG (ref 27–31)
MCHC RBC AUTO-ENTMCNC: 32 G/DL (ref 32–36)
MCV RBC AUTO: 90 FL (ref 82–98)
MONOCYTES # BLD AUTO: 1.7 K/UL (ref 0.3–1)
MONOCYTES NFR BLD: 11.9 % (ref 4–15)
NEUTROPHILS # BLD AUTO: 9.6 K/UL (ref 1.8–7.7)
NEUTROPHILS NFR BLD: 68.4 % (ref 38–73)
NRBC BLD-RTO: 0 /100 WBC
PLATELET # BLD AUTO: 124 K/UL (ref 150–450)
PMV BLD AUTO: 12.7 FL (ref 9.2–12.9)
POTASSIUM SERPL-SCNC: 3.6 MMOL/L (ref 3.5–5.1)
PROT SERPL-MCNC: 5.8 G/DL (ref 6–8.4)
RBC # BLD AUTO: 2.7 M/UL (ref 4.6–6.2)
SODIUM SERPL-SCNC: 137 MMOL/L (ref 136–145)
WBC # BLD AUTO: 14.01 K/UL (ref 3.9–12.7)

## 2024-01-14 PROCEDURE — A4216 STERILE WATER/SALINE, 10 ML: HCPCS | Performed by: INTERNAL MEDICINE

## 2024-01-14 PROCEDURE — 25000242 PHARM REV CODE 250 ALT 637 W/ HCPCS: Performed by: STUDENT IN AN ORGANIZED HEALTH CARE EDUCATION/TRAINING PROGRAM

## 2024-01-14 PROCEDURE — 36415 COLL VENOUS BLD VENIPUNCTURE: CPT | Performed by: INTERNAL MEDICINE

## 2024-01-14 PROCEDURE — 94640 AIRWAY INHALATION TREATMENT: CPT

## 2024-01-14 PROCEDURE — 80053 COMPREHEN METABOLIC PANEL: CPT | Performed by: INTERNAL MEDICINE

## 2024-01-14 PROCEDURE — 21400001 HC TELEMETRY ROOM

## 2024-01-14 PROCEDURE — 25000003 PHARM REV CODE 250: Performed by: STUDENT IN AN ORGANIZED HEALTH CARE EDUCATION/TRAINING PROGRAM

## 2024-01-14 PROCEDURE — 63600175 PHARM REV CODE 636 W HCPCS: Performed by: STUDENT IN AN ORGANIZED HEALTH CARE EDUCATION/TRAINING PROGRAM

## 2024-01-14 PROCEDURE — 99900035 HC TECH TIME PER 15 MIN (STAT)

## 2024-01-14 PROCEDURE — 25000003 PHARM REV CODE 250: Performed by: INTERNAL MEDICINE

## 2024-01-14 PROCEDURE — C9113 INJ PANTOPRAZOLE SODIUM, VIA: HCPCS | Performed by: INTERNAL MEDICINE

## 2024-01-14 PROCEDURE — 99900031 HC PATIENT EDUCATION (STAT)

## 2024-01-14 PROCEDURE — 94761 N-INVAS EAR/PLS OXIMETRY MLT: CPT

## 2024-01-14 PROCEDURE — 25000003 PHARM REV CODE 250: Performed by: NURSE PRACTITIONER

## 2024-01-14 PROCEDURE — 85025 COMPLETE CBC W/AUTO DIFF WBC: CPT | Performed by: INTERNAL MEDICINE

## 2024-01-14 PROCEDURE — 63600175 PHARM REV CODE 636 W HCPCS: Performed by: INTERNAL MEDICINE

## 2024-01-14 RX ORDER — DEXTROMETHORPHAN POLISTIREX 30 MG/5 ML
1 SUSPENSION, EXTENDED RELEASE 12 HR ORAL ONCE
Status: COMPLETED | OUTPATIENT
Start: 2024-01-14 | End: 2024-01-14

## 2024-01-14 RX ORDER — HYDROCODONE BITARTRATE AND ACETAMINOPHEN 10; 325 MG/1; MG/1
1 TABLET ORAL 3 TIMES DAILY
Status: DISCONTINUED | OUTPATIENT
Start: 2024-01-14 | End: 2024-01-16 | Stop reason: HOSPADM

## 2024-01-14 RX ADMIN — CHLORHEXIDINE GLUCONATE 15 ML: 1.2 RINSE ORAL at 08:01

## 2024-01-14 RX ADMIN — TIZANIDINE 4 MG: 4 TABLET ORAL at 06:01

## 2024-01-14 RX ADMIN — HYDRALAZINE HYDROCHLORIDE 100 MG: 25 TABLET ORAL at 08:01

## 2024-01-14 RX ADMIN — IPRATROPIUM BROMIDE AND ALBUTEROL SULFATE 3 ML: 2.5; .5 SOLUTION RESPIRATORY (INHALATION) at 01:01

## 2024-01-14 RX ADMIN — TIZANIDINE 4 MG: 4 TABLET ORAL at 01:01

## 2024-01-14 RX ADMIN — HEPARIN SODIUM 5000 UNITS: 5000 INJECTION, SOLUTION INTRAVENOUS; SUBCUTANEOUS at 09:01

## 2024-01-14 RX ADMIN — IPRATROPIUM BROMIDE AND ALBUTEROL SULFATE 3 ML: 2.5; .5 SOLUTION RESPIRATORY (INHALATION) at 07:01

## 2024-01-14 RX ADMIN — VANCOMYCIN HYDROCHLORIDE 1750 MG: 1 INJECTION, POWDER, LYOPHILIZED, FOR SOLUTION INTRAVENOUS at 01:01

## 2024-01-14 RX ADMIN — HEPARIN SODIUM 5000 UNITS: 5000 INJECTION, SOLUTION INTRAVENOUS; SUBCUTANEOUS at 01:01

## 2024-01-14 RX ADMIN — SODIUM CHLORIDE 10 ML: 9 INJECTION INTRAMUSCULAR; INTRAVENOUS; SUBCUTANEOUS at 09:01

## 2024-01-14 RX ADMIN — Medication 6 MG: at 11:01

## 2024-01-14 RX ADMIN — TIZANIDINE 4 MG: 4 TABLET ORAL at 09:01

## 2024-01-14 RX ADMIN — PREDNISONE 10 MG: 5 TABLET ORAL at 08:01

## 2024-01-14 RX ADMIN — DILTIAZEM HYDROCHLORIDE 180 MG: 180 CAPSULE, COATED, EXTENDED RELEASE ORAL at 08:01

## 2024-01-14 RX ADMIN — HYDROCODONE BITARTRATE AND ACETAMINOPHEN 1 TABLET: 10; 325 TABLET ORAL at 08:01

## 2024-01-14 RX ADMIN — POTASSIUM BICARBONATE 50 MEQ: 977.5 TABLET, EFFERVESCENT ORAL at 06:01

## 2024-01-14 RX ADMIN — HYDROCODONE BITARTRATE AND ACETAMINOPHEN 1 TABLET: 10; 325 TABLET ORAL at 01:01

## 2024-01-14 RX ADMIN — IPRATROPIUM BROMIDE AND ALBUTEROL SULFATE 3 ML: 2.5; .5 SOLUTION RESPIRATORY (INHALATION) at 08:01

## 2024-01-14 RX ADMIN — MINERAL OIL 1 ENEMA: 100 ENEMA RECTAL at 11:01

## 2024-01-14 RX ADMIN — ATORVASTATIN CALCIUM 20 MG: 20 TABLET, FILM COATED ORAL at 08:01

## 2024-01-14 RX ADMIN — MORPHINE SULFATE 4 MG: 4 INJECTION, SOLUTION INTRAMUSCULAR; INTRAVENOUS at 06:01

## 2024-01-14 RX ADMIN — MORPHINE SULFATE 4 MG: 4 INJECTION, SOLUTION INTRAMUSCULAR; INTRAVENOUS at 08:01

## 2024-01-14 RX ADMIN — SENNOSIDES 8.6 MG: 8.6 TABLET ORAL at 08:01

## 2024-01-14 RX ADMIN — PANTOPRAZOLE SODIUM 40 MG: 40 INJECTION, POWDER, FOR SOLUTION INTRAVENOUS at 08:01

## 2024-01-14 RX ADMIN — HEPARIN SODIUM 5000 UNITS: 5000 INJECTION, SOLUTION INTRAVENOUS; SUBCUTANEOUS at 06:01

## 2024-01-14 RX ADMIN — MORPHINE SULFATE 4 MG: 4 INJECTION, SOLUTION INTRAMUSCULAR; INTRAVENOUS at 11:01

## 2024-01-14 RX ADMIN — POLYETHYLENE GLYCOL 3350 17 G: 17 POWDER, FOR SOLUTION ORAL at 08:01

## 2024-01-14 NOTE — ASSESSMENT & PLAN NOTE
Creatine stable for now. BMP reviewed- noted Estimated Creatinine Clearance: 79 mL/min (based on SCr of 0.9 mg/dL). according to latest data. Based on current GFR, CKD stage is stage 3 - GFR 30-59.  Monitor UOP and serial BMP and adjust therapy as needed. Renally dose meds. Avoid nephrotoxic medications and procedures.    Continue to monitor with daily CMP.

## 2024-01-14 NOTE — PROGRESS NOTES
"                                                                                     NAME:   Scooter Swan                AGE:  73 y.o.  ..............................                .............................              Visit ID:   [id number]                  SEX:    male  ...........................  Med Rec:  [id number]                   Ht: 6' (1.829 m)    WEIGHT: 80.7 kg (177 lb 14.6 oz)    Recent Labs   Lab 01/14/24  0428   BUN 9   CREATININE 0.9   WBC 14.01*      Estimated Creatinine Clearance: 79 mL/min (based on SCr of 0.9 mg/dL).,   Vancomycin Administrations:  vancomycin given in the last 96 hours                     vancomycin (VANCOCIN) 1,750 mg in dextrose 5 % (D5W) 500 mL IVPB (mg) 1,750 mg New Bag 01/14/24 1318     1,750 mg New Bag 01/13/24 1316     1,750 mg New Bag 01/12/24 1452    vancomycin (VANCOCIN) 2,000 mg in dextrose 5 % (D5W) 500 mL IVPB (mg) 2,000 mg New Bag 01/11/24 1300                  Drug levels (last 2 results):  No results for input(s): "VANCOMYCINRA", "VANCOMYCINTR", "VANCOTROUGH" in the last 2160 hours.    Plan:  Lab down 1/14 10am-2pm. Fourth dose of 1750mg given at 13:18 so rescheduled a random level 1/15/24 with AM labs.    "

## 2024-01-14 NOTE — CARE UPDATE
01/14/24 0726   Patient Assessment/Suction   Level of Consciousness (AVPU) alert   Respiratory Effort Normal;Unlabored   Expansion/Accessory Muscles/Retractions no use of accessory muscles   All Lung Fields Breath Sounds diminished   Cough Frequency no cough   PRE-TX-O2   Device (Oxygen Therapy) room air   SpO2 (!) 94 %   Pulse Oximetry Type Intermittent   $ Pulse Oximetry - Multiple Charge Pulse Oximetry - Multiple   Pulse 76   Resp 18   Aerosol Therapy   $ Aerosol Therapy Charges Aerosol Treatment   Daily Review of Necessity (SVN) completed   Respiratory Treatment Status (SVN) given   Treatment Route (SVN) mask;oxygen   Patient Position (SVN) HOB elevated   Post Treatment Assessment (SVN) breath sounds improved   Signs of Intolerance (SVN) none   Breath Sounds Post-Respiratory Treatment   Throughout All Fields Post-Treatment All Fields   Throughout All Fields Post-Treatment no change   Post-treatment Heart Rate (beats/min) 77   Post-treatment Resp Rate (breaths/min) 18   Education   $ Education Bronchodilator;15 min

## 2024-01-14 NOTE — PROGRESS NOTES
[facility] Medicine  Progress Note    Patient Name: Scooter Swan  MRN: [id number]  Patient Class: IP- Inpatient   Admission Date: 1/9/2024  Length of Stay: 5 days  Attending Physician: Austin Rabago MD  Primary Care Provider: Salvador Kennedy DO        Subjective:     Principal Problem:Coag negative Staphylococcus bacteremia        HPI:  73-year-old male with a history of seropositive rheumatoid arthritis, thrombocytopenia, anemia , BPH, COPD, gastroesophageal reflux disease, hypertension, aortic stenosis, and hyperlipidemia initially presented to the [facility] on January 9 with dyspnea, cough, and body aches.  He had negative respiratory virus panel, negative COVID, negative flu, and negative influenza swabs.  He left there and subsequently went to [facility].  He presented there with dyspnea and flu-like symptoms for 1 week.  He reported chest discomfort earlier in the day but none during his emergency department stay.  Labs included elevated D-dimer and mild leukocytosis along with mild elevation in troponin that trended down during his stay.  CTA of the chest had no evidence of PE, though there was an opacified bronchus in the left lower lobe that may be due to mucus. In the emergency department he received albuterol, aspirin, azithromycin, Rocephin, Norco, DuoNeb, and Solu-Medrol.  They are requesting transfer to Hospital Medicine at [facility] for further treatment of dyspnea along with evaluation of elevation in troponin/aortic stenosis.  With the history of aortic stenosis and elevated troponinis, case discussed with Cardiology at [facility], and they are available for consultation.     Sodium 141, potassium 3.6, chloride 106, CO2 24, BUN 15, creatinine 1.22, glucose 114, AST 16, ALT 11, high sensitivity troponin 31 with repeat 26 (normal range 2-19), BNP 73, D-dimer 56525, white blood cells 13.1, hemoglobin 10.4, hematocrit  32.3, platelets 192, influenza negative, COVID negative     CT chest for PE protocol noted patient motion artifact.  No evidence of pulmonary embolism.  No mediastinal or hilar adenopathy.  Thoracic aorta is normal caliber.  Heart size is normal.  Opacified bronchus in the left lower lobe may be due to mucus.  There a few bands of subsegmental atelectasis in the lingula.  No airspace consolidation.  2 cm thin walled air cyst is apparent in the right lower lobe.  0 pleural effusions.  Bones are intact.     October 2022: Echocardiogram had EF 60%.  Grade 1 diastolic dysfunction.  Moderate to severe aortic valve stenosis with aortic valve area 1.35 cm2, mean gradient 47 mmHg, peak velocity 4.49 M/S.     VS:  Temperature 98.1°, pulse 109, respirations 26, blood pressure 133/75, O2 sats 99%    In my encounter, patient complains of shortness a breath that has been occurring for the past 1 month.  Worse with any kind of activity.  Associated with a cough and congestion.  Denies any fever, chills, nausea, vomiting, abdominal pains, chest pains, or lower extremity edema.  He also states he was rheumatoid arthritis in his pain throughout his body and takes Norco 10 mg regularly.    Overview/Hospital Course:  Mr. Swan is a 73-year-old male with a history of seropositive rheumatoid arthritis, thrombocytopenia, anemia , BPH, COPD, gastroesophageal reflux disease, hypertension, aortic stenosis, and hyperlipidemia who presents w/SOB. He initially presented to [facility] but was transferred here for cardiology evaluation in setting of elevated troponins and aortic stenosis. CTPE was negative for PE but did note opacified bronchus likely 2/2 mucus. Troponin 31-->26 at OSH and stable here. Negative respiratory viral panel. Cardiology consulted and repeat echo ordered. Per cardiology SOB likely 2/2 COPD/emphysema. Duonebs ordered. TTE from October 2022: Echocardiogram had EF 60%.  Grade 1 diastolic dysfunction.  Moderate to severe  aortic valve stenosis with aortic valve area 1.35 cm2, mean gradient 47 mmHg, peak velocity 4.49 M/S.  Repeat echocardiogram (1/10/24) per Cardiology showed no significant progression compared to previous.  No further inpatient cardiac workup necessary, follow up with cardiologist outpatient.  Blood cultures showed Gram-positive cocci, PCR positive for Staphylococcus lugdunensis.  Infectious disease consulted, patient started on vancomycin.  Cardiology reconsulted for GLENN per Infectious Disease.  Glenn done on 01/12/2024 showed no evidence of vegetation.  Discussed with Infectious Disease given calcifications seen on GLENN, we will anticipate IV antibiotics for 6 weeks.  Follow up repeat cultures.Patient developed SVT on 01/13, status post adenosine with improvement in heart rates.       Interval History:  Patient complaining of pain, reports taking oxycodone 3 times a day at home for rheumatoid arthritis.  Hemoglobin this morning 7.8.  Heart rates controlled this morning.    Review of Systems   Constitutional:  Negative for chills and diaphoresis.   Respiratory:  Negative for cough, choking and shortness of breath.    Cardiovascular:  Negative for chest pain.     Objective:     Vital Signs (Most Recent):  Temp: 98.3 °F (36.8 °C) (01/14/24 0826)  Pulse: 94 (01/14/24 0826)  Resp: 20 (01/14/24 0826)  BP: (!) 162/81 (01/14/24 0826)  SpO2: 99 % (01/14/24 0826) Vital Signs (24h Range):  Temp:  [98.1 °F (36.7 °C)-98.9 °F (37.2 °C)] 98.3 °F (36.8 °C)  Pulse:  [] 94  Resp:  [16-20] 20  SpO2:  [95 %-99 %] 99 %  BP: (125-162)/(61-81) 162/81     Weight: 80.7 kg (177 lb 14.6 oz)  Body mass index is 24.13 kg/m².    Intake/Output Summary (Last 24 hours) at 1/14/2024 1215  Last data filed at 1/14/2024 0912  Gross per 24 hour   Intake 1140 ml   Output --   Net 1140 ml         Physical Exam  Constitutional:       Appearance: Normal appearance.   Cardiovascular:      Rate and Rhythm: Normal rate.   Pulmonary:      Effort: Pulmonary  effort is normal. No respiratory distress.      Comments: On RA   No wheezing appreciated   Abdominal:      General: Abdomen is flat. Bowel sounds are normal.   Skin:     General: Skin is warm.   Neurological:      General: No focal deficit present.      Mental Status: He is alert.   Psychiatric:         Mood and Affect: Mood normal.         Behavior: Behavior normal.             Significant Labs: All pertinent labs within the past 24 hours have been reviewed.  CBC:   Recent Labs   Lab 01/13/24 0435 01/14/24  0428   WBC 14.26* 14.01*   HGB 8.8* 7.8*   HCT 28.0* 24.4*   * 124*     CMP:   Recent Labs   Lab 01/13/24  0435 01/14/24  0428    137   K 3.9 3.6    105   CO2 26 23   GLU 97 123*   BUN 12 9   CREATININE 1.0 0.9   CALCIUM 8.5* 8.6*   PROT 6.4 5.8*   ALBUMIN 3.6 3.4*   BILITOT 0.5 0.5   ALKPHOS 46* 44*   AST 20 16   ALT 15 13   ANIONGAP 7* 9       Significant Imaging: I have reviewed all pertinent imaging results/findings within the past 24 hours.    Assessment/Plan:      * Coag negative Staphylococcus bacteremia  Blood cultures Staphylococcus lugdunensis   Vancomycin started   Infectious disease consulted  Repeat blood cultures   Plan for PICC line placement on Monday and we will anticipate 6 weeks of IV antibiotics.      SVT (supraventricular tachycardia)   SVT developed on  01/13/2024, status post adenosine 6 mg   Heart rate currently better, sinus tachycardia   Cardiology notified      Leukocytosis  Blood cultures positive for Staph bacteremia  Started on vancomycin    Elevated d-dimer, PE ruled out  D-dimer 10,369.    CT angiogram chest shows no evidence of PE.    Dvt US negative         Elevated troponin  Static      Stage 3a chronic kidney disease  Creatine stable for now. BMP reviewed- noted Estimated Creatinine Clearance: 79 mL/min (based on SCr of 0.9 mg/dL). according to latest data. Based on current GFR, CKD stage is stage 3 - GFR 30-59.  Monitor UOP and serial BMP and adjust  therapy as needed. Renally dose meds. Avoid nephrotoxic medications and procedures.    Continue to monitor with daily CMP.    SOB (shortness of breath)  Echo 2022 Summary    The left ventricle is normal in size with concentric hypertrophy and normal systolic function.  Moderate left atrial enlargement.  Grade I left ventricular diastolic dysfunction.  The estimated ejection fraction is 60%.  Normal right ventricular size with normal right ventricular systolic function.  There is moderate-to-severe aortic valve stenosis.  Aortic valve area is 1.35 cm2; peak velocity is 4.49 m/s; mean gradient is 47 mmHg.  Moderate aortic regurgitation.  Mild tricuspid regurgitation.  Intermediate central venous pressure (8 mmHg).  The estimated PA systolic pressure is 35 mmHg.  The ascending aorta is mildly dilated.    Shortness of breath likely secondary to COPD exacerbation  -cont duonebs   On RA  will need pulmonology follow up outpatient.    Moderate aortic stenosis  Repeat TTE showed EF 65-70%, grade 1 diastolic dysfunction.  Moderate to severe aortic stenosis.  Per Cardiology no significant progression compared to prior echocardiogram   Cardiology consulted for WINSTON to evaluate for possible vegetation given positive blood cultures, [place] on 01/12 showed no vegetations.      RA (rheumatoid arthritis)  Continue home tizanidine, prednisone, and Norco      Mixed hyperlipidemia  Cont statin        VTE Risk Mitigation (From admission, onward)           Ordered     heparin (porcine) injection 5,000 Units  Every 8 hours         01/10/24 0121     IP VTE HIGH RISK PATIENT  Once         01/10/24 0121     Place sequential compression device  Until discontinued         01/10/24 0121                    Discharge Planning   AMY: 1/16/2024     Code Status: Full Code   Is the patient medically ready for discharge?:     Reason for patient still in hospital (select all that apply): Patient trending condition  Discharge Plan A: Home with family                   Austin Rabago MD  Department of Hospital Medicine   [facility]

## 2024-01-14 NOTE — SUBJECTIVE & OBJECTIVE
Interval History:  Patient complaining of pain, reports taking oxycodone 3 times a day at home for rheumatoid arthritis.  Hemoglobin this morning 7.8.  Heart rates controlled this morning.    Review of Systems   Constitutional:  Negative for chills and diaphoresis.   Respiratory:  Negative for cough, choking and shortness of breath.    Cardiovascular:  Negative for chest pain.     Objective:     Vital Signs (Most Recent):  Temp: 98.3 °F (36.8 °C) (01/14/24 0826)  Pulse: 94 (01/14/24 0826)  Resp: 20 (01/14/24 0826)  BP: (!) 162/81 (01/14/24 0826)  SpO2: 99 % (01/14/24 0826) Vital Signs (24h Range):  Temp:  [98.1 °F (36.7 °C)-98.9 °F (37.2 °C)] 98.3 °F (36.8 °C)  Pulse:  [] 94  Resp:  [16-20] 20  SpO2:  [95 %-99 %] 99 %  BP: (125-162)/(61-81) 162/81     Weight: 80.7 kg (177 lb 14.6 oz)  Body mass index is 24.13 kg/m².    Intake/Output Summary (Last 24 hours) at 1/14/2024 1215  Last data filed at 1/14/2024 0912  Gross per 24 hour   Intake 1140 ml   Output --   Net 1140 ml         Physical Exam  Constitutional:       Appearance: Normal appearance.   Cardiovascular:      Rate and Rhythm: Normal rate.   Pulmonary:      Effort: Pulmonary effort is normal. No respiratory distress.      Comments: On RA   No wheezing appreciated   Abdominal:      General: Abdomen is flat. Bowel sounds are normal.   Skin:     General: Skin is warm.   Neurological:      General: No focal deficit present.      Mental Status: He is alert.   Psychiatric:         Mood and Affect: Mood normal.         Behavior: Behavior normal.             Significant Labs: All pertinent labs within the past 24 hours have been reviewed.  CBC:   Recent Labs   Lab 01/13/24 0435 01/14/24 0428   WBC 14.26* 14.01*   HGB 8.8* 7.8*   HCT 28.0* 24.4*   * 124*     CMP:   Recent Labs   Lab 01/13/24 0435 01/14/24 0428    137   K 3.9 3.6    105   CO2 26 23   GLU 97 123*   BUN 12 9   CREATININE 1.0 0.9   CALCIUM 8.5* 8.6*   PROT 6.4 5.8*   ALBUMIN  3.6 3.4*   BILITOT 0.5 0.5   ALKPHOS 46* 44*   AST 20 16   ALT 15 13   ANIONGAP 7* 9       Significant Imaging: I have reviewed all pertinent imaging results/findings within the past 24 hours.

## 2024-01-15 LAB
ALBUMIN SERPL BCP-MCNC: 3.3 G/DL (ref 3.5–5.2)
ALP SERPL-CCNC: 44 U/L (ref 55–135)
ALT SERPL W/O P-5'-P-CCNC: 16 U/L (ref 10–44)
ANION GAP SERPL CALC-SCNC: 7 MMOL/L (ref 8–16)
AST SERPL-CCNC: 18 U/L (ref 10–40)
BACTERIA BLD CULT: NORMAL
BASOPHILS # BLD AUTO: 0.05 K/UL (ref 0–0.2)
BASOPHILS NFR BLD: 0.4 % (ref 0–1.9)
BILIRUB SERPL-MCNC: 0.4 MG/DL (ref 0.1–1)
BUN SERPL-MCNC: 6 MG/DL (ref 8–23)
CALCIUM SERPL-MCNC: 8.4 MG/DL (ref 8.7–10.5)
CHLORIDE SERPL-SCNC: 104 MMOL/L (ref 95–110)
CO2 SERPL-SCNC: 26 MMOL/L (ref 23–29)
CREAT SERPL-MCNC: 0.9 MG/DL (ref 0.5–1.4)
DIFFERENTIAL METHOD BLD: ABNORMAL
EOSINOPHIL # BLD AUTO: 0 K/UL (ref 0–0.5)
EOSINOPHIL NFR BLD: 0.2 % (ref 0–8)
ERYTHROCYTE [DISTWIDTH] IN BLOOD BY AUTOMATED COUNT: 14.5 % (ref 11.5–14.5)
EST. GFR  (NO RACE VARIABLE): >60 ML/MIN/1.73 M^2
GLUCOSE SERPL-MCNC: 145 MG/DL (ref 70–110)
HAPTOGLOB SERPL-MCNC: 200 MG/DL (ref 34–355)
HCT VFR BLD AUTO: 24.5 % (ref 40–54)
HGB BLD-MCNC: 7.6 G/DL (ref 14–18)
IMM GRANULOCYTES # BLD AUTO: 0.19 K/UL (ref 0–0.04)
IMM GRANULOCYTES NFR BLD AUTO: 1.6 % (ref 0–0.5)
LYMPHOCYTES # BLD AUTO: 2.3 K/UL (ref 1–4.8)
LYMPHOCYTES NFR BLD: 18.6 % (ref 18–48)
MCH RBC QN AUTO: 28.5 PG (ref 27–31)
MCHC RBC AUTO-ENTMCNC: 31 G/DL (ref 32–36)
MCV RBC AUTO: 92 FL (ref 82–98)
MONOCYTES # BLD AUTO: 1.5 K/UL (ref 0.3–1)
MONOCYTES NFR BLD: 12.5 % (ref 4–15)
NEUTROPHILS # BLD AUTO: 8.1 K/UL (ref 1.8–7.7)
NEUTROPHILS NFR BLD: 66.7 % (ref 38–73)
NRBC BLD-RTO: 0 /100 WBC
PLATELET # BLD AUTO: 133 K/UL (ref 150–450)
PMV BLD AUTO: 12.7 FL (ref 9.2–12.9)
POTASSIUM SERPL-SCNC: 3.9 MMOL/L (ref 3.5–5.1)
PROT SERPL-MCNC: 5.8 G/DL (ref 6–8.4)
RBC # BLD AUTO: 2.67 M/UL (ref 4.6–6.2)
SODIUM SERPL-SCNC: 137 MMOL/L (ref 136–145)
VANCOMYCIN SERPL-MCNC: 12.4 UG/ML
WBC # BLD AUTO: 12.18 K/UL (ref 3.9–12.7)

## 2024-01-15 PROCEDURE — 99233 SBSQ HOSP IP/OBS HIGH 50: CPT | Mod: ,,, | Performed by: STUDENT IN AN ORGANIZED HEALTH CARE EDUCATION/TRAINING PROGRAM

## 2024-01-15 PROCEDURE — 94761 N-INVAS EAR/PLS OXIMETRY MLT: CPT

## 2024-01-15 PROCEDURE — 80053 COMPREHEN METABOLIC PANEL: CPT | Performed by: INTERNAL MEDICINE

## 2024-01-15 PROCEDURE — 02HV33Z INSERTION OF INFUSION DEVICE INTO SUPERIOR VENA CAVA, PERCUTANEOUS APPROACH: ICD-10-PCS | Performed by: STUDENT IN AN ORGANIZED HEALTH CARE EDUCATION/TRAINING PROGRAM

## 2024-01-15 PROCEDURE — 94640 AIRWAY INHALATION TREATMENT: CPT

## 2024-01-15 PROCEDURE — 25000003 PHARM REV CODE 250: Performed by: STUDENT IN AN ORGANIZED HEALTH CARE EDUCATION/TRAINING PROGRAM

## 2024-01-15 PROCEDURE — 36569 INSJ PICC 5 YR+ W/O IMAGING: CPT

## 2024-01-15 PROCEDURE — A4216 STERILE WATER/SALINE, 10 ML: HCPCS | Performed by: STUDENT IN AN ORGANIZED HEALTH CARE EDUCATION/TRAINING PROGRAM

## 2024-01-15 PROCEDURE — A4216 STERILE WATER/SALINE, 10 ML: HCPCS | Performed by: INTERNAL MEDICINE

## 2024-01-15 PROCEDURE — 94760 N-INVAS EAR/PLS OXIMETRY 1: CPT

## 2024-01-15 PROCEDURE — 21400001 HC TELEMETRY ROOM

## 2024-01-15 PROCEDURE — 25000003 PHARM REV CODE 250: Performed by: NURSE PRACTITIONER

## 2024-01-15 PROCEDURE — C1751 CATH, INF, PER/CENT/MIDLINE: HCPCS

## 2024-01-15 PROCEDURE — 36415 COLL VENOUS BLD VENIPUNCTURE: CPT | Performed by: STUDENT IN AN ORGANIZED HEALTH CARE EDUCATION/TRAINING PROGRAM

## 2024-01-15 PROCEDURE — 25000242 PHARM REV CODE 250 ALT 637 W/ HCPCS: Performed by: STUDENT IN AN ORGANIZED HEALTH CARE EDUCATION/TRAINING PROGRAM

## 2024-01-15 PROCEDURE — 99900035 HC TECH TIME PER 15 MIN (STAT)

## 2024-01-15 PROCEDURE — 63600175 PHARM REV CODE 636 W HCPCS: Performed by: STUDENT IN AN ORGANIZED HEALTH CARE EDUCATION/TRAINING PROGRAM

## 2024-01-15 PROCEDURE — 99900031 HC PATIENT EDUCATION (STAT)

## 2024-01-15 PROCEDURE — 80202 ASSAY OF VANCOMYCIN: CPT | Performed by: STUDENT IN AN ORGANIZED HEALTH CARE EDUCATION/TRAINING PROGRAM

## 2024-01-15 PROCEDURE — 63600175 PHARM REV CODE 636 W HCPCS: Performed by: INTERNAL MEDICINE

## 2024-01-15 PROCEDURE — 25000003 PHARM REV CODE 250: Performed by: INTERNAL MEDICINE

## 2024-01-15 PROCEDURE — C9113 INJ PANTOPRAZOLE SODIUM, VIA: HCPCS | Performed by: INTERNAL MEDICINE

## 2024-01-15 PROCEDURE — 85025 COMPLETE CBC W/AUTO DIFF WBC: CPT | Performed by: INTERNAL MEDICINE

## 2024-01-15 RX ORDER — LISINOPRIL 20 MG/1
40 TABLET ORAL DAILY
Status: DISCONTINUED | OUTPATIENT
Start: 2024-01-15 | End: 2024-01-16 | Stop reason: HOSPADM

## 2024-01-15 RX ORDER — SODIUM CHLORIDE 0.9 % (FLUSH) 0.9 %
10 SYRINGE (ML) INJECTION
Status: DISCONTINUED | OUTPATIENT
Start: 2024-01-15 | End: 2024-01-16 | Stop reason: HOSPADM

## 2024-01-15 RX ORDER — SODIUM CHLORIDE 0.9 % (FLUSH) 0.9 %
10 SYRINGE (ML) INJECTION EVERY 6 HOURS
Status: DISCONTINUED | OUTPATIENT
Start: 2024-01-15 | End: 2024-01-16 | Stop reason: HOSPADM

## 2024-01-15 RX ORDER — PRAVASTATIN SODIUM 20 MG/1
20 TABLET ORAL NIGHTLY
Status: DISCONTINUED | OUTPATIENT
Start: 2024-01-15 | End: 2024-01-15

## 2024-01-15 RX ORDER — CEFAZOLIN SODIUM 2 G/50ML
2 SOLUTION INTRAVENOUS
Status: DISCONTINUED | OUTPATIENT
Start: 2024-01-15 | End: 2024-01-16 | Stop reason: HOSPADM

## 2024-01-15 RX ORDER — TAMSULOSIN HYDROCHLORIDE 0.4 MG/1
0.4 CAPSULE ORAL DAILY
Status: DISCONTINUED | OUTPATIENT
Start: 2024-01-15 | End: 2024-01-16 | Stop reason: HOSPADM

## 2024-01-15 RX ADMIN — HEPARIN SODIUM 5000 UNITS: 5000 INJECTION, SOLUTION INTRAVENOUS; SUBCUTANEOUS at 09:01

## 2024-01-15 RX ADMIN — SODIUM CHLORIDE 10 ML: 9 INJECTION INTRAMUSCULAR; INTRAVENOUS; SUBCUTANEOUS at 09:01

## 2024-01-15 RX ADMIN — IPRATROPIUM BROMIDE AND ALBUTEROL SULFATE 3 ML: 2.5; .5 SOLUTION RESPIRATORY (INHALATION) at 01:01

## 2024-01-15 RX ADMIN — IPRATROPIUM BROMIDE AND ALBUTEROL SULFATE 3 ML: 2.5; .5 SOLUTION RESPIRATORY (INHALATION) at 12:01

## 2024-01-15 RX ADMIN — TAMSULOSIN HYDROCHLORIDE 0.4 MG: 0.4 CAPSULE ORAL at 04:01

## 2024-01-15 RX ADMIN — TIZANIDINE 4 MG: 4 TABLET ORAL at 09:01

## 2024-01-15 RX ADMIN — CEFAZOLIN SODIUM 2 G: 2 SOLUTION INTRAVENOUS at 12:01

## 2024-01-15 RX ADMIN — HYDROCODONE BITARTRATE AND ACETAMINOPHEN 1 TABLET: 10; 325 TABLET ORAL at 12:01

## 2024-01-15 RX ADMIN — TIZANIDINE 4 MG: 4 TABLET ORAL at 05:01

## 2024-01-15 RX ADMIN — CHLORHEXIDINE GLUCONATE 15 ML: 1.2 RINSE ORAL at 08:01

## 2024-01-15 RX ADMIN — PANTOPRAZOLE SODIUM 40 MG: 40 INJECTION, POWDER, FOR SOLUTION INTRAVENOUS at 08:01

## 2024-01-15 RX ADMIN — IPRATROPIUM BROMIDE AND ALBUTEROL SULFATE 3 ML: 2.5; .5 SOLUTION RESPIRATORY (INHALATION) at 07:01

## 2024-01-15 RX ADMIN — LISINOPRIL 40 MG: 20 TABLET ORAL at 04:01

## 2024-01-15 RX ADMIN — HEPARIN SODIUM 5000 UNITS: 5000 INJECTION, SOLUTION INTRAVENOUS; SUBCUTANEOUS at 05:01

## 2024-01-15 RX ADMIN — CEFAZOLIN SODIUM 2 G: 2 SOLUTION INTRAVENOUS at 07:01

## 2024-01-15 RX ADMIN — TIZANIDINE 4 MG: 4 TABLET ORAL at 04:01

## 2024-01-15 RX ADMIN — SODIUM CHLORIDE, PRESERVATIVE FREE 10 ML: 5 INJECTION INTRAVENOUS at 06:01

## 2024-01-15 RX ADMIN — PREDNISONE 10 MG: 5 TABLET ORAL at 08:01

## 2024-01-15 RX ADMIN — ACETAMINOPHEN 650 MG: 325 TABLET ORAL at 04:01

## 2024-01-15 RX ADMIN — CHLORHEXIDINE GLUCONATE 15 ML: 1.2 RINSE ORAL at 09:01

## 2024-01-15 RX ADMIN — HYDRALAZINE HYDROCHLORIDE 100 MG: 25 TABLET ORAL at 09:01

## 2024-01-15 RX ADMIN — HYDROCODONE BITARTRATE AND ACETAMINOPHEN 1 TABLET: 10; 325 TABLET ORAL at 03:01

## 2024-01-15 RX ADMIN — SENNOSIDES 8.6 MG: 8.6 TABLET ORAL at 09:01

## 2024-01-15 RX ADMIN — POLYETHYLENE GLYCOL 3350 17 G: 17 POWDER, FOR SOLUTION ORAL at 08:01

## 2024-01-15 RX ADMIN — IPRATROPIUM BROMIDE AND ALBUTEROL SULFATE 3 ML: 2.5; .5 SOLUTION RESPIRATORY (INHALATION) at 08:01

## 2024-01-15 RX ADMIN — ATORVASTATIN CALCIUM 20 MG: 20 TABLET, FILM COATED ORAL at 08:01

## 2024-01-15 RX ADMIN — HEPARIN SODIUM 5000 UNITS: 5000 INJECTION, SOLUTION INTRAVENOUS; SUBCUTANEOUS at 04:01

## 2024-01-15 RX ADMIN — DILTIAZEM HYDROCHLORIDE 180 MG: 180 CAPSULE, COATED, EXTENDED RELEASE ORAL at 08:01

## 2024-01-15 RX ADMIN — HYDRALAZINE HYDROCHLORIDE 100 MG: 25 TABLET ORAL at 08:01

## 2024-01-15 RX ADMIN — HYDROCODONE BITARTRATE AND ACETAMINOPHEN 1 TABLET: 10; 325 TABLET ORAL at 09:01

## 2024-01-15 NOTE — CLINICAL REVIEW
Chart reviewed   Afebrile.  T-max 97.6°.    WBC 23--17--14--14.    On vancomycin for Staphylococcus lugdunensis   I reviewed pharmacy note.  It seems patient is needing vancomycin 1.75 g IV q.day  No new findings      Official CADY results    Left Ventricle: Normal wall motion. There is normal systolic function.    Aortic Valve: The aortic valve is a trileaflet valve. Moderately calcified cusps. There is moderate stenosis. Aortic valve area by VTI is 1.16 cm². Aortic valve peak velocity is 3.48 m/s. Mean gradient is 30 mmHg. There is moderate aortic regurgitation.    Mitral Valve: Mildly calcified anterior leaflet.    There are no ovious vegetations noted. The aortic valve and mitral valve leaflets are calcified and very small vegetations cannot be completely ruled out. Clinical correlation needed.

## 2024-01-15 NOTE — PROCEDURES
Scooter Swan is a 73 y.o. male patient.    Temp: 98.2 °F (36.8 °C) (01/15/24 1157)  Pulse: 87 (01/15/24 1258)  Resp: 16 (01/15/24 1258)  BP: (!) 164/79 (01/15/24 1157)  SpO2: 97 % (01/15/24 1258)  Weight: 80.7 kg (177 lb 14.6 oz) (01/10/24 0137)  Height: 6' (182.9 cm) (01/10/24 0137)    PICC  Date/Time: 1/15/2024 2:25 PM  Performed by: Kike Au RN  Consent Done: Yes  Time out: Immediately prior to procedure a time out was called to verify the correct patient, procedure, equipment, support staff and site/side marked as required  Indications: med administration and vascular access  Anesthesia: local infiltration  Local anesthetic: lidocaine 2% without epinephrine  Anesthetic Total (mL): 2  Preparation: skin prepped with chlorhexidine (without alcohol)  Skin prep agent dried: skin prep agent completely dried prior to procedure  Sterile barriers: all five maximum sterile barriers used - cap, mask, sterile gown, sterile gloves, and large sterile sheet  Hand hygiene: hand hygiene performed prior to central venous catheter insertion  Location details: right basilic  Catheter type: double lumen  Catheter size: 5 Fr  Catheter Length: 37cm    Ultrasound guidance: yes  Vessel Caliber: medium and patent, compressibility normal  Vascular Doppler: not done  Needle advanced into vessel with real time Ultrasound guidance.  Guidewire confirmed in vessel.  Sterile sheath used.  no esophageal manometryNumber of attempts: 1  Post-procedure: blood return through all ports, chlorhexidine patch and sterile dressing applied            Name YOSELIN RN  1/15/2024

## 2024-01-15 NOTE — PROGRESS NOTES
Pharmacy Consult Discontinuation: Vancomycin    Scooter Swan [id number] is a 73 y.o. male was consulted for vancomycin pharmacotherapy management by pharmacy.    Pharmacy consult for vancomycin dosing is no longer required.  Vancomycin was discontinued 01/15/2024 by Dr. Pedroza @ 1028.    Thank you for allowing us to participate in this patient's care. Should you have any questions or concerns please feel free to contact the pharmacy department at [phone].    Kun Cooper RPH

## 2024-01-15 NOTE — SUBJECTIVE & OBJECTIVE
Interval History:  Patient reports improvement in pain.  White blood cells 12.18 today.  Hemoglobin 7.6.  Repeat blood cultures no growth to date.  PICC line to be placed.    Review of Systems   Constitutional:  Negative for chills and diaphoresis.   Respiratory:  Negative for cough, choking and shortness of breath.    Cardiovascular:  Negative for chest pain.     Objective:     Vital Signs (Most Recent):  Temp: 98.4 °F (36.9 °C) (01/15/24 0715)  Pulse: 81 (01/15/24 0715)  Resp: 16 (01/15/24 0715)  BP: (!) 157/76 (01/15/24 0715)  SpO2: 96 % (01/15/24 0715) Vital Signs (24h Range):  Temp:  [97.6 °F (36.4 °C)-98.6 °F (37 °C)] 98.4 °F (36.9 °C)  Pulse:  [78-92] 81  Resp:  [16-20] 16  SpO2:  [95 %-100 %] 96 %  BP: (129-167)/(65-92) 157/76     Weight: 80.7 kg (177 lb 14.6 oz)  Body mass index is 24.13 kg/m².    Intake/Output Summary (Last 24 hours) at 1/15/2024 1021  Last data filed at 1/15/2024 0930  Gross per 24 hour   Intake 1060 ml   Output 175 ml   Net 885 ml         Physical Exam  Constitutional:       Appearance: Normal appearance.   Cardiovascular:      Rate and Rhythm: Normal rate.   Pulmonary:      Effort: Pulmonary effort is normal. No respiratory distress.      Comments: On RA   No wheezing appreciated   Abdominal:      General: Abdomen is flat. Bowel sounds are normal.   Skin:     General: Skin is warm.   Neurological:      General: No focal deficit present.      Mental Status: He is alert.   Psychiatric:         Mood and Affect: Mood normal.         Behavior: Behavior normal.             Significant Labs: All pertinent labs within the past 24 hours have been reviewed.  CBC:   Recent Labs   Lab 01/14/24  0428 01/15/24  0332   WBC 14.01* 12.18   HGB 7.8* 7.6*   HCT 24.4* 24.5*   * 133*     CMP:   Recent Labs   Lab 01/14/24  0428 01/15/24  0332    137   K 3.6 3.9    104   CO2 23 26   * 145*   BUN 9 6*   CREATININE 0.9 0.9   CALCIUM 8.6* 8.4*   PROT 5.8* 5.8*   ALBUMIN 3.4* 3.3*    BILITOT 0.5 0.4   ALKPHOS 44* 44*   AST 16 18   ALT 13 16   ANIONGAP 9 7*       Significant Imaging: I have reviewed all pertinent imaging results/findings within the past 24 hours.

## 2024-01-15 NOTE — ASSESSMENT & PLAN NOTE
Blood cultures Staphylococcus lugdunensis   Vancomycin started   Infectious disease consulted  Repeat blood cultures   Plan for PICC line placement  and we will anticipate 6 weeks of IV antibiotics.

## 2024-01-15 NOTE — CARE UPDATE
01/15/24 0715   Patient Assessment/Suction   Level of Consciousness (AVPU) alert   Respiratory Effort Normal;Unlabored   All Lung Fields Breath Sounds Anterior:;Lateral:;clear   Cough Type no productive sputum   PRE-TX-O2   Device (Oxygen Therapy) room air   SpO2 96 %   Pulse Oximetry Type Intermittent   $ Pulse Oximetry - Multiple Charge Pulse Oximetry - Multiple   Pulse 81   Resp 16   Aerosol Therapy   $ Aerosol Therapy Charges Aerosol Treatment   Daily Review of Necessity (SVN) completed   Respiratory Treatment Status (SVN) given   Treatment Route (SVN) mask;oxygen   Patient Position (SVN) HOB elevated   Post Treatment Assessment (SVN) breath sounds improved   Signs of Intolerance (SVN) none   Breath Sounds Post-Respiratory Treatment   Throughout All Fields Post-Treatment All Fields   Throughout All Fields Post-Treatment aeration increased   Post-treatment Heart Rate (beats/min) 78   Post-treatment Resp Rate (breaths/min) 16

## 2024-01-15 NOTE — PROGRESS NOTES
[facility] Medicine  Progress Note    Patient Name: Scooter Swan  MRN: [id number]  Patient Class: IP- Inpatient   Admission Date: 1/9/2024  Length of Stay: 6 days  Attending Physician: Austin Rabago MD  Primary Care Provider: Salvador Kennedy DO        Subjective:     Principal Problem:Coag negative Staphylococcus bacteremia        HPI:  73-year-old male with a history of seropositive rheumatoid arthritis, thrombocytopenia, anemia , BPH, COPD, gastroesophageal reflux disease, hypertension, aortic stenosis, and hyperlipidemia initially presented to the [facility] on January 9 with dyspnea, cough, and body aches.  He had negative respiratory virus panel, negative COVID, negative flu, and negative influenza swabs.  He left there and subsequently went to [facility].  He presented there with dyspnea and flu-like symptoms for 1 week.  He reported chest discomfort earlier in the day but none during his emergency department stay.  Labs included elevated D-dimer and mild leukocytosis along with mild elevation in troponin that trended down during his stay.  CTA of the chest had no evidence of PE, though there was an opacified bronchus in the left lower lobe that may be due to mucus. In the emergency department he received albuterol, aspirin, azithromycin, Rocephin, Norco, DuoNeb, and Solu-Medrol.  They are requesting transfer to Hospital Medicine at [facility] for further treatment of dyspnea along with evaluation of elevation in troponin/aortic stenosis.  With the history of aortic stenosis and elevated troponinis, case discussed with Cardiology at [facility], and they are available for consultation.     Sodium 141, potassium 3.6, chloride 106, CO2 24, BUN 15, creatinine 1.22, glucose 114, AST 16, ALT 11, high sensitivity troponin 31 with repeat 26 (normal range 2-19), BNP 73, D-dimer 36106, white blood cells 13.1, hemoglobin 10.4, hematocrit  32.3, platelets 192, influenza negative, COVID negative     CT chest for PE protocol noted patient motion artifact.  No evidence of pulmonary embolism.  No mediastinal or hilar adenopathy.  Thoracic aorta is normal caliber.  Heart size is normal.  Opacified bronchus in the left lower lobe may be due to mucus.  There a few bands of subsegmental atelectasis in the lingula.  No airspace consolidation.  2 cm thin walled air cyst is apparent in the right lower lobe.  0 pleural effusions.  Bones are intact.     October 2022: Echocardiogram had EF 60%.  Grade 1 diastolic dysfunction.  Moderate to severe aortic valve stenosis with aortic valve area 1.35 cm2, mean gradient 47 mmHg, peak velocity 4.49 M/S.     VS:  Temperature 98.1°, pulse 109, respirations 26, blood pressure 133/75, O2 sats 99%    In my encounter, patient complains of shortness a breath that has been occurring for the past 1 month.  Worse with any kind of activity.  Associated with a cough and congestion.  Denies any fever, chills, nausea, vomiting, abdominal pains, chest pains, or lower extremity edema.  He also states he was rheumatoid arthritis in his pain throughout his body and takes Norco 10 mg regularly.    Overview/Hospital Course:  Mr. Swan is a 73-year-old male with a history of seropositive rheumatoid arthritis, thrombocytopenia, anemia , BPH, COPD, gastroesophageal reflux disease, hypertension, aortic stenosis, and hyperlipidemia who presents w/SOB. He initially presented to [facility] but was transferred here for cardiology evaluation in setting of elevated troponins and aortic stenosis. CTPE was negative for PE but did note opacified bronchus likely 2/2 mucus. Troponin 31-->26 at OSH and stable here. Negative respiratory viral panel. Cardiology consulted and repeat echo ordered. Per cardiology SOB likely 2/2 COPD/emphysema. Duonebs ordered. TTE from October 2022: Echocardiogram had EF 60%.  Grade 1 diastolic dysfunction.  Moderate to severe  aortic valve stenosis with aortic valve area 1.35 cm2, mean gradient 47 mmHg, peak velocity 4.49 M/S.  Repeat echocardiogram (1/10/24) per Cardiology showed no significant progression compared to previous.  No further inpatient cardiac workup necessary, follow up with cardiologist outpatient.  Blood cultures showed Gram-positive cocci, PCR positive for Staphylococcus lugdunensis.  Infectious disease consulted, patient started on vancomycin.  Cardiology reconsulted for GLENN per Infectious Disease.  Glenn done on 01/12/2024 showed no evidence of vegetation.  Patient developed SVT on 01/13, status post adenosine with improvement in heart rates. Discussed with Infectious Disease given calcifications seen on GLENN, we will anticipate IV antibiotics for 6 weeks.  Follow up repeat cultures.       Interval History:  Patient reports improvement in pain.  White blood cells 12.18 today.  Hemoglobin 7.6.  Repeat blood cultures no growth to date.  PICC line to be placed.    Review of Systems   Constitutional:  Negative for chills and diaphoresis.   Respiratory:  Negative for cough, choking and shortness of breath.    Cardiovascular:  Negative for chest pain.     Objective:     Vital Signs (Most Recent):  Temp: 98.4 °F (36.9 °C) (01/15/24 0715)  Pulse: 81 (01/15/24 0715)  Resp: 16 (01/15/24 0715)  BP: (!) 157/76 (01/15/24 0715)  SpO2: 96 % (01/15/24 0715) Vital Signs (24h Range):  Temp:  [97.6 °F (36.4 °C)-98.6 °F (37 °C)] 98.4 °F (36.9 °C)  Pulse:  [78-92] 81  Resp:  [16-20] 16  SpO2:  [95 %-100 %] 96 %  BP: (129-167)/(65-92) 157/76     Weight: 80.7 kg (177 lb 14.6 oz)  Body mass index is 24.13 kg/m².    Intake/Output Summary (Last 24 hours) at 1/15/2024 1021  Last data filed at 1/15/2024 0930  Gross per 24 hour   Intake 1060 ml   Output 175 ml   Net 885 ml         Physical Exam  Constitutional:       Appearance: Normal appearance.   Cardiovascular:      Rate and Rhythm: Normal rate.   Pulmonary:      Effort: Pulmonary effort is  normal. No respiratory distress.      Comments: On RA   No wheezing appreciated   Abdominal:      General: Abdomen is flat. Bowel sounds are normal.   Skin:     General: Skin is warm.   Neurological:      General: No focal deficit present.      Mental Status: He is alert.   Psychiatric:         Mood and Affect: Mood normal.         Behavior: Behavior normal.             Significant Labs: All pertinent labs within the past 24 hours have been reviewed.  CBC:   Recent Labs   Lab 01/14/24  0428 01/15/24  0332   WBC 14.01* 12.18   HGB 7.8* 7.6*   HCT 24.4* 24.5*   * 133*     CMP:   Recent Labs   Lab 01/14/24  0428 01/15/24  0332    137   K 3.6 3.9    104   CO2 23 26   * 145*   BUN 9 6*   CREATININE 0.9 0.9   CALCIUM 8.6* 8.4*   PROT 5.8* 5.8*   ALBUMIN 3.4* 3.3*   BILITOT 0.5 0.4   ALKPHOS 44* 44*   AST 16 18   ALT 13 16   ANIONGAP 9 7*       Significant Imaging: I have reviewed all pertinent imaging results/findings within the past 24 hours.    Assessment/Plan:      * Coag negative Staphylococcus bacteremia  Blood cultures Staphylococcus lugdunensis   Vancomycin started   Infectious disease consulted  Repeat blood cultures   Plan for PICC line placement  and we will anticipate 6 weeks of IV antibiotics.      SVT (supraventricular tachycardia)   SVT developed on  01/13/2024, status post adenosine 6 mg   Heart rate currently better, sinus tachycardia   Cardiology notified      Leukocytosis  Blood cultures positive for Staph bacteremia  Started on vancomycin    Elevated d-dimer, PE ruled out  D-dimer 10,369.    CT angiogram chest shows no evidence of PE.    Dvt US negative         Elevated troponin  Static      Stage 3a chronic kidney disease  Creatine stable for now. BMP reviewed- noted Estimated Creatinine Clearance: 79 mL/min (based on SCr of 0.9 mg/dL). according to latest data. Based on current GFR, CKD stage is stage 3 - GFR 30-59.  Monitor UOP and serial BMP and adjust therapy as needed.  Renally dose meds. Avoid nephrotoxic medications and procedures.    Continue to monitor with daily CMP.    SOB (shortness of breath)  Echo 2022 Summary    The left ventricle is normal in size with concentric hypertrophy and normal systolic function.  Moderate left atrial enlargement.  Grade I left ventricular diastolic dysfunction.  The estimated ejection fraction is 60%.  Normal right ventricular size with normal right ventricular systolic function.  There is moderate-to-severe aortic valve stenosis.  Aortic valve area is 1.35 cm2; peak velocity is 4.49 m/s; mean gradient is 47 mmHg.  Moderate aortic regurgitation.  Mild tricuspid regurgitation.  Intermediate central venous pressure (8 mmHg).  The estimated PA systolic pressure is 35 mmHg.  The ascending aorta is mildly dilated.    Shortness of breath likely secondary to COPD exacerbation  -cont duonebs   On RA  will need pulmonology follow up outpatient.    Moderate aortic stenosis  Repeat TTE showed EF 65-70%, grade 1 diastolic dysfunction.  Moderate to severe aortic stenosis.  Per Cardiology no significant progression compared to prior echocardiogram   Cardiology consulted for W[place]ON to evaluate for possible vegetation given positive blood cultures, [place] on 01/12 showed no vegetations.      RA (rheumatoid arthritis)  Continue home tizanidine, prednisone, and Norco      Mixed hyperlipidemia  Cont statin        VTE Risk Mitigation (From admission, onward)           Ordered     heparin (porcine) injection 5,000 Units  Every 8 hours         01/10/24 0121     IP VTE HIGH RISK PATIENT  Once         01/10/24 0121     Place sequential compression device  Until discontinued         01/10/24 0121                    Discharge Planning   AMY: 1/16/2024     Code Status: Full Code   Is the patient medically ready for discharge?:     Reason for patient still in hospital (select all that apply): Patient trending condition  Discharge Plan A: Home with family                  Warajani  MD Hima  Department of Hospital Medicine   [facility]

## 2024-01-15 NOTE — PROGRESS NOTES
[facility]  Department of Infectious Disease  Progress Note        PATIENT NAME: Scooter Swan  MRN: [id number]  TODAY'S DATE: 01/15/2024  ADMIT DATE: 1/9/2024    PRINCIPLE PROBLEM: Coag negative Staphylococcus bacteremia    INTERVAL HISTORY      1/15 (Yovany):  Interim reviewed, patient seen and examined at bedside, wife present.  He is sitting in bed, reports he is night sweats are less frequent but he still has them.  Hypertensive, afebrile.  He is complaining of having difficulties emptying his bladder, discussed with hospitalist to resume home meds.  Discussed with patient CADY results and plan to continue empiric treatment for Staph lugdunensis bacteremia, cannot exclude endocarditis, plan for 6 weeks with weekly labs, will have him come to the office by the end of treatment to remove line.  Labs reviewed, white count down to 12, no left shift, H&H 7.6/24.5, platelet count 133, thrombocytopenia, stable kidney and liver function tests, CRP ordered with AM labs.  Micro reviewed, sensitivities from Staph lugdunensis reviewed, pansensitive, sensitive to oxacillin and penicillin.  Repeat blood cultures x2 sets no growth to date, pending final.    01/13/2023 dr Gonzalez Awake. No new complaints. Wife in room. We discussed treatment,  abx, duration, PICC line and care, labs etc.   Afebrile, Same aches and pains. WBC 16--23--17--14, CRP 13,   As per chart he had SVT that broke with adenosine  Vancomycin trough gets measured tomorrow on 14 th 1/12 @ 11:28:  Interim reviewed, patient seen before CADY today, he is a little somnolent, complaining of generalized weakness, fatigue, bilateral wrist, shoulder pain.  Discussed with Cardiology, preliminary CADY report with out evidence of vegetations although all cardiac valves are calcified.  Stable, afebrile, adequate urinary output, had one bowel movement yesterday.  Labs reviewed, leukocytosis down to 17 2, left shift 77 6%, H&H 7.9/25, platelet count 146,  thrombocytopenia.  Creatinine 1.1, normal LFTs, CRP yesterday 14.2, slightly high, procalcitonin negative.  MRSA nasal swab negative.  Repeat blood cultures x2 sets no growth to date, pending final.  Awaiting sensitivities of Staph lugdunensis, results available over the weekend.         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, likely oral origin   No skin abscesses  Repeat blood 1/11 cultures x2 sets no growth to date   MRSA nasal swab negative   Echo with moderate to severe AS, not all valves visualized  CADY preliminary report with no evidence of vegetations, all cardiac valves with calcifications  Procal negative  CRP 14.2-->13.6    Moderate to severe aortic stenosis    Psoriatic arthritis, rheumatoid arthritis on daily 10 mg prednisone    PMHx: BPH, anemia, thrombocytopenia, GERD, hypertension, hyperlipidemia and COPD and a history of Hep C treated with Harvoni       Recommendations:    Anticipating PICC line to be placed Tuesday 1/16 to continue IV antibiotics for at least 6 weeks given CADY findings  Upon discharge, cefazolin 2g IV q.8 OR Cefazolin 6g IV continuous infusion daily for 6 weeks from negative blood cultures, estimated end date 02/22/2024   Weekly labs including CBC with diff, CMP, CRP while on antibiotics   Weekly dressing changes  Follow-up ID clinic in 6 weeks, office to set up appointment   Aspiration precautions  Follows with Rheumatology in [place]     D/w patient and wife, Dr Rabago     SUBJECTIVE        Review of Systems  Review of systems obtained and negative except as  stated above in Interval History       OBJECTIVE     Temp:  [97.6 °F (36.4 °C)-98.6 °F (37 °C)] 98.2 °F (36.8 °C)  Pulse:  [78-95] 95  Resp:  [16-20] 20  SpO2:  [95 %-100 %] 98 %  BP: (129-167)/(65-92) 164/79    Physical Exam    General: Pleasant elderly male, lying in bed,  breathing comfortable on room air, reports feeling tired today  Eyes: Eyes with no icterus or injection. Vision grossly normal.  Arcus senilis.  Ears: Hearing grossly normal.  Nose: Nares patent  Mouth: Moist mucous membranes, dentition is poor with missing teeth. No ulcerations, erythema or exudates.  Neck: Supple, no tenderness to palpation.  Cardiovascular: Regular rate and rhythm, + harsh murmur, no edema.    Respiratory:  Clear to auscultation bilaterally anterior and posterior, no tachypnea or increased work of breathing.  On room air.  Gastrointestinal:  Soft and obese with active bowel sounds, no tenderness to palpation, no distention.  Genitourinary:  No suprapubic tenderness.  Musculoskeletal:  Moves all extremities with equal strength.    Skin:  Warm and dry, light brown patches to anterior chest. Clubbing b/l   Neuro:   Oriented, conversant, follows commands.  Psych: Good mood, normal affect.  VAD: PIV  Isolation: None    WOUNDS: none  VAD: PIV  ISOLATION:  None    CBC LAST 7 DAYS  Recent Labs   Lab 01/10/24  0408 01/11/24  0452 01/12/24  0358 01/13/24  0435 01/14/24  0428 01/15/24  0332   WBC 16.93* 23.62* 17.29* 14.26* 14.01* 12.18   RBC 3.19* 2.89* 2.80* 3.08* 2.70* 2.67*   HGB 9.1* 8.3* 7.9* 8.8* 7.8* 7.6*   HCT 29.0* 26.4* 25.5* 28.0* 24.4* 24.5*   MCV 91 91 91 91 90 92   MCH 28.5 28.7 28.2 28.6 28.9 28.5   MCHC 31.4* 31.4* 31.0* 31.4* 32.0 31.0*   RDW 14.2 14.3 14.3 14.4 14.4 14.5    165 146* 143* 124* 133*   MPV 12.4 12.0 12.4 13.1* 12.7 12.7   GRAN 89.4*  15.1* 82.5*  19.5* 77.6*  13.4* 64.4  9.2* 68.4  9.6* 66.7  8.1*   LYMPH 6.9*  1.2 8.5*  2.0 11.0*  1.9 20.8  3.0 17.5*  2.5 18.6  2.3   MONO 2.7*  0.5  "8.1  1.9* 10.4  1.8* 13.3  1.9* 11.9  1.7* 12.5  1.5*   BASO 0.02 0.02 0.01 0.03 0.04 0.05   NRBC 0 0 0 0 0 0         CHEMISTRY LAST 7 DAYS  Recent Labs   Lab 01/10/24  0408 01/11/24  0452 01/12/24  0358 01/13/24  0435 01/14/24  0428 01/15/24  0332    137 137 139 137 137   K 4.9 4.7 4.0 3.9 3.6 3.9    106 105 106 105 104   CO2 25 23 27 26 23 26   ANIONGAP 8 8 5* 7* 9 7*   BUN 20 24* 16 12 9 6*   CREATININE 1.1 1.3 1.1 1.0 0.9 0.9   * 131* 123* 97 123* 145*   CALCIUM 9.0 9.0 8.4* 8.5* 8.6* 8.4*   MG  --   --   --  2.1  --   --    ALBUMIN 3.8 3.5 3.5 3.6 3.4* 3.3*   PROT 6.6 6.3 6.1 6.4 5.8* 5.8*   ALKPHOS 51* 44* 42* 46* 44* 44*   ALT 10 12 10 15 13 16   AST 14 18 15 20 16 18   BILITOT 0.5 0.5 0.5 0.5 0.5 0.4         Estimated Creatinine Clearance: 79 mL/min (based on SCr of 0.9 mg/dL).    INFLAMMATORY/PROCAL  LAST 7 DAYS  Recent Labs   Lab 01/11/24  1636 01/13/24 0435   PROCAL 0.086  --    CRP 14.2* 13.6*       No results found for: "ESR"  CRP   Date Value Ref Range Status   01/13/2024 13.6 (H) 0.0 - 8.2 mg/L Final   01/11/2024 14.2 (H) 0.0 - 8.2 mg/L Final   10/17/2023 6.8 0.0 - 8.2 mg/L Final   02/16/2023 7.1 0.0 - 8.2 mg/L Final   05/13/2022 12.6 (H) 0.0 - 8.2 mg/L Final   10/09/2020 2.0 0.0 - 8.2 mg/L Final   07/09/2020 7.5 0.0 - 8.2 mg/L Final         LAST 7 DAYS MICROBIOLOGY   Microbiology Results (last 7 days)       Procedure Component Value Units Date/Time    Blood culture [[id number]] Collected: 01/10/24 0903    Order Status: Completed Specimen: Blood from Arm Updated: 01/15/24 1032     Blood Culture, Routine No growth after 5 days.    Blood culture [[id number]] Collected: 01/11/24 1645    Order Status: Completed Specimen: Blood from Peripheral, Left Hand Updated: 01/14/24 1832     Blood Culture, Routine No Growth to date      No Growth to date      No Growth to date      No Growth to date    Narrative:      Collection has been rescheduled by ARSH at 01/11/2024 15:32 Reason: "   Patient unavailable. Patient going into X-ray.  Collection has been rescheduled by ARSH at 01/11/2024 15:32 Reason:   Patient unavailable. Patient going into X-ray.    Blood culture [[id number]] Collected: 01/11/24 1109    Order Status: Completed Specimen: Blood from Antecubital, Right Arm Updated: 01/14/24 1232     Blood Culture, Routine No Growth to date      No Growth to date      No Growth to date      No Growth to date    Blood culture [[id number]]  (Abnormal)  (Susceptibility) Collected: 01/10/24 0904    Order Status: Completed Specimen: Blood from Arm Updated: 01/14/24 0658     Blood Culture, Routine Gram stain aer bottle: Gram positive cocci      Results called to and read back by: DEEDEE CORDERO RN [facility].      01/11/2024  06:05 TGC      Gram stain nikita bottle: Gram positive cocci      Positive results previously called 01/13/2024  06:18 KS3      STAPHYLOCOCCUS LUGDUNENSIS    MRSA Screen by PCR [[id number]] Collected: 01/11/24 1642    Order Status: Completed Specimen: Nasopharyngeal Swab from Nasal Updated: 01/11/24 1901     MRSA SCREEN BY PCR Negative    Culture, Respiratory with Gram Stain [[id number]]     Order Status: No result Specimen: Respiratory     Rapid Organism ID by PCR (from Blood culture) [[id number]]  (Abnormal) Collected: 01/10/24 0904    Order Status: Completed Updated: 01/11/24 0803     Enterococcus faecalis Not Detected     Enterococcus faecium Not Detected     Listeria monocytogenes Not Detected     Staphylococcus spp. See species for ID     Staphylococcus aureus Not Detected     Staphylococcus epidermidis Not Detected     Staphylococcus lugdunensis Detected     Streptococcus species Not Detected     Streptococcus agalactiae Not Detected     Streptococcus pneumoniae Not Detected     Streptococcus pyogenes Not Detected     Acinetobacter calcoaceticus/baumannii complex Not Detected     Bacteroides fragilis Not Detected     Enterobacterales Not Detected     Enterobacter cloacae complex Not  Detected     Escherichia coli Not Detected     Klebsiella aerogenes Not Detected     Klebsiella oxytoca Not Detected     Klebsiella pneumoniae group Not Detected     Proteus Not Detected     Salmonella sp Not Detected     Serratia marcescens Not Detected     Haemophilus influenzae Not Detected     Neisseria meningtidis Not Detected     Pseudomonas aeruginosa Not Detected     Stenotrophomonas maltophilia Not Detected     Candida albicans Not Detected     Candida auris Not Detected     Candida glabrata Not Detected     Candida krusei Not Detected     Candida parapsilosis Not Detected     Candida tropicalis Not Detected     Cryptococcus neoformans/gattii Not Detected     CTX-M (ESBL ) Test not applicable     IMP (Carbapenem resistant) Test not applicable     KPC resistance gene (Carbapenem resistant) Test not applicable     mcr-1  Test not applicable     mec A/C  Not Detected     mec A/C and MREJ (MRSA) gene Test not applicable     NDM (Carbapenem resistant) Test not applicable     OXA-48-like (Carbapenem resistant) Test not applicable     van A/B (VRE gene) Test not applicable     VIM (Carbapenem resistant) Test not applicable    Respiratory Infection Panel (PCR), Nasopharyngeal [[id number]] Collected: 01/10/24 1047    Order Status: Completed Specimen: Nasopharyngeal Swab Updated: 01/10/24 1222     Respiratory Infection Panel Source NP swab     Adenovirus Not Detected     Coronavirus 229E, Common Cold Virus Not Detected     Coronavirus HKU1, Common Cold Virus Not Detected     Coronavirus NL63, Common Cold Virus Not Detected     Coronavirus OC43, Common Cold Virus Not Detected     Comment: Coronavirus strains 229E, HKU1, NL63, and OC43 can cause the common   cold   and are not associated with the respiratory disease outbreak caused   by  the COVID-19 (SARS-CoV-2 novel Coronavirus) strain.           SARS-CoV2 (COVID-19) Qualitative PCR Not Detected     Human Metapneumovirus Not Detected     Human  Rhinovirus/Enterovirus Not Detected     Influenza A (subtypes H1, H1-2009,H3) Not Detected     Influenza B Not Detected     Parainfluenza Virus 1 Not Detected     Parainfluenza Virus 2 Not Detected     Parainfluenza Virus 3 Not Detected     Parainfluenza Virus 4 Not Detected     Respiratory Syncytial Virus Not Detected     Bordetella Parapertussis ([id number]) Not Detected     Bordetella pertussis (ptxP) Not Detected     Chlamydia pneumoniae Not Detected     Mycoplasma pneumoniae Not Detected     Comment: Respiratory Infection Panel testing performed by Multiplex PCR.       Narrative:      Respiratory Infection Panel source->NP Swab            SUSCEPTIBILITY  Susceptibility data from last 90 days.  Collected Specimen Info Organism Ceftriaxone Clindamycin Erythromycin Oxacillin Penicillin Tetracycline Trimeth/Sulfa Vancomycin   01/10/24 Blood from Arm Staphylococcus lugdunensis  S  S  S  S  S  S  S  S   01/10/24 Blood from Arm No growth after 5 days.                 CURRENT/PREVIOUS VISIT EKG  Results for orders placed or performed during the hospital encounter of 01/09/24   EKG 12-lead    Collection Time: 01/13/24  9:21 AM    Narrative    Test Reason : R06.00,    Vent. Rate : 104 BPM     Atrial Rate : 104 BPM     P-R Int : 142 ms          QRS Dur : 084 ms      QT Int : 342 ms       P-R-T Axes : 032 -24 021 degrees     QTc Int : 449 ms    Sinus tachycardia  Otherwise normal ECG  When compared with ECG of 13-JAN-2024 08:54,  Vent. rate has decreased BY  60 BPM  ST no longer depressed in Inferior leads  ST no longer depressed in Anterior leads  Nonspecific T wave abnormality, improved in Lateral leads    Referred By: EVERETT SABILLON           Confirmed By:        Significant Labs: All pertinent labs within the past 24 hours have been reviewed.     CADY 1/12/24:    Left Ventricle: Normal wall motion. There is normal systolic function.    Aortic Valve: The aortic valve is a trileaflet valve. Moderately calcified cusps. There is  moderate stenosis. Aortic valve area by VTI is 1.16 cm². Aortic valve peak velocity is 3.48 m/s. Mean gradient is 30 mmHg. There is moderate aortic regurgitation.    Mitral Valve: Mildly calcified anterior leaflet.    There are no ovious vegetations noted. The aortic valve and mitral valve leaflets are calcified and very small vegetations cannot be completely ruled out. Clinical correlation needed.    ECHO 1/10/24:    Left Ventricle: Moderately increased wall thickness. There is concentric hypertrophy. There is normal systolic function with a visually estimated ejection fraction of 65 - 70%. Grade I diastolic dysfunction. E/A ratio is 0.56.    Aortic Valve: There is aortic valve sclerosis. There is moderate to severe stenosis. Aortic valve area by VTI is 1.32 cm². Aortic valve peak velocity is 3.68 m/s. Mean gradient is 32 mmHg. Aortic Valve peak gradient is 54 mmHg. The dimensionless index is 0.38. There is mild aortic regurgitation.    Right Ventricle: Right ventricle was not assessed.    Right Atrium: Not assessed.    Mitral Valve: There is anterior leaflet sclerosis.    Tricuspid Valve: Not assessed. There is mild regurgitation.    Overall the study quality was technically difficult. The study was difficult due to patient's body habitus    CXR: Normal chest.     LE venous US: No DVT of the bilateral lower extremity veins.       Denisse Palm MD  Date of Service: 01/15/2024

## 2024-01-15 NOTE — CARE UPDATE
01/14/24 2028   Patient Assessment/Suction   Level of Consciousness (AVPU) alert   Respiratory Effort Normal   Expansion/Accessory Muscles/Retractions no use of accessory muscles   All Lung Fields Breath Sounds diminished   Rhythm/Pattern, Respiratory unlabored   Cough Frequency no cough   PRE-TX-O2   Device (Oxygen Therapy) room air   SpO2 98 %   Pulse Oximetry Type Intermittent   $ Pulse Oximetry - Multiple Charge Pulse Oximetry - Multiple   Pulse 92   Resp 19   Aerosol Therapy   $ Aerosol Therapy Charges Aerosol Treatment   Daily Review of Necessity (SVN) completed   Respiratory Treatment Status (SVN) given   Treatment Route (SVN) mask   Patient Position (SVN) semi-Stewart's   Post Treatment Assessment (SVN) breath sounds unchanged;vital signs unchanged   Signs of Intolerance (SVN) none   Education   $ Education Bronchodilator;15 min   Respiratory Evaluation   $ Care Plan Tech Time 15 min

## 2024-01-16 VITALS
SYSTOLIC BLOOD PRESSURE: 131 MMHG | WEIGHT: 177.94 LBS | DIASTOLIC BLOOD PRESSURE: 83 MMHG | HEART RATE: 105 BPM | BODY MASS INDEX: 24.1 KG/M2 | RESPIRATION RATE: 18 BRPM | TEMPERATURE: 99 F | OXYGEN SATURATION: 98 % | HEIGHT: 72 IN

## 2024-01-16 PROBLEM — D72.829 LEUKOCYTOSIS: Status: RESOLVED | Noted: 2024-01-10 | Resolved: 2024-01-16

## 2024-01-16 PROBLEM — R79.89 ELEVATED TROPONIN: Status: RESOLVED | Noted: 2024-01-10 | Resolved: 2024-01-16

## 2024-01-16 PROBLEM — R06.02 SOB (SHORTNESS OF BREATH): Status: RESOLVED | Noted: 2022-09-20 | Resolved: 2024-01-16

## 2024-01-16 PROBLEM — R79.89 ELEVATED D-DIMER: Status: RESOLVED | Noted: 2024-01-10 | Resolved: 2024-01-16

## 2024-01-16 LAB
ALBUMIN SERPL BCP-MCNC: 3.4 G/DL (ref 3.5–5.2)
ALP SERPL-CCNC: 38 U/L (ref 55–135)
ALT SERPL W/O P-5'-P-CCNC: 16 U/L (ref 10–44)
ANION GAP SERPL CALC-SCNC: 5 MMOL/L (ref 8–16)
AST SERPL-CCNC: 18 U/L (ref 10–40)
BACTERIA BLD CULT: NORMAL
BACTERIA BLD CULT: NORMAL
BASOPHILS # BLD AUTO: 0.03 K/UL (ref 0–0.2)
BASOPHILS NFR BLD: 0.3 % (ref 0–1.9)
BILIRUB SERPL-MCNC: 0.4 MG/DL (ref 0.1–1)
BUN SERPL-MCNC: 5 MG/DL (ref 8–23)
CALCIUM SERPL-MCNC: 8.3 MG/DL (ref 8.7–10.5)
CHLORIDE SERPL-SCNC: 104 MMOL/L (ref 95–110)
CO2 SERPL-SCNC: 28 MMOL/L (ref 23–29)
CREAT SERPL-MCNC: 1.1 MG/DL (ref 0.5–1.4)
CRP SERPL-MCNC: 8.6 MG/L (ref 0–8.2)
DIFFERENTIAL METHOD BLD: ABNORMAL
EOSINOPHIL # BLD AUTO: 0 K/UL (ref 0–0.5)
EOSINOPHIL NFR BLD: 0.3 % (ref 0–8)
ERYTHROCYTE [DISTWIDTH] IN BLOOD BY AUTOMATED COUNT: 14.8 % (ref 11.5–14.5)
EST. GFR  (NO RACE VARIABLE): >60 ML/MIN/1.73 M^2
GLUCOSE SERPL-MCNC: 113 MG/DL (ref 70–110)
HCT VFR BLD AUTO: 24.8 % (ref 40–54)
HGB BLD-MCNC: 7.7 G/DL (ref 14–18)
IMM GRANULOCYTES # BLD AUTO: 0.22 K/UL (ref 0–0.04)
IMM GRANULOCYTES NFR BLD AUTO: 1.9 % (ref 0–0.5)
LYMPHOCYTES # BLD AUTO: 2.3 K/UL (ref 1–4.8)
LYMPHOCYTES NFR BLD: 20.1 % (ref 18–48)
MCH RBC QN AUTO: 28.7 PG (ref 27–31)
MCHC RBC AUTO-ENTMCNC: 31 G/DL (ref 32–36)
MCV RBC AUTO: 93 FL (ref 82–98)
MONOCYTES # BLD AUTO: 1.3 K/UL (ref 0.3–1)
MONOCYTES NFR BLD: 11.5 % (ref 4–15)
NEUTROPHILS # BLD AUTO: 7.6 K/UL (ref 1.8–7.7)
NEUTROPHILS NFR BLD: 65.9 % (ref 38–73)
NRBC BLD-RTO: 0 /100 WBC
PLATELET # BLD AUTO: 128 K/UL (ref 150–450)
PMV BLD AUTO: 12.2 FL (ref 9.2–12.9)
POTASSIUM SERPL-SCNC: 3.9 MMOL/L (ref 3.5–5.1)
PROT SERPL-MCNC: 5.7 G/DL (ref 6–8.4)
RBC # BLD AUTO: 2.68 M/UL (ref 4.6–6.2)
SODIUM SERPL-SCNC: 137 MMOL/L (ref 136–145)
WBC # BLD AUTO: 11.52 K/UL (ref 3.9–12.7)

## 2024-01-16 PROCEDURE — 63600175 PHARM REV CODE 636 W HCPCS: Performed by: INTERNAL MEDICINE

## 2024-01-16 PROCEDURE — A4216 STERILE WATER/SALINE, 10 ML: HCPCS | Performed by: INTERNAL MEDICINE

## 2024-01-16 PROCEDURE — C9113 INJ PANTOPRAZOLE SODIUM, VIA: HCPCS | Performed by: INTERNAL MEDICINE

## 2024-01-16 PROCEDURE — 94640 AIRWAY INHALATION TREATMENT: CPT

## 2024-01-16 PROCEDURE — 94761 N-INVAS EAR/PLS OXIMETRY MLT: CPT

## 2024-01-16 PROCEDURE — 25000003 PHARM REV CODE 250: Performed by: NURSE PRACTITIONER

## 2024-01-16 PROCEDURE — 85025 COMPLETE CBC W/AUTO DIFF WBC: CPT | Performed by: INTERNAL MEDICINE

## 2024-01-16 PROCEDURE — 80053 COMPREHEN METABOLIC PANEL: CPT | Performed by: INTERNAL MEDICINE

## 2024-01-16 PROCEDURE — 63600175 PHARM REV CODE 636 W HCPCS: Performed by: STUDENT IN AN ORGANIZED HEALTH CARE EDUCATION/TRAINING PROGRAM

## 2024-01-16 PROCEDURE — 25000242 PHARM REV CODE 250 ALT 637 W/ HCPCS: Performed by: STUDENT IN AN ORGANIZED HEALTH CARE EDUCATION/TRAINING PROGRAM

## 2024-01-16 PROCEDURE — 86140 C-REACTIVE PROTEIN: CPT | Performed by: STUDENT IN AN ORGANIZED HEALTH CARE EDUCATION/TRAINING PROGRAM

## 2024-01-16 PROCEDURE — A4216 STERILE WATER/SALINE, 10 ML: HCPCS | Performed by: STUDENT IN AN ORGANIZED HEALTH CARE EDUCATION/TRAINING PROGRAM

## 2024-01-16 PROCEDURE — 25000003 PHARM REV CODE 250: Performed by: STUDENT IN AN ORGANIZED HEALTH CARE EDUCATION/TRAINING PROGRAM

## 2024-01-16 PROCEDURE — 25000003 PHARM REV CODE 250: Performed by: INTERNAL MEDICINE

## 2024-01-16 RX ORDER — CEFAZOLIN SODIUM 2 G/50ML
2000 SOLUTION INTRAVENOUS EVERY 8 HOURS
Qty: 4500 ML | Refills: 1
Start: 2024-01-16 | End: 2024-02-22

## 2024-01-16 RX ADMIN — PREDNISONE 10 MG: 5 TABLET ORAL at 08:01

## 2024-01-16 RX ADMIN — PANTOPRAZOLE SODIUM 40 MG: 40 INJECTION, POWDER, FOR SOLUTION INTRAVENOUS at 08:01

## 2024-01-16 RX ADMIN — CEFAZOLIN SODIUM 2 G: 2 SOLUTION INTRAVENOUS at 03:01

## 2024-01-16 RX ADMIN — POLYETHYLENE GLYCOL 3350 17 G: 17 POWDER, FOR SOLUTION ORAL at 08:01

## 2024-01-16 RX ADMIN — TAMSULOSIN HYDROCHLORIDE 0.4 MG: 0.4 CAPSULE ORAL at 08:01

## 2024-01-16 RX ADMIN — ATORVASTATIN CALCIUM 20 MG: 20 TABLET, FILM COATED ORAL at 08:01

## 2024-01-16 RX ADMIN — HYDROCODONE BITARTRATE AND ACETAMINOPHEN 1 TABLET: 10; 325 TABLET ORAL at 02:01

## 2024-01-16 RX ADMIN — HEPARIN SODIUM 5000 UNITS: 5000 INJECTION, SOLUTION INTRAVENOUS; SUBCUTANEOUS at 05:01

## 2024-01-16 RX ADMIN — CHLORHEXIDINE GLUCONATE 15 ML: 1.2 RINSE ORAL at 08:01

## 2024-01-16 RX ADMIN — HEPARIN SODIUM 5000 UNITS: 5000 INJECTION, SOLUTION INTRAVENOUS; SUBCUTANEOUS at 02:01

## 2024-01-16 RX ADMIN — SODIUM CHLORIDE, PRESERVATIVE FREE 10 ML: 5 INJECTION INTRAVENOUS at 12:01

## 2024-01-16 RX ADMIN — SODIUM CHLORIDE, PRESERVATIVE FREE 10 ML: 5 INJECTION INTRAVENOUS at 06:01

## 2024-01-16 RX ADMIN — HYDRALAZINE HYDROCHLORIDE 100 MG: 25 TABLET ORAL at 08:01

## 2024-01-16 RX ADMIN — HYDROCODONE BITARTRATE AND ACETAMINOPHEN 2 TABLET: 5; 325 TABLET ORAL at 10:01

## 2024-01-16 RX ADMIN — IPRATROPIUM BROMIDE AND ALBUTEROL SULFATE 3 ML: 2.5; .5 SOLUTION RESPIRATORY (INHALATION) at 07:01

## 2024-01-16 RX ADMIN — CEFAZOLIN SODIUM 2 G: 2 SOLUTION INTRAVENOUS at 10:01

## 2024-01-16 RX ADMIN — IPRATROPIUM BROMIDE AND ALBUTEROL SULFATE 3 ML: 2.5; .5 SOLUTION RESPIRATORY (INHALATION) at 01:01

## 2024-01-16 RX ADMIN — HYDROCODONE BITARTRATE AND ACETAMINOPHEN 1 TABLET: 10; 325 TABLET ORAL at 05:01

## 2024-01-16 RX ADMIN — SODIUM CHLORIDE 10 ML: 9 INJECTION INTRAMUSCULAR; INTRAVENOUS; SUBCUTANEOUS at 09:01

## 2024-01-16 RX ADMIN — DILTIAZEM HYDROCHLORIDE 180 MG: 180 CAPSULE, COATED, EXTENDED RELEASE ORAL at 08:01

## 2024-01-16 RX ADMIN — LISINOPRIL 40 MG: 20 TABLET ORAL at 08:01

## 2024-01-16 RX ADMIN — TIZANIDINE 4 MG: 4 TABLET ORAL at 05:01

## 2024-01-16 NOTE — DISCHARGE INSTRUCTIONS
cefazolin 2g IV q.8 estimated end date 02/22/2024   Weekly labs including CBC with diff, CMP, CRP while on antibiotics   Weekly dressing changes  Follow-up ID clinic in 6 weeks, office to set up appointment   Aspiration precautions  Follows with Rheumatology in [place]

## 2024-01-16 NOTE — PLAN OF CARE
[facility]  Department of Infectious Disease  Plan of Care        PATIENT NAME: Scooter Swan  MRN: [id number]  TODAY'S DATE: 01/16/2024  ADMIT DATE: 1/9/2024  LENGTH OF STAY: 7 DAYS    INFECTIOUS DISEASE DISCHARGE RECOMMENDATIONS:  When patient is ready to be discharged by attending, Infectious Disease recommends the following    PICC line to be placed Tuesday 1/16 to continue IV antibiotics for at least 6 weeks given CADY findings  Upon discharge, cefazolin 2g IV q.8 OR Cefazolin 6g IV continuous infusion daily for 6 weeks from negative blood cultures, estimated end date 02/22/2024   Weekly labs including CBC with diff, CMP, CRP while on antibiotics   Weekly dressing changes  Follow-up ID clinic in 6 weeks, office to set up appointment   Aspiration precautions  Follows with Rheumatology in [place]     Infectious Disease will sign off. Please send Epic secure chat with any questions.    Denisse Palm MD  Date of Service: 01/16/2024  11:16 AM

## 2024-01-16 NOTE — DISCHARGE SUMMARY
[facility] Medicine  Discharge Summary      Patient Name: Scooter Swan  MRN: [id number]  ROSE: [id number]  Patient Class: IP- Inpatient  Admission Date: 1/9/2024  Hospital Length of Stay: 7 days  Discharge Date and Time:  01/16/2024 4:50 PM  Attending Physician: No att. providers found   Discharging Provider: Austin Rabago MD  Primary Care Provider: Salvador Kennedy DO    Primary Care Team: Networked reference to record PCT     HPI:   73-year-old male with a history of seropositive rheumatoid arthritis, thrombocytopenia, anemia , BPH, COPD, gastroesophageal reflux disease, hypertension, aortic stenosis, and hyperlipidemia initially presented to the [facility] on January 9 with dyspnea, cough, and body aches.  He had negative respiratory virus panel, negative COVID, negative flu, and negative influenza swabs.  He left there and subsequently went to [facility].  He presented there with dyspnea and flu-like symptoms for 1 week.  He reported chest discomfort earlier in the day but none during his emergency department stay.  Labs included elevated D-dimer and mild leukocytosis along with mild elevation in troponin that trended down during his stay.  CTA of the chest had no evidence of PE, though there was an opacified bronchus in the left lower lobe that may be due to mucus. In the emergency department he received albuterol, aspirin, azithromycin, Rocephin, Norco, DuoNeb, and Solu-Medrol.  They are requesting transfer to Hospital Medicine at [facility] for further treatment of dyspnea along with evaluation of elevation in troponin/aortic stenosis.  With the history of aortic stenosis and elevated troponinis, case discussed with Cardiology at [facility], and they are available for consultation.     Sodium 141, potassium 3.6, chloride 106, CO2 24, BUN 15, creatinine 1.22, glucose 114, AST 16, ALT 11, high sensitivity troponin 31 with repeat  26 (normal range 2-19), BNP 73, D-dimer 19467, white blood cells 13.1, hemoglobin 10.4, hematocrit 32.3, platelets 192, influenza negative, COVID negative     CT chest for PE protocol noted patient motion artifact.  No evidence of pulmonary embolism.  No mediastinal or hilar adenopathy.  Thoracic aorta is normal caliber.  Heart size is normal.  Opacified bronchus in the left lower lobe may be due to mucus.  There a few bands of subsegmental atelectasis in the lingula.  No airspace consolidation.  2 cm thin walled air cyst is apparent in the right lower lobe.  0 pleural effusions.  Bones are intact.     October 2022: Echocardiogram had EF 60%.  Grade 1 diastolic dysfunction.  Moderate to severe aortic valve stenosis with aortic valve area 1.35 cm2, mean gradient 47 mmHg, peak velocity 4.49 M/S.     VS:  Temperature 98.1°, pulse 109, respirations 26, blood pressure 133/75, O2 sats 99%    In my encounter, patient complains of shortness a breath that has been occurring for the past 1 month.  Worse with any kind of activity.  Associated with a cough and congestion.  Denies any fever, chills, nausea, vomiting, abdominal pains, chest pains, or lower extremity edema.  He also states he was rheumatoid arthritis in his pain throughout his body and takes Norco 10 mg regularly.    Procedure(s) (LRB):  Transesophageal echo (CADY) intra-procedure log documentation (N/A)      Hospital Course:   Mr. Swan is a 73-year-old male with a history of seropositive rheumatoid arthritis, thrombocytopenia, anemia , BPH, COPD, gastroesophageal reflux disease, hypertension, aortic stenosis, and hyperlipidemia who presents w/SOB. He initially presented to [facility] but was transferred here for cardiology evaluation in setting of elevated troponins and aortic stenosis. CTPE was negative for PE but did note opacified bronchus likely 2/2 mucus. Troponin 31-->26 at OSH and stable here. Negative respiratory viral panel. Cardiology consulted and  repeat echo ordered. Per cardiology SOB likely 2/2 COPD/emphysema. Duonebs ordered. TTE from October 2022: Echocardiogram had EF 60%.  Grade 1 diastolic dysfunction.  Moderate to severe aortic valve stenosis with aortic valve area 1.35 cm2, mean gradient 47 mmHg, peak velocity 4.49 M/S.  Repeat echocardiogram (1/10/24) per Cardiology showed no significant progression compared to previous.  No further inpatient cardiac workup necessary, follow up with cardiologist outpatient.  Blood cultures showed Gram-positive cocci, PCR positive for Staphylococcus lugdunensis.  Infectious disease consulted, patient started on vancomycin.  Cardiology reconsulted for GLENN per Infectious Disease.  Glenn done on 01/12/2024 showed no evidence of vegetation.  Patient developed SVT on 01/13, status post adenosine with improvement in heart rates. Discussed with Infectious Disease given calcifications seen on GLENN, we will anticipate IV antibiotics for 6 weeks.  Patient to follow up with Infectious Disease outpatient.  Patient also given referral to pulmonology for COPD evaluation, also given referral to Hematology for anemia evaluation.       Physical Exam  Constitutional:       Appearance: Normal appearance.   Cardiovascular:      Rate and Rhythm: Normal rate.   Pulmonary:      Effort: Pulmonary effort is normal. No respiratory distress.   Abdominal:      General: Abdomen is flat. Bowel sounds are normal.   Skin:     General: Skin is warm.   Neurological:      General: No focal deficit present.      Mental Status: He is alert.   Psychiatric:         Mood and Affect: Mood normal.         Behavior: Behavior normal.         Goals of Care Treatment Preferences:  Code Status: Full Code      Consults:   Consults (From admission, onward)          Status Ordering Provider     Inpatient consult to   Once        Provider:  (Not yet assigned)    Acknowledged NIK ESTRADA     Inpatient consult to   Once         Provider:  (Not yet assigned)    Acknowledged HAMED, WADIA     Inpatient consult to PICC Line Nurse  Once        Provider:  (Not yet assigned)    Acknowledged HAMED, WADIA     Inpatient consult to PICC Line Nurse  Once        Provider:  (Not yet assigned)    Acknowledged ASIDA TAN     Inpatient consult to Anesthesiology  Once        Provider:  Francois Levy MD    Acknowledged HAMCLAUDIO, WADIA     Inpatient consult to Infectious Diseases  Once        Provider:  Denisse Rios MD    Completed RAJNI, PIERO     Inpatient consult to Cardiology  Once        Provider:  Renan Slaughter MD    Completed BROCK HOLDER            No new Assessment & Plan notes have been filed under this hospital service since the last note was generated.  Service: Hospital Medicine    Final Active Diagnoses:    Diagnosis Date Noted POA    PRINCIPAL PROBLEM:  Coag negative Staphylococcus bacteremia [R78.81, B95.7] 01/11/2024 Yes    SVT (supraventricular tachycardia) [I47.10] 01/13/2024 No    Stage 3a chronic kidney disease [N18.31] 02/23/2023 Yes    Moderate aortic stenosis [I35.0] 08/24/2020 Yes    RA (rheumatoid arthritis) [M06.9] 01/29/2019 Yes    Mixed hyperlipidemia [E78.2] 01/29/2019 Yes      Problems Resolved During this Admission:    Diagnosis Date Noted Date Resolved POA    Elevated troponin [R79.89] 01/10/2024 01/16/2024 Yes    Elevated d-dimer, PE ruled out [R79.89] 01/10/2024 01/16/2024 Yes    Leukocytosis [D72.829] 01/10/2024 01/16/2024 No    SOB (shortness of breath) [R06.02] 09/20/2022 01/16/2024 Yes       Discharged Condition: good    Disposition: Home-Health Care Svc    Follow Up:   Follow-up Information       [facility]. Go on 1/23/2024.    Specialty: Primary Care  Why: Hospital follow up at 2:00 p.m. on 1/23.  Contact information:  [address]  [facility] [address] [phone]  Additional information:  [address]             Denisse Rios MD Follow up in 6  week(s).    Specialty: Infectious Diseases  Contact information:  [address]  [address]  [facility] [address]  [phone]               Zac Boucher MD Follow up in 2 week(s).    Specialties: Interventional Cardiology, Cardiology  Contact information:  [address] [facility] [address]  [phone]               [facility] Follow up.    Contact information:  [address] [facility] [address]  [phone]             [facility] Follow up.    Contact information:  [address]  [facility] [address]  [phone]                         Patient Instructions:      C-Reactive Protein   Standing Status: Standing Number of Occurrences: 6 Standing Exp. Date: 03/15/25     Comprehensive Metabolic Panel   Standing Status: Standing Number of Occurrences: 6 Standing Exp. Date: 03/15/25     CBC Auto Differential   Standing Status: Standing Number of Occurrences: 6 Standing Exp. Date: 03/15/25     Ambulatory referral/consult to Home Health   Standing Status: Future   Referral Priority: Routine Referral Type: Home Health   Referral Reason: Specialty Services Required   Requested Specialty: Home Health Services   Number of Visits Requested: 1     Ambulatory referral/consult to Pulmonology   Standing Status: Future   Referral Priority: Routine Referral Type: Consultation   Referral Reason: Specialty Services Required   Requested Specialty: Pulmonary Disease   Number of Visits Requested: 1     Ambulatory referral/consult to Hematology / Oncology   Standing Status: Future   Referral Priority: Routine Referral Type: Consultation   Referral Reason: Specialty Services Required   Requested Specialty: Hematology and Oncology   Number of Visits Requested: 1     Diet Cardiac     Notify your health care provider if you experience any of the following:  temperature >100.4     Notify your health care provider if you experience any of the following:  persistent nausea  and vomiting or diarrhea     Notify your health care provider if you experience any of the following:  severe uncontrolled pain     Notify your health care provider if you experience any of the following:  redness, tenderness, or signs of infection (pain, swelling, redness, odor or green/yellow discharge around incision site)     Activity as tolerated       Significant Diagnostic Studies: Labs: CMP   Recent Labs   Lab 01/15/24  0332 01/16/24  0404    137   K 3.9 3.9    104   CO2 26 28   * 113*   BUN 6* 5*   CREATININE 0.9 1.1   CALCIUM 8.4* 8.3*   PROT 5.8* 5.7*   ALBUMIN 3.3* 3.4*   BILITOT 0.4 0.4   ALKPHOS 44* 38*   AST 18 18   ALT 16 16   ANIONGAP 7* 5*    and CBC   Recent Labs   Lab 01/15/24  0332 01/16/24  0404   WBC 12.18 11.52   HGB 7.6* 7.7*   HCT 24.5* 24.8*   * 128*       Pending Diagnostic Studies:       None           Medications:  Reconciled Home Medications:      Medication List        START taking these medications      ceFAZolin 2 g/50mL Dextrose IVPB 2 gram/50 mL Pgbk  Commonly known as: ANCEF  Inject 50 mLs (2,000 mg total) into the vein every 8 (eight) hours.            CONTINUE taking these medications      albuterol 90 mcg/actuation inhaler  Commonly known as: PROVENTIL/VENTOLIN HFA  INHALE 1 PUFF BY MOUTH EVERY 4 HOURS AS NEEDED FOR WHEEZING OR SHORTNESS OF BREATH     cholecalciferol (vitamin D3) 50 mcg (2,000 unit) Cap capsule  Commonly known as: VITAMIN D3  Take 1 capsule (2,000 Units total) by mouth once daily.     cyanocobalamin 500 MCG tablet  Take 500 mcg by mouth once daily.     diclofenac sodium 1 % Gel  Commonly known as: VOLTAREN  Apply 2 grams  topically 4 (four) times daily.     diltiaZEM 180 MG 24 hr capsule  Commonly known as: CARDIZEM CD  TAKE ONE CAPSULE BY MOUTH ONCE DAILY     dorzolamide 2 % ophthalmic solution  Commonly known as: TRUSOPT  Place 1 drop into both eyes 3 (three) times daily.     doxazosin 2 MG tablet  Commonly known as: CARDURA  TAKE 1  TABLET (2 MG TOTAL) BY MOUTH EVERY EVENING     enalapril 20 MG tablet  Commonly known as: VASOTEC  TAKE ONE TABLET BY MOUTH TWO TIMES A DAY     hydrALAZINE 50 MG tablet  Commonly known as: APRESOLINE  TAKE 2 TABLETS BY MOUTH EVERY 12 HOURS     HYDROcodone-acetaminophen  mg per tablet  Commonly known as: NORCO  Take 1 tablet by mouth every 8 (eight) hours as needed for Pain.     hydrOXYzine HCL 25 MG tablet  Commonly known as: ATARAX  TAKE ONE TABLET BY MOUTH NIGHTLY AS NEEDED FOR ITCHING     multivitamin capsule  Take 1 capsule by mouth once daily.     mupirocin 2 % ointment  Commonly known as: BACTROBAN  Apply topically 3 (three) times daily.     MYRBETRIQ 25 mg Tb24 ER tablet  Generic drug: mirabegron  Take 1 tablet (25 mg total) by mouth once daily.     omeprazole 20 MG capsule  Commonly known as: PRILOSEC  Take 1 capsule (20 mg total) by mouth 2 (two) times a day.     polyethylene glycol 17 gram Pwpk  Commonly known as: GLYCOLAX  Take 17 g by mouth daily as needed.     pravastatin 20 MG tablet  Commonly known as: PRAVACHOL  TAKE ONE TABLET BY MOUTH ONCE DAILY     predniSONE 5 MG tablet  Commonly known as: DELTASONE  Take 2 tablets (10 mg total) by mouth once daily.     PROBIOTIC-10 ORAL  Take 1 capsule by mouth once daily.     sildenafiL 100 MG tablet  Commonly known as: VIAGRA  Take 1 tablet (100 mg total) by mouth as needed for Erectile Dysfunction.     tamsulosin 0.4 mg Cap  Commonly known as: FLOMAX  Take 1 capsule (0.4 mg total) by mouth once daily.     tiZANidine 4 MG tablet  Commonly known as: ZANAFLEX  Take 1 tablet (4 mg total) by mouth every 8 (eight) hours.     travoprost 0.004 % ophthalmic solution  Commonly known as: TRAVATAN Z  Place 1 drop into both eyes every evening.     zaleplon 10 MG capsule  Commonly known as: SONATA  TAKE ONE CAPSULE BY MOUTH EVERY EVENING              Indwelling Lines/Drains at time of discharge:   Lines/Drains/Airways       Peripherally Inserted Central Catheter Line   Duration             PICC Double Lumen 01/15/24 1425 right basilic 1 day                    Time spent on the discharge of patient: 35 minutes         Austin Rabago MD  Department of Hospital Medicine  [facility]

## 2024-01-16 NOTE — PLAN OF CARE
Patient will be discharging with IV antibiotics through [facility].  Missael with [facility] notified and auth requested from [facility] via [employer] fax.  Missael will be sending someone out to do teaching at bedside.      Home Health referral sent to [facility] via [facility] and auth requested from [facility].      ANGEL following.     1403-  received email from Marybeth with [facility] stating IV abx are approved ([id number]).  Per donald, patient is also accepted with [facility] services. Per Shai with [facility], will be here in about 20 minutes to do teaching at bedside.         01/16/24 1048   Post-Acute Status   Post-Acute Authorization Home Health;IV Infusion   Home Health Status Referrals Sent   IV Infusion Status Referral(s) sent   Discharge Plan   Discharge Plan A Home Health   Discharge Plan B Home Health

## 2024-01-16 NOTE — PLAN OF CARE
DC orders and chart reviewed. No discharge needs noted.  Patient cleared for discharge from .    Patient is discharging home with [facility] home health services.  Per Dena Grande, start of care will be tomorrow, 1/17.  Patient will be receiving home IV antibiotics with [facility].  Hospital follow up appointment scheduled with the dc clinic.      Patient ok to discharge once teaching is completed at beside by Shai with [facility].         01/16/24 1411   Final Note   Assessment Type Final Discharge Note   Anticipated Discharge Disposition Home-Health   What phone number can be called within the next 1-3 days to see how you are doing after discharge? [phone]   Hospital Resources/Appts/Education Provided Post-Acute resouces added to AVS;Appointments scheduled and added to AVS;Provided patient/caregiver with written discharge plan information   Post-Acute Status   Post-Acute Authorization Home Health;IV Infusion   Home Health Status Set-up Complete/Auth obtained   IV Infusion Status Set-up Complete/Auth obtained   Discharge Delays (!) Patient/Caregiver Education Not Complete

## 2024-01-16 NOTE — CARE UPDATE
01/15/24 2010   Patient Assessment/Suction   Level of Consciousness (AVPU) alert   Respiratory Effort Normal;Unlabored   Expansion/Accessory Muscles/Retractions no use of accessory muscles   All Lung Fields Breath Sounds clear   Rhythm/Pattern, Respiratory unlabored;pattern regular   Cough Frequency no cough   PRE-TX-O2   Device (Oxygen Therapy) room air   SpO2 96 %   Pulse Oximetry Type Intermittent   $ Pulse Oximetry - Single Charge Pulse Oximetry - Single   $ Pulse Oximetry - Multiple Charge Pulse Oximetry - Multiple   Pulse 88   Resp 18   Positioning HOB elevated 45 degrees   Positioning   Body Position position changed independently   Head of Bed (HOB) Positioning HOB at 30-45 degrees   Aerosol Therapy   $ Aerosol Therapy Charges Aerosol Treatment   Daily Review of Necessity (SVN) completed   Respiratory Treatment Status (SVN) given   Treatment Route (SVN) mask;oxygen   Patient Position (SVN) HOB elevated   Post Treatment Assessment (SVN) patient reports breathing improved   Signs of Intolerance (SVN) none   Education   $ Education Bronchodilator;15 min   Respiratory Evaluation   $ Care Plan Tech Time 15 min

## 2024-01-17 ENCOUNTER — PATIENT OUTREACH (OUTPATIENT)
Dept: ADMINISTRATIVE | Facility: CLINIC | Age: 74
End: 2024-01-17
Payer: MEDICARE

## 2024-01-17 ENCOUNTER — TELEPHONE (OUTPATIENT)
Dept: FAMILY MEDICINE | Facility: CLINIC | Age: 74
End: 2024-01-17
Payer: MEDICARE

## 2024-01-17 PROCEDURE — G0180 MD CERTIFICATION HHA PATIENT: HCPCS | Mod: ,,, | Performed by: INTERNAL MEDICINE

## 2024-01-17 NOTE — PROGRESS NOTES
C3 nurse spoke with Scooter Swan  for a TCC post hospital discharge follow up call. The patient has a scheduled HOSFU appointment with [facility] on 1/23/24 @ 1400.

## 2024-01-17 NOTE — TELEPHONE ENCOUNTER
Contacted pt and spoke with him regarding scheduling an hospital follow up however the pt has already called and scheduled an upcoming appt with Dr. Kennedy January 19th , 2024 @ 9:00 A

## 2024-01-19 ENCOUNTER — OFFICE VISIT (OUTPATIENT)
Dept: FAMILY MEDICINE | Facility: CLINIC | Age: 74
End: 2024-01-19
Payer: MEDICARE

## 2024-01-19 VITALS
SYSTOLIC BLOOD PRESSURE: 124 MMHG | HEIGHT: 72 IN | HEART RATE: 109 BPM | OXYGEN SATURATION: 98 % | WEIGHT: 180.31 LBS | BODY MASS INDEX: 24.42 KG/M2 | DIASTOLIC BLOOD PRESSURE: 70 MMHG

## 2024-01-19 DIAGNOSIS — R78.81 BACTEREMIA: ICD-10-CM

## 2024-01-19 DIAGNOSIS — J44.9 CHRONIC OBSTRUCTIVE PULMONARY DISEASE, UNSPECIFIED COPD TYPE: ICD-10-CM

## 2024-01-19 DIAGNOSIS — D64.9 ANEMIA, UNSPECIFIED TYPE: ICD-10-CM

## 2024-01-19 DIAGNOSIS — G47.00 INSOMNIA, UNSPECIFIED TYPE: ICD-10-CM

## 2024-01-19 DIAGNOSIS — Z09 HOSPITAL DISCHARGE FOLLOW-UP: Primary | ICD-10-CM

## 2024-01-19 DIAGNOSIS — D69.6 THROMBOCYTOPENIA: ICD-10-CM

## 2024-01-19 PROCEDURE — 99999 PR PBB SHADOW E&M-EST. PATIENT-LVL V: CPT | Mod: PBBFAC,,, | Performed by: INTERNAL MEDICINE

## 2024-01-19 PROCEDURE — 99214 OFFICE O/P EST MOD 30 MIN: CPT | Mod: S$GLB,,, | Performed by: INTERNAL MEDICINE

## 2024-01-19 RX ORDER — DIAZEPAM 10 MG/1
10 TABLET ORAL NIGHTLY
Qty: 30 TABLET | Refills: 0 | Status: SHIPPED | OUTPATIENT
Start: 2024-01-19 | End: 2024-02-19 | Stop reason: SDUPTHER

## 2024-01-19 RX ORDER — ALBUTEROL SULFATE 0.83 MG/ML
2.5 SOLUTION RESPIRATORY (INHALATION) EVERY 6 HOURS PRN
Qty: 90 ML | Refills: 0 | Status: SHIPPED | OUTPATIENT
Start: 2024-01-19 | End: 2024-02-06

## 2024-01-19 NOTE — PROGRESS NOTES
"   Patient ID: Scooter Swan is a 73 y.o. male.    Chief Complaint: Follow-up      Assessment and plan     1. Thrombocytopenia    2. Hospital discharge follow-up    3. Bacteremia  - Ambulatory referral/consult to Infectious Disease; Future  - Ambulatory referral/consult to Home Health; Future    4. Chronic obstructive pulmonary disease, unspecified COPD type  - tiotropium bromide (SPIRIVA RESPIMAT) 1.25 mcg/actuation inhaler; Inhale 2 puffs into the lungs once daily. Controller  Dispense: 4 g; Refill: 5  - NEBULIZER KIT (SUPPLIES) FOR HOME USE  - NEBULIZER FOR HOME USE  - albuterol (PROVENTIL) 2.5 mg /3 mL (0.083 %) nebulizer solution; Take 3 mLs (2.5 mg total) by nebulization every 6 (six) hours as needed for Wheezing. Rescue  Dispense: 90 mL; Refill: 0  - Ambulatory referral/consult to Home Health; Future    5. Insomnia, unspecified type  - diazePAM (VALIUM) 10 MG Tab; Take 1 tablet (10 mg total) by mouth every evening.  Dispense: 30 tablet; Refill: 0    6. Anemia, unspecified type  - CBC Auto Differential; Future  - Ambulatory referral/consult to Home Health; Future     ID referral here  Insomnia causing depression- treat with diazepam   CBC in 2 week   Virtual in 4 weeks     HPI     "Hospital Course:   Mr. Swan is a 73-year-old male with a history of seropositive rheumatoid arthritis, thrombocytopenia, anemia , BPH, COPD, gastroesophageal reflux disease, hypertension, aortic stenosis, and hyperlipidemia who presents w/SOB. He initially presented to [facility] but was transferred here for cardiology evaluation in setting of elevated troponins and aortic stenosis. CTPE was negative for PE but did note opacified bronchus likely 2/2 mucus. Troponin 31-->26 at OSH and stable here. Negative respiratory viral panel. Cardiology consulted and repeat echo ordered. Per cardiology SOB likely 2/2 COPD/emphysema. Duonebs ordered. TTE from October 2022: Echocardiogram had EF 60%.  Grade 1 diastolic dysfunction.  Moderate to " "severe aortic valve stenosis with aortic valve area 1.35 cm2, mean gradient 47 mmHg, peak velocity 4.49 M/S.  Repeat echocardiogram (1/10/24) per Cardiology showed no significant progression compared to previous.  No further inpatient cardiac workup necessary, follow up with cardiologist outpatient.  Blood cultures showed Gram-positive cocci, PCR positive for Staphylococcus lugdunensis.  Infectious disease consulted, patient started on vancomycin.  Cardiology reconsulted for GLENN per Infectious Disease.  Glenn done on 01/12/2024 showed no evidence of vegetation.  Patient developed SVT on 01/13, status post adenosine with improvement in heart rates. Discussed with Infectious Disease given calcifications seen on GLENN, we will anticipate IV antibiotics for 6 weeks.  Patient to follow up with Infectious Disease outpatient.  Patient also given referral to pulmonology for COPD evaluation, also given referral to Hematology for anemia evaluation."    He is currently on cefazolin via picc. He was not given much information about what he had. Has ID appointment but may want to go here for it.     Anemia- worsened possibly due to infection.     Has home health.     Having dyspnea and has history of COPD. Albuterol not helping.     He is having trouble falling asleep still despite, he has tried trazodone, hydroxyzine, sonata, lunesta. He would like to try valium and understands the risk in the setting of chronic hydrocodone use.     Review of Systems   Constitutional:  Positive for fatigue. Negative for fever.   Respiratory:  Positive for shortness of breath.    Cardiovascular:  Negative for chest pain.   Gastrointestinal:  Negative for abdominal pain.        Objective     Vitals:    01/19/24 1410   BP: 124/70   Pulse: 109     Wt Readings from Last 3 Encounters:   01/19/24 1410 81.8 kg (180 lb 5.4 oz)   01/10/24 0137 80.7 kg (177 lb 14.6 oz)   01/10/24 0920 80.7 kg (177 lb 14.6 oz)      Body mass index is 24.46 kg/m².     Physical " Exam  Cardiovascular:      Rate and Rhythm: Normal rate and regular rhythm.      Heart sounds: No murmur heard.     No gallop.   Pulmonary:      Breath sounds: Normal breath sounds. No wheezing or rhonchi.   Abdominal:      Palpations: Abdomen is soft.      Tenderness: There is no abdominal tenderness.            Hypertension Medications               diltiaZEM (CARDIZEM CD) 180 MG 24 hr capsule TAKE ONE CAPSULE BY MOUTH ONCE DAILY    doxazosin (CARDURA) 2 MG tablet TAKE 1 TABLET (2 MG TOTAL) BY MOUTH EVERY EVENING    enalapril (VASOTEC) 20 MG tablet TAKE ONE TABLET BY MOUTH TWO TIMES A DAY    hydrALAZINE (APRESOLINE) 50 MG tablet TAKE 2 TABLETS BY MOUTH EVERY 12 HOURS           Hyperlipidemia Medications               pravastatin (PRAVACHOL) 20 MG tablet TAKE ONE TABLET BY MOUTH ONCE DAILY           Medication List with Changes/Refills   New Medications    ALBUTEROL (PROVENTIL) 2.5 MG /3 ML (0.083 %) NEBULIZER SOLUTION    Take 3 mLs (2.5 mg total) by nebulization every 6 (six) hours as needed for Wheezing. Rescue    DIAZEPAM (VALIUM) 10 MG TAB    Take 1 tablet (10 mg total) by mouth every evening.    TIOTROPIUM BROMIDE (SPIRIVA RESPIMAT) 1.25 MCG/ACTUATION INHALER    Inhale 2 puffs into the lungs once daily. Controller   Current Medications    ALBUTEROL (PROVENTIL/VENTOLIN HFA) 90 MCG/ACTUATION INHALER    INHALE 1 PUFF BY MOUTH EVERY 4 HOURS AS NEEDED FOR WHEEZING OR SHORTNESS OF BREATH    CEFAZOLIN 2 G/50ML DEXTROSE IVPB (ANCEF) 2 GRAM/50 ML PGBK    Inject 50 mLs (2,000 mg total) into the vein every 8 (eight) hours.    CHOLECALCIFEROL, VITAMIN D3, (VITAMIN D3) 50 MCG (2,000 UNIT) CAP    Take 1 capsule (2,000 Units total) by mouth once daily.    CYANOCOBALAMIN 500 MCG TABLET    Take 500 mcg by mouth once daily.    DICLOFENAC SODIUM (VOLTAREN) 1 % GEL    Apply 2 grams  topically 4 (four) times daily.    DILTIAZEM (CARDIZEM CD) 180 MG 24 HR CAPSULE    TAKE ONE CAPSULE BY MOUTH ONCE DAILY    DORZOLAMIDE (TRUSOPT) 2 %  OPHTHALMIC SOLUTION    Place 1 drop into both eyes 3 (three) times daily.    DOXAZOSIN (CARDURA) 2 MG TABLET    TAKE 1 TABLET (2 MG TOTAL) BY MOUTH EVERY EVENING    ENALAPRIL (VASOTEC) 20 MG TABLET    TAKE ONE TABLET BY MOUTH TWO TIMES A DAY    HYDRALAZINE (APRESOLINE) 50 MG TABLET    TAKE 2 TABLETS BY MOUTH EVERY 12 HOURS    HYDROCODONE-ACETAMINOPHEN (NORCO)  MG PER TABLET    Take 1 tablet by mouth every 8 (eight) hours as needed for Pain.    HYDROXYZINE HCL (ATARAX) 25 MG TABLET    TAKE ONE TABLET BY MOUTH NIGHTLY AS NEEDED FOR ITCHING    LACTOBAC NO.41/BIFIDOBACT NO.7 (PROBIOTIC-10 ORAL)    Take 1 capsule by mouth once daily.    MIRABEGRON (MYRBETRIQ) 25 MG TB24 ER TABLET    Take 1 tablet (25 mg total) by mouth once daily.    MULTIVITAMIN CAPSULE    Take 1 capsule by mouth once daily.    MUPIROCIN (BACTROBAN) 2 % OINTMENT    Apply topically 3 (three) times daily.    OMEPRAZOLE (PRILOSEC) 20 MG CAPSULE    Take 1 capsule (20 mg total) by mouth 2 (two) times a day.    POLYETHYLENE GLYCOL (GLYCOLAX) 17 GRAM PWPK    Take 17 g by mouth daily as needed.    PRAVASTATIN (PRAVACHOL) 20 MG TABLET    TAKE ONE TABLET BY MOUTH ONCE DAILY    PREDNISONE (DELTASONE) 5 MG TABLET    Take 2 tablets (10 mg total) by mouth once daily.    SILDENAFIL (VIAGRA) 100 MG TABLET    Take 1 tablet (100 mg total) by mouth as needed for Erectile Dysfunction.    TAMSULOSIN (FLOMAX) 0.4 MG CAP    Take 1 capsule (0.4 mg total) by mouth once daily.    TIZANIDINE (ZANAFLEX) 4 MG TABLET    Take 1 tablet (4 mg total) by mouth every 8 (eight) hours.    TRAVOPROST (TRAVATAN Z) 0.004 % OPHTHALMIC SOLUTION    Place 1 drop into both eyes every evening.    ZALEPLON (SONATA) 10 MG CAPSULE    TAKE ONE CAPSULE BY MOUTH EVERY EVENING       I personally reviewed past medical, family and social history.

## 2024-01-22 ENCOUNTER — PATIENT MESSAGE (OUTPATIENT)
Dept: CASE MANAGEMENT | Facility: HOSPITAL | Age: 74
End: 2024-01-22

## 2024-01-22 ENCOUNTER — TELEPHONE (OUTPATIENT)
Dept: INFECTIOUS DISEASES | Facility: CLINIC | Age: 74
End: 2024-01-22
Payer: MEDICARE

## 2024-01-22 ENCOUNTER — TELEPHONE (OUTPATIENT)
Dept: FAMILY MEDICINE | Facility: CLINIC | Age: 74
End: 2024-01-22
Payer: MEDICARE

## 2024-01-22 NOTE — TELEPHONE ENCOUNTER
Contacted pt and discussed that Dr. Piper will contact pt about breathing machine however I will fax referral again to the correct provider          ----- Message from Richa Baca sent at 1/22/2024  4:29 PM CST -----  Regarding: Pt Advice  Contact: pt wife  Needs Medical Advice      Who Called: Jacqueline        Would the patient rather a call back or a response via [facility]? Call back     Best Call Back Number: [phone]      Additional Information: Sts the pts breathing machine was sent to infectious disease when it was supposed to [facility] resp at home medical equipment in [place], Pt was able to  the medication, but that is all. Would like a call to get it  Please Advise - Thank you

## 2024-01-22 NOTE — TELEPHONE ENCOUNTER
Notified Shai with [facility] of referral to [facility] per PCP per Pt request.  Notified [facility] , spoke with Hunter, director , of referral to [facility] per PCP per pt request. Provided fax number for weekly labs to be faxed.

## 2024-01-23 ENCOUNTER — TELEPHONE (OUTPATIENT)
Dept: INFECTIOUS DISEASES | Facility: CLINIC | Age: 74
End: 2024-01-23
Payer: MEDICARE

## 2024-01-23 ENCOUNTER — TELEPHONE (OUTPATIENT)
Dept: FAMILY MEDICINE | Facility: CLINIC | Age: 74
End: 2024-01-23
Payer: MEDICARE

## 2024-01-23 RX ORDER — DILTIAZEM HYDROCHLORIDE 180 MG/1
180 CAPSULE, COATED, EXTENDED RELEASE ORAL DAILY
Qty: 30 CAPSULE | Refills: 2 | Status: SHIPPED | OUTPATIENT
Start: 2024-01-23 | End: 2024-02-29 | Stop reason: DRUGHIGH

## 2024-01-23 NOTE — TELEPHONE ENCOUNTER
----- Message from Michi Tomlin sent at 1/22/2024  4:44 PM CST -----  Regarding: Pt Advice  Contact: wife [phone], Pt [phone]  Lacie/Dr. Kennedy office calling regarding call from pt stating he is in need of a breathing machine that was to be ordered for him. Pt is having some breathing issues. Pt due to be seen on 2/22. Please call Pt wife [phone], Pt [phone]

## 2024-01-23 NOTE — TELEPHONE ENCOUNTER
Contacted Mr and Mrs. Swan stated that they will have to speak with insurance to see what type of nebulizer is being covered so that Mr. Swan can receive his nebulizer so he can breathe better

## 2024-01-23 NOTE — TELEPHONE ENCOUNTER
Called [facility] in [place], spoke with Debbie regarding breathing machine. Explained that pt was originally with ID in [place], but his PCP Dr. Kennedy asked Dr. Martinez to see pt per pt request as pt cannot travel to [place].   Patient is calling regarding Nebulizer Kit (supplies) for home use ordered by Dr. Kennedy on 1/19/24 at office visit, but pt states he does not have the machine.     Sent to Dr. Kennedy's staff and Dr. Kennedy for resolution of order.

## 2024-01-24 ENCOUNTER — TELEPHONE (OUTPATIENT)
Dept: FAMILY MEDICINE | Facility: CLINIC | Age: 74
End: 2024-01-24
Payer: MEDICARE

## 2024-01-24 NOTE — TELEPHONE ENCOUNTER
----- Message from Coby Preslsey, Patient Care Assistant sent at 1/23/2024  3:12 PM CST -----  Type: Needs Medical Advice  Who Called:  mauro   Chava Call Back Number: [phone]    Additional Information: mauro states they are still trying to get his nebulize  and it was sent to there wrong please and he can't breathe  please call to further discuss thank you

## 2024-01-24 NOTE — TELEPHONE ENCOUNTER
----- Message from Coby Pressley, Patient Care Assistant sent at 1/23/2024  3:12 PM CST -----  Type: Needs Medical Advice  Who Called:  mauro   Chava Call Back Number: [phone]    Additional Information: mauro states they are still trying to get his nebulize  and it was sent to there wrong please and he can't breathe  please call to further discuss thank you

## 2024-01-24 NOTE — TELEPHONE ENCOUNTER
----- Message from Coby Pressley, Patient Care Assistant sent at 1/23/2024  3:12 PM CST -----  Type: Needs Medical Advice  Who Called:  mauro   Chava Call Back Number: [phone]    Additional Information: mauro states they are still trying to get his nebulize  and it was sent to there wrong please and he can't breathe  please call to further discuss thank you

## 2024-01-25 ENCOUNTER — TELEPHONE (OUTPATIENT)
Dept: FAMILY MEDICINE | Facility: CLINIC | Age: 74
End: 2024-01-25
Payer: MEDICARE

## 2024-01-25 ENCOUNTER — NURSE TRIAGE (OUTPATIENT)
Dept: ADMINISTRATIVE | Facility: CLINIC | Age: 74
End: 2024-01-25
Payer: MEDICARE

## 2024-01-25 NOTE — TELEPHONE ENCOUNTER
Patient was recently prescribed a nebulizer but does not know how to obtain the device. Patient was connected to [facility] where orders were faxed on 1/24/24. Patient transferred.      Reason for Disposition   General information question, no triage required and triager able to answer question    Protocols used: Information Only Call - No Triage-A-[place]

## 2024-01-25 NOTE — TELEPHONE ENCOUNTER
Referral has been faxed to [facility] and spouse did call to confirm residence address so pt will be receiving his nebulizer in the mail           ----- Message from Diamone Speed sent at 1/25/2024 10:15 AM CST -----  Regarding: self  Type: Patient Call Back       Who called: self        What is the request in detail: pt stated that he can't not breath is is waiting on his breathing machine for a were and haven't receive it.        Can the clinic reply by [facility]? Yes       Would the patient rather a call back or a response via [facility]? Call back       Best call back number:[phone]      Additional Information:

## 2024-01-27 DIAGNOSIS — M05.9 SEROPOSITIVE RHEUMATOID ARTHRITIS: ICD-10-CM

## 2024-01-27 DIAGNOSIS — G89.4 CHRONIC PAIN SYNDROME: ICD-10-CM

## 2024-01-29 DIAGNOSIS — E78.49 OTHER HYPERLIPIDEMIA: ICD-10-CM

## 2024-01-29 RX ORDER — HYDROCODONE BITARTRATE AND ACETAMINOPHEN 10; 325 MG/1; MG/1
1 TABLET ORAL EVERY 8 HOURS PRN
Qty: 90 TABLET | Refills: 0 | Status: SHIPPED | OUTPATIENT
Start: 2024-01-29 | End: 2024-02-19 | Stop reason: SDUPTHER

## 2024-01-29 NOTE — TELEPHONE ENCOUNTER
No care due was identified.  [facility] Embedded Care Due Messages. Reference number: [id number].   1/29/2024 3:14:27 PM CST

## 2024-01-30 ENCOUNTER — OFFICE VISIT (OUTPATIENT)
Dept: FAMILY MEDICINE | Facility: CLINIC | Age: 74
End: 2024-01-30
Payer: MEDICARE

## 2024-01-30 ENCOUNTER — TELEPHONE (OUTPATIENT)
Dept: FAMILY MEDICINE | Facility: CLINIC | Age: 74
End: 2024-01-30
Payer: MEDICARE

## 2024-01-30 VITALS
SYSTOLIC BLOOD PRESSURE: 100 MMHG | BODY MASS INDEX: 24.63 KG/M2 | DIASTOLIC BLOOD PRESSURE: 58 MMHG | OXYGEN SATURATION: 97 % | WEIGHT: 181.88 LBS | HEART RATE: 75 BPM | HEIGHT: 72 IN

## 2024-01-30 DIAGNOSIS — R78.81 BACTEREMIA: ICD-10-CM

## 2024-01-30 DIAGNOSIS — R06.02 SHORTNESS OF BREATH: ICD-10-CM

## 2024-01-30 DIAGNOSIS — J43.9 PULMONARY EMPHYSEMA, UNSPECIFIED EMPHYSEMA TYPE: Primary | ICD-10-CM

## 2024-01-30 DIAGNOSIS — I10 ESSENTIAL HYPERTENSION: ICD-10-CM

## 2024-01-30 PROCEDURE — 99999 PR PBB SHADOW E&M-EST. PATIENT-LVL V: CPT | Mod: PBBFAC,,, | Performed by: PHYSICIAN ASSISTANT

## 2024-01-30 PROCEDURE — 99214 OFFICE O/P EST MOD 30 MIN: CPT | Mod: S$GLB,,, | Performed by: PHYSICIAN ASSISTANT

## 2024-01-30 RX ORDER — PRAVASTATIN SODIUM 20 MG/1
20 TABLET ORAL
Qty: 90 TABLET | Refills: 0 | Status: SHIPPED | OUTPATIENT
Start: 2024-01-30 | End: 2024-04-23

## 2024-01-30 NOTE — TELEPHONE ENCOUNTER
Scooter Swan  is requesting a refill authorization.  Brief Assessment and Rationale for Refill:  Approve     Medication Therapy Plan:         Comments:     Note composed:1:37 AM 01/30/2024

## 2024-01-30 NOTE — TELEPHONE ENCOUNTER
Attempted to reach out to pt to inform him that we have faxed over his clinical notes and order for a nebulizer over. Pt did not answer and was unable to leave a voicemail.

## 2024-01-30 NOTE — TELEPHONE ENCOUNTER
----- Message from Delicia Mcqueen sent at 1/30/2024 11:27 AM CST -----  Regarding: clinical notes  Contact: Carmelina  Type:  Needs Medical Advice    Who Called: Carmelina  Would the patient rather a call back or a response via [facility]? Call back  Best Call Back Number: [phone]  ext [phone]  fax [phone]  Additional Information: sts she needs clinical notes & order for the nebulizer from 1/19/24--please advise

## 2024-01-30 NOTE — TELEPHONE ENCOUNTER
Spoke to pt, pt stated he does not feel as bad as the time he previously had to go to the ER and has been staying hydrated with water and juice but still having low BP

## 2024-01-30 NOTE — PROGRESS NOTES
Subjective:      Patient ID: Scooter Swan is a 73 y.o. male.    Chief Complaint: Hypotension    Patient is new to me.  Patient and wife in visit.    HPI  Patient has PMH of pulmonary emphysema.    Patient was recently at [facility] and then transferred to [facility] for shortness of breath.  Found to have sepsis.    Patient reports shortness of breath is helped for a short time by breathing treatments times 3 daily.  Taking spiriva two puffs daily.  He states that his shortness of breath has improved somewhat since he went to the ER.  Denies chest pain or fever.    Review of Systems   Constitutional:  Negative for appetite change, chills and fever.   Respiratory:  Positive for shortness of breath.    Cardiovascular:  Negative for chest pain.   Gastrointestinal:  Negative for abdominal pain, blood in stool, constipation, diarrhea, nausea and vomiting.   Genitourinary:  Negative for dysuria, frequency and hematuria.       Objective:   BP (!) 100/58   Pulse 75   Ht 6' (1.829 m)   Wt 82.5 kg (181 lb 14.1 oz)   SpO2 97%   BMI 24.67 kg/m²     Physical Exam  Vitals reviewed.   Constitutional:       Appearance: Normal appearance. He is well-developed.   HENT:      Head: Normocephalic and atraumatic.      Right Ear: External ear normal.      Left Ear: External ear normal.   Eyes:      Conjunctiva/sclera: Conjunctivae normal.   Cardiovascular:      Rate and Rhythm: Normal rate and regular rhythm.      Heart sounds: Murmur heard.      No friction rub. No gallop.   Pulmonary:      Effort: No respiratory distress.      Breath sounds: Normal breath sounds. No wheezing, rhonchi or rales.   Musculoskeletal:         General: Normal range of motion.   Skin:     General: Skin is warm and dry.      Findings: No rash.      Comments: Right arm has IV antibiotics port in place.   Neurological:      General: No focal deficit present.      Mental Status: He is alert and oriented to person, place, and time.   Psychiatric:          Mood and Affect: Mood normal.         Behavior: Behavior normal.         Judgment: Judgment normal.       Assessment:      1. Pulmonary emphysema, unspecified emphysema type    2. Shortness of breath    3. Bacteremia    4. Essential hypertension       Plan:   1. Pulmonary emphysema, unspecified emphysema type  - Ambulatory referral/consult to Pulmonology; Future    2. Shortness of breath  Gave strict ER precautions for any worsening.  - Ambulatory referral/consult to Pulmonology; Future    3. Bacteremia  Infectious disease is managing this.    4. Essential hypertension  Decrease enalapril and hydralazine to once a day for low blood pressure.    Follow up as scheduled.  Patient agreed with plan and expressed understanding.    Thank you for allowing me to serve you,

## 2024-02-01 ENCOUNTER — EXTERNAL HOME HEALTH (OUTPATIENT)
Dept: HOME HEALTH SERVICES | Facility: HOSPITAL | Age: 74
End: 2024-02-01
Payer: MEDICARE

## 2024-02-01 DIAGNOSIS — E78.2 MIXED HYPERLIPIDEMIA: Primary | ICD-10-CM

## 2024-02-02 ENCOUNTER — OFFICE VISIT (OUTPATIENT)
Dept: HEMATOLOGY/ONCOLOGY | Facility: CLINIC | Age: 74
End: 2024-02-02
Payer: MEDICARE

## 2024-02-02 ENCOUNTER — LAB VISIT (OUTPATIENT)
Dept: LAB | Facility: HOSPITAL | Age: 74
End: 2024-02-02
Attending: INTERNAL MEDICINE
Payer: MEDICARE

## 2024-02-02 VITALS
HEIGHT: 72 IN | DIASTOLIC BLOOD PRESSURE: 100 MMHG | BODY MASS INDEX: 24.95 KG/M2 | SYSTOLIC BLOOD PRESSURE: 172 MMHG | HEART RATE: 84 BPM | TEMPERATURE: 98 F | WEIGHT: 184.19 LBS | OXYGEN SATURATION: 99 %

## 2024-02-02 DIAGNOSIS — D69.6 THROMBOCYTOPENIA: Primary | ICD-10-CM

## 2024-02-02 DIAGNOSIS — D64.9 ANEMIA, UNSPECIFIED TYPE: ICD-10-CM

## 2024-02-02 DIAGNOSIS — D69.6 THROMBOCYTOPENIA: ICD-10-CM

## 2024-02-02 DIAGNOSIS — Z78.9 IN DISTRESS: Primary | ICD-10-CM

## 2024-02-02 DIAGNOSIS — D72.829 LEUKOCYTOSIS, UNSPECIFIED TYPE: ICD-10-CM

## 2024-02-02 LAB
BASOPHILS # BLD AUTO: 0.05 K/UL (ref 0–0.2)
BASOPHILS NFR BLD: 0.5 % (ref 0–1.9)
DIFFERENTIAL METHOD BLD: ABNORMAL
EOSINOPHIL # BLD AUTO: 0.2 K/UL (ref 0–0.5)
EOSINOPHIL NFR BLD: 1.7 % (ref 0–8)
ERYTHROCYTE [DISTWIDTH] IN BLOOD BY AUTOMATED COUNT: 15.2 % (ref 11.5–14.5)
HCT VFR BLD AUTO: 25.6 % (ref 40–54)
HGB BLD-MCNC: 7.9 G/DL (ref 14–18)
IMM GRANULOCYTES # BLD AUTO: 0.08 K/UL (ref 0–0.04)
IMM GRANULOCYTES NFR BLD AUTO: 0.8 % (ref 0–0.5)
LYMPHOCYTES # BLD AUTO: 2.5 K/UL (ref 1–4.8)
LYMPHOCYTES NFR BLD: 26.1 % (ref 18–48)
MCH RBC QN AUTO: 28.8 PG (ref 27–31)
MCHC RBC AUTO-ENTMCNC: 30.9 G/DL (ref 32–36)
MCV RBC AUTO: 93 FL (ref 82–98)
MONOCYTES # BLD AUTO: 1.3 K/UL (ref 0.3–1)
MONOCYTES NFR BLD: 13.6 % (ref 4–15)
NEUTROPHILS # BLD AUTO: 5.5 K/UL (ref 1.8–7.7)
NEUTROPHILS NFR BLD: 57.3 % (ref 38–73)
NRBC BLD-RTO: 0 /100 WBC
PLATELET # BLD AUTO: 189 K/UL (ref 150–450)
PMV BLD AUTO: 12.8 FL (ref 9.2–12.9)
RBC # BLD AUTO: 2.74 M/UL (ref 4.6–6.2)
WBC # BLD AUTO: 9.65 K/UL (ref 3.9–12.7)

## 2024-02-02 PROCEDURE — 85025 COMPLETE CBC W/AUTO DIFF WBC: CPT | Performed by: NURSE PRACTITIONER

## 2024-02-02 PROCEDURE — 99999 PR PBB SHADOW E&M-EST. PATIENT-LVL V: CPT | Mod: PBBFAC,,, | Performed by: NURSE PRACTITIONER

## 2024-02-02 PROCEDURE — 36415 COLL VENOUS BLD VENIPUNCTURE: CPT | Performed by: NURSE PRACTITIONER

## 2024-02-02 PROCEDURE — 99215 OFFICE O/P EST HI 40 MIN: CPT | Mod: S$GLB,,, | Performed by: NURSE PRACTITIONER

## 2024-02-02 RX ORDER — DOXAZOSIN 2 MG/1
2 TABLET ORAL NIGHTLY
Qty: 90 TABLET | Refills: 1 | Status: ON HOLD | OUTPATIENT
Start: 2024-02-02 | End: 2025-02-01

## 2024-02-02 NOTE — ASSESSMENT & PLAN NOTE
ANEMIA / THROMBOCYTOPENIA  - mild normocytic anemia and intermittent mild thrombocytopenia since 2018, stable  - Previously workup with no evidence of iron, folic acid or B12 deficiency and negative for monoclonal gammopathy. Normal kidney function but does have history of HCV and liver scarring on scan. Bone marrow biopsy with normal cellularity and no abnormality on cytogenetics and NGS.     Recent decline in hemoglobin most likely r/t recent illness/hospitalization. Currently on home abx daily for Staphylococcus lugdunensis bacteremia with ID follow up    Monitor CBC monthly with VV for now

## 2024-02-02 NOTE — ASSESSMENT & PLAN NOTE
LEUKOCYTOSIS  - intermittent mild leukocytosis with elevated ANC since 2018, stable   - Pt is on chronic prednisone use for management of RA and had recurrent infections.   - 8/4/2023 BMB:  40% cellularity with trilineage hematopoietic activity with normal karyotype and no significant pathogenic alteration on NGS.  Nondiagnostic for lymphoma or plasma cell dyscrasia.    Most recent CBC with normal WBC. Monitor

## 2024-02-02 NOTE — PROGRESS NOTES
Subjective:       Patient ID: Scooter Swan is a 73 y.o. male.    Chief Complaint: review labs    HPI: 73 y.o. male with  medical history significant for HTN, AR, CAD, RA, HCV presenting today for follow up of anemia, thrombocytopenia and leukocytosis     Prior workup with no evidence of iron, folic acid or B12 deficiency and negative for monoclonal gammopathy. Normal kidney function but does have history of HCV and liver scarring on scan. Bone marrow biopsy with normal cellularity and no abnormality on cytogenetics and NGS.    Recently d/c from hospital for management of bacteremia on home IV antibiotics with ongoing ID follow up        Social History     Socioeconomic History    Marital status:    Tobacco Use    Smoking status: Never    Smokeless tobacco: Never   Substance and Sexual Activity    Alcohol use: Yes     Alcohol/week: 1.0 standard drink of alcohol     Types: 1 Shots of liquor per week     Comment: social    Drug use: Yes     Types: Hydrocodone     Social Determinants of Health     Financial Resource Strain: Low Risk  (1/11/2024)    Overall Financial Resource Strain (CARDIA)     Difficulty of Paying Living Expenses: Not hard at all   Food Insecurity: No Food Insecurity (9/27/2022)    Hunger Vital Sign     Worried About Running Out of Food in the Last Year: Never true     Ran Out of Food in the Last Year: Never true   Transportation Needs: Unmet Transportation Needs (9/27/2022)    PRAPARE - Transportation     Lack of Transportation (Medical): Yes     Lack of Transportation (Non-Medical): Yes   Physical Activity: Sufficiently Active (9/27/2022)    Exercise Vital Sign     Days of Exercise per Week: 5 days     Minutes of Exercise per Session: 30 min   Stress: Stress Concern Present (9/19/2022)    [employer] - Occupational Stress Questionnaire     Feeling of Stress : Rather much   Social Connections: Unknown (9/27/2022)    Social Connection and Isolation Panel [NHANES]      Frequency of Communication with Friends and Family: More than three times a week     Frequency of Social Gatherings with Friends and Family: More than three times a week     Active Member of Clubs or Organizations: Patient declined     Attends Club or Organization Meetings: Patient declined     Marital Status:    Housing Stability: Unknown (9/27/2022)    Housing Stability Vital Sign     Unable to Pay for Housing in the Last Year: Patient refused     Unstable Housing in the Last Year: Patient refused       Past Medical History:   Diagnosis Date    Cataract     COPD (chronic obstructive pulmonary disease)     Diverticulosis     DUARTE (dyspnea on exertion)     GERD (gastroesophageal reflux disease)     Glaucoma     Hepatitis C     treated; seeing Dr. Carr    Hyperlipidemia     Hypertension     Liver lesion 08/2018    RA (rheumatoid arthritis)     Rectal prolapse     Possible       Family History   Problem Relation Age of Onset    Stomach cancer Paternal Cousin     Stomach cancer Paternal Cousin     Cataracts Mother     Diabetes Mother     Hypertension Mother     Stroke Mother     Diabetes Sister     Hypertension Sister     Stroke Sister     Diabetes Brother     Glaucoma Brother     Hypertension Brother     Stroke Brother     Diabetes Maternal Aunt     Hypertension Maternal Aunt     Stroke Maternal Aunt     Diabetes Maternal Uncle     Hypertension Maternal Uncle     Stroke Maternal Uncle     Diabetes Maternal Grandmother     Hypertension Maternal Grandmother     Stroke Maternal Grandmother     Cancer Cousin         stomach    Colon cancer Neg Hx     Colon polyps Neg Hx     Crohn's disease Neg Hx     Ulcerative colitis Neg Hx     Esophageal cancer Neg Hx     Amblyopia Neg Hx     Blindness Neg Hx     Macular degeneration Neg Hx     Retinal detachment Neg Hx     Strabismus Neg Hx     Thyroid disease Neg Hx        Past Surgical History:   Procedure Laterality Date    ANGIOGRAM, CORONARY, WITH LEFT HEART  CATHETERIZATION  10/6/2022    Procedure: Angiogram, Coronary, with Left Heart Cath;  Surgeon: Zac Boucher MD;  Location: [facility] CATH;  Service: Cardiology;;    ARTERIOGRAPHY OF AORTIC ROOT  10/6/2022    Procedure: ARTERIOGRAM, AORTIC ROOT;  Surgeon: Zac Boucher MD;  Location: [facility] CATH;  Service: Cardiology;;    CARPAL TUNNEL RELEASE      Right wrist 2015    COLONOSCOPY  08/2016    Dr. Cardona; in care everywhere    COLONOSCOPY N/A 3/20/2019    Procedure: COLONOSCOPY;  Surgeon: Sotero Arthur MD;  Location: [facility] ENDO;  Service: Endoscopy;  Laterality: N/A; hemorrhoids, diverticulosis, repeat in 10 years for screening    ECHOCARDIOGRAM,TRANSESOPHAGEAL N/A 1/12/2024    Procedure: Transesophageal echo (CADY) intra-procedure log documentation;  Surgeon: Renan Slaughter MD;  Location: [facility] CATH/EP LAB;  Service: Cardiology;  Laterality: N/A;    EXCISIONAL HEMORRHOIDECTOMY N/A 10/18/2018    Procedure: HEMORRHOIDECTOMY, prone;  Surgeon: Gunnar Horton MD;  Location: [address];  Service: Colon and Rectal;  Laterality: N/A;    THYROID SURGERY      UPPER GASTROINTESTINAL ENDOSCOPY  08/2016    Dr. Cardona; in care everywhere       Review of Systems   Constitutional:  Positive for activity change and fatigue. Negative for appetite change, chills, fever and unexpected weight change.   HENT:  Negative for congestion, mouth sores, nosebleeds, sore throat, trouble swallowing and voice change.    Respiratory:  Positive for shortness of breath (with exertion). Negative for cough, chest tightness and wheezing.    Cardiovascular:  Negative for chest pain, palpitations and leg swelling.   Gastrointestinal:  Negative for abdominal distention, abdominal pain, blood in stool, constipation, diarrhea, nausea and vomiting.   Genitourinary:  Negative for difficulty urinating, dysuria and hematuria.   Musculoskeletal:  Positive for gait problem. Negative for arthralgias, back pain and myalgias.   Skin:  Negative for pallor,  rash and wound.   Neurological:  Positive for weakness. Negative for dizziness, syncope and headaches.   Hematological:  Negative for adenopathy. Does not bruise/bleed easily.   Psychiatric/Behavioral:  The patient is nervous/anxious.          Medication List with Changes/Refills   Current Medications    ALBUTEROL (PROVENTIL) 2.5 MG /3 ML (0.083 %) NEBULIZER SOLUTION    Take 3 mLs (2.5 mg total) by nebulization every 6 (six) hours as needed for Wheezing. Rescue    ALBUTEROL (PROVENTIL/VENTOLIN HFA) 90 MCG/ACTUATION INHALER    INHALE 1 PUFF BY MOUTH EVERY 4 HOURS AS NEEDED FOR WHEEZING OR SHORTNESS OF BREATH    CEFAZOLIN 2 G/50ML DEXTROSE IVPB (ANCEF) 2 GRAM/50 ML PGBK    Inject 50 mLs (2,000 mg total) into the vein every 8 (eight) hours.    CHOLECALCIFEROL, VITAMIN D3, (VITAMIN D3) 50 MCG (2,000 UNIT) CAP    Take 1 capsule (2,000 Units total) by mouth once daily.    CYANOCOBALAMIN 500 MCG TABLET    Take 500 mcg by mouth once daily.    DIAZEPAM (VALIUM) 10 MG TAB    Take 1 tablet (10 mg total) by mouth every evening.    DICLOFENAC SODIUM (VOLTAREN) 1 % GEL    Apply 2 grams  topically 4 (four) times daily.    DILTIAZEM (CARDIZEM CD) 180 MG 24 HR CAPSULE    Take 1 capsule (180 mg total) by mouth once daily.    DORZOLAMIDE (TRUSOPT) 2 % OPHTHALMIC SOLUTION    Place 1 drop into both eyes 3 (three) times daily.    DOXAZOSIN (CARDURA) 2 MG TABLET    TAKE 1 TABLET (2 MG TOTAL) BY MOUTH EVERY EVENING    ENALAPRIL (VASOTEC) 20 MG TABLET    TAKE ONE TABLET BY MOUTH TWO TIMES A DAY    HYDRALAZINE (APRESOLINE) 50 MG TABLET    TAKE 2 TABLETS BY MOUTH EVERY 12 HOURS    HYDROCODONE-ACETAMINOPHEN (NORCO)  MG PER TABLET    Take 1 tablet by mouth every 8 (eight) hours as needed for Pain.    HYDROXYZINE HCL (ATARAX) 25 MG TABLET    TAKE ONE TABLET BY MOUTH NIGHTLY AS NEEDED FOR ITCHING    LACTOBAC NO.41/BIFIDOBACT NO.7 (PROBIOTIC-10 ORAL)    Take 1 capsule by mouth once daily.    MIRABEGRON (MYRBETRIQ) 25 MG TB24 ER TABLET     Take 1 tablet (25 mg total) by mouth once daily.    MULTIVITAMIN CAPSULE    Take 1 capsule by mouth once daily.    MUPIROCIN (BACTROBAN) 2 % OINTMENT    Apply topically 3 (three) times daily.    OMEPRAZOLE (PRILOSEC) 20 MG CAPSULE    Take 1 capsule (20 mg total) by mouth 2 (two) times a day.    POLYETHYLENE GLYCOL (GLYCOLAX) 17 GRAM PWPK    Take 17 g by mouth daily as needed.    PRAVASTATIN (PRAVACHOL) 20 MG TABLET    TAKE ONE TABLET BY MOUTH ONCE DAILY    PREDNISONE (DELTASONE) 5 MG TABLET    Take 2 tablets (10 mg total) by mouth once daily.    SILDENAFIL (VIAGRA) 100 MG TABLET    Take 1 tablet (100 mg total) by mouth as needed for Erectile Dysfunction.    TAMSULOSIN (FLOMAX) 0.4 MG CAP    Take 1 capsule (0.4 mg total) by mouth once daily.    TIOTROPIUM BROMIDE (SPIRIVA RESPIMAT) 1.25 MCG/ACTUATION INHALER    Inhale 2 puffs into the lungs once daily. Controller    TIZANIDINE (ZANAFLEX) 4 MG TABLET    Take 1 tablet (4 mg total) by mouth every 8 (eight) hours.    TRAVOPROST (TRAVATAN Z) 0.004 % OPHTHALMIC SOLUTION    Place 1 drop into both eyes every evening.    ZALEPLON (SONATA) 10 MG CAPSULE    TAKE ONE CAPSULE BY MOUTH EVERY EVENING     Objective:     Vitals:    02/02/24 1447   BP: (!) 172/100   Pulse: 84   Temp: 97.6 °F (36.4 °C)     Lab Results   Component Value Date    WBC 9.65 02/02/2024    HGB 7.9 (L) 02/02/2024    HCT 25.6 (L) 02/02/2024    MCV 93 02/02/2024     02/02/2024       BMP  Lab Results   Component Value Date     01/30/2024    K 3.5 (L) 01/30/2024     01/16/2024    CO2 26 01/30/2024    BUN 12 01/30/2024    CREATININE 1.12 01/30/2024    CALCIUM 8.7 (L) 01/30/2024    ANIONGAP 8 01/30/2024    EGFRNORACEVR >60.0 01/16/2024     Lab Results   Component Value Date    ALT <5 01/30/2024    AST 19 01/30/2024    ALKPHOS 56 01/30/2024    BILITOT 0.3 (L) 01/30/2024       Physical Exam  Vitals reviewed.   Constitutional:       Appearance: He is well-developed.   HENT:      Head: Normocephalic.       Right Ear: External ear normal.      Left Ear: External ear normal.   Eyes:      General: Lids are normal. No scleral icterus.        Right eye: No discharge.         Left eye: No discharge.      Conjunctiva/sclera: Conjunctivae normal.   Neck:      Thyroid: No thyroid mass.   Cardiovascular:      Rate and Rhythm: Normal rate and regular rhythm.      Heart sounds: Murmur heard.   Pulmonary:      Effort: Pulmonary effort is normal. No respiratory distress.      Breath sounds: Normal breath sounds. No wheezing or rales.   Abdominal:      General: There is no distension.   Genitourinary:     Comments: deferred  Musculoskeletal:         General: Normal range of motion.      Cervical back: Normal range of motion.   Lymphadenopathy:      Head:      Right side of head: No submandibular, preauricular or posterior auricular adenopathy.      Left side of head: No submandibular, preauricular or posterior auricular adenopathy.      Cervical:      Right cervical: No superficial cervical adenopathy.     Left cervical: No superficial cervical adenopathy.   Skin:     General: Skin is warm and dry.   Neurological:      Mental Status: He is alert and oriented to person, place, and time.   Psychiatric:         Speech: Speech normal.         Behavior: Behavior normal. Behavior is cooperative.         Thought Content: Thought content normal.          Assessment:     Problem List Items Addressed This Visit          Hematology    Thrombocytopenia     Today's cbc with normal platelet count            Oncology    Anemia     ANEMIA / THROMBOCYTOPENIA  - mild normocytic anemia and intermittent mild thrombocytopenia since 2018, stable  - Previously workup with no evidence of iron, folic acid or B12 deficiency and negative for monoclonal gammopathy. Normal kidney function but does have history of HCV and liver scarring on scan. Bone marrow biopsy with normal cellularity and no abnormality on cytogenetics and NGS.     Recent decline in  hemoglobin most likely r/t recent illness/hospitalization. Currently on home abx daily for Staphylococcus lugdunensis bacteremia with ID follow up    Monitor CBC monthly with VV for now         Leukocytosis     LEUKOCYTOSIS  - intermittent mild leukocytosis with elevated ANC since 2018, stable   - Pt is on chronic prednisone use for management of RA and had recurrent infections.   - 8/4/2023 BMB:  40% cellularity with trilineage hematopoietic activity with normal karyotype and no significant pathogenic alteration on NGS.  Nondiagnostic for lymphoma or plasma cell dyscrasia.    Most recent CBC with normal WBC. Monitor           Other Visit Diagnoses       In distress    -  Primary    Relevant Orders    Ambulatory Referral/Consult to Oncology Social Work    Ambulatory Referral/Consult to Hematology Oncology Behavioral Health              Plan:     In distress  -     Ambulatory Referral/Consult to Oncology Social Work; Future; Expected date: 02/09/2024  -     Ambulatory Referral/Consult to Hematology Oncology Behavioral Health; Future; Expected date: 02/09/2024    Anemia, unspecified type    Leukocytosis, unspecified type    Thrombocytopenia          Med Onc Chart Routing      Follow up with physician    Follow up with ROBYN 4 weeks. VV   Infusion scheduling note    Injection scheduling note    Labs CBC   Scheduling:  Preferred lab:  Lab interval:  monthly cbc with VV   Imaging None      Pharmacy appointment No pharmacy appointment needed      Other referrals       No additional referrals needed           SHARRON Giraldo

## 2024-02-05 ENCOUNTER — TELEPHONE (OUTPATIENT)
Dept: FAMILY MEDICINE | Facility: CLINIC | Age: 74
End: 2024-02-05
Payer: MEDICARE

## 2024-02-05 ENCOUNTER — PATIENT MESSAGE (OUTPATIENT)
Dept: INFUSION THERAPY | Facility: HOSPITAL | Age: 74
End: 2024-02-05
Payer: MEDICARE

## 2024-02-05 NOTE — TELEPHONE ENCOUNTER
----- Message from Penny Mcpherson MA sent at 2/3/2024  5:09 PM CST -----  Regarding: Message from  Eugenie Tompkins NP    ----- Message -----  From: Eugenie Tompkins NP  Sent: 2/2/2024   4:36 PM CST  To: Brent Shearer    Good afternoon,    Patient asked that I reach out to you regarding his blood pressure medications. He was advised to hold some of his BP meds due to hypotension but has been hypertensive since. Will you please follow up with patient regarding this?

## 2024-02-06 DIAGNOSIS — J44.9 CHRONIC OBSTRUCTIVE PULMONARY DISEASE, UNSPECIFIED COPD TYPE: ICD-10-CM

## 2024-02-06 RX ORDER — ALBUTEROL SULFATE 0.83 MG/ML
SOLUTION RESPIRATORY (INHALATION)
Qty: 90 ML | Refills: 3 | Status: SHIPPED | OUTPATIENT
Start: 2024-02-06 | End: 2024-04-23

## 2024-02-06 NOTE — TELEPHONE ENCOUNTER
Care Due:                  Date            Visit Type   Department     Provider  --------------------------------------------------------------------------------                                MYCHART                              FOLLOWUP/OF  [facility]  Last Visit: 01-      FICE VISIT   MEDICINE       Salvador Kennedy                              ESTABLISHED                              PATIENT -    [facility]  Next Visit: 02-      VIRTUAL      MEDICINE       Salvador Kennedy                                                            Last  Test          Frequency    Reason                     Performed    Due Date  --------------------------------------------------------------------------------    Lipid Panel.  12 months..  pravastatin..............  02- 02-    [facility] Embedded Care Due Messages. Reference number: [id number].   2/06/2024 9:12:08 AM CST

## 2024-02-06 NOTE — TELEPHONE ENCOUNTER
Spoke with the patient he stated that he has cut back on hydralazine and enalapril taking his medications once a day and his blood pressures have came up. A couple readings are, 128/78,127/76,115/70. He wants to know if he should continue taking his medications once a day and if so should he be taking them at night or in the morning. Please advise.

## 2024-02-06 NOTE — TELEPHONE ENCOUNTER
Those are good readings.  If that is what his blood pressures have been running lately, then he can continue current regimen.

## 2024-02-06 NOTE — TELEPHONE ENCOUNTER
Refill Routing Note   Medication(s) are not appropriate for processing by [facility] for the following reason(s):        New or recently adjusted medication    [facility] action(s):  Defer               Appointments  past 12m or future 3m with PCP    Date Provider   Last Visit   1/19/2024 Salvador Kennedy, DO   Next Visit   2/21/2024 Salvador Kennedy, DO   ED visits in past 90 days: 0        Note composed:12:52 PM 02/06/2024

## 2024-02-07 ENCOUNTER — TELEPHONE (OUTPATIENT)
Dept: FAMILY MEDICINE | Facility: CLINIC | Age: 74
End: 2024-02-07
Payer: MEDICARE

## 2024-02-07 ENCOUNTER — TELEPHONE (OUTPATIENT)
Dept: HEMATOLOGY/ONCOLOGY | Facility: CLINIC | Age: 74
End: 2024-02-07
Payer: MEDICARE

## 2024-02-07 NOTE — TELEPHONE ENCOUNTER
Patient stated that he was leaving the hospital. He wants to know if you would like for him to come in person for his next visit.

## 2024-02-07 NOTE — TELEPHONE ENCOUNTER
SS was consulted for elevated distress score. SW intern attempted contact with pt via phone. SW intern left a message for pt with SS contact number. SW intern will remain available.

## 2024-02-07 NOTE — TELEPHONE ENCOUNTER
----- Message from Siomara Pressley sent at 2/7/2024 11:37 AM CST -----  Contact: lenka bill  Type: Needs Medical Advice  Who Called:  lenka woodall caring  Symptoms (please be specific):  he's tired all the time can't keep balance/confusion/vital signs are fine  How long has patient had these symptoms:  for a while  Pharmacy name and phone #:  n/a  Best Call Back Number: [phone]  Additional Information: vital signs 107/63 bp 93-94 oxygen 93. Not sure if you would like to move his appt up.please call

## 2024-02-08 ENCOUNTER — TELEPHONE (OUTPATIENT)
Dept: FAMILY MEDICINE | Facility: CLINIC | Age: 74
End: 2024-02-08
Payer: MEDICARE

## 2024-02-08 NOTE — TELEPHONE ENCOUNTER
----- Message from Triny Darnell sent at 2/8/2024 10:31 AM CST -----  Contact: patient  Type:  Patient Returning Call    Who Called:patient '    Who Left Message for Patient:damaris     Does the patient know what this is regarding?:yes     Would the patient rather a call back or a response via [facility]? Call     Best Call Back Number:[phone] (home)      Additional Information:

## 2024-02-09 ENCOUNTER — TELEPHONE (OUTPATIENT)
Dept: FAMILY MEDICINE | Facility: CLINIC | Age: 74
End: 2024-02-09
Payer: MEDICARE

## 2024-02-09 NOTE — TELEPHONE ENCOUNTER
Spoke with [facility], and informed them that it is ok for someone to go out to the patient's home per Dr. Kennedy.

## 2024-02-09 NOTE — TELEPHONE ENCOUNTER
----- Message from Dana Jimenez sent at 2/9/2024  3:17 PM CST -----  Type:  Needs Medical Advice    Who Called:  Valerie from [facility]    Would the patient rather a call back or a response via [facility]?  Call back    Best Call Back Number:  [phone]  [phone]-[phone]    Additional Information:  Valerie calling this morning needing orders sent over for this pt so they can send a nurse out to pt's house today. Need orders as soon as possible.  Please call back to advise. Thanks!

## 2024-02-09 NOTE — TELEPHONE ENCOUNTER
----- Message from Nancy Sullivan sent at 2/9/2024 10:16 AM CST -----  Regarding: Call back  Type:  Needs Medical Advice    Who Called: Valerie /  [facility]     Would the patient rather a call back or a response via [facility]? Call back    Best Call Back Number: [phone] fax [phone]    Additional Information: Would like a order to be set over  for nurse visit for today at the pt home. Thank you

## 2024-02-12 ENCOUNTER — LAB VISIT (OUTPATIENT)
Dept: LAB | Facility: HOSPITAL | Age: 74
End: 2024-02-12
Attending: INTERNAL MEDICINE
Payer: MEDICARE

## 2024-02-12 ENCOUNTER — TELEPHONE (OUTPATIENT)
Dept: FAMILY MEDICINE | Facility: CLINIC | Age: 74
End: 2024-02-12
Payer: MEDICARE

## 2024-02-12 ENCOUNTER — PATIENT MESSAGE (OUTPATIENT)
Dept: CARDIOLOGY | Facility: HOSPITAL | Age: 74
End: 2024-02-12
Payer: MEDICARE

## 2024-02-12 DIAGNOSIS — M05.9 SEROPOSITIVE RHEUMATOID ARTHRITIS: ICD-10-CM

## 2024-02-12 DIAGNOSIS — D84.821 IMMUNOCOMPROMISED STATE DUE TO DRUG THERAPY: ICD-10-CM

## 2024-02-12 DIAGNOSIS — Z79.899 IMMUNOCOMPROMISED STATE DUE TO DRUG THERAPY: ICD-10-CM

## 2024-02-12 DIAGNOSIS — D69.6 THROMBOCYTOPENIA: ICD-10-CM

## 2024-02-12 DIAGNOSIS — L40.50 PSA (PSORIATIC ARTHRITIS): ICD-10-CM

## 2024-02-12 DIAGNOSIS — R11.0 NAUSEA: Primary | ICD-10-CM

## 2024-02-12 LAB
25(OH)D3+25(OH)D2 SERPL-MCNC: 30 NG/ML (ref 30–96)
ALBUMIN SERPL BCP-MCNC: 3.4 G/DL (ref 3.5–5.2)
ALP SERPL-CCNC: 60 U/L (ref 55–135)
ALT SERPL W/O P-5'-P-CCNC: <5 U/L (ref 10–44)
ANION GAP SERPL CALC-SCNC: 10 MMOL/L (ref 8–16)
AST SERPL-CCNC: 17 U/L (ref 10–40)
BASOPHILS # BLD AUTO: 0.05 K/UL (ref 0–0.2)
BASOPHILS NFR BLD: 0.5 % (ref 0–1.9)
BILIRUB SERPL-MCNC: 0.4 MG/DL (ref 0.1–1)
BUN SERPL-MCNC: 9 MG/DL (ref 8–23)
CALCIUM SERPL-MCNC: 8.8 MG/DL (ref 8.7–10.5)
CCP AB SER IA-ACNC: <0.5 U/ML
CHLORIDE SERPL-SCNC: 108 MMOL/L (ref 95–110)
CO2 SERPL-SCNC: 24 MMOL/L (ref 23–29)
CREAT SERPL-MCNC: 1 MG/DL (ref 0.5–1.4)
CRP SERPL-MCNC: 22.7 MG/L (ref 0–8.2)
DIFFERENTIAL METHOD BLD: ABNORMAL
EOSINOPHIL # BLD AUTO: 0 K/UL (ref 0–0.5)
EOSINOPHIL NFR BLD: 0.2 % (ref 0–8)
ERYTHROCYTE [DISTWIDTH] IN BLOOD BY AUTOMATED COUNT: 16 % (ref 11.5–14.5)
ERYTHROCYTE [SEDIMENTATION RATE] IN BLOOD BY WESTERGREN METHOD: 8 MM/HR (ref 0–10)
EST. GFR  (NO RACE VARIABLE): >60 ML/MIN/1.73 M^2
GLUCOSE SERPL-MCNC: 99 MG/DL (ref 70–110)
HCT VFR BLD AUTO: 28.2 % (ref 40–54)
HGB BLD-MCNC: 8.5 G/DL (ref 14–18)
IMM GRANULOCYTES # BLD AUTO: 0.05 K/UL (ref 0–0.04)
IMM GRANULOCYTES NFR BLD AUTO: 0.5 % (ref 0–0.5)
LYMPHOCYTES # BLD AUTO: 1.6 K/UL (ref 1–4.8)
LYMPHOCYTES NFR BLD: 14.9 % (ref 18–48)
MCH RBC QN AUTO: 28.3 PG (ref 27–31)
MCHC RBC AUTO-ENTMCNC: 30.1 G/DL (ref 32–36)
MCV RBC AUTO: 94 FL (ref 82–98)
MONOCYTES # BLD AUTO: 1.1 K/UL (ref 0.3–1)
MONOCYTES NFR BLD: 10 % (ref 4–15)
NEUTROPHILS # BLD AUTO: 7.8 K/UL (ref 1.8–7.7)
NEUTROPHILS NFR BLD: 73.9 % (ref 38–73)
NRBC BLD-RTO: 0 /100 WBC
PLATELET # BLD AUTO: 151 K/UL (ref 150–450)
PMV BLD AUTO: ABNORMAL FL (ref 9.2–12.9)
POTASSIUM SERPL-SCNC: 3.5 MMOL/L (ref 3.5–5.1)
PROT SERPL-MCNC: 6.1 G/DL (ref 6–8.4)
RBC # BLD AUTO: 3 M/UL (ref 4.6–6.2)
RHEUMATOID FACT SERPL-ACNC: 65 IU/ML (ref 0–15)
SODIUM SERPL-SCNC: 142 MMOL/L (ref 136–145)
T4 FREE SERPL-MCNC: 0.8 NG/DL (ref 0.71–1.51)
TSH SERPL DL<=0.005 MIU/L-ACNC: 2.4 UIU/ML (ref 0.4–4)
WBC # BLD AUTO: 10.55 K/UL (ref 3.9–12.7)

## 2024-02-12 PROCEDURE — 36415 COLL VENOUS BLD VENIPUNCTURE: CPT | Mod: PO | Performed by: INTERNAL MEDICINE

## 2024-02-12 PROCEDURE — 84439 ASSAY OF FREE THYROXINE: CPT | Performed by: INTERNAL MEDICINE

## 2024-02-12 PROCEDURE — 86431 RHEUMATOID FACTOR QUANT: CPT | Performed by: INTERNAL MEDICINE

## 2024-02-12 PROCEDURE — 84443 ASSAY THYROID STIM HORMONE: CPT | Performed by: INTERNAL MEDICINE

## 2024-02-12 PROCEDURE — 80053 COMPREHEN METABOLIC PANEL: CPT | Performed by: INTERNAL MEDICINE

## 2024-02-12 PROCEDURE — 86140 C-REACTIVE PROTEIN: CPT | Performed by: INTERNAL MEDICINE

## 2024-02-12 PROCEDURE — 82306 VITAMIN D 25 HYDROXY: CPT | Performed by: INTERNAL MEDICINE

## 2024-02-12 PROCEDURE — 86200 CCP ANTIBODY: CPT | Performed by: INTERNAL MEDICINE

## 2024-02-12 PROCEDURE — 85651 RBC SED RATE NONAUTOMATED: CPT | Mod: PO | Performed by: INTERNAL MEDICINE

## 2024-02-12 PROCEDURE — 85025 COMPLETE CBC W/AUTO DIFF WBC: CPT | Performed by: INTERNAL MEDICINE

## 2024-02-12 RX ORDER — PROMETHAZINE HYDROCHLORIDE 25 MG/1
25 TABLET ORAL EVERY 6 HOURS PRN
Qty: 30 TABLET | Refills: 5 | Status: ON HOLD | OUTPATIENT
Start: 2024-02-12

## 2024-02-12 NOTE — TELEPHONE ENCOUNTER
----- Message from Kimi Harmon sent at 2/12/2024  9:29 AM CST -----  Type: Need Medical Advice   Who Called:Jacqueline wife of patient   Best callback number:   Additional Information: wife of patient called to state her  BP is running low 88/62  Please call to further assist, Thanks.

## 2024-02-12 NOTE — TELEPHONE ENCOUNTER
----- Message from Nancy Sullivan sent at 2/12/2024 10:10 AM CST -----  Regarding: HOme care helth update  Type:  Needs Medical Advice    Who Called: Valerie / [facility]    Would the patient rather a call back or a response via [facility]? Call back    Best Call Back Number: [phone]     Additional Information: BP reading 88/62 today, recheck /77 today, Pt feel dizzy and light headed, pt question if his meds need to be change before appt on 2/16? Weight today 173.5lbs and last was 180lbs, pt continue with poor appetite and nausea at times. Pt asking about a meds for his nausea and promethazine as help in the past ( pt can not take Zofran). Vital care from fax over this morning.   [facility] - [address]  [address] [address]  Phone: [phone] Fax: [phone]  Thank you

## 2024-02-12 NOTE — TELEPHONE ENCOUNTER
Spoke with Valerie from [facility] she stated that she does not know if anybody has went out to see the patient. I informed her that Dr. Kennedy will not make any changes to the patient's medication without laying eyes on him first. I also let her know that he was going to call in something for the nausea.    Please send in rx for nausea for patient.

## 2024-02-19 ENCOUNTER — LAB VISIT (OUTPATIENT)
Dept: LAB | Facility: HOSPITAL | Age: 74
End: 2024-02-19
Payer: MEDICARE

## 2024-02-19 ENCOUNTER — OFFICE VISIT (OUTPATIENT)
Dept: RHEUMATOLOGY | Facility: CLINIC | Age: 74
End: 2024-02-19
Payer: MEDICARE

## 2024-02-19 VITALS
HEART RATE: 105 BPM | SYSTOLIC BLOOD PRESSURE: 160 MMHG | BODY MASS INDEX: 24.82 KG/M2 | WEIGHT: 183 LBS | DIASTOLIC BLOOD PRESSURE: 98 MMHG

## 2024-02-19 DIAGNOSIS — G89.4 CHRONIC PAIN SYNDROME: ICD-10-CM

## 2024-02-19 DIAGNOSIS — M05.9 SEROPOSITIVE RHEUMATOID ARTHRITIS: Primary | ICD-10-CM

## 2024-02-19 DIAGNOSIS — Z79.52 CURRENT CHRONIC USE OF SYSTEMIC STEROIDS: ICD-10-CM

## 2024-02-19 DIAGNOSIS — G47.00 INSOMNIA, UNSPECIFIED TYPE: ICD-10-CM

## 2024-02-19 DIAGNOSIS — R53.83 FATIGUE, UNSPECIFIED TYPE: ICD-10-CM

## 2024-02-19 DIAGNOSIS — M05.9 SEROPOSITIVE RHEUMATOID ARTHRITIS: ICD-10-CM

## 2024-02-19 DIAGNOSIS — M17.9 OSTEOARTHRITIS OF KNEE, UNSPECIFIED LATERALITY, UNSPECIFIED OSTEOARTHRITIS TYPE: ICD-10-CM

## 2024-02-19 DIAGNOSIS — R78.81 BACTEREMIA: ICD-10-CM

## 2024-02-19 DIAGNOSIS — E78.2 MIXED HYPERLIPIDEMIA: ICD-10-CM

## 2024-02-19 DIAGNOSIS — Z12.5 SCREENING FOR PROSTATE CANCER: ICD-10-CM

## 2024-02-19 LAB
CHOLEST SERPL-MCNC: 186 MG/DL (ref 120–199)
CHOLEST/HDLC SERPL: 3.7 {RATIO} (ref 2–5)
COMPLEXED PSA SERPL-MCNC: 1.4 NG/ML (ref 0–4)
HDLC SERPL-MCNC: 50 MG/DL (ref 40–75)
HDLC SERPL: 26.9 % (ref 20–50)
LDLC SERPL CALC-MCNC: 110.6 MG/DL (ref 63–159)
NONHDLC SERPL-MCNC: 136 MG/DL
TRIGL SERPL-MCNC: 127 MG/DL (ref 30–150)

## 2024-02-19 PROCEDURE — 99214 OFFICE O/P EST MOD 30 MIN: CPT | Mod: 25,S$GLB,, | Performed by: PHYSICIAN ASSISTANT

## 2024-02-19 PROCEDURE — 96372 THER/PROPH/DIAG INJ SC/IM: CPT | Mod: S$GLB,,, | Performed by: PHYSICIAN ASSISTANT

## 2024-02-19 PROCEDURE — 99999 PR PBB SHADOW E&M-EST. PATIENT-LVL V: CPT | Mod: PBBFAC,,, | Performed by: PHYSICIAN ASSISTANT

## 2024-02-19 PROCEDURE — 84153 ASSAY OF PSA TOTAL: CPT | Mod: GA | Performed by: INTERNAL MEDICINE

## 2024-02-19 PROCEDURE — 36415 COLL VENOUS BLD VENIPUNCTURE: CPT | Mod: PO | Performed by: INTERNAL MEDICINE

## 2024-02-19 PROCEDURE — 80061 LIPID PANEL: CPT | Performed by: INTERNAL MEDICINE

## 2024-02-19 RX ORDER — DIAZEPAM 10 MG/1
10 TABLET ORAL NIGHTLY
Qty: 30 TABLET | Refills: 2 | Status: SHIPPED | OUTPATIENT
Start: 2024-02-19 | End: 2024-05-13

## 2024-02-19 RX ORDER — KETOROLAC TROMETHAMINE 30 MG/ML
30 INJECTION, SOLUTION INTRAMUSCULAR; INTRAVENOUS
Status: COMPLETED | OUTPATIENT
Start: 2024-02-19 | End: 2024-02-19

## 2024-02-19 RX ORDER — CYANOCOBALAMIN 1000 UG/ML
1000 INJECTION, SOLUTION INTRAMUSCULAR; SUBCUTANEOUS
Status: COMPLETED | OUTPATIENT
Start: 2024-02-19 | End: 2024-02-19

## 2024-02-19 RX ORDER — TIZANIDINE 4 MG/1
4 TABLET ORAL EVERY 8 HOURS
Qty: 270 TABLET | Refills: 1 | Status: ON HOLD | OUTPATIENT
Start: 2024-02-19

## 2024-02-19 RX ORDER — PREDNISONE 5 MG/1
10 TABLET ORAL DAILY
Qty: 270 TABLET | Refills: 1 | Status: ON HOLD | OUTPATIENT
Start: 2024-02-19 | End: 2025-02-18

## 2024-02-19 RX ORDER — KETOROLAC TROMETHAMINE 30 MG/ML
60 INJECTION, SOLUTION INTRAMUSCULAR; INTRAVENOUS
Status: CANCELLED | OUTPATIENT
Start: 2024-02-19 | End: 2024-02-19

## 2024-02-19 RX ADMIN — KETOROLAC TROMETHAMINE 30 MG: 30 INJECTION, SOLUTION INTRAMUSCULAR; INTRAVENOUS at 12:02

## 2024-02-19 RX ADMIN — CYANOCOBALAMIN 1000 MCG: 1000 INJECTION, SOLUTION INTRAMUSCULAR; SUBCUTANEOUS at 12:02

## 2024-02-19 NOTE — PROGRESS NOTES
2 pt ID used, allergies reviewed, See MAR for meds,doses and injection sites. Pt tolerated well. TC LPN

## 2024-02-19 NOTE — TELEPHONE ENCOUNTER
No care due was identified.  [facility] Embedded Care Due Messages. Reference number: [id number].   2/19/2024 8:29:41 AM CST

## 2024-02-19 NOTE — PROGRESS NOTES
Subjective:       Patient ID: Scooter Swan is a 73 y.o. male.    Chief Complaint: Disease Management    Mr. Swan is a 73 year old male who presents to clinic visit for follow up on seropositive rheumatoid arthritis and osteoarthritis.He continues to have pain in his hands, elbows, knees, and ankles. He has significant morning stiffness and he is taking prednisone 10 mg daily. Norco is providing adequate control of his pain without side effects. He is requesting injections in clinic today for joint pain. He typically receives toradol and steroids in clinic.     He could not tolerate Enbrel in the past due to s/e. Simponi was considered but never started. Last visit with Dr. Morales--no plan to restart immunosuppressive medications.     Since his last visit he was hospitalized 1/9-1/16 at [facility] then transferred to [facility]. Blood cultures showed Gram-positive cocci, PCR positive for Staphylococcus lugdunensis.  Infectious disease consulted, patient started on vancomycin.  Cardiology reconsulted for GLENN per Infectious Disease.  Glenn done on 01/12/2024 showed no evidence of vegetation. Discussed with Infectious Disease given calcifications seen on GLENN, we will anticipate IV antibiotics for 6 weeks.  Patient to follow up with Infectious Disease outpatient.  He is still on ancef via PIC and has outpt ID f/u later this week.    We reviewed his recent labs: CRP improving 22, 1/6/24 CRP >50    Current treatment:  1. Norco TID PRN  2. Prednisone 10 mg  3. Zanaflex PRN    Prior treatment:  1. Plaquenil (WEIGHT LOSS)  2. SSZ caused GI upset    Review of Systems   Constitutional: Positive for activity change. Negative for chills, fatigue and fever.   Eyes: Negative for visual disturbance.   Respiratory: Negative for cough, shortness of breath and wheezing.    Cardiovascular: Negative for chest pain, palpitations and leg swelling.   Gastrointestinal: Negative for abdominal pain, constipation, diarrhea, nausea and vomiting.    Musculoskeletal: Positive for arthralgias, back pain and joint swelling. Negative for myalgias.   Neurological: Negative for dizziness, syncope and headaches.   Hematological: Negative for adenopathy.         Objective:     Vitals:    02/19/24 1104   BP: (!) 160/98   Pulse: 105         Past Medical History:   Diagnosis Date    Cataract     COPD (chronic obstructive pulmonary disease)     Diverticulosis     DUARTE (dyspnea on exertion)     GERD (gastroesophageal reflux disease)     Glaucoma     Hepatitis C     treated; seeing Dr. Carr    Hyperlipidemia     Hypertension     Liver lesion 08/2018    RA (rheumatoid arthritis)     Rectal prolapse     Possible     Past Surgical History:   Procedure Laterality Date    ANGIOGRAM, CORONARY, WITH LEFT HEART CATHETERIZATION  10/6/2022    Procedure: Angiogram, Coronary, with Left Heart Cath;  Surgeon: Zac Boucher MD;  Location: [facility] CATH;  Service: Cardiology;;    ARTERIOGRAPHY OF AORTIC ROOT  10/6/2022    Procedure: ARTERIOGRAM, AORTIC ROOT;  Surgeon: Zac Boucher MD;  Location: [facility] CATH;  Service: Cardiology;;    CARPAL TUNNEL RELEASE      Right wrist 2015    COLONOSCOPY  08/2016    Dr. Cardona; in care everywhere    COLONOSCOPY N/A 3/20/2019    Procedure: COLONOSCOPY;  Surgeon: Sotero Arthur MD;  Location: [facility] ENDO;  Service: Endoscopy;  Laterality: N/A; hemorrhoids, diverticulosis, repeat in 10 years for screening    ECHOCARDIOGRAM,TRANSESOPHAGEAL N/A 1/12/2024    Procedure: Transesophageal echo (CADY) intra-procedure log documentation;  Surgeon: Renan Slaughter MD;  Location: [facility] CATH/EP LAB;  Service: Cardiology;  Laterality: N/A;    EXCISIONAL HEMORRHOIDECTOMY N/A 10/18/2018    Procedure: HEMORRHOIDECTOMY, prone;  Surgeon: Gunnar Horton MD;  Location: [address];  Service: Colon and Rectal;  Laterality: N/A;    THYROID SURGERY      UPPER GASTROINTESTINAL ENDOSCOPY  08/2016    Dr. Cardona; in care everywhere        Physical Exam   Constitutional: He  is well-developed, well-nourished, and in no distress.     Labs:  Component      Latest Ref Rn 2/12/2024   WBC      3.90 - 12.70 K/uL 10.55    RBC      4.60 - 6.20 M/uL 3.00 (L)    Hemoglobin      14.0 - 18.0 g/dL 8.5 (L)    Hematocrit      40.0 - 54.0 % 28.2 (L)    MCV      82 - 98 fL 94    MCH      27.0 - 31.0 pg 28.3    MCHC      32.0 - 36.0 g/dL 30.1 (L)    RDW      11.5 - 14.5 % 16.0 (H)    Platelet Count      150 - 450 K/uL 151    MPV      9.2 - 12.9 fL SEE COMMENT    Immature Granulocytes      0.0 - 0.5 % 0.5    Gran # (ANC)      1.8 - 7.7 K/uL 7.8 (H)    Immature Grans (Abs)      0.00 - 0.04 K/uL 0.05 (H)    Lymph #      1.0 - 4.8 K/uL 1.6    Mono #      0.3 - 1.0 K/uL 1.1 (H)    Eos #      0.0 - 0.5 K/uL 0.0    Baso #      0.00 - 0.20 K/uL 0.05    nRBC      0 /100 WBC 0    Gran %      38.0 - 73.0 % 73.9 (H)    Lymph %      18.0 - 48.0 % 14.9 (L)    Mono %      4.0 - 15.0 % 10.0    Eos %      0.0 - 8.0 % 0.2    Basophil %      0.0 - 1.9 % 0.5    Differential Method Automated    Sodium      136 - 145 mmol/L 142    Potassium      3.5 - 5.1 mmol/L 3.5    Chloride      95 - 110 mmol/L 108    CO2      23 - 29 mmol/L 24    Glucose      70 - 110 mg/dL 99    BUN      8 - 23 mg/dL 9    Creatinine      0.5 - 1.4 mg/dL 1.0    Calcium      8.7 - 10.5 mg/dL 8.8    PROTEIN TOTAL      6.0 - 8.4 g/dL 6.1    Albumin      3.5 - 5.2 g/dL 3.4 (L)    BILIRUBIN TOTAL      0.1 - 1.0 mg/dL 0.4    ALP      55 - 135 U/L 60    AST      10 - 40 U/L 17    ALT      10 - 44 U/L <5 (L)    eGFR      >60 mL/min/1.73 m^2 >60.0    Anion Gap      8 - 16 mmol/L 10    Free T4      0.71 - 1.51 ng/dL 0.80    TSH      0.400 - 4.000 uIU/mL 2.403    Vitamin D      30 - 96 ng/mL 30    CCP Antibodies      <5.0 U/mL <0.5    Rheumatoid Factor      0.0 - 15.0 IU/mL 65.0 (H)    Sed Rate      0 - 10 mm/Hr 8    CRP      0.0 - 8.2 mg/L 22.7 (H)      Imaging:  XR ARTHRITIS SURVEY     FINDINGS:  Cervical spine in flexion:     The atlas and odontoid appearing  good relationship to each other.  Intervertebral disc height loss is noted at the C4-C5 C5-C6 C6-C7 levels.  The C7-T1 level is not ideally displayed, no obvious abnormality is noted.     Knees AP:     Mild medial compartment narrowing on the right is noted.     Hands AP:     Interphalangeal joint space loss is noted diffusely.  Some periarticular erosive changes questioned in multiple locations including at the distal interphalangeal joints of the 2nd through 5th digits bilaterally.  These changes are minimal.  First metacarpal carpal joint space loss is noted with osseous hypertrophy suggesting degenerative change.  Radiocarpal joint space loss is noted suggesting degenerative change.  Mild osseous demineralization may be present     Feet AP:     Interphalangeal joint space loss is noted diffusely suggesting degenerative change.  Some cortical irregularity/thickening is noted involving the proximal phalanges of the 4th digit on the left suggestive of history of fracture age indeterminate.  No obvious osteolytic changes noted.  Mild osseous demineralization may be present.     Impression:     Mild periarticular erosive changes particularly along the distal interphalangeal joints of the hands is questioned.  This could relate to an inflammatory arthropathy such as psoriasis, Diego's disease, erosive osteoarthropathy or rheumatoid.  Please clinically correlate.     Assessment:       1. Seropositive rheumatoid arthritis    2. Chronic pain syndrome    3. Osteoarthritis of knee, unspecified laterality, unspecified osteoarthritis type    4. Fatigue, unspecified type    5. Bacteremia    6. Current chronic use of systemic steroids                  Plan:       Seropositive rheumatoid arthritis  -     cyanocobalamin injection 1,000 mcg  -     ketorolac injection 30 mg  -     CBC Auto Differential; Future; Expected date: 02/19/2024  -     Comprehensive Metabolic Panel; Future; Expected date: 02/19/2024  -     C-Reactive  "Protein; Future; Expected date: 02/19/2024  -     Sedimentation rate; Future; Expected date: 02/19/2024  -     ketorolac injection 30 mg  -     predniSONE (DELTASONE) 5 MG tablet; Take 2 tablets (10 mg total) by mouth once daily.  Dispense: 270 tablet; Refill: 1  -     tiZANidine (ZANAFLEX) 4 MG tablet; Take 1 tablet (4 mg total) by mouth every 8 (eight) hours.  Dispense: 270 tablet; Refill: 1    Chronic pain syndrome  -     ketorolac injection 30 mg  -     predniSONE (DELTASONE) 5 MG tablet; Take 2 tablets (10 mg total) by mouth once daily.  Dispense: 270 tablet; Refill: 1  -     tiZANidine (ZANAFLEX) 4 MG tablet; Take 1 tablet (4 mg total) by mouth every 8 (eight) hours.  Dispense: 270 tablet; Refill: 1    Osteoarthritis of knee, unspecified laterality, unspecified osteoarthritis type  -     ketorolac injection 30 mg  -     predniSONE (DELTASONE) 5 MG tablet; Take 2 tablets (10 mg total) by mouth once daily.  Dispense: 270 tablet; Refill: 1  -     tiZANidine (ZANAFLEX) 4 MG tablet; Take 1 tablet (4 mg total) by mouth every 8 (eight) hours.  Dispense: 270 tablet; Refill: 1    Fatigue, unspecified type  -     ketorolac injection 30 mg    Bacteremia    Current chronic use of systemic steroids              Assessment:  73 year old male with  Seropositive (+RF) rheumatoid arthritis, osteoarthritis on prednisone 10 mg  --thrombocytopenia, anemia  --hx of hep c s/p harvoni, last hep c viral load undetectable  --chronic pain syndrome on norco  --abnormal CT abd 6/2019 "Multiple areas of early bright arterial enhancement not seen on the later images.."  --moderate to severe aortic stenosis followed by Dr. Boucher    Plan:  1. Cont. Prednisone 5-10 mg PRN  2. Continue norco PRN per Dr. Morales. I have checked [place] prescription monitoring program site and no unusual or abnormal behavior has occurred pt understand the risk and benefits of taking opioid medications and has decided to continue the medication.  reviewed. " Sent x 3. Sonata pended. Seroquel sent.  3. Toradol 30, b12 given today for acute pain. Hold off on steroids given current infection  4. Cont tizanidine prn  5. ID f/u as scheduled    Follow up:  4 mo w/labs prior

## 2024-02-20 RX ORDER — HYDROCODONE BITARTRATE AND ACETAMINOPHEN 10; 325 MG/1; MG/1
1 TABLET ORAL EVERY 8 HOURS PRN
Qty: 90 TABLET | Refills: 0 | Status: SHIPPED | OUTPATIENT
Start: 2024-04-29 | End: 2024-05-30 | Stop reason: SDUPTHER

## 2024-02-20 RX ORDER — HYDROCODONE BITARTRATE AND ACETAMINOPHEN 10; 325 MG/1; MG/1
1 TABLET ORAL EVERY 8 HOURS PRN
Qty: 90 TABLET | Refills: 0 | Status: SHIPPED | OUTPATIENT
Start: 2024-02-29 | End: 2024-03-30

## 2024-02-20 RX ORDER — HYDROCODONE BITARTRATE AND ACETAMINOPHEN 10; 325 MG/1; MG/1
1 TABLET ORAL EVERY 8 HOURS PRN
Qty: 90 TABLET | Refills: 0 | Status: SHIPPED | OUTPATIENT
Start: 2024-03-30 | End: 2024-04-29

## 2024-02-22 ENCOUNTER — HOSPITAL ENCOUNTER (OUTPATIENT)
Dept: CARDIOLOGY | Facility: HOSPITAL | Age: 74
Discharge: HOME | End: 2024-02-22
Attending: INTERNAL MEDICINE
Payer: MEDICARE

## 2024-02-22 ENCOUNTER — OFFICE VISIT (OUTPATIENT)
Dept: INFECTIOUS DISEASES | Facility: CLINIC | Age: 74
End: 2024-02-22
Payer: MEDICARE

## 2024-02-22 VITALS — WEIGHT: 177 LBS | HEIGHT: 72 IN | BODY MASS INDEX: 23.98 KG/M2

## 2024-02-22 DIAGNOSIS — R78.81 BACTEREMIA: Primary | ICD-10-CM

## 2024-02-22 DIAGNOSIS — I35.0 MODERATE AORTIC STENOSIS: Chronic | ICD-10-CM

## 2024-02-22 LAB
ONEOME COMMENT: NORMAL
ONEOME METHOD: NORMAL

## 2024-02-22 PROCEDURE — 99999 PR PBB SHADOW E&M-EST. PATIENT-LVL III: CPT | Mod: PBBFAC,,, | Performed by: INTERNAL MEDICINE

## 2024-02-22 PROCEDURE — 99203 OFFICE O/P NEW LOW 30 MIN: CPT | Mod: S$GLB,,, | Performed by: INTERNAL MEDICINE

## 2024-02-22 NOTE — PROGRESS NOTES
Patient ID: Scooter Swan is a 73 y.o. male.    Subjective:           Chief Complaint: Follow-up      HPI     This is a 73-year-old man with multiple comorbidities.  With recent multiple hospitalizations at [facility] and recently [facility].       Hospitalized in [facility] for 7 days from 01/09/2024 through 01/16/2024  -  During that hospitalization patient was found to have Staph lugdunensis bacteremia.  Glenn with no obvious vegetations but presence of calcifications so there was a concern for possible endocarditis?    - Patient was discharged on cefazolin 6 g IV continuous infusion for 6 weeks until today, 02/22/2024     Interval HPI:  Patient tells me that he is feeling well.  PICC line in place.    Denies fever, chills, night sweats   Patient tells me that he is feeling back to normal.  He tells me that he has been compliant with Ancef with no side effects        Past Medical History:   Diagnosis Date    Cataract     COPD (chronic obstructive pulmonary disease)     Diverticulosis     DUARTE (dyspnea on exertion)     GERD (gastroesophageal reflux disease)     Glaucoma     Hepatitis C     treated; seeing Dr. Carr    Hyperlipidemia     Hypertension     Liver lesion 08/2018    RA (rheumatoid arthritis)     Rectal prolapse     Possible       Past Surgical History:   Procedure Laterality Date    ANGIOGRAM, CORONARY, WITH LEFT HEART CATHETERIZATION  10/6/2022    Procedure: Angiogram, Coronary, with Left Heart Cath;  Surgeon: Zac Boucher MD;  Location: [facility] CATH;  Service: Cardiology;;    ARTERIOGRAPHY OF AORTIC ROOT  10/6/2022    Procedure: ARTERIOGRAM, AORTIC ROOT;  Surgeon: Zac Boucher MD;  Location: [facility] CATH;  Service: Cardiology;;    CARPAL TUNNEL RELEASE      Right wrist 2015    COLONOSCOPY  08/2016    Dr. Cardona; in care everywhere    COLONOSCOPY N/A 3/20/2019    Procedure: COLONOSCOPY;  Surgeon: Sotero Arthur MD;  Location: [facility] ENDO;  Service: Endoscopy;  Laterality: N/A;  hemorrhoids, diverticulosis, repeat in 10 years for screening    ECHOCARDIOGRAM,TRANSESOPHAGEAL N/A 1/12/2024    Procedure: Transesophageal echo (CADY) intra-procedure log documentation;  Surgeon: Renan Slaughter MD;  Location: [facility] CATH/EP LAB;  Service: Cardiology;  Laterality: N/A;    EXCISIONAL HEMORRHOIDECTOMY N/A 10/18/2018    Procedure: HEMORRHOIDECTOMY, prone;  Surgeon: Gunnar Horton MD;  Location: [facility] OR [address] [place] [address];  Service: Colon and Rectal;  Laterality: N/A;    THYROID SURGERY      UPPER GASTROINTESTINAL ENDOSCOPY  08/2016    Dr. Cardona; in care everywhere       Review of patient's allergies indicates:   Allergen Reactions    Buspar [buspirone] Hallucinations     Nightmares    Enbrel [etanercept] Other (See Comments)     Severe headaches    Fentanyl Hives and Hallucinations    Plaquenil [hydroxychloroquine]      Nausea, insomnia, weight loss 40LBS    Amitiza [lubiprostone] Nausea Only and Other (See Comments)     Fatigue.    Azulfidine [sulfasalazine] Nausea And Vomiting    Trazodone Other (See Comments)     Insomnia         Family History   Problem Relation Age of Onset    Stomach cancer Paternal Cousin     Stomach cancer Paternal Cousin     Cataracts Mother     Diabetes Mother     Hypertension Mother     Stroke Mother     Diabetes Sister     Hypertension Sister     Stroke Sister     Diabetes Brother     Glaucoma Brother     Hypertension Brother     Stroke Brother     Diabetes Maternal Aunt     Hypertension Maternal Aunt     Stroke Maternal Aunt     Diabetes Maternal Uncle     Hypertension Maternal Uncle     Stroke Maternal Uncle     Diabetes Maternal Grandmother     Hypertension Maternal Grandmother     Stroke Maternal Grandmother     Cancer Cousin         stomach    Colon cancer Neg Hx     Colon polyps Neg Hx     Crohn's disease Neg Hx     Ulcerative colitis Neg Hx     Esophageal cancer Neg Hx     Amblyopia Neg Hx     Blindness Neg Hx     Macular degeneration Neg Hx     Retinal detachment Neg Hx      Strabismus Neg Hx     Thyroid disease Neg Hx        Social History     Socioeconomic History    Marital status:    Tobacco Use    Smoking status: Never    Smokeless tobacco: Never   Substance and Sexual Activity    Alcohol use: Yes     Alcohol/week: 1.0 standard drink of alcohol     Types: 1 Shots of liquor per week     Comment: social    Drug use: Yes     Types: Hydrocodone     Social Determinants of Health     Financial Resource Strain: Low Risk  (1/11/2024)    Overall Financial Resource Strain (CARDIA)     Difficulty of Paying Living Expenses: Not hard at all   Food Insecurity: No Food Insecurity (9/27/2022)    Hunger Vital Sign     Worried About Running Out of Food in the Last Year: Never true     Ran Out of Food in the Last Year: Never true   Transportation Needs: Unknown (2/21/2024)    PRAPARE - Transportation     Lack of Transportation (Non-Medical): No   Physical Activity: Unknown (2/21/2024)    Exercise Vital Sign     Days of Exercise per Week: 4 days   Stress: Stress Concern Present (9/19/2022)    [employer] [place] [employer] - Occupational Stress Questionnaire     Feeling of Stress : Rather much   Social Connections: Unknown (9/27/2022)    Social Connection and Isolation Panel [NHANES]     Frequency of Communication with Friends and Family: More than three times a week     Frequency of Social Gatherings with Friends and Family: More than three times a week     Active Member of Clubs or Organizations: Patient declined     Attends Club or Organization Meetings: Patient declined     Marital Status:    Housing Stability: Low Risk  (2/21/2024)    Housing Stability Vital Sign     Unable to Pay for Housing in the Last Year: No     Number of Places Lived in the Last Year: 1     Unstable Housing in the Last Year: No       Current Outpatient Medications   Medication Instructions    albuterol (PROVENTIL) 2.5 mg /3 mL (0.083 %) nebulizer solution TAKE 3 MLS BY MOUTH VIA NEBULIZER EVERY 6  HOURS AS NEEDED FOR WHEEZING    albuterol (PROVENTIL/VENTOLIN HFA) 90 mcg/actuation inhaler INHALE 1 PUFF BY MOUTH EVERY 4 HOURS AS NEEDED FOR WHEEZING OR SHORTNESS OF BREATH    cholecalciferol (vitamin D3) (VITAMIN D3) 2,000 Units, Oral, Daily    cyanocobalamin 500 mcg, Oral, Daily    diazePAM (VALIUM) 10 mg, Oral, Nightly    diclofenac sodium (VOLTAREN) 1 % Gel Apply 2 grams  topically 4 (four) times daily.    diltiaZEM (CARDIZEM CD) 180 mg, Oral, Daily    dorzolamide (TRUSOPT) 2 % ophthalmic solution 1 drop, Both Eyes, 3 times daily    doxazosin (CARDURA) 2 mg, Oral, Nightly    enalapril (VASOTEC) 20 mg, Oral, 2 times daily    hydrALAZINE (APRESOLINE) 100 mg, Oral, Every 12 hours    [START ON 4/29/2024] HYDROcodone-acetaminophen (NORCO)  mg per tablet 1 tablet, Oral, Every 8 hours PRN    [START ON 3/30/2024] HYDROcodone-acetaminophen (NORCO)  mg per tablet 1 tablet, Oral, Every 8 hours PRN    [START ON 2/29/2024] HYDROcodone-acetaminophen (NORCO)  mg per tablet 1 tablet, Oral, Every 8 hours PRN    hydrOXYzine HCL (ATARAX) 25 MG tablet TAKE ONE TABLET BY MOUTH NIGHTLY AS NEEDED FOR ITCHING    multivitamin capsule 1 capsule, Oral, Daily    mupirocin (BACTROBAN) 2 % ointment Topical (Top), 3 times daily    MYRBETRIQ 25 mg, Oral, Daily    omeprazole (PRILOSEC) 20 mg, Oral, 2 times daily    polyethylene glycol (GLYCOLAX) 17 g, Oral, Daily PRN    pravastatin (PRAVACHOL) 20 mg, Oral    predniSONE (DELTASONE) 10 mg, Oral, Daily    promethazine (PHENERGAN) 25 mg, Oral, Every 6 hours PRN    sildenafiL (VIAGRA) 100 mg, Oral, As needed (PRN)    tamsulosin (FLOMAX) 0.4 mg, Oral, Daily    tiotropium bromide (SPIRIVA RESPIMAT INHL) 2 puffs daily - pt reports taking everyday in AM    tiotropium bromide (SPIRIVA RESPIMAT) 1.25 mcg/actuation inhaler 2 puffs, Inhalation, Daily, Controller    tiZANidine (ZANAFLEX) 4 mg, Oral, Every 8 hours    travoprost (TRAVATAN Z) 0.004 % ophthalmic solution 1 drop, Both Eyes,  Nightly         Review of Systems   Constitutional:  Negative for activity change, chills, diaphoresis, fatigue, fever and unexpected weight change.   HENT:  Negative for sore throat.    Eyes:  Negative for photophobia and visual disturbance.   Respiratory:  Negative for cough and shortness of breath.    Cardiovascular:  Negative for chest pain and leg swelling.   Gastrointestinal:  Negative for abdominal distention, abdominal pain, nausea and vomiting.   Genitourinary:  Negative for difficulty urinating, dysuria and flank pain.   Musculoskeletal:  Negative for arthralgias.   Skin:  Negative for color change and rash.   Allergic/Immunologic: Negative for immunocompromised state.   Neurological:  Negative for dizziness and headaches.   Psychiatric/Behavioral:  The patient is not nervous/anxious.            Objective:     There were no vitals filed for this visit.    Body mass index is 24.01 kg/m².         Physical Exam  Vitals reviewed.   Constitutional:       General: He is not in acute distress.     Appearance: Normal appearance. He is well-developed. He is not ill-appearing.   HENT:      Head: Normocephalic and atraumatic.      Right Ear: External ear normal.      Left Ear: External ear normal.      Nose: Nose normal.   Eyes:      General: No scleral icterus.        Right eye: No discharge.         Left eye: No discharge.      Pupils: Pupils are equal, round, and reactive to light.   Cardiovascular:      Rate and Rhythm: Normal rate and regular rhythm.      Heart sounds: Normal heart sounds. No murmur heard.  Pulmonary:      Effort: Pulmonary effort is normal. No respiratory distress.      Breath sounds: Normal breath sounds. No wheezing.   Abdominal:      General: There is no distension.      Palpations: Abdomen is soft.      Tenderness: There is no abdominal tenderness.   Musculoskeletal:         General: Normal range of motion.      Cervical back: Normal range of motion and neck supple. No rigidity.    Lymphadenopathy:      Cervical: No cervical adenopathy.   Skin:     General: Skin is warm and dry.      Coloration: Skin is not jaundiced.   Neurological:      Mental Status: He is alert and oriented to person, place, and time.   Psychiatric:         Mood and Affect: Mood normal.         Speech: Speech normal.         Behavior: Behavior normal.         Judgment: Judgment normal.           Assessment:           Scooter was seen today for follow-up.    Diagnoses and all orders for this visit:    Bacteremia  -     Ambulatory referral/consult to Infectious Disease  -     Comprehensive Metabolic Panel; Future  -     CBC Auto Differential; Future  -     Blood culture; Future  -     Blood culture; Future         Plan:       - DC PICC line  - Repeat Bcx next week considring elevated CRP although I am suspecting this is secondary to RA.   -  If blood cultures remain negative then no further testing or treatment from Infectious Disease standpoint and follow with this clinic as needed     Greater than 30 minutes was spent on this encounter, which included: review of recent encounters, review and interpretation of labs/images, obtaining pertinent history, performing a physical examination, counseling and educating the patient/family/caregiver, ordering medications/tests, documenting in the electronic health record, and coordinating care with necessary providers.    Ciro Martinez MD  Infectious Diseases

## 2024-02-23 ENCOUNTER — TELEPHONE (OUTPATIENT)
Dept: FAMILY MEDICINE | Facility: CLINIC | Age: 74
End: 2024-02-23

## 2024-02-23 ENCOUNTER — HOSPITAL ENCOUNTER (OUTPATIENT)
Dept: CARDIOLOGY | Facility: HOSPITAL | Age: 74
Discharge: HOME | End: 2024-02-23
Attending: INTERNAL MEDICINE
Payer: MEDICARE

## 2024-02-23 ENCOUNTER — OFFICE VISIT (OUTPATIENT)
Dept: FAMILY MEDICINE | Facility: CLINIC | Age: 74
End: 2024-02-23
Payer: MEDICARE

## 2024-02-23 VITALS — BODY MASS INDEX: 23.84 KG/M2 | WEIGHT: 176 LBS | HEIGHT: 72 IN

## 2024-02-23 VITALS
OXYGEN SATURATION: 99 % | SYSTOLIC BLOOD PRESSURE: 122 MMHG | HEIGHT: 72 IN | BODY MASS INDEX: 23.85 KG/M2 | DIASTOLIC BLOOD PRESSURE: 66 MMHG | WEIGHT: 176.06 LBS | HEART RATE: 100 BPM

## 2024-02-23 DIAGNOSIS — D64.9 ANEMIA, UNSPECIFIED TYPE: Primary | ICD-10-CM

## 2024-02-23 DIAGNOSIS — G89.4 CHRONIC PAIN SYNDROME: ICD-10-CM

## 2024-02-23 DIAGNOSIS — I1A.0 RESISTANT HYPERTENSION: ICD-10-CM

## 2024-02-23 DIAGNOSIS — E78.2 MIXED HYPERLIPIDEMIA: ICD-10-CM

## 2024-02-23 DIAGNOSIS — J43.8 OTHER EMPHYSEMA: ICD-10-CM

## 2024-02-23 DIAGNOSIS — B95.7 COAG NEGATIVE STAPHYLOCOCCUS BACTEREMIA: ICD-10-CM

## 2024-02-23 DIAGNOSIS — M06.9 RHEUMATOID ARTHRITIS, INVOLVING UNSPECIFIED SITE, UNSPECIFIED WHETHER RHEUMATOID FACTOR PRESENT: ICD-10-CM

## 2024-02-23 DIAGNOSIS — R78.81 COAG NEGATIVE STAPHYLOCOCCUS BACTEREMIA: ICD-10-CM

## 2024-02-23 PROBLEM — Z86.79 HISTORY OF SUPRAVENTRICULAR TACHYCARDIA: Status: ACTIVE | Noted: 2024-02-23

## 2024-02-23 PROBLEM — R10.9 ABDOMINAL PAIN: Status: RESOLVED | Noted: 2019-03-20 | Resolved: 2024-02-23

## 2024-02-23 PROBLEM — R93.2 ABNORMAL CT SCAN, LIVER: Status: RESOLVED | Noted: 2020-02-07 | Resolved: 2024-02-23

## 2024-02-23 PROBLEM — N18.31 STAGE 3A CHRONIC KIDNEY DISEASE: Status: RESOLVED | Noted: 2023-02-23 | Resolved: 2024-02-23

## 2024-02-23 PROBLEM — R10.30 LOWER ABDOMINAL PAIN: Status: RESOLVED | Noted: 2019-02-06 | Resolved: 2024-02-23

## 2024-02-23 PROBLEM — I47.10 SVT (SUPRAVENTRICULAR TACHYCARDIA): Status: RESOLVED | Noted: 2024-01-13 | Resolved: 2024-02-23

## 2024-02-23 PROBLEM — R94.31 NONSPECIFIC ABNORMAL ELECTROCARDIOGRAM (ECG) (EKG): Chronic | Status: RESOLVED | Noted: 2022-09-21 | Resolved: 2024-02-23

## 2024-02-23 PROCEDURE — 99214 OFFICE O/P EST MOD 30 MIN: CPT | Mod: S$GLB,,, | Performed by: INTERNAL MEDICINE

## 2024-02-23 PROCEDURE — 99999 PR PBB SHADOW E&M-EST. PATIENT-LVL III: CPT | Mod: PBBFAC,,, | Performed by: INTERNAL MEDICINE

## 2024-02-23 PROCEDURE — 93306 TTE W/DOPPLER COMPLETE: CPT | Mod: PO

## 2024-02-23 PROCEDURE — 93306 TTE W/DOPPLER COMPLETE: CPT | Mod: 26,,, | Performed by: INTERNAL MEDICINE

## 2024-02-23 RX ORDER — NALOXONE HYDROCHLORIDE 4 MG/.1ML
1 SPRAY NASAL ONCE
Qty: 1 EACH | Refills: 0 | Status: SHIPPED | OUTPATIENT
Start: 2024-02-23 | End: 2024-02-23

## 2024-02-23 NOTE — TELEPHONE ENCOUNTER
Please call and speak to him or his wife about the prescription of naloxone that I sent in to his pharmacy.  This is to be given to him by his wife if he takes too many opioids and can not be woken up.

## 2024-02-23 NOTE — PROGRESS NOTES
"   Patient ID: Scooter Swan is a 73 y.o. male.    Chief Complaint: Follow-up      Assessment and plan     1. Anemia, unspecified type    2. Rheumatoid arthritis, involving unspecified site, unspecified whether rheumatoid factor present  - Ambulatory referral/consult to Home Health; Future    3. Coag negative Staphylococcus bacteremia    4. Mixed hyperlipidemia    5. Resistant hypertension    6. Other emphysema  - Ambulatory referral/consult to Home Health; Future     Continue followups with specialists as planned   Follow-up in 3 months with me   Reduce hydralazine down to 50 mg twice daily.  Continue other medications  Naloxone prescription provided in the setting of opioid and benzodiazepine use.     HPI     Follow up     Note hospitalization for bacteremia in January    Heme onc- "Recent decline in hemoglobin most likely r/t recent illness/hospitalization. Currently on home abx daily for Staphylococcus lugdunensis bacteremia with ID follow up, Monitor CBC monthly with VV for now"    Rheum- "Plan:  1. Cont. Prednisone 5-10 mg PRN  2. Continue norco PRN per Dr. Morales. I have checked [place] prescription monitoring program site and no unusual or abnormal behavior has occurred pt understand the risk and benefits of taking opioid medications and has decided to continue the medication.  reviewed. Sent x 3. Sonata pended. Seroquel sent.  3. Toradol 30, b12 given today for acute pain. Hold off on steroids given current infection  4. Cont tizanidine prn  5. ID f/u as scheduled"    Infectious disease- "- DC PICC line  - Repeat Bcx next week considring elevated CRP although I am suspecting this is secondary to RA."    For insomnia he has tried multiple medications.  Valium 10 mg is the only thing that is working currently.  Discussed risks.  He will not take within 6 hours of taking hydrocodone.    1. Anemia, unspecified type   - as above   2. Rheumatoid arthritis, involving unspecified site, unspecified whether " rheumatoid factor present   - as above   3. Coag negative Staphylococcus bacteremia   - PICC line out, blood cultures will be repeated next week   4. Mixed hyperlipidemia   - controlled on pravastatin   5. Resistant hypertension   - having lows.   6. Other emphysema   - recent flare improving, pulmonology appointment in March        Review of Systems   Constitutional:  Negative for fever.   Respiratory:  Negative for shortness of breath.    Cardiovascular:  Negative for chest pain.   Gastrointestinal:  Negative for abdominal pain.        Objective     Vitals:    02/23/24 0849   BP: 122/66   Pulse: 100     Wt Readings from Last 3 Encounters:   02/23/24 0849 79.9 kg (176 lb 0.6 oz)   02/22/24 0955 80.3 kg (177 lb 0.5 oz)   02/19/24 1104 83 kg (183 lb)      Body mass index is 23.87 kg/m².     Physical Exam  Cardiovascular:      Rate and Rhythm: Normal rate and regular rhythm.      Heart sounds: Murmur heard.      No gallop.   Pulmonary:      Breath sounds: Normal breath sounds. No wheezing or rhonchi.   Abdominal:      Palpations: Abdomen is soft.      Tenderness: There is no abdominal tenderness.            Hypertension Medications               diltiaZEM (CARDIZEM CD) 180 MG 24 hr capsule Take 1 capsule (180 mg total) by mouth once daily.    doxazosin (CARDURA) 2 MG tablet TAKE 1 TABLET (2 MG TOTAL) BY MOUTH EVERY EVENING    enalapril (VASOTEC) 20 MG tablet TAKE ONE TABLET BY MOUTH TWO TIMES A DAY    hydrALAZINE (APRESOLINE) 50 MG tablet TAKE 2 TABLETS BY MOUTH EVERY 12 HOURS           Hyperlipidemia Medications               pravastatin (PRAVACHOL) 20 MG tablet TAKE ONE TABLET BY MOUTH ONCE DAILY           Medication List with Changes/Refills   Current Medications    ALBUTEROL (PROVENTIL) 2.5 MG /3 ML (0.083 %) NEBULIZER SOLUTION    TAKE 3 MLS BY MOUTH VIA NEBULIZER EVERY 6 HOURS AS NEEDED FOR WHEEZING    ALBUTEROL (PROVENTIL/VENTOLIN HFA) 90 MCG/ACTUATION INHALER    INHALE 1 PUFF BY MOUTH EVERY 4 HOURS AS NEEDED  FOR WHEEZING OR SHORTNESS OF BREATH    CHOLECALCIFEROL, VITAMIN D3, (VITAMIN D3) 50 MCG (2,000 UNIT) CAP    Take 1 capsule (2,000 Units total) by mouth once daily.    CYANOCOBALAMIN 500 MCG TABLET    Take 500 mcg by mouth once daily.    DIAZEPAM (VALIUM) 10 MG TAB    Take 1 tablet (10 mg total) by mouth every evening.    DICLOFENAC SODIUM (VOLTAREN) 1 % GEL    Apply 2 grams  topically 4 (four) times daily.    DILTIAZEM (CARDIZEM CD) 180 MG 24 HR CAPSULE    Take 1 capsule (180 mg total) by mouth once daily.    DORZOLAMIDE (TRUSOPT) 2 % OPHTHALMIC SOLUTION    Place 1 drop into both eyes 3 (three) times daily.    DOXAZOSIN (CARDURA) 2 MG TABLET    TAKE 1 TABLET (2 MG TOTAL) BY MOUTH EVERY EVENING    ENALAPRIL (VASOTEC) 20 MG TABLET    TAKE ONE TABLET BY MOUTH TWO TIMES A DAY    HYDRALAZINE (APRESOLINE) 50 MG TABLET    TAKE 2 TABLETS BY MOUTH EVERY 12 HOURS    HYDROCODONE-ACETAMINOPHEN (NORCO)  MG PER TABLET    Take 1 tablet by mouth every 8 (eight) hours as needed for Pain.    HYDROCODONE-ACETAMINOPHEN (NORCO)  MG PER TABLET    Take 1 tablet by mouth every 8 (eight) hours as needed for Pain.    HYDROCODONE-ACETAMINOPHEN (NORCO)  MG PER TABLET    Take 1 tablet by mouth every 8 (eight) hours as needed for Pain.    HYDROXYZINE HCL (ATARAX) 25 MG TABLET    TAKE ONE TABLET BY MOUTH NIGHTLY AS NEEDED FOR ITCHING    MIRABEGRON (MYRBETRIQ) 25 MG TB24 ER TABLET    Take 1 tablet (25 mg total) by mouth once daily.    MULTIVITAMIN CAPSULE    Take 1 capsule by mouth once daily.    MUPIROCIN (BACTROBAN) 2 % OINTMENT    Apply topically 3 (three) times daily.    OMEPRAZOLE (PRILOSEC) 20 MG CAPSULE    Take 1 capsule (20 mg total) by mouth 2 (two) times a day.    POLYETHYLENE GLYCOL (GLYCOLAX) 17 GRAM PWPK    Take 17 g by mouth daily as needed.    PRAVASTATIN (PRAVACHOL) 20 MG TABLET    TAKE ONE TABLET BY MOUTH ONCE DAILY    PREDNISONE (DELTASONE) 5 MG TABLET    Take 2 tablets (10 mg total) by mouth once daily.     PROMETHAZINE (PHENERGAN) 25 MG TABLET    Take 1 tablet (25 mg total) by mouth every 6 (six) hours as needed for Nausea.    SILDENAFIL (VIAGRA) 100 MG TABLET    Take 1 tablet (100 mg total) by mouth as needed for Erectile Dysfunction.    TAMSULOSIN (FLOMAX) 0.4 MG CAP    Take 1 capsule (0.4 mg total) by mouth once daily.    TIOTROPIUM BROMIDE (SPIRIVA RESPIMAT INHL)    2 puffs daily - pt reports taking everyday in AM    TIOTROPIUM BROMIDE (SPIRIVA RESPIMAT) 1.25 MCG/ACTUATION INHALER    Inhale 2 puffs into the lungs once daily. Controller    TIZANIDINE (ZANAFLEX) 4 MG TABLET    Take 1 tablet (4 mg total) by mouth every 8 (eight) hours.    TRAVOPROST (TRAVATAN Z) 0.004 % OPHTHALMIC SOLUTION    Place 1 drop into both eyes every evening.   Discontinued Medications    LACTOBAC NO.41/BIFIDOBACT NO.7 (PROBIOTIC-10 ORAL)    Take 1 capsule by mouth once daily.       I personally reviewed past medical, family and social history.

## 2024-02-25 LAB
ASCENDING AORTA: 3.12 CM
AV INDEX (PROSTH): 0.27
AV MEAN GRADIENT: 54 MMHG
AV PEAK GRADIENT: 79 MMHG
AV REGURGITATION PRESSURE HALF TIME: 595.38 MS
AV VALVE AREA BY VELOCITY RATIO: 1.04 CM²
AV VALVE AREA: 1.29 CM²
AV VELOCITY RATIO: 0.22
BSA FOR ECHO PROCEDURE: 2.01 M2
CV ECHO LV RWT: 0.53 CM
DOP CALC AO PEAK VEL: 4.44 M/S
DOP CALC AO VTI: 87.5 CM
DOP CALC LVOT AREA: 4.7 CM2
DOP CALC LVOT DIAMETER: 2.45 CM
DOP CALC LVOT PEAK VEL: 0.98 M/S
DOP CALC LVOT STROKE VOLUME: 112.62 CM3
DOP CALCLVOT PEAK VEL VTI: 23.9 CM
E WAVE DECELERATION TIME: 144.49 MSEC
E/A RATIO: 0.42
E/E' RATIO: 12.86 M/S
ECHO LV POSTERIOR WALL: 1.35 CM (ref 0.6–1.1)
EJECTION FRACTION: 65 %
FRACTIONAL SHORTENING: 33 % (ref 28–44)
INTERVENTRICULAR SEPTUM: 1.33 CM (ref 0.6–1.1)
LEFT ATRIUM SIZE: 3.79 CM
LEFT ATRIUM VOLUME INDEX MOD: 33.9 ML/M2
LEFT ATRIUM VOLUME MOD: 68.5 CM3
LEFT INTERNAL DIMENSION IN SYSTOLE: 3.39 CM (ref 2.1–4)
LEFT VENTRICLE DIASTOLIC VOLUME INDEX: 60.07 ML/M2
LEFT VENTRICLE DIASTOLIC VOLUME: 121.35 ML
LEFT VENTRICLE MASS INDEX: 138 G/M2
LEFT VENTRICLE SYSTOLIC VOLUME INDEX: 23.3 ML/M2
LEFT VENTRICLE SYSTOLIC VOLUME: 47.12 ML
LEFT VENTRICULAR INTERNAL DIMENSION IN DIASTOLE: 5.06 CM (ref 3.5–6)
LEFT VENTRICULAR MASS: 278.6 G
LV LATERAL E/E' RATIO: 11.25 M/S
LV SEPTAL E/E' RATIO: 15 M/S
LVOT MG: 2.88 MMHG
LVOT MV: 0.84 CM/S
MV PEAK A VEL: 1.08 M/S
MV PEAK E VEL: 0.45 M/S
MV STENOSIS PRESSURE HALF TIME: 41.9 MS
MV VALVE AREA P 1/2 METHOD: 5.25 CM2
PISA AR MAX VEL: 3.65 M/S
PULM VEIN S/D RATIO: 1.06
PV PEAK D VEL: 0.48 M/S
PV PEAK S VEL: 0.51 M/S
RA PRESSURE ESTIMATED: 3 MMHG
RIGHT VENTRICULAR END-DIASTOLIC DIMENSION: 3.31 CM
RIGHT VENTRICULAR LENGTH IN DIASTOLE (APICAL 4-CHAMBER VIEW): 7.58 CM
RV MID DIAMA: 2.45 CM
RV TISSUE DOPPLER FREE WALL SYSTOLIC VELOCITY 1 (APICAL 4 CHAMBER VIEW): 15.41 CM/S
SINUS: 3.43 CM
STJ: 3.27 CM
TDI LATERAL: 0.04 M/S
TDI SEPTAL: 0.03 M/S
TDI: 0.04 M/S
TRICUSPID ANNULAR PLANE SYSTOLIC EXCURSION: 2.6 CM
Z-SCORE OF LEFT VENTRICULAR DIMENSION IN END DIASTOLE: -1.62
Z-SCORE OF LEFT VENTRICULAR DIMENSION IN END SYSTOLE: -0.58

## 2024-02-27 ENCOUNTER — TELEPHONE (OUTPATIENT)
Dept: INFECTIOUS DISEASES | Facility: CLINIC | Age: 74
End: 2024-02-27
Payer: MEDICARE

## 2024-02-27 NOTE — TELEPHONE ENCOUNTER
"Returned call to patient, no answer, attempted to leave message on vm, but recording states message was cancelled.   Attempted recall, spoke to patient who states he has questions stating he is not feeling well,  experiencing extreme fatigue, pain and hurting "everywhere", speech is slurred, spoke with wife, who states pt's speech is slurred. Suggested patient report to ED for evaluation and treatment, patient declined my suggestion stating he was going to do his labs tomorrow then go see his cardiologist. Again urged pt should his status worsen to report to the ED for evaluation, pt ended call.  "

## 2024-02-27 NOTE — TELEPHONE ENCOUNTER
----- Message from Javy Gillespie sent at 2/27/2024  1:06 PM CST -----  Regarding: rt call  Type:  Patient Returning Call    Who Called:pt    Who Left Message for Patient:unknown    Does the patient know what this is regarding?:yes    Best Call Back Number:[phone]      Additional Information: pt wants a call back to discuss some info the  nurse gave him.

## 2024-02-28 ENCOUNTER — TELEPHONE (OUTPATIENT)
Dept: FAMILY MEDICINE | Facility: CLINIC | Age: 74
End: 2024-02-28
Payer: MEDICARE

## 2024-02-28 ENCOUNTER — LAB VISIT (OUTPATIENT)
Dept: LAB | Facility: HOSPITAL | Age: 74
End: 2024-02-28
Attending: INTERNAL MEDICINE
Payer: MEDICARE

## 2024-02-28 DIAGNOSIS — D64.9 ANEMIA, UNSPECIFIED TYPE: ICD-10-CM

## 2024-02-28 DIAGNOSIS — D69.6 THROMBOCYTOPENIA: ICD-10-CM

## 2024-02-28 DIAGNOSIS — D72.829 LEUKOCYTOSIS, UNSPECIFIED TYPE: ICD-10-CM

## 2024-02-28 LAB
ANISOCYTOSIS BLD QL SMEAR: SLIGHT
BASOPHILS # BLD AUTO: 0.09 K/UL (ref 0–0.2)
BASOPHILS NFR BLD: 0.9 % (ref 0–1.9)
DIFFERENTIAL METHOD BLD: ABNORMAL
EOSINOPHIL # BLD AUTO: 0.2 K/UL (ref 0–0.5)
EOSINOPHIL NFR BLD: 2.2 % (ref 0–8)
ERYTHROCYTE [DISTWIDTH] IN BLOOD BY AUTOMATED COUNT: 15.6 % (ref 11.5–14.5)
GIANT PLATELETS BLD QL SMEAR: PRESENT
HCT VFR BLD AUTO: 32 % (ref 40–54)
HGB BLD-MCNC: 9.7 G/DL (ref 14–18)
IMM GRANULOCYTES # BLD AUTO: 0.04 K/UL (ref 0–0.04)
IMM GRANULOCYTES NFR BLD AUTO: 0.4 % (ref 0–0.5)
LYMPHOCYTES # BLD AUTO: 3.3 K/UL (ref 1–4.8)
LYMPHOCYTES NFR BLD: 32.8 % (ref 18–48)
MCH RBC QN AUTO: 28 PG (ref 27–31)
MCHC RBC AUTO-ENTMCNC: 30.3 G/DL (ref 32–36)
MCV RBC AUTO: 92 FL (ref 82–98)
MONOCYTES # BLD AUTO: 1.5 K/UL (ref 0.3–1)
MONOCYTES NFR BLD: 15.4 % (ref 4–15)
NEUTROPHILS # BLD AUTO: 4.9 K/UL (ref 1.8–7.7)
NEUTROPHILS NFR BLD: 48.7 % (ref 38–73)
NRBC BLD-RTO: 0 /100 WBC
OVALOCYTES BLD QL SMEAR: ABNORMAL
PLATELET # BLD AUTO: 230 K/UL (ref 150–450)
PLATELET BLD QL SMEAR: ABNORMAL
PMV BLD AUTO: ABNORMAL FL (ref 9.2–12.9)
POIKILOCYTOSIS BLD QL SMEAR: SLIGHT
POLYCHROMASIA BLD QL SMEAR: ABNORMAL
RBC # BLD AUTO: 3.47 M/UL (ref 4.6–6.2)
WBC # BLD AUTO: 10 K/UL (ref 3.9–12.7)

## 2024-02-28 PROCEDURE — 85025 COMPLETE CBC W/AUTO DIFF WBC: CPT | Mod: PO | Performed by: NURSE PRACTITIONER

## 2024-02-28 NOTE — TELEPHONE ENCOUNTER
----- Message from Nadiya Linares sent at 2/27/2024 12:49 PM CST -----  Contact: pt wife  Type: Needs Medical Advice  Who Called: pt wife  Best Call Back Number: [phone]    Additional Information: Pt wife is calling home health nurse wants to know if he was sent home with antibiotic so he wont end up back in hospital.Please call back and advise.

## 2024-02-29 ENCOUNTER — OFFICE VISIT (OUTPATIENT)
Dept: CARDIOLOGY | Facility: CLINIC | Age: 74
End: 2024-02-29
Payer: MEDICARE

## 2024-02-29 ENCOUNTER — DOCUMENT SCAN (OUTPATIENT)
Dept: HOME HEALTH SERVICES | Facility: HOSPITAL | Age: 74
End: 2024-02-29
Payer: MEDICARE

## 2024-02-29 VITALS
HEIGHT: 72 IN | DIASTOLIC BLOOD PRESSURE: 78 MMHG | WEIGHT: 181.69 LBS | SYSTOLIC BLOOD PRESSURE: 133 MMHG | BODY MASS INDEX: 24.61 KG/M2 | HEART RATE: 102 BPM

## 2024-02-29 DIAGNOSIS — I10 PRIMARY HYPERTENSION: Chronic | ICD-10-CM

## 2024-02-29 DIAGNOSIS — D64.9 ANEMIA, UNSPECIFIED TYPE: ICD-10-CM

## 2024-02-29 DIAGNOSIS — I65.23 CAROTID ARTERY PLAQUE, BILATERAL: Chronic | ICD-10-CM

## 2024-02-29 DIAGNOSIS — R06.02 SOB (SHORTNESS OF BREATH): ICD-10-CM

## 2024-02-29 DIAGNOSIS — I35.0 SEVERE AORTIC STENOSIS: Primary | Chronic | ICD-10-CM

## 2024-02-29 DIAGNOSIS — R06.02 SOB (SHORTNESS OF BREATH): Chronic | ICD-10-CM

## 2024-02-29 DIAGNOSIS — I35.0 SEVERE AORTIC STENOSIS: Primary | ICD-10-CM

## 2024-02-29 DIAGNOSIS — E78.2 MIXED HYPERLIPIDEMIA: Chronic | ICD-10-CM

## 2024-02-29 PROCEDURE — 99215 OFFICE O/P EST HI 40 MIN: CPT | Mod: S$GLB,,, | Performed by: INTERNAL MEDICINE

## 2024-02-29 PROCEDURE — 99999 PR PBB SHADOW E&M-EST. PATIENT-LVL V: CPT | Mod: PBBFAC,,, | Performed by: INTERNAL MEDICINE

## 2024-02-29 RX ORDER — NAPROXEN SODIUM 220 MG/1
81 TABLET, FILM COATED ORAL ONCE
Status: CANCELLED | OUTPATIENT
Start: 2024-02-29 | End: 2024-02-29

## 2024-02-29 RX ORDER — SODIUM CHLORIDE 9 MG/ML
INJECTION, SOLUTION INTRAVENOUS ONCE
Status: CANCELLED | OUTPATIENT
Start: 2024-02-29 | End: 2024-02-29

## 2024-02-29 RX ORDER — CLOPIDOGREL BISULFATE 300 MG/1
300 TABLET, FILM COATED ORAL ONCE
Status: CANCELLED | OUTPATIENT
Start: 2024-02-29 | End: 2024-02-29

## 2024-02-29 RX ORDER — DILTIAZEM HYDROCHLORIDE 240 MG/1
240 CAPSULE, COATED, EXTENDED RELEASE ORAL DAILY
Qty: 30 CAPSULE | Refills: 3 | Status: SHIPPED | OUTPATIENT
Start: 2024-02-29 | End: 2024-06-10

## 2024-02-29 RX ORDER — SODIUM CHLORIDE 0.9 % (FLUSH) 0.9 %
10 SYRINGE (ML) INJECTION
Status: SHIPPED | OUTPATIENT
Start: 2024-02-29

## 2024-02-29 NOTE — PATIENT INSTRUCTIONS
Angiogram     3/28/24 at 8:00     Arrive for procedure at: [facility]    You will receive a phone call from [facility] Pre-Op Department with further instructions and exact arrival time prior to your scheduled procedure.    Notify the nurse if you are ALLERGIC TO IODINE.    FASTING: You MAY NOT have anything to eat or drink AFTER MIDNIGHT the day before your procedure. If your procedure is scheduled in the afternoon, you may have a LIGHT BREAKFAST 6-8 hours prior to your procedure.  For example: Two slices of toast; black coffee or black tea.    MEDICATIONS: You may take your regular morning medications with water. If there are any medications that you should not take, you will be instructed to hold them for that morning.    CARDIOLOGY PRE-PROCEDURE MEDICATION ORDERS:  ** Please hold any medications that are checked below:    HOLD   # OF DAYS TO HOLD  Coumadin   Consult with Coumadin Clinic   Xarelto    _DAY BEFORE & DAY OF_  Pradaxa  _ DAY BEFORE & DAY OF _  Eliquis   _ DAY BEFORE & DAY OF _  Metformin    Day before procedure & morning of procedure  Short acting insulin   Morning of procedure    CONTINUE the Following Medications   Plavix      Effient     Aspirin    WHAT TO EXPECT:    How long will the procedure take?  The procedure will take an average of 1 - 2 hours to perform.  After the procedure, you will need to lay flat for around 4 - 6 hours to minimize bleeding from the puncture site. If the wrist is accessed you will need to keep your arm still as instructed by the nurse.    When can I go home?  You may be able to be discharged home that same afternoon if there were no complications.  If you have one of the following: balloon; stent; pacemaker or defibrillator procedures, you may spend one night for observation.  Your doctor will determine your discharge based upon your progress.  The results of your procedure will be discussed with you before you are discharged.  Any further testing or  procedures will be scheduled for you either before you leave or you will be instructed to call for a future appointment.      TRANSPORTATION:  PLEASE ARRANGE TO HAVE SOMEONE DRIVE YOU HOME FOLLOWING YOUR PROCEDURE, YOU WILL NOT BE ALLOWED TO DRIVE.

## 2024-02-29 NOTE — PROGRESS NOTES
Subjective:    Patient ID:  Scooter Swan is a 73 y.o. male who presents for Follow-up (W/ sean/ echo), Heart Problem, Valvular Heart Disease, and Shortness of Breath        Follow-up  Pertinent negatives include no change in bowel habit, chest pain, chills, congestion, coughing, diaphoresis, nausea, numbness, rash, sore throat or weakness.     DISCUSSED TEST ECHO SEVERE AS, VELOCITY MORE THAN 4 M/SEC, HAS BEEN HAVING SOME , SOB, USES INHALER,CMP OK,HB 9.5, DONE WITH ABX,.  FOLLOWED BY INFECTIOUS DISEASE 4 RECENT SEPSIS AND BACTEREMIA SEE ROS    Past Medical History:   Diagnosis Date    Cataract     COPD (chronic obstructive pulmonary disease)     Diverticulosis     DUARTE (dyspnea on exertion)     GERD (gastroesophageal reflux disease)     Glaucoma     Hepatitis C     treated; seeing Dr. Carr    Hyperlipidemia     Hypertension     Liver lesion 08/2018    RA (rheumatoid arthritis)     Rectal prolapse     Possible     Past Surgical History:   Procedure Laterality Date    ANGIOGRAM, CORONARY, WITH LEFT HEART CATHETERIZATION  10/6/2022    Procedure: Angiogram, Coronary, with Left Heart Cath;  Surgeon: Zac Boucher MD;  Location: [facility] CATH;  Service: Cardiology;;    ARTERIOGRAPHY OF AORTIC ROOT  10/6/2022    Procedure: ARTERIOGRAM, AORTIC ROOT;  Surgeon: Zac Boucher MD;  Location: [facility] CATH;  Service: Cardiology;;    CARPAL TUNNEL RELEASE      Right wrist 2015    COLONOSCOPY  08/2016    Dr. Cardona; in care everywhere    COLONOSCOPY N/A 3/20/2019    Procedure: COLONOSCOPY;  Surgeon: Sotero Arthur MD;  Location: [facility] ENDO;  Service: Endoscopy;  Laterality: N/A; hemorrhoids, diverticulosis, repeat in 10 years for screening    ECHOCARDIOGRAM,TRANSESOPHAGEAL N/A 1/12/2024    Procedure: Transesophageal echo (CADY) intra-procedure log documentation;  Surgeon: Renan Slaughter MD;  Location: [facility] CATH/EP LAB;  Service: Cardiology;  Laterality: N/A;    EXCISIONAL HEMORRHOIDECTOMY N/A 10/18/2018    Procedure:  HEMORRHOIDECTOMY, prone;  Surgeon: Gunnar Horton MD;  Location: [facility] OR [address] [place] [address];  Service: Colon and Rectal;  Laterality: N/A;    THYROID SURGERY      UPPER GASTROINTESTINAL ENDOSCOPY  08/2016    Dr. Cardona; in care everywhere     Family History   Problem Relation Age of Onset    Stomach cancer Paternal Cousin     Stomach cancer Paternal Cousin     Cataracts Mother     Diabetes Mother     Hypertension Mother     Stroke Mother     Diabetes Sister     Hypertension Sister     Stroke Sister     Diabetes Brother     Glaucoma Brother     Hypertension Brother     Stroke Brother     Diabetes Maternal Aunt     Hypertension Maternal Aunt     Stroke Maternal Aunt     Diabetes Maternal Uncle     Hypertension Maternal Uncle     Stroke Maternal Uncle     Diabetes Maternal Grandmother     Hypertension Maternal Grandmother     Stroke Maternal Grandmother     Cancer Cousin         stomach    Colon cancer Neg Hx     Colon polyps Neg Hx     Crohn's disease Neg Hx     Ulcerative colitis Neg Hx     Esophageal cancer Neg Hx     Amblyopia Neg Hx     Blindness Neg Hx     Macular degeneration Neg Hx     Retinal detachment Neg Hx     Strabismus Neg Hx     Thyroid disease Neg Hx      Social History     Socioeconomic History    Marital status:    Tobacco Use    Smoking status: Never    Smokeless tobacco: Never   Substance and Sexual Activity    Alcohol use: Yes     Alcohol/week: 1.0 standard drink of alcohol     Types: 1 Shots of liquor per week     Comment: social    Drug use: Yes     Types: Hydrocodone     Social Determinants of Health     Financial Resource Strain: Low Risk  (1/11/2024)    Overall Financial Resource Strain (CARDIA)     Difficulty of Paying Living Expenses: Not hard at all   Food Insecurity: No Food Insecurity (9/27/2022)    Hunger Vital Sign     Worried About Running Out of Food in the Last Year: Never true     Ran Out of Food in the Last Year: Never true   Transportation Needs: Unknown (2/21/2024)    PRAPARE -  Transportation     Lack of Transportation (Non-Medical): No   Physical Activity: Unknown (2/21/2024)    Exercise Vital Sign     Days of Exercise per Week: 4 days   Stress: Stress Concern Present (9/19/2022)    [employer] - Occupational Stress Questionnaire     Feeling of Stress : Rather much   Social Connections: Unknown (9/27/2022)    Social Connection and Isolation Panel [NHANES]     Frequency of Communication with Friends and Family: More than three times a week     Frequency of Social Gatherings with Friends and Family: More than three times a week     Active Member of Clubs or Organizations: Patient declined     Attends Club or Organization Meetings: Patient declined     Marital Status:    Housing Stability: Low Risk  (2/21/2024)    Housing Stability Vital Sign     Unable to Pay for Housing in the Last Year: No     Number of Places Lived in the Last Year: 1     Unstable Housing in the Last Year: No       Review of patient's allergies indicates:   Allergen Reactions    Buspar [buspirone] Hallucinations     Nightmares    Enbrel [etanercept] Other (See Comments)     Severe headaches    Fentanyl Hives and Hallucinations    Plaquenil [hydroxychloroquine]      Nausea, insomnia, weight loss 40LBS    Amitiza [lubiprostone] Nausea Only and Other (See Comments)     Fatigue.    Azulfidine [sulfasalazine] Nausea And Vomiting    Trazodone Other (See Comments)     Insomnia         Current Outpatient Medications:     albuterol (PROVENTIL) 2.5 mg /3 mL (0.083 %) nebulizer solution, TAKE 3 MLS BY MOUTH VIA NEBULIZER EVERY 6 HOURS AS NEEDED FOR WHEEZING, Disp: 90 mL, Rfl: 3    albuterol (PROVENTIL/VENTOLIN HFA) 90 mcg/actuation inhaler, INHALE 1 PUFF BY MOUTH EVERY 4 HOURS AS NEEDED FOR WHEEZING OR SHORTNESS OF BREATH, Disp: 20.1 g, Rfl: 3    cholecalciferol, vitamin D3, (VITAMIN D3) 50 mcg (2,000 unit) Cap, Take 1 capsule (2,000 Units total) by mouth once daily., Disp: 90 capsule, Rfl: 3     cyanocobalamin 500 MCG tablet, Take 500 mcg by mouth once daily., Disp: , Rfl:     diazePAM (VALIUM) 10 MG Tab, Take 1 tablet (10 mg total) by mouth every evening., Disp: 30 tablet, Rfl: 2    diclofenac sodium (VOLTAREN) 1 % Gel, Apply 2 grams  topically 4 (four) times daily., Disp: 100 g, Rfl: 5    dorzolamide (TRUSOPT) 2 % ophthalmic solution, Place 1 drop into both eyes 3 (three) times daily., Disp: , Rfl:     doxazosin (CARDURA) 2 MG tablet, TAKE 1 TABLET (2 MG TOTAL) BY MOUTH EVERY EVENING, Disp: 90 tablet, Rfl: 1    enalapril (VASOTEC) 20 MG tablet, TAKE ONE TABLET BY MOUTH TWO TIMES A DAY, Disp: 180 tablet, Rfl: 2    hydrALAZINE (APRESOLINE) 50 MG tablet, TAKE 2 TABLETS BY MOUTH EVERY 12 HOURS, Disp: 360 tablet, Rfl: 3    [START ON 4/29/2024] HYDROcodone-acetaminophen (NORCO)  mg per tablet, Take 1 tablet by mouth every 8 (eight) hours as needed for Pain., Disp: 90 tablet, Rfl: 0    [START ON 3/30/2024] HYDROcodone-acetaminophen (NORCO)  mg per tablet, Take 1 tablet by mouth every 8 (eight) hours as needed for Pain., Disp: 90 tablet, Rfl: 0    HYDROcodone-acetaminophen (NORCO)  mg per tablet, Take 1 tablet by mouth every 8 (eight) hours as needed for Pain., Disp: 90 tablet, Rfl: 0    hydrOXYzine HCL (ATARAX) 25 MG tablet, TAKE ONE TABLET BY MOUTH NIGHTLY AS NEEDED FOR ITCHING, Disp: 30 tablet, Rfl: 2    mirabegron (MYRBETRIQ) 25 mg Tb24 ER tablet, Take 1 tablet (25 mg total) by mouth once daily., Disp: 30 tablet, Rfl: 11    multivitamin capsule, Take 1 capsule by mouth once daily., Disp: , Rfl:     mupirocin (BACTROBAN) 2 % ointment, Apply topically 3 (three) times daily., Disp: 15 g, Rfl: 1    omeprazole (PRILOSEC) 20 MG capsule, Take 1 capsule (20 mg total) by mouth 2 (two) times a day., Disp: 180 capsule, Rfl: 3    polyethylene glycol (GLYCOLAX) 17 gram PwPk, Take 17 g by mouth daily as needed., Disp: , Rfl:     pravastatin (PRAVACHOL) 20 MG tablet, TAKE ONE TABLET BY MOUTH ONCE DAILY,  Disp: 90 tablet, Rfl: 0    predniSONE (DELTASONE) 5 MG tablet, Take 2 tablets (10 mg total) by mouth once daily., Disp: 270 tablet, Rfl: 1    promethazine (PHENERGAN) 25 MG tablet, Take 1 tablet (25 mg total) by mouth every 6 (six) hours as needed for Nausea., Disp: 30 tablet, Rfl: 5    sildenafiL (VIAGRA) 100 MG tablet, Take 1 tablet (100 mg total) by mouth as needed for Erectile Dysfunction., Disp: 24 tablet, Rfl: 2    tamsulosin (FLOMAX) 0.4 mg Cap, Take 1 capsule (0.4 mg total) by mouth once daily., Disp: 30 capsule, Rfl: 11    tiotropium bromide (SPIRIVA RESPIMAT INHL), 2 puffs daily - pt reports taking everyday in AM, Disp: , Rfl:     tiotropium bromide (SPIRIVA RESPIMAT) 1.25 mcg/actuation inhaler, Inhale 2 puffs into the lungs once daily. Controller, Disp: 4 g, Rfl: 5    tiZANidine (ZANAFLEX) 4 MG tablet, Take 1 tablet (4 mg total) by mouth every 8 (eight) hours., Disp: 270 tablet, Rfl: 1    travoprost (TRAVATAN Z) 0.004 % ophthalmic solution, Place 1 drop into both eyes every evening., Disp: 5 mL, Rfl: 12    diltiaZEM (CARDIZEM CD) 240 MG 24 hr capsule, Take 1 capsule (240 mg total) by mouth once daily., Disp: 30 capsule, Rfl: 3    Current Facility-Administered Medications:     sodium chloride 0.9% flush 10 mL, 10 mL, Intravenous, PRN, Zac Boucher MD    sodium chloride 0.9% flush 10 mL, 10 mL, Intravenous, PRN, Zac Boucher MD    Facility-Administered Medications Ordered in Other Visits:     0.9%  NaCl infusion, , Intravenous, Continuous, Deedee Sarkar NP, Stopped at 10/18/18 1613    mupirocin 2 % ointment, , Nasal, On Call Procedure, Deedee Sarkar NP, Given at 10/18/18 1348    Review of Systems   Constitutional: Positive for night sweats. Negative for chills, diaphoresis and malaise/fatigue.   HENT:  Negative for congestion and sore throat.    Eyes:  Negative for blurred vision and visual disturbance.   Cardiovascular:  Positive for palpitations. Negative for chest pain,  claudication, cyanosis, dyspnea on exertion (MILD), irregular heartbeat, leg swelling, near-syncope, orthopnea, paroxysmal nocturnal dyspnea and syncope.   Respiratory:  Positive for shortness of breath. Negative for cough, hemoptysis and wheezing.    Endocrine: Negative for heat intolerance and polyuria.   Hematologic/Lymphatic: Negative for adenopathy. Does not bruise/bleed easily.   Skin:  Negative for color change and rash.   Musculoskeletal:  Positive for arthritis (RA). Negative for back pain and falls.   Gastrointestinal:  Negative for change in bowel habit, dysphagia, melena and nausea.   Genitourinary:  Negative for dysuria and flank pain.   Neurological:  Negative for brief paralysis, dizziness, focal weakness, light-headedness, loss of balance, numbness and weakness.   Psychiatric/Behavioral:  Negative for altered mental status and depression. Nervous/anxious: SOME.    Allergic/Immunologic: Negative for persistent infections.        Objective:      Vitals:    02/29/24 1406   BP: 133/78   Pulse: 102   Weight: 82.4 kg (181 lb 10.5 oz)   Height: 6' (1.829 m)   PainSc: 0-No pain     Body mass index is 24.64 kg/m².    Physical Exam  Constitutional:       Appearance: He is well-developed. He is not ill-appearing.   HENT:      Head: Normocephalic and atraumatic.   Eyes:      Extraocular Movements: Extraocular movements intact.      Conjunctiva/sclera: Conjunctivae normal.   Neck:      Thyroid: No thyromegaly.      Vascular: Normal carotid pulses. No hepatojugular reflux or JVD.      Trachea: No tracheal deviation.   Cardiovascular:      Rate and Rhythm: Normal rate and regular rhythm. No extrasystoles are present.     Pulses:           Carotid pulses are 2+ on the right side and 2+ on the left side.       Radial pulses are 2+ on the right side and 2+ on the left side.        Posterior tibial pulses are 2+ on the right side and 2+ on the left side.      Heart sounds: Heart sounds not distant. No midsystolic  click. Murmur heard.      Harsh systolic murmur is present with a grade of 2/6 at the upper right sternal border.      No friction rub. No gallop.   Pulmonary:      Effort: Pulmonary effort is normal. No respiratory distress.      Breath sounds: Normal breath sounds. No rales.   Abdominal:      General: Bowel sounds are normal.      Palpations: Abdomen is soft.      Tenderness: There is no abdominal tenderness.   Musculoskeletal:         General: Normal range of motion.      Cervical back: Neck supple. No edema or erythema.      Right lower leg: No edema.      Left lower leg: No edema.   Skin:     General: Skin is warm and dry.   Neurological:      Mental Status: He is alert and oriented to person, place, and time.      Cranial Nerves: Cranial nerves 2-12 are intact. No cranial nerve deficit.   Psychiatric:         Mood and Affect: Mood normal.         Speech: Speech normal.         Behavior: Behavior normal.                 ..    Chemistry        Component Value Date/Time     02/28/2024 1130    K 3.8 02/28/2024 1130     02/28/2024 1130    CO2 27 02/28/2024 1130    BUN 14 02/28/2024 1130    CREATININE 1.0 02/28/2024 1130    GLU 96 02/28/2024 1130        Component Value Date/Time    CALCIUM 9.5 02/28/2024 1130    ALKPHOS 57 02/28/2024 1130    AST 21 02/28/2024 1130    ALT <5 (L) 02/28/2024 1130    BILITOT 0.5 02/28/2024 1130    ESTGFRAFRICA >60.0 05/13/2022 0900    EGFRNONAA >60.0 05/13/2022 0900            ..  Lab Results   Component Value Date    CHOL 186 02/19/2024    CHOL 176 02/16/2023    CHOL 162 02/11/2019     Lab Results   Component Value Date    HDL 50 02/19/2024    HDL 62 02/16/2023    HDL 76 (H) 02/11/2019     Lab Results   Component Value Date    LDLCALC 110.6 02/19/2024    LDLCALC 96.6 02/16/2023    LDLCALC 77.6 02/11/2019     Lab Results   Component Value Date    TRIG 127 02/19/2024    TRIG 87 02/16/2023    TRIG 42 02/11/2019     Lab Results   Component Value Date    CHOLHDL 26.9 02/19/2024     CHOLHDL 35.2 02/16/2023    CHOLHDL 46.9 02/11/2019     ..  Lab Results   Component Value Date    WBC 9.92 02/28/2024    WBC 10.00 02/28/2024    HGB 9.5 (L) 02/28/2024    HGB 9.7 (L) 02/28/2024    HCT 32.4 (L) 02/28/2024    HCT 32.0 (L) 02/28/2024    MCV 97 02/28/2024    MCV 92 02/28/2024     02/28/2024     02/28/2024       Test(s) Reviewed  I have reviewed the following in detail:  [] Stress test   [] Angiography   [x] Echocardiogram   [] Labs   [x] Other:       Assessment:         ICD-10-CM ICD-9-CM   1. Severe aortic stenosis  I35.0 424.1   2. Anemia, unspecified type  D64.9 285.9   3. SOB (shortness of breath)  R06.02 786.05   4. Mixed hyperlipidemia  E78.2 272.2   5. Carotid artery plaque, bilateral  I65.23 433.10     433.30   6. Primary hypertension  I10 401.9     Problem List Items Addressed This Visit          Pulmonary    SOB (shortness of breath)    Relevant Orders    Case Request-Cath Lab: CATHETERIZATION, HEART, BOTH LEFT AND RIGHT (Completed)    Vital signs    Cardiac Monitoring - Adult    Basic metabolic panel       Cardiac/Vascular    Mixed hyperlipidemia    Severe aortic stenosis - Primary    Relevant Orders    Case Request-Cath Lab: CATHETERIZATION, HEART, BOTH LEFT AND RIGHT (Completed)    Vital signs    Cardiac Monitoring - Adult    Basic metabolic panel    Primary hypertension    Carotid artery plaque, bilateral       Oncology    Anemia        Plan:     INCREASE CARDIZEM  MG SOME OF THE TACHYCARDIA RELATED TO ANEMIA FOLLOW-UP WITH ID REGARDING FURTHER NEED FOR ANTIBIOTIC WILL NEED FURTHER EVALUATION OF THE AORTIC STENOSIS RIGHT AND LEFT HEART CATHETERIZATION WEIGHT FEW WEEKS IN VIEW OF RECENT SEPSIS AND ANTIBIOTIC, AND QUESTION OF NEED FOR CONTINUATION, NO OVERT HEART FAILURE HAS CLASS 2 ANGINAL-TYPE SYMPTOMS NO CLINICAL ARRHYTHMIA RISKS AND ALTERNATIVE EXPLAINED PATIENT AND HIS WIFE IN DETAILS      Severe aortic stenosis  -     Case Request-Cath Lab: CATHETERIZATION, HEART,  BOTH LEFT AND RIGHT; Standing  -     Establish IV access and maintain saline lock; Standing  -     Hold Warfarin; Standing  -     Hold Enoxaparin/subcutaneous Heparin; Standing  -     Hold Heparin drip; Standing  -     Height and weight; Standing  -     Verify informed consent; Standing  -     Vital signs; Standing  -     Cardiac Monitoring - Adult; Standing  -     Pulse Oximetry Q4H; Standing  -     Diet NPO; Standing  -     CBC auto differential; Standing  -     Basic metabolic panel; Standing    Anemia, unspecified type    SOB (shortness of breath)  -     Case Request-Cath Lab: CATHETERIZATION, HEART, BOTH LEFT AND RIGHT; Standing  -     Establish IV access and maintain saline lock; Standing  -     Hold Warfarin; Standing  -     Hold Enoxaparin/subcutaneous Heparin; Standing  -     Hold Heparin drip; Standing  -     Height and weight; Standing  -     Verify informed consent; Standing  -     Vital signs; Standing  -     Cardiac Monitoring - Adult; Standing  -     Pulse Oximetry Q4H; Standing  -     Diet NPO; Standing  -     CBC auto differential; Standing  -     Basic metabolic panel; Standing    Mixed hyperlipidemia    Carotid artery plaque, bilateral    Primary hypertension    Other orders  -     diltiaZEM (CARDIZEM CD) 240 MG 24 hr capsule; Take 1 capsule (240 mg total) by mouth once daily.  Dispense: 30 capsule; Refill: 3  -     0.9%  NaCl infusion  -     aspirin chewable tablet 81 mg  -     clopidogreL tablet 300 mg    RTC Low level/low impact aerobic exercise 5x's/wk. Heart healthy diet and risk factor modification.    See labs and med orders.    Aerobic exercise 5x's/wk. Heart healthy diet and risk factor modification.    See labs and med orders.

## 2024-03-01 ENCOUNTER — TELEPHONE (OUTPATIENT)
Dept: HEMATOLOGY/ONCOLOGY | Facility: CLINIC | Age: 74
End: 2024-03-01
Payer: MEDICARE

## 2024-03-01 ENCOUNTER — PATIENT MESSAGE (OUTPATIENT)
Dept: HEMATOLOGY/ONCOLOGY | Facility: CLINIC | Age: 74
End: 2024-03-01

## 2024-03-04 ENCOUNTER — OFFICE VISIT (OUTPATIENT)
Dept: HEMATOLOGY/ONCOLOGY | Facility: CLINIC | Age: 74
End: 2024-03-04
Payer: MEDICARE

## 2024-03-04 VITALS
BODY MASS INDEX: 24.26 KG/M2 | WEIGHT: 179.13 LBS | TEMPERATURE: 97 F | DIASTOLIC BLOOD PRESSURE: 91 MMHG | HEIGHT: 72 IN | OXYGEN SATURATION: 96 % | SYSTOLIC BLOOD PRESSURE: 154 MMHG | HEART RATE: 114 BPM

## 2024-03-04 DIAGNOSIS — D64.9 ANEMIA, UNSPECIFIED TYPE: Primary | ICD-10-CM

## 2024-03-04 DIAGNOSIS — D63.8 ANEMIA IN OTHER CHRONIC DISEASES CLASSIFIED ELSEWHERE: ICD-10-CM

## 2024-03-04 PROCEDURE — 99213 OFFICE O/P EST LOW 20 MIN: CPT | Mod: S$GLB,,, | Performed by: NURSE PRACTITIONER

## 2024-03-04 PROCEDURE — 99999 PR PBB SHADOW E&M-EST. PATIENT-LVL V: CPT | Mod: PBBFAC,,, | Performed by: NURSE PRACTITIONER

## 2024-03-04 NOTE — ASSESSMENT & PLAN NOTE
ANEMIA / THROMBOCYTOPENIA  - mild normocytic anemia and intermittent mild thrombocytopenia since 2018, stable  - Previously workup with no evidence of iron, folic acid or B12 deficiency and negative for monoclonal gammopathy. Normal kidney function but does have history of HCV and liver scarring on scan. Bone marrow biopsy with normal cellularity and no abnormality on cytogenetics and NGS.     Recent decline in hemoglobin most likely r/t recent illness/hospitalization. Was on home abx daily for Staphylococcus lugdunensis bacteremia with ID follow up, completed 1 week ago    Hemoglobin back to 9 baseline range. Anemia of chronic disease stable. F/u 2 months with cbc, cmp. Discussed S&S to report sooner

## 2024-03-04 NOTE — PROGRESS NOTES
Subjective:       Patient ID: Scooter Swan is a 73 y.o. male.    Chief Complaint: review labs    HPI: 73 y.o. male with  medical history significant for HTN, AR, CAD, RA, HCV presenting today for follow up of anemia, thrombocytopenia and leukocytosis     Prior workup with no evidence of iron, folic acid or B12 deficiency and negative for monoclonal gammopathy. Normal kidney function but does have history of HCV and liver scarring on scan. Bone marrow biopsy with normal cellularity and no abnormality on cytogenetics and NGS.    Recently d/c from hospital for management of bacteremia on home IV antibiotics with ongoing ID follow up, completed IV antibiotics 1 week ago.        Social History     Socioeconomic History    Marital status:    Tobacco Use    Smoking status: Never    Smokeless tobacco: Never   Substance and Sexual Activity    Alcohol use: Yes     Alcohol/week: 1.0 standard drink of alcohol     Types: 1 Shots of liquor per week     Comment: social    Drug use: Yes     Types: Hydrocodone     Social Determinants of Health     Financial Resource Strain: Low Risk  (1/11/2024)    Overall Financial Resource Strain (CARDIA)     Difficulty of Paying Living Expenses: Not hard at all   Food Insecurity: No Food Insecurity (9/27/2022)    Hunger Vital Sign     Worried About Running Out of Food in the Last Year: Never true     Ran Out of Food in the Last Year: Never true   Transportation Needs: Unknown (2/21/2024)    PRAPARE - Transportation     Lack of Transportation (Non-Medical): No   Physical Activity: Unknown (2/21/2024)    Exercise Vital Sign     Days of Exercise per Week: 4 days   Stress: Stress Concern Present (9/19/2022)    [employer] [place] [employer] - Occupational Stress Questionnaire     Feeling of Stress : Rather much   Social Connections: Unknown (9/27/2022)    Social Connection and Isolation Panel [NHANES]     Frequency of Communication with Friends and Family: More than three  times a week     Frequency of Social Gatherings with Friends and Family: More than three times a week     Active Member of Clubs or Organizations: Patient declined     Attends Club or Organization Meetings: Patient declined     Marital Status:    Housing Stability: Low Risk  (2/21/2024)    Housing Stability Vital Sign     Unable to Pay for Housing in the Last Year: No     Number of Places Lived in the Last Year: 1     Unstable Housing in the Last Year: No       Past Medical History:   Diagnosis Date    Cataract     COPD (chronic obstructive pulmonary disease)     Diverticulosis     DUARTE (dyspnea on exertion)     GERD (gastroesophageal reflux disease)     Glaucoma     Hepatitis C     treated; seeing Dr. Carr    Hyperlipidemia     Hypertension     Liver lesion 08/2018    RA (rheumatoid arthritis)     Rectal prolapse     Possible       Family History   Problem Relation Age of Onset    Stomach cancer Paternal Cousin     Stomach cancer Paternal Cousin     Cataracts Mother     Diabetes Mother     Hypertension Mother     Stroke Mother     Diabetes Sister     Hypertension Sister     Stroke Sister     Diabetes Brother     Glaucoma Brother     Hypertension Brother     Stroke Brother     Diabetes Maternal Aunt     Hypertension Maternal Aunt     Stroke Maternal Aunt     Diabetes Maternal Uncle     Hypertension Maternal Uncle     Stroke Maternal Uncle     Diabetes Maternal Grandmother     Hypertension Maternal Grandmother     Stroke Maternal Grandmother     Cancer Cousin         stomach    Colon cancer Neg Hx     Colon polyps Neg Hx     Crohn's disease Neg Hx     Ulcerative colitis Neg Hx     Esophageal cancer Neg Hx     Amblyopia Neg Hx     Blindness Neg Hx     Macular degeneration Neg Hx     Retinal detachment Neg Hx     Strabismus Neg Hx     Thyroid disease Neg Hx        Past Surgical History:   Procedure Laterality Date    ANGIOGRAM, CORONARY, WITH LEFT HEART  CATHETERIZATION  10/6/2022    Procedure: Angiogram, Coronary, with Left Heart Cath;  Surgeon: Zac Boucher MD;  Location: [facility] CATH;  Service: Cardiology;;    ARTERIOGRAPHY OF AORTIC ROOT  10/6/2022    Procedure: ARTERIOGRAM, AORTIC ROOT;  Surgeon: Zac Boucher MD;  Location: [facility] CATH;  Service: Cardiology;;    CARPAL TUNNEL RELEASE      Right wrist 2015    COLONOSCOPY  08/2016    Dr. Cardona; in care everywhere    COLONOSCOPY N/A 3/20/2019    Procedure: COLONOSCOPY;  Surgeon: Sotero Arthur MD;  Location: [facility] ENDO;  Service: Endoscopy;  Laterality: N/A; hemorrhoids, diverticulosis, repeat in 10 years for screening    ECHOCARDIOGRAM,TRANSESOPHAGEAL N/A 1/12/2024    Procedure: Transesophageal echo (CADY) intra-procedure log documentation;  Surgeon: Renan Slaughter MD;  Location: [facility] CATH[facility]EP LAB;  Service: Cardiology;  Laterality: N/A;    EXCISIONAL HEMORRHOIDECTOMY N/A 10/18/2018    Procedure: HEMORRHOIDECTOMY, prone;  Surgeon: Gunnar Horton MD;  Location: [address];  Service: Colon and Rectal;  Laterality: N/A;    THYROID SURGERY      UPPER GASTROINTESTINAL ENDOSCOPY  08/2016    Dr. Cardona; in care everywhere       Review of Systems   Constitutional:  Positive for activity change and fatigue. Negative for appetite change, chills, fever and unexpected weight change.   HENT:  Negative for congestion, mouth sores, nosebleeds, sore throat, trouble swallowing and voice change.    Respiratory:  Positive for shortness of breath (with exertion). Negative for cough, chest tightness and wheezing.    Cardiovascular:  Negative for chest pain, palpitations and leg swelling.   Gastrointestinal:  Negative for abdominal distention, abdominal pain, blood in stool, constipation, diarrhea, nausea and vomiting.   Genitourinary:  Negative for difficulty urinating, dysuria and hematuria.   Musculoskeletal:  Positive for gait problem. Negative for arthralgias, back pain and myalgias.   Skin:  Negative for  pallor, rash and wound.   Neurological:  Positive for weakness. Negative for dizziness, syncope and headaches.   Hematological:  Negative for adenopathy. Does not bruise/bleed easily.   Psychiatric/Behavioral:  The patient is nervous/anxious.          Medication List with Changes/Refills   Current Medications    ALBUTEROL (PROVENTIL) 2.5 MG /3 ML (0.083 %) NEBULIZER SOLUTION    TAKE 3 MLS BY MOUTH VIA NEBULIZER EVERY 6 HOURS AS NEEDED FOR WHEEZING    ALBUTEROL (PROVENTIL/VENTOLIN HFA) 90 MCG/ACTUATION INHALER    INHALE 1 PUFF BY MOUTH EVERY 4 HOURS AS NEEDED FOR WHEEZING OR SHORTNESS OF BREATH    CHOLECALCIFEROL, VITAMIN D3, (VITAMIN D3) 50 MCG (2,000 UNIT) CAP    Take 1 capsule (2,000 Units total) by mouth once daily.    CYANOCOBALAMIN 500 MCG TABLET    Take 500 mcg by mouth once daily.    DIAZEPAM (VALIUM) 10 MG TAB    Take 1 tablet (10 mg total) by mouth every evening.    DICLOFENAC SODIUM (VOLTAREN) 1 % GEL    Apply 2 grams  topically 4 (four) times daily.    DILTIAZEM (CARDIZEM CD) 240 MG 24 HR CAPSULE    Take 1 capsule (240 mg total) by mouth once daily.    DORZOLAMIDE (TRUSOPT) 2 % OPHTHALMIC SOLUTION    Place 1 drop into both eyes 3 (three) times daily.    DOXAZOSIN (CARDURA) 2 MG TABLET    TAKE 1 TABLET (2 MG TOTAL) BY MOUTH EVERY EVENING    ENALAPRIL (VASOTEC) 20 MG TABLET    TAKE ONE TABLET BY MOUTH TWO TIMES A DAY    HYDRALAZINE (APRESOLINE) 50 MG TABLET    TAKE 2 TABLETS BY MOUTH EVERY 12 HOURS    HYDROCODONE-ACETAMINOPHEN (NORCO)  MG PER TABLET    Take 1 tablet by mouth every 8 (eight) hours as needed for Pain.    HYDROCODONE-ACETAMINOPHEN (NORCO)  MG PER TABLET    Take 1 tablet by mouth every 8 (eight) hours as needed for Pain.    HYDROCODONE-ACETAMINOPHEN (NORCO)  MG PER TABLET    Take 1 tablet by mouth every 8 (eight) hours as needed for Pain.    HYDROXYZINE HCL (ATARAX) 25 MG TABLET    TAKE ONE TABLET BY MOUTH NIGHTLY AS NEEDED FOR ITCHING    MIRABEGRON (MYRBETRIQ) 25 MG TB24 ER  TABLET    Take 1 tablet (25 mg total) by mouth once daily.    MULTIVITAMIN CAPSULE    Take 1 capsule by mouth once daily.    MUPIROCIN (BACTROBAN) 2 % OINTMENT    Apply topically 3 (three) times daily.    OMEPRAZOLE (PRILOSEC) 20 MG CAPSULE    Take 1 capsule (20 mg total) by mouth 2 (two) times a day.    POLYETHYLENE GLYCOL (GLYCOLAX) 17 GRAM PWPK    Take 17 g by mouth daily as needed.    PRAVASTATIN (PRAVACHOL) 20 MG TABLET    TAKE ONE TABLET BY MOUTH ONCE DAILY    PREDNISONE (DELTASONE) 5 MG TABLET    Take 2 tablets (10 mg total) by mouth once daily.    PROMETHAZINE (PHENERGAN) 25 MG TABLET    Take 1 tablet (25 mg total) by mouth every 6 (six) hours as needed for Nausea.    SILDENAFIL (VIAGRA) 100 MG TABLET    Take 1 tablet (100 mg total) by mouth as needed for Erectile Dysfunction.    TAMSULOSIN (FLOMAX) 0.4 MG CAP    Take 1 capsule (0.4 mg total) by mouth once daily.    TIOTROPIUM BROMIDE (SPIRIVA RESPIMAT INHL)    2 puffs daily - pt reports taking everyday in AM    TIOTROPIUM BROMIDE (SPIRIVA RESPIMAT) 1.25 MCG/ACTUATION INHALER    Inhale 2 puffs into the lungs once daily. Controller    TIZANIDINE (ZANAFLEX) 4 MG TABLET    Take 1 tablet (4 mg total) by mouth every 8 (eight) hours.    TRAVOPROST (TRAVATAN Z) 0.004 % OPHTHALMIC SOLUTION    Place 1 drop into both eyes every evening.     Objective:     Vitals:    03/04/24 1516   BP: (!) 154/91   Pulse: (!) 114   Temp: 97.2 °F (36.2 °C)     Lab Results   Component Value Date    WBC 9.92 02/28/2024    WBC 10.00 02/28/2024    HGB 9.5 (L) 02/28/2024    HGB 9.7 (L) 02/28/2024    HCT 32.4 (L) 02/28/2024    HCT 32.0 (L) 02/28/2024    MCV 97 02/28/2024    MCV 92 02/28/2024     02/28/2024     02/28/2024       BMP  Lab Results   Component Value Date     02/28/2024    K 3.8 02/28/2024     02/28/2024    CO2 27 02/28/2024    BUN 14 02/28/2024    CREATININE 1.0 02/28/2024    CALCIUM 9.5 02/28/2024    ANIONGAP 9 02/28/2024    EGFRNORACEVR >60.0  02/28/2024     Lab Results   Component Value Date    ALT <5 (L) 02/28/2024    AST 21 02/28/2024    ALKPHOS 57 02/28/2024    BILITOT 0.5 02/28/2024       Physical Exam  Vitals reviewed.   Constitutional:       Appearance: He is well-developed.   HENT:      Head: Normocephalic.      Right Ear: External ear normal.      Left Ear: External ear normal.   Eyes:      General: Lids are normal. No scleral icterus.        Right eye: No discharge.         Left eye: No discharge.      Conjunctiva/sclera: Conjunctivae normal.   Neck:      Thyroid: No thyroid mass.   Cardiovascular:      Rate and Rhythm: Normal rate and regular rhythm.      Heart sounds: Murmur heard.   Pulmonary:      Effort: Pulmonary effort is normal. No respiratory distress.      Breath sounds: Normal breath sounds. No wheezing or rales.   Abdominal:      General: There is no distension.   Genitourinary:     Comments: deferred  Musculoskeletal:         General: Normal range of motion.      Cervical back: Normal range of motion.   Lymphadenopathy:      Head:      Right side of head: No submandibular, preauricular or posterior auricular adenopathy.      Left side of head: No submandibular, preauricular or posterior auricular adenopathy.      Cervical:      Right cervical: No superficial cervical adenopathy.     Left cervical: No superficial cervical adenopathy.   Skin:     General: Skin is warm and dry.   Neurological:      Mental Status: He is alert and oriented to person, place, and time.   Psychiatric:         Speech: Speech normal.         Behavior: Behavior normal. Behavior is cooperative.         Thought Content: Thought content normal.        Assessment:     Problem List Items Addressed This Visit          Oncology    Anemia - Primary     ANEMIA / THROMBOCYTOPENIA  - mild normocytic anemia and intermittent mild thrombocytopenia since 2018, stable  - Previously workup with no evidence of iron, folic acid or B12 deficiency and negative for monoclonal  gammopathy. Normal kidney function but does have history of HCV and liver scarring on scan. Bone marrow biopsy with normal cellularity and no abnormality on cytogenetics and NGS.     Recent decline in hemoglobin most likely r/t recent illness/hospitalization. Was on home abx daily for Staphylococcus lugdunensis bacteremia with ID follow up, completed 1 week ago    Hemoglobin back to 9 baseline range. Anemia of chronic disease stable. F/u 2 months with cbc, cmp. Discussed S&S to report sooner         Relevant Orders    Comprehensive Metabolic Panel    CBC Auto Differential         Plan:     Anemia, unspecified type  -     Comprehensive Metabolic Panel; Future; Expected date: 03/04/2024  -     CBC Auto Differential; Future; Expected date: 03/04/2024    Anemia in other chronic diseases classified elsewhere          Med Onc Chart Routing      Follow up with physician    Follow up with ROBYN 2 months.   Infusion scheduling note    Injection scheduling note    Labs CBC and CMP   Scheduling:  Preferred lab:  Lab interval:     Imaging None      Pharmacy appointment No pharmacy appointment needed      Other referrals       No additional referrals needed         Eugenie Tompkins, GIAP-C

## 2024-03-07 ENCOUNTER — OFFICE VISIT (OUTPATIENT)
Dept: PULMONOLOGY | Facility: CLINIC | Age: 74
End: 2024-03-07
Payer: MEDICARE

## 2024-03-07 VITALS
OXYGEN SATURATION: 98 % | HEIGHT: 72 IN | WEIGHT: 180.69 LBS | BODY MASS INDEX: 24.47 KG/M2 | DIASTOLIC BLOOD PRESSURE: 57 MMHG | SYSTOLIC BLOOD PRESSURE: 108 MMHG | HEART RATE: 91 BPM

## 2024-03-07 DIAGNOSIS — D64.9 ANEMIA, UNSPECIFIED TYPE: ICD-10-CM

## 2024-03-07 DIAGNOSIS — R78.81 BACTEREMIA: ICD-10-CM

## 2024-03-07 DIAGNOSIS — R06.02 SHORTNESS OF BREATH: Primary | ICD-10-CM

## 2024-03-07 PROCEDURE — 99204 OFFICE O/P NEW MOD 45 MIN: CPT | Mod: S$GLB,,, | Performed by: INTERNAL MEDICINE

## 2024-03-07 PROCEDURE — 99999 PR PBB SHADOW E&M-EST. PATIENT-LVL V: CPT | Mod: PBBFAC,,, | Performed by: INTERNAL MEDICINE

## 2024-03-07 NOTE — PROGRESS NOTES
Pulmonary Clinic New Patient    HISTORY OF PRESENT ILLNESS   Scooter Swan is a 73 y.o. male with a PMH of severe aortic stenosis, chronic anemia and thrombocytopenia, RA, CAD, HTN, GERD, and HCV s/p treatment on whom we have been consulted for shortness of breath.    Estimates he can walk 1 breath before getting short of breath. He states that using the albuterol helps. Endorses orthopnea. Denies leg swelling. Denies daily cough.       SMOKING HISTORY  Never smoker of cigarettes.   Smoked about a joint a day for 10 years.     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    REVIEW OF SYSTEMS  As in HPI.     PFSH:  Past Medical History:   Diagnosis Date    Cataract     COPD (chronic obstructive pulmonary disease)     Diverticulosis     DUARTE (dyspnea on exertion)     GERD (gastroesophageal reflux disease)     Glaucoma     Hepatitis C     treated; seeing Dr. Carr    Hyperlipidemia     Hypertension     Liver lesion 08/2018    RA (rheumatoid arthritis)     Rectal prolapse     Possible         Past Surgical History:   Procedure Laterality Date    ANGIOGRAM, CORONARY, WITH LEFT HEART CATHETERIZATION  10/6/2022    Procedure: Angiogram, Coronary, with Left Heart Cath;  Surgeon: Zac Boucher MD;  Location: [facility] CATH;  Service: Cardiology;;    ARTERIOGRAPHY OF AORTIC ROOT  10/6/2022    Procedure: ARTERIOGRAM, AORTIC ROOT;  Surgeon: Zac Boucher MD;  Location: [facility] CATH;  Service: Cardiology;;    CARPAL TUNNEL RELEASE      Right wrist 2015    COLONOSCOPY  08/2016    Dr. Cardona; in care everywhere    COLONOSCOPY N/A 3/20/2019    Procedure: COLONOSCOPY;  Surgeon: Sotero Arthur MD;  Location: [facility] ENDO;  Service: Endoscopy;  Laterality: N/A; hemorrhoids, diverticulosis, repeat in 10 years for screening    ECHOCARDIOGRAM,TRANSESOPHAGEAL N/A 1/12/2024    Procedure: Transesophageal echo (CADY)  intra-procedure log documentation;  Surgeon: Renan Slaughter MD;  Location: [facility] CATH/EP LAB;  Service: Cardiology;  Laterality: N/A;    EXCISIONAL HEMORRHOIDECTOMY N/A 10/18/2018    Procedure: HEMORRHOIDECTOMY, prone;  Surgeon: Gunnar Horton MD;  Location: [facility] OR [address] [place] [address];  Service: Colon and Rectal;  Laterality: N/A;    THYROID SURGERY      UPPER GASTROINTESTINAL ENDOSCOPY  08/2016    Dr. Cardona; in care everywhere     Social History     Tobacco Use    Smoking status: Never    Smokeless tobacco: Never   Substance Use Topics    Alcohol use: Yes     Alcohol/week: 1.0 standard drink of alcohol     Types: 1 Shots of liquor per week     Comment: social    Drug use: Yes     Types: Hydrocodone     Family History   Problem Relation Age of Onset    Stomach cancer Paternal Cousin     Stomach cancer Paternal Cousin     Cataracts Mother     Diabetes Mother     Hypertension Mother     Stroke Mother     Diabetes Sister     Hypertension Sister     Stroke Sister     Diabetes Brother     Glaucoma Brother     Hypertension Brother     Stroke Brother     Diabetes Maternal Aunt     Hypertension Maternal Aunt     Stroke Maternal Aunt     Diabetes Maternal Uncle     Hypertension Maternal Uncle     Stroke Maternal Uncle     Diabetes Maternal Grandmother     Hypertension Maternal Grandmother     Stroke Maternal Grandmother     Cancer Cousin         stomach    Colon cancer Neg Hx     Colon polyps Neg Hx     Crohn's disease Neg Hx     Ulcerative colitis Neg Hx     Esophageal cancer Neg Hx     Amblyopia Neg Hx     Blindness Neg Hx     Macular degeneration Neg Hx     Retinal detachment Neg Hx     Strabismus Neg Hx     Thyroid disease Neg Hx      Review of patient's allergies indicates:   Allergen Reactions    Buspar [buspirone] Hallucinations     Nightmares    Enbrel [etanercept] Other (See Comments)     Severe headaches    Fentanyl Hives and Hallucinations    Plaquenil [hydroxychloroquine]      Nausea, insomnia, weight loss 40LBS     Amitiza [lubiprostone] Nausea Only and Other (See Comments)     Fatigue.    Azulfidine [sulfasalazine] Nausea And Vomiting    Trazodone Other (See Comments)     Insomnia           VITAL SIGNS (MOST RECENT)  Pulse: 91 (03/07/24 1424)  BP: (!) 108/57 (03/07/24 1424)  SpO2: 98 % (on room air at rest) (03/07/24 1424)    PHYSICAL EXAM  GENERAL: NAD.  HEENT: Pupils equal and reactive. Extraocular movements intact.   NECK: Supple.   HEART: Regular rate and rhythm. 2/6 systolic murmur at RUSB radiating to carotids.  LUNGS: Clear to auscultation and percussion. Lung excursion symmetrical.  ABDOMEN: Bowel sounds present. Non-tender, no masses palpated.  EXTREMITIES: Normal muscle tone and joint movement, no cyanosis or clubbing.   LYMPHATICS: No adenopathy palpated, no edema.  SKIN: Dry, intact, no lesions.   NEURO: No gross cognitive or motor deficits.  PSYCH: Appropriate affect.    Lab Results   Component Value Date    WBC 9.92 02/28/2024    WBC 10.00 02/28/2024    HGB 9.5 (L) 02/28/2024    HGB 9.7 (L) 02/28/2024    HCT 32.4 (L) 02/28/2024    HCT 32.0 (L) 02/28/2024     02/28/2024     02/28/2024    CHOL 186 02/19/2024    TRIG 127 02/19/2024    HDL 50 02/19/2024    ALT <5 (L) 02/28/2024    AST 21 02/28/2024     02/28/2024    K 3.8 02/28/2024     02/28/2024    CREATININE 1.0 02/28/2024    BUN 14 02/28/2024    CO2 27 02/28/2024    TSH 2.403 02/12/2024    PSA 1.4 02/19/2024    INR 1.0 08/04/2023    HGBA1C 5.8 01/10/2024       LAST ARTERIAL BLOOD GAS  @LABRCNTIP[PH,PO2,PCO2,HCO3,BE@      LAST 7 DAYS MICROBIOLOGY   Microbiology Results (last 7 days)       ** No results found for the last 168 hours. **            MOST RECENT IMAGING  Echo    Left Ventricle: The left ventricle is normal in size.  There is   concentric hypertrophy. There is normal systolic function. Ejection   fraction by visual approximation is 65-70%.    Right Ventricle: Normal right ventricular cavity size. Systolic   function is  "normal.    Left Atrium: Left atrium is mildly dilated.    Aortic Valve: There is moderate to severe stenosis. Aortic valve area   by VTI is 1.29 cm². Aortic valve peak velocity is 4.44 m/s. Mean gradient   is 54 mmHg. The dimensionless index is 0.27.  There is mild aortic   regurgitation.    IVC/SVC: Normal venous pressure at 3 mmHg.    Mildly dilated ascending aorta.      CURRENT VISIT EKG  No results found for this visit on 03/07/24.    ECHOCARDIOGRAM RESULTS  Results for orders placed during the hospital encounter of 02/22/24    Echo    Interpretation Summary    Left Ventricle: The left ventricle is normal in size.  There is concentric hypertrophy. There is normal systolic function. Ejection fraction by visual approximation is 65-70%.    Right Ventricle: Normal right ventricular cavity size. Systolic function is normal.    Left Atrium: Left atrium is mildly dilated.    Aortic Valve: There is moderate to severe stenosis. Aortic valve area by VTI is 1.29 cm². Aortic valve peak velocity is 4.44 m/s. Mean gradient is 54 mmHg. The dimensionless index is 0.27.  There is mild aortic regurgitation.    IVC/SVC: Normal venous pressure at 3 mmHg.    Mildly dilated ascending aorta.      Pulmonary Function Tests  No results found for: "FEV1", "FVC", "PSL3DRI", "TLC", "DLCO"    CXR 1/15/24: Clear lungs.    ASSESSMENT & PLAN   Scooter Swan is a 73 y.o. male with a PMH of severe aortic stenosis, chronic anemia and thrombocytopenia, COPD, RA, CAD, HTN, GERD, and HCV s/p treatment on whom we have been consulted for COPD care.    #DUARTE  Likely due to valvular heart failure, but lung disease is certainly a consideration.  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 Haptoglobin recently normal.  - coags  - vWF antigen and activity, factor VIII labs    Follow up in 1-2 months (after PFTs and methacholine challenge).    Discussed with wife. I have " personally reviewed recent labs and ABGs, and I have viewed and interpreted the images of recent chest imaging, as above.     Adal Nelson MD  Pulmonary and Critical Care Medicine  [facility]  Date of Service: 03/07/2024  2:36 PM

## 2024-03-08 ENCOUNTER — DOCUMENT SCAN (OUTPATIENT)
Dept: HOME HEALTH SERVICES | Facility: HOSPITAL | Age: 74
End: 2024-03-08
Payer: MEDICARE

## 2024-03-10 ENCOUNTER — DOCUMENT SCAN (OUTPATIENT)
Dept: HOME HEALTH SERVICES | Facility: HOSPITAL | Age: 74
End: 2024-03-10
Payer: MEDICARE

## 2024-03-24 ENCOUNTER — DOCUMENT SCAN (OUTPATIENT)
Dept: HOME HEALTH SERVICES | Facility: HOSPITAL | Age: 74
End: 2024-03-24
Payer: MEDICARE

## 2024-03-25 ENCOUNTER — TELEPHONE (OUTPATIENT)
Dept: CARDIOLOGY | Facility: CLINIC | Age: 74
End: 2024-03-25
Payer: MEDICARE

## 2024-03-25 NOTE — TELEPHONE ENCOUNTER
----- Message from Carolann Gamble sent at 3/25/2024  9:41 AM CDT -----  Contact: pt  Type: Needs Medical Advice         Who Called: pt  Best Call Back Number:[phone]  Additional Information: Requesting a call back regarding  pt asking for the office to call regarding Rx.   Pt said he received the incorrect MG for his rx  Please Advise- Thank you

## 2024-03-27 ENCOUNTER — HOSPITAL ENCOUNTER (OUTPATIENT)
Dept: PULMONOLOGY | Facility: HOSPITAL | Age: 74
Discharge: HOME | End: 2024-03-27
Attending: INTERNAL MEDICINE
Payer: MEDICARE

## 2024-03-27 DIAGNOSIS — R06.02 SHORTNESS OF BREATH: ICD-10-CM

## 2024-03-27 PROCEDURE — 94729 DIFFUSING CAPACITY: CPT

## 2024-03-27 PROCEDURE — 94010 BREATHING CAPACITY TEST: CPT

## 2024-03-27 PROCEDURE — 94727 GAS DIL/WSHOT DETER LNG VOL: CPT

## 2024-03-27 PROCEDURE — 94060 EVALUATION OF WHEEZING: CPT

## 2024-04-22 DIAGNOSIS — E78.49 OTHER HYPERLIPIDEMIA: ICD-10-CM

## 2024-04-22 DIAGNOSIS — J44.9 CHRONIC OBSTRUCTIVE PULMONARY DISEASE, UNSPECIFIED COPD TYPE: ICD-10-CM

## 2024-04-22 NOTE — TELEPHONE ENCOUNTER
No care due was identified.  [facility] Embedded Care Due Messages. Reference number: [id number].   4/22/2024 8:37:09 AM CDT

## 2024-04-23 RX ORDER — PRAVASTATIN SODIUM 20 MG/1
20 TABLET ORAL
Qty: 90 TABLET | Refills: 3 | Status: ON HOLD | OUTPATIENT
Start: 2024-04-23

## 2024-04-23 RX ORDER — ALBUTEROL SULFATE 0.83 MG/ML
SOLUTION RESPIRATORY (INHALATION)
Qty: 90 ML | Refills: 3 | Status: ON HOLD | OUTPATIENT
Start: 2024-04-23

## 2024-04-23 NOTE — TELEPHONE ENCOUNTER
Refill Routing Note   Medication(s) are not appropriate for processing by [facility] for the following reason(s):        New or recently adjusted medication    [facility] action(s):  Defer  Approve             Appointments  past 12m or future 3m with PCP    Date Provider   Last Visit   2/23/2024 Salvador Kennedy, DO   Next Visit   5/23/2024 Salvador Kennedy, DO   ED visits in past 90 days: 0        Note composed:3:16 AM 04/23/2024

## 2024-05-06 ENCOUNTER — LAB VISIT (OUTPATIENT)
Dept: LAB | Facility: HOSPITAL | Age: 74
End: 2024-05-06
Attending: NURSE PRACTITIONER
Payer: MEDICARE

## 2024-05-06 ENCOUNTER — OFFICE VISIT (OUTPATIENT)
Dept: HEMATOLOGY/ONCOLOGY | Facility: CLINIC | Age: 74
End: 2024-05-06
Payer: MEDICARE

## 2024-05-06 VITALS
TEMPERATURE: 98 F | BODY MASS INDEX: 23.99 KG/M2 | WEIGHT: 177.13 LBS | HEART RATE: 69 BPM | SYSTOLIC BLOOD PRESSURE: 117 MMHG | OXYGEN SATURATION: 98 % | DIASTOLIC BLOOD PRESSURE: 76 MMHG | HEIGHT: 72 IN

## 2024-05-06 DIAGNOSIS — D69.6 THROMBOCYTOPENIA: ICD-10-CM

## 2024-05-06 DIAGNOSIS — D64.9 ANEMIA, UNSPECIFIED TYPE: ICD-10-CM

## 2024-05-06 DIAGNOSIS — D64.9 ANEMIA, UNSPECIFIED TYPE: Primary | ICD-10-CM

## 2024-05-06 LAB
ALBUMIN SERPL BCP-MCNC: 3.7 G/DL (ref 3.5–5.2)
ALP SERPL-CCNC: 57 U/L (ref 55–135)
ALT SERPL W/O P-5'-P-CCNC: 7 U/L (ref 10–44)
ANION GAP SERPL CALC-SCNC: 7 MMOL/L (ref 8–16)
AST SERPL-CCNC: 14 U/L (ref 10–40)
BASOPHILS # BLD AUTO: 0.06 K/UL (ref 0–0.2)
BASOPHILS NFR BLD: 0.8 % (ref 0–1.9)
BILIRUB SERPL-MCNC: 0.4 MG/DL (ref 0.1–1)
BUN SERPL-MCNC: 12 MG/DL (ref 8–23)
CALCIUM SERPL-MCNC: 9.7 MG/DL (ref 8.7–10.5)
CHLORIDE SERPL-SCNC: 106 MMOL/L (ref 95–110)
CO2 SERPL-SCNC: 27 MMOL/L (ref 23–29)
CREAT SERPL-MCNC: 1.2 MG/DL (ref 0.5–1.4)
DIFFERENTIAL METHOD BLD: ABNORMAL
EOSINOPHIL # BLD AUTO: 0.2 K/UL (ref 0–0.5)
EOSINOPHIL NFR BLD: 3.2 % (ref 0–8)
ERYTHROCYTE [DISTWIDTH] IN BLOOD BY AUTOMATED COUNT: 14.5 % (ref 11.5–14.5)
EST. GFR  (NO RACE VARIABLE): >60 ML/MIN/1.73 M^2
GLUCOSE SERPL-MCNC: 117 MG/DL (ref 70–110)
HCT VFR BLD AUTO: 28.5 % (ref 40–54)
HGB BLD-MCNC: 8.9 G/DL (ref 14–18)
IMM GRANULOCYTES # BLD AUTO: 0.02 K/UL (ref 0–0.04)
IMM GRANULOCYTES NFR BLD AUTO: 0.3 % (ref 0–0.5)
LYMPHOCYTES # BLD AUTO: 3 K/UL (ref 1–4.8)
LYMPHOCYTES NFR BLD: 39.5 % (ref 18–48)
MCH RBC QN AUTO: 27.7 PG (ref 27–31)
MCHC RBC AUTO-ENTMCNC: 31.2 G/DL (ref 32–36)
MCV RBC AUTO: 89 FL (ref 82–98)
MONOCYTES # BLD AUTO: 1.5 K/UL (ref 0.3–1)
MONOCYTES NFR BLD: 19.5 % (ref 4–15)
NEUTROPHILS # BLD AUTO: 2.7 K/UL (ref 1.8–7.7)
NEUTROPHILS NFR BLD: 36.7 % (ref 38–73)
NRBC BLD-RTO: 0 /100 WBC
PLATELET # BLD AUTO: 143 K/UL (ref 150–450)
PMV BLD AUTO: 12.1 FL (ref 9.2–12.9)
POTASSIUM SERPL-SCNC: 3.8 MMOL/L (ref 3.5–5.1)
PROT SERPL-MCNC: 6.6 G/DL (ref 6–8.4)
RBC # BLD AUTO: 3.21 M/UL (ref 4.6–6.2)
SODIUM SERPL-SCNC: 140 MMOL/L (ref 136–145)
WBC # BLD AUTO: 7.47 K/UL (ref 3.9–12.7)

## 2024-05-06 PROCEDURE — 1100F PTFALLS ASSESS-DOCD GE2>/YR: CPT | Mod: CPTII,S$GLB,, | Performed by: NURSE PRACTITIONER

## 2024-05-06 PROCEDURE — 85025 COMPLETE CBC W/AUTO DIFF WBC: CPT | Performed by: NURSE PRACTITIONER

## 2024-05-06 PROCEDURE — 99999 PR PBB SHADOW E&M-EST. PATIENT-LVL V: CPT | Mod: PBBFAC,,, | Performed by: NURSE PRACTITIONER

## 2024-05-06 PROCEDURE — 3078F DIAST BP <80 MM HG: CPT | Mod: CPTII,S$GLB,, | Performed by: NURSE PRACTITIONER

## 2024-05-06 PROCEDURE — 1125F AMNT PAIN NOTED PAIN PRSNT: CPT | Mod: CPTII,S$GLB,, | Performed by: NURSE PRACTITIONER

## 2024-05-06 PROCEDURE — 99214 OFFICE O/P EST MOD 30 MIN: CPT | Mod: S$GLB,,, | Performed by: NURSE PRACTITIONER

## 2024-05-06 PROCEDURE — 3074F SYST BP LT 130 MM HG: CPT | Mod: CPTII,S$GLB,, | Performed by: NURSE PRACTITIONER

## 2024-05-06 PROCEDURE — 80053 COMPREHEN METABOLIC PANEL: CPT | Performed by: NURSE PRACTITIONER

## 2024-05-06 PROCEDURE — 36415 COLL VENOUS BLD VENIPUNCTURE: CPT | Performed by: NURSE PRACTITIONER

## 2024-05-06 PROCEDURE — 1159F MED LIST DOCD IN RCRD: CPT | Mod: CPTII,S$GLB,, | Performed by: NURSE PRACTITIONER

## 2024-05-06 PROCEDURE — 1160F RVW MEDS BY RX/DR IN RCRD: CPT | Mod: CPTII,S$GLB,, | Performed by: NURSE PRACTITIONER

## 2024-05-06 PROCEDURE — 3044F HG A1C LEVEL LT 7.0%: CPT | Mod: CPTII,S$GLB,, | Performed by: NURSE PRACTITIONER

## 2024-05-06 PROCEDURE — 3288F FALL RISK ASSESSMENT DOCD: CPT | Mod: CPTII,S$GLB,, | Performed by: NURSE PRACTITIONER

## 2024-05-06 NOTE — ASSESSMENT & PLAN NOTE
ANEMIA / THROMBOCYTOPENIA  - mild normocytic anemia and intermittent mild thrombocytopenia since 2018, stable  - Previously workup with no evidence of iron, folic acid or B12 deficiency and negative for monoclonal gammopathy. Normal kidney function but does have history of HCV and liver scarring on scan. Bone marrow biopsy with normal cellularity and no abnormality on cytogenetics and NGS.     Hg 8.9. prior baseline Hemoglobin 9-10 range. Anemia most consistent with anemia of chronic disease. No further intervention at present time. Will arrange next f/u with MD to consider possible EPO. May not be indicated for patient. F/u 6 weeks with labs 1-2 days prior. Discussed S&S to report sooner

## 2024-05-06 NOTE — PROGRESS NOTES
Subjective:       Patient ID: Scooter Swan is a 73 y.o. male.    Chief Complaint: review labs    HPI: 73 y.o. male with  medical history significant for HTN, AR, CAD, RA, HCV, COPD presenting today for follow up of anemia, thrombocytopenia and leukocytosis     Prior workup with no evidence of iron, folic acid or B12 deficiency and negative for monoclonal gammopathy. Normal kidney function but does have history of HCV and liver scarring on scan. Bone marrow biopsy with normal cellularity and no abnormality on cytogenetics and NGS.    Completed IV antibiotics 3/2024 for management of bacteremia with ongoing ID follow up.    Today he notes feeling well overall outside of chronic lung issues. Chronic fatigue     Social History     Socioeconomic History    Marital status:    Tobacco Use    Smoking status: Never    Smokeless tobacco: Never   Substance and Sexual Activity    Alcohol use: Yes     Alcohol/week: 1.0 standard drink of alcohol     Types: 1 Shots of liquor per week     Comment: social    Drug use: Yes     Types: Hydrocodone     Social Determinants of Health     Financial Resource Strain: Low Risk  (1/11/2024)    Overall Financial Resource Strain (CARDIA)     Difficulty of Paying Living Expenses: Not hard at all   Food Insecurity: No Food Insecurity (9/27/2022)    Hunger Vital Sign     Worried About Running Out of Food in the Last Year: Never true     Ran Out of Food in the Last Year: Never true   Transportation Needs: Unknown (2/21/2024)    PRAPARE - Transportation     Lack of Transportation (Non-Medical): No   Physical Activity: Unknown (2/21/2024)    Exercise Vital Sign     Days of Exercise per Week: 4 days   Stress: Stress Concern Present (9/19/2022)    [employer] - Occupational Stress Questionnaire     Feeling of Stress : Rather much   Housing Stability: Low Risk  (2/21/2024)    Housing Stability Vital Sign     Unable to Pay for Housing in the Last Year: No     Number of  Places Lived in the Last Year: 1     Unstable Housing in the Last Year: No       Past Medical History:   Diagnosis Date    Cataract     COPD (chronic obstructive pulmonary disease)     Diverticulosis     DUARTE (dyspnea on exertion)     GERD (gastroesophageal reflux disease)     Glaucoma     Hepatitis C     treated; seeing Dr. Carr    Hyperlipidemia     Hypertension     Liver lesion 08/2018    RA (rheumatoid arthritis)     Rectal prolapse     Possible    Thyroid disease        Family History   Problem Relation Name Age of Onset    Stomach cancer Paternal Cousin      Stomach cancer Paternal Cousin      Cataracts Mother      Diabetes Mother      Hypertension Mother      Stroke Mother      Diabetes Sister      Hypertension Sister      Stroke Sister      Diabetes Brother      Glaucoma Brother      Hypertension Brother      Stroke Brother      Diabetes Maternal Aunt      Hypertension Maternal Aunt      Stroke Maternal Aunt      Diabetes Maternal Uncle      Hypertension Maternal Uncle      Stroke Maternal Uncle      Diabetes Maternal Grandmother      Hypertension Maternal Grandmother      Stroke Maternal Grandmother      Cancer Cousin          stomach    Colon cancer Neg Hx      Colon polyps Neg Hx      Crohn's disease Neg Hx      Ulcerative colitis Neg Hx      Esophageal cancer Neg Hx      Amblyopia Neg Hx      Blindness Neg Hx      Macular degeneration Neg Hx      Retinal detachment Neg Hx      Strabismus Neg Hx      Thyroid disease Neg Hx         Past Surgical History:   Procedure Laterality Date    ANGIOGRAM, CORONARY, WITH LEFT HEART CATHETERIZATION  10/6/2022    Procedure: Angiogram, Coronary, with Left Heart Cath;  Surgeon: Zac Boucher MD;  Location: [facility] CATH;  Service: Cardiology;;    AORTOGRAPHY  3/28/2024    Procedure: AO Root;  Surgeon: Zac Boucher MD;  Location: [facility] CATH;  Service: Cardiology;;    ARTERIOGRAPHY OF AORTIC ROOT  10/6/2022    Procedure: ARTERIOGRAM, AORTIC ROOT;  Surgeon: Zac Boucher,  MD;  Location: [facility] CATH;  Service: Cardiology;;    CARPAL TUNNEL RELEASE      Right wrist 2015    CATHETERIZATION OF BOTH LEFT AND RIGHT HEART  3/28/2024    Procedure: Right / Left heart cath;  Surgeon: Zac Boucher MD;  Location: [facility] CATH;  Service: Cardiology;;    COLONOSCOPY  08/2016    Dr. Cardona; in care everywhere    COLONOSCOPY N/A 3/20/2019    Procedure: COLONOSCOPY;  Surgeon: Sotero Arthur MD;  Location: [facility] ENDO;  Service: Endoscopy;  Laterality: N/A; hemorrhoids, diverticulosis, repeat in 10 years for screening    ECHOCARDIOGRAM,TRANSESOPHAGEAL N/A 1/12/2024    Procedure: Transesophageal echo (CADY) intra-procedure log documentation;  Surgeon: Renan Slaughter MD;  Location: [facility] CATH/EP LAB;  Service: Cardiology;  Laterality: N/A;    EXCISIONAL HEMORRHOIDECTOMY N/A 10/18/2018    Procedure: HEMORRHOIDECTOMY, prone;  Surgeon: Gunnar Horton MD;  Location: [address];  Service: Colon and Rectal;  Laterality: N/A;    THYROID SURGERY      UPPER GASTROINTESTINAL ENDOSCOPY  08/2016    Dr. Cardona; in care everywhere       Review of Systems   Constitutional:  Positive for activity change and fatigue. Negative for appetite change, chills, fever and unexpected weight change.   HENT:  Negative for congestion, mouth sores, nosebleeds, sore throat, trouble swallowing and voice change.    Respiratory:  Positive for shortness of breath (with exertion). Negative for cough, chest tightness and wheezing.    Cardiovascular:  Negative for chest pain, palpitations and leg swelling.   Gastrointestinal:  Negative for abdominal distention, abdominal pain, blood in stool, constipation, diarrhea, nausea and vomiting.   Genitourinary:  Negative for difficulty urinating, dysuria and hematuria.   Musculoskeletal:  Positive for gait problem. Negative for arthralgias, back pain and myalgias.   Skin:  Negative for pallor, rash and wound.   Neurological:  Positive for weakness. Negative for dizziness, syncope and  headaches.   Hematological:  Negative for adenopathy. Does not bruise/bleed easily.   Psychiatric/Behavioral:  The patient is nervous/anxious.          Medication List with Changes/Refills   Current Medications    ALBUTEROL (PROVENTIL) 2.5 MG /3 ML (0.083 %) NEBULIZER SOLUTION    TAKE 3 MLS BY MOUTH VIA NEBULIZER EVERY 6 HOURS AS NEEDED FOR WHEEZING    ALBUTEROL (PROVENTIL/VENTOLIN HFA) 90 MCG/ACTUATION INHALER    INHALE 1 PUFF BY MOUTH EVERY 4 HOURS AS NEEDED FOR WHEEZING OR SHORTNESS OF BREATH    CHOLECALCIFEROL, VITAMIN D3, (VITAMIN D3) 50 MCG (2,000 UNIT) CAP    Take 1 capsule (2,000 Units total) by mouth once daily.    CYANOCOBALAMIN 500 MCG TABLET    Take 500 mcg by mouth once daily.    DIAZEPAM (VALIUM) 10 MG TAB    Take 1 tablet (10 mg total) by mouth every evening.    DICLOFENAC SODIUM (VOLTAREN) 1 % GEL    Apply 2 grams  topically 4 (four) times daily.    DILTIAZEM (CARDIZEM CD) 240 MG 24 HR CAPSULE    Take 1 capsule (240 mg total) by mouth once daily.    DORZOLAMIDE (TRUSOPT) 2 % OPHTHALMIC SOLUTION    Place 1 drop into both eyes 3 (three) times daily.    DOXAZOSIN (CARDURA) 2 MG TABLET    TAKE 1 TABLET (2 MG TOTAL) BY MOUTH EVERY EVENING    ENALAPRIL (VASOTEC) 20 MG TABLET    TAKE ONE TABLET BY MOUTH TWO TIMES A DAY    HYDRALAZINE (APRESOLINE) 50 MG TABLET    TAKE 2 TABLETS BY MOUTH EVERY 12 HOURS    HYDROCODONE-ACETAMINOPHEN (NORCO)  MG PER TABLET    Take 1 tablet by mouth every 8 (eight) hours as needed for Pain.    HYDROXYZINE HCL (ATARAX) 25 MG TABLET    TAKE ONE TABLET BY MOUTH NIGHTLY AS NEEDED FOR ITCHING    MIRABEGRON (MYRBETRIQ) 25 MG TB24 ER TABLET    Take 1 tablet (25 mg total) by mouth once daily.    MULTIVITAMIN CAPSULE    Take 1 capsule by mouth once daily.    MUPIROCIN (BACTROBAN) 2 % OINTMENT    Apply topically 3 (three) times daily.    OMEPRAZOLE (PRILOSEC) 20 MG CAPSULE    Take 1 capsule (20 mg total) by mouth 2 (two) times a day.    POLYETHYLENE GLYCOL (GLYCOLAX) 17 GRAM PWPK     Take 17 g by mouth daily as needed.    PRAVASTATIN (PRAVACHOL) 20 MG TABLET    TAKE ONE TABLET BY MOUTH ONCE DAILY    PREDNISONE (DELTASONE) 5 MG TABLET    Take 2 tablets (10 mg total) by mouth once daily.    PROMETHAZINE (PHENERGAN) 25 MG TABLET    Take 1 tablet (25 mg total) by mouth every 6 (six) hours as needed for Nausea.    SILDENAFIL (VIAGRA) 100 MG TABLET    Take 1 tablet (100 mg total) by mouth as needed for Erectile Dysfunction.    TAMSULOSIN (FLOMAX) 0.4 MG CAP    Take 1 capsule (0.4 mg total) by mouth once daily.    TIOTROPIUM BROMIDE (SPIRIVA RESPIMAT INHL)    2 puffs daily - pt reports taking everyday in AM    TIOTROPIUM BROMIDE (SPIRIVA RESPIMAT) 1.25 MCG/ACTUATION INHALER    Inhale 2 puffs into the lungs once daily. Controller    TIZANIDINE (ZANAFLEX) 4 MG TABLET    Take 1 tablet (4 mg total) by mouth every 8 (eight) hours.    TRAVOPROST (TRAVATAN Z) 0.004 % OPHTHALMIC SOLUTION    Place 1 drop into both eyes every evening.     Objective:     Vitals:    05/06/24 1306   BP: 117/76   Pulse: 69   Temp: 97.9 °F (36.6 °C)     Lab Results   Component Value Date    WBC 7.47 05/06/2024    HGB 8.9 (L) 05/06/2024    HCT 28.5 (L) 05/06/2024    MCV 89 05/06/2024     (L) 05/06/2024       BMP  Lab Results   Component Value Date     05/06/2024    K 3.8 05/06/2024     05/06/2024    CO2 27 05/06/2024    BUN 12 05/06/2024    CREATININE 1.2 05/06/2024    CALCIUM 9.7 05/06/2024    ANIONGAP 7 (L) 05/06/2024    EGFRNORACEVR >60 05/06/2024     Lab Results   Component Value Date    ALT 7 (L) 05/06/2024    AST 14 05/06/2024    ALKPHOS 57 05/06/2024    BILITOT 0.4 05/06/2024       Physical Exam  Vitals reviewed.   Constitutional:       Appearance: He is well-developed.   HENT:      Head: Normocephalic.      Right Ear: External ear normal.      Left Ear: External ear normal.   Eyes:      General: Lids are normal. No scleral icterus.        Right eye: No discharge.         Left eye: No discharge.       Conjunctiva/sclera: Conjunctivae normal.   Neck:      Thyroid: No thyroid mass.   Cardiovascular:      Rate and Rhythm: Normal rate and regular rhythm.      Heart sounds: Murmur heard.   Pulmonary:      Effort: Pulmonary effort is normal. No respiratory distress.      Breath sounds: Normal breath sounds. No wheezing or rales.   Abdominal:      General: There is no distension.   Genitourinary:     Comments: deferred  Musculoskeletal:         General: Normal range of motion.      Cervical back: Normal range of motion.   Lymphadenopathy:      Head:      Right side of head: No submandibular, preauricular or posterior auricular adenopathy.      Left side of head: No submandibular, preauricular or posterior auricular adenopathy.      Cervical:      Right cervical: No superficial cervical adenopathy.     Left cervical: No superficial cervical adenopathy.   Skin:     General: Skin is warm and dry.   Neurological:      Mental Status: He is alert and oriented to person, place, and time.   Psychiatric:         Speech: Speech normal.         Behavior: Behavior normal. Behavior is cooperative.         Thought Content: Thought content normal.        Assessment:     Problem List Items Addressed This Visit          Hematology    Thrombocytopenia     Intermittent mild thrombocytopenia. Stable. Prior evaluation including bmbx unrevealing.            Relevant Orders    Zinc    Copper, Serum    Lactate Dehydrogenase    CBC Auto Differential    Comprehensive Metabolic Panel    Iron and TIBC    Ferritin    Erythropoietin    Reticulocytes       Oncology    Anemia - Primary     ANEMIA / THROMBOCYTOPENIA  - mild normocytic anemia and intermittent mild thrombocytopenia since 2018, stable  - Previously workup with no evidence of iron, folic acid or B12 deficiency and negative for monoclonal gammopathy. Normal kidney function but does have history of HCV and liver scarring on scan. Bone marrow biopsy with normal cellularity and no abnormality  on cytogenetics and NGS.     Hg 8.9. prior baseline Hemoglobin 9-10 range. Anemia most consistent with anemia of chronic disease. No further intervention at present time. Will arrange next f/u with MD to consider possible EPO. May not be indicated for patient. F/u 6 weeks with labs 1-2 days prior. Discussed S&S to report sooner         Relevant Orders    Urinalysis, Reflex to Urine Culture Urine, Clean Catch (Completed)    Zinc    Copper, Serum    Lactate Dehydrogenase    CBC Auto Differential    Comprehensive Metabolic Panel    Iron and TIBC    Ferritin    Erythropoietin    Reticulocytes         Plan:     Anemia, unspecified type  -     Urinalysis, Reflex to Urine Culture Urine, Clean Catch; Future; Expected date: 05/06/2024  -     Zinc; Future; Expected date: 05/06/2024  -     Copper, Serum; Future; Expected date: 05/06/2024  -     Lactate Dehydrogenase; Future; Expected date: 05/06/2024  -     CBC Auto Differential; Future; Expected date: 05/06/2024  -     Comprehensive Metabolic Panel; Future; Expected date: 05/06/2024  -     Iron and TIBC; Future; Expected date: 05/06/2024  -     Ferritin; Future; Expected date: 05/06/2024  -     Erythropoietin; Future; Expected date: 05/06/2024  -     Reticulocytes; Future; Expected date: 05/06/2024    Thrombocytopenia  -     Zinc; Future; Expected date: 05/06/2024  -     Copper, Serum; Future; Expected date: 05/06/2024  -     Lactate Dehydrogenase; Future; Expected date: 05/06/2024  -     CBC Auto Differential; Future; Expected date: 05/06/2024  -     Comprehensive Metabolic Panel; Future; Expected date: 05/06/2024  -     Iron and TIBC; Future; Expected date: 05/06/2024  -     Ferritin; Future; Expected date: 05/06/2024  -     Erythropoietin; Future; Expected date: 05/06/2024  -     Reticulocytes; Future; Expected date: 05/06/2024          Med Onc Chart Routing      Follow up with physician 6 weeks. Rah/Adam 4-6 weeks   Follow up with ROBYN    Infusion scheduling note     Injection scheduling note    Labs CBC, CMP, ferritin, iron and TIBC, LDH and other   Scheduling:  Preferred lab:  Lab interval:  1-2 days prior   Imaging None      Pharmacy appointment No pharmacy appointment needed      Other referrals       No additional referrals needed         NIR Giraldo-C

## 2024-05-09 ENCOUNTER — TELEPHONE (OUTPATIENT)
Dept: HEMATOLOGY/ONCOLOGY | Facility: CLINIC | Age: 74
End: 2024-05-09
Payer: MEDICARE

## 2024-05-09 ENCOUNTER — TELEPHONE (OUTPATIENT)
Dept: CARDIOLOGY | Facility: CLINIC | Age: 74
End: 2024-05-09
Payer: MEDICARE

## 2024-05-09 NOTE — TELEPHONE ENCOUNTER
SW called pt re: recent distress score. Pt stated that he's had grief/loss within the past month. SW provided support and encouragement and offered grief resources. Pt declined, stating that he has support from friends/family and does not need assistance at this time. Pt will call SW at number provided if needs arise. SW will remain available.

## 2024-05-09 NOTE — TELEPHONE ENCOUNTER
----- Message from Brigette Krause sent at 5/9/2024  8:03 AM CDT -----  Regarding: appt request hosp f/u reschedule  Contact: pt's wife Jacqueline  Type: Appointment Request    Caller is requesting an appointment.      Name of Caller:pt's wife, jacqueline    When is the first available appointment?none    Symptoms:hosp f/u  breathing difficulities    Would the patient rather a call back or a response via [facility]? Call back    Best Call Back Number:[phone]    Additional Information: pt had to cancel an appt for this morning and would like to reschedule.   Please advise.  Thank you.

## 2024-05-12 DIAGNOSIS — G47.00 INSOMNIA, UNSPECIFIED TYPE: ICD-10-CM

## 2024-05-12 NOTE — TELEPHONE ENCOUNTER
No care due was identified.  [facility] Embedded Care Due Messages. Reference number: [id number].   5/12/2024 8:03:23 AM CDT

## 2024-05-13 RX ORDER — DIAZEPAM 10 MG/1
10 TABLET ORAL NIGHTLY
Qty: 30 TABLET | Refills: 2 | Status: ON HOLD | OUTPATIENT
Start: 2024-05-13

## 2024-05-15 ENCOUNTER — HOSPITAL ENCOUNTER (OUTPATIENT)
Dept: CARDIOLOGY | Facility: HOSPITAL | Age: 74
Discharge: HOME | End: 2024-05-15
Attending: INTERNAL MEDICINE
Payer: MEDICARE

## 2024-05-15 ENCOUNTER — PATIENT MESSAGE (OUTPATIENT)
Dept: CARDIOLOGY | Facility: CLINIC | Age: 74
End: 2024-05-15
Payer: MEDICARE

## 2024-05-15 DIAGNOSIS — I10 PRIMARY HYPERTENSION: Chronic | ICD-10-CM

## 2024-05-15 LAB
LEFT ABI: 1.21
LEFT ARM BP: 128 MMHG
LEFT DORSALIS PEDIS: 159 MMHG
LEFT POSTERIOR TIBIAL: 162 MMHG
LEFT TBI: 1.08
LEFT TOE PRESSURE: 145 MMHG
RIGHT ABI: 1.32
RIGHT ARM BP: 134 MMHG
RIGHT DORSALIS PEDIS: 159 MMHG
RIGHT POSTERIOR TIBIAL: 177 MMHG
RIGHT TBI: 1.07
RIGHT TOE PRESSURE: 144 MMHG

## 2024-05-15 PROCEDURE — 93922 UPR/L XTREMITY ART 2 LEVELS: CPT | Mod: PO

## 2024-05-15 PROCEDURE — 93922 UPR/L XTREMITY ART 2 LEVELS: CPT | Mod: 26,,, | Performed by: INTERNAL MEDICINE

## 2024-05-17 ENCOUNTER — TELEPHONE (OUTPATIENT)
Dept: CARDIOLOGY | Facility: CLINIC | Age: 74
End: 2024-05-17
Payer: MEDICARE

## 2024-05-17 NOTE — TELEPHONE ENCOUNTER
Spoke with caregiver regarding dental exam and to make an appointment for patient if possible prior to appt with Dr. Marrero. Explained that dental clearance is necessary prior to TAVR. Verbalized understanding.

## 2024-05-22 ENCOUNTER — TELEPHONE (OUTPATIENT)
Dept: CARDIOLOGY | Facility: CLINIC | Age: 74
End: 2024-05-22
Payer: MEDICARE

## 2024-05-22 NOTE — TELEPHONE ENCOUNTER
----- Message from Azam Holden sent at 5/22/2024  8:09 AM CDT -----  Regarding: Dentist  Contact: Wife  Type:  Needs Medical Advice    Who Called: Pts wife Jacqueline    Would the patient rather a call back or a response via [facility]? Call back    Best Call Back Number: [phone]      Additional Information: Sts they need to talk to the office before 12 about his need to go to the dentist and what all they need to do.   Please advise -- Thank you

## 2024-05-23 ENCOUNTER — OFFICE VISIT (OUTPATIENT)
Dept: FAMILY MEDICINE | Facility: CLINIC | Age: 74
End: 2024-05-23
Payer: MEDICARE

## 2024-05-23 VITALS
BODY MASS INDEX: 23.77 KG/M2 | SYSTOLIC BLOOD PRESSURE: 122 MMHG | HEART RATE: 80 BPM | WEIGHT: 175.5 LBS | DIASTOLIC BLOOD PRESSURE: 72 MMHG | OXYGEN SATURATION: 98 % | HEIGHT: 72 IN

## 2024-05-23 DIAGNOSIS — E78.2 MIXED HYPERLIPIDEMIA: ICD-10-CM

## 2024-05-23 DIAGNOSIS — Z00.00 ANNUAL PHYSICAL EXAM: ICD-10-CM

## 2024-05-23 DIAGNOSIS — I1A.0 RESISTANT HYPERTENSION: ICD-10-CM

## 2024-05-23 DIAGNOSIS — Z79.899 LONG-TERM CURRENT USE OF BENZODIAZEPINE: ICD-10-CM

## 2024-05-23 DIAGNOSIS — J43.8 OTHER EMPHYSEMA: ICD-10-CM

## 2024-05-23 DIAGNOSIS — M06.9 RHEUMATOID ARTHRITIS, INVOLVING UNSPECIFIED SITE, UNSPECIFIED WHETHER RHEUMATOID FACTOR PRESENT: ICD-10-CM

## 2024-05-23 DIAGNOSIS — G89.29 OTHER CHRONIC PAIN: Primary | ICD-10-CM

## 2024-05-23 PROCEDURE — 1160F RVW MEDS BY RX/DR IN RCRD: CPT | Mod: CPTII,S$GLB,, | Performed by: INTERNAL MEDICINE

## 2024-05-23 PROCEDURE — 3074F SYST BP LT 130 MM HG: CPT | Mod: CPTII,S$GLB,, | Performed by: INTERNAL MEDICINE

## 2024-05-23 PROCEDURE — 1101F PT FALLS ASSESS-DOCD LE1/YR: CPT | Mod: CPTII,S$GLB,, | Performed by: INTERNAL MEDICINE

## 2024-05-23 PROCEDURE — 99999 PR PBB SHADOW E&M-EST. PATIENT-LVL V: CPT | Mod: PBBFAC,,, | Performed by: INTERNAL MEDICINE

## 2024-05-23 PROCEDURE — 1126F AMNT PAIN NOTED NONE PRSNT: CPT | Mod: CPTII,S$GLB,, | Performed by: INTERNAL MEDICINE

## 2024-05-23 PROCEDURE — 1159F MED LIST DOCD IN RCRD: CPT | Mod: CPTII,S$GLB,, | Performed by: INTERNAL MEDICINE

## 2024-05-23 PROCEDURE — 3044F HG A1C LEVEL LT 7.0%: CPT | Mod: CPTII,S$GLB,, | Performed by: INTERNAL MEDICINE

## 2024-05-23 PROCEDURE — 99214 OFFICE O/P EST MOD 30 MIN: CPT | Mod: S$GLB,,, | Performed by: INTERNAL MEDICINE

## 2024-05-23 PROCEDURE — 3288F FALL RISK ASSESSMENT DOCD: CPT | Mod: CPTII,S$GLB,, | Performed by: INTERNAL MEDICINE

## 2024-05-23 PROCEDURE — 3078F DIAST BP <80 MM HG: CPT | Mod: CPTII,S$GLB,, | Performed by: INTERNAL MEDICINE

## 2024-05-23 RX ORDER — NALOXONE HYDROCHLORIDE 4 MG/.1ML
1 SPRAY NASAL ONCE
Qty: 1 EACH | Refills: 0 | Status: SHIPPED | OUTPATIENT
Start: 2024-05-23 | End: 2024-05-23

## 2024-05-23 NOTE — PROGRESS NOTES
Patient ID: Scooter Swan is a 73 y.o. male.    Chief Complaint: Follow-up    Annual     Problem  HPI  Plan   Resistant hypertension Controlled  Continue current regimen    Hyperlipidemia Controlled  Continue pravastatin    Severe aortic stenosis TAVR will be discussed. Seeing Dr. Marrero on the 30th  Follow up with cardiology    Rheumatoid arthritis Stable on prednisone 10 mg daily. Hydrocodone for pain. Followed by rheum  Follow up rheum    Pulmonary emphysema Stable w/o spiriva. Nebs work better he states. Dyspnea improved  Monitor    insomnia Taking valium which has worked the best so far.   has tried trazodone, hydroxyzine, sonata, lunesta. Prescription narcan in the setting of co opioid use.    Anemia Heme following. Last H/H 8.9/28.5  Follow-up Heme-Onc, monitor   Bacteremia  Status post abx, finished in march.  Monitor for signs of infection        Medications, recent labs, health maintenance, and diet has been reviewed.    Exercise- walking   Alcohol- none   Tobacco- none            Diagnoses addressed and related orders     1. Other chronic pain  - naloxone (NARCAN) 4 mg/actuation Spry; 1 spray (4 mg total) by Nasal route once. for 1 dose  Dispense: 1 each; Refill: 0    2. Long-term current use of benzodiazepine  - naloxone (NARCAN) 4 mg/actuation Spry; 1 spray (4 mg total) by Nasal route once. for 1 dose  Dispense: 1 each; Refill: 0    3. Resistant hypertension    4. Rheumatoid arthritis, involving unspecified site, unspecified whether rheumatoid factor present    5. Other emphysema    6. Mixed hyperlipidemia    7. Annual physical exam          Review of Systems   Constitutional:  Positive for fatigue. Negative for fever.   Respiratory:  Negative for shortness of breath.    Cardiovascular:  Negative for chest pain.   Gastrointestinal:  Negative for abdominal pain.     Vitals:    05/23/24 0939   BP: 122/72   Pulse: 80      Wt Readings from Last 3 Encounters:   05/23/24 0939 79.6 kg (175 lb 7.8 oz)   05/06/24  1306 80.3 kg (177 lb 2.2 oz)   03/28/24 0717 79.7 kg (175 lb 11.3 oz)   03/25/24 0915 81.6 kg (180 lb)      Body mass index is 23.8 kg/m².     Physical Exam  Cardiovascular:      Rate and Rhythm: Normal rate and regular rhythm.      Heart sounds: Murmur heard.      Systolic murmur is present with a grade of 4/6.      Diastolic murmur is present with a grade of 1/4.      No gallop.   Pulmonary:      Breath sounds: Normal breath sounds. No wheezing or rhonchi.   Abdominal:      Palpations: Abdomen is soft.      Tenderness: There is no abdominal tenderness.            Hypertension Medications               diltiaZEM (CARDIZEM CD) 240 MG 24 hr capsule Take 1 capsule (240 mg total) by mouth once daily.    doxazosin (CARDURA) 2 MG tablet TAKE 1 TABLET (2 MG TOTAL) BY MOUTH EVERY EVENING    enalapril (VASOTEC) 20 MG tablet TAKE ONE TABLET BY MOUTH TWO TIMES A DAY    hydrALAZINE (APRESOLINE) 50 MG tablet TAKE 2 TABLETS BY MOUTH EVERY 12 HOURS           Hyperlipidemia Medications               pravastatin (PRAVACHOL) 20 MG tablet TAKE ONE TABLET BY MOUTH ONCE DAILY           Medication List with Changes/Refills   New Medications    NALOXONE (NARCAN) 4 MG/ACTUATION SPRY    1 spray (4 mg total) by Nasal route once. for 1 dose   Current Medications    ALBUTEROL (PROVENTIL) 2.5 MG /3 ML (0.083 %) NEBULIZER SOLUTION    TAKE 3 MLS BY MOUTH VIA NEBULIZER EVERY 6 HOURS AS NEEDED FOR WHEEZING    ALBUTEROL (PROVENTIL/VENTOLIN HFA) 90 MCG/ACTUATION INHALER    INHALE 1 PUFF BY MOUTH EVERY 4 HOURS AS NEEDED FOR WHEEZING OR SHORTNESS OF BREATH    CHOLECALCIFEROL, VITAMIN D3, (VITAMIN D3) 50 MCG (2,000 UNIT) CAP    Take 1 capsule (2,000 Units total) by mouth once daily.    CYANOCOBALAMIN 500 MCG TABLET    Take 500 mcg by mouth once daily.    DIAZEPAM (VALIUM) 10 MG TAB    Take 1 tablet (10 mg total) by mouth every evening.    DICLOFENAC SODIUM (VOLTAREN) 1 % GEL    Apply 2 grams  topically 4 (four) times daily.    DILTIAZEM (CARDIZEM CD)  240 MG 24 HR CAPSULE    Take 1 capsule (240 mg total) by mouth once daily.    DORZOLAMIDE (TRUSOPT) 2 % OPHTHALMIC SOLUTION    Place 1 drop into both eyes 3 (three) times daily.    DOXAZOSIN (CARDURA) 2 MG TABLET    TAKE 1 TABLET (2 MG TOTAL) BY MOUTH EVERY EVENING    ENALAPRIL (VASOTEC) 20 MG TABLET    TAKE ONE TABLET BY MOUTH TWO TIMES A DAY    HYDRALAZINE (APRESOLINE) 50 MG TABLET    TAKE 2 TABLETS BY MOUTH EVERY 12 HOURS    HYDROCODONE-ACETAMINOPHEN (NORCO)  MG PER TABLET    Take 1 tablet by mouth every 8 (eight) hours as needed for Pain.    HYDROXYZINE HCL (ATARAX) 25 MG TABLET    TAKE ONE TABLET BY MOUTH NIGHTLY AS NEEDED FOR ITCHING    MIRABEGRON (MYRBETRIQ) 25 MG TB24 ER TABLET    Take 1 tablet (25 mg total) by mouth once daily.    MULTIVITAMIN CAPSULE    Take 1 capsule by mouth once daily.    MUPIROCIN (BACTROBAN) 2 % OINTMENT    Apply topically 3 (three) times daily.    OMEPRAZOLE (PRILOSEC) 20 MG CAPSULE    Take 1 capsule (20 mg total) by mouth 2 (two) times a day.    POLYETHYLENE GLYCOL (GLYCOLAX) 17 GRAM PWPK    Take 17 g by mouth daily as needed.    PRAVASTATIN (PRAVACHOL) 20 MG TABLET    TAKE ONE TABLET BY MOUTH ONCE DAILY    PREDNISONE (DELTASONE) 5 MG TABLET    Take 2 tablets (10 mg total) by mouth once daily.    PROMETHAZINE (PHENERGAN) 25 MG TABLET    Take 1 tablet (25 mg total) by mouth every 6 (six) hours as needed for Nausea.    SILDENAFIL (VIAGRA) 100 MG TABLET    Take 1 tablet (100 mg total) by mouth as needed for Erectile Dysfunction.    TAMSULOSIN (FLOMAX) 0.4 MG CAP    Take 1 capsule (0.4 mg total) by mouth once daily.    TIOTROPIUM BROMIDE (SPIRIVA RESPIMAT INHL)    2 puffs daily - pt reports taking everyday in AM    TIOTROPIUM BROMIDE (SPIRIVA RESPIMAT) 1.25 MCG/ACTUATION INHALER    Inhale 2 puffs into the lungs once daily. Controller    TIZANIDINE (ZANAFLEX) 4 MG TABLET    Take 1 tablet (4 mg total) by mouth every 8 (eight) hours.    TRAVOPROST (TRAVATAN Z) 0.004 % OPHTHALMIC  SOLUTION    Place 1 drop into both eyes every evening.       I personally reviewed past medical, family and social history.

## 2024-05-30 ENCOUNTER — TELEPHONE (OUTPATIENT)
Dept: CARDIOLOGY | Facility: CLINIC | Age: 74
End: 2024-05-30
Payer: MEDICARE

## 2024-05-30 ENCOUNTER — OFFICE VISIT (OUTPATIENT)
Dept: CARDIOLOGY | Facility: CLINIC | Age: 74
End: 2024-05-30
Payer: MEDICARE

## 2024-05-30 VITALS
HEIGHT: 72 IN | HEART RATE: 79 BPM | DIASTOLIC BLOOD PRESSURE: 69 MMHG | WEIGHT: 175.06 LBS | SYSTOLIC BLOOD PRESSURE: 127 MMHG | BODY MASS INDEX: 23.71 KG/M2

## 2024-05-30 DIAGNOSIS — I35.0 SEVERE AORTIC STENOSIS: Primary | ICD-10-CM

## 2024-05-30 DIAGNOSIS — M05.9 SEROPOSITIVE RHEUMATOID ARTHRITIS: ICD-10-CM

## 2024-05-30 DIAGNOSIS — I10 PRIMARY HYPERTENSION: ICD-10-CM

## 2024-05-30 DIAGNOSIS — D64.9 ANEMIA, UNSPECIFIED TYPE: ICD-10-CM

## 2024-05-30 DIAGNOSIS — G89.4 CHRONIC PAIN SYNDROME: ICD-10-CM

## 2024-05-30 DIAGNOSIS — R06.02 SOB (SHORTNESS OF BREATH): ICD-10-CM

## 2024-05-30 DIAGNOSIS — D84.9 IMMUNOSUPPRESSED STATUS: ICD-10-CM

## 2024-05-30 DIAGNOSIS — D69.6 THROMBOCYTOPENIA: ICD-10-CM

## 2024-05-30 DIAGNOSIS — I50.30 NYHA CLASS 3 HEART FAILURE WITH PRESERVED EJECTION FRACTION: ICD-10-CM

## 2024-05-30 DIAGNOSIS — I35.1 MODERATE AORTIC REGURGITATION: ICD-10-CM

## 2024-05-30 PROCEDURE — 93005 ELECTROCARDIOGRAM TRACING: CPT | Mod: PO

## 2024-05-30 PROCEDURE — 99999 PR PBB SHADOW E&M-EST. PATIENT-LVL IV: CPT | Mod: PBBFAC,,, | Performed by: INTERNAL MEDICINE

## 2024-05-30 PROCEDURE — 93010 ELECTROCARDIOGRAM REPORT: CPT | Mod: S$GLB,,, | Performed by: INTERNAL MEDICINE

## 2024-05-30 PROCEDURE — 1100F PTFALLS ASSESS-DOCD GE2>/YR: CPT | Mod: CPTII,S$GLB,, | Performed by: INTERNAL MEDICINE

## 2024-05-30 PROCEDURE — 1159F MED LIST DOCD IN RCRD: CPT | Mod: CPTII,S$GLB,, | Performed by: INTERNAL MEDICINE

## 2024-05-30 PROCEDURE — 3044F HG A1C LEVEL LT 7.0%: CPT | Mod: CPTII,S$GLB,, | Performed by: INTERNAL MEDICINE

## 2024-05-30 PROCEDURE — 3078F DIAST BP <80 MM HG: CPT | Mod: CPTII,S$GLB,, | Performed by: INTERNAL MEDICINE

## 2024-05-30 PROCEDURE — 99215 OFFICE O/P EST HI 40 MIN: CPT | Mod: S$GLB,,, | Performed by: INTERNAL MEDICINE

## 2024-05-30 PROCEDURE — 3074F SYST BP LT 130 MM HG: CPT | Mod: CPTII,S$GLB,, | Performed by: INTERNAL MEDICINE

## 2024-05-30 PROCEDURE — 3288F FALL RISK ASSESSMENT DOCD: CPT | Mod: CPTII,S$GLB,, | Performed by: INTERNAL MEDICINE

## 2024-05-30 PROCEDURE — 1126F AMNT PAIN NOTED NONE PRSNT: CPT | Mod: CPTII,S$GLB,, | Performed by: INTERNAL MEDICINE

## 2024-05-30 NOTE — TELEPHONE ENCOUNTER
Message left regarding Cardiac CTA and PFT's scheduled on 6/12/24 arrive to [facility] at 0730. Also message left regarding appointment on 6/13/24 with Dr. Murphy at [address] [facility] [address]

## 2024-05-30 NOTE — PROGRESS NOTES
[facility]   Structural Cardiology Clinic Note  Date: 5/30/24    Patient: Scooter Swan, 1950, [id number]  Primary Care Provider: Salvador Kennedy DO     Chief Complaint/Reason for Referral: AS    Subjective     Scooter Swan is a 73 y.o. male who presents for consult. They are accompanied by wife, Jacqueline. They were referred by Dr. Boucher .    Dyspnea on exertion walking less than 1 block on level ground compared to a mile a year ago. No syncope, no palpitations, no orthopnea or PND. Some chest tightness with exertion. Had a urinary clot vs stone a week or so ago with hematuria; no flank pain and no fevers. No urinary urgency, dysuria. He does have BPH. 4 falls past year because of leg weakness but no head trauma or LOC.     Focused Past History includes:   Aortic stenosis:  TTE (02/23/2024) reviewed: LVEF 55-60 % with moderate LVH, normal RV, mild LAE.  No MR, trace TR, PASP 34.  Severe trileaflet AS (Vmax 4.4, MG 54, DI 0.27) with moderate AR  Coronary angiogram (3/28/24) reviewed:  No significant CAD.  Right heart catheterization with mean PA 15.  LVEDP 26  TAVR CTAs needed  PFTs 03/27/2024:  FEV1 80%, %, DLCO 48% (adjusted 53%)  Carotids:  October 2022 by report:  0-19% stenosis in the bilateral ICAs  Dental clearance:  done 5/22  STS-PROM: 2.4%  Rhythm issues:  EKG 03/28/2024: Normal sinus rhythm and normal conduction  Chronic HFpEF  Chronic anemia, hemoglobin 8-10; mild thrombocytopenia - thought to be due to chronic disease per hematology  Rheumatoid arthritis on prednisone 10 mg daily  Hypertension   Remotely treated hepatitis-C  No history of MI, coronary revascularization, stroke, cancer, DM      Current Outpatient Medications   Medication Sig    albuterol (PROVENTIL) 2.5 mg /3 mL (0.083 %) nebulizer solution TAKE 3 MLS BY MOUTH VIA NEBULIZER EVERY 6 HOURS AS NEEDED FOR WHEEZING    albuterol (PROVENTIL/VENTOLIN HFA) 90 mcg/actuation inhaler INHALE 1 PUFF BY MOUTH EVERY 4 HOURS AS NEEDED  FOR WHEEZING OR SHORTNESS OF BREATH    cholecalciferol, vitamin D3, (VITAMIN D3) 50 mcg (2,000 unit) Cap Take 1 capsule (2,000 Units total) by mouth once daily.    cyanocobalamin 500 MCG tablet Take 500 mcg by mouth once daily.    diazePAM (VALIUM) 10 MG Tab Take 1 tablet (10 mg total) by mouth every evening.    diclofenac sodium (VOLTAREN) 1 % Gel Apply 2 grams  topically 4 (four) times daily.    doxazosin (CARDURA) 2 MG tablet TAKE 1 TABLET (2 MG TOTAL) BY MOUTH EVERY EVENING    enalapril (VASOTEC) 20 MG tablet TAKE ONE TABLET BY MOUTH TWO TIMES A DAY    hydrALAZINE (APRESOLINE) 50 MG tablet TAKE 2 TABLETS BY MOUTH EVERY 12 HOURS    mirabegron (MYRBETRIQ) 25 mg Tb24 ER tablet Take 1 tablet (25 mg total) by mouth once daily.    multivitamin capsule Take 1 capsule by mouth once daily.    mupirocin (BACTROBAN) 2 % ointment Apply topically 3 (three) times daily.    omeprazole (PRILOSEC) 20 MG capsule Take 1 capsule (20 mg total) by mouth 2 (two) times a day.    polyethylene glycol (GLYCOLAX) 17 gram PwPk Take 17 g by mouth daily as needed.    pravastatin (PRAVACHOL) 20 MG tablet TAKE ONE TABLET BY MOUTH ONCE DAILY    predniSONE (DELTASONE) 5 MG tablet Take 2 tablets (10 mg total) by mouth once daily.    promethazine (PHENERGAN) 25 MG tablet Take 1 tablet (25 mg total) by mouth every 6 (six) hours as needed for Nausea.    sildenafiL (VIAGRA) 100 MG tablet Take 1 tablet (100 mg total) by mouth as needed for Erectile Dysfunction.    tamsulosin (FLOMAX) 0.4 mg Cap Take 1 capsule (0.4 mg total) by mouth once daily.    tiotropium bromide (SPIRIVA RESPIMAT INHL) 2 puffs daily - pt reports taking everyday in AM    tiotropium bromide (SPIRIVA RESPIMAT) 1.25 mcg/actuation inhaler Inhale 2 puffs into the lungs once daily. Controller    tiZANidine (ZANAFLEX) 4 MG tablet Take 1 tablet (4 mg total) by mouth every 8 (eight) hours.    diltiaZEM (CARDIZEM CD) 240 MG 24 hr capsule TAKE ONE CAPSULE BY MOUTH EVERY DAY     HYDROcodone-acetaminophen (NORCO)  mg per tablet Take 1 tablet by mouth every 8 (eight) hours as needed for Pain.     Current Facility-Administered Medications   Medication    sodium chloride 0.9% flush 10 mL    sodium chloride 0.9% flush 10 mL     Facility-Administered Medications Ordered in Other Visits   Medication    0.9%  NaCl infusion    mupirocin 2 % ointment               Review of Systems  Constitutional: negative for fevers, night sweats, and weight loss  Eyes: negative for visual disturbance, diplopia  Respiratory: negative for cough, hemoptysis, sputum, and wheezing  Cardiovascular: see HPI  Gastrointestinal: negative for abdominal pain, bright red blood per rectum, change in bowel habits, dysphagia, melena, and reflux symptoms  Genitourinary:negative for dysuria, frequency, and hematuria  Hematologic/lymphatic: negative for bleeding, easy bruising, and lymphadenopathy  Musculoskeletal:negative for arthralgias, back pain, and myalgias  Neurological: negative for gait problems, paresthesia, speech problems, vertigo, and weakness  Behavioral/Psych: negative for excessive alcohol consumption, illegal drug usage, and sleep disturbance    -------------------------------------    Cataract    COPD (chronic obstructive pulmonary disease)    Diverticulosis    DUARTE (dyspnea on exertion)    GERD (gastroesophageal reflux disease)    Glaucoma    Hepatitis C    treated; seeing Dr. Carr    Hyperlipidemia    Hypertension    Liver lesion    RA (rheumatoid arthritis)    Rectal prolapse    Possible    Thyroid disease     ----------------------------    Angiogram, coronary, with left heart catheterization    Procedure: Angiogram, Coronary, with Left Heart Cath;  Surgeon: Zac Boucher MD;  Location: [facility] CATH;  Service: Cardiology;;    Aortography    Procedure: AO Root;  Surgeon: Zac Boucher MD;  Location: [facility] CATH;  Service: Cardiology;;    Arteriography of aortic root    Procedure: ARTERIOGRAM, AORTIC ROOT;   Surgeon: Zac Boucher MD;  Location: [facility] CATH;  Service: Cardiology;;    Carpal tunnel release    Right wrist 2015    Catheterization of both left and right heart    Procedure: Right / Left heart cath;  Surgeon: Zac Boucher MD;  Location: [facility] CATH;  Service: Cardiology;;    Colonoscopy    Dr. Cardona; in care everywhere    Colonoscopy    Procedure: COLONOSCOPY;  Surgeon: Sotero rAthur MD;  Location: [facility] ENDO;  Service: Endoscopy;  Laterality: N/A; hemorrhoids, diverticulosis, repeat in 10 years for screening    Echocardiogram,transesophageal    Procedure: Transesophageal echo (CADY) intra-procedure log documentation;  Surgeon: Renan Slaughter MD;  Location: [facility] CATH/EP LAB;  Service: Cardiology;  Laterality: N/A;    Excisional hemorrhoidectomy    Procedure: HEMORRHOIDECTOMY, prone;  Surgeon: Gunnar Horton MD;  Location: [facility] OR [address] [place] [address];  Service: Colon and Rectal;  Laterality: N/A;    Thyroid surgery    Upper gastrointestinal endoscopy    Dr. aCrdona; in care everywhere        Family History   Problem Relation Name Age of Onset    Stomach cancer Paternal Cousin      Stomach cancer Paternal Cousin      Cataracts Mother      Diabetes Mother      Hypertension Mother      Stroke Mother      Diabetes Sister      Hypertension Sister      Stroke Sister      Diabetes Brother      Glaucoma Brother      Hypertension Brother      Stroke Brother      Diabetes Maternal Aunt      Hypertension Maternal Aunt      Stroke Maternal Aunt      Diabetes Maternal Uncle      Hypertension Maternal Uncle      Stroke Maternal Uncle      Diabetes Maternal Grandmother      Hypertension Maternal Grandmother      Stroke Maternal Grandmother      Cancer Cousin          stomach    Colon cancer Neg Hx      Colon polyps Neg Hx      Crohn's disease Neg Hx      Ulcerative colitis Neg Hx      Esophageal cancer Neg Hx      Amblyopia Neg Hx      Blindness Neg Hx      Macular degeneration Neg Hx      Retinal detachment Neg Hx       Strabismus Neg Hx      Thyroid disease Neg Hx       Social History     Tobacco Use    Smoking status: Never    Smokeless tobacco: Never   Substance Use Topics    Alcohol use: Yes     Alcohol/week: 1.0 standard drink of alcohol     Types: 1 Shots of liquor per week     Comment: social    Drug use: Yes     Types: Hydrocodone         Physical Exam  /69 (BP Location: Left arm, Patient Position: Sitting, BP Method: Medium (Automatic))   Pulse 79   Ht 6' (1.829 m)   Wt 79.4 kg (175 lb 0.7 oz)   BMI 23.74 kg/m²   Body surface area is 2.01 meters squared.  Body mass index is 23.74 kg/m².    General appearance: alert, appears stated age, cooperative, and no distress  Head: Normocephalic, without obvious abnormality, atraumatic  Neck: no carotid bruit, no JVD, and supple, symmetrical, trachea midline  Lungs: clear to auscultation bilaterally  Heart: regular rate and rhythm; 3/6 CHRIS URSB with muffled S2  Abdomen: soft, non-tender, no distended  Extremities: extremities atraumatic, no pitting edema  Skin: warm, no cyanosis, no pathologic ecchymosis in exposed portions  Neurologic: Grossly normal. A&O x3      Labs Reviewed     Lab Results   Component Value Date    WBC 7.47 05/06/2024    HGB 8.9 (L) 05/06/2024     (L) 05/06/2024    INR 1.0 03/27/2024    APTT 28.4 03/27/2024       Lab Results   Component Value Date     05/06/2024    K 3.8 05/06/2024     05/06/2024    CO2 27 05/06/2024    CALCIUM 9.7 05/06/2024    MG 2.1 01/13/2024     (H) 05/06/2024    PROT 6.6 05/06/2024       Lab Results   Component Value Date    BUN 12 05/06/2024    BUN 18 03/28/2024    BUN 14 02/28/2024    CREATININE 1.2 05/06/2024    CREATININE 1.13 03/28/2024    CREATININE 1.0 02/28/2024    EGFRNORACEVR >60 05/06/2024    EGFRNORACEVR >60 03/28/2024    EGFRNORACEVR >60.0 02/28/2024       Lab Results   Component Value Date    ALBUMIN 3.7 05/06/2024    BILITOT 0.4 05/06/2024    ALKPHOS 57 05/06/2024    ALT 7 (L) 05/06/2024        Lab Results   Component Value Date    TRIG 127 02/19/2024    CHOL 186 02/19/2024    HDL 50 02/19/2024    LDLCALC 110.6 02/19/2024    LDLCALC 96.6 02/16/2023       Lab Results   Component Value Date    HGBA1C 5.8 01/10/2024    HGBA1C 5.0 10/08/2021    TSH 2.403 02/12/2024    FREET4 0.80 02/12/2024       Lab Results   Component Value Date    BNP 82 01/10/2024                   ASSESSMENT & PLAN:  1. Severe aortic stenosis  IN OFFICE EKG 12-LEAD (to Muse)    EKG 12-lead      2. Moderate aortic regurgitation  EKG 12-lead      3. NYHA class 3 heart failure with preserved ejection fraction  EKG 12-lead      4. Primary hypertension        5. Thrombocytopenia        6. Anemia, unspecified type        7. Immunosuppressed status               This is a 73 y.o.  male with class 3 symptoms of severe aortic stenosis with moderate regurgitation.  He is on chronic prednisone 10 mg daily for rheumatoid arthritis and has compensated HFpEF.  He is physically and mentally in good shape.     We discussed the natural history of severe aortic stenosis and treatment options including TAVR and SAVR versus medical management and the risks and benefits of each approach.  He strongly prefers TAVR and I do concur given his age, lifestyle, chronic steroid use.      Continue workup for AVR including TAVR CTA, Cardiothoracic surgery evaluation  Continue diltiazem, hydralazine, enalapril for hypertension.  He is also on doxazosin   Continue pravastatin  Will cc Dr. Kennedy about the urinary stone vs clot with hematuria   Emphasized the importance of modifying lifestyle related risk factors including smoking cessation, limiting alcohol intake, exercise, diet most resembling a Mediterranean diet.    Thank you, Dr. Boucher, for the opportunity to participate in Scooter Swan 's care today.    Please follow up with me in 2-3 months.      Hernandez Marrero MD, [facility]  Interventional Cardiology/Structural Heart Disease  [facility]  & [facility]  Office: [phone]            Parts of this note were completed using voice recognition software. Please excuse any misspellings or syntax errors and reach out to me with questions.  Scooter

## 2024-06-01 RX ORDER — HYDROCODONE BITARTRATE AND ACETAMINOPHEN 10; 325 MG/1; MG/1
1 TABLET ORAL EVERY 8 HOURS PRN
Qty: 90 TABLET | Refills: 0 | Status: ON HOLD | OUTPATIENT
Start: 2024-06-01 | End: 2024-07-01

## 2024-06-02 DIAGNOSIS — G89.4 CHRONIC PAIN SYNDROME: ICD-10-CM

## 2024-06-02 DIAGNOSIS — M05.9 SEROPOSITIVE RHEUMATOID ARTHRITIS: ICD-10-CM

## 2024-06-02 RX ORDER — HYDROCODONE BITARTRATE AND ACETAMINOPHEN 10; 325 MG/1; MG/1
1 TABLET ORAL EVERY 8 HOURS PRN
Qty: 90 TABLET | Refills: 0 | Status: CANCELLED | OUTPATIENT
Start: 2024-06-02 | End: 2024-07-02

## 2024-06-04 DIAGNOSIS — I50.30 NYHA CLASS 3 HEART FAILURE WITH PRESERVED EJECTION FRACTION: ICD-10-CM

## 2024-06-04 DIAGNOSIS — I35.1 MODERATE AORTIC REGURGITATION: ICD-10-CM

## 2024-06-04 DIAGNOSIS — I35.0 SEVERE AORTIC STENOSIS: Primary | ICD-10-CM

## 2024-06-04 RX ORDER — CHLORHEXIDINE GLUCONATE ORAL RINSE 1.2 MG/ML
15 SOLUTION DENTAL 2 TIMES DAILY
Status: CANCELLED | OUTPATIENT
Start: 2024-06-04 | End: 2024-06-09

## 2024-06-04 RX ORDER — ASPIRIN 325 MG
325 TABLET ORAL ONCE
Status: CANCELLED | OUTPATIENT
Start: 2024-06-04

## 2024-06-04 RX ORDER — SODIUM CHLORIDE 9 MG/ML
INJECTION, SOLUTION INTRAVENOUS ONCE
Status: CANCELLED | OUTPATIENT
Start: 2024-06-04 | End: 2024-06-04

## 2024-06-04 RX ORDER — CEFAZOLIN SODIUM 1 G/3ML
2 INJECTION, POWDER, FOR SOLUTION INTRAMUSCULAR; INTRAVENOUS
Status: CANCELLED | OUTPATIENT
Start: 2024-06-04

## 2024-06-04 RX ORDER — MUPIROCIN 20 MG/G
OINTMENT TOPICAL 2 TIMES DAILY
Status: CANCELLED | OUTPATIENT
Start: 2024-06-04 | End: 2024-06-09

## 2024-06-04 RX ORDER — NITROGLYCERIN 20 MG/100ML
5 INJECTION INTRAVENOUS CONTINUOUS
Status: CANCELLED | OUTPATIENT
Start: 2024-06-04

## 2024-06-05 LAB
OHS QRS DURATION: 84 MS
OHS QTC CALCULATION: 422 MS

## 2024-06-07 DIAGNOSIS — G47.00 INSOMNIA, UNSPECIFIED TYPE: ICD-10-CM

## 2024-06-08 RX ORDER — DIAZEPAM 10 MG/1
10 TABLET ORAL NIGHTLY
Qty: 30 TABLET | Refills: 2 | OUTPATIENT
Start: 2024-06-08

## 2024-06-08 NOTE — TELEPHONE ENCOUNTER
No care due was identified.  [facility] Embedded Care Due Messages. Reference number: [id number].   6/07/2024 11:47:30 PM CDT

## 2024-06-10 RX ORDER — DILTIAZEM HYDROCHLORIDE 240 MG/1
240 CAPSULE, COATED, EXTENDED RELEASE ORAL
Qty: 30 CAPSULE | Refills: 3 | Status: ON HOLD | OUTPATIENT
Start: 2024-06-10

## 2024-06-11 DIAGNOSIS — R31.9 HEMATURIA, UNSPECIFIED TYPE: Primary | ICD-10-CM

## 2024-06-12 NOTE — PROGRESS NOTES
Cardiology notes patient describing hematuria.  Referral to urology placed.  Brent Staff- Please schedule UA for [facility]

## 2024-06-13 ENCOUNTER — TELEPHONE (OUTPATIENT)
Dept: VASCULAR SURGERY | Facility: CLINIC | Age: 74
End: 2024-06-13
Payer: MEDICARE

## 2024-06-13 ENCOUNTER — OFFICE VISIT (OUTPATIENT)
Dept: VASCULAR SURGERY | Facility: CLINIC | Age: 74
End: 2024-06-13
Payer: MEDICARE

## 2024-06-13 ENCOUNTER — LAB VISIT (OUTPATIENT)
Dept: LAB | Facility: HOSPITAL | Age: 74
End: 2024-06-13
Attending: INTERNAL MEDICINE
Payer: MEDICARE

## 2024-06-13 VITALS
HEIGHT: 72 IN | SYSTOLIC BLOOD PRESSURE: 105 MMHG | HEART RATE: 76 BPM | BODY MASS INDEX: 23.71 KG/M2 | WEIGHT: 175.06 LBS | DIASTOLIC BLOOD PRESSURE: 60 MMHG

## 2024-06-13 DIAGNOSIS — I35.0 NONRHEUMATIC AORTIC VALVE STENOSIS: Primary | ICD-10-CM

## 2024-06-13 DIAGNOSIS — R31.9 HEMATURIA, UNSPECIFIED TYPE: ICD-10-CM

## 2024-06-13 DIAGNOSIS — K08.9 POOR DENTITION: ICD-10-CM

## 2024-06-13 LAB
BILIRUB UR QL STRIP: ABNORMAL
CLARITY UR: CLEAR
COLOR UR: YELLOW
GLUCOSE UR QL STRIP: NEGATIVE
HGB UR QL STRIP: NEGATIVE
KETONES UR QL STRIP: ABNORMAL
LEUKOCYTE ESTERASE UR QL STRIP: NEGATIVE
NITRITE UR QL STRIP: NEGATIVE
PH UR STRIP: 6 [PH] (ref 5–8)
PROT UR QL STRIP: ABNORMAL
SP GR UR STRIP: >=1.03 (ref 1–1.03)
URN SPEC COLLECT METH UR: ABNORMAL

## 2024-06-13 PROCEDURE — 99999 PR PBB SHADOW E&M-EST. PATIENT-LVL IV: CPT | Mod: PBBFAC,,, | Performed by: THORACIC SURGERY (CARDIOTHORACIC VASCULAR SURGERY)

## 2024-06-13 PROCEDURE — 1159F MED LIST DOCD IN RCRD: CPT | Mod: CPTII,S$GLB,, | Performed by: THORACIC SURGERY (CARDIOTHORACIC VASCULAR SURGERY)

## 2024-06-13 PROCEDURE — 3008F BODY MASS INDEX DOCD: CPT | Mod: CPTII,S$GLB,, | Performed by: THORACIC SURGERY (CARDIOTHORACIC VASCULAR SURGERY)

## 2024-06-13 PROCEDURE — 1100F PTFALLS ASSESS-DOCD GE2>/YR: CPT | Mod: CPTII,S$GLB,, | Performed by: THORACIC SURGERY (CARDIOTHORACIC VASCULAR SURGERY)

## 2024-06-13 PROCEDURE — 3288F FALL RISK ASSESSMENT DOCD: CPT | Mod: CPTII,S$GLB,, | Performed by: THORACIC SURGERY (CARDIOTHORACIC VASCULAR SURGERY)

## 2024-06-13 PROCEDURE — 99205 OFFICE O/P NEW HI 60 MIN: CPT | Mod: S$GLB,,, | Performed by: THORACIC SURGERY (CARDIOTHORACIC VASCULAR SURGERY)

## 2024-06-13 PROCEDURE — 3074F SYST BP LT 130 MM HG: CPT | Mod: CPTII,S$GLB,, | Performed by: THORACIC SURGERY (CARDIOTHORACIC VASCULAR SURGERY)

## 2024-06-13 PROCEDURE — 81003 URINALYSIS AUTO W/O SCOPE: CPT | Mod: PO | Performed by: INTERNAL MEDICINE

## 2024-06-13 PROCEDURE — 3044F HG A1C LEVEL LT 7.0%: CPT | Mod: CPTII,S$GLB,, | Performed by: THORACIC SURGERY (CARDIOTHORACIC VASCULAR SURGERY)

## 2024-06-13 PROCEDURE — 3078F DIAST BP <80 MM HG: CPT | Mod: CPTII,S$GLB,, | Performed by: THORACIC SURGERY (CARDIOTHORACIC VASCULAR SURGERY)

## 2024-06-13 RX ORDER — AMOXICILLIN AND CLAVULANATE POTASSIUM 875; 125 MG/1; MG/1
TABLET, FILM COATED ORAL
Qty: 6 TABLET | Refills: 11 | Status: ON HOLD | OUTPATIENT
Start: 2024-06-13 | End: 2024-06-20 | Stop reason: HOSPADM

## 2024-06-13 NOTE — PROGRESS NOTES
DATE OF CONSULTATION: 6/13/2024     CONSULT REQUESTED BY:  Dr. Hernandez Marrero/Dr. Zac Boucher     REASON FOR CONSULTATION:  Severe, symptomatic aortic valve stenosis     HPI:   The patient is a 73-year-old  male who was recently hospitalized with staph bacteremia ([facility]).  He received a 6 week course of IV Ancef-last dose 02/22/2024.    During this hospitalization, the patient was found to have severe aortic valve stenosis and has been evaluated by Dr. Boucher.      Echocardiography 02/23/2024 demonstrates an ejection fraction of 65%.  The transvalvular velocity is 444 centimeters/second, the mean gradient is 54, there is mild AR and trace MR/TR.    Left and right heart catheterization 03/28/2024 demonstrates no evidence of epicardial coronary artery disease.  The pulmonary artery pressures are normal.    The patient has been evaluated with a CTA/TAVR protocol-these images are unavailable for my review.    Dr. Marrero has evaluated the patient and scheduled TAVR for 06/19/2024.    The patient visits the office today unaccompanied for surgical evaluation.    When questioned specifically, the patient admits to exertional dyspnea which has been progressive over the last several months.  He denies angina or any other symptoms of congestive heart failure.    The patient is quite feeble.  He suffers with rheumatoid arthritis and has significant joint pain.  He ambulates slowly with a cane.     Data Review:  In preparation for this consultation, I have reviewed the patient's entire [facility] medical record.  I have personally reviewed and interpreted images from the recent echocardiogram and coronary angiogram.      Past Medical History:   Diagnosis Date    Cataract     COPD (chronic obstructive pulmonary disease)     Diverticulosis     DUARTE (dyspnea on exertion)     GERD (gastroesophageal reflux disease)     Glaucoma     Hepatitis C     treated; seeing Dr. Carr    Hyperlipidemia     Hypertension      Liver lesion 08/2018    RA (rheumatoid arthritis)     Rectal prolapse     Possible    Thyroid disease         Hypertension, hyperlipidemia, COPD, hepatitis-C, rheumatoid arthritis (daily prednisone), chronic pain, prostate hypertrophy, degenerative joint disease (knees)     Past Surgical History:   Procedure Laterality Date    ANGIOGRAM, CORONARY, WITH LEFT HEART CATHETERIZATION  10/6/2022    Procedure: Angiogram, Coronary, with Left Heart Cath;  Surgeon: Zac Boucher MD;  Location: [facility] CATH;  Service: Cardiology;;    AORTOGRAPHY  3/28/2024    Procedure: AO Root;  Surgeon: Zac Boucher MD;  Location: [facility] CATH;  Service: Cardiology;;    ARTERIOGRAPHY OF AORTIC ROOT  10/6/2022    Procedure: ARTERIOGRAM, AORTIC ROOT;  Surgeon: Zac Boucher MD;  Location: [facility] CATH;  Service: Cardiology;;    CARPAL TUNNEL RELEASE      Right wrist 2015    CATHETERIZATION OF BOTH LEFT AND RIGHT HEART  3/28/2024    Procedure: Right / Left heart cath;  Surgeon: Zac Boucher MD;  Location: [facility] CATH;  Service: Cardiology;;    COLONOSCOPY  08/2016    Dr. Cardona; in care everywhere    COLONOSCOPY N/A 3/20/2019    Procedure: COLONOSCOPY;  Surgeon: Sotero Arthur MD;  Location: [facility] ENDO;  Service: Endoscopy;  Laterality: N/A; hemorrhoids, diverticulosis, repeat in 10 years for screening    ECHOCARDIOGRAM,TRANSESOPHAGEAL N/A 1/12/2024    Procedure: Transesophageal echo (CADY) intra-procedure log documentation;  Surgeon: Renan Slaughter MD;  Location: [facility] CATH/EP LAB;  Service: Cardiology;  Laterality: N/A;    EXCISIONAL HEMORRHOIDECTOMY N/A 10/18/2018    Procedure: HEMORRHOIDECTOMY, prone;  Surgeon: Gunnar Horton MD;  Location: [address];  Service: Colon and Rectal;  Laterality: N/A;    THYROID SURGERY      UPPER GASTROINTESTINAL ENDOSCOPY  08/2016    Dr. Cardona; in care everywhere        Subtotal thyroidectomy, hemorrhoidectomy, right carpal tunnel release     Social History     Socioeconomic History    Marital  status:    Tobacco Use    Smoking status: Never    Smokeless tobacco: Never   Substance and Sexual Activity    Alcohol use: Yes     Alcohol/week: 1.0 standard drink of alcohol     Types: 1 Shots of liquor per week     Comment: social    Drug use: Yes     Types: Hydrocodone     Social Determinants of Health     Financial Resource Strain: Low Risk  (1/11/2024)    Overall Financial Resource Strain (CARDIA)     Difficulty of Paying Living Expenses: Not hard at all   Food Insecurity: No Food Insecurity (9/27/2022)    Hunger Vital Sign     Worried About Running Out of Food in the Last Year: Never true     Ran Out of Food in the Last Year: Never true   Transportation Needs: Unknown (2/21/2024)    PRAPARE - Transportation     Lack of Transportation (Non-Medical): No   Physical Activity: Unknown (2/21/2024)    Exercise Vital Sign     Days of Exercise per Week: 4 days   Stress: Stress Concern Present (9/19/2022)    [employer] - Occupational Stress Questionnaire     Feeling of Stress : Rather much   Housing Stability: Low Risk  (2/21/2024)    Housing Stability Vital Sign     Unable to Pay for Housing in the Last Year: No     Number of Places Lived in the Last Year: 1     Unstable Housing in the Last Year: No        The patient is a lifetime nonsmoker.  He consumes 1 or 2 alcoholic beverages per week.  He is  and has 4 adult children all of whom are in good health.  He is able to carry out his activities of daily living but is very slow at it.  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  He has been cleared by the dentist 05/22/2024-he does not, however, see a dentist regularly.  Today, I educated him on the importance of regular dental care with antibiotic prophylaxis.    Family History   Problem Relation Name Age of Onset    Stomach cancer Paternal Cousin      Stomach cancer Paternal Cousin      Cataracts Mother      Diabetes Mother      Hypertension Mother       Stroke Mother      Diabetes Sister      Hypertension Sister      Stroke Sister      Diabetes Brother      Glaucoma Brother      Hypertension Brother      Stroke Brother      Diabetes Maternal Aunt      Hypertension Maternal Aunt      Stroke Maternal Aunt      Diabetes Maternal Uncle      Hypertension Maternal Uncle      Stroke Maternal Uncle      Diabetes Maternal Grandmother      Hypertension Maternal Grandmother      Stroke Maternal Grandmother      Cancer Cousin          stomach    Colon cancer Neg Hx      Colon polyps Neg Hx      Crohn's disease Neg Hx      Ulcerative colitis Neg Hx      Esophageal cancer Neg Hx      Amblyopia Neg Hx      Blindness Neg Hx      Macular degeneration Neg Hx      Retinal detachment Neg Hx      Strabismus Neg Hx      Thyroid disease Neg Hx         The patient's father  with heart disease       Allergies:  No known drug allergies  Sensitivities:  Many      Prior to Admission Meds (Last known outpatient meds at time of note signature)   Prior to Admission medications    Medication Sig Start Date End Date Taking? Authorizing Provider   albuterol (PROVENTIL) 2.5 mg /3 mL (0.083 %) nebulizer solution TAKE 3 MLS BY MOUTH VIA NEBULIZER EVERY 6 HOURS AS NEEDED FOR WHEEZING 24  Yes Salvador Kennedy, DO   albuterol (PROVENTIL/VENTOLIN HFA) 90 mcg/actuation inhaler INHALE 1 PUFF BY MOUTH EVERY 4 HOURS AS NEEDED FOR WHEEZING OR SHORTNESS OF BREATH 23  Yes Salvador Kennedy, DO   cholecalciferol, vitamin D3, (VITAMIN D3) 50 mcg (2,000 unit) Cap Take 1 capsule (2,000 Units total) by mouth once daily. 10/27/21  Yes Salvador Kennedy, DO   cyanocobalamin 500 MCG tablet Take 500 mcg by mouth once daily.   Yes Provider, Historical   diazePAM (VALIUM) 10 MG Tab Take 1 tablet (10 mg total) by mouth every evening. 24  Yes Salvador Kennedy DO   diclofenac sodium (VOLTAREN) 1 % Gel Apply 2 grams  topically 4 (four) times daily. 3/6/23  Yes Salvador Kennedy, DO   diltiaZEM  (CARDIZEM CD) 240 MG 24 hr capsule TAKE ONE CAPSULE BY MOUTH EVERY DAY 6/10/24  Yes Zac Boucher MD   doxazosin (CARDURA) 2 MG tablet TAKE 1 TABLET (2 MG TOTAL) BY MOUTH EVERY EVENING 2/2/24 2/1/25 Yes Zac Boucher MD   enalapril (VASOTEC) 20 MG tablet TAKE ONE TABLET BY MOUTH TWO TIMES A DAY 11/13/23  Yes Salvador Kennedy DO   hydrALAZINE (APRESOLINE) 50 MG tablet TAKE 2 TABLETS BY MOUTH EVERY 12 HOURS 12/4/23  Yes Salvador Kennedy DO   HYDROcodone-acetaminophen (NORCO)  mg per tablet Take 1 tablet by mouth every 8 (eight) hours as needed for Pain. 6/1/24 7/1/24 Yes Carson Morales MD   mirabegron (MYRBETRIQ) 25 mg Tb24 ER tablet Take 1 tablet (25 mg total) by mouth once daily. 10/23/23 10/22/24 Yes Carrington Lott MD   multivitamin capsule Take 1 capsule by mouth once daily.   Yes Provider, Historical   mupirocin (BACTROBAN) 2 % ointment Apply topically 3 (three) times daily. 2/22/23  Yes Genia Khanna, NP   omeprazole (PRILOSEC) 20 MG capsule Take 1 capsule (20 mg total) by mouth 2 (two) times a day. 1/4/23  Yes Salvador Kennedy DO   polyethylene glycol (GLYCOLAX) 17 gram PwPk Take 17 g by mouth daily as needed.   Yes Provider, Historical   pravastatin (PRAVACHOL) 20 MG tablet TAKE ONE TABLET BY MOUTH ONCE DAILY 4/23/24  Yes Salvador Kennedy DO   predniSONE (DELTASONE) 5 MG tablet Take 2 tablets (10 mg total) by mouth once daily. 2/19/24 2/18/25 Yes Cely Bautista, PAPaolaC   promethazine (PHENERGAN) 25 MG tablet Take 1 tablet (25 mg total) by mouth every 6 (six) hours as needed for Nausea. 2/12/24  Yes Salvador Kennedy DO   sildenafiL (VIAGRA) 100 MG tablet Take 1 tablet (100 mg total) by mouth as needed for Erectile Dysfunction. 12/6/23  Yes Salvador Kennedy,    tamsulosin (FLOMAX) 0.4 mg Cap Take 1 capsule (0.4 mg total) by mouth once daily. 10/23/23  Yes Carrington Lott MD   tiotropium bromide (SPIRIVA RESPIMAT INHL) 2 puffs daily - pt reports taking everyday in AM 1/19/24   Yes Provider, Historical   tiotropium bromide (SPIRIVA RESPIMAT) 1.25 mcg/actuation inhaler Inhale 2 puffs into the lungs once daily. Controller 1/19/24  Yes Salvador Kennedy,    tiZANidine (ZANAFLEX) 4 MG tablet Take 1 tablet (4 mg total) by mouth every 8 (eight) hours. 2/19/24  Yes Cely Bautista PA-C   amoxicillin-clavulanate 875-125mg (AUGMENTIN) 875-125 mg per tablet Take iii (3) tablets by mouth one hour before every dental appointment. 6/13/24   Jada Murphy MD   golimumab (SIMPONI) 50 mg/0.5 mL PnIj Inject 50 mg into the skin every 28 days. 6/19/23 6/28/23  Cely Bautista PA-C        Review of Systems:   Constitutional: no fever, no chills, no appetite change, no unexpected weight change   Dermatological: no jaundice, no rash, no nodules, no ulcers, no pruritis   HEENT: no vision change, no hearing change, no nasal discharge, no sore throat   Neck: no unusual stiffness, no swollen glands, no goiter   Respiratory: (+) dyspnea, no cough, no hemoptysis, no wheezing,  Cardiovascular: no chest pain, no palpitations, no edema   Gastrointestinal: no abdominal discomfort   Musculoskeletal: no new joint pain, no joint swelling, no myalgia   : no dysuria, no frequency, no gross hematuria   HEME: no prolonged bleeding, no excessive bruising, no blood clots, no adenopathy   Endocrine: no excessive thirst, no unusual intolerance of heat or cold   Neurological: no confusion, no seizures, no syncope, no falls   Psychological: no anxiety, no depression     Objective:   Vitals:    06/13/24 1517   BP: 105/60   Pulse: 76       Physical Exam:   Constitutional: Alert, appears older than his stated age, cooperative and no distress.  Thin body habitus, no obvious deformities, good attention to grooming.   Head: Normocephalic, without obvious abnormality, atraumatic   Eyes: Conjunctivae/corneas clear. PERRL, EOM's intact. No exudate.  Nose: Nares normal. Septum midline. Mucosa normal. No drainage or sinus tenderness.    Throat: Lips, mucosa, and tongue normal; poor dentition  Neck: No adenopathy, (+) carotid bruit, no JVD, supple, symmetrical, trachea midline, and thyroid not enlarged, symmetric, no tenderness/mass/nodules.   Back: Symmetric, no curvature ROM normal No CVA tenderness.   Lungs: Clear to auscultation bilaterally and good air exchange. No tachypnea. No use of accessory muscles.   Heart: Regular rate and rhythm, S1, S2 normal, loud systolic murmur, click, rub or gallop. PMI nondisplaced.   Abdomen: Soft, non-tender, bowel sounds normal; No hepatosplenomegaly. No hernias   Aorta: no evidence of aneurysm   Musculoskeletal: No evidence of kyphosis or scoliosis.  Slow, somewhat wobbly gait.  Normal muscle strength and tone. No evidence of limb atrophy or abnormal movements.   Extremities: Normal, atraumatic, no clubbing, cyanosis, or edema. Hair present on pretibial regions. There are no venous varicosities   Pulses: 2+ and symmetric in both radial and femoral regions.    Skin: Skin color, texture, turgor normal.  No rashes or lesions.  Lymph nodes: Cervical, supraclavicular, and axillary nodes normal   Neurologic/psychiatric: Cranial nerves 2-12 are grossly intact No obvious abnormality. Oriented to person, place, and time. No depression, anxiety or agitation.     Assessment:  Patient Active Problem List    Diagnosis Date Noted    SOB (shortness of breath) 02/29/2024    History of supraventricular tachycardia 02/23/2024    Coag negative Staphylococcus bacteremia 01/11/2024    Leukocytosis 01/10/2024    Carotid artery plaque, bilateral 10/19/2022    Aortic valve disease 10/18/2022    Overweight (BMI 25.0-29.9) 09/21/2022    Primary hypertension 09/20/2022    Bilateral carotid bruits 09/20/2022    Moderate aortic regurgitation 08/08/2022    Other specified glaucoma 07/15/2021    Severe aortic stenosis 08/24/2020    History of hepatitis C 02/07/2020    Anemia     Other constipation 02/28/2019    Thrombocytopenia 02/28/2019     Mixed hyperlipidemia 01/29/2019    Resistant hypertension 01/29/2019    RA (rheumatoid arthritis) 01/29/2019    Hemorrhoids 10/18/2018          Plan:  The patient has severe, symptomatic aortic valve stenosis.  He has NYHA class II symptoms.      There is no evidence of epicardial coronary artery disease.    He will benefit from aortic valve replacement.    The patient is feeble and deconditioned.  I believe the best choice for him will be transcatheter delivery of the aortic valve.    Should the patient require sternotomy, I believe he will tolerate it although the recovery will be prolonged.    I had a long (53 minute) discussion with the patient in the office today.  I reviewed the anatomy and pathophysiology of aortic valve stenosis.  I described the step-by-step procedure of transcatheter aortic valve replacement.  Many questions were asked and answered.      I also reviewed, in detail, the potential surgical risks and the expected benefits and outcome of transcatheter aortic valve replacement.    I calculated and discussed the 30-day STS risk for morbidity or mortality with a surgical aortic valve replacement procedure.    Procedure Type: Isolated AVR   PERIOPERATIVE OUTCOME ESTIMATE %   Operative Mortality 4.12%   Morbidity & Mortality 14.5%   Stroke 1.68%   Renal Failure 3.6%   Reoperation 6.83%   Prolonged Ventilation 7.69%   Deep Sternal Wound Infection 0.063%   Long Hospital Stay (>14 days) 11.1%   Short Hospital Stay (<6 days)* 23.2%       He seems to understand and wishes to proceed as advised.    I emphasized the importance of regular dental care and antibiotic dental prophylaxis.  I prescribed Augmentin dental prophylaxis for him today.     I reviewed the patient's current medications.  He is not prescribed aspirin or beta-blockade therapy.    The patient is scheduled for TAVR 06/19/2024.    Thank you, Fernando Marrero and Sander for allowing me to participate in the care of this patient.                       Patient was independently examined face-to-face and was educated about the treatment options including suitability for transcatheter aortic valve replacement (TAVR), a minimally invasive procedure, or a surgical aortic valve replacement (SAVR), also known as open heart surgery. Medical or palliative therapy was also discussed.   Patient was educated about the pathophysiology and natural history of severe aortic stenosis, was provided with education and information regarding his/her severely diseased aortic valve. The risks, benefits, and alternatives of transcatheter aortic valve replacement were discussed with the patient and family. I had a detailed discussion with the patient regarding risk of stroke, MI, bleeding access site complications including limb loss, allergy, kidney failure including dialysis and death.  Patient was educated regarding pacemaker risk factors, explained the procedure carries around a 12.5% risk of permanent pacemaker requirement and that risk depends on the patients underlying conduction system.    A SDM process and individualized approach to aortic stenosis care was used and patients preferences were incorporated, all options were discussed at length.  Patient decided to proceed with the TAVR work-up and procedure. Patient and family was educated about post procedure, recovery time, restrictions and expectations.   Agrees to be full code for the foreseeable future. Patient queried and all questions were answered. The patient and family understands the risks and benefits and wishes to go ahead with the procedure.

## 2024-06-16 ENCOUNTER — DOCUMENTATION ONLY (OUTPATIENT)
Dept: CARDIOLOGY | Facility: CLINIC | Age: 74
End: 2024-06-16
Payer: MEDICARE

## 2024-06-16 NOTE — PROGRESS NOTES
Scooter Swan is a 73 y.o. male referred by Dr Boucher for evaluation of severe AS (NYHA Class 3).    The patient has undergone the following TAVR work-up:   ECHO (Date 2/23/24): LVEF 55-60 % with moderate LVH, normal RV, mild LAE. No MR, trace TR, PASP 34. Severe trileaflet AS (Vmax 4.4, MG 54, DI 0.27) with moderate AR   LHC (Date 3/28/24): No significant CAD. Right heart catheterization with mean PA 15    CT Surgery risk assessment: Dr. Murphy 6/13  Incidental findings on CT: none  STS: 2.4%   PFTs: 03/27/2024:  FEV1 80%, %, DLCO 48% (adjusted 53%)  Carotids:  October 2022 by report:  0-19% stenosis in the bilateral ICAs  Dental clearance:  done 5/22  Rhythm issues: 5/30/24 EKG with normal conduction  Comorbidities: chronic anemia (8-10), mild thrombocytopenia, RA on prednisone 10 QD, HTN      Scooter Swan is a 29 mm  MANUELITO 3 Ultra RESILIA valve candidate via LEFT TF access.      TAVR CTAs personally analyzed:  Valve morphology: trileaflet  Annular area: 635  Annular perimeter: 92.4  Annular dimensions: 25.3 x 32.2  LVOT dimensions: area 641, perimeter 92.1, 25.5 x 31.9  Sinus of Valsalva dimensions: 40.2 x 40.3 x 41.2   STJ height: 24.7  STJ dimensions: 33.4 x 37.5   Left coronary height: 20  Right coronary height: 22.3  Annular angle to the horizontal plane: 61.4  Implant angle: [id number]/[id number] (3-cusp)     Left iliofemoral: common 11-13, external 8-10, femoral 7-8       Transcatheter Aortic valve replacement   Anesthesia: GA  Echo: TTE  Valve: 29mm MANUELITO 3 Ultra RESILIA (nominal plus 1 cc)  TAVR access: LEFT  Pigtail access: right  Valvuloplasty balloon: N/A  TAVR wire:  Mahesh + Derrick  Covered stent size if needed: 7x39 VBX BE  Temporary pacing wire: right femoral  Post op destination: ICU  Antithrombotic therapy: Dual antiplatelet therapy   Heart failure on admission? Yes, HFpEF  CONSENTING:  The patient was told that the procedure carries around a 10-15% risk of permanent pacemaker requirement and that  risk depends on the patients underlying conduction system.  Prior to the [facility] visit, all available records from referring provider were reviewed.  I have personally reviewed all the lab tests available related to the patient's case  The patient's images were reviewed by myself and the procedural planning was done with my own interpretation of the iliac and aortic anatomy based on CTA, angiography or CADY. I have reviewed all other imaging studies relevant to the patient including echocardiography and coronary angiography.  Patient was educated abut the pathophysiology and natural history of severe aortic stenosis and was educated about the treatment options including surgical and transcatheter valve replacement. She agrees to be full code for the forseable future and to undergo workup for treatment of severe AS.   The risks, benefits, and alternatives of transcatheter aortic valve replacement were discussed with the patient. All questions were answered and informed consent was obtained. I had a detailed discussion with the patient regarding risk of stroke, MI, bleeding access site complications including limb loss, allergy, kidney failure including dialysis and death.  The patient understands the risks and benefits and wishes to go ahead with the procedure.  The referring Cardiologist was notified of the plan  All patient's questions were answered    Shared Decision Making:  This patient was seen today by a multidisciplinary heart team involving both a Structural Heart Specialist (Interventional Cardiologist) and a Cardiothoracic Surgeon. The patient was involved in shared decision-making to define clearer goals for treatment and align health decisions with their current values.  The patient understands the risks and expected benefits of their treatment options.    Hernandez Marrero MD, [facility]  Interventional Cardiology/Structural Heart Disease  [facility]  Office: [phone]

## 2024-06-17 ENCOUNTER — TELEPHONE (OUTPATIENT)
Dept: CARDIOLOGY | Facility: CLINIC | Age: 74
End: 2024-06-17
Payer: MEDICARE

## 2024-06-17 NOTE — TELEPHONE ENCOUNTER
----- Message from Ban Sher sent at 6/17/2024  7:33 AM CDT -----  Regarding: Needs return call  Type: Needs Medical Advice  Who Called:  Wife    Best Call Back Number: [phone]     Additional Information: Pt is supposed to get surgery done and he has some lab orders in his chart and was told he HAD to go to the [facility] for the labs, they just wanted to see if they could go to [place] for them or does he have to go to [place] for this, please call to advise

## 2024-06-17 NOTE — TELEPHONE ENCOUNTER
Dontae mario spouse and confirmed labs to be done today at [facility]. Verbalized understanding.

## 2024-06-19 PROBLEM — Z95.3 S/P TAVR (TRANSCATHETER AORTIC VALVE REPLACEMENT): Status: ACTIVE | Noted: 2024-06-19

## 2024-06-19 PROBLEM — Z95.2 S/P TAVR (TRANSCATHETER AORTIC VALVE REPLACEMENT): Status: ACTIVE | Noted: 2024-06-19

## 2024-06-19 PROBLEM — I50.30 NYHA CLASS 3 HEART FAILURE WITH PRESERVED EJECTION FRACTION: Status: ACTIVE | Noted: 2024-06-19

## 2024-06-19 PROBLEM — I35.0 SEVERE AORTIC STENOSIS: Status: RESOLVED | Noted: 2020-08-24 | Resolved: 2024-06-19

## 2024-06-19 LAB
OHS QRS DURATION: 94 MS
OHS QTC CALCULATION: 482 MS

## 2024-06-19 PROCEDURE — 93010 ELECTROCARDIOGRAM REPORT: CPT | Mod: S$GLB,,, | Performed by: INTERNAL MEDICINE

## 2024-06-24 ENCOUNTER — TELEPHONE (OUTPATIENT)
Dept: FAMILY MEDICINE | Facility: CLINIC | Age: 74
End: 2024-06-24
Payer: MEDICARE

## 2024-06-24 NOTE — TELEPHONE ENCOUNTER
----- Message from Deliciamay Mcqueen sent at 6/24/2024 11:08 AM CDT -----  Regarding: hospital f/u  Contact: Jacqueline  Type:  Sooner Apoointment Request    Caller is requesting a sooner appointment.  Caller declined first available appointment listed below.  Caller will not accept being placed on the waitlist and is requesting a message be sent to doctor.  Name of Caller:Jacqueline  When is the first available appointment?7/30/24    Would the patient rather a call back or a response via [facility]? Call back  Best Call Back Number:[phone] or [phone]    Additional Information: she wants to schedule a hospital f/u appt

## 2024-06-25 ENCOUNTER — OFFICE VISIT (OUTPATIENT)
Dept: RHEUMATOLOGY | Facility: CLINIC | Age: 74
End: 2024-06-25
Payer: MEDICARE

## 2024-06-25 VITALS
BODY MASS INDEX: 24.18 KG/M2 | HEIGHT: 72 IN | SYSTOLIC BLOOD PRESSURE: 157 MMHG | HEART RATE: 55 BPM | DIASTOLIC BLOOD PRESSURE: 89 MMHG | WEIGHT: 178.56 LBS

## 2024-06-25 DIAGNOSIS — R53.83 FATIGUE, UNSPECIFIED TYPE: ICD-10-CM

## 2024-06-25 DIAGNOSIS — D64.9 ANEMIA, UNSPECIFIED TYPE: ICD-10-CM

## 2024-06-25 DIAGNOSIS — B37.89: ICD-10-CM

## 2024-06-25 DIAGNOSIS — H02.409 PTOSIS OF EYELID, UNSPECIFIED LATERALITY: ICD-10-CM

## 2024-06-25 DIAGNOSIS — G89.4 CHRONIC PAIN SYNDROME: ICD-10-CM

## 2024-06-25 DIAGNOSIS — D69.6 THROMBOCYTOPENIC: ICD-10-CM

## 2024-06-25 DIAGNOSIS — J02.8: ICD-10-CM

## 2024-06-25 DIAGNOSIS — L40.50 PSA (PSORIATIC ARTHRITIS): ICD-10-CM

## 2024-06-25 DIAGNOSIS — M17.9 OSTEOARTHRITIS OF KNEE, UNSPECIFIED LATERALITY, UNSPECIFIED OSTEOARTHRITIS TYPE: ICD-10-CM

## 2024-06-25 DIAGNOSIS — M05.9 SEROPOSITIVE RHEUMATOID ARTHRITIS: Primary | ICD-10-CM

## 2024-06-25 PROCEDURE — 1111F DSCHRG MED/CURRENT MED MERGE: CPT | Mod: CPTII,S$GLB,, | Performed by: INTERNAL MEDICINE

## 2024-06-25 PROCEDURE — 3008F BODY MASS INDEX DOCD: CPT | Mod: CPTII,S$GLB,, | Performed by: INTERNAL MEDICINE

## 2024-06-25 PROCEDURE — 1159F MED LIST DOCD IN RCRD: CPT | Mod: CPTII,S$GLB,, | Performed by: INTERNAL MEDICINE

## 2024-06-25 PROCEDURE — 1125F AMNT PAIN NOTED PAIN PRSNT: CPT | Mod: CPTII,S$GLB,, | Performed by: INTERNAL MEDICINE

## 2024-06-25 PROCEDURE — 3044F HG A1C LEVEL LT 7.0%: CPT | Mod: CPTII,S$GLB,, | Performed by: INTERNAL MEDICINE

## 2024-06-25 PROCEDURE — 3077F SYST BP >= 140 MM HG: CPT | Mod: CPTII,S$GLB,, | Performed by: INTERNAL MEDICINE

## 2024-06-25 PROCEDURE — 96372 THER/PROPH/DIAG INJ SC/IM: CPT | Mod: S$GLB,,, | Performed by: INTERNAL MEDICINE

## 2024-06-25 PROCEDURE — 1160F RVW MEDS BY RX/DR IN RCRD: CPT | Mod: CPTII,S$GLB,, | Performed by: INTERNAL MEDICINE

## 2024-06-25 PROCEDURE — 99999 PR PBB SHADOW E&M-EST. PATIENT-LVL V: CPT | Mod: PBBFAC,,, | Performed by: INTERNAL MEDICINE

## 2024-06-25 PROCEDURE — 1100F PTFALLS ASSESS-DOCD GE2>/YR: CPT | Mod: CPTII,S$GLB,, | Performed by: INTERNAL MEDICINE

## 2024-06-25 PROCEDURE — 99215 OFFICE O/P EST HI 40 MIN: CPT | Mod: 25,S$GLB,, | Performed by: INTERNAL MEDICINE

## 2024-06-25 PROCEDURE — 3288F FALL RISK ASSESSMENT DOCD: CPT | Mod: CPTII,S$GLB,, | Performed by: INTERNAL MEDICINE

## 2024-06-25 PROCEDURE — 4010F ACE/ARB THERAPY RXD/TAKEN: CPT | Mod: CPTII,S$GLB,, | Performed by: INTERNAL MEDICINE

## 2024-06-25 PROCEDURE — 3079F DIAST BP 80-89 MM HG: CPT | Mod: CPTII,S$GLB,, | Performed by: INTERNAL MEDICINE

## 2024-06-25 RX ORDER — HYDROCODONE BITARTRATE AND ACETAMINOPHEN 10; 325 MG/1; MG/1
1 TABLET ORAL EVERY 8 HOURS PRN
Qty: 90 TABLET | Refills: 0 | Status: SHIPPED | OUTPATIENT
Start: 2024-07-24 | End: 2024-08-23

## 2024-06-25 RX ORDER — NYSTATIN 100000 [USP'U]/ML
6 SUSPENSION ORAL 4 TIMES DAILY
Qty: 240 ML | Refills: 1 | Status: SHIPPED | OUTPATIENT
Start: 2024-06-25 | End: 2024-07-15

## 2024-06-25 RX ORDER — NYSTATIN 100000 [USP'U]/ML
6 SUSPENSION ORAL 4 TIMES DAILY
Qty: 240 ML | Refills: 1 | Status: SHIPPED | OUTPATIENT
Start: 2024-06-25 | End: 2024-06-25 | Stop reason: SDUPTHER

## 2024-06-25 RX ORDER — TIZANIDINE 4 MG/1
4 TABLET ORAL EVERY 8 HOURS
Qty: 270 TABLET | Refills: 1 | Status: SHIPPED | OUTPATIENT
Start: 2024-06-25

## 2024-06-25 RX ORDER — HYDROCODONE BITARTRATE AND ACETAMINOPHEN 10; 325 MG/1; MG/1
1 TABLET ORAL EVERY 8 HOURS PRN
Qty: 90 TABLET | Refills: 0 | Status: SHIPPED | OUTPATIENT
Start: 2024-08-26 | End: 2024-09-25

## 2024-06-25 RX ORDER — FLUCONAZOLE 150 MG/1
150 TABLET ORAL DAILY
Qty: 7 TABLET | Refills: 0 | Status: SHIPPED | OUTPATIENT
Start: 2024-06-25 | End: 2024-07-02

## 2024-06-25 RX ORDER — PREDNISONE 5 MG/1
10 TABLET ORAL DAILY
Qty: 270 TABLET | Refills: 1 | Status: SHIPPED | OUTPATIENT
Start: 2024-06-25 | End: 2025-06-25

## 2024-06-25 RX ORDER — CYANOCOBALAMIN 1000 UG/ML
1000 INJECTION, SOLUTION INTRAMUSCULAR; SUBCUTANEOUS
Status: COMPLETED | OUTPATIENT
Start: 2024-06-25 | End: 2024-06-25

## 2024-06-25 RX ORDER — HYDROCODONE BITARTRATE AND ACETAMINOPHEN 10; 325 MG/1; MG/1
1 TABLET ORAL EVERY 6 HOURS PRN
Qty: 120 TABLET | Refills: 0 | Status: SHIPPED | OUTPATIENT
Start: 2024-06-25 | End: 2024-07-25

## 2024-06-25 RX ADMIN — CYANOCOBALAMIN 1000 MCG: 1000 INJECTION, SOLUTION INTRAMUSCULAR; SUBCUTANEOUS at 03:06

## 2024-06-25 NOTE — PROGRESS NOTES
Subjective:     Patient ID:  Scooter Swan    Chief Complaint:  Disease Management     History of Present Illness:  Pt is a 73 y.o. male with RA recently hospitalized June 18 with aortic stenosis and TARV done on 6/19.who presents today for f/u. soila  They were referred by Dr. Boucher to Dr. Marrero- status post TAVR as below, he is  on low dose prednisone. He had pos staph blood culture. Overall fatigued   No DUARTE. No CP. No syncope, no dizziness, no palpitations, no orthopnea or PND.  No leg edema.     Focused Past History includes:   Chronic HFpEF  Aortic stenosis S/p TAVR on 6/19/24 with 29 mm Shaneka 3 Ultra RESILIA valve (+2cc) via left TF (Perclose x 2)/ secondary access RTF (Perclose x 1), TVP via RFV- manual hemostasis  Uncomplicated:  TTE (02/23/2024) reviewed: LVEF 55-60 % with moderate LVH, normal RV, mild LAE.  No MR, trace TR, PASP 34.  Severe trileaflet AS (Vmax 4.4, MG 54, DI 0.27) with moderate AR  Coronary angiogram (3/28/24) reviewed:  No significant CAD.  Right heart catheterization with mean PA 15.  LVEDP 26  PFTs 03/27/2024:  FEV1 80%, %, DLCO 48% (adjusted 53%)  Carotids:  October 2022 by report:  0-19% stenosis in the bilateral ICAs  Chronic anemia, hemoglobin 8-10; mild thrombocytopenia - thought to be due to chronic disease per hematology  Rheumatoid arthritis on prednisone 10 mg daily  Hypertension   Remotely treated hepatitis-C  No history of MI, coronary revascularization, stroke, cancer, DM       Rheumatologic History:   - Diagnosis/es:  - Positive serologies:  - Infectious screening labs:  - Previous Treatments:  - Current Treatments:     Interval History:   Hospitalization since last office visit: Yes -     Patient Active Problem List    Diagnosis Date Noted    NYHA class 3 heart failure with preserved ejection fraction 06/19/2024    S/P TAVR (transcatheter aortic valve replacement) 06/19/2024    SOB (shortness of breath) 02/29/2024    History of supraventricular tachycardia 02/23/2024     Coag negative Staphylococcus bacteremia 01/11/2024    Leukocytosis 01/10/2024    Carotid artery plaque, bilateral 10/19/2022    Aortic valve disease 10/18/2022    Overweight (BMI 25.0-29.9) 09/21/2022    Primary hypertension 09/20/2022    Bilateral carotid bruits 09/20/2022    Moderate aortic regurgitation 08/08/2022    Other specified glaucoma 07/15/2021    History of hepatitis C 02/07/2020    Anemia     Other constipation 02/28/2019    Thrombocytopenia 02/28/2019    Mixed hyperlipidemia 01/29/2019    Resistant hypertension 01/29/2019    RA (rheumatoid arthritis) 01/29/2019    Hemorrhoids 10/18/2018     Past Surgical History:   Procedure Laterality Date    ANGIOGRAM, CORONARY, WITH LEFT HEART CATHETERIZATION  10/06/2022    Procedure: Angiogram, Coronary, with Left Heart Cath;  Surgeon: Zac Boucher MD;  [facility];  Service: Cardiology;;    AORTOGRAPHY  03/28/2024    Procedure: AO Root;  Surgeon: Zac Boucher MD;  Location: [facility];  Service: Cardiology;;    ARTERIOGRAPHY OF AORTIC ROOT  10/06/2022    Procedure: ARTERIOGRAM, AORTIC ROOT;  Surgeon: Zac Boucher MD;  Location: [facility];  Service: Cardiology;;    CARPAL TUNNEL RELEASE      Right wrist 2015    CATHETERIZATION OF BOTH LEFT AND RIGHT HEART  03/28/2024    Procedure: Right / Left heart cath;  Surgeon: Zac Boucher MD;  Location: [facility] CATH;  Service: Cardiology;;    COLONOSCOPY  08/2016    Dr. Cardona; in care everywhere    COLONOSCOPY N/A 03/20/2019    Procedure: COLONOSCOPY;  Surgeon: Sotero Arthur MD;  Location: [facility] ENDO;  Service: Endoscopy;  Laterality: N/A; hemorrhoids, diverticulosis, repeat in 10 years for screening    ECHOCARDIOGRAM,TRANSESOPHAGEAL N/A 01/12/2024    Procedure: Transesophageal echo (CADY) intra-procedure log documentation;  Surgeon: Renan Slaughter MD;  Location: [facility] CATH/EP LAB;  Service: Cardiology;  Laterality: N/A;    EXCISIONAL HEMORRHOIDECTOMY N/A 10/18/2018    Procedure: HEMORRHOIDECTOMY,  prone;  Surgeon: Gunnar Horton MD;  Location: [facility] OR 2ND FLR;  Service: Colon and Rectal;  Laterality: N/A;    HERNIA REPAIR      THYROID SURGERY      TRANSCATHETER AORTIC VALVE REPLACEMENT (TAVR)  6/19/2024    Procedure: (TAVR);  Surgeon: Hernandez Marrero MD;  Location: [facility] CATH;  Service: Cardiology;;    TRANSCATHETER AORTIC VALVE REPLACEMENT (TAVR)  6/19/2024    Procedure: (TAVR) - Surgeon;  Surgeon: Jada Murphy MD;  [facility];  Service: Cardiothoracic;;    UPPER GASTROINTESTINAL ENDOSCOPY  08/2016    Dr. Cardona; in care everywhere     Social History     Tobacco Use    Smoking status: Never    Smokeless tobacco: Never   Substance Use Topics    Alcohol use: Yes     Alcohol/week: 1.0 standard drink of alcohol     Types: 1 Shots of liquor per week     Comment: social    Drug use: Yes     Types: Hydrocodone, Marijuana     Family History   Problem Relation Name Age of Onset    Stomach cancer Paternal Cousin      Stomach cancer Paternal Cousin      Cataracts Mother      Diabetes Mother      Hypertension Mother      Stroke Mother      Diabetes Sister      Hypertension Sister      Stroke Sister      Diabetes Brother      Glaucoma Brother      Hypertension Brother      Stroke Brother      Diabetes Maternal Aunt      Hypertension Maternal Aunt      Stroke Maternal Aunt      Diabetes Maternal Uncle      Hypertension Maternal Uncle      Stroke Maternal Uncle      Diabetes Maternal Grandmother      Hypertension Maternal Grandmother      Stroke Maternal Grandmother      Cancer Cousin          stomach    Colon cancer Neg Hx      Colon polyps Neg Hx      Crohn's disease Neg Hx      Ulcerative colitis Neg Hx      Esophageal cancer Neg Hx      Amblyopia Neg Hx      Blindness Neg Hx      Macular degeneration Neg Hx      Retinal detachment Neg Hx      Strabismus Neg Hx      Thyroid disease Neg Hx       Review of patient's allergies indicates:   Allergen Reactions    Buspar [buspirone] Hallucinations     Nightmares     Enbrel [etanercept] Other (See Comments)     Severe headaches    Fentanyl Hives and Hallucinations    Plaquenil [hydroxychloroquine]      Nausea, insomnia, weight loss 40LBS    Amitiza [lubiprostone] Nausea Only and Other (See Comments)     Fatigue.    Azulfidine [sulfasalazine] Nausea And Vomiting    Trazodone Other (See Comments)     Insomnia         Review of Systems   Review of Systems   Constitutional:  Positive for activity change and fatigue. Negative for appetite change, chills, diaphoresis, fever and unexpected weight change.   HENT:  Negative for congestion, ear pain, facial swelling, mouth sores, nosebleeds, postnasal drip, rhinorrhea, sinus pressure, sneezing, sore throat, tinnitus, trouble swallowing and voice change.    Eyes:  Negative for pain, discharge, redness, itching and visual disturbance.   Respiratory:  Negative for apnea, cough, chest tightness, shortness of breath and wheezing.    Cardiovascular:  Negative for chest pain, palpitations and leg swelling.   Gastrointestinal:  Negative for abdominal pain, constipation, diarrhea, nausea and vomiting.   Endocrine: Negative for cold intolerance, heat intolerance, polydipsia and polyuria.   Genitourinary:  Negative for decreased urine volume, difficulty urinating, dysuria, flank pain, frequency, hematuria and urgency.   Musculoskeletal:  Positive for back pain, joint swelling and neck stiffness. Negative for arthralgias, gait problem, myalgias and neck pain.   Skin:  Negative for pallor, rash and wound.   Allergic/Immunologic: Negative for immunocompromised state.   Neurological:  Negative for dizziness, tremors, seizures, syncope, weakness, numbness and headaches.   Hematological:  Negative for adenopathy. Does not bruise/bleed easily.   Psychiatric/Behavioral:  Negative for sleep disturbance and suicidal ideas. The patient is not nervous/anxious.         Current Medications:  Current Outpatient Medications   Medication Instructions    albuterol  "(PROVENTIL) 2.5 mg /3 mL (0.083 %) nebulizer solution TAKE 3 MLS BY MOUTH VIA NEBULIZER EVERY 6 HOURS AS NEEDED FOR WHEEZING    albuterol (PROVENTIL/VENTOLIN HFA) 90 mcg/actuation inhaler INHALE 1 PUFF BY MOUTH EVERY 4 HOURS AS NEEDED FOR WHEEZING OR SHORTNESS OF BREATH    amoxicillin (AMOXIL) 500 MG Tab Take 4 tabs (2000 mg) once 60 minutes prior to dental cleaning or other high risk procedures    aspirin (ECOTRIN) 81 mg, Oral, Daily    cholecalciferol (vitamin D3) (VITAMIN D3) 2,000 Units, Oral, Daily    cyanocobalamin 500 mcg, Oral, Daily    diazePAM (VALIUM) 10 mg, Oral, Nightly    diclofenac sodium (VOLTAREN) 1 % Gel Apply 2 grams  topically 4 (four) times daily.    diltiaZEM (CARDIZEM CD) 180 mg, Oral, Daily    doxazosin (CARDURA) 2 mg, Oral, Nightly    enalapril (VASOTEC) 20 mg, Oral, 2 times daily    HYDROcodone-acetaminophen (NORCO)  mg per tablet 1 tablet, Oral, Every 6 hours PRN    [START ON 8/26/2024] HYDROcodone-acetaminophen (NORCO)  mg per tablet 1 tablet, Oral, Every 8 hours PRN    [START ON 7/24/2024] HYDROcodone-acetaminophen (NORCO)  mg per tablet 1 tablet, Oral, Every 8 hours PRN    multivitamin capsule 1 capsule, Oral, Daily    MYRBETRIQ 25 mg, Oral, Daily    nystatin (MYCOSTATIN) 600,000 Units, Oral, 4 times daily    omeprazole (PRILOSEC) 20 mg, Oral, 2 times daily    polyethylene glycol (GLYCOLAX) 17 g, Oral, Daily PRN    pravastatin (PRAVACHOL) 20 mg, Oral    predniSONE (DELTASONE) 10 mg, Oral, Daily    promethazine (PHENERGAN) 25 mg, Oral, Every 6 hours PRN    sildenafiL (VIAGRA) 100 mg, Oral, As needed (PRN)    tamsulosin (FLOMAX) 0.4 mg, Oral, Daily    tiotropium bromide (SPIRIVA RESPIMAT INHL) 2 puffs daily - pt reports taking everyday in AM    tiZANidine (ZANAFLEX) 4 mg, Oral, Every 8 hours         Objective:     Vitals:    06/25/24 1435   BP: (!) 157/89   Pulse: (!) 55   Weight: 81 kg (178 lb 9.2 oz)   Height: 6' 0.01" (1.829 m)   PainSc:   7   PainLoc: Generalized    "   Body mass index is 24.21 kg/m².     Physical Examinations:  Physical Exam   Constitutional: He is oriented to person, place, and time.   HENT:   Head: Normocephalic and atraumatic.   Mouth/Throat: Oropharynx is clear and moist.   Eyes: Pupils are equal, round, and reactive to light.   Neck: No thyromegaly present.   Cardiovascular: Normal rate, regular rhythm and normal heart sounds. Exam reveals no gallop and no friction rub.   No murmur heard.  Pulmonary/Chest: Breath sounds normal. He has no wheezes. He has no rales. He exhibits no tenderness.   Abdominal: There is no abdominal tenderness. There is no rebound and no guarding.   Musculoskeletal:         General: Tenderness present.      Right shoulder: Tenderness present.      Left shoulder: Tenderness present.      Right elbow: Swelling present. Tenderness present.      Left elbow: Tenderness present.      Right wrist: Swelling and tenderness present.      Left wrist: Swelling and tenderness present.      Cervical back: Neck supple.      Right knee: Swelling and effusion present. Tenderness present.      Left knee: Swelling and effusion present. Tenderness present.   Lymphadenopathy:     He has no cervical adenopathy.   Neurological: He is alert and oriented to person, place, and time. Gait normal.   Skin: Bruising noted. No rash noted. There is erythema. No pallor.   Psychiatric: Mood and affect normal.   Vitals reviewed.      Right Side Rheumatological Exam     The patient is tender to palpation of the shoulder, elbow, wrist, knee, 1st PIP, 1st MCP, 2nd PIP, 2nd MCP, 3rd PIP, 3rd MCP, 4th PIP, 4th MCP and 5th PIP    He has swelling of the elbow, wrist, knee, 1st PIP, 1st MCP, 2nd PIP, 2nd MCP, 3rd PIP, 3rd MCP, 4th PIP, 4th MCP, 5th PIP and 5th MCP    The patient has an enlarged wrist and knee    Shoulder Exam   Tenderness Location: no tenderness    Range of Motion   Active abduction:  abnormal   Adduction: abnormal  Sensation: normal    Knee Exam    Tenderness Location: medial joint line and LCL  Patellofemoral Crepitus: positive  Effusion: positive  Sensation: normal    Hip Exam   Tenderness Location: posterior and anterior  Sensation: normal    Elbow/Wrist Exam   Tenderness Location: no tenderness  Sensation: normal    Left Side Rheumatological Exam     The patient is tender to palpation of the shoulder, elbow, wrist, knee, 1st PIP, 1st MCP, 2nd PIP, 2nd MCP, 3rd PIP, 3rd MCP, 4th PIP, 4th MCP, 5th PIP and 5th MCP.    He has swelling of the wrist, knee, 1st PIP, 1st MCP, 2nd PIP, 2nd MCP, 3rd PIP, 3rd MCP, 4th PIP, 4th MCP, 5th PIP and 5th MCP    The patient has an enlarged knee.    Shoulder Exam   Tenderness Location: no tenderness    Range of Motion   Active abduction:  abnormal   Sensation: normal    Knee Exam   Tenderness Location: lateral joint line and medial joint line    Patellofemoral Crepitus: positive  Effusion: positive  Sensation: normal    Hip Exam   Tenderness Location: posterior and anterior  Sensation: normal    Elbow/Wrist Exam   Sensation: normal      Back/Neck Exam   General Inspection   Gait: normal       Tenderness Right paramedian tenderness of the Upper C-Spine, Lower C-Spine, Lower L-Spine and SI Joint.Left paramedian tenderness of the Upper C-Spine, Lower L-Spine, Lower C-Spine and SI Joint.    Neck Range of Motion   Flexion:  Limited  Extension:  Limited       Disease Assessment Scores:  Patient's Global Assessment of arthritis (0-10): 3  Physician's Global Assessment of arthritis (0-10): 3  Number of Tender Joints (0-28): 4  Number of Swollen Joints (0-28): 4        10/24/2023     7:36 AM   Rapid3 Question Responses and Scores   MDHAQ Score 1   Psychologic Score 3.3   Pain Score 7   When you awakened in the morning OVER THE LAST WEEK, did you feel stiff? Yes   If Yes, please indicate the number of hours until you are as limber as you will be for the day 2   Fatigue Score 4   Global Health Score 5.5   RAPID3 Score 5.28  "      Monitoring Lab Results:  Lab Results   Component Value Date    WBC 11.42 06/26/2024    RBC 2.82 (L) 06/26/2024    HGB 7.9 (L) 06/26/2024    HCT 25.6 (L) 06/26/2024    MCV 91 06/26/2024    MCH 28.0 06/26/2024    MCHC 30.9 (L) 06/26/2024    RDW 15.7 (H) 06/26/2024     (L) 06/26/2024        Lab Results   Component Value Date     06/26/2024    K 4.5 06/26/2024     06/26/2024    CO2 27 06/26/2024     (H) 06/26/2024    BUN 16 06/26/2024    CREATININE 1.3 06/26/2024    CALCIUM 9.5 06/26/2024    PROT 6.6 06/26/2024    ALBUMIN 3.8 06/26/2024    BILITOT 0.6 06/26/2024    ALKPHOS 55 06/26/2024    AST 20 06/26/2024    ALT 7 (L) 06/26/2024    ANIONGAP 9 06/26/2024    EGFRNORACEVR 57.6 (A) 06/26/2024       Lab Results   Component Value Date    SEDRATE 8 02/12/2024    CRP 22.7 (H) 02/12/2024        Lab Results   Component Value Date    UVRSQYAA50SP 30 02/12/2024    IQJXAVVW22 652 01/12/2024        Lab Results   Component Value Date    CHOL 186 02/19/2024    HDL 50 02/19/2024    LDLCALC 110.6 02/19/2024    TRIG 127 02/19/2024       Lab Results   Component Value Date    RF 65.0 (H) 02/12/2024    CCPANTIBODIE <0.5 02/12/2024     No results found for: "ANASCREEN", "ANATITER", "ANAPATTE", "DSDNA", "SMRNPAB", "SSAANTIBODY", "SSBANTIBODY", "EIF98VJ", "JO1AB"  No results found for: "HLABB27"    Infectious Disease Screening:  Lab Results   Component Value Date    HEPBSAG Non-reactive 04/25/2023    HEPBCAB Non-reactive 04/25/2023    HEPBSURFABQU Negative 04/25/2023    HEPBSURFABQU <3 04/25/2023     Lab Results   Component Value Date    HEPCAB Reactive (A) 04/25/2023     Lab Results   Component Value Date    TBGOLDPLUS Negative 04/25/2023     No results found for: "QUANTIFERON", "SVCMT", "QUANTAGVALUE", "QUANTNILVALU", "QUANTMITOGEN", "QFTTBAG", "QINT"     Imaging: DEXA, Xrays, MRIs, CTs, etc    Old & Outside Medical Records:  Reviewed old and all outside medical records available in Care Everywhere "   Assessment:     Encounter Diagnoses   Name Primary?    Seropositive rheumatoid arthritis Yes    Pharyngitis due to yeast     Anemia, unspecified type     Chronic pain syndrome     Fatigue, unspecified type     PSA (psoriatic arthritis)     Ptosis of eyelid, unspecified laterality     Thrombocytopenic     Osteoarthritis of knee, unspecified laterality, unspecified osteoarthritis type          Plan:      Encounter Diagnoses   Name Primary?    Pharyngitis due to yeast     Anemia, unspecified type     Seropositive rheumatoid arthritis Yes    Chronic pain syndrome     Fatigue, unspecified type     PSA (psoriatic arthritis)     Ptosis of eyelid, unspecified laterality     Thrombocytopenic     Osteoarthritis of knee, unspecified laterality, unspecified osteoarthritis type      Scooter was seen today for disease management.    Diagnoses and all orders for this visit:    Seropositive rheumatoid arthritis  -     Transferrin; Future  -     Iron and TIBC; Future  -     Vitamin D; Future  -     Cyclic Citrullinated Peptide Antibody, IgG; Future  -     Rheumatoid Factor; Future  -     Sedimentation rate; Future  -     C-Reactive Protein; Future  -     Comprehensive Metabolic Panel; Future  -     CBC Auto Differential; Future  -     HYDROcodone-acetaminophen (NORCO)  mg per tablet; Take 1 tablet by mouth every 6 (six) hours as needed for Pain.  -     HYDROcodone-acetaminophen (NORCO)  mg per tablet; Take 1 tablet by mouth every 8 (eight) hours as needed for Pain.  -     HYDROcodone-acetaminophen (NORCO)  mg per tablet; Take 1 tablet by mouth every 8 (eight) hours as needed for Pain.  -     predniSONE (DELTASONE) 5 MG tablet; Take 2 tablets (10 mg total) by mouth once daily.  -     tiZANidine (ZANAFLEX) 4 MG tablet; Take 1 tablet (4 mg total) by mouth every 8 (eight) hours.    Pharyngitis due to yeast  -     Discontinue: nystatin (MYCOSTATIN) 100,000 unit/mL suspension; Take 6 mLs (600,000 Units total) by mouth 4  (four) times daily. for 20 days  -     fluconazole (DIFLUCAN) 150 MG Tab; Take 1 tablet (150 mg total) by mouth once daily. for 7 days (Patient not taking: Reported on 6/28/2024)  -     Transferrin; Future  -     Iron and TIBC; Future  -     Vitamin D; Future  -     Cyclic Citrullinated Peptide Antibody, IgG; Future  -     Rheumatoid Factor; Future  -     Sedimentation rate; Future  -     C-Reactive Protein; Future  -     Comprehensive Metabolic Panel; Future  -     CBC Auto Differential; Future  -     nystatin (MYCOSTATIN) 100,000 unit/mL suspension; Take 6 mLs (600,000 Units total) by mouth 4 (four) times daily. for 20 days    Anemia, unspecified type  -     cyanocobalamin injection 1,000 mcg  -     Transferrin; Future  -     Iron and TIBC; Future  -     Vitamin D; Future  -     Cyclic Citrullinated Peptide Antibody, IgG; Future  -     Rheumatoid Factor; Future  -     Sedimentation rate; Future  -     C-Reactive Protein; Future  -     Comprehensive Metabolic Panel; Future  -     CBC Auto Differential; Future  -     C-Reactive Protein; Future  -     CBC Auto Differential; Future  -     Transferrin; Future  -     Iron and TIBC; Future  -     Ambulatory referral/consult to Hematology / Oncology; Future    Chronic pain syndrome  -     Transferrin; Future  -     Iron and TIBC; Future  -     Vitamin D; Future  -     Cyclic Citrullinated Peptide Antibody, IgG; Future  -     Rheumatoid Factor; Future  -     Sedimentation rate; Future  -     C-Reactive Protein; Future  -     Comprehensive Metabolic Panel; Future  -     CBC Auto Differential; Future  -     HYDROcodone-acetaminophen (NORCO)  mg per tablet; Take 1 tablet by mouth every 6 (six) hours as needed for Pain.  -     HYDROcodone-acetaminophen (NORCO)  mg per tablet; Take 1 tablet by mouth every 8 (eight) hours as needed for Pain.  -     HYDROcodone-acetaminophen (NORCO)  mg per tablet; Take 1 tablet by mouth every 8 (eight) hours as needed for  Pain.  -     predniSONE (DELTASONE) 5 MG tablet; Take 2 tablets (10 mg total) by mouth once daily.  -     tiZANidine (ZANAFLEX) 4 MG tablet; Take 1 tablet (4 mg total) by mouth every 8 (eight) hours.    Fatigue, unspecified type  -     Transferrin; Future  -     Iron and TIBC; Future  -     Vitamin D; Future  -     Cyclic Citrullinated Peptide Antibody, IgG; Future  -     Rheumatoid Factor; Future  -     Sedimentation rate; Future  -     C-Reactive Protein; Future  -     Comprehensive Metabolic Panel; Future  -     CBC Auto Differential; Future  -     Anti-Thyroglobulin Antibody; Future  -     T4, Free; Future  -     Thyroid Peroxidase Antibody; Future  -     TSH; Future  -     T3, Free; Future  -     HYDROcodone-acetaminophen (NORCO)  mg per tablet; Take 1 tablet by mouth every 6 (six) hours as needed for Pain.  -     HYDROcodone-acetaminophen (NORCO)  mg per tablet; Take 1 tablet by mouth every 8 (eight) hours as needed for Pain.  -     HYDROcodone-acetaminophen (NORCO)  mg per tablet; Take 1 tablet by mouth every 8 (eight) hours as needed for Pain.    PSA (psoriatic arthritis)  -     Transferrin; Future  -     Iron and TIBC; Future  -     Vitamin D; Future  -     Cyclic Citrullinated Peptide Antibody, IgG; Future  -     Rheumatoid Factor; Future  -     Sedimentation rate; Future  -     C-Reactive Protein; Future  -     Comprehensive Metabolic Panel; Future  -     CBC Auto Differential; Future  -     HYDROcodone-acetaminophen (NORCO)  mg per tablet; Take 1 tablet by mouth every 6 (six) hours as needed for Pain.  -     HYDROcodone-acetaminophen (NORCO)  mg per tablet; Take 1 tablet by mouth every 8 (eight) hours as needed for Pain.  -     HYDROcodone-acetaminophen (NORCO)  mg per tablet; Take 1 tablet by mouth every 8 (eight) hours as needed for Pain.    Ptosis of eyelid, unspecified laterality  -     Transferrin; Future  -     Iron and TIBC; Future  -     Vitamin D; Future  -      Cyclic Citrullinated Peptide Antibody, IgG; Future  -     Rheumatoid Factor; Future  -     Sedimentation rate; Future  -     C-Reactive Protein; Future  -     Comprehensive Metabolic Panel; Future  -     CBC Auto Differential; Future  -     Anti-Thyroglobulin Antibody; Future  -     T4, Free; Future  -     Thyroid Peroxidase Antibody; Future  -     TSH; Future  -     T3, Free; Future  -     HYDROcodone-acetaminophen (NORCO)  mg per tablet; Take 1 tablet by mouth every 6 (six) hours as needed for Pain.  -     HYDROcodone-acetaminophen (NORCO)  mg per tablet; Take 1 tablet by mouth every 8 (eight) hours as needed for Pain.  -     HYDROcodone-acetaminophen (NORCO)  mg per tablet; Take 1 tablet by mouth every 8 (eight) hours as needed for Pain.    Thrombocytopenic  -     Ambulatory referral/consult to Hematology / Oncology; Future    Osteoarthritis of knee, unspecified laterality, unspecified osteoarthritis type  -     predniSONE (DELTASONE) 5 MG tablet; Take 2 tablets (10 mg total) by mouth once daily.  -     tiZANidine (ZANAFLEX) 4 MG tablet; Take 1 tablet (4 mg total) by mouth every 8 (eight) hours.    Other orders  -     ferumoxytoL (FERAHEME) 510 mg in D5W 117 mL IVPB  -     EPINEPHrine (EPIPEN) 0.3 mg/0.3 mL pen injection 0.3 mg  -     diphenhydrAMINE injection 50 mg  -     hydrocortisone sodium succinate injection 100 mg  -     heparin, porcine (PF) 100 unit/mL injection flush 500 Units  -     sodium chloride 0.9% flush 10 mL  -     0.9% NaCl 100 mL flush bag        1. Labs ordered, check for anemia post surgery  2.nurse injections  3. F/u 4 months     Follow-up 4 months  More than 50% of the  40 minute encounter was spent face to face counseling the patient regarding current status and future plan of care as well as side effects  of the medications. All questions were answered to patient's satisfaction also includes  non-face to face time preparing to see the patient (eg, review of tests),  Obtaining and/or reviewing separately obtained history, Documenting clinical information in the electronic or other health record, Independently interpreting results

## 2024-06-26 ENCOUNTER — LAB VISIT (OUTPATIENT)
Dept: LAB | Facility: HOSPITAL | Age: 74
End: 2024-06-26
Attending: INTERNAL MEDICINE
Payer: MEDICARE

## 2024-06-26 DIAGNOSIS — M05.9 SEROPOSITIVE RHEUMATOID ARTHRITIS: ICD-10-CM

## 2024-06-26 DIAGNOSIS — D64.9 ANEMIA, UNSPECIFIED TYPE: ICD-10-CM

## 2024-06-26 DIAGNOSIS — B37.89: ICD-10-CM

## 2024-06-26 DIAGNOSIS — H02.409 PTOSIS OF EYELID, UNSPECIFIED LATERALITY: ICD-10-CM

## 2024-06-26 DIAGNOSIS — J02.8: ICD-10-CM

## 2024-06-26 DIAGNOSIS — L40.50 PSA (PSORIATIC ARTHRITIS): ICD-10-CM

## 2024-06-26 DIAGNOSIS — R53.83 FATIGUE, UNSPECIFIED TYPE: ICD-10-CM

## 2024-06-26 DIAGNOSIS — G89.4 CHRONIC PAIN SYNDROME: ICD-10-CM

## 2024-06-26 LAB
ALBUMIN SERPL BCP-MCNC: 3.8 G/DL (ref 3.5–5.2)
ALP SERPL-CCNC: 55 U/L (ref 55–135)
ALT SERPL W/O P-5'-P-CCNC: 7 U/L (ref 10–44)
ANION GAP SERPL CALC-SCNC: 9 MMOL/L (ref 8–16)
AST SERPL-CCNC: 20 U/L (ref 10–40)
BASOPHILS # BLD AUTO: 0.03 K/UL (ref 0–0.2)
BASOPHILS NFR BLD: 0.3 % (ref 0–1.9)
BILIRUB SERPL-MCNC: 0.6 MG/DL (ref 0.1–1)
BUN SERPL-MCNC: 16 MG/DL (ref 8–23)
CALCIUM SERPL-MCNC: 9.5 MG/DL (ref 8.7–10.5)
CHLORIDE SERPL-SCNC: 105 MMOL/L (ref 95–110)
CO2 SERPL-SCNC: 27 MMOL/L (ref 23–29)
CREAT SERPL-MCNC: 1.3 MG/DL (ref 0.5–1.4)
DIFFERENTIAL METHOD BLD: ABNORMAL
EOSINOPHIL # BLD AUTO: 0.1 K/UL (ref 0–0.5)
EOSINOPHIL NFR BLD: 0.4 % (ref 0–8)
ERYTHROCYTE [DISTWIDTH] IN BLOOD BY AUTOMATED COUNT: 15.7 % (ref 11.5–14.5)
EST. GFR  (NO RACE VARIABLE): 57.6 ML/MIN/1.73 M^2
GLUCOSE SERPL-MCNC: 130 MG/DL (ref 70–110)
HCT VFR BLD AUTO: 25.6 % (ref 40–54)
HGB BLD-MCNC: 7.9 G/DL (ref 14–18)
IMM GRANULOCYTES # BLD AUTO: 0.09 K/UL (ref 0–0.04)
IMM GRANULOCYTES NFR BLD AUTO: 0.8 % (ref 0–0.5)
IRON SERPL-MCNC: 51 UG/DL (ref 45–160)
LYMPHOCYTES # BLD AUTO: 1.3 K/UL (ref 1–4.8)
LYMPHOCYTES NFR BLD: 11.2 % (ref 18–48)
MCH RBC QN AUTO: 28 PG (ref 27–31)
MCHC RBC AUTO-ENTMCNC: 30.9 G/DL (ref 32–36)
MCV RBC AUTO: 91 FL (ref 82–98)
MONOCYTES # BLD AUTO: 0.9 K/UL (ref 0.3–1)
MONOCYTES NFR BLD: 7.8 % (ref 4–15)
NEUTROPHILS # BLD AUTO: 9.1 K/UL (ref 1.8–7.7)
NEUTROPHILS NFR BLD: 79.5 % (ref 38–73)
NRBC BLD-RTO: 0 /100 WBC
PLATELET # BLD AUTO: 121 K/UL (ref 150–450)
PMV BLD AUTO: ABNORMAL FL (ref 9.2–12.9)
POTASSIUM SERPL-SCNC: 4.5 MMOL/L (ref 3.5–5.1)
PROT SERPL-MCNC: 6.6 G/DL (ref 6–8.4)
RBC # BLD AUTO: 2.82 M/UL (ref 4.6–6.2)
SATURATED IRON: 20 % (ref 20–50)
SODIUM SERPL-SCNC: 141 MMOL/L (ref 136–145)
TOTAL IRON BINDING CAPACITY: 252 UG/DL (ref 250–450)
TRANSFERRIN SERPL-MCNC: 170 MG/DL (ref 200–375)
TRANSFERRIN SERPL-MCNC: 170 MG/DL (ref 200–375)
WBC # BLD AUTO: 11.42 K/UL (ref 3.9–12.7)

## 2024-06-26 PROCEDURE — 83540 ASSAY OF IRON: CPT | Performed by: INTERNAL MEDICINE

## 2024-06-26 PROCEDURE — 85025 COMPLETE CBC W/AUTO DIFF WBC: CPT | Performed by: INTERNAL MEDICINE

## 2024-06-26 PROCEDURE — 80053 COMPREHEN METABOLIC PANEL: CPT | Performed by: INTERNAL MEDICINE

## 2024-06-26 PROCEDURE — 36415 COLL VENOUS BLD VENIPUNCTURE: CPT | Mod: PO | Performed by: INTERNAL MEDICINE

## 2024-06-28 ENCOUNTER — OFFICE VISIT (OUTPATIENT)
Dept: FAMILY MEDICINE | Facility: CLINIC | Age: 74
End: 2024-06-28
Payer: MEDICARE

## 2024-06-28 VITALS
WEIGHT: 173.31 LBS | SYSTOLIC BLOOD PRESSURE: 140 MMHG | OXYGEN SATURATION: 100 % | DIASTOLIC BLOOD PRESSURE: 90 MMHG | HEIGHT: 72 IN | BODY MASS INDEX: 23.47 KG/M2 | HEART RATE: 61 BPM

## 2024-06-28 DIAGNOSIS — I1A.0 RESISTANT HYPERTENSION: ICD-10-CM

## 2024-06-28 DIAGNOSIS — Z95.2 S/P TAVR (TRANSCATHETER AORTIC VALVE REPLACEMENT): Primary | ICD-10-CM

## 2024-06-28 PROCEDURE — 3077F SYST BP >= 140 MM HG: CPT | Mod: CPTII,S$GLB,, | Performed by: PHYSICIAN ASSISTANT

## 2024-06-28 PROCEDURE — 3288F FALL RISK ASSESSMENT DOCD: CPT | Mod: CPTII,S$GLB,, | Performed by: PHYSICIAN ASSISTANT

## 2024-06-28 PROCEDURE — 1159F MED LIST DOCD IN RCRD: CPT | Mod: CPTII,S$GLB,, | Performed by: PHYSICIAN ASSISTANT

## 2024-06-28 PROCEDURE — 4010F ACE/ARB THERAPY RXD/TAKEN: CPT | Mod: CPTII,S$GLB,, | Performed by: PHYSICIAN ASSISTANT

## 2024-06-28 PROCEDURE — 3008F BODY MASS INDEX DOCD: CPT | Mod: CPTII,S$GLB,, | Performed by: PHYSICIAN ASSISTANT

## 2024-06-28 PROCEDURE — 99999 PR PBB SHADOW E&M-EST. PATIENT-LVL V: CPT | Mod: PBBFAC,,, | Performed by: PHYSICIAN ASSISTANT

## 2024-06-28 PROCEDURE — 1111F DSCHRG MED/CURRENT MED MERGE: CPT | Mod: CPTII,S$GLB,, | Performed by: PHYSICIAN ASSISTANT

## 2024-06-28 PROCEDURE — 3080F DIAST BP >= 90 MM HG: CPT | Mod: CPTII,S$GLB,, | Performed by: PHYSICIAN ASSISTANT

## 2024-06-28 PROCEDURE — 99214 OFFICE O/P EST MOD 30 MIN: CPT | Mod: S$GLB,,, | Performed by: PHYSICIAN ASSISTANT

## 2024-06-28 PROCEDURE — 3044F HG A1C LEVEL LT 7.0%: CPT | Mod: CPTII,S$GLB,, | Performed by: PHYSICIAN ASSISTANT

## 2024-06-28 PROCEDURE — 1101F PT FALLS ASSESS-DOCD LE1/YR: CPT | Mod: CPTII,S$GLB,, | Performed by: PHYSICIAN ASSISTANT

## 2024-06-28 PROCEDURE — 1125F AMNT PAIN NOTED PAIN PRSNT: CPT | Mod: CPTII,S$GLB,, | Performed by: PHYSICIAN ASSISTANT

## 2024-06-28 PROCEDURE — 1160F RVW MEDS BY RX/DR IN RCRD: CPT | Mod: CPTII,S$GLB,, | Performed by: PHYSICIAN ASSISTANT

## 2024-06-28 NOTE — PROGRESS NOTES
Subjective:      Patient ID: Scooter Swan is a 73 y.o. male.    Chief Complaint: Hospital Follow Up    HPI  Patient has PMH of HTN, SVT, cured Hep C, RA, thrombocytopenia, and anemia.    Patient went to [facility] 6/19/2024 to 6/20/2024 for elective TAVR with Dr. Marrero.  No operative complications noted.    Patient reports extreme weakness and sometimes even difficulty walking and intermittent fatigue since the TAVR.  Was hospitalized with staph bacteremia 2/2024 and had IV Ancef for 6 weeks for this.  Eating and drinking well.  Sleeping during the day and night.    Not needed albuterol inhaler lately.    BP Readings from Last 3 Encounters:   06/28/24 (!) 140/90   06/25/24 (!) 157/89   06/24/24 136/84      Review of Systems   Constitutional:  Positive for fatigue.   Respiratory:  Positive for shortness of breath (improved since TAVR).    Cardiovascular:  Negative for chest pain and leg swelling.   Gastrointestinal:  Negative for abdominal pain, constipation, diarrhea, nausea and vomiting.   Genitourinary:  Negative for dysuria, frequency and hematuria.   Neurological:  Positive for weakness and headaches.   Psychiatric/Behavioral:  Positive for sleep disturbance.        Objective:   BP (!) 140/90   Pulse 61   Ht 6' (1.829 m)   Wt 78.6 kg (173 lb 4.5 oz)   SpO2 100%   BMI 23.50 kg/m²     Physical Exam  Vitals reviewed.   Constitutional:       Appearance: Normal appearance. He is well-developed.   HENT:      Head: Normocephalic and atraumatic.      Right Ear: External ear normal.      Left Ear: External ear normal.   Eyes:      Conjunctiva/sclera: Conjunctivae normal.   Cardiovascular:      Rate and Rhythm: Normal rate and regular rhythm.      Heart sounds: Normal heart sounds. No murmur heard.     No friction rub. No gallop.   Pulmonary:      Effort: Pulmonary effort is normal. No respiratory distress.      Breath sounds: Normal breath sounds. No wheezing, rhonchi or rales.   Musculoskeletal:         General: Normal  range of motion.   Skin:     General: Skin is warm and dry.      Findings: No rash.   Neurological:      General: No focal deficit present.      Mental Status: He is alert and oriented to person, place, and time.   Psychiatric:         Mood and Affect: Mood normal.         Behavior: Behavior normal.         Judgment: Judgment normal.       Assessment:      1. S/P TAVR (transcatheter aortic valve replacement)    2. Resistant hypertension       Plan:   1. S/P TAVR (transcatheter aortic valve replacement)  Improving somewhat.    2. Resistant hypertension  Decreasing appropriately.    Follow up in 2 months with Dr. Kennedy.  Patient agreed with plan and expressed understanding.    Thank you for allowing me to serve you,

## 2024-07-07 RX ORDER — EPINEPHRINE 0.3 MG/.3ML
0.3 INJECTION SUBCUTANEOUS ONCE AS NEEDED
OUTPATIENT
Start: 2024-07-07

## 2024-07-07 RX ORDER — DIPHENHYDRAMINE HYDROCHLORIDE 50 MG/ML
50 INJECTION INTRAMUSCULAR; INTRAVENOUS ONCE AS NEEDED
OUTPATIENT
Start: 2024-07-07

## 2024-07-07 RX ORDER — HEPARIN 100 UNIT/ML
500 SYRINGE INTRAVENOUS
OUTPATIENT
Start: 2024-07-07

## 2024-07-07 RX ORDER — SODIUM CHLORIDE 0.9 % (FLUSH) 0.9 %
10 SYRINGE (ML) INJECTION
OUTPATIENT
Start: 2024-07-07

## 2024-07-09 ENCOUNTER — TELEPHONE (OUTPATIENT)
Dept: RHEUMATOLOGY | Facility: CLINIC | Age: 74
End: 2024-07-09
Payer: MEDICARE

## 2024-07-09 NOTE — TELEPHONE ENCOUNTER
----- Message from Carson Morales MD sent at 7/7/2024 10:32 PM CDT -----  Severely anemic will refer  to heme/onc and iron

## 2024-07-16 ENCOUNTER — INFUSION (OUTPATIENT)
Dept: INFUSION THERAPY | Facility: HOSPITAL | Age: 74
End: 2024-07-16
Attending: INTERNAL MEDICINE
Payer: MEDICARE

## 2024-07-16 VITALS
TEMPERATURE: 98 F | OXYGEN SATURATION: 97 % | RESPIRATION RATE: 18 BRPM | DIASTOLIC BLOOD PRESSURE: 90 MMHG | HEART RATE: 58 BPM | SYSTOLIC BLOOD PRESSURE: 137 MMHG

## 2024-07-16 DIAGNOSIS — J44.9 CHRONIC OBSTRUCTIVE PULMONARY DISEASE, UNSPECIFIED COPD TYPE: ICD-10-CM

## 2024-07-16 DIAGNOSIS — D64.9 ANEMIA, UNSPECIFIED TYPE: Primary | ICD-10-CM

## 2024-07-16 PROCEDURE — 96374 THER/PROPH/DIAG INJ IV PUSH: CPT | Mod: PN

## 2024-07-16 PROCEDURE — 63600175 PHARM REV CODE 636 W HCPCS: Mod: JZ,JG,PN | Performed by: INTERNAL MEDICINE

## 2024-07-16 PROCEDURE — 25000003 PHARM REV CODE 250: Mod: PN | Performed by: INTERNAL MEDICINE

## 2024-07-16 RX ORDER — DIPHENHYDRAMINE HYDROCHLORIDE 50 MG/ML
50 INJECTION INTRAMUSCULAR; INTRAVENOUS ONCE AS NEEDED
Status: CANCELLED | OUTPATIENT
Start: 2024-07-16

## 2024-07-16 RX ORDER — DIPHENHYDRAMINE HYDROCHLORIDE 50 MG/ML
50 INJECTION INTRAMUSCULAR; INTRAVENOUS ONCE AS NEEDED
Status: DISCONTINUED | OUTPATIENT
Start: 2024-07-16 | End: 2024-07-16 | Stop reason: HOSPADM

## 2024-07-16 RX ORDER — EPINEPHRINE 0.3 MG/.3ML
0.3 INJECTION SUBCUTANEOUS ONCE AS NEEDED
Status: DISCONTINUED | OUTPATIENT
Start: 2024-07-16 | End: 2024-07-16 | Stop reason: HOSPADM

## 2024-07-16 RX ORDER — EPINEPHRINE 0.3 MG/.3ML
0.3 INJECTION SUBCUTANEOUS ONCE AS NEEDED
Status: CANCELLED | OUTPATIENT
Start: 2024-07-16

## 2024-07-16 RX ORDER — SODIUM CHLORIDE 0.9 % (FLUSH) 0.9 %
10 SYRINGE (ML) INJECTION
Status: DISCONTINUED | OUTPATIENT
Start: 2024-07-16 | End: 2024-07-16 | Stop reason: HOSPADM

## 2024-07-16 RX ORDER — SODIUM CHLORIDE 0.9 % (FLUSH) 0.9 %
10 SYRINGE (ML) INJECTION
Status: CANCELLED | OUTPATIENT
Start: 2024-07-16

## 2024-07-16 RX ORDER — HEPARIN 100 UNIT/ML
500 SYRINGE INTRAVENOUS
Status: CANCELLED | OUTPATIENT
Start: 2024-07-16

## 2024-07-16 RX ORDER — ALBUTEROL SULFATE 0.83 MG/ML
SOLUTION RESPIRATORY (INHALATION)
Qty: 90 ML | Refills: 3 | Status: SHIPPED | OUTPATIENT
Start: 2024-07-16

## 2024-07-16 RX ADMIN — SODIUM CHLORIDE: 9 INJECTION, SOLUTION INTRAVENOUS at 11:07

## 2024-07-16 RX ADMIN — FERUMOXYTOL 510 MG: 510 INJECTION INTRAVENOUS at 11:07

## 2024-07-16 NOTE — TELEPHONE ENCOUNTER
No care due was identified.  [facility] Embedded Care Due Messages. Reference number: [id number].   7/16/2024 9:23:11 AM CDT

## 2024-07-16 NOTE — PLAN OF CARE
Problem: Adult Inpatient Plan of Care  Goal: Plan of Care Review  Outcome: Progressing  Flowsheets (Taken 7/16/2024 1150)  Plan of Care Reviewed With:   patient   spouse  Goal: Patient-Specific Goal (Individualized)  Outcome: Progressing  Flowsheets (Taken 7/16/2024 1150)  Individualized Care Needs: recliner, blanket, dimmed lights  Anxieties, Fears or Concerns: none  Patient/Family-Specific Goals (Include Timeframe): no reaction     Problem: Fatigue  Goal: Improved Activity Tolerance  Outcome: Progressing  Intervention: Promote Improved Energy  Flowsheets (Taken 7/16/2024 1150)  Fatigue Management: paced activity encouraged  Sleep/Rest Enhancement:   noise level reduced   relaxation techniques promoted   room darkened  Activity Management:   Ambulated -L4   Up in chair - L3   Walk with assistive devise and /or staff member - L3   Ambulated in [place] - L4  Environmental Support: calm environment promoted     Pt tolerated feraheme, VSS with no signs of adverse reaction post infusion. NAD noted, pt aware of next appt. Pt d/c home, left unit ambulatory accompanied by spouse.

## 2024-07-18 ENCOUNTER — HOSPITAL ENCOUNTER (OUTPATIENT)
Dept: CARDIOLOGY | Facility: HOSPITAL | Age: 74
Discharge: HOME | End: 2024-07-18
Attending: NURSE PRACTITIONER
Payer: MEDICARE

## 2024-07-18 ENCOUNTER — OFFICE VISIT (OUTPATIENT)
Dept: CARDIOLOGY | Facility: CLINIC | Age: 74
End: 2024-07-18
Payer: MEDICARE

## 2024-07-18 VITALS — WEIGHT: 173 LBS | HEIGHT: 72 IN | BODY MASS INDEX: 23.43 KG/M2

## 2024-07-18 VITALS
DIASTOLIC BLOOD PRESSURE: 78 MMHG | HEIGHT: 72 IN | SYSTOLIC BLOOD PRESSURE: 120 MMHG | WEIGHT: 171.31 LBS | BODY MASS INDEX: 23.2 KG/M2 | HEART RATE: 66 BPM

## 2024-07-18 DIAGNOSIS — I50.30 NYHA CLASS 1 HEART FAILURE WITH PRESERVED EJECTION FRACTION: ICD-10-CM

## 2024-07-18 DIAGNOSIS — Z95.2 S/P TAVR (TRANSCATHETER AORTIC VALVE REPLACEMENT): ICD-10-CM

## 2024-07-18 DIAGNOSIS — I10 PRIMARY HYPERTENSION: ICD-10-CM

## 2024-07-18 DIAGNOSIS — Z95.2 S/P TAVR (TRANSCATHETER AORTIC VALVE REPLACEMENT): Primary | ICD-10-CM

## 2024-07-18 LAB
ASCENDING AORTA: 3.29 CM
AV INDEX (PROSTH): 0.59
AV MEAN GRADIENT: 10 MMHG
AV PEAK GRADIENT: 19 MMHG
AV VALVE AREA BY VELOCITY RATIO: 2.36 CM²
AV VALVE AREA: 2.99 CM²
AV VELOCITY RATIO: 0.47
BSA FOR ECHO PROCEDURE: 2 M2
CV ECHO LV RWT: 0.58 CM
DOP CALC AO PEAK VEL: 2.17 M/S
DOP CALC AO VTI: 44 CM
DOP CALC LVOT AREA: 5.1 CM2
DOP CALC LVOT DIAMETER: 2.54 CM
DOP CALC LVOT PEAK VEL: 1.01 M/S
DOP CALC LVOT STROKE VOLUME: 131.68 CM3
DOP CALCLVOT PEAK VEL VTI: 26 CM
E WAVE DECELERATION TIME: 471.33 MSEC
E/A RATIO: 0.58
E/E' RATIO: 17.33 M/S
ECHO LV POSTERIOR WALL: 1.28 CM (ref 0.6–1.1)
FRACTIONAL SHORTENING: 28 % (ref 28–44)
INTERVENTRICULAR SEPTUM: 1.14 CM (ref 0.6–1.1)
IVRT: 165.56 MSEC
LEFT ATRIUM AREA SYSTOLIC (APICAL 2 CHAMBER): 21.63 CM2
LEFT ATRIUM AREA SYSTOLIC (APICAL 4 CHAMBER): 17.4 CM2
LEFT ATRIUM SIZE: 3.97 CM
LEFT ATRIUM VOLUME INDEX MOD: 30.7 ML/M2
LEFT ATRIUM VOLUME MOD: 61.34 CM3
LEFT INTERNAL DIMENSION IN SYSTOLE: 3.15 CM (ref 2.1–4)
LEFT VENTRICLE DIASTOLIC VOLUME INDEX: 43.84 ML/M2
LEFT VENTRICLE DIASTOLIC VOLUME: 87.67 ML
LEFT VENTRICLE END SYSTOLIC VOLUME APICAL 2 CHAMBER: 75.75 ML
LEFT VENTRICLE END SYSTOLIC VOLUME APICAL 4 CHAMBER: 48.69 ML
LEFT VENTRICLE MASS INDEX: 97 G/M2
LEFT VENTRICLE SYSTOLIC VOLUME INDEX: 19.7 ML/M2
LEFT VENTRICLE SYSTOLIC VOLUME: 39.46 ML
LEFT VENTRICULAR INTERNAL DIMENSION IN DIASTOLE: 4.4 CM (ref 3.5–6)
LEFT VENTRICULAR MASS: 193.65 G
LV LATERAL E/E' RATIO: 17.33 M/S
LV SEPTAL E/E' RATIO: 17.33 M/S
LVED V (TEICH): 87.67 ML
LVES V (TEICH): 39.46 ML
LVOT MG: 2.11 MMHG
LVOT MV: 0.68 CM/S
MV PEAK A VEL: 0.9 M/S
MV PEAK E VEL: 0.52 M/S
OHS CV RV/LV RATIO: 1.05 CM
PISA TR MAX VEL: 2.42 M/S
PULM VEIN S/D RATIO: 2.07
PV PEAK D VEL: 0.3 M/S
PV PEAK S VEL: 0.62 M/S
RA VOL SYS: 47.25 ML
RIGHT ATRIAL AREA: 16.1 CM2
RIGHT ATRIUM VOLUME AREA LENGTH APICAL 4 CHAMBER: 45.08 ML
RIGHT VENTRICLE DIASTOLIC LENGTH: 7.4 CM
RIGHT VENTRICLE DIASTOLIC MID DIMENSION: 2.8 CM
RIGHT VENTRICULAR END-DIASTOLIC DIMENSION: 4.62 CM
RIGHT VENTRICULAR LENGTH IN DIASTOLE (APICAL 4-CHAMBER VIEW): 7.4 CM
RV MID DIAMA: 2.83 CM
RV TISSUE DOPPLER FREE WALL SYSTOLIC VELOCITY 1 (APICAL 4 CHAMBER VIEW): 11.8 CM/S
SINUS: 3.91 CM
STJ: 3.34 CM
TDI LATERAL: 0.03 M/S
TDI SEPTAL: 0.03 M/S
TDI: 0.03 M/S
TR MAX PG: 23 MMHG
TRICUSPID ANNULAR PLANE SYSTOLIC EXCURSION: 1.91 CM
Z-SCORE OF LEFT VENTRICULAR DIMENSION IN END DIASTOLE: -2.81
Z-SCORE OF LEFT VENTRICULAR DIMENSION IN END SYSTOLE: -1.01

## 2024-07-18 PROCEDURE — 93306 TTE W/DOPPLER COMPLETE: CPT | Mod: PO

## 2024-07-18 PROCEDURE — 4010F ACE/ARB THERAPY RXD/TAKEN: CPT | Mod: CPTII,S$GLB,, | Performed by: INTERNAL MEDICINE

## 2024-07-18 PROCEDURE — 1159F MED LIST DOCD IN RCRD: CPT | Mod: CPTII,S$GLB,, | Performed by: INTERNAL MEDICINE

## 2024-07-18 PROCEDURE — 99999 PR PBB SHADOW E&M-EST. PATIENT-LVL IV: CPT | Mod: PBBFAC,,, | Performed by: INTERNAL MEDICINE

## 2024-07-18 PROCEDURE — 3078F DIAST BP <80 MM HG: CPT | Mod: CPTII,S$GLB,, | Performed by: INTERNAL MEDICINE

## 2024-07-18 PROCEDURE — 3074F SYST BP LT 130 MM HG: CPT | Mod: CPTII,S$GLB,, | Performed by: INTERNAL MEDICINE

## 2024-07-18 PROCEDURE — 3288F FALL RISK ASSESSMENT DOCD: CPT | Mod: CPTII,S$GLB,, | Performed by: INTERNAL MEDICINE

## 2024-07-18 PROCEDURE — 93005 ELECTROCARDIOGRAM TRACING: CPT | Mod: PO

## 2024-07-18 PROCEDURE — 93010 ELECTROCARDIOGRAM REPORT: CPT | Mod: S$GLB,,, | Performed by: INTERNAL MEDICINE

## 2024-07-18 PROCEDURE — 3044F HG A1C LEVEL LT 7.0%: CPT | Mod: CPTII,S$GLB,, | Performed by: INTERNAL MEDICINE

## 2024-07-18 PROCEDURE — 3008F BODY MASS INDEX DOCD: CPT | Mod: CPTII,S$GLB,, | Performed by: INTERNAL MEDICINE

## 2024-07-18 PROCEDURE — 1111F DSCHRG MED/CURRENT MED MERGE: CPT | Mod: CPTII,S$GLB,, | Performed by: INTERNAL MEDICINE

## 2024-07-18 PROCEDURE — 1100F PTFALLS ASSESS-DOCD GE2>/YR: CPT | Mod: CPTII,S$GLB,, | Performed by: INTERNAL MEDICINE

## 2024-07-18 PROCEDURE — 99214 OFFICE O/P EST MOD 30 MIN: CPT | Mod: S$GLB,,, | Performed by: INTERNAL MEDICINE

## 2024-07-18 NOTE — PROGRESS NOTES
[facility]   Structural Cardiology Clinic Note  Date: 7/18/24    Patient: Scooter Swan, 1950, [id number]  Primary Care Provider: Salvador Kennedy DO     Chief Complaint/Reason for Referral:  Mixed aortic valve disease    Subjective     Scooter Swan is a 73 y.o. male who presents for consult. They are accompanied by wife, Jacqueline. They were referred by Dr. Boucher .    He feels great.  No groin related complaints.  No syncope, fevers, chills, chest pain, dyspnea on exertion, edema, PND, palpitations.  He is about to start cardiac rehab and says he feels better than he has in years    Focused Past History includes:   Aortic stenosis:  TTE (02/23/2024) reviewed: LVEF 55-60 % with moderate LVH, normal RV, mild LAE.  No MR, trace TR, PASP 34.  Severe trileaflet AS (Vmax 4.4, MG 54, DI 0.27) with moderate AR  Coronary angiogram (3/28/24) reviewed:  No significant CAD.  Right heart catheterization with mean PA 15.  LVEDP 26  PFTs 03/27/2024:  FEV1 80%, %, DLCO 48% (adjusted 53%)  Carotids:  October 2022 by report:  0-19% stenosis in the bilateral ICAs  Dental clearance:  done 5/22  STS-PROM: 2.4%  Rhythm issues:  EKG 03/28/2024: Normal sinus rhythm and normal conduction  GAEL via left femoral access 06/19/24 with 29 mm MANUELITO 3 Ultra RESILIA     Chronic HFpEF  Chronic anemia, hemoglobin 8-10; mild thrombocytopenia - thought to be due to chronic disease per hematology  Rheumatoid arthritis on prednisone 10 mg daily  Hypertension   Remotely treated hepatitis-C  No history of MI, coronary revascularization, stroke, cancer, DM      Current Outpatient Medications   Medication Sig    albuterol (PROVENTIL) 2.5 mg /3 mL (0.083 %) nebulizer solution TAKE 3 MLS BY MOUTH VIA NEBULIZER EVERY 6 HOURS AS NEEDED FOR WHEEZING    albuterol (PROVENTIL/VENTOLIN HFA) 90 mcg/actuation inhaler INHALE 1 PUFF BY MOUTH EVERY 4 HOURS AS NEEDED FOR WHEEZING OR SHORTNESS OF BREATH    amoxicillin (AMOXIL) 500 MG Tab Take 4 tabs  (2000 mg) once 60 minutes prior to dental cleaning or other high risk procedures (Patient not taking: Reported on 6/25/2024)    aspirin (ECOTRIN) 81 MG EC tablet Take 1 tablet (81 mg total) by mouth once daily.    cholecalciferol, vitamin D3, (VITAMIN D3) 50 mcg (2,000 unit) Cap Take 1 capsule (2,000 Units total) by mouth once daily.    cyanocobalamin 500 MCG tablet Take 500 mcg by mouth once daily.    diazePAM (VALIUM) 10 MG Tab Take 1 tablet (10 mg total) by mouth every evening.    diclofenac sodium (VOLTAREN) 1 % Gel Apply 2 grams  topically 4 (four) times daily.    diltiaZEM (CARDIZEM CD) 180 MG 24 hr capsule Take 1 capsule (180 mg total) by mouth once daily.    doxazosin (CARDURA) 2 MG tablet TAKE 1 TABLET (2 MG TOTAL) BY MOUTH EVERY EVENING    enalapril (VASOTEC) 20 MG tablet TAKE ONE TABLET BY MOUTH TWO TIMES A DAY    HYDROcodone-acetaminophen (NORCO)  mg per tablet Take 1 tablet by mouth every 6 (six) hours as needed for Pain.    [START ON 8/26/2024] HYDROcodone-acetaminophen (NORCO)  mg per tablet Take 1 tablet by mouth every 8 (eight) hours as needed for Pain.    [START ON 7/24/2024] HYDROcodone-acetaminophen (NORCO)  mg per tablet Take 1 tablet by mouth every 8 (eight) hours as needed for Pain.    mirabegron (MYRBETRIQ) 25 mg Tb24 ER tablet Take 1 tablet (25 mg total) by mouth once daily.    multivitamin capsule Take 1 capsule by mouth once daily.    omeprazole (PRILOSEC) 20 MG capsule Take 1 capsule (20 mg total) by mouth 2 (two) times a day.    polyethylene glycol (GLYCOLAX) 17 gram PwPk Take 17 g by mouth daily as needed.    pravastatin (PRAVACHOL) 20 MG tablet TAKE ONE TABLET BY MOUTH ONCE DAILY (Patient taking differently: Take 20 mg by mouth every evening.)    predniSONE (DELTASONE) 5 MG tablet Take 2 tablets (10 mg total) by mouth once daily.    promethazine (PHENERGAN) 25 MG tablet Take 1 tablet (25 mg total) by mouth every 6 (six) hours as needed for Nausea.    sildenafiL  (VIAGRA) 100 MG tablet Take 1 tablet (100 mg total) by mouth as needed for Erectile Dysfunction.    tamsulosin (FLOMAX) 0.4 mg Cap Take 1 capsule (0.4 mg total) by mouth once daily.    tiotropium bromide (SPIRIVA RESPIMAT INHL) 2 puffs daily - pt reports taking everyday in AM (Patient not taking: Reported on 6/28/2024)    tiZANidine (ZANAFLEX) 4 MG tablet Take 1 tablet (4 mg total) by mouth every 8 (eight) hours.     No current facility-administered medications for this visit.     Facility-Administered Medications Ordered in Other Visits   Medication    0.9%  NaCl infusion    mupirocin 2 % ointment               Review of Systems  Constitutional: negative for fevers, night sweats, and weight loss  Eyes: negative for visual disturbance, diplopia  Respiratory: negative for cough, hemoptysis, sputum, and wheezing  Cardiovascular: see HPI  Gastrointestinal: negative for abdominal pain, bright red blood per rectum, change in bowel habits, dysphagia, melena, and reflux symptoms  Genitourinary:negative for dysuria, frequency, and hematuria  Hematologic/lymphatic: negative for bleeding, easy bruising, and lymphadenopathy  Musculoskeletal:negative for arthralgias, back pain, and myalgias  Neurological: negative for gait problems, paresthesia, speech problems, vertigo, and weakness  Behavioral/Psych: negative for excessive alcohol consumption, illegal drug usage, and sleep disturbance    -------------------------------------    Cataract    COPD (chronic obstructive pulmonary disease)    Diverticulosis    DUARTE (dyspnea on exertion)    GERD (gastroesophageal reflux disease)    Glaucoma    Hepatitis C    treated; seeing Dr. Carr    Hyperlipidemia    Hypertension    Liver lesion    RA (rheumatoid arthritis)    Rectal prolapse    Possible    Severe aortic stenosis    Thyroid disease     ----------------------------    Angiogram, coronary, with left heart catheterization    Procedure: Angiogram, Coronary, with Left Heart Cath;   Surgeon: Zac Boucher MD;  Location: [facility] [place] CATH;  Service: Cardiology;;    Aortography    Procedure: AO Root;  Surgeon: Zac Boucher MD;  Location: [facility] CATH;  Service: Cardiology;;    Arteriography of aortic root    Procedure: ARTERIOGRAM, AORTIC ROOT;  Surgeon: Zac Boucher MD;  Location: [facility] CATH;  Service: Cardiology;;    Carpal tunnel release    Right wrist 2015    Catheterization of both left and right heart    Procedure: Right / Left heart cath;  Surgeon: Zac Boucher MD;  [facility]ATH;  Service: Cardiology;;    Colonoscopy    Dr. Cardona; in care everywhere    Colonoscopy    Procedure: COLONOSCOPY;  Surgeon: Sotero Arthur MD;  Location: [facility];  Service: Endoscopy;  Laterality: N/A; hemorrhoids, diverticulosis, repeat in 10 years for screening    Echocardiogram,transesophageal    Procedure: Transesophageal echo (CADY) intra-procedure log documentation;  Surgeon: Renan Slaughter MD;  Location: [facility] CATH/EP LAB;  Service: Cardiology;  Laterality: N/A;    Excisional hemorrhoidectomy    Procedure: HEMORRHOIDECTOMY, prone;  Surgeon: Gunnar Horton MD;  Location: [facility] OR [address] [place] [address];  Service: Colon and Rectal;  Laterality: N/A;    Hernia repair    Thyroid surgery    Transcatheter aortic valve replacement (tavr)    Procedure: (TAVR);  Surgeon: Hernandez Marrero MD;  Location: [facility] [place] CATH;  Service: Cardiology;;    Transcatheter aortic valve replacement (tavr)    Procedure: (TAVR) - Surgeon;  Surgeon: Jada Murphy MD;  Location: [facility] [place] CATH;  Service: Cardiothoracic;;    Upper gastrointestinal endoscopy    Dr. Cardona; in care everywhere        Family History   Problem Relation Name Age of Onset    Stomach cancer Paternal Cousin      Stomach cancer Paternal Cousin      Cataracts Mother      Diabetes Mother      Hypertension Mother      Stroke Mother      Diabetes Sister      Hypertension Sister      Stroke Sister      Diabetes Brother      Glaucoma Brother      Hypertension Brother      Stroke  Brother      Diabetes Maternal Aunt      Hypertension Maternal Aunt      Stroke Maternal Aunt      Diabetes Maternal Uncle      Hypertension Maternal Uncle      Stroke Maternal Uncle      Diabetes Maternal Grandmother      Hypertension Maternal Grandmother      Stroke Maternal Grandmother      Cancer Cousin          stomach    Colon cancer Neg Hx      Colon polyps Neg Hx      Crohn's disease Neg Hx      Ulcerative colitis Neg Hx      Esophageal cancer Neg Hx      Amblyopia Neg Hx      Blindness Neg Hx      Macular degeneration Neg Hx      Retinal detachment Neg Hx      Strabismus Neg Hx      Thyroid disease Neg Hx       Social History     Tobacco Use    Smoking status: Never    Smokeless tobacco: Never   Substance Use Topics    Alcohol use: Yes     Alcohol/week: 1.0 standard drink of alcohol     Types: 1 Shots of liquor per week     Comment: social    Drug use: Yes     Types: Hydrocodone, Marijuana         Physical Exam  /78   Pulse 66   Ht 6' (1.829 m)   Wt 77.7 kg (171 lb 4.8 oz)   BMI 23.23 kg/m²   Body surface area is 1.99 meters squared.  Body mass index is 23.23 kg/m².    General appearance: alert, appears stated age, cooperative, and no distress  Head: Normocephalic, without obvious abnormality, atraumatic  Neck: no carotid bruit, no JVD, and supple, symmetrical, trachea midline  Lungs: clear to auscultation bilaterally  Heart: regular rate and rhythm; no murmurs, rubs, or gallops  Abdomen: soft, non-tender, no distended  Extremities: extremities atraumatic, no pitting edema  Skin: warm, no cyanosis, no pathologic ecchymosis in exposed portions  Neurologic: Grossly normal. A&O x3      Labs Reviewed     Lab Results   Component Value Date    WBC 11.42 06/26/2024    HGB 7.9 (L) 06/26/2024     (L) 06/26/2024    INR 1.1 06/17/2024    INR 1.0 03/27/2024    APTT 31.1 06/17/2024       Lab Results   Component Value Date     06/26/2024    K 4.5 06/26/2024     06/26/2024    CO2 27 06/26/2024     CALCIUM 9.5 06/26/2024    MG 2.1 01/13/2024     (H) 06/26/2024    PROT 6.6 06/26/2024       Lab Results   Component Value Date    BUN 16 06/26/2024    BUN 18 06/20/2024    BUN 16 06/17/2024    CREATININE 1.3 06/26/2024    CREATININE 1.09 06/20/2024    CREATININE 1.33 06/17/2024    EGFRNORACEVR 57.6 (A) 06/26/2024    EGFRNORACEVR >60 06/20/2024    EGFRNORACEVR 56 (A) 06/17/2024       Lab Results   Component Value Date    ALBUMIN 3.8 06/26/2024    BILITOT 0.6 06/26/2024    ALKPHOS 55 06/26/2024    ALT 7 (L) 06/26/2024       Lab Results   Component Value Date    TRIG 127 02/19/2024    CHOL 186 02/19/2024    HDL 50 02/19/2024    LDLCALC 110.6 02/19/2024    LDLCALC 96.6 02/16/2023       Lab Results   Component Value Date    HGBA1C 5.8 01/10/2024    HGBA1C 5.0 10/08/2021    TSH 2.403 02/12/2024    FREET4 0.80 02/12/2024       Lab Results   Component Value Date    NTPROBNP 989 (H) 06/19/2024    BNP 82 01/10/2024     EKG today: Normal sinus rhythm, no conduction abnormalities              ASSESSMENT & PLAN:  1. S/P TAVR (transcatheter aortic valve replacement)  IN OFFICE EKG 12-LEAD (to [place])      2. NYHA class 1 heart failure with preserved ejection fraction        3. Primary hypertension                 This is a 73 y.o.  male with mixed AS AR who is now 1 month after an uncomplicated left transfemoral GAEL with a 29 mm MANUELITO 3 Ultra RESILIA.  He is on chronic prednisone 10 mg daily for rheumatoid arthritis and has compensated HFpEF.  He is physically and mentally in good shape.     He feels very well and says he has not felt this good with breathing and energy and a long time.  He is about to start cardiac rehab      Continue workup for AVR including TAVR CTA, Cardiothoracic surgery evaluation  Continue diltiazem 180, enalapril 20 b.i.d. for hypertension.  He is also on doxazosin   Continue pravastatin  Continue aspirin 81 mg daily monotherapy.  Re-emphasized endocarditis prophylaxis especially  in light his chronic steroid use  Emphasized the importance of modifying lifestyle related risk factors including smoking cessation, limiting alcohol intake, exercise, diet most resembling a Mediterranean diet.    Thank you, Dr. Boucher, for the opportunity to participate in Scooter Swan 's care today.    Please follow up with me in 11 months with TTE.      Hernandez Marrero MD, [facility]  Interventional Cardiology/Structural Heart Disease  [facility]  Office: [phone]            Parts of this note were completed using voice recognition software. Please excuse any misspellings or syntax errors and reach out to me with questions.  Scooter

## 2024-07-19 LAB
OHS QRS DURATION: 82 MS
OHS QTC CALCULATION: 433 MS

## 2024-07-22 ENCOUNTER — LAB VISIT (OUTPATIENT)
Dept: LAB | Facility: HOSPITAL | Age: 74
End: 2024-07-22
Attending: NURSE PRACTITIONER
Payer: MEDICARE

## 2024-07-22 DIAGNOSIS — D64.9 ANEMIA, UNSPECIFIED TYPE: ICD-10-CM

## 2024-07-22 DIAGNOSIS — D69.6 THROMBOCYTOPENIA: ICD-10-CM

## 2024-07-22 LAB
ACANTHOCYTES BLD QL SMEAR: PRESENT
ALBUMIN SERPL BCP-MCNC: 3.9 G/DL (ref 3.5–5.2)
ALP SERPL-CCNC: 74 U/L (ref 55–135)
ALT SERPL W/O P-5'-P-CCNC: 8 U/L (ref 10–44)
ANION GAP SERPL CALC-SCNC: 10 MMOL/L (ref 8–16)
ANISOCYTOSIS BLD QL SMEAR: SLIGHT
AST SERPL-CCNC: 19 U/L (ref 10–40)
BASOPHILS # BLD AUTO: 0.08 K/UL (ref 0–0.2)
BASOPHILS NFR BLD: 0.8 % (ref 0–1.9)
BILIRUB SERPL-MCNC: 0.4 MG/DL (ref 0.1–1)
BUN SERPL-MCNC: 14 MG/DL (ref 8–23)
CALCIUM SERPL-MCNC: 9.3 MG/DL (ref 8.7–10.5)
CHLORIDE SERPL-SCNC: 107 MMOL/L (ref 95–110)
CO2 SERPL-SCNC: 23 MMOL/L (ref 23–29)
CREAT SERPL-MCNC: 1.1 MG/DL (ref 0.5–1.4)
DACRYOCYTES BLD QL SMEAR: ABNORMAL
DIFFERENTIAL METHOD BLD: ABNORMAL
EOSINOPHIL # BLD AUTO: 0.3 K/UL (ref 0–0.5)
EOSINOPHIL NFR BLD: 3.4 % (ref 0–8)
ERYTHROCYTE [DISTWIDTH] IN BLOOD BY AUTOMATED COUNT: 15.7 % (ref 11.5–14.5)
EST. GFR  (NO RACE VARIABLE): >60 ML/MIN/1.73 M^2
FERRITIN SERPL-MCNC: 726 NG/ML (ref 20–300)
GLUCOSE SERPL-MCNC: 82 MG/DL (ref 70–110)
HCT VFR BLD AUTO: 30.6 % (ref 40–54)
HGB BLD-MCNC: 9.5 G/DL (ref 14–18)
HYPOCHROMIA BLD QL SMEAR: ABNORMAL
IMM GRANULOCYTES # BLD AUTO: 0.04 K/UL (ref 0–0.04)
IMM GRANULOCYTES NFR BLD AUTO: 0.4 % (ref 0–0.5)
IRON SERPL-MCNC: 124 UG/DL (ref 45–160)
LDH SERPL L TO P-CCNC: 337 U/L (ref 110–260)
LYMPHOCYTES # BLD AUTO: 2.6 K/UL (ref 1–4.8)
LYMPHOCYTES NFR BLD: 25.7 % (ref 18–48)
MCH RBC QN AUTO: 27.9 PG (ref 27–31)
MCHC RBC AUTO-ENTMCNC: 31 G/DL (ref 32–36)
MCV RBC AUTO: 90 FL (ref 82–98)
MONOCYTES # BLD AUTO: 1.2 K/UL (ref 0.3–1)
MONOCYTES NFR BLD: 11.6 % (ref 4–15)
NEUTROPHILS # BLD AUTO: 5.9 K/UL (ref 1.8–7.7)
NEUTROPHILS NFR BLD: 58.5 % (ref 38–73)
NRBC BLD-RTO: 0 /100 WBC
OVALOCYTES BLD QL SMEAR: ABNORMAL
PLATELET # BLD AUTO: 140 K/UL (ref 150–450)
PLATELET BLD QL SMEAR: ABNORMAL
PMV BLD AUTO: ABNORMAL FL (ref 9.2–12.9)
POIKILOCYTOSIS BLD QL SMEAR: SLIGHT
POTASSIUM SERPL-SCNC: 3.6 MMOL/L (ref 3.5–5.1)
PROT SERPL-MCNC: 7 G/DL (ref 6–8.4)
RBC # BLD AUTO: 3.41 M/UL (ref 4.6–6.2)
RETICS/RBC NFR AUTO: 3 % (ref 0.4–2)
SATURATED IRON: 48 % (ref 20–50)
SCHISTOCYTES BLD QL SMEAR: PRESENT
SODIUM SERPL-SCNC: 140 MMOL/L (ref 136–145)
TOTAL IRON BINDING CAPACITY: 260 UG/DL (ref 250–450)
TRANSFERRIN SERPL-MCNC: 176 MG/DL (ref 200–375)
WBC # BLD AUTO: 10.06 K/UL (ref 3.9–12.7)

## 2024-07-22 PROCEDURE — 82668 ASSAY OF ERYTHROPOIETIN: CPT | Performed by: NURSE PRACTITIONER

## 2024-07-22 PROCEDURE — 85025 COMPLETE CBC W/AUTO DIFF WBC: CPT | Mod: PO | Performed by: NURSE PRACTITIONER

## 2024-07-22 PROCEDURE — 82728 ASSAY OF FERRITIN: CPT | Performed by: NURSE PRACTITIONER

## 2024-07-22 PROCEDURE — 82525 ASSAY OF COPPER: CPT | Performed by: NURSE PRACTITIONER

## 2024-07-22 PROCEDURE — 83615 LACTATE (LD) (LDH) ENZYME: CPT | Performed by: NURSE PRACTITIONER

## 2024-07-22 PROCEDURE — 85045 AUTOMATED RETICULOCYTE COUNT: CPT | Performed by: NURSE PRACTITIONER

## 2024-07-22 PROCEDURE — 83540 ASSAY OF IRON: CPT | Performed by: NURSE PRACTITIONER

## 2024-07-22 PROCEDURE — 80053 COMPREHEN METABOLIC PANEL: CPT | Performed by: NURSE PRACTITIONER

## 2024-07-22 PROCEDURE — 84630 ASSAY OF ZINC: CPT | Performed by: NURSE PRACTITIONER

## 2024-07-22 PROCEDURE — 36415 COLL VENOUS BLD VENIPUNCTURE: CPT | Mod: PO | Performed by: NURSE PRACTITIONER

## 2024-07-23 ENCOUNTER — INFUSION (OUTPATIENT)
Dept: INFUSION THERAPY | Facility: HOSPITAL | Age: 74
End: 2024-07-23
Attending: INTERNAL MEDICINE
Payer: MEDICARE

## 2024-07-23 VITALS
HEART RATE: 60 BPM | OXYGEN SATURATION: 96 % | TEMPERATURE: 98 F | SYSTOLIC BLOOD PRESSURE: 134 MMHG | RESPIRATION RATE: 18 BRPM | DIASTOLIC BLOOD PRESSURE: 81 MMHG

## 2024-07-23 DIAGNOSIS — D64.9 ANEMIA, UNSPECIFIED TYPE: Primary | ICD-10-CM

## 2024-07-23 PROCEDURE — 25000003 PHARM REV CODE 250: Mod: PN | Performed by: INTERNAL MEDICINE

## 2024-07-23 PROCEDURE — 63600175 PHARM REV CODE 636 W HCPCS: Mod: JZ,JG,PN | Performed by: INTERNAL MEDICINE

## 2024-07-23 PROCEDURE — 96374 THER/PROPH/DIAG INJ IV PUSH: CPT | Mod: PN

## 2024-07-23 RX ORDER — HEPARIN 100 UNIT/ML
500 SYRINGE INTRAVENOUS
OUTPATIENT
Start: 2024-07-23

## 2024-07-23 RX ORDER — SODIUM CHLORIDE 0.9 % (FLUSH) 0.9 %
10 SYRINGE (ML) INJECTION
Status: DISCONTINUED | OUTPATIENT
Start: 2024-07-23 | End: 2024-07-23 | Stop reason: HOSPADM

## 2024-07-23 RX ORDER — EPINEPHRINE 0.3 MG/.3ML
0.3 INJECTION SUBCUTANEOUS ONCE AS NEEDED
Status: DISCONTINUED | OUTPATIENT
Start: 2024-07-23 | End: 2024-07-23 | Stop reason: HOSPADM

## 2024-07-23 RX ORDER — EPINEPHRINE 0.3 MG/.3ML
0.3 INJECTION SUBCUTANEOUS ONCE AS NEEDED
OUTPATIENT
Start: 2024-07-23

## 2024-07-23 RX ORDER — DIPHENHYDRAMINE HYDROCHLORIDE 50 MG/ML
50 INJECTION INTRAMUSCULAR; INTRAVENOUS ONCE AS NEEDED
Status: DISCONTINUED | OUTPATIENT
Start: 2024-07-23 | End: 2024-07-23 | Stop reason: HOSPADM

## 2024-07-23 RX ORDER — DIPHENHYDRAMINE HYDROCHLORIDE 50 MG/ML
50 INJECTION INTRAMUSCULAR; INTRAVENOUS ONCE AS NEEDED
OUTPATIENT
Start: 2024-07-23

## 2024-07-23 RX ORDER — SODIUM CHLORIDE 0.9 % (FLUSH) 0.9 %
10 SYRINGE (ML) INJECTION
Status: CANCELLED | OUTPATIENT
Start: 2024-07-23

## 2024-07-23 RX ADMIN — FERUMOXYTOL 510 MG: 510 INJECTION INTRAVENOUS at 11:07

## 2024-07-23 RX ADMIN — SODIUM CHLORIDE: 9 INJECTION, SOLUTION INTRAVENOUS at 11:07

## 2024-07-23 NOTE — PLAN OF CARE
Problem: Adult Inpatient Plan of Care  Goal: Plan of Care Review  Outcome: Progressing  Flowsheets (Taken 7/23/2024 1145)  Plan of Care Reviewed With: patient  Goal: Patient-Specific Goal (Individualized)  Outcome: Progressing  Flowsheets (Taken 7/23/2024 1145)  Individualized Care Needs: recliner, dimmed lights, tv remote, pillow  Anxieties, Fears or Concerns: none     Problem: Fatigue  Goal: Improved Activity Tolerance  Outcome: Progressing  Intervention: Promote Improved Energy  Flowsheets (Taken 7/23/2024 1145)  Fatigue Management: paced activity encouraged  Sleep/Rest Enhancement:   natural light exposure provided   noise level reduced   relaxation techniques promoted  Activity Management:   Ambulated -L4   Up in chair - L3   Walk with assistive devise and /or staff member - L3  Environmental Support: calm environment promoted     Pt tolerated iron infusion, no signs of adverse reaction noted during post obs period. NAD noted, VSS. Pt d/c home, left unit ambulatory.

## 2024-07-24 ENCOUNTER — PATIENT MESSAGE (OUTPATIENT)
Dept: HEPATOLOGY | Facility: CLINIC | Age: 74
End: 2024-07-24
Payer: MEDICARE

## 2024-07-25 LAB
EPO SERPL-ACNC: 13.3 MIU/ML (ref 2.6–18.5)
ZINC SERPL-MCNC: 96 UG/DL (ref 60–130)

## 2024-07-26 ENCOUNTER — OFFICE VISIT (OUTPATIENT)
Dept: HEMATOLOGY/ONCOLOGY | Facility: CLINIC | Age: 74
End: 2024-07-26
Payer: MEDICARE

## 2024-07-26 VITALS
DIASTOLIC BLOOD PRESSURE: 72 MMHG | TEMPERATURE: 97 F | HEIGHT: 72 IN | OXYGEN SATURATION: 97 % | WEIGHT: 172.75 LBS | BODY MASS INDEX: 23.4 KG/M2 | SYSTOLIC BLOOD PRESSURE: 111 MMHG | HEART RATE: 69 BPM

## 2024-07-26 DIAGNOSIS — Z95.2 S/P TAVR (TRANSCATHETER AORTIC VALVE REPLACEMENT): ICD-10-CM

## 2024-07-26 DIAGNOSIS — D69.6 THROMBOCYTOPENIA: ICD-10-CM

## 2024-07-26 DIAGNOSIS — D64.9 ANEMIA, UNSPECIFIED TYPE: Primary | ICD-10-CM

## 2024-07-26 DIAGNOSIS — D69.6 THROMBOCYTOPENIC: ICD-10-CM

## 2024-07-26 LAB — COPPER SERPL-MCNC: 1445 UG/L (ref 665–1480)

## 2024-07-26 PROCEDURE — 1100F PTFALLS ASSESS-DOCD GE2>/YR: CPT | Mod: CPTII,S$GLB,, | Performed by: INTERNAL MEDICINE

## 2024-07-26 PROCEDURE — 3074F SYST BP LT 130 MM HG: CPT | Mod: CPTII,S$GLB,, | Performed by: INTERNAL MEDICINE

## 2024-07-26 PROCEDURE — 4010F ACE/ARB THERAPY RXD/TAKEN: CPT | Mod: CPTII,S$GLB,, | Performed by: INTERNAL MEDICINE

## 2024-07-26 PROCEDURE — 3044F HG A1C LEVEL LT 7.0%: CPT | Mod: CPTII,S$GLB,, | Performed by: INTERNAL MEDICINE

## 2024-07-26 PROCEDURE — 3288F FALL RISK ASSESSMENT DOCD: CPT | Mod: CPTII,S$GLB,, | Performed by: INTERNAL MEDICINE

## 2024-07-26 PROCEDURE — 1159F MED LIST DOCD IN RCRD: CPT | Mod: CPTII,S$GLB,, | Performed by: INTERNAL MEDICINE

## 2024-07-26 PROCEDURE — 99215 OFFICE O/P EST HI 40 MIN: CPT | Mod: S$GLB,,, | Performed by: INTERNAL MEDICINE

## 2024-07-26 PROCEDURE — 99999 PR PBB SHADOW E&M-EST. PATIENT-LVL V: CPT | Mod: PBBFAC,,, | Performed by: INTERNAL MEDICINE

## 2024-07-26 PROCEDURE — 3008F BODY MASS INDEX DOCD: CPT | Mod: CPTII,S$GLB,, | Performed by: INTERNAL MEDICINE

## 2024-07-26 PROCEDURE — 1126F AMNT PAIN NOTED NONE PRSNT: CPT | Mod: CPTII,S$GLB,, | Performed by: INTERNAL MEDICINE

## 2024-07-26 PROCEDURE — 3078F DIAST BP <80 MM HG: CPT | Mod: CPTII,S$GLB,, | Performed by: INTERNAL MEDICINE

## 2024-07-31 DIAGNOSIS — E78.2 MIXED HYPERLIPIDEMIA: ICD-10-CM

## 2024-07-31 RX ORDER — DOXAZOSIN 2 MG/1
2 TABLET ORAL NIGHTLY
Qty: 90 TABLET | Refills: 0 | Status: SHIPPED | OUTPATIENT
Start: 2024-07-31 | End: 2025-07-31

## 2024-08-06 DIAGNOSIS — G47.00 INSOMNIA, UNSPECIFIED TYPE: ICD-10-CM

## 2024-08-06 RX ORDER — DIAZEPAM 10 MG/1
10 TABLET ORAL NIGHTLY
Qty: 30 TABLET | Refills: 2 | Status: SHIPPED | OUTPATIENT
Start: 2024-08-06

## 2024-08-28 ENCOUNTER — OFFICE VISIT (OUTPATIENT)
Dept: FAMILY MEDICINE | Facility: CLINIC | Age: 74
End: 2024-08-28
Payer: MEDICARE

## 2024-08-28 ENCOUNTER — TELEPHONE (OUTPATIENT)
Dept: FAMILY MEDICINE | Facility: CLINIC | Age: 74
End: 2024-08-28

## 2024-08-28 VITALS
HEART RATE: 79 BPM | BODY MASS INDEX: 22.72 KG/M2 | OXYGEN SATURATION: 97 % | DIASTOLIC BLOOD PRESSURE: 76 MMHG | HEIGHT: 72 IN | SYSTOLIC BLOOD PRESSURE: 110 MMHG | WEIGHT: 167.75 LBS

## 2024-08-28 DIAGNOSIS — R61 NIGHT SWEATS: ICD-10-CM

## 2024-08-28 DIAGNOSIS — D64.9 ANEMIA, UNSPECIFIED TYPE: ICD-10-CM

## 2024-08-28 DIAGNOSIS — E78.2 MIXED HYPERLIPIDEMIA: Primary | ICD-10-CM

## 2024-08-28 DIAGNOSIS — I10 PRIMARY HYPERTENSION: ICD-10-CM

## 2024-08-28 DIAGNOSIS — I1A.0 RESISTANT HYPERTENSION: ICD-10-CM

## 2024-08-28 DIAGNOSIS — R53.83 FATIGUE, UNSPECIFIED TYPE: ICD-10-CM

## 2024-08-28 PROCEDURE — 99214 OFFICE O/P EST MOD 30 MIN: CPT | Mod: S$GLB,,, | Performed by: INTERNAL MEDICINE

## 2024-08-28 PROCEDURE — 1160F RVW MEDS BY RX/DR IN RCRD: CPT | Mod: CPTII,S$GLB,, | Performed by: INTERNAL MEDICINE

## 2024-08-28 PROCEDURE — 99999 PR PBB SHADOW E&M-EST. PATIENT-LVL V: CPT | Mod: PBBFAC,,, | Performed by: INTERNAL MEDICINE

## 2024-08-28 PROCEDURE — 3288F FALL RISK ASSESSMENT DOCD: CPT | Mod: CPTII,S$GLB,, | Performed by: INTERNAL MEDICINE

## 2024-08-28 PROCEDURE — G2211 COMPLEX E/M VISIT ADD ON: HCPCS | Mod: S$GLB,,, | Performed by: INTERNAL MEDICINE

## 2024-08-28 PROCEDURE — 1159F MED LIST DOCD IN RCRD: CPT | Mod: CPTII,S$GLB,, | Performed by: INTERNAL MEDICINE

## 2024-08-28 PROCEDURE — 3044F HG A1C LEVEL LT 7.0%: CPT | Mod: CPTII,S$GLB,, | Performed by: INTERNAL MEDICINE

## 2024-08-28 PROCEDURE — 3008F BODY MASS INDEX DOCD: CPT | Mod: CPTII,S$GLB,, | Performed by: INTERNAL MEDICINE

## 2024-08-28 PROCEDURE — 1101F PT FALLS ASSESS-DOCD LE1/YR: CPT | Mod: CPTII,S$GLB,, | Performed by: INTERNAL MEDICINE

## 2024-08-28 PROCEDURE — 3074F SYST BP LT 130 MM HG: CPT | Mod: CPTII,S$GLB,, | Performed by: INTERNAL MEDICINE

## 2024-08-28 PROCEDURE — 4010F ACE/ARB THERAPY RXD/TAKEN: CPT | Mod: CPTII,S$GLB,, | Performed by: INTERNAL MEDICINE

## 2024-08-28 PROCEDURE — 1125F AMNT PAIN NOTED PAIN PRSNT: CPT | Mod: CPTII,S$GLB,, | Performed by: INTERNAL MEDICINE

## 2024-08-28 PROCEDURE — 3078F DIAST BP <80 MM HG: CPT | Mod: CPTII,S$GLB,, | Performed by: INTERNAL MEDICINE

## 2024-09-06 ENCOUNTER — LAB VISIT (OUTPATIENT)
Dept: LAB | Facility: HOSPITAL | Age: 74
End: 2024-09-06
Payer: MEDICARE

## 2024-09-06 DIAGNOSIS — R53.83 FATIGUE, UNSPECIFIED TYPE: ICD-10-CM

## 2024-09-06 DIAGNOSIS — R61 NIGHT SWEATS: ICD-10-CM

## 2024-09-06 LAB — TESTOST SERPL-MCNC: 554 NG/DL (ref 304–1227)

## 2024-09-06 PROCEDURE — 87040 BLOOD CULTURE FOR BACTERIA: CPT | Performed by: INTERNAL MEDICINE

## 2024-09-06 PROCEDURE — 36415 COLL VENOUS BLD VENIPUNCTURE: CPT | Mod: PO | Performed by: INTERNAL MEDICINE

## 2024-09-06 PROCEDURE — 86480 TB TEST CELL IMMUN MEASURE: CPT | Performed by: INTERNAL MEDICINE

## 2024-09-06 PROCEDURE — 84403 ASSAY OF TOTAL TESTOSTERONE: CPT | Performed by: INTERNAL MEDICINE

## 2024-09-06 RX ORDER — ALBUTEROL SULFATE 90 UG/1
INHALANT RESPIRATORY (INHALATION)
Qty: 20.1 G | Refills: 3 | Status: SHIPPED | OUTPATIENT
Start: 2024-09-06

## 2024-09-06 NOTE — TELEPHONE ENCOUNTER
No care due was identified.  [facility] Embedded Care Due Messages. Reference number: [id number].   9/06/2024 8:03:56 AM CDT

## 2024-09-06 NOTE — TELEPHONE ENCOUNTER
Refill Decision Note   Scooter Miken  is requesting a refill authorization.  Brief Assessment and Rationale for Refill:  Approve     Medication Therapy Plan:         Alert overridden per protocol: Yes   Comments:     Note composed:1:33 PM 09/06/2024             Appointments     Last Visit   8/28/2024 Salvador Kennedy, DO   Next Visit   11/27/2024 Salvador Kenneyd, DO

## 2024-09-08 RX ORDER — ENALAPRIL MALEATE 20 MG/1
20 TABLET ORAL 2 TIMES DAILY
Qty: 180 TABLET | Refills: 3 | Status: SHIPPED | OUTPATIENT
Start: 2024-09-08

## 2024-09-08 NOTE — TELEPHONE ENCOUNTER
No care due was identified.  [facility] Embedded Care Due Messages. Reference number: [id number].   9/08/2024 8:04:12 AM CDT

## 2024-09-09 LAB
GAMMA INTERFERON BACKGROUND BLD IA-ACNC: 0.01 IU/ML
M TB IFN-G CD4+ BCKGRND COR BLD-ACNC: 0.03 IU/ML
M TB IFN-G CD4+ BCKGRND COR BLD-ACNC: 0.04 IU/ML
MITOGEN IGNF BCKGRD COR BLD-ACNC: 9.99 IU/ML
TB GOLD PLUS: NEGATIVE

## 2024-09-09 NOTE — TELEPHONE ENCOUNTER
Refill Decision Note   Scooter Swan  is requesting a refill authorization.  Brief Assessment and Rationale for Refill:  Approve     Medication Therapy Plan:        Comments:     Note composed:8:32 PM 09/08/2024

## 2024-09-11 LAB
BACTERIA BLD CULT: NORMAL
BACTERIA BLD CULT: NORMAL

## 2024-09-21 DIAGNOSIS — K21.9 GASTROESOPHAGEAL REFLUX DISEASE, UNSPECIFIED WHETHER ESOPHAGITIS PRESENT: ICD-10-CM

## 2024-09-21 NOTE — TELEPHONE ENCOUNTER
No care due was identified.  [facility] Embedded Care Due Messages. Reference number: [id number].   9/21/2024 10:06:18 AM CDT

## 2024-09-22 RX ORDER — OMEPRAZOLE 20 MG/1
20 CAPSULE, DELAYED RELEASE ORAL 2 TIMES DAILY
Qty: 180 CAPSULE | Refills: 3 | Status: SHIPPED | OUTPATIENT
Start: 2024-09-22

## 2024-09-24 ENCOUNTER — OFFICE VISIT (OUTPATIENT)
Dept: FAMILY MEDICINE | Facility: CLINIC | Age: 74
End: 2024-09-24
Payer: MEDICARE

## 2024-09-24 VITALS
HEART RATE: 85 BPM | OXYGEN SATURATION: 99 % | SYSTOLIC BLOOD PRESSURE: 118 MMHG | BODY MASS INDEX: 23.29 KG/M2 | DIASTOLIC BLOOD PRESSURE: 76 MMHG | WEIGHT: 171.94 LBS | HEIGHT: 72 IN

## 2024-09-24 DIAGNOSIS — Z23 NEED FOR INFLUENZA VACCINATION: Primary | ICD-10-CM

## 2024-09-24 DIAGNOSIS — G43.909 MIGRAINE WITHOUT STATUS MIGRAINOSUS, NOT INTRACTABLE, UNSPECIFIED MIGRAINE TYPE: ICD-10-CM

## 2024-09-24 PROCEDURE — 4010F ACE/ARB THERAPY RXD/TAKEN: CPT | Mod: CPTII,S$GLB,, | Performed by: INTERNAL MEDICINE

## 2024-09-24 PROCEDURE — 3078F DIAST BP <80 MM HG: CPT | Mod: CPTII,S$GLB,, | Performed by: INTERNAL MEDICINE

## 2024-09-24 PROCEDURE — 1159F MED LIST DOCD IN RCRD: CPT | Mod: CPTII,S$GLB,, | Performed by: INTERNAL MEDICINE

## 2024-09-24 PROCEDURE — 90653 IIV ADJUVANT VACCINE IM: CPT | Mod: S$GLB,,, | Performed by: INTERNAL MEDICINE

## 2024-09-24 PROCEDURE — 99213 OFFICE O/P EST LOW 20 MIN: CPT | Mod: S$GLB,,, | Performed by: INTERNAL MEDICINE

## 2024-09-24 PROCEDURE — 3008F BODY MASS INDEX DOCD: CPT | Mod: CPTII,S$GLB,, | Performed by: INTERNAL MEDICINE

## 2024-09-24 PROCEDURE — 1160F RVW MEDS BY RX/DR IN RCRD: CPT | Mod: CPTII,S$GLB,, | Performed by: INTERNAL MEDICINE

## 2024-09-24 PROCEDURE — 1125F AMNT PAIN NOTED PAIN PRSNT: CPT | Mod: CPTII,S$GLB,, | Performed by: INTERNAL MEDICINE

## 2024-09-24 PROCEDURE — 99999 PR PBB SHADOW E&M-EST. PATIENT-LVL IV: CPT | Mod: PBBFAC,,, | Performed by: INTERNAL MEDICINE

## 2024-09-24 PROCEDURE — G2211 COMPLEX E/M VISIT ADD ON: HCPCS | Mod: S$GLB,,, | Performed by: INTERNAL MEDICINE

## 2024-09-24 PROCEDURE — 1101F PT FALLS ASSESS-DOCD LE1/YR: CPT | Mod: CPTII,S$GLB,, | Performed by: INTERNAL MEDICINE

## 2024-09-24 PROCEDURE — 3288F FALL RISK ASSESSMENT DOCD: CPT | Mod: CPTII,S$GLB,, | Performed by: INTERNAL MEDICINE

## 2024-09-24 PROCEDURE — 3074F SYST BP LT 130 MM HG: CPT | Mod: CPTII,S$GLB,, | Performed by: INTERNAL MEDICINE

## 2024-09-24 PROCEDURE — G0008 ADMIN INFLUENZA VIRUS VAC: HCPCS | Mod: S$GLB,,, | Performed by: INTERNAL MEDICINE

## 2024-09-24 PROCEDURE — 3044F HG A1C LEVEL LT 7.0%: CPT | Mod: CPTII,S$GLB,, | Performed by: INTERNAL MEDICINE

## 2024-09-24 RX ORDER — BUTALBITAL, ACETAMINOPHEN AND CAFFEINE 50; 325; 40 MG/1; MG/1; MG/1
1 TABLET ORAL EVERY 4 HOURS PRN
Qty: 10 TABLET | Refills: 5 | Status: SHIPPED | OUTPATIENT
Start: 2024-09-24 | End: 2024-10-24

## 2024-09-24 NOTE — PROGRESS NOTES
Patient ID: Scooter Swan is a 73 y.o. male.    Chief Complaint: Follow-up     Assessment and Plan   1. Need for influenza vaccination  - influenza (adjuvanted) (Fluad) 45 mcg/0.5 mL IM vaccine (> or = 66 yo) 0.5 mL    2. Migraine without status migrainosus, not intractable, unspecified migraine type  - butalbital-acetaminophen-caffeine -40 mg (FIORICET, ESGIC) -40 mg per tablet; Take 1 tablet by mouth every 4 (four) hours as needed for Pain.  Dispense: 10 tablet; Refill: 5     Anemia could be contributory.  Will treat as migraine.  Consider preventative migraine medication (topiramate)  Amitriptyline not a choice in the setting of resistant hypertension     HPI     Headache/ pressure since hospitalization. Comes and goes, frontal, no numbness or weakness but he does have dizziness at times. Sometimes has congestion. Light and sound make it worse. Activity makes it worse. Occurring 3 x week.     Review of Systems   Constitutional:  Negative for fever.   Respiratory:  Negative for shortness of breath.    Cardiovascular:  Negative for chest pain.   Gastrointestinal:  Negative for abdominal pain.       I personally reviewed past medical, family and social history.   Objective    Vitals:    09/24/24 1508   BP: 118/76   Pulse: 85      Wt Readings from Last 3 Encounters:   09/24/24 1508 78 kg (171 lb 15.3 oz)   08/28/24 1522 76.1 kg (167 lb 12.3 oz)   07/26/24 0859 78.4 kg (172 lb 11.7 oz)      Body mass index is 23.32 kg/m².     Physical Exam   Reference     : Visit today included increased complexity associated with the care of the episodic problem migraine addressed and managing the longitudinal care of the patient due to the serious and/or complex managed problem(s)     Active Problem List with Overview Notes    Diagnosis Date Noted    Primary hypertension 09/20/2022    History of hepatitis C 02/07/2020    Thrombocytopenia 02/28/2019    Mixed hyperlipidemia 01/29/2019    Resistant hypertension  01/29/2019    RA (rheumatoid arthritis) 01/29/2019    NYHA class 3 heart failure with preserved ejection fraction 06/19/2024    S/P TAVR (transcatheter aortic valve replacement)- bovine 06/19/2024    SOB (shortness of breath) 02/29/2024    History of supraventricular tachycardia 02/23/2024    Coag negative Staphylococcus bacteremia 01/11/2024    Leukocytosis 01/10/2024    Carotid artery plaque, bilateral 10/19/2022    Aortic valve disease 10/18/2022    Overweight (BMI 25.0-29.9) 09/21/2022    Bilateral carotid bruits 09/20/2022    Moderate aortic regurgitation 08/08/2022    Other specified glaucoma 07/15/2021    Anemia     Other constipation 02/28/2019    Hemorrhoids 10/18/2018           Hypertension Medications               diltiaZEM (CARDIZEM CD) 180 MG 24 hr capsule Take 1 capsule (180 mg total) by mouth once daily.    doxazosin (CARDURA) 2 MG tablet TAKE 1 TABLET (2 MG TOTAL) BY MOUTH EVERY EVENING    enalapril (VASOTEC) 20 MG tablet TAKE ONE TABLET BY MOUTH TWO TIMES A DAY           Hyperlipidemia Medications               pravastatin (PRAVACHOL) 20 MG tablet TAKE ONE TABLET BY MOUTH ONCE DAILY           Medication List with Changes/Refills   New Medications    BUTALBITAL-ACETAMINOPHEN-CAFFEINE -40 MG (FIORICET, ESGIC) -40 MG PER TABLET    Take 1 tablet by mouth every 4 (four) hours as needed for Pain.   Current Medications    ALBUTEROL (PROVENTIL) 2.5 MG /3 ML (0.083 %) NEBULIZER SOLUTION    TAKE 3 MLS BY MOUTH VIA NEBULIZER EVERY 6 HOURS AS NEEDED FOR WHEEZING    ALBUTEROL (PROVENTIL/VENTOLIN HFA) 90 MCG/ACTUATION INHALER    INHALE 1 PUFF BY MOUTH EVERY 4 HOURS AS NEEDED FOR WHEEZING OR SHORTNESS OF BREATH    AMOXICILLIN (AMOXIL) 500 MG TAB    Take 4 tabs (2000 mg) once 60 minutes prior to dental cleaning or other high risk procedures    ASPIRIN (ECOTRIN) 81 MG EC TABLET    Take 1 tablet (81 mg total) by mouth once daily.    CHOLECALCIFEROL, VITAMIN D3, (VITAMIN D3) 50 MCG (2,000 UNIT) CAP     Take 1 capsule (2,000 Units total) by mouth once daily.    CYANOCOBALAMIN 500 MCG TABLET    Take 500 mcg by mouth once daily.    DIAZEPAM (VALIUM) 10 MG TAB    Take 1 Tablet by mouth EVERY EVENING    DICLOFENAC SODIUM (VOLTAREN) 1 % GEL    Apply 2 grams  topically 4 (four) times daily.    DILTIAZEM (CARDIZEM CD) 180 MG 24 HR CAPSULE    Take 1 capsule (180 mg total) by mouth once daily.    DOXAZOSIN (CARDURA) 2 MG TABLET    TAKE 1 TABLET (2 MG TOTAL) BY MOUTH EVERY EVENING    ENALAPRIL (VASOTEC) 20 MG TABLET    TAKE ONE TABLET BY MOUTH TWO TIMES A DAY    HYDROCODONE-ACETAMINOPHEN (NORCO)  MG PER TABLET    Take 1 tablet by mouth every 8 (eight) hours as needed for Pain.    MIRABEGRON (MYRBETRIQ) 25 MG TB24 ER TABLET    Take 1 tablet (25 mg total) by mouth once daily.    MULTIVITAMIN CAPSULE    Take 1 capsule by mouth once daily.    OMEPRAZOLE (PRILOSEC) 20 MG CAPSULE    Take 1 capsule (20 mg total) by mouth 2 (two) times a day.    POLYETHYLENE GLYCOL (GLYCOLAX) 17 GRAM PWPK    Take 17 g by mouth daily as needed.    PRAVASTATIN (PRAVACHOL) 20 MG TABLET    TAKE ONE TABLET BY MOUTH ONCE DAILY    PREDNISONE (DELTASONE) 5 MG TABLET    Take 2 tablets (10 mg total) by mouth once daily.    PROMETHAZINE (PHENERGAN) 25 MG TABLET    Take 1 tablet (25 mg total) by mouth every 6 (six) hours as needed for Nausea.    SILDENAFIL (VIAGRA) 100 MG TABLET    Take 1 tablet (100 mg total) by mouth as needed for Erectile Dysfunction.    TAMSULOSIN (FLOMAX) 0.4 MG CAP    Take 1 capsule (0.4 mg total) by mouth once daily.    TIOTROPIUM BROMIDE (SPIRIVA RESPIMAT INHL)    2 puffs daily - pt reports taking everyday in AM    TIZANIDINE (ZANAFLEX) 4 MG TABLET    Take 1 tablet (4 mg total) by mouth every 8 (eight) hours.         Sodium   Date Value Ref Range Status   07/22/2024 140 136 - 145 mmol/L Final     Potassium   Date Value Ref Range Status   07/22/2024 3.6 3.5 - 5.1 mmol/L Final     Chloride   Date Value Ref Range Status   07/22/2024  107 95 - 110 mmol/L Final     CO2   Date Value Ref Range Status   07/22/2024 23 23 - 29 mmol/L Final     Glucose   Date Value Ref Range Status   07/22/2024 82 70 - 110 mg/dL Final     BUN   Date Value Ref Range Status   07/22/2024 14 8 - 23 mg/dL Final     Creatinine   Date Value Ref Range Status   07/22/2024 1.1 0.5 - 1.4 mg/dL Final     Calcium   Date Value Ref Range Status   07/22/2024 9.3 8.7 - 10.5 mg/dL Final     Total Protein   Date Value Ref Range Status   07/22/2024 7.0 6.0 - 8.4 g/dL Final     Albumin   Date Value Ref Range Status   07/22/2024 3.9 3.5 - 5.2 g/dL Final     Total Bilirubin   Date Value Ref Range Status   07/22/2024 0.4 0.1 - 1.0 mg/dL Final     Comment:     For infants and newborns, interpretation of results should be based  on gestational age, weight and in agreement with clinical  observations.    Premature Infant recommended reference ranges:  Up to 24 hours.............<8.0 mg/dL  Up to 48 hours............<12.0 mg/dL  3-5 days..................<15.0 mg/dL  6-29 days.................<15.0 mg/dL       Alkaline Phosphatase   Date Value Ref Range Status   07/22/2024 74 55 - 135 U/L Final     AST   Date Value Ref Range Status   07/22/2024 19 10 - 40 U/L Final     ALT   Date Value Ref Range Status   07/22/2024 8 (L) 10 - 44 U/L Final     Anion Gap   Date Value Ref Range Status   07/22/2024 10 8 - 16 mmol/L Final     eGFR   Date Value Ref Range Status   07/22/2024 >60 >60 mL/min/1.73 m^2 Final      Lab Results   Component Value Date    HGBA1C 5.8 01/10/2024    HGBA1C 5.0 10/08/2021      Lab Results   Component Value Date    TSH 2.403 02/12/2024      Lab Results   Component Value Date    CHOL 186 02/19/2024    CHOL 176 02/16/2023    CHOL 162 02/11/2019     Lab Results   Component Value Date    HDL 50 02/19/2024    HDL 62 02/16/2023    HDL 76 (H) 02/11/2019     Lab Results   Component Value Date    LDLCALC 110.6 02/19/2024    LDLCALC 96.6 02/16/2023    LDLCALC 77.6 02/11/2019     Lab Results    Component Value Date    TRIG 127 02/19/2024    TRIG 87 02/16/2023    TRIG 42 02/11/2019     Lab Results   Component Value Date    CHOLHDL 26.9 02/19/2024    CHOLHDL 35.2 02/16/2023    CHOLHDL 46.9 02/11/2019       Results for orders placed during the hospital encounter of 07/18/24    Echo    Interpretation Summary    Left Ventricle: The left ventricle is normal in size. Normal wall thickness. There is normal systolic function with a visually estimated ejection fraction of 55 - 60%.    Right Ventricle: Mild right ventricular enlargement. Systolic function is normal.    Aortic Valve: There is a transcatheter valve replacement in the aortic position. It is reported to be a 29 mm MANUELITO 3 Ultra RESILIA valve. Aortic valve area by VTI is 2.99 cm². Aortic valve peak velocity is 2.17 m/s. Mean gradient is 10 mmHg. The dimensionless index is 0.59. There is no significant regurgitation.    Pulmonary Artery: No pulmonary hypertension. The estimated pulmonary artery systolic pressure is 26 mmHg.    IVC/SVC: Normal venous pressure at 3 mmHg.    GAEL POD30     Results for orders placed during the hospital encounter of 09/23/22    Nuclear Stress - Cardiology Interpreted    Interpretation Summary    Abnormal myocardial perfusion scan.    There is a mild intensity, small to moderate sized, reversible perfusion abnormality that is consistent with ischemia in the basal inferolateral wall(s).    There are no other significant perfusion abnormalities.    The gated perfusion images showed an ejection fraction of 64% at rest.    There is normal wall motion at rest.    The EKG portion of this study is negative for ischemia.    During stress, rare PVCs are noted.

## 2024-10-01 DIAGNOSIS — E78.2 MIXED HYPERLIPIDEMIA: ICD-10-CM

## 2024-10-01 RX ORDER — DOXAZOSIN 2 MG/1
2 TABLET ORAL NIGHTLY
Qty: 90 TABLET | Refills: 1 | Status: SHIPPED | OUTPATIENT
Start: 2024-10-01 | End: 2025-10-01

## 2024-10-02 DIAGNOSIS — G89.4 CHRONIC PAIN SYNDROME: ICD-10-CM

## 2024-10-02 DIAGNOSIS — M05.9 SEROPOSITIVE RHEUMATOID ARTHRITIS: ICD-10-CM

## 2024-10-02 DIAGNOSIS — H02.409 PTOSIS OF EYELID, UNSPECIFIED LATERALITY: ICD-10-CM

## 2024-10-02 DIAGNOSIS — R53.83 FATIGUE, UNSPECIFIED TYPE: ICD-10-CM

## 2024-10-02 DIAGNOSIS — L40.50 PSA (PSORIATIC ARTHRITIS): ICD-10-CM

## 2024-10-02 NOTE — TELEPHONE ENCOUNTER
----- Message from Carolann sent at 10/2/2024  7:36 AM CDT -----  Contact: pt  Type:  RX Refill Request    Who Called:   pt  Refill or New Rx: refill  RX Name and Strength:  HYDROcodone-acetaminophen (NORCO)  mg per tablet  How is the patient currently taking it? (ex. 1XDay):   as ordered  Is this a 30 day or 90 day RX:  90  Preferred Pharmacy with phone number:    [facility] - [address]   [place] [address]  Phone: [phone] Fax: [phone]      Local or Mail Order:  local  Ordering Provider:  juan jose Larsen Call Back Number:  [phone]  Additional Information:

## 2024-10-03 RX ORDER — HYDROCODONE BITARTRATE AND ACETAMINOPHEN 10; 325 MG/1; MG/1
1 TABLET ORAL EVERY 8 HOURS PRN
Qty: 90 TABLET | Refills: 0 | Status: SHIPPED | OUTPATIENT
Start: 2024-10-03 | End: 2024-11-02

## 2024-10-04 DIAGNOSIS — L40.50 PSA (PSORIATIC ARTHRITIS): ICD-10-CM

## 2024-10-04 DIAGNOSIS — M05.9 SEROPOSITIVE RHEUMATOID ARTHRITIS: ICD-10-CM

## 2024-10-04 DIAGNOSIS — H02.409 PTOSIS OF EYELID, UNSPECIFIED LATERALITY: ICD-10-CM

## 2024-10-04 DIAGNOSIS — R53.83 FATIGUE, UNSPECIFIED TYPE: ICD-10-CM

## 2024-10-04 DIAGNOSIS — G89.4 CHRONIC PAIN SYNDROME: ICD-10-CM

## 2024-10-04 RX ORDER — HYDROCODONE BITARTRATE AND ACETAMINOPHEN 10; 325 MG/1; MG/1
1 TABLET ORAL EVERY 8 HOURS PRN
Qty: 90 TABLET | Refills: 0 | Status: CANCELLED | OUTPATIENT
Start: 2024-10-04 | End: 2024-11-03

## 2024-10-08 ENCOUNTER — TELEPHONE (OUTPATIENT)
Dept: CARDIOLOGY | Facility: CLINIC | Age: 74
End: 2024-10-08
Payer: MEDICARE

## 2024-10-08 NOTE — TELEPHONE ENCOUNTER
----- Message from [employer] sent at 10/8/2024 11:34 AM CDT -----  Type:  Needs Medical Advice    Who Called: the patient  Symptoms (please be specific):high b/p  How long has patient had these symptoms:    Pharmacy name and phone #:    Would the patient rather a call back or a response via [facility]? call back  Best Call Back Number: [phone]   Additional Information:pt states b/p  158/93 and would like to speak to staff  Please advise  Thanks

## 2024-10-08 NOTE — TELEPHONE ENCOUNTER
Please advise: pt states for the last week and a half his blood pressure has been averaging around 158/93.   Pt is taking diltiazem 180mg daily, doxazosin 2mg nightly and enalapril 20mg BID

## 2024-10-09 DIAGNOSIS — E78.2 MIXED HYPERLIPIDEMIA: ICD-10-CM

## 2024-10-09 RX ORDER — DOXAZOSIN 4 MG/1
4 TABLET ORAL NIGHTLY
Qty: 90 TABLET | Refills: 1 | Status: SHIPPED | OUTPATIENT
Start: 2024-10-09 | End: 2025-10-09

## 2024-10-14 DIAGNOSIS — M17.9 OSTEOARTHRITIS OF KNEE, UNSPECIFIED LATERALITY, UNSPECIFIED OSTEOARTHRITIS TYPE: ICD-10-CM

## 2024-10-14 RX ORDER — DICLOFENAC SODIUM 10 MG/G
2 GEL TOPICAL 4 TIMES DAILY
Qty: 100 G | Refills: 5 | Status: SHIPPED | OUTPATIENT
Start: 2024-10-14

## 2024-10-14 NOTE — TELEPHONE ENCOUNTER
No care due was identified.  [facility] Embedded Care Due Messages. Reference number: [id number].   10/14/2024 2:50:03 PM CDT

## 2024-10-21 DIAGNOSIS — J44.9 CHRONIC OBSTRUCTIVE PULMONARY DISEASE, UNSPECIFIED COPD TYPE: ICD-10-CM

## 2024-10-21 RX ORDER — ALBUTEROL SULFATE 0.83 MG/ML
SOLUTION RESPIRATORY (INHALATION)
Qty: 90 ML | Refills: 10 | Status: SHIPPED | OUTPATIENT
Start: 2024-10-21

## 2024-10-21 NOTE — TELEPHONE ENCOUNTER
No care due was identified.  [facility] Embedded Care Due Messages. Reference number: [id number].   10/21/2024 1:59:22 PM CDT

## 2024-10-22 NOTE — TELEPHONE ENCOUNTER
Refill Decision Note   Scooter Swan  is requesting a refill authorization.  Brief Assessment and Rationale for Refill:  Approve     Medication Therapy Plan:        Comments:     Note composed:9:09 PM 10/21/2024

## 2024-10-25 ENCOUNTER — OFFICE VISIT (OUTPATIENT)
Dept: UROLOGY | Facility: CLINIC | Age: 74
End: 2024-10-25
Payer: MEDICARE

## 2024-10-25 VITALS
DIASTOLIC BLOOD PRESSURE: 87 MMHG | WEIGHT: 172.5 LBS | HEIGHT: 73 IN | HEART RATE: 81 BPM | RESPIRATION RATE: 14 BRPM | TEMPERATURE: 98 F | SYSTOLIC BLOOD PRESSURE: 153 MMHG | BODY MASS INDEX: 22.86 KG/M2

## 2024-10-25 DIAGNOSIS — R31.0 GROSS HEMATURIA: ICD-10-CM

## 2024-10-25 DIAGNOSIS — N28.1 RENAL CYST: ICD-10-CM

## 2024-10-25 DIAGNOSIS — N40.0 BENIGN PROSTATIC HYPERPLASIA, UNSPECIFIED WHETHER LOWER URINARY TRACT SYMPTOMS PRESENT: Primary | ICD-10-CM

## 2024-10-25 PROCEDURE — 99999 PR PBB SHADOW E&M-EST. PATIENT-LVL V: CPT | Mod: PBBFAC,,, | Performed by: UROLOGY

## 2024-10-25 RX ORDER — TAMSULOSIN HYDROCHLORIDE 0.4 MG/1
0.4 CAPSULE ORAL DAILY
Qty: 90 CAPSULE | Refills: 3 | Status: SHIPPED | OUTPATIENT
Start: 2024-10-25

## 2024-10-25 NOTE — PROGRESS NOTES
Chief Complaint:   Encounter Diagnoses   Name Primary?    Benign prostatic hyperplasia, unspecified whether lower urinary tract symptoms present Yes    Renal cyst     Gross hematuria        HPI:  HPI Scooter Swan sid 73 y.o. male who presents with follow up to BPH and complex renal cyst.  He had a CT scan performed last month that showed Bosniak 2 cyst with no change.  The cyst has been stable for many years now.  He does have BPH and is on tamsulosin.  He has noticed a month ago some gross hematuria.  States it has only happened once and resolved.  He has never had trouble with kidney stones.  He did have a TAVR performed in June of this year.  He was not on any anticoagulation.    History:  Social History     Tobacco Use    Smoking status: Never    Smokeless tobacco: Never   Substance Use Topics    Alcohol use: Yes     Alcohol/week: 1.0 standard drink of alcohol     Types: 1 Shots of liquor per week     Comment: social    Drug use: Yes     Types: Hydrocodone, Marijuana     Past Medical History:   Diagnosis Date    Cataract     COPD (chronic obstructive pulmonary disease)     Diverticulosis     DUARTE (dyspnea on exertion)     GERD (gastroesophageal reflux disease)     Glaucoma     Hepatitis C     treated; seeing Dr. Carr    Hyperlipidemia     Hypertension     Liver lesion 08/2018    RA (rheumatoid arthritis)     Rectal prolapse     Possible    Severe aortic stenosis 08/24/2020    Thyroid disease      Past Surgical History:   Procedure Laterality Date    ANGIOGRAM, CORONARY, WITH LEFT HEART CATHETERIZATION  10/06/2022    Procedure: Angiogram, Coronary, with Left Heart Cath;  Surgeon: Zac Boucher MD;  Location: [facility] CATH;  Service: Cardiology;;    AORTOGRAPHY  03/28/2024    Procedure: AO Root;  Surgeon: Zac Boucher MD;  Location: [facility] CATH;  Service: Cardiology;;    ARTERIOGRAPHY OF AORTIC ROOT  10/06/2022    Procedure: ARTERIOGRAM, AORTIC ROOT;  Surgeon: Zac Boucher MD;  Location: [facility] CATH;  Service:  Cardiology;;    CARPAL TUNNEL RELEASE      Right wrist 2015    CATHETERIZATION OF BOTH LEFT AND RIGHT HEART  03/28/2024    Procedure: Right / Left heart cath;  Surgeon: Zac Boucher MD;  Location: [facility] CATH;  Service: Cardiology;;    COLONOSCOPY  08/2016    Dr. Cardona; in care everywhere    COLONOSCOPY N/A 03/20/2019    Procedure: COLONOSCOPY;  Surgeon: Sotero Arthur MD;  Location: [facility] ENDO;  Service: Endoscopy;  Laterality: N/A; hemorrhoids, diverticulosis, repeat in 10 years for screening    ECHOCARDIOGRAM,TRANSESOPHAGEAL N/A 01/12/2024    Procedure: Transesophageal echo (CADY) intra-procedure log documentation;  Surgeon: Renan Slaughter MD;  Location: [facility] CATH/EP LAB;  Service: Cardiology;  Laterality: N/A;    EXCISIONAL HEMORRHOIDECTOMY N/A 10/18/2018    Procedure: HEMORRHOIDECTOMY, prone;  Surgeon: Gunnar Horton MD;  Location: [facility] OR [address] [place] [address];  Service: Colon and Rectal;  Laterality: N/A;    HERNIA REPAIR      THYROID SURGERY      TRANSCATHETER AORTIC VALVE REPLACEMENT (TAVR)  6/19/2024    Procedure: (TAVR);  Surgeon: Hernandez Marrero MD;  Location: [facility] CATH;  Service: Cardiology;;    TRANSCATHETER AORTIC VALVE REPLACEMENT (TAVR)  6/19/2024    Procedure: (TAVR) - Surgeon;  Surgeon: Jada Murphy MD;  Location: [facility] CATH;  Service: Cardiothoracic;;    UPPER GASTROINTESTINAL ENDOSCOPY  08/2016    Dr. Cardona; in care everywhere     Family History   Problem Relation Name Age of Onset    Stomach cancer Paternal Cousin      Stomach cancer Paternal Cousin      Cataracts Mother      Diabetes Mother      Hypertension Mother      Stroke Mother      Diabetes Sister      Hypertension Sister      Stroke Sister      Diabetes Brother      Glaucoma Brother      Hypertension Brother      Stroke Brother      Diabetes Maternal Aunt      Hypertension Maternal Aunt      Stroke Maternal Aunt      Diabetes Maternal Uncle      Hypertension Maternal Uncle      Stroke Maternal Uncle      Diabetes Maternal Grandmother       Hypertension Maternal Grandmother      Stroke Maternal Grandmother      Cancer Cousin          stomach    Colon cancer Neg Hx      Colon polyps Neg Hx      Crohn's disease Neg Hx      Ulcerative colitis Neg Hx      Esophageal cancer Neg Hx      Amblyopia Neg Hx      Blindness Neg Hx      Macular degeneration Neg Hx      Retinal detachment Neg Hx      Strabismus Neg Hx      Thyroid disease Neg Hx         Current Outpatient Medications on File Prior to Visit   Medication Sig Dispense Refill    albuterol (PROVENTIL) 2.5 mg /3 mL (0.083 %) nebulizer solution TAKE 3 MLS BY MOUTH VIA NEBULIZER EVERY 6 HOURS AS NEEDED FOR WHEEZING 90 mL 10    albuterol (PROVENTIL/VENTOLIN HFA) 90 mcg/actuation inhaler INHALE 1 PUFF BY MOUTH EVERY 4 HOURS AS NEEDED FOR WHEEZING OR SHORTNESS OF BREATH 20.1 g 3    amoxicillin (AMOXIL) 500 MG Tab Take 4 tabs (2000 mg) once 60 minutes prior to dental cleaning or other high risk procedures 20 tablet 0    aspirin (ECOTRIN) 81 MG EC tablet Take 1 tablet (81 mg total) by mouth once daily. 360 tablet 0    [] butalbital-acetaminophen-caffeine -40 mg (FIORICET, ESGIC) -40 mg per tablet Take 1 tablet by mouth every 4 (four) hours as needed for Pain. 10 tablet 5    cholecalciferol, vitamin D3, (VITAMIN D3) 50 mcg (2,000 unit) Cap Take 1 capsule (2,000 Units total) by mouth once daily. 90 capsule 3    cyanocobalamin 500 MCG tablet Take 500 mcg by mouth once daily.      diazePAM (VALIUM) 10 MG Tab Take 1 Tablet by mouth EVERY EVENING 30 tablet 2    diclofenac sodium (VOLTAREN) 1 % Gel APPLY TWO Grams TOPICALLY FOUR TIMES DAILY 100 g 5    diltiaZEM (CARDIZEM CD) 180 MG 24 hr capsule Take 1 capsule (180 mg total) by mouth once daily. 90 capsule 3    doxazosin (CARDURA) 4 MG tablet Take 1 tablet (4 mg total) by mouth every evening. 90 tablet 1    enalapril (VASOTEC) 20 MG tablet TAKE ONE TABLET BY MOUTH TWO TIMES A  tablet 3    HYDROcodone-acetaminophen (NORCO)  mg per tablet  Take 1 tablet by mouth every 8 (eight) hours as needed for Pain. 90 tablet 0    multivitamin capsule Take 1 capsule by mouth once daily.      omeprazole (PRILOSEC) 20 MG capsule Take 1 capsule (20 mg total) by mouth 2 (two) times a day. 180 capsule 3    polyethylene glycol (GLYCOLAX) 17 gram PwPk Take 17 g by mouth daily as needed.      pravastatin (PRAVACHOL) 20 MG tablet TAKE ONE TABLET BY MOUTH ONCE DAILY (Patient taking differently: Take 20 mg by mouth every evening.) 90 tablet 3    predniSONE (DELTASONE) 5 MG tablet Take 2 tablets (10 mg total) by mouth once daily. 270 tablet 1    promethazine (PHENERGAN) 25 MG tablet Take 1 tablet (25 mg total) by mouth every 6 (six) hours as needed for Nausea. 30 tablet 5    sildenafiL (VIAGRA) 100 MG tablet Take 1 tablet (100 mg total) by mouth as needed for Erectile Dysfunction. 24 tablet 2    tiotropium bromide (SPIRIVA RESPIMAT INHL) 2 puffs daily - pt reports taking everyday in AM      tiZANidine (ZANAFLEX) 4 MG tablet Take 1 tablet (4 mg total) by mouth every 8 (eight) hours. 270 tablet 1    [DISCONTINUED] golimumab (SIMPONI) 50 mg/0.5 mL PnIj Inject 50 mg into the skin every 28 days. 0.5 mL 11    [DISCONTINUED] mirabegron (MYRBETRIQ) 25 mg Tb24 ER tablet Take 1 tablet (25 mg total) by mouth once daily. 30 tablet 11    [DISCONTINUED] tamsulosin (FLOMAX) 0.4 mg Cap Take 1 capsule (0.4 mg total) by mouth once daily. 30 capsule 11     Current Facility-Administered Medications on File Prior to Visit   Medication Dose Route Frequency Provider Last Rate Last Admin    0.9%  NaCl infusion   Intravenous Continuous Deedee Sarkar NP   Stopped at 10/18/18 1613    mupirocin 2 % ointment   Nasal On Call Procedure Deedee Sarkar NP   Given at 10/18/18 1348        Objective:     Vitals:    10/25/24 1018   BP: (!) 153/87   BP Location: Right arm   Patient Position: Sitting   Pulse: 81   Resp: 14   Temp: 98.2 °F (36.8 °C)   TempSrc: Temporal   Weight: 78.3 kg (172 lb 8.2  "oz)   Height: 6' 1" (1.854 m)      BMI Readings from Last 1 Encounters:   10/25/24 22.76 kg/m²          Physical Exam  No acute distress alert and oriented   Respirations even unlabored   Abdomen is soft nontender    Lab Results   Component Value Date    PSA 1.4 02/19/2024    PSA 1.6 02/16/2023        Lab Results   Component Value Date    CREATININE 1.1 07/22/2024      Assessment:       1. Benign prostatic hyperplasia, unspecified whether lower urinary tract symptoms present    2. Renal cyst    3. Gross hematuria        Plan:     1. Benign prostatic hyperplasia, unspecified whether lower urinary tract symptoms present    2. Renal cyst    3. Gross hematuria       Orders Placed This Encounter    POCT Bladder Scan    tamsulosin (FLOMAX) 0.4 mg Cap      BPH, continue tamsulosin.  Renal cyst Bosniak 2 and stable.  No further evaluation needed.  New onset gross hematuria.  Upper tracts have been evaluated adequately with CT scan recently.  Recommend cystoscopy in 2-3 weeks.  "

## 2024-10-27 DIAGNOSIS — G47.00 INSOMNIA, UNSPECIFIED TYPE: ICD-10-CM

## 2024-10-28 RX ORDER — DIAZEPAM 10 MG/1
10 TABLET ORAL NIGHTLY
Qty: 30 TABLET | Refills: 2 | Status: SHIPPED | OUTPATIENT
Start: 2024-10-28

## 2024-10-29 ENCOUNTER — OFFICE VISIT (OUTPATIENT)
Dept: RHEUMATOLOGY | Facility: CLINIC | Age: 74
End: 2024-10-29
Payer: MEDICARE

## 2024-10-29 VITALS
WEIGHT: 174 LBS | HEIGHT: 73 IN | BODY MASS INDEX: 23.06 KG/M2 | DIASTOLIC BLOOD PRESSURE: 101 MMHG | HEART RATE: 81 BPM | SYSTOLIC BLOOD PRESSURE: 156 MMHG

## 2024-10-29 DIAGNOSIS — G89.4 CHRONIC PAIN SYNDROME: ICD-10-CM

## 2024-10-29 DIAGNOSIS — H02.409 PTOSIS OF EYELID, UNSPECIFIED LATERALITY: ICD-10-CM

## 2024-10-29 DIAGNOSIS — L40.50 PSA (PSORIATIC ARTHRITIS): ICD-10-CM

## 2024-10-29 DIAGNOSIS — M05.9 SEROPOSITIVE RHEUMATOID ARTHRITIS: ICD-10-CM

## 2024-10-29 DIAGNOSIS — R53.83 FATIGUE, UNSPECIFIED TYPE: ICD-10-CM

## 2024-10-29 DIAGNOSIS — M05.9 SEROPOSITIVE RHEUMATOID ARTHRITIS: Primary | ICD-10-CM

## 2024-10-29 DIAGNOSIS — J32.9 SINUSITIS, UNSPECIFIED CHRONICITY, UNSPECIFIED LOCATION: ICD-10-CM

## 2024-10-29 DIAGNOSIS — Z79.52 CURRENT CHRONIC USE OF SYSTEMIC STEROIDS: ICD-10-CM

## 2024-10-29 PROCEDURE — 3077F SYST BP >= 140 MM HG: CPT | Mod: CPTII,S$GLB,, | Performed by: PHYSICIAN ASSISTANT

## 2024-10-29 PROCEDURE — 1100F PTFALLS ASSESS-DOCD GE2>/YR: CPT | Mod: CPTII,S$GLB,, | Performed by: PHYSICIAN ASSISTANT

## 2024-10-29 PROCEDURE — 99214 OFFICE O/P EST MOD 30 MIN: CPT | Mod: 25,S$GLB,, | Performed by: PHYSICIAN ASSISTANT

## 2024-10-29 PROCEDURE — 3288F FALL RISK ASSESSMENT DOCD: CPT | Mod: CPTII,S$GLB,, | Performed by: PHYSICIAN ASSISTANT

## 2024-10-29 PROCEDURE — 1160F RVW MEDS BY RX/DR IN RCRD: CPT | Mod: CPTII,S$GLB,, | Performed by: PHYSICIAN ASSISTANT

## 2024-10-29 PROCEDURE — 1159F MED LIST DOCD IN RCRD: CPT | Mod: CPTII,S$GLB,, | Performed by: PHYSICIAN ASSISTANT

## 2024-10-29 PROCEDURE — 1125F AMNT PAIN NOTED PAIN PRSNT: CPT | Mod: CPTII,S$GLB,, | Performed by: PHYSICIAN ASSISTANT

## 2024-10-29 PROCEDURE — 3044F HG A1C LEVEL LT 7.0%: CPT | Mod: CPTII,S$GLB,, | Performed by: PHYSICIAN ASSISTANT

## 2024-10-29 PROCEDURE — 99999 PR PBB SHADOW E&M-EST. PATIENT-LVL IV: CPT | Mod: PBBFAC,,, | Performed by: PHYSICIAN ASSISTANT

## 2024-10-29 PROCEDURE — 3080F DIAST BP >= 90 MM HG: CPT | Mod: CPTII,S$GLB,, | Performed by: PHYSICIAN ASSISTANT

## 2024-10-29 PROCEDURE — 4010F ACE/ARB THERAPY RXD/TAKEN: CPT | Mod: CPTII,S$GLB,, | Performed by: PHYSICIAN ASSISTANT

## 2024-10-29 PROCEDURE — 96372 THER/PROPH/DIAG INJ SC/IM: CPT | Mod: S$GLB,,, | Performed by: PHYSICIAN ASSISTANT

## 2024-10-29 RX ORDER — CYANOCOBALAMIN 1000 UG/ML
1000 INJECTION, SOLUTION INTRAMUSCULAR; SUBCUTANEOUS
Status: COMPLETED | OUTPATIENT
Start: 2024-10-29 | End: 2024-10-29

## 2024-10-29 RX ORDER — AMOXICILLIN AND CLAVULANATE POTASSIUM 875; 125 MG/1; MG/1
1 TABLET, FILM COATED ORAL 2 TIMES DAILY
Qty: 20 TABLET | Refills: 0 | Status: SHIPPED | OUTPATIENT
Start: 2024-10-29 | End: 2024-11-08

## 2024-10-29 RX ORDER — KETOROLAC TROMETHAMINE 30 MG/ML
30 INJECTION, SOLUTION INTRAMUSCULAR; INTRAVENOUS
Status: COMPLETED | OUTPATIENT
Start: 2024-10-29 | End: 2024-10-29

## 2024-10-29 RX ADMIN — CYANOCOBALAMIN 1000 MCG: 1000 INJECTION, SOLUTION INTRAMUSCULAR; SUBCUTANEOUS at 02:10

## 2024-10-29 RX ADMIN — KETOROLAC TROMETHAMINE 30 MG: 30 INJECTION, SOLUTION INTRAMUSCULAR; INTRAVENOUS at 02:10

## 2024-10-29 ASSESSMENT — ROUTINE ASSESSMENT OF PATIENT INDEX DATA (RAPID3)
MDHAQ FUNCTION SCORE: 1.5
PSYCHOLOGICAL DISTRESS SCORE: 1.1
PAIN SCORE: 8.5
PATIENT GLOBAL ASSESSMENT SCORE: 8.5
TOTAL RAPID3 SCORE: 7.33
FATIGUE SCORE: 1.1

## 2024-10-30 RX ORDER — HYDROCODONE BITARTRATE AND ACETAMINOPHEN 10; 325 MG/1; MG/1
1 TABLET ORAL EVERY 8 HOURS PRN
Qty: 90 TABLET | Refills: 0 | Status: SHIPPED | OUTPATIENT
Start: 2024-11-02 | End: 2024-12-02

## 2024-10-30 RX ORDER — HYDROCODONE BITARTRATE AND ACETAMINOPHEN 10; 325 MG/1; MG/1
1 TABLET ORAL EVERY 8 HOURS PRN
Qty: 90 TABLET | Refills: 0 | Status: SHIPPED | OUTPATIENT
Start: 2024-12-02 | End: 2025-01-01

## 2024-10-30 RX ORDER — HYDROCODONE BITARTRATE AND ACETAMINOPHEN 10; 325 MG/1; MG/1
1 TABLET ORAL EVERY 8 HOURS PRN
Qty: 90 TABLET | Refills: 0 | Status: SHIPPED | OUTPATIENT
Start: 2024-12-31 | End: 2025-01-30

## 2024-11-06 ENCOUNTER — LAB VISIT (OUTPATIENT)
Dept: LAB | Facility: HOSPITAL | Age: 74
End: 2024-11-06
Attending: INTERNAL MEDICINE
Payer: MEDICARE

## 2024-11-06 DIAGNOSIS — R53.83 FATIGUE, UNSPECIFIED TYPE: ICD-10-CM

## 2024-11-06 DIAGNOSIS — H02.409 PTOSIS OF EYELID, UNSPECIFIED LATERALITY: ICD-10-CM

## 2024-11-06 DIAGNOSIS — D64.9 ANEMIA, UNSPECIFIED TYPE: ICD-10-CM

## 2024-11-06 DIAGNOSIS — J02.8: ICD-10-CM

## 2024-11-06 DIAGNOSIS — M05.9 SEROPOSITIVE RHEUMATOID ARTHRITIS: ICD-10-CM

## 2024-11-06 DIAGNOSIS — B37.89: ICD-10-CM

## 2024-11-06 DIAGNOSIS — G89.4 CHRONIC PAIN SYNDROME: ICD-10-CM

## 2024-11-06 DIAGNOSIS — L40.50 PSA (PSORIATIC ARTHRITIS): ICD-10-CM

## 2024-11-06 LAB
25(OH)D3+25(OH)D2 SERPL-MCNC: 47 NG/ML (ref 30–96)
BASOPHILS # BLD AUTO: 0.09 K/UL (ref 0–0.2)
BASOPHILS NFR BLD: 0.6 % (ref 0–1.9)
CCP AB SER IA-ACNC: <0.5 U/ML
CRP SERPL-MCNC: 3.4 MG/L (ref 0–8.2)
CRP SERPL-MCNC: 3.4 MG/L (ref 0–8.2)
DIFFERENTIAL METHOD BLD: ABNORMAL
EOSINOPHIL # BLD AUTO: 0.2 K/UL (ref 0–0.5)
EOSINOPHIL NFR BLD: 1.1 % (ref 0–8)
ERYTHROCYTE [DISTWIDTH] IN BLOOD BY AUTOMATED COUNT: 13.4 % (ref 11.5–14.5)
ERYTHROCYTE [SEDIMENTATION RATE] IN BLOOD BY WESTERGREN METHOD: 3 MM/HR (ref 0–10)
HCT VFR BLD AUTO: 35.6 % (ref 40–54)
HGB BLD-MCNC: 10.8 G/DL (ref 14–18)
IMM GRANULOCYTES # BLD AUTO: 0.1 K/UL (ref 0–0.04)
IMM GRANULOCYTES NFR BLD AUTO: 0.7 % (ref 0–0.5)
IRON SERPL-MCNC: 126 UG/DL (ref 45–160)
LYMPHOCYTES # BLD AUTO: 4.2 K/UL (ref 1–4.8)
LYMPHOCYTES NFR BLD: 30.5 % (ref 18–48)
MCH RBC QN AUTO: 28.6 PG (ref 27–31)
MCHC RBC AUTO-ENTMCNC: 30.3 G/DL (ref 32–36)
MCV RBC AUTO: 94 FL (ref 82–98)
MONOCYTES # BLD AUTO: 1.6 K/UL (ref 0.3–1)
MONOCYTES NFR BLD: 11.3 % (ref 4–15)
NEUTROPHILS # BLD AUTO: 7.8 K/UL (ref 1.8–7.7)
NEUTROPHILS NFR BLD: 55.8 % (ref 38–73)
NRBC BLD-RTO: 0 /100 WBC
PLATELET # BLD AUTO: 146 K/UL (ref 150–450)
PMV BLD AUTO: 13.9 FL (ref 9.2–12.9)
RBC # BLD AUTO: 3.77 M/UL (ref 4.6–6.2)
RHEUMATOID FACT SERPL-ACNC: 68 IU/ML (ref 0–15)
SATURATED IRON: 47 % (ref 20–50)
T3FREE SERPL-MCNC: 2.7 PG/ML (ref 2.3–4.2)
T4 FREE SERPL-MCNC: 0.9 NG/DL (ref 0.71–1.51)
THYROGLOB AB SERPL IA-ACNC: <4 IU/ML (ref 0–3.9)
THYROPEROXIDASE IGG SERPL-ACNC: <6 IU/ML
TOTAL IRON BINDING CAPACITY: 268 UG/DL (ref 250–450)
TRANSFERRIN SERPL-MCNC: 181 MG/DL (ref 200–375)
TRANSFERRIN SERPL-MCNC: 181 MG/DL (ref 200–375)
TSH SERPL DL<=0.005 MIU/L-ACNC: 5.4 UIU/ML (ref 0.4–4)
WBC # BLD AUTO: 13.9 K/UL (ref 3.9–12.7)

## 2024-11-06 PROCEDURE — 86140 C-REACTIVE PROTEIN: CPT | Performed by: INTERNAL MEDICINE

## 2024-11-06 PROCEDURE — 85651 RBC SED RATE NONAUTOMATED: CPT | Mod: PO | Performed by: INTERNAL MEDICINE

## 2024-11-06 PROCEDURE — 86431 RHEUMATOID FACTOR QUANT: CPT | Performed by: INTERNAL MEDICINE

## 2024-11-06 PROCEDURE — 86200 CCP ANTIBODY: CPT | Performed by: INTERNAL MEDICINE

## 2024-11-06 PROCEDURE — 86376 MICROSOMAL ANTIBODY EACH: CPT | Performed by: INTERNAL MEDICINE

## 2024-11-06 PROCEDURE — 84481 FREE ASSAY (FT-3): CPT | Performed by: INTERNAL MEDICINE

## 2024-11-06 PROCEDURE — 36415 COLL VENOUS BLD VENIPUNCTURE: CPT | Mod: PO | Performed by: INTERNAL MEDICINE

## 2024-11-06 PROCEDURE — 85025 COMPLETE CBC W/AUTO DIFF WBC: CPT | Performed by: INTERNAL MEDICINE

## 2024-11-06 PROCEDURE — 82306 VITAMIN D 25 HYDROXY: CPT | Performed by: INTERNAL MEDICINE

## 2024-11-06 PROCEDURE — 84443 ASSAY THYROID STIM HORMONE: CPT | Performed by: INTERNAL MEDICINE

## 2024-11-06 PROCEDURE — 86800 THYROGLOBULIN ANTIBODY: CPT | Performed by: INTERNAL MEDICINE

## 2024-11-06 PROCEDURE — 84439 ASSAY OF FREE THYROXINE: CPT | Performed by: INTERNAL MEDICINE

## 2024-11-06 PROCEDURE — 84466 ASSAY OF TRANSFERRIN: CPT | Performed by: INTERNAL MEDICINE

## 2024-11-08 ENCOUNTER — TELEPHONE (OUTPATIENT)
Dept: CARDIOLOGY | Facility: CLINIC | Age: 74
End: 2024-11-08
Payer: MEDICARE

## 2024-11-08 NOTE — TELEPHONE ENCOUNTER
[facility]  Dr. Diony Rodriguez  Phone: [phone]  Fax: [phone]  Extractions  Tavr Valve  Asa

## 2024-11-15 ENCOUNTER — LAB VISIT (OUTPATIENT)
Dept: LAB | Facility: HOSPITAL | Age: 74
End: 2024-11-15
Attending: UROLOGY
Payer: MEDICARE

## 2024-11-15 ENCOUNTER — PROCEDURE VISIT (OUTPATIENT)
Dept: UROLOGY | Facility: CLINIC | Age: 74
End: 2024-11-15
Payer: MEDICARE

## 2024-11-15 ENCOUNTER — E-CONSULT (OUTPATIENT)
Dept: CARDIOLOGY | Facility: CLINIC | Age: 74
End: 2024-11-15
Payer: MEDICARE

## 2024-11-15 DIAGNOSIS — R31.0 GROSS HEMATURIA: Primary | ICD-10-CM

## 2024-11-15 DIAGNOSIS — R31.0 GROSS HEMATURIA: ICD-10-CM

## 2024-11-15 DIAGNOSIS — N32.9 LESION OF BLADDER: ICD-10-CM

## 2024-11-15 DIAGNOSIS — N40.0 BENIGN PROSTATIC HYPERPLASIA, UNSPECIFIED WHETHER LOWER URINARY TRACT SYMPTOMS PRESENT: ICD-10-CM

## 2024-11-15 DIAGNOSIS — Z95.2 S/P TAVR (TRANSCATHETER AORTIC VALVE REPLACEMENT): Primary | ICD-10-CM

## 2024-11-15 LAB
ALBUMIN SERPL BCP-MCNC: 4.2 G/DL (ref 3.5–5.2)
ALP SERPL-CCNC: 69 U/L (ref 40–150)
ALT SERPL W/O P-5'-P-CCNC: 11 U/L (ref 10–44)
ANION GAP SERPL CALC-SCNC: 9 MMOL/L (ref 8–16)
AST SERPL-CCNC: 15 U/L (ref 10–40)
BASOPHILS # BLD AUTO: 0.05 K/UL (ref 0–0.2)
BASOPHILS NFR BLD: 0.3 % (ref 0–1.9)
BILIRUB SERPL-MCNC: 0.4 MG/DL (ref 0.1–1)
BILIRUB UR QL STRIP: NEGATIVE
BUN SERPL-MCNC: 17 MG/DL (ref 8–23)
CALCIUM SERPL-MCNC: 9.5 MG/DL (ref 8.7–10.5)
CHLORIDE SERPL-SCNC: 104 MMOL/L (ref 95–110)
CO2 SERPL-SCNC: 27 MMOL/L (ref 23–29)
CREAT SERPL-MCNC: 1.2 MG/DL (ref 0.5–1.4)
DIFFERENTIAL METHOD BLD: ABNORMAL
EOSINOPHIL # BLD AUTO: 0 K/UL (ref 0–0.5)
EOSINOPHIL NFR BLD: 0.2 % (ref 0–8)
ERYTHROCYTE [DISTWIDTH] IN BLOOD BY AUTOMATED COUNT: 13.7 % (ref 11.5–14.5)
EST. GFR  (NO RACE VARIABLE): >60 ML/MIN/1.73 M^2
GLUCOSE SERPL-MCNC: 119 MG/DL (ref 70–110)
GLUCOSE UR QL STRIP: NEGATIVE
HCT VFR BLD AUTO: 34.5 % (ref 40–54)
HGB BLD-MCNC: 11.2 G/DL (ref 14–18)
IMM GRANULOCYTES # BLD AUTO: 0.18 K/UL (ref 0–0.04)
IMM GRANULOCYTES NFR BLD AUTO: 1.1 % (ref 0–0.5)
KETONES UR QL STRIP: POSITIVE
LEUKOCYTE ESTERASE UR QL STRIP: NEGATIVE
LYMPHOCYTES # BLD AUTO: 1.3 K/UL (ref 1–4.8)
LYMPHOCYTES NFR BLD: 7.8 % (ref 18–48)
MCH RBC QN AUTO: 29.9 PG (ref 27–31)
MCHC RBC AUTO-ENTMCNC: 32.5 G/DL (ref 32–36)
MCV RBC AUTO: 92 FL (ref 82–98)
MONOCYTES # BLD AUTO: 1.1 K/UL (ref 0.3–1)
MONOCYTES NFR BLD: 6.9 % (ref 4–15)
NEUTROPHILS # BLD AUTO: 13.9 K/UL (ref 1.8–7.7)
NEUTROPHILS NFR BLD: 83.7 % (ref 38–73)
NRBC BLD-RTO: 0 /100 WBC
PH, POC UA: 6
PLATELET # BLD AUTO: 127 K/UL (ref 150–450)
PMV BLD AUTO: ABNORMAL FL (ref 9.2–12.9)
POC BLOOD, URINE: NEGATIVE
POC NITRATES, URINE: NEGATIVE
POTASSIUM SERPL-SCNC: 4 MMOL/L (ref 3.5–5.1)
PROT SERPL-MCNC: 7.3 G/DL (ref 6–8.4)
PROT UR QL STRIP: POSITIVE
RBC # BLD AUTO: 3.74 M/UL (ref 4.6–6.2)
SODIUM SERPL-SCNC: 140 MMOL/L (ref 136–145)
SP GR UR STRIP: 1.03 (ref 1–1.03)
UROBILINOGEN UR STRIP-ACNC: 1 (ref 0.3–2.2)
WBC # BLD AUTO: 16.63 K/UL (ref 3.9–12.7)

## 2024-11-15 PROCEDURE — 36415 COLL VENOUS BLD VENIPUNCTURE: CPT | Performed by: UROLOGY

## 2024-11-15 PROCEDURE — 85025 COMPLETE CBC W/AUTO DIFF WBC: CPT | Performed by: UROLOGY

## 2024-11-15 PROCEDURE — 80053 COMPREHEN METABOLIC PANEL: CPT | Performed by: UROLOGY

## 2024-11-15 RX ORDER — LIDOCAINE HYDROCHLORIDE 20 MG/ML
JELLY TOPICAL
Status: COMPLETED | OUTPATIENT
Start: 2024-11-15 | End: 2024-11-15

## 2024-11-15 RX ORDER — CIPROFLOXACIN 500 MG/1
500 TABLET ORAL
Status: COMPLETED | OUTPATIENT
Start: 2024-11-15 | End: 2024-11-15

## 2024-11-15 RX ADMIN — CIPROFLOXACIN 500 MG: 500 TABLET ORAL at 09:11

## 2024-11-15 RX ADMIN — LIDOCAINE HYDROCHLORIDE 11 ML: 20 JELLY TOPICAL at 09:11

## 2024-11-15 NOTE — PROCEDURES
Cystoscopy    Date/Time: 11/15/2024 9:30 AM    Performed by: Carrington Lott MD  Authorized by: Carrington Lott MD    Consent Done?:  Yes (Written)  Timeout: prior to procedure the correct patient, procedure, and site was verified    Anesthesia:  Intraurethral instillation  Local anesthetic:  Lidocaine 2% topical gel  Indications: hematuria    Position:  Supine  Anesthesia:  Intraurethral instillation  Scope type:  Flexible cystoscope  Comments:        After informed consent, and preoperative antibiotic positioned in supine position.  Genitalia prepped and draped sterilely.  A 17 Cymro flexible cystoscope was passed through the urethra into the bladder.  Systematic examination revealed mild trabeculation.  .  Scope was retroflexed and moderate sized median lobe projected into prostate. On the right side of the median lobe a pedunculated subcm lesion was noted to project of the median lobe.  Prostate were noted to be moderately enlarged with an approximate length of 4 cm. The scope was removed.  He tolerated procedure well.    Discussed findings on cystoscopy. Would recommend Transurethral resection of this urothelial lesion along with the median lobe.  Discussed risks and benefits.

## 2024-11-15 NOTE — CONSULTS
[facility] - [facility]iology  Response for E-Consult     Patient Name: Scooter Swan  MRN: [id number]  Primary Care Provider: Salvador Kennedy DO   Requesting Provider: Carrington Lott MD  Consults    Recommendation:  Acceptable CV risk    Contingency that warrants a repeat eConsult or referral:     Total time of Consultation: 5 minute    I did not speak to the requesting provider verbally about this.     *This eConsult is based on the clinical data available to me and is furnished without benefit of a physical examination. The eConsult will need to be interpreted in light of any clinical issues or changes in patient status not available to me at the time of filing this eConsults. Significant changes in patient condition or level of acuity should result in immediate formal consultation and reevaluation. Please alert me if you have further questions.    Thank you for this eConsult referral.     Zac Boucher MD  [facility] - [facility]iology

## 2024-11-22 DIAGNOSIS — N18.31 STAGE 3A CHRONIC KIDNEY DISEASE: ICD-10-CM

## 2024-11-27 ENCOUNTER — LAB VISIT (OUTPATIENT)
Dept: LAB | Facility: HOSPITAL | Age: 74
End: 2024-11-27
Attending: INTERNAL MEDICINE
Payer: MEDICARE

## 2024-11-27 ENCOUNTER — TELEPHONE (OUTPATIENT)
Dept: CARDIOLOGY | Facility: CLINIC | Age: 74
End: 2024-11-27
Payer: MEDICARE

## 2024-11-27 ENCOUNTER — OFFICE VISIT (OUTPATIENT)
Dept: FAMILY MEDICINE | Facility: CLINIC | Age: 74
End: 2024-11-27
Payer: MEDICARE

## 2024-11-27 VITALS
BODY MASS INDEX: 23.76 KG/M2 | WEIGHT: 179.25 LBS | SYSTOLIC BLOOD PRESSURE: 132 MMHG | DIASTOLIC BLOOD PRESSURE: 88 MMHG | OXYGEN SATURATION: 98 % | HEIGHT: 73 IN | HEART RATE: 89 BPM

## 2024-11-27 DIAGNOSIS — G43.909 MIGRAINE WITHOUT STATUS MIGRAINOSUS, NOT INTRACTABLE, UNSPECIFIED MIGRAINE TYPE: ICD-10-CM

## 2024-11-27 DIAGNOSIS — D72.829 LEUKOCYTOSIS, UNSPECIFIED TYPE: ICD-10-CM

## 2024-11-27 DIAGNOSIS — N18.31 STAGE 3A CHRONIC KIDNEY DISEASE: ICD-10-CM

## 2024-11-27 DIAGNOSIS — I10 PRIMARY HYPERTENSION: Primary | ICD-10-CM

## 2024-11-27 LAB
BASOPHILS # BLD AUTO: 0.05 K/UL (ref 0–0.2)
BASOPHILS NFR BLD: 0.4 % (ref 0–1.9)
DIFFERENTIAL METHOD BLD: ABNORMAL
EOSINOPHIL # BLD AUTO: 0.1 K/UL (ref 0–0.5)
EOSINOPHIL NFR BLD: 0.9 % (ref 0–8)
ERYTHROCYTE [DISTWIDTH] IN BLOOD BY AUTOMATED COUNT: 13.9 % (ref 11.5–14.5)
HCT VFR BLD AUTO: 31.5 % (ref 40–54)
HGB BLD-MCNC: 10.2 G/DL (ref 14–18)
IMM GRANULOCYTES # BLD AUTO: 0.14 K/UL (ref 0–0.04)
IMM GRANULOCYTES NFR BLD AUTO: 1.1 % (ref 0–0.5)
LYMPHOCYTES # BLD AUTO: 1.5 K/UL (ref 1–4.8)
LYMPHOCYTES NFR BLD: 11.9 % (ref 18–48)
MCH RBC QN AUTO: 29.7 PG (ref 27–31)
MCHC RBC AUTO-ENTMCNC: 32.4 G/DL (ref 32–36)
MCV RBC AUTO: 92 FL (ref 82–98)
MONOCYTES # BLD AUTO: 1 K/UL (ref 0.3–1)
MONOCYTES NFR BLD: 8 % (ref 4–15)
NEUTROPHILS # BLD AUTO: 9.9 K/UL (ref 1.8–7.7)
NEUTROPHILS NFR BLD: 77.7 % (ref 38–73)
NRBC BLD-RTO: 0 /100 WBC
PLATELET # BLD AUTO: 112 K/UL (ref 150–450)
PMV BLD AUTO: 13.5 FL (ref 9.2–12.9)
RBC # BLD AUTO: 3.43 M/UL (ref 4.6–6.2)
WBC # BLD AUTO: 12.72 K/UL (ref 3.9–12.7)

## 2024-11-27 PROCEDURE — 36415 COLL VENOUS BLD VENIPUNCTURE: CPT | Mod: PO | Performed by: INTERNAL MEDICINE

## 2024-11-27 PROCEDURE — 3075F SYST BP GE 130 - 139MM HG: CPT | Mod: CPTII,S$GLB,, | Performed by: INTERNAL MEDICINE

## 2024-11-27 PROCEDURE — 3044F HG A1C LEVEL LT 7.0%: CPT | Mod: CPTII,S$GLB,, | Performed by: INTERNAL MEDICINE

## 2024-11-27 PROCEDURE — 99999 PR PBB SHADOW E&M-EST. PATIENT-LVL V: CPT | Mod: PBBFAC,,, | Performed by: INTERNAL MEDICINE

## 2024-11-27 PROCEDURE — 3079F DIAST BP 80-89 MM HG: CPT | Mod: CPTII,S$GLB,, | Performed by: INTERNAL MEDICINE

## 2024-11-27 PROCEDURE — G2211 COMPLEX E/M VISIT ADD ON: HCPCS | Mod: S$GLB,,, | Performed by: INTERNAL MEDICINE

## 2024-11-27 PROCEDURE — 85025 COMPLETE CBC W/AUTO DIFF WBC: CPT | Performed by: INTERNAL MEDICINE

## 2024-11-27 PROCEDURE — 1125F AMNT PAIN NOTED PAIN PRSNT: CPT | Mod: CPTII,S$GLB,, | Performed by: INTERNAL MEDICINE

## 2024-11-27 PROCEDURE — 3288F FALL RISK ASSESSMENT DOCD: CPT | Mod: CPTII,S$GLB,, | Performed by: INTERNAL MEDICINE

## 2024-11-27 PROCEDURE — 99214 OFFICE O/P EST MOD 30 MIN: CPT | Mod: S$GLB,,, | Performed by: INTERNAL MEDICINE

## 2024-11-27 PROCEDURE — 1160F RVW MEDS BY RX/DR IN RCRD: CPT | Mod: CPTII,S$GLB,, | Performed by: INTERNAL MEDICINE

## 2024-11-27 PROCEDURE — 1101F PT FALLS ASSESS-DOCD LE1/YR: CPT | Mod: CPTII,S$GLB,, | Performed by: INTERNAL MEDICINE

## 2024-11-27 PROCEDURE — 4010F ACE/ARB THERAPY RXD/TAKEN: CPT | Mod: CPTII,S$GLB,, | Performed by: INTERNAL MEDICINE

## 2024-11-27 PROCEDURE — 1159F MED LIST DOCD IN RCRD: CPT | Mod: CPTII,S$GLB,, | Performed by: INTERNAL MEDICINE

## 2024-11-27 RX ORDER — DILTIAZEM HYDROCHLORIDE 240 MG/1
240 CAPSULE, COATED, EXTENDED RELEASE ORAL DAILY
Qty: 90 CAPSULE | Refills: 1 | Status: SHIPPED | OUTPATIENT
Start: 2024-11-27 | End: 2025-11-27

## 2024-11-27 NOTE — PROGRESS NOTES
Patient ID: Scooter Swan is a 74 y.o. male.    Chief Complaint: Follow-up     Assessment and Plan     1. Primary hypertension  - diltiaZEM (CARDIZEM CD) 240 MG 24 hr capsule; Take 1 capsule (240 mg total) by mouth once daily.  Dispense: 90 capsule; Refill: 1    2. Migraine without status migrainosus, not intractable, unspecified migraine type  - Ambulatory referral/consult to Neurology; Future    3. Leukocytosis, unspecified type  - CBC Auto Differential; Future       Assessment & Plan    IMPRESSION:  - Reviewed patient's history of headaches and previous unsuccessful treatment with Fioricet  - Noted elevated white blood cell count (16.6) from recent labs; considering steroid-related cause  - Assessed hypertension management; patient reporting elevated home readings (176/106)  - Evaluated current medication regimen, focusing on antihypertensives  - Considered neuropathy symptoms in feet; opted for non-pharmacological approach due to extensive medication list    PLAN SUMMARY:  - CBC ordered to recheck white blood cell count  - Increased Diltiazem to 240 mg daily for blood pressure management  - Referred to neurology headache clinic for evaluation and treatment  - Continue aspirin 81 mg daily as per recent instructions  - Recommend trying alpha lipoic acid supplement for neuropathy symptoms  - Follow up with virtual visit in early January for blood pressure management    PATIENT EDUCATION:  - Discussed alpha lipoic acid as a natural alternative for managing neuropathy symptoms  - Emphasized the importance of confirming elevated blood pressure readings with a second machine at home    ACTION ITEMS/LIFESTYLE:  - Scooter to obtain a second blood pressure machine to cross-check home readings  - Recommend trying alpha lipoic acid supplement for neuropathy symptoms         No follow-ups on file.   HPI       History of Present Illness    CHIEF COMPLAINT:  Scooter presents for follow-up of previously reported headaches and to  discuss management of high blood pressure.    HPI:  Scooter reports ongoing daily headaches, initially evaluated in September. He tried Fioricet for suspected migraines, which provided minimal relief and caused dizziness. The headaches typically persist until sleep onset. Scooter is unable to identify specific triggers.    cSooter reports elevated blood pressure readings at home, with a recent reading of 176/106. He has been using a home blood pressure machine for approximately 2 years but is uncertain of its accuracy. In-office blood pressure was recorded as 132/88, which is considered borderline high.    Scooter has sensations in his feet described as bristle-like on the soles, with alternating cold and hot sensations, suggesting possible neuropathy.    Scooter has an upcoming TURP procedure scheduled for December 11th with Dr. Lott. He was initially instructed to discontinue aspirin before the procedure but received instructions this morning to continue taking it.    Recent lab work showed an elevated white blood cell count of 16.6, increased from 13.9 3 weeks prior. Scooter denies any recent infections, fevers, or changes in steroid dosage that might explain this elevation.      ROS:  General: -fever  Neurological: +headache, +dizziness, +numbness         Review of Systems    I personally reviewed past medical, family and social history.     Objective    Vitals:    11/27/24 1005   BP: 132/88   Pulse: 89      Wt Readings from Last 3 Encounters:   11/27/24 1005 81.3 kg (179 lb 3.7 oz)   10/29/24 1320 78.9 kg (174 lb)   10/25/24 1018 78.3 kg (172 lb 8.2 oz)      Body mass index is 23.65 kg/m².     Physical Exam    Vitals: Blood pressure: 132/88. Heart rate: 89.  Cardiovascular: Very slight systolic murmur, 1/6.  Respiratory: Normal respiratory effort. Clear to auscultation bilaterally. No rales. No rhonchi. No wheezing.  Extremities: No lower extremity edema.        Reference     : Visit today included increased  complexity associated with the care of the episodic problem Primary hypertension [I10] addressed and managing the longitudinal care of the patient due to the serious and/or complex managed problem(s)     Active Problem List with Overview Notes    Diagnosis Date Noted    Primary hypertension 09/20/2022    History of hepatitis C 02/07/2020    Thrombocytopenia 02/28/2019    Mixed hyperlipidemia 01/29/2019    Resistant hypertension 01/29/2019    RA (rheumatoid arthritis) 01/29/2019    NYHA class 3 heart failure with preserved ejection fraction 06/19/2024    S/P TAVR (transcatheter aortic valve replacement)- bovine 06/19/2024    SOB (shortness of breath) 02/29/2024    History of supraventricular tachycardia 02/23/2024    Coag negative Staphylococcus bacteremia 01/11/2024    Leukocytosis 01/10/2024    Carotid artery plaque, bilateral 10/19/2022    Aortic valve disease 10/18/2022    Overweight (BMI 25.0-29.9) 09/21/2022    Bilateral carotid bruits 09/20/2022    Moderate aortic regurgitation 08/08/2022    Other specified glaucoma 07/15/2021    Anemia     Other constipation 02/28/2019    Hemorrhoids 10/18/2018           Hypertension Medications               diltiaZEM (CARDIZEM CD) 180 MG 24 hr capsule Take 1 capsule (180 mg total) by mouth once daily.    doxazosin (CARDURA) 4 MG tablet Take 1 tablet (4 mg total) by mouth every evening.    enalapril (VASOTEC) 20 MG tablet TAKE ONE TABLET BY MOUTH TWO TIMES A DAY           Hyperlipidemia Medications               pravastatin (PRAVACHOL) 20 MG tablet TAKE ONE TABLET BY MOUTH ONCE DAILY           Medication List with Changes/Refills   New Medications    DILTIAZEM (CARDIZEM CD) 240 MG 24 HR CAPSULE    Take 1 capsule (240 mg total) by mouth once daily.   Current Medications    ALBUTEROL (PROVENTIL) 2.5 MG /3 ML (0.083 %) NEBULIZER SOLUTION    TAKE 3 MLS BY MOUTH VIA NEBULIZER EVERY 6 HOURS AS NEEDED FOR WHEEZING    ALBUTEROL (PROVENTIL/VENTOLIN HFA) 90 MCG/ACTUATION INHALER     INHALE 1 PUFF BY MOUTH EVERY 4 HOURS AS NEEDED FOR WHEEZING OR SHORTNESS OF BREATH    AMOXICILLIN (AMOXIL) 500 MG TAB    Take 4 tabs (2000 mg) once 60 minutes prior to dental cleaning or other high risk procedures    ASPIRIN (ECOTRIN) 81 MG EC TABLET    Take 1 tablet (81 mg total) by mouth once daily.    CHOLECALCIFEROL, VITAMIN D3, (VITAMIN D3) 50 MCG (2,000 UNIT) CAP    Take 1 capsule (2,000 Units total) by mouth once daily.    CYANOCOBALAMIN 500 MCG TABLET    Take 500 mcg by mouth once daily.    DIAZEPAM (VALIUM) 10 MG TAB    Take 1 tablet (10 mg total) by mouth every evening.    DICLOFENAC SODIUM (VOLTAREN) 1 % GEL    APPLY TWO Grams TOPICALLY FOUR TIMES DAILY    DOXAZOSIN (CARDURA) 4 MG TABLET    Take 1 tablet (4 mg total) by mouth every evening.    ENALAPRIL (VASOTEC) 20 MG TABLET    TAKE ONE TABLET BY MOUTH TWO TIMES A DAY    HYDROCODONE-ACETAMINOPHEN (NORCO)  MG PER TABLET    Take 1 tablet by mouth every 8 (eight) hours as needed for Pain.    HYDROCODONE-ACETAMINOPHEN (NORCO)  MG PER TABLET    Take 1 tablet by mouth every 8 (eight) hours as needed for Pain.    HYDROCODONE-ACETAMINOPHEN (NORCO)  MG PER TABLET    Take 1 tablet by mouth every 8 (eight) hours as needed for Pain.    MULTIVITAMIN CAPSULE    Take 1 capsule by mouth once daily.    OMEPRAZOLE (PRILOSEC) 20 MG CAPSULE    Take 1 capsule (20 mg total) by mouth 2 (two) times a day.    POLYETHYLENE GLYCOL (GLYCOLAX) 17 GRAM PWPK    Take 17 g by mouth daily as needed.    PRAVASTATIN (PRAVACHOL) 20 MG TABLET    TAKE ONE TABLET BY MOUTH ONCE DAILY    PREDNISONE (DELTASONE) 5 MG TABLET    Take 2 tablets (10 mg total) by mouth once daily.    PROMETHAZINE (PHENERGAN) 25 MG TABLET    Take 1 tablet (25 mg total) by mouth every 6 (six) hours as needed for Nausea.    SILDENAFIL (VIAGRA) 100 MG TABLET    Take 1 tablet (100 mg total) by mouth as needed for Erectile Dysfunction.    TAMSULOSIN (FLOMAX) 0.4 MG CAP    Take 1 capsule (0.4 mg total) by  mouth once daily.    TIOTROPIUM BROMIDE (SPIRIVA RESPIMAT INHL)    2 puffs daily - pt reports taking everyday in AM    TIZANIDINE (ZANAFLEX) 4 MG TABLET    Take 1 tablet (4 mg total) by mouth every 8 (eight) hours.   Discontinued Medications    DILTIAZEM (CARDIZEM CD) 180 MG 24 HR CAPSULE    Take 1 capsule (180 mg total) by mouth once daily.         This note was generated with the assistance of ambient listening technology. Verbal consent was obtained by the patient and accompanying visitor(s) for the recording of patient appointment to facilitate this note. I attest to having reviewed and edited the generated note for accuracy, though some syntax or spelling errors may persist. Please contact the author of this note for any clarification.

## 2024-12-02 DIAGNOSIS — G43.909 MIGRAINE WITHOUT STATUS MIGRAINOSUS, NOT INTRACTABLE, UNSPECIFIED MIGRAINE TYPE: ICD-10-CM

## 2024-12-02 RX ORDER — BUTALBITAL, ACETAMINOPHEN AND CAFFEINE 50; 325; 40 MG/1; MG/1; MG/1
1 TABLET ORAL EVERY 4 HOURS PRN
Qty: 10 TABLET | Refills: 5 | OUTPATIENT
Start: 2024-12-02

## 2024-12-02 NOTE — TELEPHONE ENCOUNTER
Care Due:                  Date            Visit Type   Department     Provider  --------------------------------------------------------------------------------                                EP -                              [facility] FAMILY  Last Visit: 11-      CARE ([facility])   WALTER Kennedy                              EP -                              PRIMARY      [facility] FAM[facility]  Next Visit: 02-      CARE ([facility])   WALTER Kennedy                                                            Last  Test          Frequency    Reason                     Performed    Due Date  --------------------------------------------------------------------------------    Lipid Panel.  12 months..  pravastatin..............  02- 02-    [facility] Embedded Care Due Messages. Reference number: [id number].   12/02/2024 9:07:40 AM CST

## 2024-12-03 NOTE — TELEPHONE ENCOUNTER
Sent a message to patient that medication was discontinued.  To schedule an appointment to discuss medication with provider.

## 2024-12-05 DIAGNOSIS — G43.909 MIGRAINE WITHOUT STATUS MIGRAINOSUS, NOT INTRACTABLE, UNSPECIFIED MIGRAINE TYPE: ICD-10-CM

## 2024-12-05 RX ORDER — BUTALBITAL, ACETAMINOPHEN AND CAFFEINE 50; 325; 40 MG/1; MG/1; MG/1
1 TABLET ORAL EVERY 4 HOURS PRN
Qty: 10 TABLET | Refills: 5 | OUTPATIENT
Start: 2024-12-05

## 2024-12-05 NOTE — TELEPHONE ENCOUNTER
No care due was identified.  [facility] Embedded Care Due Messages. Reference number: [id number].   12/05/2024 8:24:32 AM CST

## 2024-12-05 NOTE — TELEPHONE ENCOUNTER
Talked with patient and informed medication was discontinued.  Informed him to schedule an appointment with provider to discuss restarting.

## 2024-12-06 ENCOUNTER — OFFICE VISIT (OUTPATIENT)
Dept: INTERNAL MEDICINE | Facility: CLINIC | Age: 74
End: 2024-12-06
Payer: MEDICARE

## 2024-12-06 ENCOUNTER — HOSPITAL ENCOUNTER (OUTPATIENT)
Dept: RADIOLOGY | Facility: HOSPITAL | Age: 74
Discharge: HOME | End: 2024-12-06
Attending: NURSE PRACTITIONER
Payer: MEDICARE

## 2024-12-06 VITALS
HEART RATE: 69 BPM | SYSTOLIC BLOOD PRESSURE: 144 MMHG | DIASTOLIC BLOOD PRESSURE: 85 MMHG | OXYGEN SATURATION: 96 % | TEMPERATURE: 97 F

## 2024-12-06 DIAGNOSIS — E78.2 MIXED HYPERLIPIDEMIA: ICD-10-CM

## 2024-12-06 DIAGNOSIS — J44.9 CHRONIC OBSTRUCTIVE PULMONARY DISEASE, UNSPECIFIED COPD TYPE: ICD-10-CM

## 2024-12-06 DIAGNOSIS — D69.6 THROMBOCYTOPENIA: ICD-10-CM

## 2024-12-06 DIAGNOSIS — I65.23 CAROTID ARTERY PLAQUE, BILATERAL: ICD-10-CM

## 2024-12-06 DIAGNOSIS — I10 PRIMARY HYPERTENSION: ICD-10-CM

## 2024-12-06 DIAGNOSIS — I50.30 NYHA CLASS 3 HEART FAILURE WITH PRESERVED EJECTION FRACTION: ICD-10-CM

## 2024-12-06 DIAGNOSIS — K21.9 GASTROESOPHAGEAL REFLUX DISEASE, UNSPECIFIED WHETHER ESOPHAGITIS PRESENT: ICD-10-CM

## 2024-12-06 DIAGNOSIS — Z86.79 HISTORY OF SUPRAVENTRICULAR TACHYCARDIA: ICD-10-CM

## 2024-12-06 DIAGNOSIS — D64.9 ANEMIA, UNSPECIFIED TYPE: ICD-10-CM

## 2024-12-06 DIAGNOSIS — R31.0 GROSS HEMATURIA: ICD-10-CM

## 2024-12-06 DIAGNOSIS — Z95.2 S/P TAVR (TRANSCATHETER AORTIC VALVE REPLACEMENT): ICD-10-CM

## 2024-12-06 DIAGNOSIS — Z01.818 PREOP TESTING: ICD-10-CM

## 2024-12-06 DIAGNOSIS — N40.0 BENIGN PROSTATIC HYPERPLASIA, UNSPECIFIED WHETHER LOWER URINARY TRACT SYMPTOMS PRESENT: Primary | ICD-10-CM

## 2024-12-06 DIAGNOSIS — N32.9 LESION OF BLADDER: ICD-10-CM

## 2024-12-06 DIAGNOSIS — M06.9 RHEUMATOID ARTHRITIS, INVOLVING UNSPECIFIED SITE, UNSPECIFIED WHETHER RHEUMATOID FACTOR PRESENT: ICD-10-CM

## 2024-12-06 DIAGNOSIS — D72.829 LEUKOCYTOSIS, UNSPECIFIED TYPE: ICD-10-CM

## 2024-12-06 PROBLEM — N40.1 BENIGN PROSTATIC HYPERPLASIA WITH LOWER URINARY TRACT SYMPTOMS: Status: ACTIVE | Noted: 2024-12-06

## 2024-12-06 PROBLEM — G47.00 INSOMNIA: Status: ACTIVE | Noted: 2024-12-06

## 2024-12-06 PROCEDURE — 99999 PR PBB SHADOW E&M-EST. PATIENT-LVL IV: CPT | Mod: PBBFAC,,,

## 2024-12-06 PROCEDURE — 71045 X-RAY EXAM CHEST 1 VIEW: CPT | Mod: 26,,, | Performed by: RADIOLOGY

## 2024-12-06 PROCEDURE — 71045 X-RAY EXAM CHEST 1 VIEW: CPT | Mod: TC

## 2024-12-06 NOTE — ASSESSMENT & PLAN NOTE
-stable and appears compensated upon exam   -Echo on 7/18/2024 showed Left Ventricle: The left ventricle is normal in size. Normal wall thickness. There is normal systolic function with a visually estimated EF of 55 - 60%.    Right Ventricle: Mild right ventricular enlargement. Systolic function is normal.    Aortic Valve: There is a transcatheter valve replacement in the aortic position. It is reported to be a 29 mm MANUELITO 3 Ultra RESILIA valve. Aortic valve area by VTI is 2.99 cm². Aortic valve peak velocity is 2.17 m/s. Mean gradient is 10 mmHg. The dimensionless index is 0.59. There is no significant regurgitation.    Pulmonary Artery: No pulmonary hypertension. The estimated pulmonary artery systolic pressure is 26 mmHg.    IVC/SVC: Normal venous pressure at 3 mmHg.  -followed outpatient by cardiology

## 2024-12-06 NOTE — ASSESSMENT & PLAN NOTE
-Carotid US reviewed with 0-19% to SENDY and LICA on 10/11/2022   -ASA prescribed- held 5 days prior to procedure per primary surgeon  -pravastatin continued nightly

## 2024-12-06 NOTE — PROGRESS NOTES
Preoperative History and Physical  Pre-Admit Testing                                                                    Chief Complaint: Preoperative evaluation     History of Present Illness:      Scooter Swan is a 74 y.o. male who presents to the office today for a preoperative consultation at the request of Dr. Lott who plans on performing a TURP, USING BIPOLAR CAUTERY on December 11.     Functional Status:      The patient is able to climb a flight of stairs slowly. The patient is able to ambulate with use of cane without difficulty. The patient's functional status is not affected by the surgical problem. The patient's functional status is affected by shortness of breath, chest pain, dyspnea on exertion and fatigue.    MET score greater than 4    Patient Anesthesia History:      History of Malignant Hyperthermia: no  History of Pseudocholinesterase Deficiency: no  History of PONV: no  History of difficult intubation: no  History of delayed emergence: no  History of high fever after anesthesia: no    Family Anesthesia History:      History of Malignant Hyperthermia: no  History of Pseudocholinesterase Deficiency: no    Past Medical History:      Past Medical History:   Diagnosis Date    Cataract     COPD (chronic obstructive pulmonary disease)     Diverticulosis     DUARTE (dyspnea on exertion)     GERD (gastroesophageal reflux disease)     Glaucoma     Hepatitis C     treated; seeing Dr. Carr    Hyperlipidemia     Hypertension     Liver lesion 08/2018    RA (rheumatoid arthritis)     Rectal prolapse     Possible    Severe aortic stenosis 08/24/2020    Thyroid disease         Past Surgical History:      Past Surgical History:   Procedure Laterality Date    ANGIOGRAM, CORONARY, WITH LEFT HEART CATHETERIZATION  10/06/2022    Procedure: Angiogram, Coronary, with Left Heart Cath;  Surgeon: Zac Boucher MD;  Location: [facility];  Service: Cardiology;;    AORTOGRAPHY   03/28/2024    Procedure: AO Root;  Surgeon: Zac Boucher MD;  Location: [facility] CATH;  Service: Cardiology;;    ARTERIOGRAPHY OF AORTIC ROOT  10/06/2022    Procedure: ARTERIOGRAM, AORTIC ROOT;  Surgeon: Zac Boucher MD;  Location: [facility] CATH;  Service: Cardiology;;    CARPAL TUNNEL RELEASE      Right wrist 2015    CATHETERIZATION OF BOTH LEFT AND RIGHT HEART  03/28/2024    Procedure: Right / Left heart cath;  Surgeon: Zac Boucher MD;  Location: [facility] CATH;  Service: Cardiology;;    COLONOSCOPY  08/2016    Dr. Cardona; in care everywhere    COLONOSCOPY N/A 03/20/2019    Procedure: COLONOSCOPY;  Surgeon: Sotero Arthur MD;  Location: [facility];  Service: Endoscopy;  Laterality: N/A; hemorrhoids, diverticulosis, repeat in 10 years for screening    ECHOCARDIOGRAM,TRANSESOPHAGEAL N/A 01/12/2024    Procedure: Transesophageal echo (CADY) intra-procedure log documentation;  Surgeon: Renan Slaughter MD;  Location: [facility] CATH/EP LAB;  Service: Cardiology;  Laterality: N/A;    EXCISIONAL HEMORRHOIDECTOMY N/A 10/18/2018    Procedure: HEMORRHOIDECTOMY, prone;  Surgeon: Gunnar Horton MD;  Location: [facility] OR [address] [place] [address];  Service: Colon and Rectal;  Laterality: N/A;    HERNIA REPAIR      THYROID SURGERY      TRANSCATHETER AORTIC VALVE REPLACEMENT (TAVR)  6/19/2024    Procedure: (TAVR);  Surgeon: Hernandez Marrero MD;  Location: [facility] CATH;  Service: Cardiology;;    TRANSCATHETER AORTIC VALVE REPLACEMENT (TAVR)  6/19/2024    Procedure: (TAVR) - Surgeon;  Surgeon: Jada Murphy MD;  Location: [facility] CATH;  Service: Cardiothoracic;;    UPPER GASTROINTESTINAL ENDOSCOPY  08/2016    Dr. Cardona; in care everywhere        Social History:      Social History     Socioeconomic History    Marital status:    Tobacco Use    Smoking status: Never    Smokeless tobacco: Never   Substance and Sexual Activity    Alcohol use: Yes     Alcohol/week: 1.0 standard drink of alcohol     Types: 1 Shots of liquor per week     Comment: social    Drug  use: Yes     Types: Hydrocodone, Marijuana     Social Drivers of Health     Financial Resource Strain: Low Risk  (6/19/2024)    Overall Financial Resource Strain (CARDIA)     Difficulty of Paying Living Expenses: Not hard at all   Food Insecurity: No Food Insecurity (6/19/2024)    Hunger Vital Sign     Worried About Running Out of Food in the Last Year: Never true     Ran Out of Food in the Last Year: Never true   Transportation Needs: No Transportation Needs (6/19/2024)    TRANSPORTATION NEEDS     Transportation : No   Physical Activity: Unknown (2/21/2024)    Exercise Vital Sign     Days of Exercise per Week: 4 days   Stress: Stress Concern Present (9/19/2022)    [employer] - Occupational Stress Questionnaire     Feeling of Stress : Rather much   Housing Stability: Low Risk  (6/19/2024)    Housing Stability Vital Sign     Unable to Pay for Housing in the Last Year: No     Homeless in the Last Year: No        Family History:      Family History   Problem Relation Name Age of Onset    Stomach cancer Paternal Cousin      Stomach cancer Paternal Cousin      Cataracts Mother      Diabetes Mother      Hypertension Mother      Stroke Mother      Diabetes Sister      Hypertension Sister      Stroke Sister      Diabetes Brother      Glaucoma Brother      Hypertension Brother      Stroke Brother      Diabetes Maternal Aunt      Hypertension Maternal Aunt      Stroke Maternal Aunt      Diabetes Maternal Uncle      Hypertension Maternal Uncle      Stroke Maternal Uncle      Diabetes Maternal Grandmother      Hypertension Maternal Grandmother      Stroke Maternal Grandmother      Cancer Cousin          stomach    Colon cancer Neg Hx      Colon polyps Neg Hx      Crohn's disease Neg Hx      Ulcerative colitis Neg Hx      Esophageal cancer Neg Hx      Amblyopia Neg Hx      Blindness Neg Hx      Macular degeneration Neg Hx      Retinal detachment Neg Hx      Strabismus Neg Hx      Thyroid disease Neg  Hx         Allergies:      Review of patient's allergies indicates:   Allergen Reactions    Buspar [buspirone] Hallucinations     Nightmares    Enbrel [etanercept] Other (See Comments)     Severe headaches    Fentanyl Hives and Hallucinations    Plaquenil [hydroxychloroquine]      Nausea, insomnia, weight loss 40LBS    Amitiza [lubiprostone] Nausea Only and Other (See Comments)     Fatigue.    Azulfidine [sulfasalazine] Nausea And Vomiting    Trazodone Other (See Comments)     Insomnia         Medications:      Current Outpatient Medications   Medication Sig    albuterol (PROVENTIL) 2.5 mg /3 mL (0.083 %) nebulizer solution TAKE 3 MLS BY MOUTH VIA NEBULIZER EVERY 6 HOURS AS NEEDED FOR WHEEZING    albuterol (PROVENTIL/VENTOLIN HFA) 90 mcg/actuation inhaler INHALE 1 PUFF BY MOUTH EVERY 4 HOURS AS NEEDED FOR WHEEZING OR SHORTNESS OF BREATH    amoxicillin (AMOXIL) 500 MG Tab Take 4 tabs (2000 mg) once 60 minutes prior to dental cleaning or other high risk procedures    aspirin (ECOTRIN) 81 MG EC tablet Take 1 tablet (81 mg total) by mouth once daily.    cholecalciferol, vitamin D3, (VITAMIN D3) 50 mcg (2,000 unit) Cap Take 1 capsule (2,000 Units total) by mouth once daily.    cyanocobalamin 500 MCG tablet Take 500 mcg by mouth once daily.    diazePAM (VALIUM) 10 MG Tab Take 1 tablet (10 mg total) by mouth every evening.    diclofenac sodium (VOLTAREN) 1 % Gel APPLY TWO Grams TOPICALLY FOUR TIMES DAILY (Patient not taking: Reported on 11/27/2024)    diltiaZEM (CARDIZEM CD) 240 MG 24 hr capsule Take 1 capsule (240 mg total) by mouth once daily.    doxazosin (CARDURA) 4 MG tablet Take 1 tablet (4 mg total) by mouth every evening.    enalapril (VASOTEC) 20 MG tablet TAKE ONE TABLET BY MOUTH TWO TIMES A DAY    [START ON 12/31/2024] HYDROcodone-acetaminophen (NORCO)  mg per tablet Take 1 tablet by mouth every 8 (eight) hours as needed for Pain.    HYDROcodone-acetaminophen (NORCO)  mg per tablet Take 1 tablet by  mouth every 8 (eight) hours as needed for Pain.    multivitamin capsule Take 1 capsule by mouth once daily.    omeprazole (PRILOSEC) 20 MG capsule Take 1 capsule (20 mg total) by mouth 2 (two) times a day.    polyethylene glycol (GLYCOLAX) 17 gram PwPk Take 17 g by mouth daily as needed.    pravastatin (PRAVACHOL) 20 MG tablet TAKE ONE TABLET BY MOUTH ONCE DAILY    predniSONE (DELTASONE) 5 MG tablet Take 2 tablets (10 mg total) by mouth once daily.    promethazine (PHENERGAN) 25 MG tablet Take 1 tablet (25 mg total) by mouth every 6 (six) hours as needed for Nausea.    sildenafiL (VIAGRA) 100 MG tablet Take 1 tablet (100 mg total) by mouth as needed for Erectile Dysfunction.    tamsulosin (FLOMAX) 0.4 mg Cap Take 1 capsule (0.4 mg total) by mouth once daily.    tiotropium bromide (SPIRIVA RESPIMAT INHL) 2 puffs daily - pt reports taking everyday in AM (Patient not taking: Reported on 11/27/2024)    tiZANidine (ZANAFLEX) 4 MG tablet Take 1 tablet (4 mg total) by mouth every 8 (eight) hours.     No current facility-administered medications for this visit.     Facility-Administered Medications Ordered in Other Visits   Medication    0.9%  NaCl infusion    mupirocin 2 % ointment       Vitals:      Vitals:    12/06/24 1214   BP: (!) 144/85   Pulse: 69   Temp: 97.2 °F (36.2 °C)       Review of Systems:        Constitutional: Negative for fever, chills, weight loss, malaise/fatigue and diaphoresis.   HENT: Negative for hearing loss, ear pain, nosebleeds, congestion, sore throat, neck pain, tinnitus and ear discharge.    Eyes: Negative for blurred vision, double vision, photophobia, pain, discharge and redness.   Respiratory: Negative for cough, hemoptysis, sputum production, wheezing and stridor.  + shortness of breath (in the setting of COPD and HFpEF)  Cardiovascular: Negative for chest pain, palpitations, orthopnea, claudication, leg swelling and PND.   Gastrointestinal: Negative for heartburn, nausea, vomiting,  abdominal pain, diarrhea, constipation, blood in stool and melena.   Genitourinary: Negative for dysuria, urgency, frequency, hematuria and flank pain.   Musculoskeletal: Negative for myalgias, back pain, joint pain and falls.   Skin: Negative for itching and rash.   Neurological: Negative for dizziness, tingling, tremors, sensory change, speech change, focal weakness, seizures, loss of consciousness, weakness and headaches.   Endo/Heme/Allergies: Negative for environmental allergies and polydipsia. Does not bruise/bleed easily.   Psychiatric/Behavioral: Negative for depression, suicidal ideas, hallucinations, memory loss and substance abuse. The patient is not nervous/anxious and does not have insomnia.    All 14 systems reviewed and negative except as noted above.    Physical Exam:      Constitutional: Appears well-developed, well-nourished and in no acute distress.  Patient is oriented to person, place, and time. Cane used for gait stability   Head: Normocephalic and atraumatic. Mucous membranes moist.  Neck: Neck supple no mass.   Cardiovascular: Normal rate and regular rhythm.  S1 S2 appreciated by ascultation.  Pulmonary/Chest: Effort normal and clear to auscultation bilaterally. No respiratory distress.   Abdomen: Soft. Non-tender and non-distended. Bowel sounds are normal.   Neurological: Patient is alert and oriented to person, place and time. Moves all extremities.  Skin: Warm and dry. No lesions.  Extremities: No clubbing, cyanosis or edema.    Laboratory data:      Reviewed and noted in plan where applicable. Please see chart for full laboratory data.    Lab Results   Component Value Date    INR 1.1 06/17/2024    INR 1.0 03/27/2024    INR 1.0 08/04/2023       Lab Results   Component Value Date    WBC 12.72 (H) 11/27/2024    HGB 10.2 (L) 11/27/2024    HCT 31.5 (L) 11/27/2024    MCV 92 11/27/2024     (L) 11/27/2024     Comprehensive metabolic panel  Order: [id number]  Status: Final result  Dx: Gross  hematuria; Lesion of bladder; B...               Component Ref Range & Units 3 wk ago  (11/15/24) 4 mo ago  (7/22/24) 5 mo ago  (6/26/24) 5 mo ago  (6/20/24) 5 mo ago  (6/17/24) 7 mo ago  (5/6/24) 8 mo ago  (3/28/24)   Sodium 136 - 145 mmol/L 140 140 141 135 Low  143 140 141   Potassium 3.5 - 5.1 mmol/L 4.0 3.6 4.5 3.9 CM 4.0 CM 3.8 4.0 CM   Chloride 95 - 110 mmol/L 104 107 105 107 108 106 107   CO2 23 - 29 mmol/L 27 23 27 23 R 26 R 27 27 R   Glucose 70 - 110 mg/dL 119 High  82 130 High  148 High  CM 96  High  121 High  CM   BUN 8 - 23 mg/dL 17 14 16 18 R 16 R 12 18 R   Creatinine 0.5 - 1.4 mg/dL 1.2 1.1 1.3 1.09 R 1.33 R 1.2 1.13 R   Calcium 8.7 - 10.5 mg/dL 9.5 9.3 9.5 8.6 R 9.6 R 9.7 9.4 R   Total Protein 6.0 - 8.4 g/dL 7.3 7.0 6.6 5.9 Low  6.7 6.6    Albumin 3.5 - 5.2 g/dL 4.2 3.9 3.8 3.6 4.1 3.7    Total Bilirubin 0.1 - 1.0 mg/dL 0.4 0.4 CM 0.6 CM 0.3 R 0.5 R 0.4 CM    Comment: For infants and newborns, interpretation of results should be based  on gestational age, weight and in agreement with clinical  observations.    Premature Infant recommended reference ranges:  Up to 24 hours.............<8.0 mg/dL  Up to 48 hours............<12.0 mg/dL  3-5 days..................<15.0 mg/dL  6-29 days.................<15.0 mg/dL   Alkaline Phosphatase 40 - 150 U/L 69 74 R 55 R 48 R 59 R 57 R    AST 10 - 40 U/L 15 19 20 26 R 24 R 14    ALT 10 - 44 U/L 11 8 Low  7 Low  16 R 8 R 7 Low     eGFR >60 mL/min/1.73 m^2 >60.0 >60 57.6 Abnormal  >60 56 Abnormal  >60 >60   Anion Gap 8 - 16 mmol/L 9 10 9 5 R, CM 9 R, CM 7 Low  7 R, CM   Resulting Agency  [facility]              Narrative  Performed by: [facility]  Send normal result to authorizing provider's In Basket if  patient is active on MyChart:->Yes   Specimen Collected: 11/15/24 10:31 CST Last Resulted: 11/15/24 20:16 CST         Predictors of intubation difficulty:       Morbid obesity? no   Anatomically abnormal facies? no   Prominent incisors? no    Receding mandible? no   Short, thick neck? no   Neck range of motion: normal   Dentition: Chipped teeth present (multiple on top and bottom)  Based on the Modified Mallampati, patient is a mallampati score: IV (only hard palate visible) May benefit from glide scope to secure airway     Cardiographics:      ECG: Sinus bradycardia   Nonspecific T wave abnormality   Abnormal ECG   When compared with ECG of 20-JUN-2024 06:22,   No significant change was found   Confirmed by Sander BALL, Zac ([id number]) on 7/19/2024 9:47:28 PM     Echocardiogram on 7/18/2024: Left Ventricle: The left ventricle is normal in size. Normal wall thickness. There is normal systolic function with a visually estimated ejection fraction of 55 - 60%.    Right Ventricle: Mild right ventricular enlargement. Systolic function is normal.    Aortic Valve: There is a transcatheter valve replacement in the aortic position. It is reported to be a 29 mm MANUELITO 3 Ultra RESILIA valve. Aortic valve area by VTI is 2.99 cm². Aortic valve peak velocity is 2.17 m/s. Mean gradient is 10 mmHg. The dimensionless index is 0.59. There is no significant regurgitation.    Pulmonary Artery: No pulmonary hypertension. The estimated pulmonary artery systolic pressure is 26 mmHg.    IVC/SVC: Normal venous pressure at 3 mmHg.    Imaging:      Chest x-ray: Cardiac silhouette and mediastinal contours are unchanged. Lungs are clear. Osseous structures are intact. No acute cardiopulmonary process per imaging on 12/6/2024     Assessment and Plan:      1. Benign prostatic hyperplasia, unspecified whether lower urinary tract symptoms present   TURP, USING BIPOLAR CAUTERY planned per Dr. Lott on 12/11/2024     Known risk factors for perioperative complications: Coronary disease  Chronic pulmonary disease    Difficulty with intubation MAYBE anticipated. MP IV - may require use of Ethel scope to secure airway     Cardiac Risk Estimation: Based on the Revised Cardiac Risk index, patient is a  Class II risk with a 6 % risk of a major cardiac event in a low risk procedure.    Cardiac Evaluation: Acceptable CV risk per Cardiology encounter on 11/15/2024     1.) Preoperative workup as follows: chest x-ray, ECG, hemoglobin, hematocrit, electrolytes, creatinine, glucose.  2.) Change in medication regimen before surgery: discontinue ASA 6 days before surgery (last dose 12/6/2024), discontinue NSAIDs (Voltaren) 5 days before surgery, hold Metformin 24 hours prior to surgery. Hold all vitamins/supplements for 7 days prior to surgery, with the exception of Potassium and Iron supplementation. Hold semaglutide/tirzepatide for 7 days prior to surgery.  Patient advised to refrain from sildenafil use for 24 hours prior to procedure.  Patient advised to refrain from use of marijuana for 72 hours prior to procedure (last use 12/6/2024).  3.) Prophylaxis for cardiac events with perioperative beta-blockers: not indicated.  4.) Invasive hemodynamic monitoring perioperatively: at the discretion of anesthesiologist.  5.) Deep vein thrombosis prophylaxis postoperatively: regimen to be chosen by surgical team.  6.) Surveillance for postoperative MI with ECG immediately postoperatively and on postoperati ve days 1 and 2 AND troponin levels 24 hours postoperatively and on day 4 or hospital discharge (whichever comes first): at the discretion of anesthesiologist.  7.) Current medications which may produce withdrawal symptoms if withheld perioperatively: Norco as needed   8.) Other measures: Postoperative hypertension management with IV hydralazine until able to take oral medications.  Postoperative incentive spirometry to prevent pneumonia.   -Flomax continued nightly    -     Ambulatory referral/consult to Pre-Admit  -     EKG 12-lead; Future; Expected date: 12/06/2024    2. Gross hematuria  Assessment & Plan:  -procedure planned per Dr. Lott on 12/11/2024  -see plan for benign prostatic hyperplasia with lower urinary tract  symptoms     Orders:  -     Ambulatory referral/consult to Pre-Admit    3. Lesion of bladder  Assessment & Plan:  -procedure planned per Dr. Lott on 12/11/2024  -see plan for benign prostatic hyperplasia with lower urinary tract symptoms     Orders:  -     Ambulatory referral/consult to Pre-Admit    4. Mixed hyperlipidemia  Assessment & Plan:  -pravastatin continued nightly       5. NYHA class 3 heart failure with preserved ejection fraction  Assessment & Plan:  -stable and appears compensated upon exam   -Echo on 7/18/2024 showed Left Ventricle: The left ventricle is normal in size. Normal wall thickness. There is normal systolic function with a visually estimated EF of 55 - 60%.    Right Ventricle: Mild right ventricular enlargement. Systolic function is normal.    Aortic Valve: There is a transcatheter valve replacement in the aortic position. It is reported to be a 29 mm MANUELITO 3 Ultra RESILIA valve. Aortic valve area by VTI is 2.99 cm². Aortic valve peak velocity is 2.17 m/s. Mean gradient is 10 mmHg. The dimensionless index is 0.59. There is no significant regurgitation.    Pulmonary Artery: No pulmonary hypertension. The estimated pulmonary artery systolic pressure is 26 mmHg.    IVC/SVC: Normal venous pressure at 3 mmHg.  -followed outpatient by cardiology         6. Primary hypertension  Assessment & Plan:  -moderate control   -Cardizem and Cardura continued nightly   -Enalapril continued- hold morning of surgery       7. Thrombocytopenia  Assessment & Plan:  - per CBC on 11/27/2024   -ASA held 5 days prior to procedure   -followed outpatient by Heme/Onc       8. S/P TAVR (transcatheter aortic valve replacement)- bovine  Assessment & Plan:  -noted on 6/19/2024   -ASA held 5 days prior to procedure per primary surgeon   -medications continued for BP control   -Echo results reviewed   -followed outpatient by Cardiology with clearance obtained prior to procedure       9. Carotid artery plaque,  bilateral  Assessment & Plan:  -Carotid US reviewed with 0-19% to SENDY and LICA on 10/11/2022   -ASA prescribed- held 5 days prior to procedure per primary surgeon  -pravastatin continued nightly      10. Anemia, unspecified type  Assessment & Plan:  -H/H 10.2/31.5- MCV 92 - per CBC on 11/27/2024   -vitamin D, vitamin B, and MVI held   -patient advised to hold all vitamins and supplements for 7 days prior to procedure  -followed outpatient by Heme/Onc       11. History of supraventricular tachycardia  Assessment & Plan:  -stable   -Cardizem continued nightly   -followed outpatient by Cardiology       12. Rheumatoid arthritis, involving unspecified site, unspecified whether rheumatoid factor present  Assessment & Plan:  -Prednisone continued- take morning of surgery   -Norco and Flexeril as needed for pain control- hold morning of surgery   -followed outpatient by Rheumatology       13. Chronic obstructive pulmonary disease, unspecified COPD type  Assessment & Plan:  -stable and no symptoms of acute exacerbation present   -Albuterol inhaler/nebulizer continued as needed   -Spiriva prescribed- patient currently not taking  -patient advised to follow up with pulmonology as needed for further evaluation      14. Gastroesophageal reflux disease, unspecified whether esophagitis present  Assessment & Plan:  -stable   -Omeprazole continued- take morning of surgery       15. Leukocytosis, unspecified type  Assessment & Plan:  -mild with WBC 12.72 in the setting of chronic steroid use   -intermittent occurrence as documented by Heme/Onc   -patient denies any symptoms of infection  -followed outpatient by Hematology                Electronically signed by Sofia Hernandez NP on 12/6/2024 at 11:35 AM.

## 2024-12-06 NOTE — ASSESSMENT & PLAN NOTE
-stable and no symptoms of acute exacerbation present   -Albuterol inhaler/nebulizer continued as needed   -Spiriva prescribed- patient currently not taking  -patient advised to follow up with pulmonology as needed for further evaluation

## 2024-12-06 NOTE — ASSESSMENT & PLAN NOTE
-Prednisone continued- take morning of surgery   -Norco and Flexeril as needed for pain control- hold morning of surgery   -followed outpatient by Rheumatology

## 2024-12-06 NOTE — ASSESSMENT & PLAN NOTE
-H/H 10.2/31.5- MCV 92 - per CBC on 11/27/2024   -vitamin D, vitamin B, and MVI held   -patient advised to hold all vitamins and supplements for 7 days prior to procedure  -followed outpatient by Heme/Onc

## 2024-12-06 NOTE — PRE ADMISSION SCREENING
Pre op instructions reviewed with patient during Clinic Visit with Provider, verbalized understanding.    To confirm, Surgery is scheduled on 12/11/24. We will call you late afternoon the business day prior to surgery with your arrival time.    *Please report to the [facility] L[facility] (1st Floor) located off of [facility] (2nd Entrance/Building on the left, in front of the flag pole). Address: [address] [place] [address] [place]. [address]      INSTRUCTIONS IMPORTANT!!!  !!!NO FOOD after midnight! You may have clear liquids up to 3 hrs before your arrival to the Hospital!!!  - Clear liquids include Gatorade, water, soda, black coffee or tea, and clear juices,chicken broth, jello, (no milk or creamer).  - Clear liquids do NOT include anything with pulp or food particles, Ice cream or yogurt  - NOTHING RED OR PURPLE    MORNING OF SURGERY, drink small sip of water with the following medications instructed by Pre-Admit Provider:  Inhaler  Omeprazole  Prednisone    *Blood Thinners: Please follow Dr. Lott's instructions as to when to hold your ASPIRIN , prior to surgery per Physician Instructions! Call your Surgeon office to inquire about any questions regarding your blood thinner medication.    If you take Ozempic/ Mounjaro / Wegovy / Trulicity / Semaglutide, any weight loss injections OR PHENTERMINE --->>> PLEASE LET US KNOW IMMEDIATELY, as these medications need to be stopped 7 days prior to surgery!    *Patients should HOLD all vitamins, herbal supplements, weight loss medication, aspirin products & NSAIDS 7 days prior to surgery, as these can thin the blood. Ok to take Tylenol.    ____  Avoid Alcoholic beverages 3 days prior to surgery, as it can thin the blood.  ____  NO Acrylic/fake nails or nail polish worn day of surgery (specifically hand/arm & foot surgeries).  ____  NO powder, lotions, deodorants, oils or cream on body.  ____  Remove all jewelry, piercings, & foreign objects prior to arrival and leave  at home.  ____  Remove Dentures, Hearing Aids & Contact Lens prior to surgery.  ____  Bring photo ID and insurance information to hospital (Leave Valuables at Home).  ____  If going home the same day, arrange for a ride home. You will not be able to drive for 24 hrs if Anesthesia was used.   ____  Males: Stop ED medications (Viagra, Cialis) 24 hrs prior to surgery.  ____  Wear clean, loose fitting clothing to allow for dressings/ bandages.    Bathing Instructions:    -Shower with anti-bacterial Soap (ex: Hibiclens or Dial) the night before surgery and the morning of.   -Do not use Hibiclens on your face or genitals.   -Apply clean clothes after shower.  -Do not shave your face morning of surgery   -Do not shave your body 3 days prior to surgery unless instructed otherwise by your Surgeon.    [facility]/Ride Policy:  Only 2 adults allowed in pre op/recovery area during your procedure. You MUST HAVE A RIDE HOME from a responsible adult that you know and trust. Medical Transport, Uber or Lyft can ONLY be used if patient has a responsible adult to accompany them during ride home.       *Signs and symptoms of Infection Before or After Surgery:               !!!If you experience any fever, chills, nausea/ vomiting, foul odor/ excessive drainage from surgical site, flu-like symptoms, new wounds or cuts, PLEASE CALL THE SURGEON OFFICE at [phone] or SEND MESSAGE THROUGH Site Tour PORTAL!!!     *If you are running late day of surgery, please call the Surgery Dept @ [phone].    *Billing question, please call  [phone]     Thank you,  -[facility] Pre [facility]mit Dept.  [phone]   or [phone]  M-F 7:30 am-4:00 pm (Closed Major Holidays)

## 2024-12-06 NOTE — DISCHARGE INSTRUCTIONS
Pre op instructions reviewed with patient during Clinic Visit with Provider, verbalized understanding.    To confirm, Surgery is scheduled on 12/11/24. We will call you late afternoon the business day prior to surgery with your arrival time.    *Please report to the [facility] (1st Floor) located off of [facility] (2nd Entrance/Building on the left, in front of the flag pole). Address: [address] [place] [address] [place] Muñ[place]. [address]      INSTRUCTIONS IMPORTANT!!!  !!!NO FOOD after midnight! You may have clear liquids up to 3 hrs before your arrival to the Hospital!!!  - Clear liquids include Gatorade, water, soda, black coffee or tea, and clear juices,chicken broth, jello, (no milk or creamer).  - Clear liquids do NOT include anything with pulp or food particles, Ice cream or yogurt  - NOTHING RED OR PURPLE    MORNING OF SURGERY, drink small sip of water with the following medications instructed by Pre-Admit Provider:  Inhaler  Omeprazole  Prednisone    *Blood Thinners: Please follow Dr. Lott's instructions as to when to hold your ASPIRIN , prior to surgery per Physician Instructions! Call your Surgeon office to inquire about any questions regarding your blood thinner medication.    If you take Ozempic/ Mounjaro / Wegovy / Trulicity / Semaglutide, any weight loss injections OR PHENTERMINE --->>> PLEASE LET US KNOW IMMEDIATELY, as these medications need to be stopped 7 days prior to surgery!    *Patients should HOLD all vitamins, herbal supplements, weight loss medication, aspirin products & NSAIDS 7 days prior to surgery, as these can thin the blood. Ok to take Tylenol.    ____  Avoid Alcoholic beverages 3 days prior to surgery, as it can thin the blood.  ____  NO Acrylic/fake nails or nail polish worn day of surgery (specifically hand/arm & foot surgeries).  ____  NO powder, lotions, deodorants, oils or cream on body.  ____  Remove all jewelry, piercings, & foreign objects prior to arrival and leave  at home.  ____  Remove Dentures, Hearing Aids & Contact Lens prior to surgery.  ____  Bring photo ID and insurance information to hospital (Leave Valuables at Home).  ____  If going home the same day, arrange for a ride home. You will not be able to drive for 24 hrs if Anesthesia was used.   ____  Males: Stop ED medications (Viagra, Cialis) 24 hrs prior to surgery.  ____  Wear clean, loose fitting clothing to allow for dressings/ bandages.    Bathing Instructions:    -Shower with anti-bacterial Soap (ex: Hibiclens or Dial) the night before surgery and the morning of.   -Do not use Hibiclens on your face or genitals.   -Apply clean clothes after shower.  -Do not shave your face morning of surgery   -Do not shave your body 3 days prior to surgery unless instructed otherwise by your Surgeon.    [facility]/Ride Policy:  Only 2 adults allowed in pre op/recovery area during your procedure. You MUST HAVE A RIDE HOME from a responsible adult that you know and trust. Medical Transport, Uber or Lyft can ONLY be used if patient has a responsible adult to accompany them during ride home.       *Signs and symptoms of Infection Before or After Surgery:               !!!If you experience any fever, chills, nausea/ vomiting, foul odor/ excessive drainage from surgical site, flu-like symptoms, new wounds or cuts, PLEASE CALL THE SURGEON OFFICE at [phone] or SEND MESSAGE THROUGH [facility] PORTAL!!!     *If you are running late day of surgery, please call the Surgery Dept @ [phone].    *Billing question, please call  [phone]     Thank you,  -[facility] Pre [facility].  [phone]-Pgnynp   or [phone]  M-F 7:30 am-4:00 pm (Closed Major Holidays)

## 2024-12-06 NOTE — ASSESSMENT & PLAN NOTE
-mild with WBC 12.72 in the setting of chronic steroid use   -intermittent occurrence as documented by Heme/Onc   -patient denies any symptoms of infection  -followed outpatient by Hematology

## 2024-12-06 NOTE — ASSESSMENT & PLAN NOTE
-procedure planned per Dr. Lott on 12/11/2024  -see plan for benign prostatic hyperplasia with lower urinary tract symptoms

## 2024-12-06 NOTE — ASSESSMENT & PLAN NOTE
-noted on 6/19/2024   -ASA held 5 days prior to procedure per primary surgeon   -medications continued for BP control   -Echo results reviewed   -followed outpatient by Cardiology with clearance obtained prior to procedure

## 2024-12-06 NOTE — ASSESSMENT & PLAN NOTE
TURP, USING BIPOLAR CAUTERY planned per Dr. Lott on 12/11/2024     Known risk factors for perioperative complications: Coronary disease  Chronic pulmonary disease    Difficulty with intubation  MAYBE  anticipated. MP IV - may require use of [place] scope to secure airway     Cardiac Risk Estimation: Based on the Revised Cardiac Risk index, patient is a Class II risk with a 6 % risk of a major cardiac event in a low risk procedure.    Cardiac Evaluation: Acceptable CV risk per Cardiology encounter on 11/15/2024     1.) Preoperative workup as follows: chest x-ray, ECG, hemoglobin, hematocrit, electrolytes, creatinine, glucose.  2.) Change in medication regimen before surgery: discontinue ASA 6 days before surgery (last dose 12/6/2024), discontinue NSAIDs (Voltaren) 5 days before surgery, hold Metformin 24 hours prior to surgery. Hold all vitamins/supplements for 7 days prior to surgery, with the exception of Potassium and Iron supplementation. Hold semaglutide/tirzepatide for 7 days prior to surgery.  Patient advised to refrain from sildenafil use for 24 hours prior to procedure.  3.) Prophylaxis for cardiac events with perioperative beta-blockers: not indicated.  4.) Invasive hemodynamic monitoring perioperatively: at the discretion of anesthesiologist.  5.) Deep vein thrombosis prophylaxis postoperatively: regimen to be chosen by surgical team.  6.) Surveillance for postoperative MI with ECG immediately postoperatively and on postoperati ve days 1 and 2 AND troponin levels 24 hours postoperatively and on day 4 or hospital discharge (whichever comes first): at the discretion of anesthesiologist.  7.) Current medications which may produce withdrawal symptoms if withheld perioperatively: Norco as needed   8.) Other measures: Postoperative hypertension management with IV hydralazine until able to take oral medications.  Postoperative incentive spirometry to prevent pneumonia.   -Flomax continued nightly

## 2024-12-06 NOTE — ASSESSMENT & PLAN NOTE
-moderate control   -Cardizem and Cardura continued nightly   -Enalapril continued- hold morning of surgery

## 2024-12-10 ENCOUNTER — TELEPHONE (OUTPATIENT)
Dept: PREADMISSION TESTING | Facility: HOSPITAL | Age: 74
End: 2024-12-10
Payer: MEDICARE

## 2024-12-10 NOTE — TELEPHONE ENCOUNTER
Called and spoke with patient about the following:     Please arrive to [facility] at 11:00am on 12/11/2024 for your scheduled procedure.  Address: [address] [place] [address] [place]. [address] (2nd Building on left, 1st Floor Lobby)    !!!NO FOOD after midnight! You may have clear liquids up to 3 hrs before your arrival to the Hospital!!!  Clear liquids include Gatorade, water, soda, black coffee or tea (no milk or creamer), and clear juices.  Clear liquids do NOT include anything with pulp or food particles (Chicken broth, ice cream, yogurt, Jello, etc.)    Thank you,  -[facility] Pre [facility]  Mon-Fri 7:30 am - 4 pm [phone]

## 2024-12-11 ENCOUNTER — TELEPHONE (OUTPATIENT)
Dept: UROLOGY | Facility: CLINIC | Age: 74
End: 2024-12-11
Payer: MEDICARE

## 2024-12-11 ENCOUNTER — ANESTHESIA EVENT (OUTPATIENT)
Dept: SURGERY | Facility: HOSPITAL | Age: 74
End: 2024-12-11
Payer: MEDICARE

## 2024-12-11 ENCOUNTER — ANESTHESIA (OUTPATIENT)
Dept: SURGERY | Facility: HOSPITAL | Age: 74
End: 2024-12-11
Payer: MEDICARE

## 2024-12-11 ENCOUNTER — HOSPITAL ENCOUNTER (OUTPATIENT)
Facility: HOSPITAL | Age: 74
Discharge: HOME | End: 2024-12-11
Attending: UROLOGY | Admitting: UROLOGY
Payer: MEDICARE

## 2024-12-11 VITALS
RESPIRATION RATE: 10 BRPM | HEIGHT: 72 IN | SYSTOLIC BLOOD PRESSURE: 182 MMHG | DIASTOLIC BLOOD PRESSURE: 96 MMHG | BODY MASS INDEX: 24.4 KG/M2 | TEMPERATURE: 97 F | OXYGEN SATURATION: 94 % | WEIGHT: 180.13 LBS | HEART RATE: 68 BPM

## 2024-12-11 DIAGNOSIS — G89.4 CHRONIC PAIN SYNDROME: ICD-10-CM

## 2024-12-11 DIAGNOSIS — L40.50 PSA (PSORIATIC ARTHRITIS): ICD-10-CM

## 2024-12-11 DIAGNOSIS — N40.0 BENIGN PROSTATIC HYPERPLASIA, UNSPECIFIED WHETHER LOWER URINARY TRACT SYMPTOMS PRESENT: ICD-10-CM

## 2024-12-11 DIAGNOSIS — Z01.818 PREOP TESTING: Primary | ICD-10-CM

## 2024-12-11 DIAGNOSIS — R31.0 GROSS HEMATURIA: ICD-10-CM

## 2024-12-11 DIAGNOSIS — Z01.810 PREOP CARDIOVASCULAR EXAM: ICD-10-CM

## 2024-12-11 DIAGNOSIS — N32.9 LESION OF BLADDER: ICD-10-CM

## 2024-12-11 DIAGNOSIS — M05.9 SEROPOSITIVE RHEUMATOID ARTHRITIS: ICD-10-CM

## 2024-12-11 DIAGNOSIS — R53.83 FATIGUE, UNSPECIFIED TYPE: ICD-10-CM

## 2024-12-11 PROCEDURE — 71000015 HC POSTOP RECOV 1ST HR: Performed by: UROLOGY

## 2024-12-11 PROCEDURE — 37000008 HC ANESTHESIA 1ST 15 MINUTES: Performed by: UROLOGY

## 2024-12-11 PROCEDURE — 36000707: Performed by: UROLOGY

## 2024-12-11 PROCEDURE — 71000033 HC RECOVERY, INTIAL HOUR: Performed by: UROLOGY

## 2024-12-11 PROCEDURE — 36000706: Performed by: UROLOGY

## 2024-12-11 PROCEDURE — 63600175 PHARM REV CODE 636 W HCPCS: Performed by: NURSE ANESTHETIST, CERTIFIED REGISTERED

## 2024-12-11 PROCEDURE — 63600175 PHARM REV CODE 636 W HCPCS: Performed by: UROLOGY

## 2024-12-11 PROCEDURE — 27201423 OPTIME MED/SURG SUP & DEVICES STERILE SUPPLY: Performed by: UROLOGY

## 2024-12-11 PROCEDURE — 37000009 HC ANESTHESIA EA ADD 15 MINS: Performed by: UROLOGY

## 2024-12-11 PROCEDURE — 88305 TISSUE EXAM BY PATHOLOGIST: CPT | Performed by: PATHOLOGY

## 2024-12-11 PROCEDURE — 52601 PROSTATECTOMY (TURP): CPT | Mod: ,,, | Performed by: UROLOGY

## 2024-12-11 RX ORDER — HYDROMORPHONE HYDROCHLORIDE 1 MG/ML
0.2 INJECTION, SOLUTION INTRAMUSCULAR; INTRAVENOUS; SUBCUTANEOUS EVERY 5 MIN PRN
Status: DISCONTINUED | OUTPATIENT
Start: 2024-12-11 | End: 2024-12-11 | Stop reason: HOSPADM

## 2024-12-11 RX ORDER — OXYCODONE AND ACETAMINOPHEN 5; 325 MG/1; MG/1
1 TABLET ORAL
Status: DISCONTINUED | OUTPATIENT
Start: 2024-12-11 | End: 2024-12-11 | Stop reason: HOSPADM

## 2024-12-11 RX ORDER — CIPROFLOXACIN 500 MG/1
500 TABLET ORAL EVERY 12 HOURS
Qty: 6 TABLET | Refills: 0 | Status: SHIPPED | OUTPATIENT
Start: 2024-12-11

## 2024-12-11 RX ORDER — CEFTRIAXONE 1 G/1
1 INJECTION, POWDER, FOR SOLUTION INTRAMUSCULAR; INTRAVENOUS
Status: COMPLETED | OUTPATIENT
Start: 2024-12-11 | End: 2024-12-11

## 2024-12-11 RX ORDER — PROPOFOL 10 MG/ML
VIAL (ML) INTRAVENOUS
Status: DISCONTINUED | OUTPATIENT
Start: 2024-12-11 | End: 2024-12-11

## 2024-12-11 RX ORDER — HYDROCODONE BITARTRATE AND ACETAMINOPHEN 5; 325 MG/1; MG/1
1 TABLET ORAL EVERY 4 HOURS PRN
Status: CANCELLED | OUTPATIENT
Start: 2024-12-11

## 2024-12-11 RX ORDER — ONDANSETRON HYDROCHLORIDE 2 MG/ML
INJECTION, SOLUTION INTRAVENOUS
Status: DISCONTINUED | OUTPATIENT
Start: 2024-12-11 | End: 2024-12-11

## 2024-12-11 RX ORDER — LIDOCAINE HYDROCHLORIDE 10 MG/ML
INJECTION, SOLUTION EPIDURAL; INFILTRATION; INTRACAUDAL; PERINEURAL
Status: DISCONTINUED | OUTPATIENT
Start: 2024-12-11 | End: 2024-12-11

## 2024-12-11 RX ORDER — ONDANSETRON HYDROCHLORIDE 2 MG/ML
4 INJECTION, SOLUTION INTRAVENOUS DAILY PRN
Status: DISCONTINUED | OUTPATIENT
Start: 2024-12-11 | End: 2024-12-11 | Stop reason: HOSPADM

## 2024-12-11 RX ORDER — PHENAZOPYRIDINE HYDROCHLORIDE 200 MG/1
200 TABLET, FILM COATED ORAL 3 TIMES DAILY PRN
Qty: 15 TABLET | Refills: 0 | Status: SHIPPED | OUTPATIENT
Start: 2024-12-11 | End: 2024-12-21

## 2024-12-11 RX ADMIN — CEFTRIAXONE SODIUM 1 G: 1 INJECTION, POWDER, FOR SOLUTION INTRAMUSCULAR; INTRAVENOUS at 11:12

## 2024-12-11 RX ADMIN — ONDANSETRON 4 MG: 2 INJECTION INTRAMUSCULAR; INTRAVENOUS at 12:12

## 2024-12-11 RX ADMIN — LIDOCAINE HYDROCHLORIDE 50 MG: 10 SOLUTION INTRAVENOUS at 11:12

## 2024-12-11 RX ADMIN — SODIUM CHLORIDE, SODIUM LACTATE, POTASSIUM CHLORIDE, AND CALCIUM CHLORIDE: .6; .31; .03; .02 INJECTION, SOLUTION INTRAVENOUS at 11:12

## 2024-12-11 RX ADMIN — PROPOFOL 150 MG: 10 INJECTION, EMULSION INTRAVENOUS at 11:12

## 2024-12-11 NOTE — OP NOTE
Date of Procedure: 12/11/2024    PREOP DIAGNOSIS:  Benign prostatic hypertrophy with LUTs, bladder lesion    POSTOP DIAGNOSIS:  Benign prostatic hypertrophy with LUTs, bladder lesion    PROCEDURES:      1.  Transurethral resection of the prostate    SURGEON:  Carrington Lott MD    Assistants: None    Specimen:  1) Prostate chips 2) bladder neck lesion    ANESTHESIA:  General endotracheal.    BLOOD LOSS:  10 cc    FINDINGS:  median lobe resection, lesion on median lobe removed and sent as separate specimen.    PROCEDURE IN DETAIL:  Patient was brought to the operative suite and placed under general anesthesia and positioned into the dorsal lithotomy position.  After being sterilely prepped and draped, 21 Occitan rigid cystoscope was passed through the urethra into the bladder.  Prostate was noted to be moderately enlarged with mainly median lobe enlargement.  Systematic examination revealed no abnormalities.  There was a pedunculated lesion on the right median lobe.  This was subcentimeter in size.  At this point,  A continuous-flow bipolar resectoscope with visual obturator was introduced.  .  The length was fairly short at 3-3-1/2 cm.  The cutting loop electrode was assembled. I began resection with the bipolar loop systematically resecting the median lobe.  A separate swipe was taken of the lesion on the right median lobe.  This was sent as bladder neck lesion.  Ureteral orifices were well away from resection plane.  At the end of the procedure a wide open channel was noted.  Good hemostasis was noted with bipolar cautery.  Prostatic chips were Ellik evacuated out of the bladder.  Inspection of the bladder revealed no residual chips.  At this point a 20 Occitan Raymond catheter was placed.  Urine remained cleared.  Patient was awakened taken to recovery room in stable condition.  Plan will be to keep the catheter overnight as an outpatient.  Follow up in 2 weeks for pathology review.    COMPLICATIONS:  None

## 2024-12-11 NOTE — TRANSFER OF CARE
Anesthesia Transfer of Care Note    Patient: Scooter Swan    Procedure(s) Performed: Procedure(s) (LRB):  TURP, USING BIPOLAR CAUTERY (N/A)    Patient location: PACU    Anesthesia Type: general    Transport from OR: Transported from OR on room air with adequate spontaneous ventilation    Post pain: adequate analgesia    Post assessment: no apparent anesthetic complications    Post vital signs: stable    Level of consciousness: sedated    Nausea/Vomiting: no nausea/vomiting    Complications: none    Transfer of care protocol was followed      Last vitals: Visit Vitals  BP (!) 185/107 (BP Location: Right arm, Patient Position: Sitting)   Pulse 83   Temp 36.7 °C (98.1 °F) (Temporal)   Resp 16   Ht 6' (1.829 m)   Wt 81.7 kg (180 lb 1.9 oz)   SpO2 98%   BMI 24.43 kg/m²

## 2024-12-11 NOTE — ANESTHESIA PREPROCEDURE EVALUATION
12/11/2024  Scooter Swan is a 74 y.o., male.    Patient Active Problem List   Diagnosis    Hemorrhoids    Mixed hyperlipidemia    Resistant hypertension    RA (rheumatoid arthritis)    Other constipation    Thrombocytopenia    Anemia    History of hepatitis C    Other specified glaucoma    Moderate aortic regurgitation    Primary hypertension    Bilateral carotid bruits    Overweight (BMI 25.0-29.9)    Aortic valve disease    Carotid artery plaque, bilateral    Leukocytosis    Coag negative Staphylococcus bacteremia    History of supraventricular tachycardia    SOB (shortness of breath)    NYHA class 3 heart failure with preserved ejection fraction    S/P TAVR (transcatheter aortic valve replacement)- bovine    Benign prostatic hyperplasia with lower urinary tract symptoms    Gross hematuria    Lesion of bladder    COPD (chronic obstructive pulmonary disease)    GERD (gastroesophageal reflux disease)    Insomnia     Past Surgical History:   Procedure Laterality Date    ANGIOGRAM, CORONARY, WITH LEFT HEART CATHETERIZATION  10/06/2022    Procedure: Angiogram, Coronary, with Left Heart Cath;  Surgeon: Zac Boucher MD;  Location: [facility] CATH;  Service: Cardiology;;    AORTOGRAPHY  03/28/2024    Procedure: AO Root;  Surgeon: Zac Boucher MD;  Location: [facility] CATH;  Service: Cardiology;;    ARTERIOGRAPHY OF AORTIC ROOT  10/06/2022    Procedure: ARTERIOGRAM, AORTIC ROOT;  Surgeon: Zac Boucher MD;  Location: [facility] CATH;  Service: Cardiology;;    CARPAL TUNNEL RELEASE      Right wrist 2015    CATHETERIZATION OF BOTH LEFT AND RIGHT HEART  03/28/2024    Procedure: Right / Left heart cath;  Surgeon: Zac Boucher MD;  Location: [facility] CATH;  Service: Cardiology;;    COLONOSCOPY  08/2016    Dr. Cardona; in care everywhere    COLONOSCOPY N/A 03/20/2019    Procedure: COLONOSCOPY;  Surgeon: Sotero Arthur MD;   Location: [facility] ENDO;  Service: Endoscopy;  Laterality: N/A; hemorrhoids, diverticulosis, repeat in 10 years for screening    ECHOCARDIOGRAM,TRANSESOPHAGEAL N/A 01/12/2024    Procedure: Transesophageal echo (CADY) intra-procedure log documentation;  Surgeon: Renan Slaughter MD;  Location: [facility] CATH/EP LAB;  Service: Cardiology;  Laterality: N/A;    EXCISIONAL HEMORRHOIDECTOMY N/A 10/18/2018    Procedure: HEMORRHOIDECTOMY, prone;  Surgeon: Gunnar Horton MD;  Location: [facility] OR [address] [place] [address];  Service: Colon and Rectal;  Laterality: N/A;    HERNIA REPAIR      THYROID SURGERY      TRANSCATHETER AORTIC VALVE REPLACEMENT (TAVR)  6/19/2024    Procedure: (TAVR);  Surgeon: Hernandez Marrero MD;  Location: [facility] CATH;  Service: Cardiology;;    TRANSCATHETER AORTIC VALVE REPLACEMENT (TAVR)  6/19/2024    Procedure: (TAVR) - Surgeon;  Surgeon: Jada Murphy MD;  Location: [facility] CATH;  Service: Cardiothoracic;;    UPPER GASTROINTESTINAL ENDOSCOPY  08/2016    Dr. Cardona; in care everywhere       Pre-op Assessment    I have reviewed the Patient Summary Reports.    I have reviewed the NPO Status.   I have reviewed the Medications.     Review of Systems  Anesthesia Hx:  No problems with previous Anesthesia                Social:  Non-Smoker       Hematology/Oncology:       -- Anemia:                                  Cardiovascular:     Hypertension           hyperlipidemia DUARTE  ECG has been reviewed. S/p TRANSCATHETER AORTIC VALVE REPLACEMENT (TAVR)                           Pulmonary:   COPD                     Renal/:    BPH              Hepatic/GI:     GERD                Musculoskeletal:  Arthritis   (rheumatoid arthritis)            Neurological:  Neurology Normal                                      Endocrine:  Endocrine Normal                Physical Exam  General: Well nourished    Airway:  Mallampati: II   Mouth Opening: Normal  TM Distance: Normal  Neck ROM: Normal ROM    Dental:  Intact        Anesthesia Plan  Type of  Anesthesia, risks & benefits discussed:    Anesthesia Type: Gen ETT, Gen Supraglottic Airway  Intra-op Monitoring Plan: Standard ASA Monitors  Post Op Pain Control Plan: multimodal analgesia  Induction:  IV  Airway Plan: , Post-Induction  Informed Consent: Informed consent signed with the Patient and all parties understand the risks and agree with anesthesia plan.  All questions answered.   ASA Score: 3    Ready For Surgery From Anesthesia Perspective.     .      Chemistry        Component Value Date/Time     11/15/2024 1031    K 4.0 11/15/2024 1031     11/15/2024 1031    CO2 27 11/15/2024 1031    BUN 17 11/15/2024 1031    CREATININE 1.2 11/15/2024 1031     (H) 11/15/2024 1031        Component Value Date/Time    CALCIUM 9.5 11/15/2024 1031    ALKPHOS 69 11/15/2024 1031    AST 15 11/15/2024 1031    ALT 11 11/15/2024 1031    BILITOT 0.4 11/15/2024 1031    ESTGFRAFRICA >60.0 05/13/2022 0900    EGFRNONAA >60.0 05/13/2022 0900        Lab Results   Component Value Date    WBC 12.72 (H) 11/27/2024    HGB 10.2 (L) 11/27/2024    HCT 31.5 (L) 11/27/2024    MCV 92 11/27/2024     (L) 11/27/2024       Sinus bradycardia   Nonspecific T wave abnormality   Abnormal ECG   When compared with ECG of 20-JUN-2024 06:22,   No significant change was found   Confirmed by Zac Boucher MD ([id number]) on 7/19/2024 9:47:28 PM     CADY 1/12/24:    Left Ventricle: Normal wall motion. There is normal systolic function.    Aortic Valve: The aortic valve is a trileaflet valve. Moderately calcified cusps. There is moderate stenosis. Aortic valve area by VTI is 1.16 cm². Aortic valve peak velocity is 3.48 m/s. Mean gradient is 30 mmHg. There is moderate aortic regurgitation.    Mitral Valve: Mildly calcified anterior leaflet.    There are no ovious vegetations noted. The aortic valve and mitral valve leaflets are calcified and very small vegetations cannot be completely ruled out. Clinical correlation needed.     CADY 7/18/24:     Left Ventricle: The left ventricle is normal in size. Normal wall thickness. There is normal systolic function with a visually estimated ejection fraction of 55 - 60%.    Right Ventricle: Mild right ventricular enlargement. Systolic function is normal.    Aortic Valve: There is a transcatheter valve replacement in the aortic position. It is reported to be a 29 mm MANUELITO 3 Ultra RESILIA valve. Aortic valve area by VTI is 2.99 cm². Aortic valve peak velocity is 2.17 m/s. Mean gradient is 10 mmHg. The dimensionless index is 0.59. There is no significant regurgitation.    Pulmonary Artery: No pulmonary hypertension. The estimated pulmonary artery systolic pressure is 26 mmHg.    IVC/SVC: Normal venous pressure at 3 mmHg.

## 2024-12-11 NOTE — TELEPHONE ENCOUNTER
----- Message from Carrington Lott MD sent at 12/11/2024 12:26 PM CST -----  Regarding: schedule  Please schedule patient tomorrow afternoon at [facility] for [facility]illo cath removal   Nurse visit    To see me in 2 weeks please   Thank you  
acuteness of illness/anxiety

## 2024-12-11 NOTE — ANESTHESIA PROCEDURE NOTES
Intubation    Date/Time: 12/11/2024 11:53 AM    Performed by: Concha Strauss CRNA  Authorized by: Kaushal Collado II, MD    Intubation:     Induction:  Intravenous    Intubated:  Postinduction    Mask Ventilation:  Not attempted    Attempts:  1    Attempted By:  CRNA    Difficult Airway Encountered?: No      Complications:  None    Airway Device:  Supraglottic airway/LMA    Airway Device Size:  4.0    Style/Cuff Inflation:  Uncuffed    Placement Verified By:  Capnometry    Complicating Factors:  None    Findings Post-Intubation:  Atraumatic/condition of teeth unchanged

## 2024-12-11 NOTE — H&P
Chief Complaint:        Encounter Diagnoses   Name Primary?    Benign prostatic hyperplasia, unspecified whether lower urinary tract symptoms present Yes    Renal cyst      Gross hematuria           HPI:  HPI Scooter Swan sid 73 y.o. male who presents with follow up to BPH and complex renal cyst.  He had a CT scan performed last month that showed Bosniak 2 cyst with no change.  The cyst has been stable for many years now.  He does have BPH and is on tamsulosin.  He has noticed a month ago some gross hematuria.  States it has only happened once and resolved.  He has never had trouble with kidney stones.  He did have a TAVR performed in June of this year.  He was not on any anticoagulation.     History:  Social History   Social History            Tobacco Use    Smoking status: Never    Smokeless tobacco: Never   Substance Use Topics    Alcohol use: Yes       Alcohol/week: 1.0 standard drink of alcohol       Types: 1 Shots of liquor per week       Comment: social    Drug use: Yes       Types: Hydrocodone, Marijuana              Past Medical History:   Diagnosis Date    Cataract      COPD (chronic obstructive pulmonary disease)      Diverticulosis      DUARTE (dyspnea on exertion)      GERD (gastroesophageal reflux disease)      Glaucoma      Hepatitis C       treated; seeing Dr. Carr    Hyperlipidemia      Hypertension      Liver lesion 08/2018    RA (rheumatoid arthritis)      Rectal prolapse       Possible    Severe aortic stenosis 08/24/2020    Thyroid disease              Past Surgical History:   Procedure Laterality Date    ANGIOGRAM, CORONARY, WITH LEFT HEART CATHETERIZATION   10/06/2022     Procedure: Angiogram, Coronary, with Left Heart Cath;  Surgeon: Zac Boucher MD;  Location: [facility] CATH;  Service: Cardiology;;    AORTOGRAPHY   03/28/2024     Procedure: AO Root;  Surgeon: Zac Boucher MD;  Location: [facility] CATH;  Service: Cardiology;;    ARTERIOGRAPHY OF AORTIC ROOT   10/06/2022     Procedure: ARTERIOGRAM,  AORTIC ROOT;  Surgeon: Zac Boucher MD;  Location: [facility] CATH;  Service: Cardiology;;    CARPAL TUNNEL RELEASE         Right wrist 2015    CATHETERIZATION OF BOTH LEFT AND RIGHT HEART   03/28/2024     Procedure: Right / Left heart cath;  Surgeon: Zac Boucher MD;  Location: [facility] CATH;  Service: Cardiology;;    COLONOSCOPY   08/2016     Dr. Cardona; in care everywhere    COLONOSCOPY N/A 03/20/2019     Procedure: COLONOSCOPY;  Surgeon: Sotero Arthur MD;  Location: [facility] ENDO;  Service: Endoscopy;  Laterality: N/A; hemorrhoids, diverticulosis, repeat in 10 years for screening    ECHOCARDIOGRAM,TRANSESOPHAGEAL N/A 01/12/2024     Procedure: Transesophageal echo (CADY) intra-procedure log documentation;  Surgeon: Renan Slaughter MD;  Location: [facility] CATH/EP LAB;  Service: Cardiology;  Laterality: N/A;    EXCISIONAL HEMORRHOIDECTOMY N/A 10/18/2018     Procedure: HEMORRHOIDECTOMY, prone;  Surgeon: Gunnar Horton MD;  Location: [address];  Service: Colon and Rectal;  Laterality: N/A;    HERNIA REPAIR        THYROID SURGERY        TRANSCATHETER AORTIC VALVE REPLACEMENT (TAVR)   6/19/2024     Procedure: (TAVR);  Surgeon: Hernandez Marrero MD;  Location: [facility] CATH;  Service: Cardiology;;    TRANSCATHETER AORTIC VALVE REPLACEMENT (TAVR)   6/19/2024     Procedure: (TAVR) - Surgeon;  Surgeon: Jada Murphy MD;  Location: [facility] CATH;  Service: Cardiothoracic;;    UPPER GASTROINTESTINAL ENDOSCOPY   08/2016     Dr. Cardona; in care everywhere             Family History   Problem Relation Name Age of Onset    Stomach cancer Paternal Cousin        Stomach cancer Paternal Cousin        Cataracts Mother        Diabetes Mother        Hypertension Mother        Stroke Mother        Diabetes Sister        Hypertension Sister        Stroke Sister        Diabetes Brother        Glaucoma Brother        Hypertension Brother        Stroke Brother        Diabetes Maternal Aunt        Hypertension Maternal Aunt        Stroke Maternal  Aunt        Diabetes Maternal Uncle        Hypertension Maternal Uncle        Stroke Maternal Uncle        Diabetes Maternal Grandmother        Hypertension Maternal Grandmother        Stroke Maternal Grandmother        Cancer Cousin             stomach    Colon cancer Neg Hx        Colon polyps Neg Hx        Crohn's disease Neg Hx        Ulcerative colitis Neg Hx        Esophageal cancer Neg Hx        Amblyopia Neg Hx        Blindness Neg Hx        Macular degeneration Neg Hx        Retinal detachment Neg Hx        Strabismus Neg Hx        Thyroid disease Neg Hx             Medications Ordered Prior to Encounter          Current Outpatient Medications on File Prior to Visit   Medication Sig Dispense Refill    albuterol (PROVENTIL) 2.5 mg /3 mL (0.083 %) nebulizer solution TAKE 3 MLS BY MOUTH VIA NEBULIZER EVERY 6 HOURS AS NEEDED FOR WHEEZING 90 mL 10    albuterol (PROVENTIL/VENTOLIN HFA) 90 mcg/actuation inhaler INHALE 1 PUFF BY MOUTH EVERY 4 HOURS AS NEEDED FOR WHEEZING OR SHORTNESS OF BREATH 20.1 g 3    amoxicillin (AMOXIL) 500 MG Tab Take 4 tabs (2000 mg) once 60 minutes prior to dental cleaning or other high risk procedures 20 tablet 0    aspirin (ECOTRIN) 81 MG EC tablet Take 1 tablet (81 mg total) by mouth once daily. 360 tablet 0    [] butalbital-acetaminophen-caffeine -40 mg (FIORICET, ESGIC) -40 mg per tablet Take 1 tablet by mouth every 4 (four) hours as needed for Pain. 10 tablet 5    cholecalciferol, vitamin D3, (VITAMIN D3) 50 mcg (2,000 unit) Cap Take 1 capsule (2,000 Units total) by mouth once daily. 90 capsule 3    cyanocobalamin 500 MCG tablet Take 500 mcg by mouth once daily.        diazePAM (VALIUM) 10 MG Tab Take 1 Tablet by mouth EVERY EVENING 30 tablet 2    diclofenac sodium (VOLTAREN) 1 % Gel APPLY TWO Grams TOPICALLY FOUR TIMES DAILY 100 g 5    diltiaZEM (CARDIZEM CD) 180 MG 24 hr capsule Take 1 capsule (180 mg total) by mouth once daily. 90 capsule 3    doxazosin  (CARDURA) 4 MG tablet Take 1 tablet (4 mg total) by mouth every evening. 90 tablet 1    enalapril (VASOTEC) 20 MG tablet TAKE ONE TABLET BY MOUTH TWO TIMES A  tablet 3    HYDROcodone-acetaminophen (NORCO)  mg per tablet Take 1 tablet by mouth every 8 (eight) hours as needed for Pain. 90 tablet 0    multivitamin capsule Take 1 capsule by mouth once daily.        omeprazole (PRILOSEC) 20 MG capsule Take 1 capsule (20 mg total) by mouth 2 (two) times a day. 180 capsule 3    polyethylene glycol (GLYCOLAX) 17 gram PwPk Take 17 g by mouth daily as needed.        pravastatin (PRAVACHOL) 20 MG tablet TAKE ONE TABLET BY MOUTH ONCE DAILY (Patient taking differently: Take 20 mg by mouth every evening.) 90 tablet 3    predniSONE (DELTASONE) 5 MG tablet Take 2 tablets (10 mg total) by mouth once daily. 270 tablet 1    promethazine (PHENERGAN) 25 MG tablet Take 1 tablet (25 mg total) by mouth every 6 (six) hours as needed for Nausea. 30 tablet 5    sildenafiL (VIAGRA) 100 MG tablet Take 1 tablet (100 mg total) by mouth as needed for Erectile Dysfunction. 24 tablet 2    tiotropium bromide (SPIRIVA RESPIMAT INHL) 2 puffs daily - pt reports taking everyday in AM        tiZANidine (ZANAFLEX) 4 MG tablet Take 1 tablet (4 mg total) by mouth every 8 (eight) hours. 270 tablet 1    [DISCONTINUED] golimumab (SIMPONI) 50 mg/0.5 mL PnIj Inject 50 mg into the skin every 28 days. 0.5 mL 11    [DISCONTINUED] mirabegron (MYRBETRIQ) 25 mg Tb24 ER tablet Take 1 tablet (25 mg total) by mouth once daily. 30 tablet 11    [DISCONTINUED] tamsulosin (FLOMAX) 0.4 mg Cap Take 1 capsule (0.4 mg total) by mouth once daily. 30 capsule 11                Current Facility-Administered Medications on File Prior to Visit   Medication Dose Route Frequency Provider Last Rate Last Admin    0.9%  NaCl infusion   Intravenous Continuous Deedee Sarkar NP   Stopped at 10/18/18 1423    mupirocin 2 % ointment   Nasal On Call Procedure Deedee Sarkar  "ABIOLA NP   Given at 10/18/18 1348            Objective:      Vitals       Vitals:     10/25/24 1018   BP: (!) 153/87   BP Location: Right arm   Patient Position: Sitting   Pulse: 81   Resp: 14   Temp: 98.2 °F (36.8 °C)   TempSrc: Temporal   Weight: 78.3 kg (172 lb 8.2 oz)   Height: 6' 1" (1.854 m)             BMI Readings from Last 1 Encounters:   10/25/24 22.76 kg/m²            Physical Exam  No acute distress alert and oriented   Respirations even unlabored   Abdomen is soft nontender           Lab Results   Component Value Date     PSA 1.4 02/19/2024     PSA 1.6 02/16/2023               Lab Results   Component Value Date     CREATININE 1.1 07/22/2024      Assessment:      Assessment  1. Benign prostatic hyperplasia, unspecified whether lower urinary tract symptoms present    2. Renal cyst    3. Gross hematuria          Plan:      1. Benign prostatic hyperplasia, unspecified whether lower urinary tract symptoms present    2. Renal cyst    3. Gross hematuria           Orders Placed This Encounter    POCT Bladder Scan    tamsulosin (FLOMAX) 0.4 mg Cap      BPH, continue tamsulosin.  Renal cyst Bosniak 2 and stable.  No further evaluation needed.  New onset gross hematuria.  Upper tracts have been evaluated adequately with CT scan recently.  Recommend cystoscopy in 2-3 weeks.    Cystoscopy     Date/Time: 11/15/2024 9:30 AM     Performed by: Carrington Lott MD  Authorized by: Carrington Lott MD    Consent Done?:  Yes (Written)  Timeout: prior to procedure the correct patient, procedure, and site was verified    Anesthesia:  Intraurethral instillation  Local anesthetic:  Lidocaine 2% topical gel  Indications: hematuria    Position:  Supine  Anesthesia:  Intraurethral instillation  Scope type:  Flexible cystoscope  Comments:        After informed consent, and preoperative antibiotic positioned in supine position.  Genitalia prepped and draped sterilely.  A 17 Kiswahili flexible cystoscope was passed through the urethra " into the bladder.  Systematic examination revealed mild trabeculation.  .  Scope was retroflexed and moderate sized median lobe projected into prostate. On the right side of the median lobe a pedunculated subcm lesion was noted to project of the median lobe.  Prostate were noted to be moderately enlarged with an approximate length of 4 cm. The scope was removed.  He tolerated procedure well.     Discussed findings on cystoscopy. Would recommend Transurethral resection of this urothelial lesion along with the median lobe.  Discussed risks and benefits.

## 2024-12-11 NOTE — ANESTHESIA POSTPROCEDURE EVALUATION
Anesthesia Post Evaluation    Patient: Scooter Swan    Procedure(s) Performed: Procedure(s) (LRB):  TURP, USING BIPOLAR CAUTERY (N/A)    Final Anesthesia Type: general      Patient location during evaluation: PACU  Patient participation: Yes- Able to Participate  Level of consciousness: awake and alert  Post-procedure vital signs: reviewed and stable  Pain management: adequate  Airway patency: patent  JENELLE mitigation strategies: Extubation while patient is awake  PONV status at discharge: No PONV  Anesthetic complications: no      Cardiovascular status: hemodynamically stable  Respiratory status: spontaneous ventilation  Hydration status: euvolemic  Follow-up not needed.              Vitals Value Taken Time   /109 12/11/24 1306   Temp 36.2 °C (97.1 °F) 12/11/24 1235   Pulse 64 12/11/24 1306   Resp 15 12/11/24 1306   SpO2 93 % 12/11/24 1306   Vitals shown include unfiled device data.      No case tracking events are documented in the log.      Pain/Carisa Score: Carisa Score: 9 (12/11/2024 12:45 PM)

## 2024-12-12 ENCOUNTER — CLINICAL SUPPORT (OUTPATIENT)
Dept: UROLOGY | Facility: CLINIC | Age: 74
End: 2024-12-12
Payer: MEDICARE

## 2024-12-12 DIAGNOSIS — N40.0 BENIGN PROSTATIC HYPERPLASIA, UNSPECIFIED WHETHER LOWER URINARY TRACT SYMPTOMS PRESENT: Primary | ICD-10-CM

## 2024-12-12 NOTE — PROGRESS NOTES
Pt came in to have portillo removed . Told pt to lay on the exam told . Deflated balloon 10 cc of water . Gently pulled out portillo . Told pt about follow up appt . Pt understood and left out clinic happy .

## 2024-12-13 LAB
FINAL PATHOLOGIC DIAGNOSIS: NORMAL
GROSS: NORMAL
Lab: NORMAL

## 2024-12-17 ENCOUNTER — TELEPHONE (OUTPATIENT)
Dept: CARDIOLOGY | Facility: CLINIC | Age: 74
End: 2024-12-17
Payer: MEDICARE

## 2024-12-17 NOTE — TELEPHONE ENCOUNTER
"Pt transferred from [facility]. Pt c/o elevated BP and dizziness, as stated by Kylie Warren. Pt stated via phone call that he "can't control his blood pressure". When asked about BP medication compliance, pt stated he has been taking meds regularly. Inquired about symptoms- pt stated he wasn't too dizzy, is experiencing pressure across the forehead along with a headache, no lightheadedness, and not nauseous. Pt denied any other symptoms during time of phone call. Pt took BP reading during present time of phone conversation, measured at 171/100, verbalized by pts wife. Advised pt to go to the ER for evaluation due to BP reading and headache. Instructed pt to have his wife drive him to the nearest ER, and pt and his wife stated they were getting dressed to go. Instructed pt to call back if he needed anything else. Pt and wife V/U of the importance of an ER evaluation.    "

## 2025-01-06 ENCOUNTER — OFFICE VISIT (OUTPATIENT)
Dept: UROLOGY | Facility: CLINIC | Age: 75
End: 2025-01-06
Payer: MEDICARE

## 2025-01-06 ENCOUNTER — LAB VISIT (OUTPATIENT)
Dept: LAB | Facility: HOSPITAL | Age: 75
End: 2025-01-06
Attending: UROLOGY
Payer: MEDICARE

## 2025-01-06 VITALS
DIASTOLIC BLOOD PRESSURE: 94 MMHG | BODY MASS INDEX: 24.18 KG/M2 | WEIGHT: 178.56 LBS | HEART RATE: 68 BPM | HEIGHT: 72 IN | SYSTOLIC BLOOD PRESSURE: 154 MMHG

## 2025-01-06 DIAGNOSIS — D30.3: ICD-10-CM

## 2025-01-06 DIAGNOSIS — N40.0 BENIGN PROSTATIC HYPERPLASIA, UNSPECIFIED WHETHER LOWER URINARY TRACT SYMPTOMS PRESENT: Primary | ICD-10-CM

## 2025-01-06 DIAGNOSIS — N40.0 BENIGN PROSTATIC HYPERPLASIA, UNSPECIFIED WHETHER LOWER URINARY TRACT SYMPTOMS PRESENT: ICD-10-CM

## 2025-01-06 PROCEDURE — 99999 PR PBB SHADOW E&M-EST. PATIENT-LVL IV: CPT | Mod: PBBFAC,,, | Performed by: UROLOGY

## 2025-01-06 PROCEDURE — 1159F MED LIST DOCD IN RCRD: CPT | Mod: CPTII,S$GLB,, | Performed by: UROLOGY

## 2025-01-06 PROCEDURE — 1101F PT FALLS ASSESS-DOCD LE1/YR: CPT | Mod: CPTII,S$GLB,, | Performed by: UROLOGY

## 2025-01-06 PROCEDURE — 3288F FALL RISK ASSESSMENT DOCD: CPT | Mod: CPTII,S$GLB,, | Performed by: UROLOGY

## 2025-01-06 PROCEDURE — 3077F SYST BP >= 140 MM HG: CPT | Mod: CPTII,S$GLB,, | Performed by: UROLOGY

## 2025-01-06 PROCEDURE — 3080F DIAST BP >= 90 MM HG: CPT | Mod: CPTII,S$GLB,, | Performed by: UROLOGY

## 2025-01-06 PROCEDURE — 36415 COLL VENOUS BLD VENIPUNCTURE: CPT | Performed by: UROLOGY

## 2025-01-06 PROCEDURE — 99024 POSTOP FOLLOW-UP VISIT: CPT | Mod: S$GLB,,, | Performed by: UROLOGY

## 2025-01-06 PROCEDURE — 1126F AMNT PAIN NOTED NONE PRSNT: CPT | Mod: CPTII,S$GLB,, | Performed by: UROLOGY

## 2025-01-06 NOTE — PROGRESS NOTES
Chief Complaint:   Encounter Diagnoses   Name Primary?    Benign prostatic hyperplasia, unspecified whether lower urinary tract symptoms present Yes    Benign neoplasm of urinary bladder neck        HPI:  HPI Scooter Swan sid 74 y.o. male who presents with follow up from TURP of his median lobe with resection of a urothelial neoplasm on the median lobe.  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  Patient states he has been voiding about the same.  He does have chronic frequency and urgency.  He has previously on Myrbetriq 25 mg.  He is also on tamsulosin.  He takes doxazosin.  He has not had any hematuria.  He was discharged with a Raymond which was removed after a couple of days.  He had no trouble with this.    History:  Social History     Tobacco Use    Smoking status: Never    Smokeless tobacco: Never   Substance Use Topics    Alcohol use: Yes     Alcohol/week: 1.0 standard drink of alcohol     Types: 1 Shots of liquor per week     Comment: social    Drug use: Yes     Types: Hydrocodone, Marijuana     Past Medical History:   Diagnosis Date    Cataract     COPD (chronic obstructive pulmonary disease)     Diverticulosis     DUARTE (dyspnea on exertion)     GERD (gastroesophageal reflux disease)     Glaucoma     Hepatitis C     treated; seeing Dr. Carr    Hyperlipidemia     Hypertension     Liver lesion 08/2018    RA (rheumatoid arthritis)     Rectal prolapse     Possible    Severe aortic stenosis 08/24/2020    Thyroid disease      Past Surgical History:   Procedure Laterality Date    ANGIOGRAM, CORONARY, WITH LEFT HEART CATHETERIZATION  10/06/2022    Procedure: Angiogram, Coronary, with Left Heart Cath;  Surgeon: Zac Boucher MD;  Location: [facility] CATH;  Service: Cardiology;;    AORTOGRAPHY  03/28/2024    Procedure: AO Root;  Surgeon: Zac Boucher MD;  Location: [facility] CATH;  Service: Cardiology;;    ARTERIOGRAPHY OF AORTIC ROOT  10/06/2022    Procedure: ARTERIOGRAM,  AORTIC ROOT;  Surgeon: Zac Boucher MD;  Location: [facility] CATH;  Service: Cardiology;;    CARPAL TUNNEL RELEASE      Right wrist 2015    CATHETERIZATION OF BOTH LEFT AND RIGHT HEART  03/28/2024    Procedure: Right / Left heart cath;  Surgeon: Zac Boucher MD;  Location: [facility] CATH;  Service: Cardiology;;    COLONOSCOPY  08/2016    Dr. Cardona; in care everywhere    COLONOSCOPY N/A 03/20/2019    Procedure: COLONOSCOPY;  Surgeon: Sotero Arthur MD;  Location: [facility] ENDO;  Service: Endoscopy;  Laterality: N/A; hemorrhoids, diverticulosis, repeat in 10 years for screening    ECHOCARDIOGRAM,TRANSESOPHAGEAL N/A 01/12/2024    Procedure: Transesophageal echo (CADY) intra-procedure log documentation;  Surgeon: Renan Slaughter MD;  Location: [facility] CATH/EP LAB;  Service: Cardiology;  Laterality: N/A;    EXCISIONAL HEMORRHOIDECTOMY N/A 10/18/2018    Procedure: HEMORRHOIDECTOMY, prone;  Surgeon: Gunnar Horton MD;  Location: [address];  Service: Colon and Rectal;  Laterality: N/A;    HERNIA REPAIR      THYROID SURGERY      TRANSCATHETER AORTIC VALVE REPLACEMENT (TAVR)  6/19/2024    Procedure: (TAVR);  Surgeon: Hernandez Marrero MD;  Location: [facility] CATH;  Service: Cardiology;;    TRANSCATHETER AORTIC VALVE REPLACEMENT (TAVR)  6/19/2024    Procedure: (TAVR) - Surgeon;  Surgeon: Jada Murphy MD;  Location: [facility] CATH;  Service: Cardiothoracic;;    TRANSURETHRAL RESECTION OF PROSTATE USING BIPOLAR CAUTERY N/A 12/11/2024    Procedure: TURP, USING BIPOLAR CAUTERY;  Surgeon: Carrington Lott MD;  Location: [facility];  Service: Urology;  Laterality: N/A;    UPPER GASTROINTESTINAL ENDOSCOPY  08/2016    Dr. Cardona; in care everywhere     Family History   Problem Relation Name Age of Onset    Stomach cancer Paternal Cousin      Stomach cancer Paternal Cousin      Cataracts Mother      Diabetes Mother      Hypertension Mother      Stroke Mother      Diabetes Sister      Hypertension Sister      Stroke Sister      Diabetes Brother       Glaucoma Brother      Hypertension Brother      Stroke Brother      Diabetes Maternal Aunt      Hypertension Maternal Aunt      Stroke Maternal Aunt      Diabetes Maternal Uncle      Hypertension Maternal Uncle      Stroke Maternal Uncle      Diabetes Maternal Grandmother      Hypertension Maternal Grandmother      Stroke Maternal Grandmother      Cancer Cousin          stomach    Colon cancer Neg Hx      Colon polyps Neg Hx      Crohn's disease Neg Hx      Ulcerative colitis Neg Hx      Esophageal cancer Neg Hx      Amblyopia Neg Hx      Blindness Neg Hx      Macular degeneration Neg Hx      Retinal detachment Neg Hx      Strabismus Neg Hx      Thyroid disease Neg Hx         Current Outpatient Medications on File Prior to Visit   Medication Sig Dispense Refill    albuterol (PROVENTIL) 2.5 mg /3 mL (0.083 %) nebulizer solution TAKE 3 MLS BY MOUTH VIA NEBULIZER EVERY 6 HOURS AS NEEDED FOR WHEEZING 90 mL 10    albuterol (PROVENTIL/VENTOLIN HFA) 90 mcg/actuation inhaler INHALE 1 PUFF BY MOUTH EVERY 4 HOURS AS NEEDED FOR WHEEZING OR SHORTNESS OF BREATH 20.1 g 3    amoxicillin (AMOXIL) 500 MG Tab Take 4 tabs (2000 mg) once 60 minutes prior to dental cleaning or other high risk procedures 20 tablet 0    aspirin (ECOTRIN) 81 MG EC tablet Take 1 tablet (81 mg total) by mouth once daily. 360 tablet 0    cholecalciferol, vitamin D3, (VITAMIN D3) 50 mcg (2,000 unit) Cap Take 1 capsule (2,000 Units total) by mouth once daily. 90 capsule 3    ciprofloxacin HCl (CIPRO) 500 MG tablet Take 1 tablet (500 mg total) by mouth every 12 (twelve) hours. 6 tablet 0    cyanocobalamin 500 MCG tablet Take 500 mcg by mouth once daily.      diazePAM (VALIUM) 10 MG Tab Take 1 tablet (10 mg total) by mouth every evening. 30 tablet 2    diclofenac sodium (VOLTAREN) 1 % Gel APPLY TWO Grams TOPICALLY FOUR TIMES DAILY 100 g 5    diltiaZEM (CARDIZEM CD) 240 MG 24 hr capsule Take 1 capsule (240 mg total) by mouth once daily. 90 capsule 1     doxazosin (CARDURA) 4 MG tablet Take 1 tablet (4 mg total) by mouth every evening. 90 tablet 1    enalapril (VASOTEC) 20 MG tablet TAKE ONE TABLET BY MOUTH TWO TIMES A  tablet 3    HYDROcodone-acetaminophen (NORCO)  mg per tablet Take 1 tablet by mouth every 8 (eight) hours as needed for Pain. 90 tablet 0    multivitamin capsule Take 1 capsule by mouth once daily.      omeprazole (PRILOSEC) 20 MG capsule Take 1 capsule (20 mg total) by mouth 2 (two) times a day. 180 capsule 3    polyethylene glycol (GLYCOLAX) 17 gram PwPk Take 17 g by mouth daily as needed.      pravastatin (PRAVACHOL) 20 MG tablet TAKE ONE TABLET BY MOUTH ONCE DAILY 90 tablet 3    predniSONE (DELTASONE) 5 MG tablet Take 2 tablets (10 mg total) by mouth once daily. 270 tablet 1    promethazine (PHENERGAN) 25 MG tablet Take 1 tablet (25 mg total) by mouth every 6 (six) hours as needed for Nausea. 30 tablet 5    sildenafiL (VIAGRA) 100 MG tablet Take 1 tablet (100 mg total) by mouth as needed for Erectile Dysfunction. 24 tablet 2    tiotropium bromide (SPIRIVA RESPIMAT INHL) 2 puffs daily - pt reports taking everyday in AM      tiZANidine (ZANAFLEX) 4 MG tablet Take 1 tablet (4 mg total) by mouth every 8 (eight) hours. 270 tablet 1    [DISCONTINUED] tamsulosin (FLOMAX) 0.4 mg Cap Take 1 capsule (0.4 mg total) by mouth once daily. 90 capsule 3    [DISCONTINUED] golimumab (SIMPONI) 50 mg/0.5 mL PnIj Inject 50 mg into the skin every 28 days. 0.5 mL 11     Current Facility-Administered Medications on File Prior to Visit   Medication Dose Route Frequency Provider Last Rate Last Admin    0.9%  NaCl infusion   Intravenous Continuous Deedee Sarkar NP   Stopped at 10/18/18 1613    mupirocin 2 % ointment   Nasal On Call Procedure Deedee Sarkar NP   Given at 10/18/18 1348        Objective:     Vitals:    01/06/25 1329   BP: (!) 154/94   Pulse: 68   Weight: 81 kg (178 lb 9.2 oz)   Height: 6' (1.829 m)      BMI Readings from Last 1  Encounters:   01/06/25 24.22 kg/m²          Physical Exam  No acute distress alert and oriented   Respirations even unlabored   Abdomen is soft nontender  Lab Results   Component Value Date    PSA 1.4 02/19/2024    PSA 1.6 02/16/2023        Lab Results   Component Value Date    CREATININE 1.2 11/15/2024      Assessment:       1. Benign prostatic hyperplasia, unspecified whether lower urinary tract symptoms present    2. Benign neoplasm of urinary bladder neck        Plan:     1. Benign prostatic hyperplasia, unspecified whether lower urinary tract symptoms present    2. Benign neoplasm of urinary bladder neck       Orders Placed This Encounter    Prostate Specific Antigen, Diagnostic      Reassured that the neoplasm seen was benign.  Recommend observation and follow up of his urine.  Discussed can stop both tamsulosin and Myrbetriq if these are not helpful for him.  I will re-evaluate him in 6 months.  We will check PSA today.

## 2025-01-22 DIAGNOSIS — G47.00 INSOMNIA, UNSPECIFIED TYPE: ICD-10-CM

## 2025-01-22 RX ORDER — DIAZEPAM 10 MG/1
10 TABLET ORAL NIGHTLY
Qty: 30 TABLET | Refills: 0 | Status: SHIPPED | OUTPATIENT
Start: 2025-01-22

## 2025-01-22 NOTE — TELEPHONE ENCOUNTER
No care due was identified.  [facility] Embedded Care Due Messages. Reference number: [id number].   1/22/2025 8:05:38 AM CST

## 2025-01-31 DIAGNOSIS — M05.9 SEROPOSITIVE RHEUMATOID ARTHRITIS: ICD-10-CM

## 2025-01-31 DIAGNOSIS — L40.50 PSA (PSORIATIC ARTHRITIS): ICD-10-CM

## 2025-01-31 DIAGNOSIS — G89.4 CHRONIC PAIN SYNDROME: ICD-10-CM

## 2025-01-31 DIAGNOSIS — R53.83 FATIGUE, UNSPECIFIED TYPE: ICD-10-CM

## 2025-02-01 NOTE — PROGRESS NOTES
Subjective:       Patient ID: Scooter Swan is a 67 y.o. male.    Chief Complaint: Rheumatoid Arthritis          Rheumatoid Arthritis    The disease course has been fluctuating.     He complains of pain, stiffness, joint swelling and joint warmth. The symptoms have been worsening. Affected locations include the neck, right MCP, right DIP, right hip, right wrist, right knee, left shoulder, left elbow, left wrist, left MCP, left PIP, left DIP, left hip, left knee, left ankle, right shoulder and right elbow. Associated symptoms include fatigue, pain at night, pain while resting, myalgias and weight loss. Pertinent negatives include no dysuria, fever, Raynaud's syndrome, uveitis, trouble swallowing, dry eyes, headaches or stress. He complains of morning stiffness.The neck, left shoulder, right shoulder, left wrist, right wrist, left elbow, right elbow, left hand, left hip, right hip, right hand, left knee, right knee, left ankle and right ankle presents with Arthralgia.    Past treatments include NSAIDs and corticosteroids. The treatment provided mild relief. Factors aggravating his arthritis include activity.     Review of Systems   Constitutional: Positive for activity change, fatigue and weight loss. Negative for appetite change, chills, diaphoresis, fever and unexpected weight change.   HENT: Negative for congestion, ear pain, facial swelling, mouth sores, nosebleeds, postnasal drip, rhinorrhea, sinus pressure, sneezing, sore throat, tinnitus, trouble swallowing and voice change.    Eyes: Negative for pain, discharge, redness, itching and visual disturbance.   Respiratory: Negative for apnea, cough, chest tightness, shortness of breath and wheezing.    Cardiovascular: Negative for chest pain, palpitations and leg swelling.   Gastrointestinal: Negative for abdominal pain, constipation, diarrhea, nausea and vomiting.   Endocrine: Negative for cold intolerance, heat intolerance, polydipsia and polyuria.   Genitourinary:  The patient's goals for the shift include      The clinical goals for the shift include Monitor labs and vitals, safety      Problem: Fall/Injury  Goal: Not fall by end of shift  Outcome: Progressing  Goal: Be free from injury by end of the shift  Outcome: Progressing  Goal: Verbalize understanding of personal risk factors for fall in the hospital  Outcome: Progressing  Goal: Verbalize understanding of risk factor reduction measures to prevent injury from fall in the home  Outcome: Progressing  Goal: Use assistive devices by end of the shift  Outcome: Progressing  Goal: Pace activities to prevent fatigue by end of the shift  Outcome: Progressing        Negative for decreased urine volume, difficulty urinating, dysuria, flank pain, frequency, hematuria and urgency.   Musculoskeletal: Positive for back pain, gait problem, joint swelling, myalgias, neck pain, neck stiffness and stiffness. Negative for arthralgias.   Skin: Positive for rash. Negative for pallor and wound.   Allergic/Immunologic: Negative for immunocompromised state.   Neurological: Negative for dizziness, tremors, seizures, syncope, weakness, numbness and headaches.   Hematological: Negative for adenopathy. Does not bruise/bleed easily.   Psychiatric/Behavioral: Negative for sleep disturbance and suicidal ideas. The patient is not nervous/anxious.          Objective:   BP (!) 144/72   Pulse 62   Ht 6' (1.829 m)   Wt 80.6 kg (177 lb 11.1 oz)   BMI 24.10 kg/m²      Physical Exam   Vitals reviewed.  Constitutional: He is oriented to person, place, and time and well-developed, well-nourished, and in no distress.   HENT:   Head: Normocephalic and atraumatic.   Mouth/Throat: Oropharynx is clear and moist.   Eyes: EOM are normal. Pupils are equal, round, and reactive to light.   Neck: Neck supple. No thyromegaly present.   Cardiovascular: Normal rate, regular rhythm and normal heart sounds.  Exam reveals no gallop and no friction rub.    No murmur heard.  Pulmonary/Chest: Breath sounds normal. He has no wheezes. He has no rales. He exhibits no tenderness.   Abdominal: There is no tenderness. There is no rebound and no guarding.       Right Side Rheumatological Exam     The patient is tender to palpation of the shoulder, elbow, wrist, knee, 1st PIP, 1st MCP, 2nd PIP, 2nd MCP, 3rd PIP, 3rd MCP, 4th PIP, 4th MCP and 5th PIP    He has swelling of the elbow, wrist, knee, 1st PIP, 1st MCP, 2nd PIP, 2nd MCP, 3rd PIP, 3rd MCP, 4th PIP, 4th MCP, 5th PIP and 5th MCP    The patient has an enlarged wrist and knee    Shoulder Exam   Tenderness Location: no tenderness    Range of Motion   Active Abduction: abnormal    Adduction: abnormal  Sensation: normal    Knee Exam   Tenderness Location: medial joint line and LCL  Patellofemoral Crepitus: positive  Effusion: positive  Sensation: normal    Hip Exam   Tenderness Location: posterior and anterior  Sensation: normal    Elbow/Wrist Exam   Tenderness Location: no tenderness  Sensation: normal    Left Side Rheumatological Exam     The patient is tender to palpation of the shoulder, elbow, wrist, knee, 1st PIP, 1st MCP, 2nd PIP, 2nd MCP, 3rd PIP, 3rd MCP, 4th PIP, 4th MCP, 5th PIP and 5th MCP.    He has swelling of the wrist, knee, 1st PIP, 1st MCP, 2nd PIP, 2nd MCP, 3rd PIP, 3rd MCP, 4th PIP, 4th MCP, 5th PIP and 5th MCP    The patient has an enlarged knee.    Shoulder Exam   Tenderness Location: no tenderness    Range of Motion   Active Abduction: abnormal   Sensation: normal    Knee Exam   Tenderness Location: lateral joint line and medial joint line    Patellofemoral Crepitus: positive  Effusion: positive  Sensation: normal    Hip Exam   Tenderness Location: posterior and anterior  Sensation: normal    Elbow/Wrist Exam   Sensation: normal      Back/Neck Exam   General Inspection   Gait: normal       Tenderness Right paramedian tenderness of the Upper C-Spine, Lower C-Spine, Lower L-Spine and SI Joint.Left paramedian tenderness of the Upper C-Spine, Lower L-Spine, Lower C-Spine and SI Joint.    Neck Range of Motion   Flexion: Limited  Extension: Limited  Lymphadenopathy:     He has no cervical adenopathy.   Neurological: He is alert and oriented to person, place, and time. Gait normal.   Skin: No rash noted. No erythema. No pallor.     Psychiatric: Mood and affect normal.   Musculoskeletal: He exhibits edema, tenderness and deformity.         Rheumatoid factor (02/17/2015 10:32 AM)  Rheumatoid factor (02/17/2015 10:32 AM)   Component Value Ref Range   RA Latex POSITIVE 1:8 (A) NEGATIVE     Rheumatoid factor (02/17/2015 10:32 AM)   Specimen Performing Laboratory   N/A - Blood  [facility]     Back to top of Lab Results      Cyclic citrul peptide antibody, IgG (02/17/2015 10:32 AM)  Cyclic citrul peptide antibody, IgG (02/17/2015 10:32 AM)   Component Value Ref Range   Test requested see belowComment: CYCLIC CITRULLINE PEPTIDE (CCP) IGG     CCP, Ab IGG <16  Comment:   Test Performed By:  [facility]-[place], [place].   NEGATIVE:~ <20~WEAK POSITIVE:~ 20-39~MODERATE POSITIVE:~ 40-59~STRONG POSITIVE~ >59     Cyclic citrul peptide antibody, IgG (02/17/2015 10:32 AM)   Specimen Performing Laboratory   Blood [facility]             Assessment:       1. Rheumatoid arthritis, involving unspecified site, unspecified rheumatoid factor presence    2. Carpal tunnel syndrome, unspecified laterality    3. Hepatitis C carrier            Plan:       Scooter was seen today for rheumatoid arthritis.    Diagnoses and all orders for this visit:    Rheumatoid arthritis, involving unspecified site, unspecified rheumatoid factor presence  -     predniSONE (DELTASONE) 5 MG tablet; Take 2 tablets (10 mg total) by mouth daily as needed.  -     methylPREDNISolone acetate injection 160 mg; Inject 2 mLs (160 mg total) into the muscle one time.  -     ketorolac injection 60 mg; Inject 2 mLs (60 mg total) into the muscle one time.  -     hydrocodone-acetaminophen 10-325mg (NORCO)  mg Tab; Take 1 tablet by mouth every 8 (eight) hours as needed for Pain.  -     tiZANidine (ZANAFLEX) 4 MG tablet; Take 1 tablet (4 mg total) by mouth every 8 (eight) hours.  -     diclofenac sodium 1 % Gel; Apply 2 g topically once daily.  -     C-reactive protein; Future  -     Sedimentation rate, manual; Future  -     TSH; Future  -     T4, free; Future  -     T3, free; Future  -     Cyclic citrul peptide antibody, IgG; Future  -     Rheumatoid factor; Future  -     Thyroid peroxidase antibody; Future  -     Anti-thyroglobulin antibody; Future  -     Hepatitis C RNA, quantitative, PCR;  Future  -     HLA B27 Antigen; Future  -     C-reactive protein  -     Sedimentation rate, manual  -     TSH  -     T4, free  -     T3, free  -     Cyclic citrul peptide antibody, IgG  -     Rheumatoid factor  -     Thyroid peroxidase antibody  -     Anti-thyroglobulin antibody  -     Hepatitis C RNA, quantitative, PCR  -     HLA B27 Antigen    Carpal tunnel syndrome, unspecified laterality  -     predniSONE (DELTASONE) 5 MG tablet; Take 2 tablets (10 mg total) by mouth daily as needed.  -     methylPREDNISolone acetate injection 160 mg; Inject 2 mLs (160 mg total) into the muscle one time.  -     ketorolac injection 60 mg; Inject 2 mLs (60 mg total) into the muscle one time.  -     hydrocodone-acetaminophen 10-325mg (NORCO)  mg Tab; Take 1 tablet by mouth every 8 (eight) hours as needed for Pain.  -     tiZANidine (ZANAFLEX) 4 MG tablet; Take 1 tablet (4 mg total) by mouth every 8 (eight) hours.  -     diclofenac sodium 1 % Gel; Apply 2 g topically once daily.  -     C-reactive protein; Future  -     Sedimentation rate, manual; Future  -     TSH; Future  -     T4, free; Future  -     T3, free; Future  -     Cyclic citrul peptide antibody, IgG; Future  -     Rheumatoid factor; Future  -     Thyroid peroxidase antibody; Future  -     Anti-thyroglobulin antibody; Future  -     Hepatitis C RNA, quantitative, PCR; Future  -     HLA B27 Antigen; Future  -     C-reactive protein  -     Sedimentation rate, manual  -     TSH  -     T4, free  -     T3, free  -     Cyclic citrul peptide antibody, IgG  -     Rheumatoid factor  -     Thyroid peroxidase antibody  -     Anti-thyroglobulin antibody  -     Hepatitis C RNA, quantitative, PCR  -     HLA B27 Antigen    Hepatitis C carrier  -     C-reactive protein; Future  -     Sedimentation rate, manual; Future  -     TSH; Future  -     T4, free; Future  -     T3, free; Future  -     Cyclic citrul peptide antibody, IgG; Future  -     Rheumatoid factor; Future  -     Thyroid  peroxidase antibody; Future  -     Anti-thyroglobulin antibody; Future  -     Hepatitis C RNA, quantitative, PCR; Future  -     HLA B27 Antigen; Future  -     C-reactive protein  -     Sedimentation rate, manual  -     TSH  -     T4, free  -     T3, free  -     Cyclic citrul peptide antibody, IgG  -     Rheumatoid factor  -     Thyroid peroxidase antibody  -     Anti-thyroglobulin antibody  -     Hepatitis C RNA, quantitative, PCR  -     HLA B27 Antigen

## 2025-02-03 RX ORDER — HYDROCODONE BITARTRATE AND ACETAMINOPHEN 10; 325 MG/1; MG/1
1 TABLET ORAL EVERY 8 HOURS PRN
Qty: 90 TABLET | Refills: 0 | Status: SHIPPED | OUTPATIENT
Start: 2025-02-03 | End: 2025-02-25 | Stop reason: SDUPTHER

## 2025-02-12 ENCOUNTER — PATIENT MESSAGE (OUTPATIENT)
Dept: SURGERY | Facility: HOSPITAL | Age: 75
End: 2025-02-12
Payer: MEDICARE

## 2025-02-12 ENCOUNTER — LAB VISIT (OUTPATIENT)
Dept: LAB | Facility: HOSPITAL | Age: 75
End: 2025-02-12
Attending: INTERNAL MEDICINE
Payer: MEDICARE

## 2025-02-12 ENCOUNTER — TELEPHONE (OUTPATIENT)
Dept: CARDIOLOGY | Facility: CLINIC | Age: 75
End: 2025-02-12
Payer: MEDICARE

## 2025-02-12 DIAGNOSIS — D64.9 ANEMIA, UNSPECIFIED TYPE: ICD-10-CM

## 2025-02-12 LAB
ALBUMIN SERPL BCP-MCNC: 3.7 G/DL (ref 3.5–5.2)
ALP SERPL-CCNC: 56 U/L (ref 40–150)
ALT SERPL W/O P-5'-P-CCNC: 12 U/L (ref 10–44)
ANION GAP SERPL CALC-SCNC: 7 MMOL/L (ref 8–16)
AST SERPL-CCNC: 14 U/L (ref 10–40)
BASOPHILS # BLD AUTO: 0.06 K/UL (ref 0–0.2)
BASOPHILS NFR BLD: 0.4 % (ref 0–1.9)
BILIRUB SERPL-MCNC: 0.2 MG/DL (ref 0.1–1)
BUN SERPL-MCNC: 23 MG/DL (ref 8–23)
CALCIUM SERPL-MCNC: 9.4 MG/DL (ref 8.7–10.5)
CHLORIDE SERPL-SCNC: 105 MMOL/L (ref 95–110)
CO2 SERPL-SCNC: 27 MMOL/L (ref 23–29)
CREAT SERPL-MCNC: 1.2 MG/DL (ref 0.5–1.4)
DIFFERENTIAL METHOD BLD: ABNORMAL
EOSINOPHIL # BLD AUTO: 0 K/UL (ref 0–0.5)
EOSINOPHIL NFR BLD: 0.3 % (ref 0–8)
ERYTHROCYTE [DISTWIDTH] IN BLOOD BY AUTOMATED COUNT: 13.4 % (ref 11.5–14.5)
EST. GFR  (NO RACE VARIABLE): >60 ML/MIN/1.73 M^2
FERRITIN SERPL-MCNC: 423 NG/ML (ref 20–300)
GLUCOSE SERPL-MCNC: 173 MG/DL (ref 70–110)
HCT VFR BLD AUTO: 34 % (ref 40–54)
HGB BLD-MCNC: 10.2 G/DL (ref 14–18)
IMM GRANULOCYTES # BLD AUTO: 0.21 K/UL (ref 0–0.04)
IMM GRANULOCYTES NFR BLD AUTO: 1.5 % (ref 0–0.5)
IRON SERPL-MCNC: 71 UG/DL (ref 45–160)
LYMPHOCYTES # BLD AUTO: 1.6 K/UL (ref 1–4.8)
LYMPHOCYTES NFR BLD: 11.3 % (ref 18–48)
MCH RBC QN AUTO: 29 PG (ref 27–31)
MCHC RBC AUTO-ENTMCNC: 30 G/DL (ref 32–36)
MCV RBC AUTO: 97 FL (ref 82–98)
MONOCYTES # BLD AUTO: 1 K/UL (ref 0.3–1)
MONOCYTES NFR BLD: 7.5 % (ref 4–15)
NEUTROPHILS # BLD AUTO: 11 K/UL (ref 1.8–7.7)
NEUTROPHILS NFR BLD: 79 % (ref 38–73)
NRBC BLD-RTO: 0 /100 WBC
PLATELET # BLD AUTO: 124 K/UL (ref 150–450)
PMV BLD AUTO: 13.2 FL (ref 9.2–12.9)
POTASSIUM SERPL-SCNC: 4.3 MMOL/L (ref 3.5–5.1)
PROT SERPL-MCNC: 6.4 G/DL (ref 6–8.4)
RBC # BLD AUTO: 3.52 M/UL (ref 4.6–6.2)
SATURATED IRON: 26 % (ref 20–50)
SODIUM SERPL-SCNC: 139 MMOL/L (ref 136–145)
TOTAL IRON BINDING CAPACITY: 268 UG/DL (ref 250–450)
TRANSFERRIN SERPL-MCNC: 181 MG/DL (ref 200–375)
WBC # BLD AUTO: 13.93 K/UL (ref 3.9–12.7)

## 2025-02-12 PROCEDURE — 83540 ASSAY OF IRON: CPT | Performed by: INTERNAL MEDICINE

## 2025-02-12 PROCEDURE — 80053 COMPREHEN METABOLIC PANEL: CPT | Performed by: INTERNAL MEDICINE

## 2025-02-12 PROCEDURE — 82728 ASSAY OF FERRITIN: CPT | Performed by: INTERNAL MEDICINE

## 2025-02-12 PROCEDURE — 36415 COLL VENOUS BLD VENIPUNCTURE: CPT | Mod: PO | Performed by: INTERNAL MEDICINE

## 2025-02-12 PROCEDURE — 85025 COMPLETE CBC W/AUTO DIFF WBC: CPT | Performed by: INTERNAL MEDICINE

## 2025-02-24 DIAGNOSIS — G47.00 INSOMNIA, UNSPECIFIED TYPE: ICD-10-CM

## 2025-02-24 RX ORDER — DIAZEPAM 10 MG/1
10 TABLET ORAL NIGHTLY
Qty: 30 TABLET | Refills: 2 | Status: SHIPPED | OUTPATIENT
Start: 2025-02-24

## 2025-02-24 NOTE — TELEPHONE ENCOUNTER
No care due was identified.  [facility] Embedded Care Due Messages. Reference number: [id number].   2/24/2025 11:14:10 AM CST

## 2025-02-25 ENCOUNTER — OFFICE VISIT (OUTPATIENT)
Dept: RHEUMATOLOGY | Facility: CLINIC | Age: 75
End: 2025-02-25
Payer: MEDICARE

## 2025-02-25 VITALS
BODY MASS INDEX: 25.06 KG/M2 | WEIGHT: 185 LBS | HEART RATE: 86 BPM | SYSTOLIC BLOOD PRESSURE: 120 MMHG | HEIGHT: 72 IN | DIASTOLIC BLOOD PRESSURE: 78 MMHG

## 2025-02-25 DIAGNOSIS — R53.83 FATIGUE, UNSPECIFIED TYPE: ICD-10-CM

## 2025-02-25 DIAGNOSIS — L40.50 PSA (PSORIATIC ARTHRITIS): ICD-10-CM

## 2025-02-25 DIAGNOSIS — G89.4 CHRONIC PAIN SYNDROME: ICD-10-CM

## 2025-02-25 DIAGNOSIS — R73.09 OTHER ABNORMAL GLUCOSE: ICD-10-CM

## 2025-02-25 DIAGNOSIS — M17.9 OSTEOARTHRITIS OF KNEE, UNSPECIFIED LATERALITY, UNSPECIFIED OSTEOARTHRITIS TYPE: ICD-10-CM

## 2025-02-25 DIAGNOSIS — M81.6 LOCALIZED OSTEOPOROSIS (LEQUESNE): ICD-10-CM

## 2025-02-25 DIAGNOSIS — M05.9 SEROPOSITIVE RHEUMATOID ARTHRITIS: ICD-10-CM

## 2025-02-25 DIAGNOSIS — J32.9 CHRONIC SINUSITIS, UNSPECIFIED LOCATION: Primary | ICD-10-CM

## 2025-02-25 PROCEDURE — 99999 PR PBB SHADOW E&M-EST. PATIENT-LVL V: CPT | Mod: PBBFAC,,, | Performed by: INTERNAL MEDICINE

## 2025-02-25 RX ORDER — KETOROLAC TROMETHAMINE 30 MG/ML
60 INJECTION, SOLUTION INTRAMUSCULAR; INTRAVENOUS
Status: COMPLETED | OUTPATIENT
Start: 2025-02-25 | End: 2025-02-25

## 2025-02-25 RX ORDER — HYDROCODONE BITARTRATE AND ACETAMINOPHEN 10; 325 MG/1; MG/1
1 TABLET ORAL EVERY 8 HOURS PRN
Qty: 90 TABLET | Refills: 0 | Status: SHIPPED | OUTPATIENT
Start: 2025-04-01 | End: 2025-05-01

## 2025-02-25 RX ORDER — HYDROCODONE BITARTRATE AND ACETAMINOPHEN 10; 325 MG/1; MG/1
1 TABLET ORAL EVERY 8 HOURS PRN
Qty: 90 TABLET | Refills: 0 | Status: SHIPPED | OUTPATIENT
Start: 2025-05-01 | End: 2025-05-31

## 2025-02-25 RX ORDER — PREDNISONE 5 MG/1
10 TABLET ORAL DAILY
Qty: 270 TABLET | Refills: 1 | Status: SHIPPED | OUTPATIENT
Start: 2025-02-25 | End: 2026-02-25

## 2025-02-25 RX ORDER — HYDROCODONE BITARTRATE AND ACETAMINOPHEN 10; 325 MG/1; MG/1
1 TABLET ORAL EVERY 8 HOURS PRN
Qty: 90 TABLET | Refills: 0 | Status: SHIPPED | OUTPATIENT
Start: 2025-03-01 | End: 2025-03-31

## 2025-02-25 RX ORDER — TIZANIDINE 4 MG/1
4 TABLET ORAL EVERY 8 HOURS
Qty: 270 TABLET | Refills: 1 | Status: SHIPPED | OUTPATIENT
Start: 2025-02-25

## 2025-02-25 RX ORDER — CYANOCOBALAMIN 1000 UG/ML
1000 INJECTION, SOLUTION INTRAMUSCULAR; SUBCUTANEOUS
Status: COMPLETED | OUTPATIENT
Start: 2025-02-25 | End: 2025-02-25

## 2025-02-25 RX ADMIN — CYANOCOBALAMIN 1000 MCG: 1000 INJECTION, SOLUTION INTRAMUSCULAR; SUBCUTANEOUS at 03:02

## 2025-02-25 RX ADMIN — KETOROLAC TROMETHAMINE 60 MG: 30 INJECTION, SOLUTION INTRAMUSCULAR; INTRAVENOUS at 03:02

## 2025-02-25 ASSESSMENT — ROUTINE ASSESSMENT OF PATIENT INDEX DATA (RAPID3)
MDHAQ FUNCTION SCORE: 1.4
TOTAL RAPID3 SCORE: 7.55
PATIENT GLOBAL ASSESSMENT SCORE: 8.5
PAIN SCORE: 9.5
PSYCHOLOGICAL DISTRESS SCORE: 1.1
FATIGUE SCORE: 3.3

## 2025-02-25 NOTE — PROGRESS NOTES
Subjective:     Patient ID:  Scooter Swan    Chief Complaint:  Disease Management     History of Present Illness:  Pt is a 74 y.o. male  seropositive rheumatoid arthritis and osteoarthritis.He continues to have pain in his hands, elbows, knees, and ankles. He has significant morning stiffness and he is taking prednisone 10 mg daily. Norco is providing adequate control of his pain without side effects. He is requesting injections in clinic today for joint pain. He typically receives toradol and b12 at his office visits.     He could not tolerate Enbrel in the past due to s/e. Simponi was considered but never started. Last visit with Dr. Morales--no plan to restart immunosuppressive medications, which is unchanged.     He is doing fair s/p TAVR. Evaluated by Hematology with no urgent concerns for anemia/thrombocytopenia.    labs reviewed         HCurrent treatment:  1. Norco TID PRN  2. Prednisone 10 mg  3. Zanaflex PRN     Prior treatment:  1. Plaquenil (WEIGHT LOSS)  2. SSZ caused GI upset  Rheumatologic History:   - Diagnosis/es:  - Positive serologies:  - Infectious screening labs:  - Previous Treatments:  - Current Treatments:     Interval History:   Hospitalization since last office visit: No    Patient Active Problem List    Diagnosis Date Noted    Benign prostatic hyperplasia 12/06/2024    Gross hematuria 12/06/2024    Lesion of bladder 12/06/2024    COPD (chronic obstructive pulmonary disease) 12/06/2024    GERD (gastroesophageal reflux disease) 12/06/2024    Insomnia 12/06/2024    NYHA class 3 heart failure with preserved ejection fraction 06/19/2024    S/P TAVR (transcatheter aortic valve replacement)- bovine 06/19/2024    SOB (shortness of breath) 02/29/2024    History of supraventricular tachycardia 02/23/2024    Coag negative Staphylococcus bacteremia 01/11/2024    Leukocytosis 01/10/2024    Carotid artery plaque, bilateral 10/19/2022    Aortic valve disease 10/18/2022    Overweight (BMI 25.0-29.9)  09/21/2022    Primary hypertension 09/20/2022    Bilateral carotid bruits 09/20/2022    Moderate aortic regurgitation 08/08/2022    Other specified glaucoma 07/15/2021    History of hepatitis C 02/07/2020    Anemia     Other constipation 02/28/2019    Thrombocytopenia 02/28/2019    Mixed hyperlipidemia 01/29/2019    Resistant hypertension 01/29/2019    RA (rheumatoid arthritis) 01/29/2019    Hemorrhoids 10/18/2018     Past Surgical History:   Procedure Laterality Date    ANGIOGRAM, CORONARY, WITH LEFT HEART CATHETERIZATION  10/06/2022    Procedure: Angiogram, Coronary, with Left Heart Cath;  Surgeon: Zac Boucher MD;  Location: [facility] CATH;  Service: Cardiology;;    AORTOGRAPHY  03/28/2024    Procedure: AO Root;  Surgeon: Zac Boucher MD;  Location: [facility] CATH;  Service: Cardiology;;    ARTERIOGRAPHY OF AORTIC ROOT  10/06/2022    Procedure: ARTERIOGRAM, AORTIC ROOT;  Surgeon: Zac Boucher MD;  Location: [facility] CATH;  Service: Cardiology;;    CARPAL TUNNEL RELEASE      Right wrist 2015    CATHETERIZATION OF BOTH LEFT AND RIGHT HEART  03/28/2024    Procedure: Right / Left heart cath;  Surgeon: Zac Boucher MD;  Location: [facility] CATH;  Service: Cardiology;;    COLONOSCOPY  08/2016    Dr. Cardona; in care everywhere    COLONOSCOPY N/A 03/20/2019    Procedure: COLONOSCOPY;  Surgeon: Sotero Arthur MD;  Location: [facility] ENDO;  Service: Endoscopy;  Laterality: N/A; hemorrhoids, diverticulosis, repeat in 10 years for screening    ECHOCARDIOGRAM,TRANSESOPHAGEAL N/A 01/12/2024    Procedure: Transesophageal echo (CADY) intra-procedure log documentation;  Surgeon: Renan Slaughter MD;  Location: [facility] CATH/EP LAB;  Service: Cardiology;  Laterality: N/A;    EXCISIONAL HEMORRHOIDECTOMY N/A 10/18/2018    Procedure: HEMORRHOIDECTOMY, prone;  Surgeon: Gunnar Horton MD;  Location: [facility] OR [address] [place] [address];  Service: Colon and Rectal;  Laterality: N/A;    HERNIA REPAIR      THYROID SURGERY      TRANSCATHETER AORTIC VALVE REPLACEMENT  (TAVR)  6/19/2024    Procedure: (TAVR);  Surgeon: Hernandez Marrero MD;  Location: [facility] CATH;  Service: Cardiology;;    TRANSCATHETER AORTIC VALVE REPLACEMENT (TAVR)  6/19/2024    Procedure: (TAVR) - Surgeon;  Surgeon: Jada Murphy MD;  Location: [facility] CATH;  Service: Cardiothoracic;;    TRANSURETHRAL RESECTION OF PROSTATE USING BIPOLAR CAUTERY N/A 12/11/2024    Procedure: TURP, USING BIPOLAR CAUTERY;  Surgeon: Carrington Lott MD;  Location: [facility] OR;  Service: Urology;  Laterality: N/A;    UPPER GASTROINTESTINAL ENDOSCOPY  08/2016    Dr. Cardona; in care everywhere     Social History[1]  Family History   Problem Relation Name Age of Onset    Stomach cancer Paternal Cousin      Stomach cancer Paternal Cousin      Cataracts Mother      Diabetes Mother      Hypertension Mother      Stroke Mother      Diabetes Sister      Hypertension Sister      Stroke Sister      Diabetes Brother      Glaucoma Brother      Hypertension Brother      Stroke Brother      Diabetes Maternal Aunt      Hypertension Maternal Aunt      Stroke Maternal Aunt      Diabetes Maternal Uncle      Hypertension Maternal Uncle      Stroke Maternal Uncle      Diabetes Maternal Grandmother      Hypertension Maternal Grandmother      Stroke Maternal Grandmother      Cancer Cousin          stomach    Colon cancer Neg Hx      Colon polyps Neg Hx      Crohn's disease Neg Hx      Ulcerative colitis Neg Hx      Esophageal cancer Neg Hx      Amblyopia Neg Hx      Blindness Neg Hx      Macular degeneration Neg Hx      Retinal detachment Neg Hx      Strabismus Neg Hx      Thyroid disease Neg Hx       Review of patient's allergies indicates:   Allergen Reactions    Buspar [buspirone] Hallucinations     Nightmares    Enbrel [etanercept] Other (See Comments)     Severe headaches    Fentanyl Hives and Hallucinations    Plaquenil [hydroxychloroquine]      Nausea, insomnia, weight loss 40LBS    Amitiza [lubiprostone] Nausea Only and Other (See Comments)     Fatigue.     Azulfidine [sulfasalazine] Nausea And Vomiting    Trazodone Other (See Comments)     Insomnia         Review of Systems   Review of Systems     Current Medications:  Current Outpatient Medications   Medication Instructions    albuterol (PROVENTIL) 2.5 mg /3 mL (0.083 %) nebulizer solution TAKE 3 MLS BY MOUTH VIA NEBULIZER EVERY 6 HOURS AS NEEDED FOR WHEEZING    albuterol (PROVENTIL/VENTOLIN HFA) 90 mcg/actuation inhaler INHALE 1 PUFF BY MOUTH EVERY 4 HOURS AS NEEDED FOR WHEEZING OR SHORTNESS OF BREATH    amoxicillin (AMOXIL) 500 MG Tab Take 4 tabs (2000 mg) once 60 minutes prior to dental cleaning or other high risk procedures    aspirin (ECOTRIN) 81 mg, Oral, Daily    cholecalciferol (vitamin D3) (VITAMIN D3) 2,000 Units, Oral, Daily    cyanocobalamin 500 mcg, Daily    diazePAM (VALIUM) 10 mg, Oral, Nightly    diclofenac sodium (VOLTAREN) 2 g, 4 times daily    diltiaZEM (CARDIZEM CD) 240 mg, Oral, Daily    doxazosin (CARDURA) 4 mg, Oral, Nightly    enalapril (VASOTEC) 20 mg, Oral, 2 times daily    [START ON 3/1/2025] HYDROcodone-acetaminophen (NORCO)  mg per tablet 1 tablet, Oral, Every 8 hours PRN    [START ON 4/1/2025] HYDROcodone-acetaminophen (NORCO)  mg per tablet 1 tablet, Oral, Every 8 hours PRN    [START ON 5/1/2025] HYDROcodone-acetaminophen (NORCO)  mg per tablet 1 tablet, Oral, Every 8 hours PRN    multivitamin capsule 1 capsule, Daily    omeprazole (PRILOSEC) 20 mg, Oral, 2 times daily    polyethylene glycol (GLYCOLAX) 17 g, Daily PRN    pravastatin (PRAVACHOL) 20 mg, Oral    predniSONE (DELTASONE) 10 mg, Oral, Daily    promethazine (PHENERGAN) 25 mg, Oral, Every 6 hours PRN    sildenafiL (VIAGRA) 100 mg, Oral, As needed (PRN)    tiZANidine (ZANAFLEX) 4 mg, Oral, Every 8 hours         Objective:     Vitals:    02/25/25 1307   BP: 120/78   Pulse: 86   Weight: 83.9 kg (185 lb)   Height: 6' (1.829 m)   PainSc:   4   PainLoc: Generalized      Body mass index is 25.09 kg/m².     Physical  "Examinations:  Physical Exam     Disease Assessment Scores:  Patient's Global Assessment of arthritis (0-10): 2  Physician's Global Assessment of arthritis (0-10): 1  Number of Tender Joints (0-28): 1  Number of Swollen Joints (0-28): 1        10/24/2023     7:36 AM   Rapid3 Question Responses and Scores   MDHAQ Score 1   Psychologic Score 3.3   Pain Score 7   When you awakened in the morning OVER THE LAST WEEK, did you feel stiff? Yes   If Yes, please indicate the number of hours until you are as limber as you will be for the day 2   Fatigue Score 4   Global Health Score 5.5   RAPID3 Score 5.28       Monitoring Lab Results:  Lab Results   Component Value Date    WBC 13.93 (H) 02/12/2025    RBC 3.52 (L) 02/12/2025    HGB 10.2 (L) 02/12/2025    HCT 34.0 (L) 02/12/2025    MCV 97 02/12/2025    MCH 29.0 02/12/2025    MCHC 30.0 (L) 02/12/2025    RDW 13.4 02/12/2025     (L) 02/12/2025        Lab Results   Component Value Date     02/12/2025    K 4.3 02/12/2025     02/12/2025    CO2 27 02/12/2025     (H) 02/12/2025    BUN 23 02/12/2025    CREATININE 1.2 02/12/2025    CALCIUM 9.4 02/12/2025    PROT 6.4 02/12/2025    ALBUMIN 3.7 02/12/2025    BILITOT 0.2 02/12/2025    ALKPHOS 56 02/12/2025    AST 14 02/12/2025    ALT 12 02/12/2025    ANIONGAP 7 (L) 02/12/2025    EGFRNORACEVR >60.0 02/12/2025       Lab Results   Component Value Date    SEDRATE 3 11/06/2024    CRP 3.4 11/06/2024    CRP 3.4 11/06/2024        Lab Results   Component Value Date    KAWVBOTU99NW 47 11/06/2024    IQJQCPQP25 652 01/12/2024        Lab Results   Component Value Date    CHOL 186 02/19/2024    HDL 50 02/19/2024    LDLCALC 110.6 02/19/2024    TRIG 127 02/19/2024       Lab Results   Component Value Date    RF 68.0 (H) 11/06/2024    CCPANTIBODIE <0.5 11/06/2024     No results found for: "ANASCREEN", "ANATITER", "ANAPATTE", "DSDNA", "SMRNPAB", "SSAANTIBODY", "SSBANTIBODY", "OLJ40MA", "JO1AB"  No results found for: " ""HLABB27"    Infectious Disease Screening:  Lab Results   Component Value Date    HEPBSAG Non-reactive 04/25/2023    HEPBCAB Non-reactive 04/25/2023    HEPBSURFABQU Negative 04/25/2023    HEPBSURFABQU <3 04/25/2023     Lab Results   Component Value Date    HEPCAB Reactive (A) 04/25/2023     Lab Results   Component Value Date    TBGOLDPLUS Negative 09/06/2024     No results found for: "QUANTIFERON", "SVCMT", "QUANTAGVALUE", "QUANTNILVALU", "QUANTMITOGEN", "QFTTBAG", "QINT"     Imaging: DEXA, Xrays, MRIs, CTs, etc  x: Thrombopenia; Chronic pain syndrome; ...    Test Result Released: Yes (not seen)    0 Result Notes  Details    Reading Physician Reading Date Result Priority   Jim Dent MD  [phone] 2/16/2023 Routine     Narrative & Impression  EXAMINATION:  DXA BONE DENSITY AXIAL SKELETON 1 OR MORE SITES     CLINICAL HISTORY:  Rheumatoid arthritis with rheumatoid factor, unspecified     TECHNIQUE:  DXA scanning was performed over the left hip and lumbar spine.  Review of the images confirms satisfactory positioning and technique.     COMPARISON:  None     FINDINGS:  The L1 to L4 vertebral bone mineral density is equal to 1.011 g/cm squared with a T score of -1.7.     The left femoral neck bone mineral density is equal to 0.924 g/cm squared with a T score of -1.0.     The total hip bone mineral density is equal to 1.116 g/cm squared with a T score of -0.4.     There is a 2.3% risk of a major osteoporotic fracture and a 0.3% risk of hip fracture in the next 10 years (FRAX).     Impression:     Osteopenia     Consider FDA approved medical therapies in postmenopausal women and men aged 50 years and older, based on the following:     *A hip or vertebral (clinical or morphometric) fracture  *T score less than or equal to -2.5 at the femoral neck or spine after appropriate evaluation to exclude secondary causes.  *Low bone mass -- also known as osteopenia (T score between -1.0 and -2.5 at the femoral neck or " spine) and a 10 year probability of hip fracture greater than or equal to 3% or a 10 year probability of major osteoporosis-related fracture greater than or equal to 20% based on the [place]-adapted WHO algorithm.  *Clinicians judgment and/or patient preference may indicate treatment for people with 10 year fracture probabilities is above or below these levels.        Electronically signed by:Jim Dent MD  Date:                                            02/16/2023  Time:                                           11:52        Exam Ended: 02/16/23 10:59 CST Last Resulted: 02/16/23 11:52 CST     Old & Outside Medical Records:  Reviewed old and all outside medical records available in Care Everywhere     Assessment:     Encounter Diagnoses   Name Primary?    Chronic sinusitis, unspecified location Yes    Seropositive rheumatoid arthritis     Chronic pain syndrome     Osteoarthritis of knee, unspecified laterality, unspecified osteoarthritis type     Fatigue, unspecified type     PSA (psoriatic arthritis)     Other abnormal glucose     Localized osteoporosis ([place])        Plan:      Encounter Diagnoses   Name Primary?    Seropositive rheumatoid arthritis     Chronic pain syndrome     Osteoarthritis of knee, unspecified laterality, unspecified osteoarthritis type     Fatigue, unspecified type     PSA (psoriatic arthritis)     Chronic sinusitis, unspecified location Yes    Other abnormal glucose     Localized osteoporosis ([place])      Scooter was seen today for disease management.    Diagnoses and all orders for this visit:    Chronic sinusitis, unspecified location  -     Ambulatory referral/consult to ENT; Future  -     Sedimentation rate; Future  -     C-Reactive Protein; Future  -     Comprehensive Metabolic Panel; Future  -     CBC Auto Differential; Future  -     Hemoglobin A1C; Future  -     Rheumatoid Factor; Future  -     Vitamin D; Future  -     DXA Bone Density Axial Skeleton 1 or more sites;  Future    Seropositive rheumatoid arthritis  -     predniSONE (DELTASONE) 5 MG tablet; Take 2 tablets (10 mg total) by mouth once daily.  -     HYDROcodone-acetaminophen (NORCO)  mg per tablet; Take 1 tablet by mouth every 8 (eight) hours as needed for Pain.  -     HYDROcodone-acetaminophen (NORCO)  mg per tablet; Take 1 tablet by mouth every 8 (eight) hours as needed for Pain.  -     HYDROcodone-acetaminophen (NORCO)  mg per tablet; Take 1 tablet by mouth every 8 (eight) hours as needed for Pain.  -     ketorolac injection 60 mg  -     cyanocobalamin injection 1,000 mcg  -     tiZANidine (ZANAFLEX) 4 MG tablet; Take 1 tablet (4 mg total) by mouth every 8 (eight) hours.  -     Sedimentation rate; Future  -     C-Reactive Protein; Future  -     Comprehensive Metabolic Panel; Future  -     CBC Auto Differential; Future  -     Hemoglobin A1C; Future  -     Rheumatoid Factor; Future  -     Vitamin D; Future  -     DXA Bone Density Axial Skeleton 1 or more sites; Future    Chronic pain syndrome  -     predniSONE (DELTASONE) 5 MG tablet; Take 2 tablets (10 mg total) by mouth once daily.  -     HYDROcodone-acetaminophen (NORCO)  mg per tablet; Take 1 tablet by mouth every 8 (eight) hours as needed for Pain.  -     HYDROcodone-acetaminophen (NORCO)  mg per tablet; Take 1 tablet by mouth every 8 (eight) hours as needed for Pain.  -     HYDROcodone-acetaminophen (NORCO)  mg per tablet; Take 1 tablet by mouth every 8 (eight) hours as needed for Pain.  -     ketorolac injection 60 mg  -     cyanocobalamin injection 1,000 mcg  -     tiZANidine (ZANAFLEX) 4 MG tablet; Take 1 tablet (4 mg total) by mouth every 8 (eight) hours.  -     Sedimentation rate; Future  -     C-Reactive Protein; Future  -     Comprehensive Metabolic Panel; Future  -     CBC Auto Differential; Future  -     Hemoglobin A1C; Future  -     Rheumatoid Factor; Future  -     Vitamin D; Future  -     DXA Bone Density Axial  Skeleton 1 or more sites; Future    Osteoarthritis of knee, unspecified laterality, unspecified osteoarthritis type  -     predniSONE (DELTASONE) 5 MG tablet; Take 2 tablets (10 mg total) by mouth once daily.  -     ketorolac injection 60 mg  -     cyanocobalamin injection 1,000 mcg  -     tiZANidine (ZANAFLEX) 4 MG tablet; Take 1 tablet (4 mg total) by mouth every 8 (eight) hours.  -     Sedimentation rate; Future  -     C-Reactive Protein; Future  -     Comprehensive Metabolic Panel; Future  -     CBC Auto Differential; Future  -     Hemoglobin A1C; Future    Fatigue, unspecified type  -     HYDROcodone-acetaminophen (NORCO)  mg per tablet; Take 1 tablet by mouth every 8 (eight) hours as needed for Pain.  -     HYDROcodone-acetaminophen (NORCO)  mg per tablet; Take 1 tablet by mouth every 8 (eight) hours as needed for Pain.  -     HYDROcodone-acetaminophen (NORCO)  mg per tablet; Take 1 tablet by mouth every 8 (eight) hours as needed for Pain.  -     ketorolac injection 60 mg  -     cyanocobalamin injection 1,000 mcg    PSA (psoriatic arthritis)  -     HYDROcodone-acetaminophen (NORCO)  mg per tablet; Take 1 tablet by mouth every 8 (eight) hours as needed for Pain.  -     HYDROcodone-acetaminophen (NORCO)  mg per tablet; Take 1 tablet by mouth every 8 (eight) hours as needed for Pain.  -     HYDROcodone-acetaminophen (NORCO)  mg per tablet; Take 1 tablet by mouth every 8 (eight) hours as needed for Pain.  -     ketorolac injection 60 mg  -     cyanocobalamin injection 1,000 mcg  -     Sedimentation rate; Future  -     C-Reactive Protein; Future  -     Comprehensive Metabolic Panel; Future  -     CBC Auto Differential; Future  -     Hemoglobin A1C; Future  -     Rheumatoid Factor; Future  -     Vitamin D; Future  -     DXA Bone Density Axial Skeleton 1 or more sites; Future    Other abnormal glucose  -     Hemoglobin A1C; Future    Localized osteoporosis ([place])  -     DXA  Bone Density Axial Skeleton 1 or more sites; Future        1. Nurse injection toradol  2. Norco refills  3. Labs ordered     Follow-up 4 months    More than 50% of the  40 minute encounter was spent face to face counseling the patient regarding current status and future plan of care as well as side effects  of the medications. All questions were answered to patient's satisfaction also includes  non-face to face time preparing to see the patient (eg, review of tests), Obtaining and/or reviewing separately obtained history, Documenting clinical information in the electronic or other health record, Independently interpreting results            [1]   Social History  Tobacco Use    Smoking status: Never    Smokeless tobacco: Never   Substance Use Topics    Alcohol use: Yes     Alcohol/week: 1.0 standard drink of alcohol     Types: 1 Shots of liquor per week     Comment: social    Drug use: Yes     Types: Hydrocodone, Marijuana

## 2025-02-26 ENCOUNTER — PATIENT MESSAGE (OUTPATIENT)
Dept: OTOLARYNGOLOGY | Facility: CLINIC | Age: 75
End: 2025-02-26
Payer: MEDICARE

## 2025-03-18 DIAGNOSIS — E78.49 OTHER HYPERLIPIDEMIA: ICD-10-CM

## 2025-03-18 RX ORDER — PRAVASTATIN SODIUM 20 MG/1
20 TABLET ORAL
Qty: 90 TABLET | Refills: 3 | Status: SHIPPED | OUTPATIENT
Start: 2025-03-18

## 2025-03-18 NOTE — TELEPHONE ENCOUNTER
Refill Routing Note   Medication(s) are not appropriate for processing by [facility] for the following reason(s):        Required labs outdated    [facility] action(s):  Defer               Appointments  past 12m or future 3m with PCP    Date Provider   Last Visit   11/27/2024 Salvador Kennedy, DO   Next Visit   Visit date not found Salvador Kennedy, DO   ED visits in past 90 days: 0        Note composed:3:06 PM 03/18/2025

## 2025-03-18 NOTE — TELEPHONE ENCOUNTER
No care due was identified.  [facility] Embedded Care Due Messages. Reference number: [id number].   3/18/2025 8:04:49 AM CDT

## 2025-03-20 DIAGNOSIS — G43.909 MIGRAINE WITHOUT STATUS MIGRAINOSUS, NOT INTRACTABLE, UNSPECIFIED MIGRAINE TYPE: ICD-10-CM

## 2025-03-20 RX ORDER — BUTALBITAL, ACETAMINOPHEN AND CAFFEINE 50; 325; 40 MG/1; MG/1; MG/1
1 TABLET ORAL EVERY 4 HOURS PRN
Qty: 10 TABLET | Refills: 5 | Status: SHIPPED | OUTPATIENT
Start: 2025-03-20

## 2025-03-20 NOTE — TELEPHONE ENCOUNTER
No care due was identified.  [facility] Embedded Care Due Messages. Reference number: [id number].   3/20/2025 3:40:26 PM CDT

## 2025-03-21 ENCOUNTER — TELEPHONE (OUTPATIENT)
Dept: FAMILY MEDICINE | Facility: CLINIC | Age: 75
End: 2025-03-21
Payer: MEDICARE

## 2025-03-21 DIAGNOSIS — G43.909 MIGRAINE WITHOUT STATUS MIGRAINOSUS, NOT INTRACTABLE, UNSPECIFIED MIGRAINE TYPE: ICD-10-CM

## 2025-03-21 RX ORDER — BUTALBITAL, ACETAMINOPHEN AND CAFFEINE 50; 325; 40 MG/1; MG/1; MG/1
1 TABLET ORAL EVERY 4 HOURS PRN
Qty: 10 TABLET | Refills: 5 | OUTPATIENT
Start: 2025-03-21

## 2025-03-21 NOTE — TELEPHONE ENCOUNTER
No care due was identified.  [facility] Embedded Care Due Messages. Reference number: [id number].   3/21/2025 10:18:15 AM CDT

## 2025-03-21 NOTE — TELEPHONE ENCOUNTER
----- Message from Lory sent at 3/21/2025  9:21 AM CDT -----  Type:  Pharmacy Calling to Clarify an RXName of Caller:[facility]Prescription Name:FioricetWhat do they need to clarify?:Is now a controlled medication, needs to have it written properlyBest Call Back Number:[phone] Information:               Please call back to advise, thank you!

## 2025-03-27 NOTE — PROGRESS NOTES
Otolaryngology Clinic Note    Subjective:       Patient ID: Scooter Swan is a 74 y.o. male.    Chief Complaint: Sinus Problem, Headache, and Nasal Congestion      History of Present Illness: Scooter Swan is a 74 y.o. male presenting with chronic sinusitis and referred by Dr. Morales     Patient is new to me but established with ENT. He last saw Katty in 2023 for cerumen impaction. He has not been seen seen for sinus complaints. He has been having nasal congestion x1-2 months in both sides. He has runny nose, PND, sinus pressure, itchy watery eyes. He denies a sore throat, cough, sneezing. He does have seasonal allergies and uses Claritin. He does not use any nasal sprays. He denies any known deviated septum, nasal polys, enlarged turbinates, or nasal trauma, or decreased smell. He denies any asthma but does have COPD. He is on ASA. He has been having headache and his PCP did give him Fioricet which helps.     Past Surgical History:   Procedure Laterality Date    ANGIOGRAM, CORONARY, WITH LEFT HEART CATHETERIZATION  10/06/2022    Procedure: Angiogram, Coronary, with Left Heart Cath;  Surgeon: Zac Boucher MD;  Location: [facility] CATH;  Service: Cardiology;;    AORTOGRAPHY  03/28/2024    Procedure: AO Root;  Surgeon: Zac Boucher MD;  Location: [facility] CATH;  Service: Cardiology;;    ARTERIOGRAPHY OF AORTIC ROOT  10/06/2022    Procedure: ARTERIOGRAM, AORTIC ROOT;  Surgeon: Zac Boucher MD;  Location: [facility] CATH;  Service: Cardiology;;    CARPAL TUNNEL RELEASE      Right wrist 2015    CATHETERIZATION OF BOTH LEFT AND RIGHT HEART  03/28/2024    Procedure: Right / Left heart cath;  Surgeon: Zac Boucher MD;  Location: [facility] CATH;  Service: Cardiology;;    COLONOSCOPY  08/2016    Dr. Cardona; in care everywhere    COLONOSCOPY N/A 03/20/2019    Procedure: COLONOSCOPY;  Surgeon: Sotero Arthur MD;  Location: [facility] ENDO;  Service: Endoscopy;  Laterality: N/A; hemorrhoids, diverticulosis, repeat in 10 years for screening     ECHOCARDIOGRAM,TRANSESOPHAGEAL N/A 01/12/2024    Procedure: Transesophageal echo (CADY) intra-procedure log documentation;  Surgeon: Renan Slaughter MD;  Location: [facility] CATH/EP LAB;  Service: Cardiology;  Laterality: N/A;    EXCISIONAL HEMORRHOIDECTOMY N/A 10/18/2018    Procedure: HEMORRHOIDECTOMY, prone;  Surgeon: Gunnar Horton MD;  [address];  Service: Colon and Rectal;  Laterality: N/A;    HERNIA REPAIR      THYROID SURGERY      TRANSCATHETER AORTIC VALVE REPLACEMENT (TAVR)  6/19/2024    Procedure: (TAVR);  Surgeon: Hernandez Marrero MD;  Location: [facility] CATH;  Service: Cardiology;;    TRANSCATHETER AORTIC VALVE REPLACEMENT (TAVR)  6/19/2024    Procedure: (TAVR) - Surgeon;  Surgeon: Jada Murphy MD;  Location: [facility] CATH;  Service: Cardiothoracic;;    TRANSURETHRAL RESECTION OF PROSTATE USING BIPOLAR CAUTERY N/A 12/11/2024    Procedure: TURP, USING BIPOLAR CAUTERY;  Surgeon: Carrington Lott MD;  Location: [facility];  Service: Urology;  Laterality: N/A;    UPPER GASTROINTESTINAL ENDOSCOPY  08/2016    Dr. Cardona; in care everywhere     Past Medical History:   Diagnosis Date    Cataract     COPD (chronic obstructive pulmonary disease)     Diverticulosis     DUARTE (dyspnea on exertion)     GERD (gastroesophageal reflux disease)     Glaucoma     Hepatitis C     treated; seeing Dr. Carr    Hyperlipidemia     Hypertension     Liver lesion 08/2018    RA (rheumatoid arthritis)     Rectal prolapse     Possible    Severe aortic stenosis 08/24/2020    Thyroid disease      Social Drivers of Health     Tobacco Use: Low Risk  (3/1/2025)    Patient History     Smoking Tobacco Use: Never     Smokeless Tobacco Use: Never     Passive Exposure: Not on file   Alcohol Use: Unknown (9/27/2022)    AUDIT-C     Frequency of Alcohol Consumption: Patient declined     Average Number of Drinks: Patient declined     Frequency of Binge Drinking: Never   Financial Resource Strain: Low Risk  (6/19/2024)    Overall Financial Resource  Strain (CARDIA)     Difficulty of Paying Living Expenses: Not hard at all   Food Insecurity: No Food Insecurity (6/19/2024)    Hunger Vital Sign     Worried About Running Out of Food in the Last Year: Never true     Ran Out of Food in the Last Year: Never true   Transportation Needs: No Transportation Needs (6/19/2024)    TRANSPORTATION NEEDS     Transportation : No   Physical Activity: Unknown (2/21/2024)    Exercise Vital Sign     Days of Exercise per Week: 4 days     Minutes of Exercise per Session: Not on file   Stress: Stress Concern Present (9/19/2022)    [employer] - Occupational Stress Questionnaire     Feeling of Stress : Rather much   Housing Stability: Unknown (6/19/2024)    Housing Stability Vital Sign     Unable to Pay for Housing in the Last Year: No     Number of Times Moved in the Last Year: Not on file     Homeless in the Last Year: No   Depression: Medium Risk (1/19/2024)    Depression     Last PHQ-4: Flowsheet Data: 3   Utilities: Not At Risk (6/19/2024)    [facility] Utilities     Threatened with loss of utilities: No   Health Literacy: Not on file   Social Isolation: Not on file     Review of patient's allergies indicates:   Allergen Reactions    Buspar [buspirone] Hallucinations     Nightmares    Enbrel [etanercept] Other (See Comments)     Severe headaches    Fentanyl Hives and Hallucinations    Plaquenil [hydroxychloroquine]      Nausea, insomnia, weight loss 40LBS    Amitiza [lubiprostone] Nausea Only and Other (See Comments)     Fatigue.    Azulfidine [sulfasalazine] Nausea And Vomiting    Trazodone Other (See Comments)     Insomnia       Current Outpatient Medications   Medication Instructions    albuterol (PROVENTIL) 2.5 mg /3 mL (0.083 %) nebulizer solution TAKE 3 MLS BY MOUTH VIA NEBULIZER EVERY 6 HOURS AS NEEDED FOR WHEEZING    albuterol (PROVENTIL/VENTOLIN HFA) 90 mcg/actuation inhaler INHALE 1 PUFF BY MOUTH EVERY 4 HOURS AS NEEDED FOR WHEEZING OR SHORTNESS OF  BREATH    amoxicillin (AMOXIL) 500 MG Tab Take 4 tabs (2000 mg) once 60 minutes prior to dental cleaning or other high risk procedures    aspirin (ECOTRIN) 81 mg, Oral, Daily    azelastine (ASTELIN) 137 mcg, Nasal, 2 times daily    butalbital-acetaminophen-caffeine -40 mg (FIORICET, ESGIC) -40 mg per tablet 1 tablet, Oral, Every 4 hours PRN, for pain.    cholecalciferol (vitamin D3) (VITAMIN D3) 2,000 Units, Oral, Daily    cyanocobalamin 500 mcg, Daily    diazePAM (VALIUM) 10 mg, Oral, Nightly    diclofenac sodium (VOLTAREN) 2 g, 4 times daily    diltiaZEM (CARDIZEM CD) 240 mg, Oral, Daily    doxazosin (CARDURA) 4 mg, Oral, Nightly    enalapril (VASOTEC) 20 mg, Oral, 2 times daily    fluticasone propionate (FLONASE) 50 mcg, Each Nostril, 2 times daily    HYDROcodone-acetaminophen (NORCO)  mg per tablet 1 tablet, Oral, Every 8 hours PRN    [START ON 4/1/2025] HYDROcodone-acetaminophen (NORCO)  mg per tablet 1 tablet, Oral, Every 8 hours PRN    [START ON 5/1/2025] HYDROcodone-acetaminophen (NORCO)  mg per tablet 1 tablet, Oral, Every 8 hours PRN    multivitamin capsule 1 capsule, Daily    omeprazole (PRILOSEC) 20 mg, Oral, 2 times daily    polyethylene glycol (GLYCOLAX) 17 g, Daily PRN    pravastatin (PRAVACHOL) 20 mg, Oral    predniSONE (DELTASONE) 10 mg, Oral, Daily    promethazine (PHENERGAN) 25 mg, Oral, Every 6 hours PRN    sildenafiL (VIAGRA) 100 mg, Oral, As needed (PRN)    tiZANidine (ZANAFLEX) 4 mg, Oral, Every 8 hours         ENT ROS negative except as stated above.     Patient answers are not available for this visit.            Objective:      There were no vitals filed for this visit.    General: NAD, well appearing  Eyes: Normal conjunctiva and lids  Face: symmetric, nerve intact  Nose: The nose is without any evidence of any deformity. The nasal mucosa is moist. The septum is midline. There is no evidence of septal hematoma. The turbinates are without abnormality.   Ears:  The ears are with normal-appearing pinna. Examination of the canals is normal appearing bilaterally. There is no drainage or erythema noted. The tympanic membranes are normal appearing with pearly color, normal-appearing landmarks and normal light reflex. Hearing is grossly intact.  Mouth: No obvious abnormalities to the lips. Missing teeth and cracked tooth noted on left lower. The gingivae are without any obvious evidence of infection or lesion. The oral mucosa is moist and pink. There are no obvious masses to the hard or soft palate.   Oropharynx: The uvula is midline.  The tongue is midline. The posterior pharynx is without erythema or exudate. The tonsils are normal appearing.  Salivary glands: The salivary glands are symmetric and not enlarged, no masses  Neck: No lymphadenopathy, trachea midline, thryoid not enlarged.  Psych: Normal mood and affect.   Neuro: Grossly intact  Speech: fluent    SEPARATE PROCEDURE NOTE:  Anterior rhinoscopy insufficient to account for patient's symptoms. After verbal consent obtained, bilaterally nasal cavities sprayed with phenylephrine and xylocaine.  A complete diagnostic nasal endoscopy was performed to visualize the nasal cavities, the middle and superior meatus, the turbinates, and the sphenoethmoid recess bilaterally.  Rigid / Flexible nasoendoscopy carried out and findings were discussed and reviewed in detail with the patient. Findings discussed:   Nasal cavity --- patent bilaterally, no obstructions; turbinate hypertrophy, spur noted in left side  Inferior, Middle, and Superior turbinates/meatus --- no mucopus or polyps  Spheno-ethmoid recess --- no mucopus or polyps       Assessment and Plan:       1. Allergic rhinitis, unspecified seasonality, unspecified trigger    2. Nasal congestion    3. PND (post-nasal drip)    4. Chronic rhinitis        - Add Saline rinse twice daily, morning and night  - Flonase and Astelin twice daily after rinse  - Can add Ponaris for nasal  congestion  - Refer back to PCP for headaches; he is requesting a refill of his Fioricet    RTC: in 8 weeks to assess symptoms     Plan of care was discussed in detail with the patient, who agreed with the plan as above. All questions were answered in detail. 30 minutes spent in direct patient care, chart review and documentation.     Jen Stephenson, FNP-C  Otolaryngology

## 2025-03-28 ENCOUNTER — OFFICE VISIT (OUTPATIENT)
Dept: OTOLARYNGOLOGY | Facility: CLINIC | Age: 75
End: 2025-03-28
Payer: MEDICARE

## 2025-03-28 VITALS — HEIGHT: 72 IN | BODY MASS INDEX: 23.29 KG/M2 | WEIGHT: 171.94 LBS

## 2025-03-28 DIAGNOSIS — J31.0 CHRONIC RHINITIS: ICD-10-CM

## 2025-03-28 DIAGNOSIS — J30.9 ALLERGIC RHINITIS, UNSPECIFIED SEASONALITY, UNSPECIFIED TRIGGER: Primary | ICD-10-CM

## 2025-03-28 DIAGNOSIS — R09.81 NASAL CONGESTION: ICD-10-CM

## 2025-03-28 DIAGNOSIS — R09.82 PND (POST-NASAL DRIP): ICD-10-CM

## 2025-03-28 PROCEDURE — 99999 PR PBB SHADOW E&M-EST. PATIENT-LVL IV: CPT | Mod: PBBFAC,,, | Performed by: NURSE PRACTITIONER

## 2025-03-28 RX ORDER — FLUTICASONE PROPIONATE 50 MCG
1 SPRAY, SUSPENSION (ML) NASAL 2 TIMES DAILY
Qty: 16 G | Refills: 11 | Status: SHIPPED | OUTPATIENT
Start: 2025-03-28

## 2025-03-28 RX ORDER — AZELASTINE 1 MG/ML
1 SPRAY, METERED NASAL 2 TIMES DAILY
Qty: 30 ML | Refills: 11 | Status: SHIPPED | OUTPATIENT
Start: 2025-03-28 | End: 2025-07-16

## 2025-03-28 NOTE — PATIENT INSTRUCTIONS
"ENT SINUS REGIMEN:    "ENT-APPROVED" NASAL SPRAYS:  Flonase / fluticasone / Nasacort / Rhinocort (steroid spray) is best for stuffy, pressure, fullness. Available over-the-counter without a prescription.   Astepro / azelastine (antihistamine spray) is best for itchy, drippy, sneezy. Available over-the-counter without a prescription.       Use as directed, spraying 1 times in each nostril twice each day. It may take 2-3 days to 2-3 weeks to begin seeing improvement. This medication needs to be taken consistently to see results. Overall, this is a well-tolerated medication with low side effects. The benefit of nasal steroids as opposed to oral steroids is that the nasal steroid spray works primarily in the nose. Common side effects can include: headache, nasal dryness, minor nose bleed.  Rare side effects may include:  septal perforation, elevation in eye pressure, dry eyes, change in smell, allergic reaction.  Notify your provider if you have any concerns or experience these symptoms.     Nasal spray instructions:  Blow nose first gently to clean. Keep chin level with the floor (do not tilt head forward or back). Using the opposite hand (example: right hand for left nostril, left hand for right nostril) insert nasal spray taking caution to direct it AWAY from the middle wall inside the nose (septum) to avoid irritating nasal septum which could cause nosebleed.  Do not tilt spray up but rather flat and out along the roof of your mouth to spray. Angle the tip of the spray out slightly toward the direction of the ears; then spray. Do not take quick vigorous sniff but rather slow gentle inhalation while waiting for medication to absorb into nasal passages. Then administer second spray in same way.     Nasal saline rinse kit (use Neti pot or NeilMed sinus rinse kit) -- Rinse your sinuses once to twice daily to reduce the allergen burden in your nose. Use sterile water (boiled tap water which has cooled) or distilled " bottled water. Add 1/4 teaspoon sea salt and a pinch of baking soda or a mixture packet from the maker of your sinus rinse kit.  Rinse through both sides of nose to cleanse sinus and nasal passages, bending forward with head tilted down. Keep your mouth open, without holding your breath. Squeeze bottle gently until solution starts draining from the opposite nasal passage. After bottle is empty, blow nose very gently, without pinching nose completely, to avoid pressure on eardrums.  There are useful YouTube videos that show demonstration of how to do these properly.     Ponaris Nasal Emollient is used for the relief of: nasal congestion due to colds, nasal irritation, allergy exacerbations, nasal crusting. Specifically prepared iodized organic oils of pine, eucalyptus, peppermint, cajeput, and cottonseed. To order Ponaris: ask your pharmacist to order it for you or we carry it in our pharmacy downstairs on the first floor.

## 2025-04-22 DIAGNOSIS — R11.0 NAUSEA: ICD-10-CM

## 2025-04-22 DIAGNOSIS — G43.909 MIGRAINE WITHOUT STATUS MIGRAINOSUS, NOT INTRACTABLE, UNSPECIFIED MIGRAINE TYPE: ICD-10-CM

## 2025-04-23 ENCOUNTER — TELEPHONE (OUTPATIENT)
Dept: FAMILY MEDICINE | Facility: CLINIC | Age: 75
End: 2025-04-23
Payer: MEDICARE

## 2025-04-23 DIAGNOSIS — G43.909 MIGRAINE WITHOUT STATUS MIGRAINOSUS, NOT INTRACTABLE, UNSPECIFIED MIGRAINE TYPE: ICD-10-CM

## 2025-04-23 RX ORDER — BUTALBITAL, ACETAMINOPHEN AND CAFFEINE 50; 325; 40 MG/1; MG/1; MG/1
1 TABLET ORAL EVERY 4 HOURS PRN
Qty: 10 TABLET | Refills: 5 | Status: SHIPPED | OUTPATIENT
Start: 2025-04-23

## 2025-04-23 RX ORDER — PROMETHAZINE HYDROCHLORIDE 25 MG/1
25 TABLET ORAL EVERY 6 HOURS PRN
Qty: 30 TABLET | Refills: 5 | Status: SHIPPED | OUTPATIENT
Start: 2025-04-23

## 2025-04-23 RX ORDER — BUTALBITAL, ACETAMINOPHEN AND CAFFEINE 50; 325; 40 MG/1; MG/1; MG/1
1 TABLET ORAL EVERY 4 HOURS PRN
Qty: 10 TABLET | Refills: 5 | Status: SHIPPED | OUTPATIENT
Start: 2025-04-23 | End: 2025-04-23 | Stop reason: SDUPTHER

## 2025-04-23 NOTE — TELEPHONE ENCOUNTER
Care Due:                  Date            Visit Type   Department     Provider  --------------------------------------------------------------------------------                                EP -                              PRIMARY      [facility] FAMILY  Last Visit: 11-      CARE ([facility])   MEDICINE       Salvador Kennedy  Next Visit: None Scheduled  None         None Found                                                            Last  Test          Frequency    Reason                     Performed    Due Date  --------------------------------------------------------------------------------    Lipid Panel.  12 months..  pravastatin..............  02- 02-    [facility] Embedded Care Due Messages. Reference number: [id number].   4/22/2025 10:08:06 PM CDT

## 2025-04-23 NOTE — TELEPHONE ENCOUNTER
Because pharmacies are now considering Fioricet a controlled substance, we can no longer send prescription electronically until they fix the system.  Therefore [facility] for [facility] to .

## 2025-05-12 DIAGNOSIS — G47.00 INSOMNIA, UNSPECIFIED TYPE: ICD-10-CM

## 2025-05-12 RX ORDER — DIAZEPAM 10 MG/1
10 TABLET ORAL NIGHTLY
Qty: 30 TABLET | Refills: 2 | Status: SHIPPED | OUTPATIENT
Start: 2025-05-12

## 2025-05-12 NOTE — TELEPHONE ENCOUNTER
No care due was identified.  [facility] Embedded Care Due Messages. Reference number: [id number].   5/12/2025 8:05:59 AM CDT

## 2025-05-17 DIAGNOSIS — I10 PRIMARY HYPERTENSION: ICD-10-CM

## 2025-05-17 NOTE — TELEPHONE ENCOUNTER
No care due was identified.  [facility] Embedded Care Due Messages. Reference number: [id number].   5/17/2025 9:50:16 AM CDT

## 2025-05-18 NOTE — TELEPHONE ENCOUNTER
Refill Routing Note   Medication(s) are not appropriate for processing by [facility] for the following reason(s):        Drug-disease interaction: diltiaZEM and NYHA class 3 heart failure with preserved ejection fraction/butalbital-acetaminophen-caffeine -40 mg    [facility] action(s):  Defer               Appointments  past 12m or future 3m with PCP    Date Provider   Last Visit   11/27/2024 Salvador Kennedy, DO   Next Visit   Visit date not found Salvador Kennedy, DO   ED visits in past 90 days: 0        Note composed:12:14 PM 05/18/2025

## 2025-05-19 RX ORDER — DILTIAZEM HYDROCHLORIDE 240 MG/1
240 CAPSULE, COATED, EXTENDED RELEASE ORAL
Qty: 90 CAPSULE | Refills: 1 | Status: SHIPPED | OUTPATIENT
Start: 2025-05-19

## 2025-05-23 ENCOUNTER — OFFICE VISIT (OUTPATIENT)
Dept: OTOLARYNGOLOGY | Facility: CLINIC | Age: 75
End: 2025-05-23
Payer: MEDICARE

## 2025-05-23 VITALS — WEIGHT: 186.31 LBS | HEIGHT: 72 IN | BODY MASS INDEX: 25.24 KG/M2

## 2025-05-23 DIAGNOSIS — J30.9 ALLERGIC RHINITIS, UNSPECIFIED SEASONALITY, UNSPECIFIED TRIGGER: ICD-10-CM

## 2025-05-23 DIAGNOSIS — J31.0 CHRONIC RHINITIS: ICD-10-CM

## 2025-05-23 DIAGNOSIS — R09.82 PND (POST-NASAL DRIP): ICD-10-CM

## 2025-05-23 DIAGNOSIS — R09.81 NASAL CONGESTION: Primary | ICD-10-CM

## 2025-05-23 PROCEDURE — 99999 PR PBB SHADOW E&M-EST. PATIENT-LVL IV: CPT | Mod: PBBFAC,,, | Performed by: NURSE PRACTITIONER

## 2025-05-23 NOTE — PROGRESS NOTES
Otolaryngology Clinic Note    Subjective:       Patient ID: Scooter Swan is a 74 y.o. male.    Chief Complaint: Follow-up (Nasal congestion - some improvement )      History of Present Illness: Scooter Swan is a 74 y.o. male presenting with chronic sinusitis and referred by Dr. Morales     Patient is new to me but established with ENT. He last saw Katty in 2023 for cerumen impaction. He has not been seen seen for sinus complaints. He has been having nasal congestion x1-2 months in both sides. He has runny nose, PND, sinus pressure, itchy watery eyes. He denies a sore throat, cough, sneezing. He does have seasonal allergies and uses Claritin. He does not use any nasal sprays. He denies any known deviated septum, nasal polys, enlarged turbinates, or nasal trauma, or decreased smell. He denies any asthma but does have COPD. He is on ASA. He has been having headache and his PCP did give him Fioricet which helps.     5/23/25: Patient presents today for follow up of nasal symptoms. He reports gradual improvement in nasal congestion, runny nose, and post nasal drip. Sinus pressure, itchy eyes, and dizzy headaches are slowly improving with medication use. He uses saline rinse twice daily followed by Flonase and Astelin nasal sprays. He also uses Ponaris nasal medication and takes an oral antihistamine. He is happy with where he is at currently with his symptoms.     ROS:  Eyes:  +eye itchiness  ENT: -ear pain, +nasal congestion improved , -sore throat, +sinus pressure improved  Respiratory: -cough,         Past Surgical History:   Procedure Laterality Date    ANGIOGRAM, CORONARY, WITH LEFT HEART CATHETERIZATION  10/06/2022    Procedure: Angiogram, Coronary, with Left Heart Cath;  Surgeon: Zac Boucher MD;  Location: [facility] CATH;  Service: Cardiology;;    AORTOGRAPHY  03/28/2024    Procedure: AO Root;  Surgeon: Zac Boucher MD;  Location: [facility] CATH;  Service: Cardiology;;    ARTERIOGRAPHY OF AORTIC ROOT  10/06/2022     Procedure: ARTERIOGRAM, AORTIC ROOT;  Surgeon: Zac Boucher MD;  Location: [facility] CATH;  Service: Cardiology;;    CARPAL TUNNEL RELEASE      Right wrist 2015    CATHETERIZATION OF BOTH LEFT AND RIGHT HEART  03/28/2024    Procedure: Right / Left heart cath;  Surgeon: Zac Boucher MD;  Location: [facility] CATH;  Service: Cardiology;;    COLONOSCOPY  08/2016    Dr. Cardona; in care everywhere    COLONOSCOPY N/A 03/20/2019    Procedure: COLONOSCOPY;  Surgeon: Sotero Arthur MD;  Location: [facility];  Service: Endoscopy;  Laterality: N/A; hemorrhoids, diverticulosis, repeat in 10 years for screening    ECHOCARDIOGRAM,TRANSESOPHAGEAL N/A 01/12/2024    Procedure: Transesophageal echo (CADY) intra-procedure log documentation;  Surgeon: Renan Slaughter MD;  Location: [facility] CATH/EP LAB;  Service: Cardiology;  Laterality: N/A;    EXCISIONAL HEMORRHOIDECTOMY N/A 10/18/2018    Procedure: HEMORRHOIDECTOMY, prone;  Surgeon: Gunnar Horton MD;  Location: [address];  Service: Colon and Rectal;  Laterality: N/A;    HERNIA REPAIR      THYROID SURGERY      TRANSCATHETER AORTIC VALVE REPLACEMENT (TAVR)  6/19/2024    Procedure: (TAVR);  Surgeon: Hernandez Marrero MD;  Location: [facility] CATH;  Service: Cardiology;;    TRANSCATHETER AORTIC VALVE REPLACEMENT (TAVR)  6/19/2024    Procedure: (TAVR) - Surgeon;  Surgeon: Jada Murphy MD;  Location: [facility] CATH;  Service: Cardiothoracic;;    TRANSURETHRAL RESECTION OF PROSTATE USING BIPOLAR CAUTERY N/A 12/11/2024    Procedure: TURP, USING BIPOLAR CAUTERY;  Surgeon: Carrington Lott MD;  Location: [facility] OR;  Service: Urology;  Laterality: N/A;    UPPER GASTROINTESTINAL ENDOSCOPY  08/2016    Dr. Cardona; in care everywhere     Past Medical History:   Diagnosis Date    Cataract     COPD (chronic obstructive pulmonary disease)     Diverticulosis     DUARTE (dyspnea on exertion)     GERD (gastroesophageal reflux disease)     Glaucoma     Hepatitis C     treated; seeing Dr. Bruno Granda      Hypertension     Liver lesion 08/2018    RA (rheumatoid arthritis)     Rectal prolapse     Possible    Severe aortic stenosis 08/24/2020    Thyroid disease      Social Drivers of Health     Tobacco Use: Low Risk  (5/23/2025)    Patient History     Smoking Tobacco Use: Never     Smokeless Tobacco Use: Never     Passive Exposure: Not on file   Alcohol Use: Unknown (9/27/2022)    AUDIT-C     Frequency of Alcohol Consumption: Patient declined     Average Number of Drinks: Patient declined     Frequency of Binge Drinking: Never   Financial Resource Strain: Low Risk  (6/19/2024)    Overall Financial Resource Strain (CARDIA)     Difficulty of Paying Living Expenses: Not hard at all   Food Insecurity: No Food Insecurity (6/19/2024)    Hunger Vital Sign     Worried About Running Out of Food in the Last Year: Never true     Ran Out of Food in the Last Year: Never true   Transportation Needs: No Transportation Needs (6/19/2024)    TRANSPORTATION NEEDS     Transportation : No   Physical Activity: Unknown (2/21/2024)    Exercise Vital Sign     Days of Exercise per Week: 4 days     Minutes of Exercise per Session: Not on file   Stress: Stress Concern Present (9/19/2022)    [employer] [place] [employer] - Occupational Stress Questionnaire     Feeling of Stress : Rather much   Housing Stability: Unknown (6/19/2024)    Housing Stability Vital Sign     Unable to Pay for Housing in the Last Year: No     Number of Times Moved in the Last Year: Not on file     Homeless in the Last Year: No   Depression: Medium Risk (1/19/2024)    Depression     Last PHQ-4: Flowsheet Data: 3   Utilities: Not At Risk (6/19/2024)    [facility] Utilities     Threatened with loss of utilities: No   Health Literacy: Not on file   Social Isolation: Not on file     Review of patient's allergies indicates:   Allergen Reactions    Buspar [buspirone] Hallucinations     Nightmares    Enbrel [etanercept] Other (See Comments)     Severe headaches    Fentanyl  Hives and Hallucinations    Plaquenil [hydroxychloroquine]      Nausea, insomnia, weight loss 40LBS    Amitiza [lubiprostone] Nausea Only and Other (See Comments)     Fatigue.    Azulfidine [sulfasalazine] Nausea And Vomiting    Trazodone Other (See Comments)     Insomnia       Current Outpatient Medications   Medication Instructions    albuterol (PROVENTIL) 2.5 mg /3 mL (0.083 %) nebulizer solution TAKE 3 MLS BY MOUTH VIA NEBULIZER EVERY 6 HOURS AS NEEDED FOR WHEEZING    albuterol (PROVENTIL/VENTOLIN HFA) 90 mcg/actuation inhaler INHALE 1 PUFF BY MOUTH EVERY 4 HOURS AS NEEDED FOR WHEEZING OR SHORTNESS OF BREATH    amoxicillin (AMOXIL) 500 MG Tab Take 4 tabs (2000 mg) once 60 minutes prior to dental cleaning or other high risk procedures    aspirin (ECOTRIN) 81 mg, Oral, Daily    azelastine (ASTELIN) 137 mcg, Nasal, 2 times daily    butalbital-acetaminophen-caffeine -40 mg (FIORICET, ESGIC) -40 mg per tablet 1 tablet, Oral, Every 4 hours PRN, for pain.    cholecalciferol (vitamin D3) (VITAMIN D3) 2,000 Units, Oral, Daily    cyanocobalamin 500 mcg, Daily    diazePAM (VALIUM) 10 mg, Oral, Nightly    diclofenac sodium (VOLTAREN) 2 g, 4 times daily    diltiaZEM (CARDIZEM CD) 240 mg, Oral    doxazosin (CARDURA) 4 mg, Oral, Nightly    enalapril (VASOTEC) 20 mg, Oral, 2 times daily    fluticasone propionate (FLONASE) 50 mcg, Each Nostril, 2 times daily    HYDROcodone-acetaminophen (NORCO)  mg per tablet 1 tablet, Oral, Every 8 hours PRN    multivitamin capsule 1 capsule, Daily    omeprazole (PRILOSEC) 20 mg, Oral, 2 times daily    polyethylene glycol (GLYCOLAX) 17 g, Daily PRN    pravastatin (PRAVACHOL) 20 mg, Oral    predniSONE (DELTASONE) 10 mg, Oral, Daily    promethazine (PHENERGAN) 25 mg, Oral, Every 6 hours PRN    sildenafiL (VIAGRA) 100 mg, Oral, As needed (PRN)    tiZANidine (ZANAFLEX) 4 mg, Oral, Every 8 hours         ENT ROS negative except as stated above.     Patient answers are not available  for this visit.            Objective:      There were no vitals filed for this visit.    General: NAD, well appearing  Eyes: Normal conjunctiva and lids  Face: symmetric, nerve intact  Nose: The nose is without any evidence of any deformity. The nasal mucosa is moist. The septum is midline. There is no evidence of septal hematoma. The turbinates are with hypertrophy with rhinitis changes    Ears: The ears are with normal-appearing pinna. Examination of the canals is normal appearing bilaterally. There is no drainage or erythema noted. The tympanic membranes are normal appearing with pearly color, normal-appearing landmarks and normal light reflex. Hearing is grossly intact.  Mouth: No obvious abnormalities to the lips. Missing teeth and cracked tooth noted on left lower. The gingivae are without any obvious evidence of infection or lesion. The oral mucosa is moist and pink. There are no obvious masses to the hard or soft palate.   Oropharynx: The uvula is midline.  The tongue is midline. The posterior pharynx is without erythema or exudate. The tonsils are normal appearing.  Salivary glands: The salivary glands are symmetric and not enlarged, no masses  Neck: No lymphadenopathy, trachea midline, thryoid not enlarged.  Psych: Normal mood and affect.   Neuro: Grossly intact  Speech: fluent    SEPARATE PROCEDURE NOTE: last visit  Anterior rhinoscopy insufficient to account for patient's symptoms. After verbal consent obtained, bilaterally nasal cavities sprayed with phenylephrine and xylocaine.  A complete diagnostic nasal endoscopy was performed to visualize the nasal cavities, the middle and superior meatus, the turbinates, and the sphenoethmoid recess bilaterally.  Rigid / Flexible nasoendoscopy carried out and findings were discussed and reviewed in detail with the patient. Findings discussed:   Nasal cavity --- patent bilaterally, no obstructions; turbinate hypertrophy, spur noted in left side  Inferior, Middle,  and Superior turbinates/meatus --- no mucopus or polyps  Spheno-ethmoid recess --- no mucopus or polyps       Assessment and Plan:       1. Nasal congestion    2. PND (post-nasal drip)    3. Chronic rhinitis    4. Allergic rhinitis, unspecified seasonality, unspecified trigger          - Continue Saline rinse twice daily, morning and night  -  Continue Flonase and Astelin twice daily after rinse  - Continue Ponaris for nasal congestion  - Follow-up with PCP for headaches; he is requesting a refill of his Fioricet  - Continue debrox PRN   - If his nasal congestion becomes more of an issue he can come to discuss other options for symptoms with surgeon such as turbinate reduction. He is not interested at this time.     RTC: prn for any further ENT concern/complaint    Plan of care was discussed in detail with the patient, who agreed with the plan as above. All questions were answered in detail. 25 minutes spent in direct patient care, chart review and documentation.     Jen Stephenson, GIAP-C  Otolaryngology     This note was generated with the assistance of ambient listening technology. Verbal consent was obtained by the patient and accompanying visitor(s) for the recording of patient appointment to facilitate this note. I attest to having reviewed and edited the generated note for accuracy, though some syntax or spelling errors may persist. Please contact the author of this note for any clarification.

## 2025-05-28 DIAGNOSIS — R53.83 FATIGUE, UNSPECIFIED TYPE: ICD-10-CM

## 2025-05-28 DIAGNOSIS — L40.50 PSA (PSORIATIC ARTHRITIS): Primary | ICD-10-CM

## 2025-05-28 DIAGNOSIS — M05.9 SEROPOSITIVE RHEUMATOID ARTHRITIS: ICD-10-CM

## 2025-05-28 DIAGNOSIS — G89.4 CHRONIC PAIN SYNDROME: ICD-10-CM

## 2025-05-29 ENCOUNTER — TELEPHONE (OUTPATIENT)
Dept: UROLOGY | Facility: CLINIC | Age: 75
End: 2025-05-29
Payer: MEDICARE

## 2025-05-29 NOTE — TELEPHONE ENCOUNTER
Called and spoke to pt; informed him that Dr. Lott would not be in clinic on 7/7/25 and his appt needed to be rescheduled; pt verbalized understanding and appt was rescheduled.

## 2025-05-31 RX ORDER — HYDROCODONE BITARTRATE AND ACETAMINOPHEN 10; 325 MG/1; MG/1
1 TABLET ORAL EVERY 8 HOURS PRN
Qty: 90 TABLET | Refills: 0 | Status: SHIPPED | OUTPATIENT
Start: 2025-07-01 | End: 2025-07-31

## 2025-05-31 RX ORDER — HYDROCODONE BITARTRATE AND ACETAMINOPHEN 10; 325 MG/1; MG/1
1 TABLET ORAL EVERY 8 HOURS PRN
Qty: 90 TABLET | Refills: 0 | Status: SHIPPED | OUTPATIENT
Start: 2025-06-01 | End: 2025-07-01

## 2025-05-31 RX ORDER — HYDROCODONE BITARTRATE AND ACETAMINOPHEN 10; 325 MG/1; MG/1
1 TABLET ORAL EVERY 8 HOURS PRN
Qty: 90 TABLET | Refills: 0 | Status: SHIPPED | OUTPATIENT
Start: 2025-08-01 | End: 2025-08-31

## 2025-06-20 ENCOUNTER — PATIENT OUTREACH (OUTPATIENT)
Dept: ADMINISTRATIVE | Facility: HOSPITAL | Age: 75
End: 2025-06-20
Payer: MEDICARE

## 2025-07-01 ENCOUNTER — HOSPITAL ENCOUNTER (OUTPATIENT)
Dept: CARDIOLOGY | Facility: HOSPITAL | Age: 75
Discharge: HOME | End: 2025-07-01
Attending: NURSE PRACTITIONER
Payer: MEDICARE

## 2025-07-01 ENCOUNTER — OFFICE VISIT (OUTPATIENT)
Dept: CARDIOLOGY | Facility: CLINIC | Age: 75
End: 2025-07-01
Payer: MEDICARE

## 2025-07-01 VITALS
DIASTOLIC BLOOD PRESSURE: 94 MMHG | WEIGHT: 185.44 LBS | HEIGHT: 72 IN | SYSTOLIC BLOOD PRESSURE: 136 MMHG | BODY MASS INDEX: 25.12 KG/M2 | HEART RATE: 70 BPM | WEIGHT: 186 LBS | BODY MASS INDEX: 25.19 KG/M2 | HEIGHT: 72 IN

## 2025-07-01 DIAGNOSIS — Z95.3 S/P TAVR (TRANSCATHETER AORTIC VALVE REPLACEMENT): ICD-10-CM

## 2025-07-01 DIAGNOSIS — I10 PRIMARY HYPERTENSION: Primary | ICD-10-CM

## 2025-07-01 DIAGNOSIS — J44.9 CHRONIC OBSTRUCTIVE PULMONARY DISEASE, UNSPECIFIED COPD TYPE: ICD-10-CM

## 2025-07-01 LAB
AORTIC SIZE INDEX (SOV): 1.8 CM/M2
AORTIC SIZE INDEX: 1.7 CM/M2
ASCENDING AORTA: 3.5 CM
AV INDEX (PROSTH): 0.57
AV MEAN GRADIENT: 11 MMHG
AV PEAK GRADIENT: 18 MMHG
AV VALVE AREA BY VELOCITY RATIO: 2.3 CM²
AV VALVE AREA: 2.8 CM²
AV VELOCITY RATIO: 0.48
BSA FOR ECHO PROCEDURE: 2.07 M2
CV ECHO LV RWT: 0.57 CM
DOP CALC AO PEAK VEL: 2.1 M/S
DOP CALC AO VTI: 41.3 CM
DOP CALC LVOT AREA: 4.9 CM2
DOP CALC LVOT DIAMETER: 2.5 CM
DOP CALC LVOT PEAK VEL: 1 M/S
DOP CALC LVOT STROKE VOLUME: 115.3 CM3
DOP CALCLVOT PEAK VEL VTI: 23.5 CM
E WAVE DECELERATION TIME: 374 MSEC
E/A RATIO: 0.69
E/E' RATIO: 16 M/S
ECHO LV POSTERIOR WALL: 1.2 CM (ref 0.6–1.1)
FRACTIONAL SHORTENING: 31 % (ref 28–44)
INTERVENTRICULAR SEPTUM: 1.4 CM (ref 0.6–1.1)
IVRT: 151 MSEC
LEFT ATRIUM AREA SYSTOLIC (APICAL 2 CHAMBER): 19.94 CM2
LEFT ATRIUM AREA SYSTOLIC (APICAL 4 CHAMBER): 20.49 CM2
LEFT ATRIUM SIZE: 4.1 CM
LEFT ATRIUM VOLUME INDEX MOD: 33 ML/M2
LEFT ATRIUM VOLUME MOD: 68 ML
LEFT INTERNAL DIMENSION IN SYSTOLE: 2.9 CM (ref 2.1–4)
LEFT VENTRICLE DIASTOLIC VOLUME INDEX: 37.68 ML/M2
LEFT VENTRICLE DIASTOLIC VOLUME: 78 ML
LEFT VENTRICLE END SYSTOLIC VOLUME APICAL 2 CHAMBER: 66.83 ML
LEFT VENTRICLE END SYSTOLIC VOLUME APICAL 4 CHAMBER: 55.8 ML
LEFT VENTRICLE MASS INDEX: 96.9 G/M2
LEFT VENTRICLE SYSTOLIC VOLUME INDEX: 15.5 ML/M2
LEFT VENTRICLE SYSTOLIC VOLUME: 32 ML
LEFT VENTRICULAR INTERNAL DIMENSION IN DIASTOLE: 4.2 CM (ref 3.5–6)
LEFT VENTRICULAR MASS: 200.6 G
LV LATERAL E/E' RATIO: 14.4 M/S
LV SEPTAL E/E' RATIO: 18 M/S
LVED V (TEICH): 77.81 ML
LVES V (TEICH): 32.37 ML
LVOT MG: 2.21 MMHG
LVOT MV: 0.69 CM/S
MV PEAK A VEL: 1.04 M/S
MV PEAK E VEL: 0.72 M/S
MV STENOSIS PRESSURE HALF TIME: 108.59 MS
MV VALVE AREA P 1/2 METHOD: 2.03 CM2
OHS CV RV/LV RATIO: 1.1 CM
OHS QRS DURATION: 80 MS
OHS QTC CALCULATION: 413 MS
PISA TR MAX VEL: 2.4 M/S
PULM VEIN S/D RATIO: 1.76
PV PEAK D VEL: 0.34 M/S
PV PEAK S VEL: 0.6 M/S
RA PRESSURE ESTIMATED: 3 MMHG
RA VOL SYS: 50.3 ML
RIGHT ATRIAL AREA: 17 CM2
RIGHT ATRIUM END SYSTOLIC VOLUME APICAL 4 CHAMBER INDEX BSA: 23.62 ML/M2
RIGHT ATRIUM VOLUME AREA LENGTH APICAL 4 CHAMBER: 48.89 ML
RIGHT VENTRICLE DIASTOLIC BASEL DIMENSION: 4.6 CM
RIGHT VENTRICLE DIASTOLIC LENGTH: 6.4 CM
RIGHT VENTRICLE DIASTOLIC MID DIMENSION: 2.6 CM
RIGHT VENTRICULAR END-DIASTOLIC DIMENSION: 4.64 CM
RIGHT VENTRICULAR LENGTH IN DIASTOLE (APICAL 4-CHAMBER VIEW): 6.42 CM
RV MID DIAMA: 2.64 CM
RV TB RVSP: 5 MMHG
SINUS: 3.8 CM
STJ: 3 CM
TDI LATERAL: 0.05 M/S
TDI SEPTAL: 0.04 M/S
TDI: 0.05 M/S
TR MAX PG: 24 MMHG
TRICUSPID ANNULAR PLANE SYSTOLIC EXCURSION: 1.8 CM
TV REST PULMONARY ARTERY PRESSURE: 26 MMHG
Z-SCORE OF LEFT VENTRICULAR DIMENSION IN END DIASTOLE: -4.04
Z-SCORE OF LEFT VENTRICULAR DIMENSION IN END SYSTOLE: -2.24

## 2025-07-01 PROCEDURE — 93306 TTE W/DOPPLER COMPLETE: CPT | Mod: PO

## 2025-07-01 PROCEDURE — 3075F SYST BP GE 130 - 139MM HG: CPT | Mod: CPTII,S$GLB,,

## 2025-07-01 PROCEDURE — 93306 TTE W/DOPPLER COMPLETE: CPT | Mod: 26,,, | Performed by: INTERNAL MEDICINE

## 2025-07-01 PROCEDURE — 3080F DIAST BP >= 90 MM HG: CPT | Mod: CPTII,S$GLB,,

## 2025-07-01 PROCEDURE — 1126F AMNT PAIN NOTED NONE PRSNT: CPT | Mod: CPTII,S$GLB,,

## 2025-07-01 PROCEDURE — 93010 ELECTROCARDIOGRAM REPORT: CPT | Mod: S$GLB,,, | Performed by: INTERNAL MEDICINE

## 2025-07-01 PROCEDURE — 99214 OFFICE O/P EST MOD 30 MIN: CPT | Mod: S$GLB,,,

## 2025-07-01 PROCEDURE — 93005 ELECTROCARDIOGRAM TRACING: CPT | Mod: PO

## 2025-07-01 PROCEDURE — 99999 PR PBB SHADOW E&M-EST. PATIENT-LVL IV: CPT | Mod: PBBFAC,,,

## 2025-07-01 PROCEDURE — 1159F MED LIST DOCD IN RCRD: CPT | Mod: CPTII,S$GLB,,

## 2025-07-01 NOTE — PROGRESS NOTES
Subjective:    Patient ID:  Scooter Swan is a 74 y.o. male patient here for evaluation tavr    History of Present Illness:     Pt of Dr. Boucher's here today for one year post TAVR follow up. Echo jsut completed has not yet been read. Feeling much better from a CHF/valve standpoint, however remains limited in his daily activities due to COPD. NO volume concerns or chest pain.   EKG today no conduction abnormalities or ischemic change. KCCQ completed.           Most Recent Echocardiogram Results  Results for orders placed during the hospital encounter of 07/01/25    Echo    Interpretation Summary    Left Ventricle: The left ventricle is normal in size. Normal wall thickness. There is normal systolic function with a visually estimated ejection fraction of 60 - 65%. Grade I diastolic dysfunction.    Right Ventricle: The right ventricle is normal in size measuring 4.6 cm. Wall thickness is normal. Systolic function is normal.    Left Atrium: The left atrium is mildly dilated    Aortic Valve: There is a transcatheter valve replacement in the aortic position.  IT IS REPORTED TO BE A 29 MM MANEULITO 3 ULTRA  RESILIA MOSAIC VALVE.   THE VALVE IS WELL SEATED, NO AI.    Tricuspid Valve: There is mild regurgitation.    Aorta: The aortic root is mildly dilated measuring 3.8 cm. The proximal ascending aorta is normal in size measuring 3.5 cm.    Pulmonary Artery: No pulmonary hypertension. The estimated pulmonary artery systolic pressure is 26 mmHg.    IVC/SVC: Normal venous pressure at 3 mmHg.      Most Recent Nuclear Stress Test Results  Results for orders placed during the hospital encounter of 09/23/22    Nuclear Stress - Cardiology Interpreted    Interpretation Summary    Abnormal myocardial perfusion scan.    There is a mild intensity, small to moderate sized, reversible perfusion abnormality that is consistent with ischemia in the basal inferolateral wall(s).    There are no other significant perfusion abnormalities.    The  gated perfusion images showed an ejection fraction of 64% at rest.    There is normal wall motion at rest.    The EKG portion of this study is negative for ischemia.    During stress, rare PVCs are noted.      Most Recent Cardiac PET Stress Test Results  No results found for this or any previous visit.      Most Recent Cardiovascular Angiogram results  Results for orders placed during the hospital encounter of 06/19/24    Cardiac catheterization    Conclusion  Procedure performed in the Invasive Lab  - See Procedure Log link below for nursing documentation  - See OpNote on Surgeries Tab for physician findings  - See Imaging Tab for radiologist dictation      Other Most Recent Cardiology Results  Results for orders placed in visit on 08/01/24    Cardiac monitoring strips      REVIEW OF SYSTEMS: As noted in HPI   CARDIOVASCULAR: No recent chest pain, palpitations, arm/neck/jaw pain, or edema.  RESPIRATORY: No recent fever, cough.  : No blood in the urine  GI: No reflux, nausea, vomiting, or blood in stool.   MUSCULOSKELETAL: No falls.   NEURO: No headaches, syncope, or dizziness.  EYES: No sudden changes in vision.     Past Medical History:   Diagnosis Date    Cataract     COPD (chronic obstructive pulmonary disease)     Diverticulosis     DUARTE (dyspnea on exertion)     GERD (gastroesophageal reflux disease)     Glaucoma     Hepatitis C     treated; seeing Dr. Carr    Hyperlipidemia     Hypertension     Liver lesion 08/2018    RA (rheumatoid arthritis)     Rectal prolapse     Possible    Severe aortic stenosis 08/24/2020    Thyroid disease      Past Surgical History:   Procedure Laterality Date    ANGIOGRAM, CORONARY, WITH LEFT HEART CATHETERIZATION  10/06/2022    Procedure: Angiogram, Coronary, with Left Heart Cath;  Surgeon: Zac Boucher MD;  Location: [facility] CATH;  Service: Cardiology;;    AORTOGRAPHY  03/28/2024    Procedure: AO Root;  Surgeon: Zac Boucher MD;  Location: [facility] CATH;  Service: Cardiology;;     ARTERIOGRAPHY OF AORTIC ROOT  10/06/2022    Procedure: ARTERIOGRAM, AORTIC ROOT;  Surgeon: Zac Boucher MD;  Location: [facility] CATH;  Service: Cardiology;;    CARPAL TUNNEL RELEASE      Right wrist 2015    CATHETERIZATION OF BOTH LEFT AND RIGHT HEART  03/28/2024    Procedure: Right / Left heart cath;  Surgeon: Zac Boucher MD;  Location: [facility] CATH;  Service: Cardiology;;    COLONOSCOPY  08/2016    Dr. Cardona; in care everywhere    COLONOSCOPY N/A 03/20/2019    Procedure: COLONOSCOPY;  Surgeon: Sotero Arthur MD;  Location: [facility] ENDO;  Service: Endoscopy;  Laterality: N/A; hemorrhoids, diverticulosis, repeat in 10 years for screening    ECHOCARDIOGRAM,TRANSESOPHAGEAL N/A 01/12/2024    Procedure: Transesophageal echo (CADY) intra-procedure log documentation;  Surgeon: Renan Slaughter MD;  Location: [facility] CATH/EP LAB;  Service: Cardiology;  Laterality: N/A;    EXCISIONAL HEMORRHOIDECTOMY N/A 10/18/2018    Procedure: HEMORRHOIDECTOMY, prone;  Surgeon: Gunnar Horton MD;  Location: [address];  Service: Colon and Rectal;  Laterality: N/A;    HERNIA REPAIR      THYROID SURGERY      TRANSCATHETER AORTIC VALVE REPLACEMENT (TAVR)  6/19/2024    Procedure: (TAVR);  Surgeon: Hernandez Marrero MD;  Location: [facility] CATH;  Service: Cardiology;;    TRANSCATHETER AORTIC VALVE REPLACEMENT (TAVR)  6/19/2024    Procedure: (TAVR) - Surgeon;  Surgeon: Jada Murphy MD;  Location: [facility] CATH;  Service: Cardiothoracic;;    TRANSURETHRAL RESECTION OF PROSTATE USING BIPOLAR CAUTERY N/A 12/11/2024    Procedure: TURP, USING BIPOLAR CAUTERY;  Surgeon: Carrington Lott MD;  Location: [facility] OR;  Service: Urology;  Laterality: N/A;    UPPER GASTROINTESTINAL ENDOSCOPY  08/2016    Dr. Cardona; in care everywhere     Social History[1]      Objective      Vitals:    07/01/25 1415   BP: (!) 136/94   Pulse: 70       LAST EKG  Results for orders placed or performed in visit on 07/18/24   IN OFFICE EKG 12-LEAD (to [place])    Collection Time: 07/18/24  11:08 AM   Result Value Ref Range    QRS Duration 82 ms    [facility] QTC Calculation 433 ms    Narrative    Test Reason : Z95.2,    Vent. Rate : 052 BPM     Atrial Rate : 052 BPM     P-R Int : 170 ms          QRS Dur : 082 ms      QT Int : 466 ms       P-R-T Axes : 035 -14 -05 degrees     QTc Int : 433 ms    Sinus bradycardia  Nonspecific T wave abnormality  Abnormal ECG  When compared with ECG of 20-JUN-2024 06:22,  No significant change was found  Confirmed by Zac Boucher MD ([id number]) on 7/19/2024 9:47:28 PM    Referred By:             Confirmed By:Zac Boucher MD     LIPIDS - LAST 2   Lab Results   Component Value Date    CHOL 186 02/19/2024    CHOL 176 02/16/2023    HDL 50 02/19/2024    HDL 62 02/16/2023    LDLCALC 110.6 02/19/2024    LDLCALC 96.6 02/16/2023    TRIG 127 02/19/2024    TRIG 87 02/16/2023    CHOLHDL 26.9 02/19/2024    CHOLHDL 35.2 02/16/2023     CARDIAC PROFILE - LAST 2  Lab Results   Component Value Date    BNP 82 01/10/2024    CPK 77 01/12/2024     (H) 07/22/2024     01/12/2024      CBC - LAST 2  Lab Results   Component Value Date    WBC 13.93 (H) 02/12/2025    WBC 12.72 (H) 11/27/2024    HGB 10.2 (L) 02/12/2025    HGB 10.2 (L) 11/27/2024    HCT 34.0 (L) 02/12/2025    HCT 31.5 (L) 11/27/2024     (L) 02/12/2025     (L) 11/27/2024     Lab Results   Component Value Date    LABPT 13.8 06/17/2024    INR 1.1 06/17/2024    INR 1.0 03/27/2024    APTT 31.1 06/17/2024    APTT 28.4 03/27/2024     CHEMISTRY - LAST 2  Lab Results   Component Value Date     02/12/2025     11/15/2024    K 4.3 02/12/2025    K 4.0 11/15/2024    CO2 27 02/12/2025    CO2 27 11/15/2024    BUN 23 02/12/2025    BUN 17 11/15/2024    CREATININE 1.2 02/12/2025    CREATININE 1.2 11/15/2024     (H) 02/12/2025     (H) 11/15/2024    CALCIUM 9.4 02/12/2025    CALCIUM 9.5 11/15/2024    MG 2.1 01/13/2024    ALBUMIN 3.7 02/12/2025    ALBUMIN 4.2 11/15/2024    ALT 12 02/12/2025    ALT 11 11/15/2024    AST  14 02/12/2025    AST 15 11/15/2024      ENDOCRINE - LAST 2  Lab Results   Component Value Date    HGBA1C 5.8 01/10/2024    HGBA1C 5.0 10/08/2021    TSH 5.401 (H) 11/06/2024    TSH 2.403 02/12/2024        PHYSICAL EXAM  CONSTITUTIONAL: Well built, well nourished in no apparent distress. Cane to ambulate.   NECK: no carotid bruit, no JVD  LUNGS: CTA  CHEST WALL: no tenderness  HEART: regular rate and rhythm, S1, S2 normal, no murmur, click, rub or gallop   ABDOMEN: soft, non-tender; bowel sounds normal; no masses,  no organomegaly  EXTREMITIES: Extremities normal, no edema, no calf tenderness noted  NEURO: AAO X 3    All pertinent data including labs, imaging, EKGs, and studies listed above were reviewed.     Current Outpatient Medications   Medication Instructions    albuterol (PROVENTIL) 2.5 mg /3 mL (0.083 %) nebulizer solution TAKE 3 MLS BY MOUTH VIA NEBULIZER EVERY 6 HOURS AS NEEDED FOR WHEEZING    albuterol (PROVENTIL/VENTOLIN HFA) 90 mcg/actuation inhaler INHALE 1 PUFF BY MOUTH EVERY 4 HOURS AS NEEDED FOR WHEEZING OR SHORTNESS OF BREATH    amoxicillin (AMOXIL) 500 MG Tab Take 4 tabs (2000 mg) once 60 minutes prior to dental cleaning or other high risk procedures    aspirin (ECOTRIN) 81 mg, Oral, Daily    azelastine (ASTELIN) 137 mcg, Nasal, 2 times daily    butalbital-acetaminophen-caffeine -40 mg (FIORICET, ESGIC) -40 mg per tablet 1 tablet, Oral, Every 4 hours PRN, for pain.    cholecalciferol (vitamin D3) (VITAMIN D3) 2,000 Units, Oral, Daily    cyanocobalamin 500 mcg, Daily    diazePAM (VALIUM) 10 mg, Oral, Nightly    diclofenac sodium (VOLTAREN) 2 g, 4 times daily    diltiaZEM (CARDIZEM CD) 240 mg, Oral    doxazosin (CARDURA) 4 mg, Oral, Nightly    enalapril (VASOTEC) 20 mg, Oral, 2 times daily    fluticasone propionate (FLONASE) 50 mcg, Each Nostril, 2 times daily    HYDROcodone-acetaminophen (NORCO)  mg per tablet 1 tablet, Oral, Every 8 hours PRN    HYDROcodone-acetaminophen (NORCO)   mg per tablet 1 tablet, Oral, Every 8 hours PRN    [START ON 8/1/2025] HYDROcodone-acetaminophen (NORCO)  mg per tablet 1 tablet, Oral, Every 8 hours PRN    multivitamin capsule 1 capsule, Daily    omeprazole (PRILOSEC) 20 mg, Oral, 2 times daily    polyethylene glycol (GLYCOLAX) 17 g, Daily PRN    pravastatin (PRAVACHOL) 20 mg, Oral    predniSONE (DELTASONE) 10 mg, Oral, Daily    promethazine (PHENERGAN) 25 mg, Oral, Every 6 hours PRN    sildenafiL (VIAGRA) 100 mg, Oral, As needed (PRN)    tiZANidine (ZANAFLEX) 4 mg, Oral, Every 8 hours        Assessment & Plan   1. Primary hypertension (Primary)  Well controlled   Continue cardura, cardizem  - IN OFFICE EKG 12-LEAD (to [place])    2. S/P TAVR (transcatheter aortic valve replacement)- bovine  EKG today NSR   KQQC completed   Lifelong endocarditis prophylaxis   Continue ASA 81 mg daily   Continue statin   1 year echo to be read, will follow up     3. Chronic obstructive pulmonary disease, unspecified COPD type  Remains limited due to above        9 months with Dr. Sander Inman PA-C   [facility]   Office: [phone]         [1]   Social History  Tobacco Use    Smoking status: Never    Smokeless tobacco: Never   Substance Use Topics    Alcohol use: Yes     Alcohol/week: 1.0 standard drink of alcohol     Types: 1 Shots of liquor per week     Comment: social    Drug use: Yes     Types: Hydrocodone, Marijuana

## 2025-07-14 ENCOUNTER — LAB VISIT (OUTPATIENT)
Dept: LAB | Facility: HOSPITAL | Age: 75
End: 2025-07-14
Attending: UROLOGY
Payer: MEDICARE

## 2025-07-14 ENCOUNTER — OFFICE VISIT (OUTPATIENT)
Dept: UROLOGY | Facility: CLINIC | Age: 75
End: 2025-07-14
Payer: MEDICARE

## 2025-07-14 VITALS
HEIGHT: 72 IN | SYSTOLIC BLOOD PRESSURE: 143 MMHG | BODY MASS INDEX: 25.32 KG/M2 | DIASTOLIC BLOOD PRESSURE: 97 MMHG | RESPIRATION RATE: 18 BRPM | WEIGHT: 186.94 LBS | HEART RATE: 88 BPM | TEMPERATURE: 98 F

## 2025-07-14 DIAGNOSIS — R31.0 GROSS HEMATURIA: ICD-10-CM

## 2025-07-14 DIAGNOSIS — D30.3: ICD-10-CM

## 2025-07-14 DIAGNOSIS — N40.0 BENIGN PROSTATIC HYPERPLASIA, UNSPECIFIED WHETHER LOWER URINARY TRACT SYMPTOMS PRESENT: Primary | ICD-10-CM

## 2025-07-14 DIAGNOSIS — N40.0 BENIGN PROSTATIC HYPERPLASIA, UNSPECIFIED WHETHER LOWER URINARY TRACT SYMPTOMS PRESENT: ICD-10-CM

## 2025-07-14 DIAGNOSIS — G43.909 MIGRAINE WITHOUT STATUS MIGRAINOSUS, NOT INTRACTABLE, UNSPECIFIED MIGRAINE TYPE: ICD-10-CM

## 2025-07-14 LAB — PSA SERPL-MCNC: 1.68 NG/ML

## 2025-07-14 PROCEDURE — 1100F PTFALLS ASSESS-DOCD GE2>/YR: CPT | Mod: CPTII,S$GLB,, | Performed by: UROLOGY

## 2025-07-14 PROCEDURE — 1159F MED LIST DOCD IN RCRD: CPT | Mod: CPTII,S$GLB,, | Performed by: UROLOGY

## 2025-07-14 PROCEDURE — 99214 OFFICE O/P EST MOD 30 MIN: CPT | Mod: S$GLB,,, | Performed by: UROLOGY

## 2025-07-14 PROCEDURE — 3288F FALL RISK ASSESSMENT DOCD: CPT | Mod: CPTII,S$GLB,, | Performed by: UROLOGY

## 2025-07-14 PROCEDURE — 1126F AMNT PAIN NOTED NONE PRSNT: CPT | Mod: CPTII,S$GLB,, | Performed by: UROLOGY

## 2025-07-14 PROCEDURE — 36415 COLL VENOUS BLD VENIPUNCTURE: CPT

## 2025-07-14 PROCEDURE — 84153 ASSAY OF PSA TOTAL: CPT

## 2025-07-14 PROCEDURE — 3077F SYST BP >= 140 MM HG: CPT | Mod: CPTII,S$GLB,, | Performed by: UROLOGY

## 2025-07-14 PROCEDURE — 99999 PR PBB SHADOW E&M-EST. PATIENT-LVL V: CPT | Mod: PBBFAC,,, | Performed by: UROLOGY

## 2025-07-14 PROCEDURE — 3080F DIAST BP >= 90 MM HG: CPT | Mod: CPTII,S$GLB,, | Performed by: UROLOGY

## 2025-07-14 RX ORDER — BUTALBITAL, ACETAMINOPHEN AND CAFFEINE 50; 325; 40 MG/1; MG/1; MG/1
1 TABLET ORAL EVERY 4 HOURS PRN
Qty: 10 TABLET | Refills: 5 | Status: SHIPPED | OUTPATIENT
Start: 2025-07-14

## 2025-07-14 NOTE — TELEPHONE ENCOUNTER
Care Due:                  Date            Visit Type   Department     Provider  --------------------------------------------------------------------------------                                EP -                              PRIMARY      [facility] FAMILY  Last Visit: 11-      CARE ([facility])   MEDICINE       Salvador Kennedy  Next Visit: None Scheduled  None         None Found                                                            Last  Test          Frequency    Reason                     Performed    Due Date  --------------------------------------------------------------------------------    Lipid Panel.  12 months..  pravastatin..............  02- 02-    [facility] Embedded Care Due Messages. Reference number: [id number].   7/14/2025 9:49:43 AM CDT

## 2025-07-14 NOTE — PROGRESS NOTES
Chief Complaint:   Encounter Diagnoses   Name Primary?    Benign prostatic hyperplasia, unspecified whether lower urinary tract symptoms present Yes    Gross hematuria     Benign neoplasm of urinary bladder neck        HPI:  HPI Scooter Swan sid 74 y.o. male who presents with follow up from BPH and hematuria.  Patient underwent evaluation for hematuria and found to have a small growth on his median lobe of his prostate.  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  He was having some moderate lower urinary tract symptoms including urge incontinence and nocturia.  These have all improved.  He is only maintained on doxazosin currently.  He has not had any further hematuria.  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  He is here with his wife today.    History:  Social History[1]  Past Medical History:   Diagnosis Date    Cataract     COPD (chronic obstructive pulmonary disease)     Diverticulosis     DUARTE (dyspnea on exertion)     GERD (gastroesophageal reflux disease)     Glaucoma     Hepatitis C     treated; seeing Dr. Carr    Hyperlipidemia     Hypertension     Liver lesion 08/2018    RA (rheumatoid arthritis)     Rectal prolapse     Possible    Severe aortic stenosis 08/24/2020    Thyroid disease      Past Surgical History:   Procedure Laterality Date    ANGIOGRAM, CORONARY, WITH LEFT HEART CATHETERIZATION  10/06/2022    Procedure: Angiogram, Coronary, with Left Heart Cath;  Surgeon: Zac Boucher MD;  Location: [facility] CATH;  Service: Cardiology;;    AORTOGRAPHY  03/28/2024    Procedure: AO Root;  Surgeon: Zac Boucher MD;  Location: [facility] CATH;  Service: Cardiology;;    ARTERIOGRAPHY OF AORTIC ROOT  10/06/2022    Procedure: ARTERIOGRAM, AORTIC ROOT;  Surgeon: Zac Boucher MD;  Location: [facility] CATH;  Service: Cardiology;;    CARPAL TUNNEL RELEASE      Right wrist 2015    CATHETERIZATION OF BOTH LEFT AND RIGHT HEART   03/28/2024    Procedure: Right / Left heart cath;  Surgeon: Zac Boucher MD;  Location: [facility] CATH;  Service: Cardiology;;    COLONOSCOPY  08/2016    Dr. Cardona; in care everywhere    COLONOSCOPY N/A 03/20/2019    Procedure: COLONOSCOPY;  Surgeon: Sotero Arthur MD;  Location: [facility] ENDO;  Service: Endoscopy;  Laterality: N/A; hemorrhoids, diverticulosis, repeat in 10 years for screening    ECHOCARDIOGRAM,TRANSESOPHAGEAL N/A 01/12/2024    Procedure: Transesophageal echo (CADY) intra-procedure log documentation;  Surgeon: Renan Slaughter MD;  Location: [facility] CATH/EP LAB;  Service: Cardiology;  Laterality: N/A;    EXCISIONAL HEMORRHOIDECTOMY N/A 10/18/2018    Procedure: HEMORRHOIDECTOMY, prone;  Surgeon: Gunnar Horton MD;  Location: [address];  Service: Colon and Rectal;  Laterality: N/A;    HERNIA REPAIR      THYROID SURGERY      TRANSCATHETER AORTIC VALVE REPLACEMENT (TAVR)  6/19/2024    Procedure: (TAVR);  Surgeon: Hernandez Marrero MD;  Location: [facility] CATH;  Service: Cardiology;;    TRANSCATHETER AORTIC VALVE REPLACEMENT (TAVR)  6/19/2024    Procedure: (TAVR) - Surgeon;  Surgeon: Jada Murphy MD;  Location: [facility] CATH;  Service: Cardiothoracic;;    TRANSURETHRAL RESECTION OF PROSTATE USING BIPOLAR CAUTERY N/A 12/11/2024    Procedure: TURP, USING BIPOLAR CAUTERY;  Surgeon: Carrington Lott MD;  Location: [facility];  Service: Urology;  Laterality: N/A;    UPPER GASTROINTESTINAL ENDOSCOPY  08/2016    Dr. Cardona; in care everywhere     Family History   Problem Relation Name Age of Onset    Stomach cancer Paternal Cousin      Stomach cancer Paternal Cousin      Cataracts Mother      Diabetes Mother      Hypertension Mother      Stroke Mother      Diabetes Sister      Hypertension Sister      Stroke Sister      Diabetes Brother      Glaucoma Brother      Hypertension Brother      Stroke Brother      Diabetes Maternal Aunt      Hypertension Maternal Aunt      Stroke Maternal Aunt      Diabetes Maternal Uncle       Hypertension Maternal Uncle      Stroke Maternal Uncle      Diabetes Maternal Grandmother      Hypertension Maternal Grandmother      Stroke Maternal Grandmother      Cancer Cousin          stomach    Colon cancer Neg Hx      Colon polyps Neg Hx      Crohn's disease Neg Hx      Ulcerative colitis Neg Hx      Esophageal cancer Neg Hx      Amblyopia Neg Hx      Blindness Neg Hx      Macular degeneration Neg Hx      Retinal detachment Neg Hx      Strabismus Neg Hx      Thyroid disease Neg Hx         Medications Ordered Prior to Encounter[2]     Objective:     Vitals:    07/14/25 1122   BP: (!) 143/97   Pulse: 88   Resp: 18   Temp: 97.8 °F (36.6 °C)   TempSrc: Oral   Weight: 84.8 kg (186 lb 15.2 oz)   Height: 6' (1.829 m)      BMI Readings from Last 1 Encounters:   07/14/25 25.35 kg/m²          Physical Exam  No acute distress alert and oriented   Respirations even unlabored   Abdomen is soft nontender thin    Urinalysis without any hematuria or pyuria  Lab Results   Component Value Date    PSA 1.4 02/19/2024    PSA 1.6 02/16/2023        Lab Results   Component Value Date    CREATININE 1.2 02/12/2025      Assessment:       1. Benign prostatic hyperplasia, unspecified whether lower urinary tract symptoms present    2. Gross hematuria    3. Benign neoplasm of urinary bladder neck        Plan:     1. Benign prostatic hyperplasia, unspecified whether lower urinary tract symptoms present    2. Gross hematuria    3. Benign neoplasm of urinary bladder neck       Orders Placed This Encounter    Prostate Specific Antigen, Diagnostic      Patient is overall doing well after his limited TURP.  His urine is clear.  Check PSA today and follow up annually.       [1]   Social History  Tobacco Use    Smoking status: Never    Smokeless tobacco: Never   Substance Use Topics    Alcohol use: Yes     Alcohol/week: 1.0 standard drink of alcohol     Types: 1 Shots of liquor per week     Comment: social    Drug use: Yes     Types:  Hydrocodone, Marijuana   [2]   Current Outpatient Medications on File Prior to Visit   Medication Sig Dispense Refill    albuterol (PROVENTIL) 2.5 mg /3 mL (0.083 %) nebulizer solution TAKE 3 MLS BY MOUTH VIA NEBULIZER EVERY 6 HOURS AS NEEDED FOR WHEEZING 90 mL 10    albuterol (PROVENTIL/VENTOLIN HFA) 90 mcg/actuation inhaler INHALE 1 PUFF BY MOUTH EVERY 4 HOURS AS NEEDED FOR WHEEZING OR SHORTNESS OF BREATH 20.1 g 3    amoxicillin (AMOXIL) 500 MG Tab Take 4 tabs (2000 mg) once 60 minutes prior to dental cleaning or other high risk procedures 20 tablet 0    azelastine (ASTELIN) 137 mcg (0.1 %) nasal spray 1 spray (137 mcg total) by Nasal route 2 (two) times daily. 30 mL 11    butalbital-acetaminophen-caffeine -40 mg (FIORICET, ESGIC) -40 mg per tablet Take 1 tablet by mouth every 4 (four) hours as needed. for pain. 10 tablet 5    cholecalciferol, vitamin D3, (VITAMIN D3) 50 mcg (2,000 unit) Cap Take 1 capsule (2,000 Units total) by mouth once daily. 90 capsule 3    cyanocobalamin 500 MCG tablet Take 500 mcg by mouth once daily.      diazePAM (VALIUM) 10 MG Tab Take 1 Tablet by mouth EVERY EVENING 30 tablet 2    diclofenac sodium (VOLTAREN) 1 % Gel APPLY TWO Grams TOPICALLY FOUR TIMES DAILY 100 g 5    diltiaZEM (CARDIZEM CD) 240 MG 24 hr capsule TAKE 1 CAPSULE BY MOUTH EVERY DAY 90 capsule 1    doxazosin (CARDURA) 4 MG tablet Take 1 tablet (4 mg total) by mouth every evening. 90 tablet 1    enalapril (VASOTEC) 20 MG tablet TAKE ONE TABLET BY MOUTH TWO TIMES A  tablet 3    fluticasone propionate (FLONASE) 50 mcg/actuation nasal spray 1 spray (50 mcg total) by Each Nostril route 2 (two) times a day. 16 g 11    HYDROcodone-acetaminophen (NORCO)  mg per tablet Take 1 tablet by mouth every 8 (eight) hours as needed for Pain. 90 tablet 0    [START ON 8/1/2025] HYDROcodone-acetaminophen (NORCO)  mg per tablet Take 1 tablet by mouth every 8 (eight) hours as needed for Pain. 90 tablet 0     multivitamin capsule Take 1 capsule by mouth once daily.      omeprazole (PRILOSEC) 20 MG capsule Take 1 capsule (20 mg total) by mouth 2 (two) times a day. 180 capsule 3    polyethylene glycol (GLYCOLAX) 17 gram PwPk Take 17 g by mouth daily as needed.      pravastatin (PRAVACHOL) 20 MG tablet TAKE ONE TABLET BY MOUTH ONCE DAILY 90 tablet 3    predniSONE (DELTASONE) 5 MG tablet Take 2 tablets (10 mg total) by mouth once daily. 270 tablet 1    promethazine (PHENERGAN) 25 MG tablet Take 1 tablet (25 mg total) by mouth every 6 (six) hours as needed for Nausea. 30 tablet 5    sildenafiL (VIAGRA) 100 MG tablet Take 1 tablet (100 mg total) by mouth as needed for Erectile Dysfunction. 24 tablet 2    tiZANidine (ZANAFLEX) 4 MG tablet Take 1 tablet (4 mg total) by mouth every 8 (eight) hours. 270 tablet 1    aspirin (ECOTRIN) 81 MG EC tablet Take 1 tablet (81 mg total) by mouth once daily. 360 tablet 0    [DISCONTINUED] golimumab (SIMPONI) 50 mg/0.5 mL PnAlma Inject 50 mg into the skin every 28 days. 0.5 mL 11     Current Facility-Administered Medications on File Prior to Visit   Medication Dose Route Frequency Provider Last Rate Last Admin    0.9%  NaCl infusion   Intravenous Continuous Deedee Sarkar NP   Stopped at 10/18/18 7119    mupirocin 2 % ointment   Nasal On Call Procedure Deedee Sarkar NP   Given at 10/18/18 1533

## 2025-07-15 DIAGNOSIS — M17.9 OSTEOARTHRITIS OF KNEE, UNSPECIFIED LATERALITY, UNSPECIFIED OSTEOARTHRITIS TYPE: ICD-10-CM

## 2025-07-15 DIAGNOSIS — G89.4 CHRONIC PAIN SYNDROME: ICD-10-CM

## 2025-07-15 DIAGNOSIS — M05.9 SEROPOSITIVE RHEUMATOID ARTHRITIS: ICD-10-CM

## 2025-07-15 RX ORDER — PREDNISONE 5 MG/1
10 TABLET ORAL
Qty: 270 TABLET | Refills: 1 | Status: SHIPPED | OUTPATIENT
Start: 2025-07-15

## 2025-07-31 ENCOUNTER — OFFICE VISIT (OUTPATIENT)
Dept: FAMILY MEDICINE | Facility: CLINIC | Age: 75
End: 2025-07-31
Payer: MEDICARE

## 2025-07-31 VITALS
SYSTOLIC BLOOD PRESSURE: 140 MMHG | WEIGHT: 190.69 LBS | HEIGHT: 72 IN | DIASTOLIC BLOOD PRESSURE: 82 MMHG | OXYGEN SATURATION: 97 % | BODY MASS INDEX: 25.83 KG/M2 | HEART RATE: 76 BPM

## 2025-07-31 DIAGNOSIS — R51.9 WORSENING HEADACHES: Primary | ICD-10-CM

## 2025-07-31 DIAGNOSIS — E78.2 MIXED HYPERLIPIDEMIA: ICD-10-CM

## 2025-07-31 DIAGNOSIS — R73.9 HYPERGLYCEMIA: ICD-10-CM

## 2025-07-31 DIAGNOSIS — G47.19 EXCESSIVE DAYTIME SLEEPINESS: ICD-10-CM

## 2025-07-31 DIAGNOSIS — G43.909 MIGRAINE WITHOUT STATUS MIGRAINOSUS, NOT INTRACTABLE, UNSPECIFIED MIGRAINE TYPE: ICD-10-CM

## 2025-07-31 DIAGNOSIS — I50.30 NYHA CLASS 1 HEART FAILURE WITH PRESERVED EJECTION FRACTION: ICD-10-CM

## 2025-07-31 DIAGNOSIS — I10 PRIMARY HYPERTENSION: ICD-10-CM

## 2025-07-31 PROCEDURE — 99999 PR PBB SHADOW E&M-EST. PATIENT-LVL V: CPT | Mod: PBBFAC,,, | Performed by: INTERNAL MEDICINE

## 2025-07-31 NOTE — PROGRESS NOTES
Patient ID: Scooter Swan is a 74 y.o. male.    Chief Complaint: Headache       HPI / Assessment and plan:     Headaches  2 months. Fioricet helps but he needs to take more than 10 tablets/ month. Frequency is increasing.   MRI   Headache clinic referral    Fatigue  He does fall asleep easy through out the day. He does snore. He does have excessive daytime sleepiness.   Check home sleep study.     CHF-heart failure with preserved ejection fraction  Stable, 2/6 right-sided systolic murmur.  No overt edema  Monitor.     Hypertension  Controlled. Running 115-70s at home.   Continue enalapril, cardizem    Hyperglycemia   Check A1c     Mixed hyperlipidemia  Taking pravastatin, due for lipids   Continue pravastatin   Diagnosis and Orders   Worsening headaches  -     MRI Brain Without Contrast; Future; Expected date: 07/31/2025    Migraine without status migrainosus, not intractable, unspecified migraine type  -     Ambulatory referral/consult to Neurology; Future; Expected date: 08/07/2025    NYHA class 1 heart failure with preserved ejection fraction    Excessive daytime sleepiness  -     Home Sleep Study; Future; Expected date: 08/07/2025  -     CBC Auto Differential; Future; Expected date: 07/31/2025  -     Comprehensive Metabolic Panel; Future; Expected date: 07/31/2025    Hyperglycemia  -     Hemoglobin A1C; Future; Expected date: 07/31/2025    Mixed hyperlipidemia  -     Lipid Panel; Future; Expected date: 07/31/2025    Primary hypertension  -     Microalbumin/Creatinine Ratio, Urine; Future; Expected date: 07/31/2025            ROS   Review of Systems   Respiratory:  Negative for shortness of breath.    Cardiovascular:  Negative for chest pain.   Gastrointestinal:  Negative for abdominal pain.      Exam   Physical Exam  Cardiovascular:      Rate and Rhythm: Normal rate and regular rhythm.   Pulmonary:      Breath sounds: Normal breath sounds. No wheezing.        Vitals    Vitals:    07/31/25 1424   BP: (!) 140/82    Pulse: 76      Wt Readings from Last 3 Encounters:   07/31/25 1424 86.5 kg (190 lb 11.2 oz)   07/14/25 1122 84.8 kg (186 lb 15.2 oz)   07/01/25 1410 84.4 kg (186 lb)      Body mass index is 25.86 kg/m².      Reference      Hypertension Medications              diltiaZEM (CARDIZEM CD) 240 MG 24 hr capsule TAKE 1 CAPSULE BY MOUTH EVERY DAY    doxazosin (CARDURA) 4 MG tablet Take 1 tablet (4 mg total) by mouth every evening.    enalapril (VASOTEC) 20 MG tablet TAKE ONE TABLET BY MOUTH TWO TIMES A DAY           Hyperlipidemia Medications              pravastatin (PRAVACHOL) 20 MG tablet TAKE ONE TABLET BY MOUTH ONCE DAILY                MEDICATIONS   Medication List with Changes/Refills   Current Medications    ALBUTEROL (PROVENTIL) 2.5 MG /3 ML (0.083 %) NEBULIZER SOLUTION    TAKE 3 MLS BY MOUTH VIA NEBULIZER EVERY 6 HOURS AS NEEDED FOR WHEEZING    ALBUTEROL (PROVENTIL/VENTOLIN HFA) 90 MCG/ACTUATION INHALER    INHALE 1 PUFF BY MOUTH EVERY 4 HOURS AS NEEDED FOR WHEEZING OR SHORTNESS OF BREATH    AMOXICILLIN (AMOXIL) 500 MG TAB    Take 4 tabs (2000 mg) once 60 minutes prior to dental cleaning or other high risk procedures    ASPIRIN (ECOTRIN) 81 MG EC TABLET    Take 1 tablet (81 mg total) by mouth once daily.    AZELASTINE (ASTELIN) 137 MCG (0.1 %) NASAL SPRAY    1 spray (137 mcg total) by Nasal route 2 (two) times daily.    BUTALBITAL-ACETAMINOPHEN-CAFFEINE -40 MG (FIORICET, ESGIC) -40 MG PER TABLET    TAKE 1 TABLET BY MOUTH EVERY 4 HOURS AS NEEDED FOR PAIN    CHOLECALCIFEROL, VITAMIN D3, (VITAMIN D3) 50 MCG (2,000 UNIT) CAP    Take 1 capsule (2,000 Units total) by mouth once daily.    CYANOCOBALAMIN 500 MCG TABLET    Take 500 mcg by mouth once daily.    DIAZEPAM (VALIUM) 10 MG TAB    Take 1 Tablet by mouth EVERY EVENING    DICLOFENAC SODIUM (VOLTAREN) 1 % GEL    APPLY TWO Grams TOPICALLY FOUR TIMES DAILY    DILTIAZEM (CARDIZEM CD) 240 MG 24 HR CAPSULE    TAKE 1 CAPSULE BY MOUTH EVERY DAY    DOXAZOSIN  (CARDURA) 4 MG TABLET    Take 1 tablet (4 mg total) by mouth every evening.    ENALAPRIL (VASOTEC) 20 MG TABLET    TAKE ONE TABLET BY MOUTH TWO TIMES A DAY    FLUTICASONE PROPIONATE (FLONASE) 50 MCG/ACTUATION NASAL SPRAY    1 spray (50 mcg total) by Each Nostril route 2 (two) times a day.    HYDROCODONE-ACETAMINOPHEN (NORCO)  MG PER TABLET    Take 1 tablet by mouth every 8 (eight) hours as needed for Pain.    HYDROCODONE-ACETAMINOPHEN (NORCO)  MG PER TABLET    Take 1 tablet by mouth every 8 (eight) hours as needed for Pain.    MULTIVITAMIN CAPSULE    Take 1 capsule by mouth once daily.    OMEPRAZOLE (PRILOSEC) 20 MG CAPSULE    Take 1 capsule (20 mg total) by mouth 2 (two) times a day.    POLYETHYLENE GLYCOL (GLYCOLAX) 17 GRAM PWPK    Take 17 g by mouth daily as needed.    PRAVASTATIN (PRAVACHOL) 20 MG TABLET    TAKE ONE TABLET BY MOUTH ONCE DAILY    PREDNISONE (DELTASONE) 5 MG TABLET    TAKE 2 TABLETS BY MOUTH EVERY DAY    PROMETHAZINE (PHENERGAN) 25 MG TABLET    Take 1 tablet (25 mg total) by mouth every 6 (six) hours as needed for Nausea.    SILDENAFIL (VIAGRA) 100 MG TABLET    Take 1 tablet (100 mg total) by mouth as needed for Erectile Dysfunction.    TIZANIDINE (ZANAFLEX) 4 MG TABLET    Take 1 tablet (4 mg total) by mouth every 8 (eight) hours.

## 2025-08-03 DIAGNOSIS — G47.00 INSOMNIA, UNSPECIFIED TYPE: ICD-10-CM

## 2025-08-03 NOTE — TELEPHONE ENCOUNTER
No care due was identified.  [facility] Embedded Care Due Messages. Reference number: [id number].   8/03/2025 8:00:55 AM CDT

## 2025-08-04 RX ORDER — DIAZEPAM 10 MG/1
10 TABLET ORAL NIGHTLY
Qty: 30 TABLET | Refills: 2 | Status: SHIPPED | OUTPATIENT
Start: 2025-08-04

## 2025-08-04 RX ORDER — ENALAPRIL MALEATE 20 MG/1
20 TABLET ORAL 2 TIMES DAILY
Qty: 180 TABLET | Refills: 3 | Status: SHIPPED | OUTPATIENT
Start: 2025-08-04

## 2025-08-04 RX ORDER — ALBUTEROL SULFATE 90 UG/1
INHALANT RESPIRATORY (INHALATION)
Qty: 20.1 G | Refills: 3 | Status: SHIPPED | OUTPATIENT
Start: 2025-08-04

## 2025-08-04 NOTE — TELEPHONE ENCOUNTER
Refill Routing Note   Medication(s) are not appropriate for processing by [facility] for the following reason(s):        Outside of protocol  Required vitals abnormal    [facility] action(s):  Approve  Defer  Route        Medication Therapy Plan: 7/31/25 (!) 140/82      Appointments  past 12m or future 3m with PCP    Date Provider   Last Visit   7/31/2025 Salvador Kennedy, DO   Next Visit   Visit date not found Salvador Kennedy, DO   ED visits in past 90 days: 0        Note composed:1:06 PM 08/04/2025

## 2025-08-05 ENCOUNTER — PATIENT MESSAGE (OUTPATIENT)
Dept: FAMILY MEDICINE | Facility: CLINIC | Age: 75
End: 2025-08-05
Payer: MEDICARE

## 2025-08-05 ENCOUNTER — DOCUMENTATION ONLY (OUTPATIENT)
Dept: FAMILY MEDICINE | Facility: CLINIC | Age: 75
End: 2025-08-05
Payer: MEDICARE

## 2025-08-05 NOTE — PROGRESS NOTES
Scooter Swan (Key: [id number])  PA Case ID #: [id number]  Need Help? Call us at [phone]  Outcome  Approved today by [employer] 2017 NCP[employer]  Request Reference Number: [id number]. DIAZEPAM TAB 10MG is approved through 12/31/2025. Your patient may now fill this prescription and it will be covered.  Effective Date: 8/5/2025  Authorization Expiration Date: 12/31/2025  Drug  diazePAM 10MG tablets    Form  [employer] Part D Electronic Prior Authorization Form (2017 NCPDP)

## 2025-08-12 DIAGNOSIS — G89.4 CHRONIC PAIN SYNDROME: ICD-10-CM

## 2025-08-12 DIAGNOSIS — M17.9 OSTEOARTHRITIS OF KNEE, UNSPECIFIED LATERALITY, UNSPECIFIED OSTEOARTHRITIS TYPE: ICD-10-CM

## 2025-08-12 DIAGNOSIS — M05.9 SEROPOSITIVE RHEUMATOID ARTHRITIS: ICD-10-CM

## 2025-08-12 DIAGNOSIS — E78.2 MIXED HYPERLIPIDEMIA: ICD-10-CM

## 2025-08-12 RX ORDER — TIZANIDINE 4 MG/1
4 TABLET ORAL EVERY 8 HOURS
Qty: 270 TABLET | Refills: 1 | Status: SHIPPED | OUTPATIENT
Start: 2025-08-12

## 2025-08-12 RX ORDER — DOXAZOSIN 2 MG/1
2 TABLET ORAL EVERY MORNING
Qty: 90 TABLET | Refills: 1 | OUTPATIENT
Start: 2025-08-12

## 2025-08-16 DIAGNOSIS — E78.2 MIXED HYPERLIPIDEMIA: ICD-10-CM

## 2025-08-18 RX ORDER — DOXAZOSIN 4 MG/1
4 TABLET ORAL NIGHTLY
Qty: 90 TABLET | Refills: 1 | Status: SHIPPED | OUTPATIENT
Start: 2025-08-18 | End: 2026-08-18

## 2025-08-22 ENCOUNTER — HOSPITAL ENCOUNTER (OUTPATIENT)
Dept: RADIOLOGY | Facility: HOSPITAL | Age: 75
Discharge: HOME | End: 2025-08-22
Attending: INTERNAL MEDICINE
Payer: MEDICARE

## 2025-08-22 DIAGNOSIS — R51.9 WORSENING HEADACHES: ICD-10-CM

## 2025-08-22 PROBLEM — E07.9 THYROID ASSOCIATED OPHTHALMOPATHY: Status: ACTIVE | Noted: 2025-08-22

## 2025-08-22 PROBLEM — H05.89 THYROID ASSOCIATED OPHTHALMOPATHY: Status: ACTIVE | Noted: 2025-08-22

## 2025-08-22 PROCEDURE — 70551 MRI BRAIN STEM W/O DYE: CPT | Mod: 26,,, | Performed by: RADIOLOGY

## 2025-08-22 PROCEDURE — 70551 MRI BRAIN STEM W/O DYE: CPT | Mod: TC,PO

## 2025-08-25 ENCOUNTER — TELEPHONE (OUTPATIENT)
Dept: FAMILY MEDICINE | Facility: CLINIC | Age: 75
End: 2025-08-25
Payer: MEDICARE

## (undated) DEVICE — ELECTRODE REM PLYHSV RETURN 9

## (undated) DEVICE — SEE MEDLINE ITEM 146417

## (undated) DEVICE — SEE MEDLINE ITEM 157148

## (undated) DEVICE — GAUZE SPONGE 4X4 12PLY

## (undated) DEVICE — MANIFOLD 4 PORT

## (undated) DEVICE — SUT CTD VICRYL VIL BR SH 27

## (undated) DEVICE — ADHESIVE MASTISOL VIAL 48/BX

## (undated) DEVICE — SUT MONOCRYL 4.0 PS2 CP496G

## (undated) DEVICE — GLOVE SIGNATURE ESSNTL LTX 7.5

## (undated) DEVICE — COVER OVERHEAD SURG LT BLUE

## (undated) DEVICE — SEE MEDLINE ITEM 157027

## (undated) DEVICE — DRAPE LAP TIBURON 77X122IN

## (undated) DEVICE — UROVIEW 2600/2800

## (undated) DEVICE — SYR LUER LOCK STERILE 10ML

## (undated) DEVICE — BLADE SURG CARBON STEEL SZ11

## (undated) DEVICE — BRIEF MESH LARGE

## (undated) DEVICE — CONTAINER SPECIMEN OR STER 4OZ

## (undated) DEVICE — SUT VICRYL 3-0 27 SH

## (undated) DEVICE — DEVICE SNAPSECURE FOL CATH

## (undated) DEVICE — TAPE SILK 3IN

## (undated) DEVICE — SEE MEDLINE ITEM 157117

## (undated) DEVICE — IRRIGATION SET Y-TYPE TUR/BLAD

## (undated) DEVICE — APPLICATOR CHLORAPREP ORN 26ML

## (undated) DEVICE — SYR ONLY LUER LOCK 20CC

## (undated) DEVICE — LUBRICANT SURGILUBE 2 OZ

## (undated) DEVICE — NDL SAFETY 25G X 1.5 ECLIPSE

## (undated) DEVICE — BAG DRAIN ANTI REFLUX 4000ML

## (undated) DEVICE — SEE MEDLINE ITEM 152622

## (undated) DEVICE — SPONGE GELFOAM 12-7MM

## (undated) DEVICE — ELECTRODE LOOP CUTTING BIPOLAR

## (undated) DEVICE — TRAY SKIN SCRUB WET 4 COMPART

## (undated) DEVICE — SUT MONOCYRL 4-0 PS2 UND

## (undated) DEVICE — GLOVE SURG BIOGEL LATEX SZ 7.5

## (undated) DEVICE — GOWN POLY REINF X-LONG XL

## (undated) DEVICE — Device

## (undated) DEVICE — DRAPE T CYSTOSCOPY STERILE

## (undated) DEVICE — SUT VICRYL 0 SH

## (undated) DEVICE — PANTIES FEMININE NAPKIN LG/XLG

## (undated) DEVICE — DECANTER VIAL ASEPTIC TRANSFER

## (undated) DEVICE — TOWEL OR DISP STRL BLUE 4/PK

## (undated) DEVICE — BOWL STERILE LARGE 32OZ

## (undated) DEVICE — SET IRR CYSTO TUR Y-TYPE 90IN

## (undated) DEVICE — SOL 9P NACL IRR PIC IL

## (undated) DEVICE — SUT CTD VICRYL 0 UND BR SUT

## (undated) DEVICE — SPONGE COTTON TRAY 4X4IN

## (undated) DEVICE — NDL 22GA X1 1/2 REG BEVEL

## (undated) DEVICE — CLOSURE SKIN STERI STRIP 1/2X4

## (undated) DEVICE — COVER LIGHT HANDLE 80/CA

## (undated) DEVICE — SYR 10CC LUER LOCK

## (undated) DEVICE — DRESSING TRANS 4X4 TEGADERM

## (undated) DEVICE — SYR 50ML CATH TIP

## (undated) DEVICE — TRAY MINOR GEN SURG